# Patient Record
Sex: FEMALE | Race: OTHER | NOT HISPANIC OR LATINO
[De-identification: names, ages, dates, MRNs, and addresses within clinical notes are randomized per-mention and may not be internally consistent; named-entity substitution may affect disease eponyms.]

---

## 2017-01-04 ENCOUNTER — RESULT REVIEW (OUTPATIENT)
Age: 67
End: 2017-01-04

## 2017-01-06 ENCOUNTER — APPOINTMENT (OUTPATIENT)
Dept: MRI IMAGING | Facility: CLINIC | Age: 67
End: 2017-01-06

## 2017-03-02 ENCOUNTER — INPATIENT (INPATIENT)
Facility: HOSPITAL | Age: 67
LOS: 20 days | Discharge: HOME | DRG: 73 | End: 2017-03-23
Attending: INTERNAL MEDICINE | Admitting: INTERNAL MEDICINE
Payer: MEDICARE

## 2017-03-02 VITALS
TEMPERATURE: 97 F | SYSTOLIC BLOOD PRESSURE: 201 MMHG | HEART RATE: 115 BPM | OXYGEN SATURATION: 95 % | DIASTOLIC BLOOD PRESSURE: 96 MMHG

## 2017-03-02 DIAGNOSIS — R79.89 OTHER SPECIFIED ABNORMAL FINDINGS OF BLOOD CHEMISTRY: ICD-10-CM

## 2017-03-02 DIAGNOSIS — R11.2 NAUSEA WITH VOMITING, UNSPECIFIED: ICD-10-CM

## 2017-03-02 DIAGNOSIS — I10 ESSENTIAL (PRIMARY) HYPERTENSION: ICD-10-CM

## 2017-03-02 DIAGNOSIS — N17.9 ACUTE KIDNEY FAILURE, UNSPECIFIED: ICD-10-CM

## 2017-03-02 DIAGNOSIS — I48.2 CHRONIC ATRIAL FIBRILLATION: ICD-10-CM

## 2017-03-02 DIAGNOSIS — E11.22 TYPE 2 DIABETES MELLITUS WITH DIABETIC CHRONIC KIDNEY DISEASE: ICD-10-CM

## 2017-03-02 LAB
ALBUMIN SERPL ELPH-MCNC: 3.8 G/DL — SIGNIFICANT CHANGE UP (ref 3.3–5)
ALP SERPL-CCNC: 128 U/L — HIGH (ref 40–120)
ALT FLD-CCNC: 29 U/L RC — SIGNIFICANT CHANGE UP (ref 10–45)
ANION GAP SERPL CALC-SCNC: 19 MMOL/L — HIGH (ref 5–17)
APPEARANCE UR: CLEAR — SIGNIFICANT CHANGE UP
APTT BLD: 34.9 SEC — SIGNIFICANT CHANGE UP (ref 27.5–37.4)
AST SERPL-CCNC: 26 U/L — SIGNIFICANT CHANGE UP (ref 10–40)
BACTERIA # UR AUTO: ABNORMAL /HPF
BASOPHILS # BLD AUTO: 0 K/UL — SIGNIFICANT CHANGE UP (ref 0–0.2)
BASOPHILS NFR BLD AUTO: 0.1 % — SIGNIFICANT CHANGE UP (ref 0–2)
BILIRUB SERPL-MCNC: 0.4 MG/DL — SIGNIFICANT CHANGE UP (ref 0.2–1.2)
BILIRUB UR-MCNC: NEGATIVE — SIGNIFICANT CHANGE UP
BUN SERPL-MCNC: 100 MG/DL — HIGH (ref 7–23)
CALCIUM SERPL-MCNC: 11.3 MG/DL — HIGH (ref 8.4–10.5)
CHLORIDE SERPL-SCNC: 90 MMOL/L — LOW (ref 96–108)
CK MB CFR SERPL CALC: 7.7 NG/ML — HIGH (ref 0–3.8)
CK MB CFR SERPL CALC: 8.5 NG/ML — HIGH (ref 0–3.8)
CK SERPL-CCNC: 55 U/L — SIGNIFICANT CHANGE UP (ref 25–170)
CK SERPL-CCNC: 70 U/L — SIGNIFICANT CHANGE UP (ref 25–170)
CO2 SERPL-SCNC: 23 MMOL/L — SIGNIFICANT CHANGE UP (ref 22–31)
COLOR SPEC: SIGNIFICANT CHANGE UP
CREAT SERPL-MCNC: 2.84 MG/DL — HIGH (ref 0.5–1.3)
DIFF PNL FLD: ABNORMAL
EOSINOPHIL # BLD AUTO: 0 K/UL — SIGNIFICANT CHANGE UP (ref 0–0.5)
EOSINOPHIL NFR BLD AUTO: 0.3 % — SIGNIFICANT CHANGE UP (ref 0–6)
EPI CELLS # UR: SIGNIFICANT CHANGE UP /HPF
GAS PNL BLDV: SIGNIFICANT CHANGE UP
GLUCOSE SERPL-MCNC: 134 MG/DL — HIGH (ref 70–99)
GLUCOSE UR QL: 300
HCT VFR BLD CALC: 37.3 % — SIGNIFICANT CHANGE UP (ref 34.5–45)
HGB BLD-MCNC: 12.2 G/DL — SIGNIFICANT CHANGE UP (ref 11.5–15.5)
INR BLD: 1.53 RATIO — HIGH (ref 0.88–1.16)
INR BLD: 1.54 RATIO — HIGH (ref 0.88–1.16)
KETONES UR-MCNC: NEGATIVE — SIGNIFICANT CHANGE UP
LEUKOCYTE ESTERASE UR-ACNC: NEGATIVE — SIGNIFICANT CHANGE UP
LIDOCAIN IGE QN: 93 U/L — HIGH (ref 7–60)
LYMPHOCYTES # BLD AUTO: 0.8 K/UL — LOW (ref 1–3.3)
LYMPHOCYTES # BLD AUTO: 6 % — LOW (ref 13–44)
MCHC RBC-ENTMCNC: 24.7 PG — LOW (ref 27–34)
MCHC RBC-ENTMCNC: 32.7 GM/DL — SIGNIFICANT CHANGE UP (ref 32–36)
MCV RBC AUTO: 75.5 FL — LOW (ref 80–100)
MONOCYTES # BLD AUTO: 0.3 K/UL — SIGNIFICANT CHANGE UP (ref 0–0.9)
MONOCYTES NFR BLD AUTO: 2.3 % — SIGNIFICANT CHANGE UP (ref 2–14)
NEUTROPHILS # BLD AUTO: 12.8 K/UL — HIGH (ref 1.8–7.4)
NEUTROPHILS NFR BLD AUTO: 91.2 % — HIGH (ref 43–77)
NITRITE UR-MCNC: NEGATIVE — SIGNIFICANT CHANGE UP
NT-PROBNP SERPL-SCNC: 1371 PG/ML — HIGH (ref 0–300)
PH UR: 7 — SIGNIFICANT CHANGE UP (ref 4.8–8)
PLATELET # BLD AUTO: 465 K/UL — HIGH (ref 150–400)
POTASSIUM SERPL-MCNC: 4.4 MMOL/L — SIGNIFICANT CHANGE UP (ref 3.5–5.3)
POTASSIUM SERPL-SCNC: 4.4 MMOL/L — SIGNIFICANT CHANGE UP (ref 3.5–5.3)
PROT SERPL-MCNC: 8.7 G/DL — HIGH (ref 6–8.3)
PROT UR-MCNC: 500 MG/DL
PROTHROM AB SERPL-ACNC: 16.8 SEC — HIGH (ref 10–13.1)
PROTHROM AB SERPL-ACNC: 16.9 SEC — HIGH (ref 10–13.1)
RBC # BLD: 4.94 M/UL — SIGNIFICANT CHANGE UP (ref 3.8–5.2)
RBC # FLD: 17.6 % — HIGH (ref 10.3–14.5)
RBC CASTS # UR COMP ASSIST: SIGNIFICANT CHANGE UP /HPF (ref 0–2)
SODIUM SERPL-SCNC: 132 MMOL/L — LOW (ref 135–145)
SP GR SPEC: 1.01 — SIGNIFICANT CHANGE UP (ref 1.01–1.02)
TROPONIN T SERPL-MCNC: 0.17 NG/ML — HIGH (ref 0–0.06)
TROPONIN T SERPL-MCNC: 0.19 NG/ML — HIGH (ref 0–0.06)
UROBILINOGEN FLD QL: NEGATIVE — SIGNIFICANT CHANGE UP
WBC # BLD: 14 K/UL — HIGH (ref 3.8–10.5)
WBC # FLD AUTO: 14 K/UL — HIGH (ref 3.8–10.5)
WBC UR QL: SIGNIFICANT CHANGE UP /HPF (ref 0–5)

## 2017-03-02 PROCEDURE — 74176 CT ABD & PELVIS W/O CONTRAST: CPT | Mod: 26

## 2017-03-02 PROCEDURE — 93010 ELECTROCARDIOGRAM REPORT: CPT

## 2017-03-02 PROCEDURE — 99223 1ST HOSP IP/OBS HIGH 75: CPT

## 2017-03-02 PROCEDURE — 99285 EMERGENCY DEPT VISIT HI MDM: CPT | Mod: 25

## 2017-03-02 PROCEDURE — 71010: CPT | Mod: 26

## 2017-03-02 RX ORDER — INSULIN LISPRO 100/ML
VIAL (ML) SUBCUTANEOUS AT BEDTIME
Qty: 0 | Refills: 0 | Status: DISCONTINUED | OUTPATIENT
Start: 2017-03-02 | End: 2017-03-23

## 2017-03-02 RX ORDER — ONDANSETRON 8 MG/1
4 TABLET, FILM COATED ORAL ONCE
Qty: 0 | Refills: 0 | Status: COMPLETED | OUTPATIENT
Start: 2017-03-02 | End: 2017-03-02

## 2017-03-02 RX ORDER — SODIUM CHLORIDE 9 MG/ML
2000 INJECTION INTRAMUSCULAR; INTRAVENOUS; SUBCUTANEOUS ONCE
Qty: 0 | Refills: 0 | Status: COMPLETED | OUTPATIENT
Start: 2017-03-02 | End: 2017-03-02

## 2017-03-02 RX ORDER — DEXTROSE 50 % IN WATER 50 %
1 SYRINGE (ML) INTRAVENOUS ONCE
Qty: 0 | Refills: 0 | Status: DISCONTINUED | OUTPATIENT
Start: 2017-03-02 | End: 2017-03-23

## 2017-03-02 RX ORDER — AMLODIPINE BESYLATE 2.5 MG/1
5 TABLET ORAL DAILY
Qty: 0 | Refills: 0 | Status: DISCONTINUED | OUTPATIENT
Start: 2017-03-02 | End: 2017-03-03

## 2017-03-02 RX ORDER — INSULIN LISPRO 100/ML
VIAL (ML) SUBCUTANEOUS
Qty: 0 | Refills: 0 | Status: DISCONTINUED | OUTPATIENT
Start: 2017-03-02 | End: 2017-03-23

## 2017-03-02 RX ORDER — WARFARIN SODIUM 2.5 MG/1
4 TABLET ORAL ONCE
Qty: 0 | Refills: 0 | Status: COMPLETED | OUTPATIENT
Start: 2017-03-02 | End: 2017-03-02

## 2017-03-02 RX ORDER — SODIUM CHLORIDE 9 MG/ML
1000 INJECTION, SOLUTION INTRAVENOUS
Qty: 0 | Refills: 0 | Status: DISCONTINUED | OUTPATIENT
Start: 2017-03-02 | End: 2017-03-23

## 2017-03-02 RX ORDER — ONDANSETRON 8 MG/1
4 TABLET, FILM COATED ORAL EVERY 6 HOURS
Qty: 0 | Refills: 0 | Status: DISCONTINUED | OUTPATIENT
Start: 2017-03-02 | End: 2017-03-23

## 2017-03-02 RX ORDER — DEXTROSE 50 % IN WATER 50 %
25 SYRINGE (ML) INTRAVENOUS ONCE
Qty: 0 | Refills: 0 | Status: DISCONTINUED | OUTPATIENT
Start: 2017-03-02 | End: 2017-03-23

## 2017-03-02 RX ORDER — GLUCAGON INJECTION, SOLUTION 0.5 MG/.1ML
1 INJECTION, SOLUTION SUBCUTANEOUS ONCE
Qty: 0 | Refills: 0 | Status: DISCONTINUED | OUTPATIENT
Start: 2017-03-02 | End: 2017-03-23

## 2017-03-02 RX ORDER — HEPARIN SODIUM 5000 [USP'U]/ML
5000 INJECTION INTRAVENOUS; SUBCUTANEOUS EVERY 12 HOURS
Qty: 0 | Refills: 0 | Status: DISCONTINUED | OUTPATIENT
Start: 2017-03-02 | End: 2017-03-02

## 2017-03-02 RX ORDER — CITALOPRAM 10 MG/1
10 TABLET, FILM COATED ORAL DAILY
Qty: 0 | Refills: 0 | Status: DISCONTINUED | OUTPATIENT
Start: 2017-03-03 | End: 2017-03-07

## 2017-03-02 RX ORDER — SODIUM CHLORIDE 9 MG/ML
1000 INJECTION INTRAMUSCULAR; INTRAVENOUS; SUBCUTANEOUS
Qty: 0 | Refills: 0 | Status: DISCONTINUED | OUTPATIENT
Start: 2017-03-02 | End: 2017-03-03

## 2017-03-02 RX ORDER — SODIUM CHLORIDE 9 MG/ML
3 INJECTION INTRAMUSCULAR; INTRAVENOUS; SUBCUTANEOUS ONCE
Qty: 0 | Refills: 0 | Status: COMPLETED | OUTPATIENT
Start: 2017-03-02 | End: 2017-03-02

## 2017-03-02 RX ADMIN — SODIUM CHLORIDE 3 MILLILITER(S): 9 INJECTION INTRAMUSCULAR; INTRAVENOUS; SUBCUTANEOUS at 14:07

## 2017-03-02 RX ADMIN — ONDANSETRON 4 MILLIGRAM(S): 8 TABLET, FILM COATED ORAL at 20:14

## 2017-03-02 RX ADMIN — AMLODIPINE BESYLATE 5 MILLIGRAM(S): 2.5 TABLET ORAL at 20:21

## 2017-03-02 RX ADMIN — Medication 1 DROP(S): at 21:48

## 2017-03-02 RX ADMIN — ONDANSETRON 4 MILLIGRAM(S): 8 TABLET, FILM COATED ORAL at 15:04

## 2017-03-02 RX ADMIN — WARFARIN SODIUM 4 MILLIGRAM(S): 2.5 TABLET ORAL at 21:48

## 2017-03-02 RX ADMIN — SODIUM CHLORIDE 2000 MILLILITER(S): 9 INJECTION INTRAMUSCULAR; INTRAVENOUS; SUBCUTANEOUS at 15:04

## 2017-03-02 NOTE — H&P ADULT. - PROBLEM SELECTOR PROBLEM 3
Type 2 diabetes mellitus with chronic kidney disease, without long-term current use of insulin, unspecified CKD stage

## 2017-03-02 NOTE — H&P ADULT. - PROBLEM SELECTOR PLAN 2
- Renal consulted, f/u recs  -  trend Cr   - Gentle IVF 70ml/hr CKD Stage 4   - Renal consulted, f/u recs  -  trend Cr   - Gentle IVF 70ml/hr

## 2017-03-02 NOTE — ED PROVIDER NOTE - OBJECTIVE STATEMENT
66 year old female patient with pmhx of DM, HTN, HLD presents to the ED from rehab c/o vomiting due intolerance to PO intake x20-30 days with nausea and abdominal pain. Patient vomits after PO intake. Had a lengthy hospital stay in October and November. Last bowel movement was   Denies fever, diarrhea, cough, chest pain, shortness of breath. 66 year old female patient with pmhx of DM, HTN, HLD presents to the ED from rehab c/o vomiting due intolerance to PO intake x20-30 days with nausea and abdominal pain. Patient vomits after PO intake. Had a lengthy hospital stay in October and November.   Denies fever, diarrhea, cough, chest pain, shortness of breath.

## 2017-03-02 NOTE — H&P ADULT. - PSH
S/P Amputation of Lesser Toe  Rt 1-3 metatarsal  S/P amputation of lesser toe, right  2013 right great toe, 2nd and 3rd right toes

## 2017-03-02 NOTE — H&P ADULT. - PROBLEM SELECTOR PLAN 5
Trop 0.19: No CP; likely demand possibly in setting of significant hypertension.   - Trend trops and EKG

## 2017-03-02 NOTE — ED PROVIDER NOTE - PMH
Cellulitis of Foot    Diabetes  Type 2  Diabetes    Diabetic Neuropathy    Edema of Both Legs    History of Osteoarthritis    HLD (Hyperlipidemia)    HTN - Hypertension    Hyperlipidemia    Hypertension    Osteomyelitis

## 2017-03-02 NOTE — H&P ADULT. - RADIOLOGY RESULTS AND INTERPRETATION
Personally reviewed imaging. Imaging notable for mild b/l midlung linear atelectasis. CT Abd/Pelvis demonstrated small hiatal hernia. PEG in satisfactory position. No bowel obstruction. mild pulm edema.

## 2017-03-02 NOTE — ED PROVIDER NOTE - NS ED MD SCRIBE ATTENDING SCRIBE SECTIONS
PHYSICAL EXAM/VITAL SIGNS( Pullset)/HISTORY OF PRESENT ILLNESS/HIV/REVIEW OF SYSTEMS/PAST MEDICAL/SURGICAL/SOCIAL HISTORY/DISPOSITION

## 2017-03-02 NOTE — H&P ADULT. - FAMILY HISTORY
Father  Still living? Unknown  Family history of diabetes mellitus in father, Age at diagnosis: Age Unknown

## 2017-03-02 NOTE — H&P ADULT. - PROBLEM SELECTOR PLAN 1
Unclear etiology. CT Abd Pelvis without any evidence of bowel obstruction. PEG tube per imaging in place. Unclear etiology. CT Abd Pelvis without any evidence of bowel obstruction. PEG tube per imaging in place.  - Zofran prn  - Consider GI consult in am  - NPO for now; except meds; Will hold on feeds

## 2017-03-02 NOTE — H&P ADULT. - HISTORY OF PRESENT ILLNESS
67 yo woman w/DM w/diabetic neuropathy, HTN, HLD, CKD stage 4, afib  severe lymphedema, sent in from [facility] for nausea/vomiting for 1 month. The patient denied any significant abdominal pain but reported nausea and repeated episodes of vomiting nonbilious non bloody contents. She reported fatigue and generalized weakness. She reports that she vomits immediately after taking any feeds or medications via the G-tube. Per transfer documents, the patient had a BM earlier today prior to transfer. She denied any abdominal pain, fevers/chills, CP, SOB, sick contacts, or skin changes. At the facility, an abdominal XR was done negative for any evidence of obstruction or foreign body. Per transfer records, the patient was advanced to dysphagia and then mechanical soft foods per speech and swallow. The speech therapist noted persistent regrugitation of gastric fluids.     Of note, she had a prolonged hospitalization where she was hospitalized for RLE cellulitis/osteomyelitis course c/b afib and PEA arrest requiring intubation s/p trach given prolonged wean, NEHAL requiring HD, and dysphagia requiring PEG tube placement.      In the ED, initial vitals Afebrile,  201/96 95% on RA  Labs notable for leukocytosis 14.0 Hb 12.2 Plt 465 BMP Na 132,  Cr 2.84 K 4.4   Trop 0.19/CKMB 8.5; BNP 1371; VBG 7.37/46/26/1.1  UA few bacteria, negative nitrite negative LE.     Imaging notable for mild b/l midlung linear atelectasis. CT Abd/Pelvis demonstrated small hiatal hernia. PEG in satisfactory position. No bowel obstruction. mild pulm edema.     In the ED, she received 2L NS and was started on  NS @ 70 ml/hr. Zofran 4mg x1.     Renal consulted and she was admitted to medicine for further evaluation.

## 2017-03-02 NOTE — ED ADULT NURSE NOTE - OBJECTIVE STATEMENT
Pt came in with c/o nausea and vomiting. No distress. Breathing easy and non labored. Pt from rehab. Pt able to move all extremities. Son at bedside.

## 2017-03-02 NOTE — H&P ADULT. - ASSESSMENT
66F w/DM w/diabetic neuropathy, HTN, HLD, CKD stage 4, afib  severe lymphedema, [facility] for 1 month admitted for NEHAL on CKD and evaluation of nausea/vomiting.

## 2017-03-02 NOTE — ED ADULT NURSE REASSESSMENT NOTE - NS ED NURSE REASSESS COMMENT FT1
Endorsed to Lizabeth that Normal saline bolus not completed because pt refused to keep left arm straight. IV reinforced

## 2017-03-02 NOTE — H&P ADULT. - LAB RESULTS AND INTERPRETATION
Personally reviewed Labs notable for leukocytosis 14.0 Hb 12.2 Plt 465 BMP Na 132,  Cr 2.84 K 4.4   Trop 0.19/CKMB 8.5; BNP 1371; VBG 7.37/46/26/1.1  UA few bacteria, negative nitrite negative LE.

## 2017-03-03 LAB
ANION GAP SERPL CALC-SCNC: 16 MMOL/L — SIGNIFICANT CHANGE UP (ref 5–17)
APTT BLD: 32.5 SEC — SIGNIFICANT CHANGE UP (ref 27.5–37.4)
BASOPHILS # BLD AUTO: 0.03 K/UL — SIGNIFICANT CHANGE UP (ref 0–0.2)
BASOPHILS NFR BLD AUTO: 0.2 % — SIGNIFICANT CHANGE UP (ref 0–2)
BUN SERPL-MCNC: 96 MG/DL — HIGH (ref 7–23)
CALCIUM SERPL-MCNC: 10.4 MG/DL — SIGNIFICANT CHANGE UP (ref 8.4–10.5)
CHLORIDE SERPL-SCNC: 95 MMOL/L — LOW (ref 96–108)
CO2 SERPL-SCNC: 22 MMOL/L — SIGNIFICANT CHANGE UP (ref 22–31)
CREAT SERPL-MCNC: 2.63 MG/DL — HIGH (ref 0.5–1.3)
CULTURE RESULTS: SIGNIFICANT CHANGE UP
EOSINOPHIL # BLD AUTO: 0.19 K/UL — SIGNIFICANT CHANGE UP (ref 0–0.5)
EOSINOPHIL NFR BLD AUTO: 1.4 % — SIGNIFICANT CHANGE UP (ref 0–6)
GLUCOSE SERPL-MCNC: 91 MG/DL — SIGNIFICANT CHANGE UP (ref 70–99)
HBA1C BLD-MCNC: 6.4 % — HIGH (ref 4–5.6)
HCT VFR BLD CALC: 31 % — LOW (ref 34.5–45)
HGB BLD-MCNC: 10.2 G/DL — LOW (ref 11.5–15.5)
IMM GRANULOCYTES NFR BLD AUTO: 0.2 % — SIGNIFICANT CHANGE UP (ref 0–1.5)
INR BLD: 1.55 RATIO — HIGH (ref 0.88–1.16)
LYMPHOCYTES # BLD AUTO: 1.55 K/UL — SIGNIFICANT CHANGE UP (ref 1–3.3)
LYMPHOCYTES # BLD AUTO: 11.6 % — LOW (ref 13–44)
MAGNESIUM SERPL-MCNC: 3 MG/DL — HIGH (ref 1.6–2.6)
MCHC RBC-ENTMCNC: 24.5 PG — LOW (ref 27–34)
MCHC RBC-ENTMCNC: 32.9 GM/DL — SIGNIFICANT CHANGE UP (ref 32–36)
MCV RBC AUTO: 74.3 FL — LOW (ref 80–100)
MONOCYTES # BLD AUTO: 0.57 K/UL — SIGNIFICANT CHANGE UP (ref 0–0.9)
MONOCYTES NFR BLD AUTO: 4.3 % — SIGNIFICANT CHANGE UP (ref 2–14)
NEUTROPHILS # BLD AUTO: 11.02 K/UL — HIGH (ref 1.8–7.4)
NEUTROPHILS NFR BLD AUTO: 82.3 % — HIGH (ref 43–77)
PHOSPHATE SERPL-MCNC: 5.6 MG/DL — HIGH (ref 2.5–4.5)
PLATELET # BLD AUTO: 464 K/UL — HIGH (ref 150–400)
POTASSIUM SERPL-MCNC: 3.3 MMOL/L — LOW (ref 3.5–5.3)
POTASSIUM SERPL-SCNC: 3.3 MMOL/L — LOW (ref 3.5–5.3)
PROTHROM AB SERPL-ACNC: 17.6 SEC — HIGH (ref 10–13.1)
RBC # BLD: 4.17 M/UL — SIGNIFICANT CHANGE UP (ref 3.8–5.2)
RBC # FLD: 18.1 % — HIGH (ref 10.3–14.5)
SODIUM SERPL-SCNC: 133 MMOL/L — LOW (ref 135–145)
SPECIMEN SOURCE: SIGNIFICANT CHANGE UP
WBC # BLD: 13.39 K/UL — HIGH (ref 3.8–10.5)
WBC # FLD AUTO: 13.39 K/UL — HIGH (ref 3.8–10.5)

## 2017-03-03 RX ORDER — METOCLOPRAMIDE HCL 10 MG
5 TABLET ORAL EVERY 8 HOURS
Qty: 0 | Refills: 0 | Status: DISCONTINUED | OUTPATIENT
Start: 2017-03-03 | End: 2017-03-07

## 2017-03-03 RX ORDER — HYDRALAZINE HCL 50 MG
10 TABLET ORAL
Qty: 0 | Refills: 0 | Status: DISCONTINUED | OUTPATIENT
Start: 2017-03-03 | End: 2017-03-04

## 2017-03-03 RX ORDER — LISINOPRIL 2.5 MG/1
5 TABLET ORAL DAILY
Qty: 0 | Refills: 0 | Status: DISCONTINUED | OUTPATIENT
Start: 2017-03-03 | End: 2017-03-07

## 2017-03-03 RX ORDER — ASPIRIN/CALCIUM CARB/MAGNESIUM 324 MG
81 TABLET ORAL DAILY
Qty: 0 | Refills: 0 | Status: DISCONTINUED | OUTPATIENT
Start: 2017-03-03 | End: 2017-03-04

## 2017-03-03 RX ORDER — WARFARIN SODIUM 2.5 MG/1
4 TABLET ORAL ONCE
Qty: 0 | Refills: 0 | Status: COMPLETED | OUTPATIENT
Start: 2017-03-03 | End: 2017-03-03

## 2017-03-03 RX ORDER — POT CHLORIDE/POT BICARB/CIT AC 25 MEQ
25 TABLET, EFFERVESCENT ORAL
Qty: 0 | Refills: 0 | Status: COMPLETED | OUTPATIENT
Start: 2017-03-03 | End: 2017-03-03

## 2017-03-03 RX ORDER — AMLODIPINE BESYLATE 2.5 MG/1
5 TABLET ORAL ONCE
Qty: 0 | Refills: 0 | Status: DISCONTINUED | OUTPATIENT
Start: 2017-03-03 | End: 2017-03-03

## 2017-03-03 RX ORDER — HYDRALAZINE HCL 50 MG
10 TABLET ORAL
Qty: 0 | Refills: 0 | Status: DISCONTINUED | OUTPATIENT
Start: 2017-03-03 | End: 2017-03-03

## 2017-03-03 RX ORDER — ZALEPLON 10 MG
5 CAPSULE ORAL AT BEDTIME
Qty: 0 | Refills: 0 | Status: DISCONTINUED | OUTPATIENT
Start: 2017-03-03 | End: 2017-03-07

## 2017-03-03 RX ORDER — HYDRALAZINE HCL 50 MG
5 TABLET ORAL ONCE
Qty: 0 | Refills: 0 | Status: COMPLETED | OUTPATIENT
Start: 2017-03-03 | End: 2017-03-03

## 2017-03-03 RX ORDER — SODIUM CHLORIDE 9 MG/ML
1000 INJECTION, SOLUTION INTRAVENOUS
Qty: 0 | Refills: 0 | Status: DISCONTINUED | OUTPATIENT
Start: 2017-03-03 | End: 2017-03-05

## 2017-03-03 RX ADMIN — ONDANSETRON 4 MILLIGRAM(S): 8 TABLET, FILM COATED ORAL at 04:49

## 2017-03-03 RX ADMIN — LISINOPRIL 5 MILLIGRAM(S): 2.5 TABLET ORAL at 17:08

## 2017-03-03 RX ADMIN — CITALOPRAM 10 MILLIGRAM(S): 10 TABLET, FILM COATED ORAL at 12:59

## 2017-03-03 RX ADMIN — AMLODIPINE BESYLATE 5 MILLIGRAM(S): 2.5 TABLET ORAL at 05:30

## 2017-03-03 RX ADMIN — Medication 1 DROP(S): at 15:49

## 2017-03-03 RX ADMIN — Medication 5 MILLIGRAM(S): at 23:35

## 2017-03-03 RX ADMIN — Medication 5 MILLIGRAM(S): at 15:50

## 2017-03-03 RX ADMIN — Medication 25 MILLIEQUIVALENT(S): at 11:06

## 2017-03-03 RX ADMIN — ONDANSETRON 4 MILLIGRAM(S): 8 TABLET, FILM COATED ORAL at 22:10

## 2017-03-03 RX ADMIN — Medication 25 MILLIEQUIVALENT(S): at 12:59

## 2017-03-03 RX ADMIN — Medication 5 MILLIGRAM(S): at 06:50

## 2017-03-03 RX ADMIN — SODIUM CHLORIDE 70 MILLILITER(S): 9 INJECTION, SOLUTION INTRAVENOUS at 11:06

## 2017-03-03 RX ADMIN — Medication 1 DROP(S): at 05:30

## 2017-03-03 RX ADMIN — SODIUM CHLORIDE 70 MILLILITER(S): 9 INJECTION, SOLUTION INTRAVENOUS at 22:06

## 2017-03-03 RX ADMIN — Medication 5 MILLIGRAM(S): at 23:03

## 2017-03-03 RX ADMIN — Medication 81 MILLIGRAM(S): at 12:59

## 2017-03-03 RX ADMIN — ONDANSETRON 4 MILLIGRAM(S): 8 TABLET, FILM COATED ORAL at 11:20

## 2017-03-03 RX ADMIN — Medication 10 MILLIGRAM(S): at 17:07

## 2017-03-03 RX ADMIN — Medication 1 DROP(S): at 22:07

## 2017-03-03 RX ADMIN — WARFARIN SODIUM 4 MILLIGRAM(S): 2.5 TABLET ORAL at 23:06

## 2017-03-03 NOTE — DIETITIAN INITIAL EVALUATION ADULT. - ADHERENCE
noted per transfer records, pt was on Nepro via PEG for a total volume of 1200ml per day, providing 2160cal, 96Gm prot, pt reports she was on nocturnal feeds

## 2017-03-03 NOTE — DIETITIAN INITIAL EVALUATION ADULT. - ENERGY NEEDS
ht: 5' , wt: 160 pounds, BMI:31.2 kg/m2, IBW: 100 pounds +/- 10%    pt admitted c nausea/vomiting, abdominal pain from [facility]; Pmhx noted (including lymphedema); noted no obstruction noted. Spoke c NP, per attending, pt was on po diet and tube feeds and possibly po diet was making her nauseous, would like to try bolus feeds at this time. Noted questionable if pt c pressure ulcer.

## 2017-03-03 NOTE — DIETITIAN INITIAL EVALUATION ADULT. - NS AS NUTRI INTERV ENTERAL NUTRITION
Discussed c NP, conflicting information and concern for whether bolus feeds will help c relieving nausea; noted attending knows pt well and would like to try bolus feeds: recommend gradually increasing feeds to 7 cans daily of Glucerna 1.2 (1995cal, 99Gm prot; ~27cal/kg of stated wt; ~2Gm prot/kg based on IBW). RD remains available as needed to adjust tube feeds as needed based on tolerance, wt and labs.

## 2017-03-03 NOTE — DIETITIAN INITIAL EVALUATION ADULT. - FACTORS AFF FOOD INTAKE
noted per MBS on 10/31/2016, recommended for NPO c non-oral means of hydration; pt reports at [place] she was having daily BMs for the most part; pt reports she has been experiencing nausea c tube feeds for the past 4-5 months, unable to recall whether she has nausea when she was in the hospital/difficulty chewing/difficulty swallowing

## 2017-03-03 NOTE — DIETITIAN INITIAL EVALUATION ADULT. - PHYSICAL APPEARANCE
nutrition focused physical exam conducted, mild muscle loss around shoulders and severe wasting in calf muscles noted at this time/overweight

## 2017-03-03 NOTE — DIETITIAN INITIAL EVALUATION ADULT. - NS FNS WEIGHT USED FOR CALC
stated/160 pounds; IBW for prot needs; defer fluid needs to team at this time; estimated nutrient needs can be adjusted as needed pending more accurate wt

## 2017-03-03 NOTE — DIETITIAN INITIAL EVALUATION ADULT. - OTHER INFO
pt seen for consult for chewing/swallowing difficulty. pt reports she weighed about 200 pounds about 6 months ago and states now she weighs about 160 pounds, noted admission wt of 171.9 pounds, questionable which wt more accurate, however, overall pt c wt loss in short period of time-possibly related to hospitalization, decreased po intake compared to how much pt used to eat, noted pt was obese- likely po intake at her usual wt versus how much she was getting via tube feeds (which did provide her c adequate prot and calories and micronutrient coverage) was lower. pt reports NKFA. Noted per chart, pt was on Coumadin PTA. pt reports at [facility], her Finger sticks were usually WNL, states she remembers them only going as high as 170 but not much higher than that and could not recall how low they had been. Noted pt was on Glucerna 1.2 upon DC to [facility] in November 2016 per DC note, per pt she is unsure which formulas she was on.

## 2017-03-03 NOTE — DIETITIAN INITIAL EVALUATION ADULT. - PROBLEM SELECTOR PLAN 1
Unclear etiology. CT Abd Pelvis without any evidence of bowel obstruction. PEG tube per imaging in place.  - Zofran prn  - Consider GI consult in am  - NPO for now; except meds; Will hold on feeds

## 2017-03-03 NOTE — DIETITIAN INITIAL EVALUATION ADULT. - ORAL INTAKE PTA
pt reports being NPO at [place], noted per H&P, pt recommended for mechanical soft diet, pt reports she was not eating anything by mouth

## 2017-03-04 LAB
ANION GAP SERPL CALC-SCNC: 18 MMOL/L — HIGH (ref 5–17)
BUN SERPL-MCNC: 83 MG/DL — HIGH (ref 7–23)
CALCIUM SERPL-MCNC: 10.9 MG/DL — HIGH (ref 8.4–10.5)
CHLORIDE SERPL-SCNC: 105 MMOL/L — SIGNIFICANT CHANGE UP (ref 96–108)
CO2 SERPL-SCNC: 19 MMOL/L — LOW (ref 22–31)
CREAT SERPL-MCNC: 2.56 MG/DL — HIGH (ref 0.5–1.3)
GLUCOSE SERPL-MCNC: 99 MG/DL — SIGNIFICANT CHANGE UP (ref 70–99)
HCT VFR BLD CALC: 35.6 % — SIGNIFICANT CHANGE UP (ref 34.5–45)
HGB BLD-MCNC: 11.6 G/DL — SIGNIFICANT CHANGE UP (ref 11.5–15.5)
INR BLD: 2.02 RATIO — HIGH (ref 0.88–1.16)
MCHC RBC-ENTMCNC: 24.8 PG — LOW (ref 27–34)
MCHC RBC-ENTMCNC: 32.6 GM/DL — SIGNIFICANT CHANGE UP (ref 32–36)
MCV RBC AUTO: 76.1 FL — LOW (ref 80–100)
PLATELET # BLD AUTO: 438 K/UL — HIGH (ref 150–400)
POTASSIUM SERPL-MCNC: 3.6 MMOL/L — SIGNIFICANT CHANGE UP (ref 3.5–5.3)
POTASSIUM SERPL-SCNC: 3.6 MMOL/L — SIGNIFICANT CHANGE UP (ref 3.5–5.3)
PROTHROM AB SERPL-ACNC: 23 SEC — HIGH (ref 10–13.1)
RBC # BLD: 4.68 M/UL — SIGNIFICANT CHANGE UP (ref 3.8–5.2)
RBC # FLD: 18.8 % — HIGH (ref 10.3–14.5)
SODIUM SERPL-SCNC: 142 MMOL/L — SIGNIFICANT CHANGE UP (ref 135–145)
WBC # BLD: 13.07 K/UL — HIGH (ref 3.8–10.5)
WBC # FLD AUTO: 13.07 K/UL — HIGH (ref 3.8–10.5)

## 2017-03-04 RX ORDER — WARFARIN SODIUM 2.5 MG/1
4 TABLET ORAL ONCE
Qty: 0 | Refills: 0 | Status: COMPLETED | OUTPATIENT
Start: 2017-03-04 | End: 2017-03-04

## 2017-03-04 RX ORDER — HYDRALAZINE HCL 50 MG
25 TABLET ORAL
Qty: 0 | Refills: 0 | Status: DISCONTINUED | OUTPATIENT
Start: 2017-03-04 | End: 2017-03-05

## 2017-03-04 RX ORDER — ASPIRIN/CALCIUM CARB/MAGNESIUM 324 MG
81 TABLET ORAL DAILY
Qty: 0 | Refills: 0 | Status: DISCONTINUED | OUTPATIENT
Start: 2017-03-04 | End: 2017-03-07

## 2017-03-04 RX ADMIN — Medication 1 DROP(S): at 05:23

## 2017-03-04 RX ADMIN — Medication 1: at 12:02

## 2017-03-04 RX ADMIN — WARFARIN SODIUM 4 MILLIGRAM(S): 2.5 TABLET ORAL at 22:46

## 2017-03-04 RX ADMIN — SODIUM CHLORIDE 70 MILLILITER(S): 9 INJECTION, SOLUTION INTRAVENOUS at 17:53

## 2017-03-04 RX ADMIN — Medication 1 DROP(S): at 22:46

## 2017-03-04 RX ADMIN — Medication 5 MILLIGRAM(S): at 13:56

## 2017-03-04 RX ADMIN — ONDANSETRON 4 MILLIGRAM(S): 8 TABLET, FILM COATED ORAL at 09:46

## 2017-03-04 RX ADMIN — Medication 1 DROP(S): at 13:55

## 2017-03-04 RX ADMIN — Medication 10 MILLIGRAM(S): at 05:22

## 2017-03-04 RX ADMIN — LISINOPRIL 5 MILLIGRAM(S): 2.5 TABLET ORAL at 05:22

## 2017-03-04 RX ADMIN — ONDANSETRON 4 MILLIGRAM(S): 8 TABLET, FILM COATED ORAL at 21:22

## 2017-03-04 RX ADMIN — CITALOPRAM 10 MILLIGRAM(S): 10 TABLET, FILM COATED ORAL at 12:02

## 2017-03-04 RX ADMIN — Medication 10 MILLIGRAM(S): at 17:53

## 2017-03-04 RX ADMIN — Medication 5 MILLIGRAM(S): at 22:46

## 2017-03-04 RX ADMIN — Medication 81 MILLIGRAM(S): at 13:55

## 2017-03-04 RX ADMIN — ONDANSETRON 4 MILLIGRAM(S): 8 TABLET, FILM COATED ORAL at 15:35

## 2017-03-04 RX ADMIN — SODIUM CHLORIDE 70 MILLILITER(S): 9 INJECTION, SOLUTION INTRAVENOUS at 23:05

## 2017-03-04 RX ADMIN — Medication 5 MILLIGRAM(S): at 05:22

## 2017-03-04 NOTE — SWALLOW BEDSIDE ASSESSMENT ADULT - SWALLOW EVAL: DIAGNOSIS
Pt presents with a h/o oropharyngeal dysphagia, now admitted with persistent intermittent emesis x 1 mo., suspected 2/2 Gastroparesis with plan for conversion of PEG to J-tube. Given current ongoing GI w/u and plan, would need clearance from a GI perspective for trials of all PO textures including solids, and possibly barium for MBS prior to completion of objective assessment. Will schedule objective swallow study for comprehensive evaluation of swallow function pending further clarification of GI diagnosis and plan.

## 2017-03-04 NOTE — SWALLOW BEDSIDE ASSESSMENT ADULT - COMMENTS
Of note, she had a prolonged hospitalization where she was hospitalized for RLE cellulitis/osteomyelitis course c/b afib and PEA arrest requiring intubation s/p trach given prolonged wean, NEHAL requiring HD, dysphagia requiring PEG  placement. In ED, initial vitals Afebrile,  201/96 95% on RA  Labs notable for leukocytosis 14.0 Hb 12.2 Plt 465 BMP Na 132,  Cr 2.84 K 4.4, Trop 0.19/CKMB 8.5; BNP 1371; VBG 7.37/46/26/1.1  UA few bacteria, negative nitrite negative LE. Imaging notable for mild b/l midlung linear atelectasis. CT Abd/Pelvis demonstrated small hiatal hernia. PEG in satisfactory position. No bowel obstruction. mild pulm edema. 2 cm linear metallic density in the transverse colon of uncertain etiology. In the ED, she received 2L NS and was started on  NS @ 70 ml/hr. Zofran 4mg x1. Renal consulted and she was admitted to medicine for further evaluation. +hyponatremia, hypokalemia, NEHAL,   Malnutrition alert: 30-40 lb wt loss in ~6 mos.   Cards following for NSTEMI.  GI: vomitting, likely gastroparesis, IR contacted for conversion from G to G-J tube, would avoid high volume/bolus feeds, ok to start low rate feeds.

## 2017-03-04 NOTE — SWALLOW BEDSIDE ASSESSMENT ADULT - SLP GENERAL OBSERVATIONS
Pt evaluated OOB seated upright in chair. Telephone  #[phone] (Tej) assisted for Farsi. Pt verbally responsive, suspected cognitive deficits, follows directions for the purposes of the assessment. + Dysphonia with hoarse vocal quality and reduced vocal intensity.

## 2017-03-04 NOTE — SWALLOW BEDSIDE ASSESSMENT ADULT - SWALLOW EVAL: PATIENT/FAMILY GOALS STATEMENT
"I'm very hungry." Pt reports that she has been participating in swallow therapy exercises while at [facility]. She reports consuming very limited trials of PO during swallow therapy only, receiving all nutrition/hydration/meds via PEG.

## 2017-03-04 NOTE — SWALLOW BEDSIDE ASSESSMENT ADULT - SLP PERTINENT HISTORY OF CURRENT PROBLEM
65 yo woman w/DM w/diabetic neuropathy, HTN, HLD, CKD stage 4, afib  severe lymphedema, [facility]. Pt denied any significant abdominal pain but reported nausea and repeated episodes of vomiting nonbilious non bloody contents. Reported fatigue and generalized weakness. Reports that she vomits immediately after any feeds or meds via G-tube. Per transfer documents, Pt had a BM earlier today prior to transfer. Denied any abdominal pain, fevers/chills, CP, SOB, sick contacts, or skin changes. At Rehab, Abd XR was done negative for any evidence of obstruction or foreign body. Per transfer records, Pt was advanced to dysphagia and then mechanical soft foods per speech and swallow. Speech therapist noted persistent regurgitation of gastric fluids.

## 2017-03-05 LAB
ANION GAP SERPL CALC-SCNC: 17 MMOL/L — SIGNIFICANT CHANGE UP (ref 5–17)
BUN SERPL-MCNC: 68 MG/DL — HIGH (ref 7–23)
CALCIUM SERPL-MCNC: 10.6 MG/DL — HIGH (ref 8.4–10.5)
CHLORIDE SERPL-SCNC: 109 MMOL/L — HIGH (ref 96–108)
CO2 SERPL-SCNC: 20 MMOL/L — LOW (ref 22–31)
CREAT SERPL-MCNC: 2.31 MG/DL — HIGH (ref 0.5–1.3)
GLUCOSE SERPL-MCNC: 127 MG/DL — HIGH (ref 70–99)
HCT VFR BLD CALC: 32.8 % — LOW (ref 34.5–45)
HGB BLD-MCNC: 10.5 G/DL — LOW (ref 11.5–15.5)
INR BLD: 2.01 RATIO — HIGH (ref 0.88–1.16)
MCHC RBC-ENTMCNC: 24.7 PG — LOW (ref 27–34)
MCHC RBC-ENTMCNC: 32 GM/DL — SIGNIFICANT CHANGE UP (ref 32–36)
MCV RBC AUTO: 77.2 FL — LOW (ref 80–100)
PLATELET # BLD AUTO: 458 K/UL — HIGH (ref 150–400)
POTASSIUM SERPL-MCNC: 3.1 MMOL/L — LOW (ref 3.5–5.3)
POTASSIUM SERPL-SCNC: 3.1 MMOL/L — LOW (ref 3.5–5.3)
PROTHROM AB SERPL-ACNC: 22.9 SEC — HIGH (ref 10–13.1)
RBC # BLD: 4.25 M/UL — SIGNIFICANT CHANGE UP (ref 3.8–5.2)
RBC # FLD: 18.8 % — HIGH (ref 10.3–14.5)
SODIUM SERPL-SCNC: 146 MMOL/L — HIGH (ref 135–145)
WBC # BLD: 13.76 K/UL — HIGH (ref 3.8–10.5)
WBC # FLD AUTO: 13.76 K/UL — HIGH (ref 3.8–10.5)

## 2017-03-05 RX ORDER — WARFARIN SODIUM 2.5 MG/1
4 TABLET ORAL ONCE
Qty: 0 | Refills: 0 | Status: COMPLETED | OUTPATIENT
Start: 2017-03-05 | End: 2017-03-05

## 2017-03-05 RX ORDER — POTASSIUM CHLORIDE 20 MEQ
10 PACKET (EA) ORAL
Qty: 0 | Refills: 0 | Status: COMPLETED | OUTPATIENT
Start: 2017-03-05 | End: 2017-03-05

## 2017-03-05 RX ORDER — POTASSIUM CHLORIDE 20 MEQ
20 PACKET (EA) ORAL
Qty: 0 | Refills: 0 | Status: DISCONTINUED | OUTPATIENT
Start: 2017-03-05 | End: 2017-03-05

## 2017-03-05 RX ORDER — HYDRALAZINE HCL 50 MG
50 TABLET ORAL THREE TIMES A DAY
Qty: 0 | Refills: 0 | Status: DISCONTINUED | OUTPATIENT
Start: 2017-03-05 | End: 2017-03-06

## 2017-03-05 RX ORDER — HYDRALAZINE HCL 50 MG
25 TABLET ORAL EVERY 8 HOURS
Qty: 0 | Refills: 0 | Status: DISCONTINUED | OUTPATIENT
Start: 2017-03-05 | End: 2017-03-05

## 2017-03-05 RX ORDER — HYDRALAZINE HCL 50 MG
10 TABLET ORAL ONCE
Qty: 0 | Refills: 0 | Status: COMPLETED | OUTPATIENT
Start: 2017-03-05 | End: 2017-03-05

## 2017-03-05 RX ORDER — HYDRALAZINE HCL 50 MG
20 TABLET ORAL EVERY 6 HOURS
Qty: 0 | Refills: 0 | Status: DISCONTINUED | OUTPATIENT
Start: 2017-03-05 | End: 2017-03-12

## 2017-03-05 RX ADMIN — Medication 50 MILLIGRAM(S): at 14:03

## 2017-03-05 RX ADMIN — Medication 1 DROP(S): at 14:00

## 2017-03-05 RX ADMIN — Medication 1 DROP(S): at 22:21

## 2017-03-05 RX ADMIN — Medication 5 MILLIGRAM(S): at 22:21

## 2017-03-05 RX ADMIN — Medication 10 MILLIGRAM(S): at 10:26

## 2017-03-05 RX ADMIN — Medication 5 MILLIGRAM(S): at 14:00

## 2017-03-05 RX ADMIN — Medication 81 MILLIGRAM(S): at 11:28

## 2017-03-05 RX ADMIN — Medication 25 MILLIGRAM(S): at 06:00

## 2017-03-05 RX ADMIN — LISINOPRIL 5 MILLIGRAM(S): 2.5 TABLET ORAL at 06:00

## 2017-03-05 RX ADMIN — CITALOPRAM 10 MILLIGRAM(S): 10 TABLET, FILM COATED ORAL at 11:28

## 2017-03-05 RX ADMIN — Medication 100 MILLIEQUIVALENT(S): at 15:22

## 2017-03-05 RX ADMIN — Medication 5 MILLIGRAM(S): at 06:00

## 2017-03-05 RX ADMIN — Medication 100 MILLIEQUIVALENT(S): at 12:02

## 2017-03-05 RX ADMIN — Medication 50 MILLIGRAM(S): at 22:25

## 2017-03-05 RX ADMIN — Medication 100 MILLIEQUIVALENT(S): at 16:35

## 2017-03-05 RX ADMIN — WARFARIN SODIUM 4 MILLIGRAM(S): 2.5 TABLET ORAL at 22:21

## 2017-03-05 RX ADMIN — Medication 1 DROP(S): at 06:00

## 2017-03-05 RX ADMIN — ONDANSETRON 4 MILLIGRAM(S): 8 TABLET, FILM COATED ORAL at 20:27

## 2017-03-05 RX ADMIN — ONDANSETRON 4 MILLIGRAM(S): 8 TABLET, FILM COATED ORAL at 05:58

## 2017-03-06 ENCOUNTER — APPOINTMENT (OUTPATIENT)
Dept: VASCULAR SURGERY | Facility: CLINIC | Age: 67
End: 2017-03-06

## 2017-03-06 ENCOUNTER — TRANSCRIPTION ENCOUNTER (OUTPATIENT)
Age: 67
End: 2017-03-06

## 2017-03-06 LAB
ANION GAP SERPL CALC-SCNC: 15 MMOL/L — SIGNIFICANT CHANGE UP (ref 5–17)
BUN SERPL-MCNC: 66 MG/DL — HIGH (ref 7–23)
CALCIUM SERPL-MCNC: 10.8 MG/DL — HIGH (ref 8.4–10.5)
CHLORIDE SERPL-SCNC: 111 MMOL/L — HIGH (ref 96–108)
CO2 SERPL-SCNC: 20 MMOL/L — LOW (ref 22–31)
CREAT SERPL-MCNC: 2.3 MG/DL — HIGH (ref 0.5–1.3)
GLUCOSE SERPL-MCNC: 104 MG/DL — HIGH (ref 70–99)
HCT VFR BLD CALC: 34.9 % — SIGNIFICANT CHANGE UP (ref 34.5–45)
HGB BLD-MCNC: 11.1 G/DL — LOW (ref 11.5–15.5)
INR BLD: 1.97 RATIO — HIGH (ref 0.88–1.16)
MCHC RBC-ENTMCNC: 24.7 PG — LOW (ref 27–34)
MCHC RBC-ENTMCNC: 31.8 GM/DL — LOW (ref 32–36)
MCV RBC AUTO: 77.7 FL — LOW (ref 80–100)
PLATELET # BLD AUTO: 460 K/UL — HIGH (ref 150–400)
POTASSIUM SERPL-MCNC: 3.2 MMOL/L — LOW (ref 3.5–5.3)
POTASSIUM SERPL-MCNC: 3.7 MMOL/L — SIGNIFICANT CHANGE UP (ref 3.5–5.3)
POTASSIUM SERPL-SCNC: 3.2 MMOL/L — LOW (ref 3.5–5.3)
POTASSIUM SERPL-SCNC: 3.7 MMOL/L — SIGNIFICANT CHANGE UP (ref 3.5–5.3)
PROTHROM AB SERPL-ACNC: 22.4 SEC — HIGH (ref 10–13.1)
RBC # BLD: 4.49 M/UL — SIGNIFICANT CHANGE UP (ref 3.8–5.2)
RBC # FLD: 19.1 % — HIGH (ref 10.3–14.5)
SODIUM SERPL-SCNC: 146 MMOL/L — HIGH (ref 135–145)
WBC # BLD: 13.86 K/UL — HIGH (ref 3.8–10.5)
WBC # FLD AUTO: 13.86 K/UL — HIGH (ref 3.8–10.5)

## 2017-03-06 PROCEDURE — 93010 ELECTROCARDIOGRAM REPORT: CPT

## 2017-03-06 RX ORDER — METOPROLOL TARTRATE 50 MG
50 TABLET ORAL
Qty: 0 | Refills: 0 | Status: DISCONTINUED | OUTPATIENT
Start: 2017-03-06 | End: 2017-03-07

## 2017-03-06 RX ORDER — HYDRALAZINE HCL 50 MG
75 TABLET ORAL THREE TIMES A DAY
Qty: 0 | Refills: 0 | Status: DISCONTINUED | OUTPATIENT
Start: 2017-03-06 | End: 2017-03-07

## 2017-03-06 RX ORDER — WARFARIN SODIUM 2.5 MG/1
4 TABLET ORAL ONCE
Qty: 0 | Refills: 0 | Status: COMPLETED | OUTPATIENT
Start: 2017-03-06 | End: 2017-03-06

## 2017-03-06 RX ORDER — METOPROLOL TARTRATE 50 MG
25 TABLET ORAL
Qty: 0 | Refills: 0 | Status: DISCONTINUED | OUTPATIENT
Start: 2017-03-06 | End: 2017-03-06

## 2017-03-06 RX ORDER — POTASSIUM CHLORIDE 20 MEQ
40 PACKET (EA) ORAL ONCE
Qty: 0 | Refills: 0 | Status: COMPLETED | OUTPATIENT
Start: 2017-03-06 | End: 2017-03-06

## 2017-03-06 RX ORDER — SODIUM CHLORIDE 9 MG/ML
1000 INJECTION, SOLUTION INTRAVENOUS
Qty: 0 | Refills: 0 | Status: DISCONTINUED | OUTPATIENT
Start: 2017-03-06 | End: 2017-03-09

## 2017-03-06 RX ADMIN — Medication 50 MILLIGRAM(S): at 18:03

## 2017-03-06 RX ADMIN — Medication 1 DROP(S): at 22:15

## 2017-03-06 RX ADMIN — Medication 5 MILLIGRAM(S): at 13:13

## 2017-03-06 RX ADMIN — Medication 40 MILLIEQUIVALENT(S): at 17:58

## 2017-03-06 RX ADMIN — Medication 81 MILLIGRAM(S): at 13:13

## 2017-03-06 RX ADMIN — LISINOPRIL 5 MILLIGRAM(S): 2.5 TABLET ORAL at 06:00

## 2017-03-06 RX ADMIN — WARFARIN SODIUM 4 MILLIGRAM(S): 2.5 TABLET ORAL at 22:18

## 2017-03-06 RX ADMIN — SODIUM CHLORIDE 70 MILLILITER(S): 9 INJECTION, SOLUTION INTRAVENOUS at 17:00

## 2017-03-06 RX ADMIN — Medication 5 MILLIGRAM(S): at 22:13

## 2017-03-06 RX ADMIN — CITALOPRAM 10 MILLIGRAM(S): 10 TABLET, FILM COATED ORAL at 13:13

## 2017-03-06 RX ADMIN — Medication 1 DROP(S): at 13:13

## 2017-03-06 RX ADMIN — Medication 5 MILLIGRAM(S): at 06:00

## 2017-03-06 RX ADMIN — Medication 50 MILLIGRAM(S): at 05:59

## 2017-03-06 RX ADMIN — Medication 75 MILLIGRAM(S): at 14:06

## 2017-03-06 RX ADMIN — Medication 1 DROP(S): at 06:00

## 2017-03-06 RX ADMIN — Medication 75 MILLIGRAM(S): at 22:14

## 2017-03-06 NOTE — DISCHARGE NOTE ADULT - CARE PROVIDERS DIRECT ADDRESSES
,DirectAddress_Unknown,[email or web address],DirectAddress_Unknown,DirectAddress_Unknown

## 2017-03-06 NOTE — DISCHARGE NOTE ADULT - MEDICATION SUMMARY - MEDICATIONS TO STOP TAKING
I will STOP taking the medications listed below when I get home from the hospital:    Lasix 40 mg oral tablet  -- 1 tab(s) by gastrostomy tube once a day

## 2017-03-06 NOTE — DISCHARGE NOTE ADULT - PLAN OF CARE
no nausea/vomiting Gastrostomy port to stay vented;  If giving meds through gastrostomy, keep it clamped for 2 hours  Use Reglan until Dromperidone is available Continue Reglan until dromperidone purchased by family Routine care  use jejunostomy port for feeding Avoid taking (NSAIDs) - (ex: Ibuprofen, Advil, Celebrex, Naprosyn)  Avoid taking any nephrotoxic agents (can harm kidneys) - Intravenous contrast for diagnostic testing, combination cold medications.  Have all medications adjusted for your renal function by your Health Care Provider.  Blood pressure control is important.  Take all medication as prescribed. Continue free water via j tube 300 cc Q6 hourly Continue Epogen 3x weekly complete treatment Will receive Epogen at Nephrology office completed  treatment

## 2017-03-06 NOTE — DISCHARGE NOTE ADULT - HOSPITAL COURSE
Attending to complete pt with hx  CKD 5  on HD , severe gastroparesis  due to DM . comes to ER  c/o persistant  vomiting ,  pt with gastrostomy tube , during hoitalization , jejunostomy tube was reinserted  and gastrostomy tube to gravity , pt also was treated for UTI  and pt was started on Dromperidone  for gastroparesis  with NPO .

## 2017-03-06 NOTE — DISCHARGE NOTE ADULT - CARE PROVIDER_API CALL
Pat Gold), Internal Medicine; Nephrology  [address]  [place] [address]  Phone: [phone]  Fax: [phone]    Javier Ellis), Gastroenterology; Internal Medicine  [address]  [place], [place] [id number]  Phone: [phone]  Fax: [phone]    Bryan Schmid), Medicine  [address] [place] [address]  Phone: [phone]  Fax: [phone]

## 2017-03-06 NOTE — DISCHARGE NOTE ADULT - CARE PLAN
Principal Discharge DX:	Intractable vomiting with nausea, unspecified vomiting type  Goal:	no nausea/vomiting  Instructions for follow-up, activity and diet:	Gastrostomy port to stay vented;  If giving meds through gastrostomy, keep it clamped for 2 hours  Use Reglan until Dromperidone is available  Secondary Diagnosis:	Gastroparesis due to DM  Instructions for follow-up, activity and diet:	Continue Reglan until dromperidone purchased by family  Secondary Diagnosis:	Gastrojejunostomy tube status  Instructions for follow-up, activity and diet:	Routine care  use jejunostomy port for feeding  Secondary Diagnosis:	Acute on chronic kidney failure  Instructions for follow-up, activity and diet:	Avoid taking (NSAIDs) - (ex: Ibuprofen, Advil, Celebrex, Naprosyn)  Avoid taking any nephrotoxic agents (can harm kidneys) - Intravenous contrast for diagnostic testing, combination cold medications.  Have all medications adjusted for your renal function by your Health Care Provider.  Blood pressure control is important.  Take all medication as prescribed.  Secondary Diagnosis:	Hypernatremia  Instructions for follow-up, activity and diet:	Continue free water via j tube 300 cc Q6 hourly  Secondary Diagnosis:	Anemia of chronic renal failure  Instructions for follow-up, activity and diet:	Continue Epogen 3x weekly  Secondary Diagnosis:	Acute cystitis without hematuria  Instructions for follow-up, activity and diet:	complete treatment Principal Discharge DX:	Intractable vomiting with nausea, unspecified vomiting type  Goal:	no nausea/vomiting  Instructions for follow-up, activity and diet:	Gastrostomy port to stay vented;  If giving meds through gastrostomy, keep it clamped for 2 hours  Use Reglan until Dromperidone is available  Secondary Diagnosis:	Gastroparesis due to DM  Instructions for follow-up, activity and diet:	Continue Reglan until dromperidone purchased by family  Secondary Diagnosis:	Gastrojejunostomy tube status  Instructions for follow-up, activity and diet:	Routine care  use jejunostomy port for feeding  Secondary Diagnosis:	Acute on chronic kidney failure  Instructions for follow-up, activity and diet:	Avoid taking (NSAIDs) - (ex: Ibuprofen, Advil, Celebrex, Naprosyn)  Avoid taking any nephrotoxic agents (can harm kidneys) - Intravenous contrast for diagnostic testing, combination cold medications.  Have all medications adjusted for your renal function by your Health Care Provider.  Blood pressure control is important.  Take all medication as prescribed.  Secondary Diagnosis:	Hypernatremia  Instructions for follow-up, activity and diet:	Continue free water via j tube 300 cc Q6 hourly  Secondary Diagnosis:	Anemia of chronic renal failure  Instructions for follow-up, activity and diet:	Will receive Epogen at Nephrology office  Secondary Diagnosis:	Acute cystitis without hematuria  Instructions for follow-up, activity and diet:	complete treatment Principal Discharge DX:	Intractable vomiting with nausea, unspecified vomiting type  Goal:	no nausea/vomiting  Instructions for follow-up, activity and diet:	Gastrostomy port to stay vented;  If giving meds through gastrostomy, keep it clamped for 2 hours  Use Reglan until Dromperidone is available  Secondary Diagnosis:	Gastroparesis due to DM  Instructions for follow-up, activity and diet:	Continue Reglan until dromperidone purchased by family  Secondary Diagnosis:	Gastrojejunostomy tube status  Instructions for follow-up, activity and diet:	Routine care  use jejunostomy port for feeding  Secondary Diagnosis:	Acute on chronic kidney failure  Instructions for follow-up, activity and diet:	Avoid taking (NSAIDs) - (ex: Ibuprofen, Advil, Celebrex, Naprosyn)  Avoid taking any nephrotoxic agents (can harm kidneys) - Intravenous contrast for diagnostic testing, combination cold medications.  Have all medications adjusted for your renal function by your Health Care Provider.  Blood pressure control is important.  Take all medication as prescribed.  Secondary Diagnosis:	Hypernatremia  Instructions for follow-up, activity and diet:	Continue free water via j tube 300 cc Q6 hourly  Secondary Diagnosis:	Anemia of chronic renal failure  Instructions for follow-up, activity and diet:	Will receive Epogen at Nephrology office  Secondary Diagnosis:	Acute cystitis without hematuria  Instructions for follow-up, activity and diet:	completed  treatment

## 2017-03-06 NOTE — DISCHARGE NOTE ADULT - SECONDARY DIAGNOSIS.
Gastroparesis due to DM Gastrojejunostomy tube status Acute on chronic kidney failure Hypernatremia Anemia of chronic renal failure Acute cystitis without hematuria

## 2017-03-06 NOTE — DISCHARGE NOTE ADULT - MEDICATION SUMMARY - MEDICATIONS TO TAKE
I will START or STAY ON the medications listed below when I get home from the hospital:    aspirin 81 mg oral tablet, chewable  -- 1 tab(s) by gastrostomy tube once a day  -- Indication: For prevention    warfarin 7.5 mg oral tablet  -- 1 tab(s) by gastrostomy tube once  -- Indication: For Afib    warfarin 4 mg oral tablet  -- 1 tab(s) by gastrostomy tube once a day  -- Indication: For Afib    citalopram 10 mg oral tablet  -- 1 tab(s) by gastrostomy tube once a day  -- Indication: For Depression    insulin lispro 100 units/mL subcutaneous solution  --  subcutaneous once a day (at bedtime); 0 Unit(s) if Glucose 0 - 250  1 Unit(s) if Glucose 251 - 300  2 Unit(s) if Glucose 301 - 350  3 Unit(s) if Glucose 351 - 400  4 Unit(s) if Glucose Greater Than 400  -- Indication: For Diabetes    insulin lispro 100 units/mL subcutaneous solution  --  subcutaneous 3 times a day (before meals); 1 Unit(s) if Glucose 151 - 200  2 Unit(s) if Glucose 201 - 250  3 Unit(s) if Glucose 251 - 300  4 Unit(s) if Glucose 301 - 350  5 Unit(s) if Glucose 351 - 400  6 Unit(s) if Glucose Greater Than 400  -- Indication: For Diabetes    Reglan 5 mg/5 mL oral syrup  -- 10 milligram(s) by gastrostomy tube 3 times a day  -- Indication: For Gastroparesis    epoetin ramón  -- 68767 unit(s) subcutaneous Tuesday, Thursday, Saturday  -- Indication: For Anemia    polyethylene glycol 3350 oral powder for reconstitution  -- 17 gram(s) by gastrostomy tube once a day  -- Indication: For Constipation    midodrine 5 mg oral tablet  -- 1 tab(s) by gastrostomy tube 3 times a day  -- Indication: For hypotension    ocular lubricant ophthalmic solution  -- 1 drop(s) to each affected eye 3 times a day  -- Indication: For dry eyes I will START or STAY ON the medications listed below when I get home from the hospital:    aspirin 81 mg oral tablet, chewable  -- 1 tab(s) by gastrostomy tube once a day  -- Indication: For prevention    warfarin 7.5 mg oral tablet  -- 1 tab(s) by gastrostomy tube once  -- Indication: For Afib    warfarin 4 mg oral tablet  -- 1 tab(s) by gastrostomy tube once a day  -- Indication: For Afib    citalopram 10 mg oral tablet  -- 1 tab(s) by gastrostomy tube once a day  -- Indication: For Depression    insulin lispro 100 units/mL subcutaneous solution  --  subcutaneous once a day (at bedtime); 0 Unit(s) if Glucose 0 - 250  1 Unit(s) if Glucose 251 - 300  2 Unit(s) if Glucose 301 - 350  3 Unit(s) if Glucose 351 - 400  4 Unit(s) if Glucose Greater Than 400  -- Indication: For Diabetes    insulin lispro 100 units/mL subcutaneous solution  --  subcutaneous 3 times a day (before meals); 1 Unit(s) if Glucose 151 - 200  2 Unit(s) if Glucose 201 - 250  3 Unit(s) if Glucose 251 - 300  4 Unit(s) if Glucose 301 - 350  5 Unit(s) if Glucose 351 - 400  6 Unit(s) if Glucose Greater Than 400  -- Indication: For Diabetes    Reglan 5 mg/5 mL oral syrup  -- 10 milligram(s) by gastrostomy tube 3 times a day  -- Indication: For Gastroparesis    polyethylene glycol 3350 oral powder for reconstitution  -- 17 gram(s) by gastrostomy tube once a day  -- Indication: For Constipation    midodrine 5 mg oral tablet  -- 1 tab(s) by gastrostomy tube 3 times a day  -- Indication: For hypotension    ocular lubricant ophthalmic solution  -- 1 drop(s) to each affected eye 3 times a day  -- Indication: For dry eyes I will START or STAY ON the medications listed below when I get home from the hospital:    aspirin 81 mg oral tablet, chewable  -- 1 tab(s) by gastrostomy tube once a day  -- Indication: For prevention    warfarin 4 mg oral tablet  -- 1 tab(s) by gastrostomy tube once a day  -- Indication: For Afib    citalopram 10 mg oral tablet  -- 1 tab(s) by gastrostomy tube once a day  -- Indication: For Depression    insulin lispro 100 units/mL subcutaneous solution  --  subcutaneous once a day (at bedtime); 0 Unit(s) if Glucose 0 - 250  1 Unit(s) if Glucose 251 - 300  2 Unit(s) if Glucose 301 - 350  3 Unit(s) if Glucose 351 - 400  4 Unit(s) if Glucose Greater Than 400  -- Indication: For Diabetes    insulin lispro 100 units/mL subcutaneous solution  --  subcutaneous 3 times a day (before meals); 1 Unit(s) if Glucose 151 - 200  2 Unit(s) if Glucose 201 - 250  3 Unit(s) if Glucose 251 - 300  4 Unit(s) if Glucose 301 - 350  5 Unit(s) if Glucose 351 - 400  6 Unit(s) if Glucose Greater Than 400  -- Indication: For Diabetes    Reglan 5 mg/5 mL oral syrup  -- 10 milligram(s) by gastrostomy tube 3 times a day  -- Indication: For Gastroparesis    polyethylene glycol 3350 oral powder for reconstitution  -- 17 gram(s) by gastrostomy tube once a day  -- Indication: For Constipation    midodrine 5 mg oral tablet  -- 1 tab(s) by gastrostomy tube 3 times a day  -- Indication: For hypotension    ocular lubricant ophthalmic solution  -- 1 drop(s) to each affected eye 3 times a day  -- Indication: For dry eyes

## 2017-03-06 NOTE — DISCHARGE NOTE ADULT - NS AS DC FOLLOWUP STROKE INST
Smoking Cessation/Influenza vaccination (VIS Pub Date: August 19, 2014) Smoking Cessation/Influenza vaccination (VIS Pub Date: August 19, 2014)/Coumadin/Warfarin Smoking Cessation/Coumadin/Warfarin Smoking Cessation

## 2017-03-06 NOTE — DISCHARGE NOTE ADULT - ADDITIONAL INSTRUCTIONS
Follow up with Nephrology, gastroenterology and PMD  Dose coumadin daily for Afib; Keep INR 2-3;   Check BMP in 2 days

## 2017-03-06 NOTE — DISCHARGE NOTE ADULT - PATIENT PORTAL LINK FT
“You can access the [facility] Patient Portal, offered by [facility], by registering with the following website: [email or web address]”

## 2017-03-06 NOTE — DISCHARGE NOTE ADULT - MEDICATION SUMMARY - MEDICATIONS TO CHANGE
I will SWITCH the dose or number of times a day I take the medications listed below when I get home from the hospital:    Reglan 10 mg oral tablet  -- 1 tab(s) by mouth 2 times a day

## 2017-03-06 NOTE — DISCHARGE NOTE ADULT - INSTRUCTIONS
Feeds: suplena at 45 cc/hr via jejunostomy; Medications fully dissolved if given through jejunostomy; if not give through gastrostomy and clamp for 2 hours after  Gastric port to be vented Feeds: suplena at 45 cc/hr via jejunostomy with free water 300 cc Q6 hourly    Please give Medications via gastrostomy, unless fully dissolvable or in solution form and clamp for 2 hours after.  Gastric port to be vented

## 2017-03-07 LAB
ANION GAP SERPL CALC-SCNC: 14 MMOL/L — SIGNIFICANT CHANGE UP (ref 5–17)
BUN SERPL-MCNC: 67 MG/DL — HIGH (ref 7–23)
CALCIUM SERPL-MCNC: 10.3 MG/DL — SIGNIFICANT CHANGE UP (ref 8.4–10.5)
CHLORIDE SERPL-SCNC: 109 MMOL/L — HIGH (ref 96–108)
CO2 SERPL-SCNC: 22 MMOL/L — SIGNIFICANT CHANGE UP (ref 22–31)
CREAT SERPL-MCNC: 2.82 MG/DL — HIGH (ref 0.5–1.3)
GLUCOSE SERPL-MCNC: 95 MG/DL — SIGNIFICANT CHANGE UP (ref 70–99)
HCT VFR BLD CALC: 35.3 % — SIGNIFICANT CHANGE UP (ref 34.5–45)
HGB BLD-MCNC: 10.8 G/DL — LOW (ref 11.5–15.5)
INR BLD: 2.13 RATIO — HIGH (ref 0.88–1.16)
MCHC RBC-ENTMCNC: 23.9 PG — LOW (ref 27–34)
MCHC RBC-ENTMCNC: 30.6 GM/DL — LOW (ref 32–36)
MCV RBC AUTO: 78.1 FL — LOW (ref 80–100)
PLATELET # BLD AUTO: 472 K/UL — HIGH (ref 150–400)
POTASSIUM SERPL-MCNC: 3.6 MMOL/L — SIGNIFICANT CHANGE UP (ref 3.5–5.3)
POTASSIUM SERPL-SCNC: 3.6 MMOL/L — SIGNIFICANT CHANGE UP (ref 3.5–5.3)
PROTHROM AB SERPL-ACNC: 24.2 SEC — HIGH (ref 10–13.1)
RBC # BLD: 4.52 M/UL — SIGNIFICANT CHANGE UP (ref 3.8–5.2)
RBC # FLD: 19.7 % — HIGH (ref 10.3–14.5)
SODIUM SERPL-SCNC: 145 MMOL/L — SIGNIFICANT CHANGE UP (ref 135–145)
WBC # BLD: 14.34 K/UL — HIGH (ref 3.8–10.5)
WBC # FLD AUTO: 14.34 K/UL — HIGH (ref 3.8–10.5)

## 2017-03-07 PROCEDURE — 49446 CHANGE G-TUBE TO G-J PERC: CPT

## 2017-03-07 RX ORDER — WARFARIN SODIUM 2.5 MG/1
4 TABLET ORAL ONCE
Qty: 0 | Refills: 0 | Status: DISCONTINUED | OUTPATIENT
Start: 2017-03-07 | End: 2017-03-07

## 2017-03-07 RX ORDER — METOCLOPRAMIDE HCL 10 MG
5 TABLET ORAL EVERY 8 HOURS
Qty: 0 | Refills: 0 | Status: DISCONTINUED | OUTPATIENT
Start: 2017-03-07 | End: 2017-03-08

## 2017-03-07 RX ORDER — CITALOPRAM 10 MG/1
10 TABLET, FILM COATED ORAL DAILY
Qty: 0 | Refills: 0 | Status: DISCONTINUED | OUTPATIENT
Start: 2017-03-07 | End: 2017-03-23

## 2017-03-07 RX ORDER — ZALEPLON 10 MG
5 CAPSULE ORAL AT BEDTIME
Qty: 0 | Refills: 0 | Status: DISCONTINUED | OUTPATIENT
Start: 2017-03-07 | End: 2017-03-07

## 2017-03-07 RX ORDER — METOPROLOL TARTRATE 50 MG
50 TABLET ORAL
Qty: 0 | Refills: 0 | Status: DISCONTINUED | OUTPATIENT
Start: 2017-03-07 | End: 2017-03-11

## 2017-03-07 RX ORDER — HYDRALAZINE HCL 50 MG
75 TABLET ORAL THREE TIMES A DAY
Qty: 0 | Refills: 0 | Status: DISCONTINUED | OUTPATIENT
Start: 2017-03-07 | End: 2017-03-08

## 2017-03-07 RX ORDER — WARFARIN SODIUM 2.5 MG/1
4 TABLET ORAL ONCE
Qty: 0 | Refills: 0 | Status: COMPLETED | OUTPATIENT
Start: 2017-03-07 | End: 2017-03-07

## 2017-03-07 RX ORDER — METOCLOPRAMIDE HCL 10 MG
5 TABLET ORAL EVERY 8 HOURS
Qty: 0 | Refills: 0 | Status: DISCONTINUED | OUTPATIENT
Start: 2017-03-07 | End: 2017-03-07

## 2017-03-07 RX ORDER — LISINOPRIL 2.5 MG/1
5 TABLET ORAL DAILY
Qty: 0 | Refills: 0 | Status: DISCONTINUED | OUTPATIENT
Start: 2017-03-07 | End: 2017-03-09

## 2017-03-07 RX ORDER — ASPIRIN/CALCIUM CARB/MAGNESIUM 324 MG
81 TABLET ORAL DAILY
Qty: 0 | Refills: 0 | Status: DISCONTINUED | OUTPATIENT
Start: 2017-03-07 | End: 2017-03-23

## 2017-03-07 RX ADMIN — Medication 5 MILLIGRAM(S): at 01:13

## 2017-03-07 RX ADMIN — WARFARIN SODIUM 4 MILLIGRAM(S): 2.5 TABLET ORAL at 22:49

## 2017-03-07 RX ADMIN — Medication 81 MILLIGRAM(S): at 22:49

## 2017-03-07 RX ADMIN — CITALOPRAM 10 MILLIGRAM(S): 10 TABLET, FILM COATED ORAL at 22:49

## 2017-03-07 RX ADMIN — SODIUM CHLORIDE 70 MILLILITER(S): 9 INJECTION, SOLUTION INTRAVENOUS at 22:50

## 2017-03-07 RX ADMIN — Medication 75 MILLIGRAM(S): at 05:39

## 2017-03-07 RX ADMIN — Medication 50 MILLIGRAM(S): at 05:38

## 2017-03-07 RX ADMIN — SODIUM CHLORIDE 70 MILLILITER(S): 9 INJECTION, SOLUTION INTRAVENOUS at 05:39

## 2017-03-07 RX ADMIN — Medication 75 MILLIGRAM(S): at 22:49

## 2017-03-07 RX ADMIN — Medication 1 DROP(S): at 22:20

## 2017-03-07 RX ADMIN — Medication 1 DROP(S): at 19:27

## 2017-03-07 RX ADMIN — Medication 1 DROP(S): at 05:39

## 2017-03-07 RX ADMIN — Medication 5 MILLIGRAM(S): at 22:49

## 2017-03-07 RX ADMIN — ONDANSETRON 4 MILLIGRAM(S): 8 TABLET, FILM COATED ORAL at 11:55

## 2017-03-07 RX ADMIN — Medication 5 MILLIGRAM(S): at 05:38

## 2017-03-07 RX ADMIN — LISINOPRIL 5 MILLIGRAM(S): 2.5 TABLET ORAL at 05:38

## 2017-03-07 NOTE — PHYSICAL THERAPY INITIAL EVALUATION ADULT - PERTINENT HX OF CURRENT PROBLEM, REHAB EVAL
Pt is a 65 y/o female admitted to [facility] on 3/2/17 sent in from [facility] for nausea/vomiting for 1 month. The patient denied any significant abdominal pain but reported nausea and repeated episodes of vomiting nonbilious non bloody contents. She reported fatigue and generalized weakness. She reports that she vomits immediately after taking any feeds or medications via the G-tube. At the facility, an abdominal XR was done negative for any evidence of obstruction or foreign body.

## 2017-03-07 NOTE — PHYSICAL THERAPY INITIAL EVALUATION ADULT - GAIT DEVIATIONS NOTED, PT EVAL
decreased stride length/decreased weight-shifting ability/unsteady gait/decreased step length/decreased leandro

## 2017-03-07 NOTE — PHYSICAL THERAPY INITIAL EVALUATION ADULT - ADDITIONAL COMMENTS
Pt came from a rehab facility, where she was walking with a walker. Prior to last admission, pt was I ambulating at home without a device. Pt lives in a house with 6 steps to enter.

## 2017-03-07 NOTE — PHYSICAL THERAPY INITIAL EVALUATION ADULT - PRECAUTIONS/LIMITATIONS, REHAB EVAL
Per transfer records, the patient was advanced to dysphagia and then mechanical soft foods per speech and swallow. The speech therapist noted persistent regrugitation of gastric fluids.  Of note, she had a prolonged hospitalization where she was hospitalized for RLE cellulitis/osteomyelitis course c/b afib and PEA arrest requiring intubation s/p trach given prolonged wean, NEHAL requiring HD, and dysphagia requiring PEG tube placement. CT abd: Small hiatal hernia. PEG tube in satisfactory position. No bowel obstruction. 2 cm linear metallic density in the transverse colon of uncertain etiology. Mild pulmonary edema. Per transfer records, the patient was advanced to dysphagia and then mechanical soft foods per speech and swallow. The speech therapist noted persistent regrugitation of gastric fluids.  Of note, she had a prolonged hospitalization where she was hospitalized for RLE cellulitis/osteomyelitis course c/b afib and PEA arrest requiring intubation s/p trach given prolonged wean, NEHAL requiring HD, and dysphagia requiring PEG tube placement. CT abd: Small hiatal hernia. PEG tube in satisfactory position. No bowel obstruction. 2 cm linear metallic density in the transverse colon of uncertain etiology. Mild pulmonary edema./fall precautions

## 2017-03-08 LAB
ANION GAP SERPL CALC-SCNC: 13 MMOL/L — SIGNIFICANT CHANGE UP (ref 5–17)
BUN SERPL-MCNC: 74 MG/DL — HIGH (ref 7–23)
CALCIUM SERPL-MCNC: 10 MG/DL — SIGNIFICANT CHANGE UP (ref 8.4–10.5)
CHLORIDE SERPL-SCNC: 107 MMOL/L — SIGNIFICANT CHANGE UP (ref 96–108)
CO2 SERPL-SCNC: 22 MMOL/L — SIGNIFICANT CHANGE UP (ref 22–31)
CREAT SERPL-MCNC: 2.85 MG/DL — HIGH (ref 0.5–1.3)
GLUCOSE SERPL-MCNC: 85 MG/DL — SIGNIFICANT CHANGE UP (ref 70–99)
HCT VFR BLD CALC: 35.3 % — SIGNIFICANT CHANGE UP (ref 34.5–45)
HGB BLD-MCNC: 10.9 G/DL — LOW (ref 11.5–15.5)
INR BLD: 3.56 RATIO — HIGH (ref 0.88–1.16)
MCHC RBC-ENTMCNC: 24.4 PG — LOW (ref 27–34)
MCHC RBC-ENTMCNC: 30.9 GM/DL — LOW (ref 32–36)
MCV RBC AUTO: 79 FL — LOW (ref 80–100)
PLATELET # BLD AUTO: 460 K/UL — HIGH (ref 150–400)
POTASSIUM SERPL-MCNC: 3.6 MMOL/L — SIGNIFICANT CHANGE UP (ref 3.5–5.3)
POTASSIUM SERPL-SCNC: 3.6 MMOL/L — SIGNIFICANT CHANGE UP (ref 3.5–5.3)
PROTHROM AB SERPL-ACNC: 40.7 SEC — HIGH (ref 10–13.1)
RBC # BLD: 4.47 M/UL — SIGNIFICANT CHANGE UP (ref 3.8–5.2)
RBC # FLD: 20 % — HIGH (ref 10.3–14.5)
SODIUM SERPL-SCNC: 142 MMOL/L — SIGNIFICANT CHANGE UP (ref 135–145)
WBC # BLD: 10.16 K/UL — SIGNIFICANT CHANGE UP (ref 3.8–10.5)
WBC # FLD AUTO: 10.16 K/UL — SIGNIFICANT CHANGE UP (ref 3.8–10.5)

## 2017-03-08 RX ORDER — METOCLOPRAMIDE HCL 10 MG
10 TABLET ORAL EVERY 8 HOURS
Qty: 0 | Refills: 0 | Status: DISCONTINUED | OUTPATIENT
Start: 2017-03-08 | End: 2017-03-23

## 2017-03-08 RX ORDER — HYDRALAZINE HCL 50 MG
50 TABLET ORAL THREE TIMES A DAY
Qty: 0 | Refills: 0 | Status: DISCONTINUED | OUTPATIENT
Start: 2017-03-08 | End: 2017-03-08

## 2017-03-08 RX ORDER — HYDRALAZINE HCL 50 MG
50 TABLET ORAL THREE TIMES A DAY
Qty: 0 | Refills: 0 | Status: DISCONTINUED | OUTPATIENT
Start: 2017-03-08 | End: 2017-03-09

## 2017-03-08 RX ORDER — METOCLOPRAMIDE HCL 10 MG
10 TABLET ORAL EVERY 8 HOURS
Qty: 0 | Refills: 0 | Status: DISCONTINUED | OUTPATIENT
Start: 2017-03-08 | End: 2017-03-08

## 2017-03-08 RX ADMIN — LISINOPRIL 5 MILLIGRAM(S): 2.5 TABLET ORAL at 05:38

## 2017-03-08 RX ADMIN — Medication 50 MILLIGRAM(S): at 05:38

## 2017-03-08 RX ADMIN — Medication 1 DROP(S): at 05:39

## 2017-03-08 RX ADMIN — Medication 10 MILLIGRAM(S): at 12:33

## 2017-03-08 RX ADMIN — Medication 1 DROP(S): at 13:41

## 2017-03-08 RX ADMIN — CITALOPRAM 10 MILLIGRAM(S): 10 TABLET, FILM COATED ORAL at 12:35

## 2017-03-08 RX ADMIN — Medication 75 MILLIGRAM(S): at 05:38

## 2017-03-08 RX ADMIN — Medication 81 MILLIGRAM(S): at 12:33

## 2017-03-08 RX ADMIN — Medication 10 MILLIGRAM(S): at 22:59

## 2017-03-08 RX ADMIN — Medication 5 MILLIGRAM(S): at 05:39

## 2017-03-08 RX ADMIN — Medication 50 MILLIGRAM(S): at 23:21

## 2017-03-08 RX ADMIN — ONDANSETRON 4 MILLIGRAM(S): 8 TABLET, FILM COATED ORAL at 10:17

## 2017-03-08 RX ADMIN — Medication 1 DROP(S): at 23:20

## 2017-03-08 RX ADMIN — Medication 50 MILLIGRAM(S): at 17:56

## 2017-03-08 NOTE — SWALLOW VFSS/MBS ASSESSMENT ADULT - DIAGNOSTIC IMPRESSIONS
Pt scheduled for MBS this afternoon. Pt arrived in radiology, secure in CHRISTIAN chair. Pt awake and alert, was brought into room for exam. However, prior to initiation of PO trials Pt began vomiting. RN and DOUGLAS De Oliveira were notified and test was not completed at this time. Pt was returned to floor. Will reattempt on 3/9, pending Pt able to participate in PO trials.

## 2017-03-09 LAB
ANION GAP SERPL CALC-SCNC: 14 MMOL/L — SIGNIFICANT CHANGE UP (ref 5–17)
BLD GP AB SCN SERPL QL: NEGATIVE — SIGNIFICANT CHANGE UP
BUN SERPL-MCNC: 88 MG/DL — HIGH (ref 7–23)
CALCIUM SERPL-MCNC: 9 MG/DL — SIGNIFICANT CHANGE UP (ref 8.4–10.5)
CHLORIDE SERPL-SCNC: 103 MMOL/L — SIGNIFICANT CHANGE UP (ref 96–108)
CO2 SERPL-SCNC: 22 MMOL/L — SIGNIFICANT CHANGE UP (ref 22–31)
CREAT SERPL-MCNC: 3.7 MG/DL — HIGH (ref 0.5–1.3)
GLUCOSE SERPL-MCNC: 126 MG/DL — HIGH (ref 70–99)
HCT VFR BLD CALC: 28 % — LOW (ref 34.5–45)
HCT VFR BLD CALC: 28.4 % — LOW (ref 34.5–45)
HGB BLD-MCNC: 8.7 G/DL — LOW (ref 11.5–15.5)
HGB BLD-MCNC: 9 G/DL — LOW (ref 11.5–15.5)
INR BLD: 3.47 RATIO — HIGH (ref 0.88–1.16)
MCHC RBC-ENTMCNC: 24.8 PG — LOW (ref 27–34)
MCHC RBC-ENTMCNC: 24.9 PG — LOW (ref 27–34)
MCHC RBC-ENTMCNC: 31.1 GM/DL — LOW (ref 32–36)
MCHC RBC-ENTMCNC: 31.8 GM/DL — LOW (ref 32–36)
MCV RBC AUTO: 78.3 FL — LOW (ref 80–100)
MCV RBC AUTO: 79.8 FL — LOW (ref 80–100)
PLATELET # BLD AUTO: 338 K/UL — SIGNIFICANT CHANGE UP (ref 150–400)
PLATELET # BLD AUTO: 353 K/UL — SIGNIFICANT CHANGE UP (ref 150–400)
POTASSIUM SERPL-MCNC: 3.3 MMOL/L — LOW (ref 3.5–5.3)
POTASSIUM SERPL-SCNC: 3.3 MMOL/L — LOW (ref 3.5–5.3)
PROTHROM AB SERPL-ACNC: 39.7 SEC — HIGH (ref 10–13.1)
RBC # BLD: 3.51 M/UL — LOW (ref 3.8–5.2)
RBC # BLD: 3.63 M/UL — LOW (ref 3.8–5.2)
RBC # FLD: 18.5 % — HIGH (ref 10.3–14.5)
RBC # FLD: 19.8 % — HIGH (ref 10.3–14.5)
RH IG SCN BLD-IMP: POSITIVE — SIGNIFICANT CHANGE UP
SODIUM SERPL-SCNC: 139 MMOL/L — SIGNIFICANT CHANGE UP (ref 135–145)
WBC # BLD: 10.7 K/UL — HIGH (ref 3.8–10.5)
WBC # BLD: 9.37 K/UL — SIGNIFICANT CHANGE UP (ref 3.8–10.5)
WBC # FLD AUTO: 10.7 K/UL — HIGH (ref 3.8–10.5)
WBC # FLD AUTO: 9.37 K/UL — SIGNIFICANT CHANGE UP (ref 3.8–10.5)

## 2017-03-09 PROCEDURE — 74230 X-RAY XM SWLNG FUNCJ C+: CPT | Mod: 26

## 2017-03-09 RX ORDER — SODIUM CHLORIDE 9 MG/ML
1000 INJECTION INTRAMUSCULAR; INTRAVENOUS; SUBCUTANEOUS
Qty: 0 | Refills: 0 | Status: DISCONTINUED | OUTPATIENT
Start: 2017-03-09 | End: 2017-03-10

## 2017-03-09 RX ORDER — SODIUM CHLORIDE 9 MG/ML
250 INJECTION INTRAMUSCULAR; INTRAVENOUS; SUBCUTANEOUS ONCE
Qty: 0 | Refills: 0 | Status: COMPLETED | OUTPATIENT
Start: 2017-03-09 | End: 2017-03-09

## 2017-03-09 RX ADMIN — SODIUM CHLORIDE 70 MILLILITER(S): 9 INJECTION INTRAMUSCULAR; INTRAVENOUS; SUBCUTANEOUS at 23:48

## 2017-03-09 RX ADMIN — Medication 10 MILLIGRAM(S): at 06:45

## 2017-03-09 RX ADMIN — Medication 1 DROP(S): at 06:45

## 2017-03-09 RX ADMIN — Medication 81 MILLIGRAM(S): at 14:17

## 2017-03-09 RX ADMIN — SODIUM CHLORIDE 500 MILLILITER(S): 9 INJECTION INTRAMUSCULAR; INTRAVENOUS; SUBCUTANEOUS at 14:18

## 2017-03-09 RX ADMIN — Medication 50 MILLIGRAM(S): at 18:51

## 2017-03-09 RX ADMIN — Medication 1 DROP(S): at 23:20

## 2017-03-09 RX ADMIN — Medication 50 MILLIGRAM(S): at 06:45

## 2017-03-09 RX ADMIN — CITALOPRAM 10 MILLIGRAM(S): 10 TABLET, FILM COATED ORAL at 14:17

## 2017-03-09 RX ADMIN — SODIUM CHLORIDE 70 MILLILITER(S): 9 INJECTION INTRAMUSCULAR; INTRAVENOUS; SUBCUTANEOUS at 14:19

## 2017-03-09 RX ADMIN — ONDANSETRON 4 MILLIGRAM(S): 8 TABLET, FILM COATED ORAL at 08:28

## 2017-03-09 RX ADMIN — Medication 10 MILLIGRAM(S): at 23:20

## 2017-03-09 RX ADMIN — Medication 10 MILLIGRAM(S): at 14:18

## 2017-03-09 RX ADMIN — Medication 1 DROP(S): at 14:18

## 2017-03-09 RX ADMIN — LISINOPRIL 5 MILLIGRAM(S): 2.5 TABLET ORAL at 06:45

## 2017-03-09 NOTE — SWALLOW VFSS/MBS ASSESSMENT ADULT - SLP PERTINENT HISTORY OF CURRENT PROBLEM
67 yo woman w/DM w/diabetic neuropathy, HTN, HLD, CKD stage 4, afib  severe lymphedema, sent in [facility]/V for 1 month. Pt denied any significant abdominal pain but reported nausea and repeated episodes of vomiting nonbilious non bloody contents. Reported fatigue and generalized weakness. Reports that she vomits immediately after any feeds or meds via G-tube. Per transfer documents, Pt had a BM earlier today prior to transfer. Denied any abdominal pain, fevers/chills, CP, SOB, sick contacts, or skin changes. At Rehab, Abd XR was done negative for any evidence of obstruction or foreign body. Per transfer records, Pt was advanced to dysphagia and then mechanical soft foods per speech and swallow. Speech therapist noted persistent regurgitation of gastric fluids.

## 2017-03-09 NOTE — SWALLOW VFSS/MBS ASSESSMENT ADULT - NS SWALLOW VFSS REC ASPIR MON
upper respiratory infection/pneumonia/change of breathing pattern/cough/gurgly voice/fever/throat clearing

## 2017-03-09 NOTE — SWALLOW VFSS/MBS ASSESSMENT ADULT - LARYNGEAL PENETRATION DURING THE SWALLOW - SILENT
Mild/deep, over the laryngeal surface of the arytenoids, with incomplete retrieval/Trace over the laryngeal surface of the arytenoids, with incomplete retrieval/Trace over the larygeal surface of the epiglottis and suspected over the interarytenoid space to the level of the vocal cords, with incomplete retrieval/Trace over the laryngeal surface of the arytenoids with incomplete retrieval/Trace over the laryngeal surface of the epiglottis and arytenoids, with incomplete retrieval/Trace

## 2017-03-09 NOTE — SWALLOW VFSS/MBS ASSESSMENT ADULT - DIAGNOSTIC IMPRESSIONS
Pt presents with an oropharyngeal dysphagia notable for prolonged oral management with max spillover prior to swallow, delay in pharyngeal swallow initiation.... Pt presents with an oropharyngeal dysphagia notable for prolonged oral management with piecemeal deglutition and max spillover to the hypopharynx prior to swallow, delay in pharyngeal swallow initiation, and deep laryngeal penetration across PO consistencies with incomplete clearance. Pt with inconsistent cough post laryngeal penetration, and unable to implement postural strategies due to inconsistent command following.    Disorders: reduced lingual strength/ROM/Rate of motion, reduced tongue to palate contact, mildly reduced BOT to posterior pharyngeal wall contact, delay in trigger of the swallow reflex, mildly reduced hyo-laryngeal excursion, reduced laryngeal closure, and reduced supraglottic sensation.

## 2017-03-09 NOTE — SWALLOW VFSS/MBS ASSESSMENT ADULT - SLP GENERAL OBSERVATIONS
Pt received in radiology, secure in CHRISTIAN chair. Pt awake and alert, cooperative, but restless, frequently shifting position throughout study, which limited visualization to a degree.

## 2017-03-09 NOTE — SWALLOW VFSS/MBS ASSESSMENT ADULT - ROSENBEK'S PENETRATION ASPIRATION SCALE
(3) contrast remains above the vocal cords, visible residue remains (penetration) (5) contrast contacts vocal cords, visible residue remains (penetration)

## 2017-03-09 NOTE — SWALLOW VFSS/MBS ASSESSMENT ADULT - RECOMMENDED CONSISTENCY
From an oropharyngeal standpoint Pt appears to tolerate:   However, given Pt with present hospital course significant for gastroparesis with persistent N/V and poor tolerance of G-tube feedings, requiring conversion from G-tube to J-tube, defer to GI for appropriateness for and suggested volume of PO intake, pending Pt tolerance of oral feeding. NPO, with non-oral nutrition/hydration/medications.

## 2017-03-09 NOTE — SWALLOW VFSS/MBS ASSESSMENT ADULT - LARYNGEAL PENETRATION AFTER THE SWALLOW - SILENT
over laryngeal surface of the arytenoids, from oral residue that flows to hypopharynx, during reswallow/Trace

## 2017-03-09 NOTE — SWALLOW VFSS/MBS ASSESSMENT ADULT - ORAL PHASE
Residue in oral cavity/Reduced anterior - posterior transport/Delayed oral transit time/Incomplete tongue to palate contact/Uncontrolled bolus / spillover in anand-pharynx/max spillover to the valleculae, min spillover to hypopharynx Delayed oral transit time/Reduced anterior - posterior transport/Incomplete tongue to palate contact/max spillover to the pyriform sinuses, trace to min lingual residue Delayed oral transit time/Reduced anterior - posterior transport/Incomplete tongue to palate contact/max spillover to the pyriforms, trace to min lingual residue post swallow Delayed oral transit time/Incomplete tongue to palate contact/max uncontrolled spillover to the pyriform sinuses, mild deep laryngeal penetration over the laryngeal surface of the arytenoids prior to swallow with incomplete retrieval/Reduced anterior - posterior transport Incomplete tongue to palate contact/Delayed oral transit time/Reduced anterior - posterior transport Residue in oral cavity/Reduced anterior - posterior transport/Incomplete tongue to palate contact/Delayed oral transit time/Uncontrolled bolus / spillover in anand-pharynx/max spillover to the valleculae, mod spillover to hypopharynx, mild lingual residue that flows to valleculae post swallow

## 2017-03-09 NOTE — SWALLOW VFSS/MBS ASSESSMENT ADULT - ADDITIONAL INFORMATION
+ slight calcifications of laryngeal surface epiglottis, thyroid and arytenoid cartilages + slight calcifications of laryngeal surface epiglottis, thyroid and arytenoid cartilages  + lateral neck calcification, ? vascular  + multilevel C-spine changes

## 2017-03-10 LAB
ANION GAP SERPL CALC-SCNC: 14 MMOL/L — SIGNIFICANT CHANGE UP (ref 5–17)
BUN SERPL-MCNC: 88 MG/DL — HIGH (ref 7–23)
CALCIUM SERPL-MCNC: 9 MG/DL — SIGNIFICANT CHANGE UP (ref 8.4–10.5)
CHLORIDE SERPL-SCNC: 103 MMOL/L — SIGNIFICANT CHANGE UP (ref 96–108)
CO2 SERPL-SCNC: 23 MMOL/L — SIGNIFICANT CHANGE UP (ref 22–31)
CREAT SERPL-MCNC: 4.12 MG/DL — HIGH (ref 0.5–1.3)
GLUCOSE SERPL-MCNC: 95 MG/DL — SIGNIFICANT CHANGE UP (ref 70–99)
HCT VFR BLD CALC: 28.9 % — LOW (ref 34.5–45)
HGB BLD-MCNC: 9.4 G/DL — LOW (ref 11.5–15.5)
INR BLD: 1.79 RATIO — HIGH (ref 0.88–1.16)
MCHC RBC-ENTMCNC: 25.1 PG — LOW (ref 27–34)
MCHC RBC-ENTMCNC: 32.3 GM/DL — SIGNIFICANT CHANGE UP (ref 32–36)
MCV RBC AUTO: 77.6 FL — LOW (ref 80–100)
PLATELET # BLD AUTO: 346 K/UL — SIGNIFICANT CHANGE UP (ref 150–400)
POTASSIUM SERPL-MCNC: 3.5 MMOL/L — SIGNIFICANT CHANGE UP (ref 3.5–5.3)
POTASSIUM SERPL-SCNC: 3.5 MMOL/L — SIGNIFICANT CHANGE UP (ref 3.5–5.3)
PROTHROM AB SERPL-ACNC: 20.3 SEC — HIGH (ref 10–13.1)
RBC # BLD: 3.73 M/UL — LOW (ref 3.8–5.2)
RBC # FLD: 18.7 % — HIGH (ref 10.3–14.5)
SODIUM SERPL-SCNC: 140 MMOL/L — SIGNIFICANT CHANGE UP (ref 135–145)
WBC # BLD: 11 K/UL — HIGH (ref 3.8–10.5)
WBC # FLD AUTO: 11 K/UL — HIGH (ref 3.8–10.5)

## 2017-03-10 RX ORDER — SODIUM CHLORIDE 9 MG/ML
1000 INJECTION INTRAMUSCULAR; INTRAVENOUS; SUBCUTANEOUS
Qty: 0 | Refills: 0 | Status: DISCONTINUED | OUTPATIENT
Start: 2017-03-10 | End: 2017-03-12

## 2017-03-10 RX ORDER — WARFARIN SODIUM 2.5 MG/1
4 TABLET ORAL ONCE
Qty: 0 | Refills: 0 | Status: COMPLETED | OUTPATIENT
Start: 2017-03-10 | End: 2017-03-10

## 2017-03-10 RX ADMIN — SODIUM CHLORIDE 50 MILLILITER(S): 9 INJECTION INTRAMUSCULAR; INTRAVENOUS; SUBCUTANEOUS at 14:04

## 2017-03-10 RX ADMIN — Medication 1 DROP(S): at 14:04

## 2017-03-10 RX ADMIN — Medication 10 MILLIGRAM(S): at 22:53

## 2017-03-10 RX ADMIN — ONDANSETRON 4 MILLIGRAM(S): 8 TABLET, FILM COATED ORAL at 10:04

## 2017-03-10 RX ADMIN — Medication 1 DROP(S): at 22:54

## 2017-03-10 RX ADMIN — CITALOPRAM 10 MILLIGRAM(S): 10 TABLET, FILM COATED ORAL at 14:03

## 2017-03-10 RX ADMIN — Medication 81 MILLIGRAM(S): at 14:03

## 2017-03-10 RX ADMIN — Medication 50 MILLIGRAM(S): at 05:34

## 2017-03-10 RX ADMIN — WARFARIN SODIUM 4 MILLIGRAM(S): 2.5 TABLET ORAL at 22:57

## 2017-03-10 RX ADMIN — Medication 10 MILLIGRAM(S): at 05:34

## 2017-03-10 RX ADMIN — Medication 10 MILLIGRAM(S): at 14:04

## 2017-03-10 RX ADMIN — Medication 1 DROP(S): at 05:34

## 2017-03-11 LAB
ANION GAP SERPL CALC-SCNC: 16 MMOL/L — SIGNIFICANT CHANGE UP (ref 5–17)
APTT BLD: 35.7 SEC — SIGNIFICANT CHANGE UP (ref 27.5–37.4)
BUN SERPL-MCNC: 104 MG/DL — HIGH (ref 7–23)
CALCIUM SERPL-MCNC: 9.2 MG/DL — SIGNIFICANT CHANGE UP (ref 8.4–10.5)
CHLORIDE SERPL-SCNC: 101 MMOL/L — SIGNIFICANT CHANGE UP (ref 96–108)
CO2 SERPL-SCNC: 24 MMOL/L — SIGNIFICANT CHANGE UP (ref 22–31)
CREAT SERPL-MCNC: 4.46 MG/DL — HIGH (ref 0.5–1.3)
GLUCOSE SERPL-MCNC: 88 MG/DL — SIGNIFICANT CHANGE UP (ref 70–99)
HCT VFR BLD CALC: 26.3 % — LOW (ref 34.5–45)
HGB BLD-MCNC: 8.2 G/DL — LOW (ref 11.5–15.5)
INR BLD: 1.91 RATIO — HIGH (ref 0.88–1.16)
MCHC RBC-ENTMCNC: 24.6 PG — LOW (ref 27–34)
MCHC RBC-ENTMCNC: 31.2 GM/DL — LOW (ref 32–36)
MCV RBC AUTO: 78.7 FL — LOW (ref 80–100)
PLATELET # BLD AUTO: 370 K/UL — SIGNIFICANT CHANGE UP (ref 150–400)
POTASSIUM SERPL-MCNC: 3.1 MMOL/L — LOW (ref 3.5–5.3)
POTASSIUM SERPL-SCNC: 3.1 MMOL/L — LOW (ref 3.5–5.3)
PROTHROM AB SERPL-ACNC: 21.7 SEC — HIGH (ref 10–13.1)
RBC # BLD: 3.34 M/UL — LOW (ref 3.8–5.2)
RBC # FLD: 20 % — HIGH (ref 10.3–14.5)
SODIUM SERPL-SCNC: 141 MMOL/L — SIGNIFICANT CHANGE UP (ref 135–145)
WBC # BLD: 10.06 K/UL — SIGNIFICANT CHANGE UP (ref 3.8–10.5)
WBC # FLD AUTO: 10.06 K/UL — SIGNIFICANT CHANGE UP (ref 3.8–10.5)

## 2017-03-11 RX ORDER — WARFARIN SODIUM 2.5 MG/1
5 TABLET ORAL ONCE
Qty: 0 | Refills: 0 | Status: COMPLETED | OUTPATIENT
Start: 2017-03-11 | End: 2017-03-11

## 2017-03-11 RX ORDER — METOPROLOL TARTRATE 50 MG
25 TABLET ORAL
Qty: 0 | Refills: 0 | Status: DISCONTINUED | OUTPATIENT
Start: 2017-03-11 | End: 2017-03-12

## 2017-03-11 RX ADMIN — Medication 10 MILLIGRAM(S): at 14:21

## 2017-03-11 RX ADMIN — Medication 10 MILLIGRAM(S): at 22:34

## 2017-03-11 RX ADMIN — WARFARIN SODIUM 5 MILLIGRAM(S): 2.5 TABLET ORAL at 22:33

## 2017-03-11 RX ADMIN — Medication 50 MILLIGRAM(S): at 06:18

## 2017-03-11 RX ADMIN — Medication 1 DROP(S): at 14:21

## 2017-03-11 RX ADMIN — CITALOPRAM 10 MILLIGRAM(S): 10 TABLET, FILM COATED ORAL at 12:06

## 2017-03-11 RX ADMIN — Medication 81 MILLIGRAM(S): at 12:06

## 2017-03-11 RX ADMIN — Medication 1 DROP(S): at 06:18

## 2017-03-11 RX ADMIN — SODIUM CHLORIDE 50 MILLILITER(S): 9 INJECTION INTRAMUSCULAR; INTRAVENOUS; SUBCUTANEOUS at 08:33

## 2017-03-11 RX ADMIN — Medication 1 DROP(S): at 22:33

## 2017-03-11 RX ADMIN — Medication 50 MILLIGRAM(S): at 18:29

## 2017-03-11 RX ADMIN — Medication 10 MILLIGRAM(S): at 06:19

## 2017-03-12 LAB
ANION GAP SERPL CALC-SCNC: 18 MMOL/L — HIGH (ref 5–17)
ANION GAP SERPL CALC-SCNC: 18 MMOL/L — HIGH (ref 5–17)
BASOPHILS # BLD AUTO: 0.11 K/UL — SIGNIFICANT CHANGE UP (ref 0–0.2)
BASOPHILS NFR BLD AUTO: 1 % — SIGNIFICANT CHANGE UP (ref 0–2)
BUN SERPL-MCNC: 115 MG/DL — HIGH (ref 7–23)
BUN SERPL-MCNC: 120 MG/DL — HIGH (ref 7–23)
CALCIUM SERPL-MCNC: 9 MG/DL — SIGNIFICANT CHANGE UP (ref 8.4–10.5)
CALCIUM SERPL-MCNC: 9.2 MG/DL — SIGNIFICANT CHANGE UP (ref 8.4–10.5)
CHLORIDE SERPL-SCNC: 101 MMOL/L — SIGNIFICANT CHANGE UP (ref 96–108)
CHLORIDE SERPL-SCNC: 102 MMOL/L — SIGNIFICANT CHANGE UP (ref 96–108)
CO2 SERPL-SCNC: 22 MMOL/L — SIGNIFICANT CHANGE UP (ref 22–31)
CO2 SERPL-SCNC: 25 MMOL/L — SIGNIFICANT CHANGE UP (ref 22–31)
CREAT SERPL-MCNC: 5.36 MG/DL — HIGH (ref 0.5–1.3)
CREAT SERPL-MCNC: 5.64 MG/DL — HIGH (ref 0.5–1.3)
EOSINOPHIL # BLD AUTO: 0.44 K/UL — SIGNIFICANT CHANGE UP (ref 0–0.5)
EOSINOPHIL NFR BLD AUTO: 4 % — SIGNIFICANT CHANGE UP (ref 0–6)
GLUCOSE SERPL-MCNC: 102 MG/DL — HIGH (ref 70–99)
GLUCOSE SERPL-MCNC: 95 MG/DL — SIGNIFICANT CHANGE UP (ref 70–99)
HCT VFR BLD CALC: 25.9 % — LOW (ref 34.5–45)
HGB BLD-MCNC: 8 G/DL — LOW (ref 11.5–15.5)
INR BLD: 2.54 RATIO — HIGH (ref 0.88–1.16)
LYMPHOCYTES # BLD AUTO: 1.89 K/UL — SIGNIFICANT CHANGE UP (ref 1–3.3)
LYMPHOCYTES # BLD AUTO: 17 % — SIGNIFICANT CHANGE UP (ref 13–44)
MANUAL SMEAR VERIFICATION: SIGNIFICANT CHANGE UP
MCHC RBC-ENTMCNC: 24.3 PG — LOW (ref 27–34)
MCHC RBC-ENTMCNC: 30.9 GM/DL — LOW (ref 32–36)
MCV RBC AUTO: 78.7 FL — LOW (ref 80–100)
MONOCYTES # BLD AUTO: 0.22 K/UL — SIGNIFICANT CHANGE UP (ref 0–0.9)
MONOCYTES NFR BLD AUTO: 2 % — SIGNIFICANT CHANGE UP (ref 2–14)
NEUTROPHILS # BLD AUTO: 8.23 K/UL — HIGH (ref 1.8–7.4)
NEUTROPHILS NFR BLD AUTO: 69 % — SIGNIFICANT CHANGE UP (ref 43–77)
NEUTS BAND # BLD: 5 % — SIGNIFICANT CHANGE UP (ref 0–8)
PLAT MORPH BLD: NORMAL — SIGNIFICANT CHANGE UP
PLATELET # BLD AUTO: 337 K/UL — SIGNIFICANT CHANGE UP (ref 150–400)
POTASSIUM SERPL-MCNC: 3 MMOL/L — LOW (ref 3.5–5.3)
POTASSIUM SERPL-MCNC: 3.9 MMOL/L — SIGNIFICANT CHANGE UP (ref 3.5–5.3)
POTASSIUM SERPL-SCNC: 3 MMOL/L — LOW (ref 3.5–5.3)
POTASSIUM SERPL-SCNC: 3.9 MMOL/L — SIGNIFICANT CHANGE UP (ref 3.5–5.3)
PROTHROM AB SERPL-ACNC: 29 SEC — HIGH (ref 10–13.1)
RBC # BLD: 3.29 M/UL — LOW (ref 3.8–5.2)
RBC # FLD: 20.2 % — HIGH (ref 10.3–14.5)
RBC BLD AUTO: SIGNIFICANT CHANGE UP
SODIUM SERPL-SCNC: 141 MMOL/L — SIGNIFICANT CHANGE UP (ref 135–145)
SODIUM SERPL-SCNC: 145 MMOL/L — SIGNIFICANT CHANGE UP (ref 135–145)
VARIANT LYMPHS # BLD: 2 % — SIGNIFICANT CHANGE UP (ref 0–6)
WBC # BLD: 11.12 K/UL — HIGH (ref 3.8–10.5)
WBC # FLD AUTO: 11.12 K/UL — HIGH (ref 3.8–10.5)

## 2017-03-12 PROCEDURE — 71010: CPT | Mod: 26

## 2017-03-12 PROCEDURE — 71250 CT THORAX DX C-: CPT | Mod: 26

## 2017-03-12 RX ORDER — MIDODRINE HYDROCHLORIDE 2.5 MG/1
5 TABLET ORAL
Qty: 0 | Refills: 0 | Status: DISCONTINUED | OUTPATIENT
Start: 2017-03-12 | End: 2017-03-14

## 2017-03-12 RX ORDER — SODIUM CHLORIDE 9 MG/ML
1000 INJECTION INTRAMUSCULAR; INTRAVENOUS; SUBCUTANEOUS
Qty: 0 | Refills: 0 | Status: DISCONTINUED | OUTPATIENT
Start: 2017-03-12 | End: 2017-03-13

## 2017-03-12 RX ORDER — WARFARIN SODIUM 2.5 MG/1
4 TABLET ORAL ONCE
Qty: 0 | Refills: 0 | Status: COMPLETED | OUTPATIENT
Start: 2017-03-12 | End: 2017-03-12

## 2017-03-12 RX ORDER — POTASSIUM CHLORIDE 20 MEQ
40 PACKET (EA) ORAL ONCE
Qty: 0 | Refills: 0 | Status: COMPLETED | OUTPATIENT
Start: 2017-03-12 | End: 2017-03-12

## 2017-03-12 RX ORDER — METOPROLOL TARTRATE 50 MG
25 TABLET ORAL
Qty: 0 | Refills: 0 | Status: DISCONTINUED | OUTPATIENT
Start: 2017-03-12 | End: 2017-03-12

## 2017-03-12 RX ORDER — POTASSIUM CHLORIDE 20 MEQ
10 PACKET (EA) ORAL
Qty: 0 | Refills: 0 | Status: DISCONTINUED | OUTPATIENT
Start: 2017-03-12 | End: 2017-03-12

## 2017-03-12 RX ADMIN — Medication 1 DROP(S): at 22:18

## 2017-03-12 RX ADMIN — Medication 81 MILLIGRAM(S): at 11:46

## 2017-03-12 RX ADMIN — WARFARIN SODIUM 4 MILLIGRAM(S): 2.5 TABLET ORAL at 22:17

## 2017-03-12 RX ADMIN — CITALOPRAM 10 MILLIGRAM(S): 10 TABLET, FILM COATED ORAL at 11:46

## 2017-03-12 RX ADMIN — Medication 1 DROP(S): at 06:08

## 2017-03-12 RX ADMIN — Medication 10 MILLIGRAM(S): at 22:17

## 2017-03-12 RX ADMIN — Medication 40 MILLIEQUIVALENT(S): at 11:45

## 2017-03-12 RX ADMIN — Medication 1 DROP(S): at 17:37

## 2017-03-12 RX ADMIN — Medication 100 MILLIEQUIVALENT(S): at 11:46

## 2017-03-12 RX ADMIN — Medication 10 MILLIGRAM(S): at 06:08

## 2017-03-12 RX ADMIN — Medication 10 MILLIGRAM(S): at 17:37

## 2017-03-12 RX ADMIN — MIDODRINE HYDROCHLORIDE 5 MILLIGRAM(S): 2.5 TABLET ORAL at 17:36

## 2017-03-13 LAB
ANION GAP SERPL CALC-SCNC: 16 MMOL/L — SIGNIFICANT CHANGE UP (ref 5–17)
ANION GAP SERPL CALC-SCNC: 18 MMOL/L — HIGH (ref 5–17)
APPEARANCE UR: ABNORMAL
BILIRUB UR-MCNC: NEGATIVE — SIGNIFICANT CHANGE UP
BUN SERPL-MCNC: 120 MG/DL — HIGH (ref 7–23)
BUN SERPL-MCNC: 120 MG/DL — HIGH (ref 7–23)
CALCIUM SERPL-MCNC: 8.7 MG/DL — SIGNIFICANT CHANGE UP (ref 8.4–10.5)
CALCIUM SERPL-MCNC: 9 MG/DL — SIGNIFICANT CHANGE UP (ref 8.4–10.5)
CHLORIDE SERPL-SCNC: 103 MMOL/L — SIGNIFICANT CHANGE UP (ref 96–108)
CHLORIDE SERPL-SCNC: 104 MMOL/L — SIGNIFICANT CHANGE UP (ref 96–108)
CO2 SERPL-SCNC: 24 MMOL/L — SIGNIFICANT CHANGE UP (ref 22–31)
CO2 SERPL-SCNC: 27 MMOL/L — SIGNIFICANT CHANGE UP (ref 22–31)
COLOR SPEC: ABNORMAL
CREAT ?TM UR-MCNC: 101 MG/DL — SIGNIFICANT CHANGE UP
CREAT SERPL-MCNC: 5.5 MG/DL — HIGH (ref 0.5–1.3)
CREAT SERPL-MCNC: 5.76 MG/DL — HIGH (ref 0.5–1.3)
DIFF PNL FLD: ABNORMAL
GLUCOSE SERPL-MCNC: 103 MG/DL — HIGH (ref 70–99)
GLUCOSE SERPL-MCNC: 112 MG/DL — HIGH (ref 70–99)
GLUCOSE UR QL: NEGATIVE MG/DL — SIGNIFICANT CHANGE UP
HCT VFR BLD CALC: 26 % — LOW (ref 34.5–45)
HGB BLD-MCNC: 8.6 G/DL — LOW (ref 11.5–15.5)
INR BLD: 2.68 RATIO — HIGH (ref 0.88–1.16)
KETONES UR-MCNC: NEGATIVE — SIGNIFICANT CHANGE UP
LEUKOCYTE ESTERASE UR-ACNC: ABNORMAL
MAGNESIUM SERPL-MCNC: 3.5 MG/DL — HIGH (ref 1.6–2.6)
MCHC RBC-ENTMCNC: 25.7 PG — LOW (ref 27–34)
MCHC RBC-ENTMCNC: 32.9 GM/DL — SIGNIFICANT CHANGE UP (ref 32–36)
MCV RBC AUTO: 78.1 FL — LOW (ref 80–100)
NITRITE UR-MCNC: NEGATIVE — SIGNIFICANT CHANGE UP
PH UR: 6 — SIGNIFICANT CHANGE UP (ref 5–8)
PHOSPHATE SERPL-MCNC: 4.5 MG/DL — SIGNIFICANT CHANGE UP (ref 2.5–4.5)
PLATELET # BLD AUTO: 378 K/UL — SIGNIFICANT CHANGE UP (ref 150–400)
POTASSIUM SERPL-MCNC: 3.5 MMOL/L — SIGNIFICANT CHANGE UP (ref 3.5–5.3)
POTASSIUM SERPL-MCNC: 3.6 MMOL/L — SIGNIFICANT CHANGE UP (ref 3.5–5.3)
POTASSIUM SERPL-SCNC: 3.5 MMOL/L — SIGNIFICANT CHANGE UP (ref 3.5–5.3)
POTASSIUM SERPL-SCNC: 3.6 MMOL/L — SIGNIFICANT CHANGE UP (ref 3.5–5.3)
PROT UR-MCNC: NEGATIVE MG/DL — SIGNIFICANT CHANGE UP
PROTHROM AB SERPL-ACNC: 29.2 SEC — HIGH (ref 10–13.1)
RBC # BLD: 3.33 M/UL — LOW (ref 3.8–5.2)
RBC # FLD: 19.4 % — HIGH (ref 10.3–14.5)
SODIUM SERPL-SCNC: 144 MMOL/L — SIGNIFICANT CHANGE UP (ref 135–145)
SODIUM SERPL-SCNC: 148 MMOL/L — HIGH (ref 135–145)
SODIUM UR-SCNC: 70 MMOL/L — SIGNIFICANT CHANGE UP
SP GR SPEC: 1.02 — SIGNIFICANT CHANGE UP (ref 1.01–1.02)
UROBILINOGEN FLD QL: NEGATIVE MG/DL — SIGNIFICANT CHANGE UP
WBC # BLD: 14.7 K/UL — HIGH (ref 3.8–10.5)
WBC # FLD AUTO: 14.7 K/UL — HIGH (ref 3.8–10.5)

## 2017-03-13 PROCEDURE — 99222 1ST HOSP IP/OBS MODERATE 55: CPT | Mod: GC

## 2017-03-13 RX ORDER — SODIUM CHLORIDE 9 MG/ML
1000 INJECTION INTRAMUSCULAR; INTRAVENOUS; SUBCUTANEOUS
Qty: 0 | Refills: 0 | Status: DISCONTINUED | OUTPATIENT
Start: 2017-03-13 | End: 2017-03-13

## 2017-03-13 RX ORDER — SODIUM CHLORIDE 9 MG/ML
500 INJECTION INTRAMUSCULAR; INTRAVENOUS; SUBCUTANEOUS ONCE
Qty: 0 | Refills: 0 | Status: COMPLETED | OUTPATIENT
Start: 2017-03-13 | End: 2017-03-13

## 2017-03-13 RX ORDER — POLYETHYLENE GLYCOL 3350 17 G/17G
17 POWDER, FOR SOLUTION ORAL
Qty: 0 | Refills: 0 | Status: DISCONTINUED | OUTPATIENT
Start: 2017-03-13 | End: 2017-03-17

## 2017-03-13 RX ORDER — ERYTHROPOIETIN 10000 [IU]/ML
10000 INJECTION, SOLUTION INTRAVENOUS; SUBCUTANEOUS ONCE
Qty: 0 | Refills: 0 | Status: COMPLETED | OUTPATIENT
Start: 2017-03-13 | End: 2017-03-13

## 2017-03-13 RX ORDER — WARFARIN SODIUM 2.5 MG/1
3 TABLET ORAL ONCE
Qty: 0 | Refills: 0 | Status: COMPLETED | OUTPATIENT
Start: 2017-03-13 | End: 2017-03-13

## 2017-03-13 RX ADMIN — MIDODRINE HYDROCHLORIDE 5 MILLIGRAM(S): 2.5 TABLET ORAL at 06:17

## 2017-03-13 RX ADMIN — Medication 10 MILLIGRAM(S): at 13:12

## 2017-03-13 RX ADMIN — WARFARIN SODIUM 3 MILLIGRAM(S): 2.5 TABLET ORAL at 21:29

## 2017-03-13 RX ADMIN — POLYETHYLENE GLYCOL 3350 17 GRAM(S): 17 POWDER, FOR SOLUTION ORAL at 13:14

## 2017-03-13 RX ADMIN — Medication 10 MILLIGRAM(S): at 06:17

## 2017-03-13 RX ADMIN — ONDANSETRON 4 MILLIGRAM(S): 8 TABLET, FILM COATED ORAL at 21:29

## 2017-03-13 RX ADMIN — Medication 1 DROP(S): at 13:11

## 2017-03-13 RX ADMIN — ERYTHROPOIETIN 10000 UNIT(S): 10000 INJECTION, SOLUTION INTRAVENOUS; SUBCUTANEOUS at 18:20

## 2017-03-13 RX ADMIN — MIDODRINE HYDROCHLORIDE 5 MILLIGRAM(S): 2.5 TABLET ORAL at 18:20

## 2017-03-13 RX ADMIN — SODIUM CHLORIDE 125 MILLILITER(S): 9 INJECTION INTRAMUSCULAR; INTRAVENOUS; SUBCUTANEOUS at 12:12

## 2017-03-13 RX ADMIN — CITALOPRAM 10 MILLIGRAM(S): 10 TABLET, FILM COATED ORAL at 13:12

## 2017-03-13 RX ADMIN — Medication 81 MILLIGRAM(S): at 13:12

## 2017-03-13 RX ADMIN — Medication 10 MILLIGRAM(S): at 21:30

## 2017-03-13 RX ADMIN — Medication 1 DROP(S): at 06:16

## 2017-03-13 RX ADMIN — SODIUM CHLORIDE 166.67 MILLILITER(S): 9 INJECTION INTRAMUSCULAR; INTRAVENOUS; SUBCUTANEOUS at 09:15

## 2017-03-13 RX ADMIN — Medication 1 DROP(S): at 21:29

## 2017-03-14 LAB
ANION GAP SERPL CALC-SCNC: 20 MMOL/L — HIGH (ref 5–17)
BUN SERPL-MCNC: 119 MG/DL — HIGH (ref 7–23)
CALCIUM SERPL-MCNC: 9.2 MG/DL — SIGNIFICANT CHANGE UP (ref 8.4–10.5)
CHLORIDE SERPL-SCNC: 99 MMOL/L — SIGNIFICANT CHANGE UP (ref 96–108)
CO2 SERPL-SCNC: 24 MMOL/L — SIGNIFICANT CHANGE UP (ref 22–31)
CREAT SERPL-MCNC: 5.74 MG/DL — HIGH (ref 0.5–1.3)
GLUCOSE SERPL-MCNC: 119 MG/DL — HIGH (ref 70–99)
HCT VFR BLD CALC: 27.2 % — LOW (ref 34.5–45)
HGB BLD-MCNC: 8.4 G/DL — LOW (ref 11.5–15.5)
INR BLD: 2.87 RATIO — HIGH (ref 0.88–1.16)
MCHC RBC-ENTMCNC: 24.7 PG — LOW (ref 27–34)
MCHC RBC-ENTMCNC: 30.9 GM/DL — LOW (ref 32–36)
MCV RBC AUTO: 80 FL — SIGNIFICANT CHANGE UP (ref 80–100)
PLATELET # BLD AUTO: 413 K/UL — HIGH (ref 150–400)
POTASSIUM SERPL-MCNC: 3.6 MMOL/L — SIGNIFICANT CHANGE UP (ref 3.5–5.3)
POTASSIUM SERPL-SCNC: 3.6 MMOL/L — SIGNIFICANT CHANGE UP (ref 3.5–5.3)
PROTHROM AB SERPL-ACNC: 32.8 SEC — HIGH (ref 10–13.1)
RBC # BLD: 3.4 M/UL — LOW (ref 3.8–5.2)
RBC # FLD: 20.4 % — HIGH (ref 10.3–14.5)
SODIUM SERPL-SCNC: 143 MMOL/L — SIGNIFICANT CHANGE UP (ref 135–145)
WBC # BLD: 14.2 K/UL — HIGH (ref 3.8–10.5)
WBC # FLD AUTO: 14.2 K/UL — HIGH (ref 3.8–10.5)

## 2017-03-14 PROCEDURE — 99232 SBSQ HOSP IP/OBS MODERATE 35: CPT

## 2017-03-14 RX ORDER — CEFTRIAXONE 500 MG/1
1 INJECTION, POWDER, FOR SOLUTION INTRAMUSCULAR; INTRAVENOUS EVERY 24 HOURS
Qty: 0 | Refills: 0 | Status: DISCONTINUED | OUTPATIENT
Start: 2017-03-14 | End: 2017-03-20

## 2017-03-14 RX ORDER — WARFARIN SODIUM 2.5 MG/1
3 TABLET ORAL ONCE
Qty: 0 | Refills: 0 | Status: COMPLETED | OUTPATIENT
Start: 2017-03-14 | End: 2017-03-14

## 2017-03-14 RX ADMIN — WARFARIN SODIUM 3 MILLIGRAM(S): 2.5 TABLET ORAL at 21:39

## 2017-03-14 RX ADMIN — Medication 10 MILLIGRAM(S): at 05:16

## 2017-03-14 RX ADMIN — Medication 1 DROP(S): at 05:16

## 2017-03-14 RX ADMIN — MIDODRINE HYDROCHLORIDE 5 MILLIGRAM(S): 2.5 TABLET ORAL at 05:16

## 2017-03-14 RX ADMIN — CEFTRIAXONE 100 GRAM(S): 500 INJECTION, POWDER, FOR SOLUTION INTRAMUSCULAR; INTRAVENOUS at 18:12

## 2017-03-14 RX ADMIN — Medication 1 DROP(S): at 21:40

## 2017-03-14 RX ADMIN — Medication 81 MILLIGRAM(S): at 12:10

## 2017-03-14 RX ADMIN — Medication 10 MILLIGRAM(S): at 21:39

## 2017-03-14 RX ADMIN — POLYETHYLENE GLYCOL 3350 17 GRAM(S): 17 POWDER, FOR SOLUTION ORAL at 05:15

## 2017-03-14 RX ADMIN — CITALOPRAM 10 MILLIGRAM(S): 10 TABLET, FILM COATED ORAL at 12:10

## 2017-03-14 RX ADMIN — POLYETHYLENE GLYCOL 3350 17 GRAM(S): 17 POWDER, FOR SOLUTION ORAL at 21:39

## 2017-03-14 RX ADMIN — Medication 1 DROP(S): at 14:06

## 2017-03-14 NOTE — PROVIDER CONTACT NOTE (OTHER) - BACKGROUND
admitted with ARF, nausea and vomiting, GJ tube in place, SUplena infusing at 45 ml/hr, via J tube Gtube to dependent drainage.

## 2017-03-14 NOTE — PROVIDER CONTACT NOTE (OTHER) - ACTION/TREATMENT ORDERED:
2 liters oxygen via nasal cannula applied, oxygen saturation is 95 %, Will continue to monitor
Give morning amlodipine and recheck manual BP in 1 hour
Ok to give patient amlodipine
continue to monitor
continue to monitor

## 2017-03-14 NOTE — PROVIDER CONTACT NOTE (OTHER) - ASSESSMENT
Patient is alert, no signs/symptoms of distress noted, per NP Ankit and RN assessment, lungs clear. HOB elevated 30-45 degrees

## 2017-03-14 NOTE — PROVIDER CONTACT NOTE (OTHER) - SITUATION
patient's oxygen saturation is 86% on room air, had been 95 % on room air at 0945 with DOUGLAS Pham in attendance.

## 2017-03-15 LAB
-  AMIKACIN: SIGNIFICANT CHANGE UP
-  AMPICILLIN/SULBACTAM: SIGNIFICANT CHANGE UP
-  AMPICILLIN: SIGNIFICANT CHANGE UP
-  AZTREONAM: SIGNIFICANT CHANGE UP
-  CEFAZOLIN: SIGNIFICANT CHANGE UP
-  CEFEPIME: SIGNIFICANT CHANGE UP
-  CEFOXITIN: SIGNIFICANT CHANGE UP
-  CEFTAZIDIME: SIGNIFICANT CHANGE UP
-  CEFTRIAXONE: SIGNIFICANT CHANGE UP
-  CIPROFLOXACIN: SIGNIFICANT CHANGE UP
-  ERTAPENEM: SIGNIFICANT CHANGE UP
-  GENTAMICIN: SIGNIFICANT CHANGE UP
-  LEVOFLOXACIN: SIGNIFICANT CHANGE UP
-  MEROPENEM: SIGNIFICANT CHANGE UP
-  NITROFURANTOIN: SIGNIFICANT CHANGE UP
-  PIPERACILLIN/TAZOBACTAM: SIGNIFICANT CHANGE UP
-  TOBRAMYCIN: SIGNIFICANT CHANGE UP
-  TRIMETHOPRIM/SULFAMETHOXAZOLE: SIGNIFICANT CHANGE UP
ANION GAP SERPL CALC-SCNC: 22 MMOL/L — HIGH (ref 5–17)
BASOPHILS # BLD AUTO: 0.03 K/UL — SIGNIFICANT CHANGE UP (ref 0–0.2)
BASOPHILS NFR BLD AUTO: 0.3 % — SIGNIFICANT CHANGE UP (ref 0–2)
BUN SERPL-MCNC: 129 MG/DL — HIGH (ref 7–23)
CALCIUM SERPL-MCNC: 8.9 MG/DL — SIGNIFICANT CHANGE UP (ref 8.4–10.5)
CHLORIDE SERPL-SCNC: 97 MMOL/L — SIGNIFICANT CHANGE UP (ref 96–108)
CO2 SERPL-SCNC: 26 MMOL/L — SIGNIFICANT CHANGE UP (ref 22–31)
CREAT SERPL-MCNC: 6.3 MG/DL — HIGH (ref 0.5–1.3)
CULTURE RESULTS: SIGNIFICANT CHANGE UP
EOSINOPHIL # BLD AUTO: 0.5 K/UL — SIGNIFICANT CHANGE UP (ref 0–0.5)
EOSINOPHIL NFR BLD AUTO: 5 % — SIGNIFICANT CHANGE UP (ref 0–6)
GLUCOSE SERPL-MCNC: 113 MG/DL — HIGH (ref 70–99)
HCT VFR BLD CALC: 25.4 % — LOW (ref 34.5–45)
HGB BLD-MCNC: 8 G/DL — LOW (ref 11.5–15.5)
IMM GRANULOCYTES NFR BLD AUTO: 0.3 % — SIGNIFICANT CHANGE UP (ref 0–1.5)
INR BLD: 3.2 RATIO — HIGH (ref 0.88–1.16)
LYMPHOCYTES # BLD AUTO: 1.52 K/UL — SIGNIFICANT CHANGE UP (ref 1–3.3)
LYMPHOCYTES # BLD AUTO: 15.1 % — SIGNIFICANT CHANGE UP (ref 13–44)
MCHC RBC-ENTMCNC: 24.9 PG — LOW (ref 27–34)
MCHC RBC-ENTMCNC: 31.5 GM/DL — LOW (ref 32–36)
MCV RBC AUTO: 79.1 FL — LOW (ref 80–100)
METHOD TYPE: SIGNIFICANT CHANGE UP
MONOCYTES # BLD AUTO: 0.77 K/UL — SIGNIFICANT CHANGE UP (ref 0–0.9)
MONOCYTES NFR BLD AUTO: 7.6 % — SIGNIFICANT CHANGE UP (ref 2–14)
NEUTROPHILS # BLD AUTO: 7.23 K/UL — SIGNIFICANT CHANGE UP (ref 1.8–7.4)
NEUTROPHILS NFR BLD AUTO: 71.7 % — SIGNIFICANT CHANGE UP (ref 43–77)
ORGANISM # SPEC MICROSCOPIC CNT: SIGNIFICANT CHANGE UP
ORGANISM # SPEC MICROSCOPIC CNT: SIGNIFICANT CHANGE UP
PLATELET # BLD AUTO: 378 K/UL — SIGNIFICANT CHANGE UP (ref 150–400)
POTASSIUM SERPL-MCNC: 3.2 MMOL/L — LOW (ref 3.5–5.3)
POTASSIUM SERPL-SCNC: 3.2 MMOL/L — LOW (ref 3.5–5.3)
PROTHROM AB SERPL-ACNC: 36.6 SEC — HIGH (ref 10–13.1)
RBC # BLD: 3.21 M/UL — LOW (ref 3.8–5.2)
RBC # FLD: 20.4 % — HIGH (ref 10.3–14.5)
SODIUM SERPL-SCNC: 145 MMOL/L — SIGNIFICANT CHANGE UP (ref 135–145)
SPECIMEN SOURCE: SIGNIFICANT CHANGE UP
WBC # BLD: 10.08 K/UL — SIGNIFICANT CHANGE UP (ref 3.8–10.5)
WBC # FLD AUTO: 10.08 K/UL — SIGNIFICANT CHANGE UP (ref 3.8–10.5)

## 2017-03-15 PROCEDURE — 99232 SBSQ HOSP IP/OBS MODERATE 35: CPT

## 2017-03-15 RX ORDER — WARFARIN SODIUM 2.5 MG/1
1 TABLET ORAL ONCE
Qty: 0 | Refills: 0 | Status: COMPLETED | OUTPATIENT
Start: 2017-03-15 | End: 2017-03-15

## 2017-03-15 RX ORDER — POTASSIUM CHLORIDE 20 MEQ
20 PACKET (EA) ORAL
Qty: 0 | Refills: 0 | Status: COMPLETED | OUTPATIENT
Start: 2017-03-15 | End: 2017-03-15

## 2017-03-15 RX ADMIN — Medication 81 MILLIGRAM(S): at 11:51

## 2017-03-15 RX ADMIN — POLYETHYLENE GLYCOL 3350 17 GRAM(S): 17 POWDER, FOR SOLUTION ORAL at 11:52

## 2017-03-15 RX ADMIN — Medication 20 MILLIEQUIVALENT(S): at 12:03

## 2017-03-15 RX ADMIN — Medication 1 DROP(S): at 22:36

## 2017-03-15 RX ADMIN — Medication 20 MILLIEQUIVALENT(S): at 11:55

## 2017-03-15 RX ADMIN — Medication 10 MILLIGRAM(S): at 22:17

## 2017-03-15 RX ADMIN — Medication 1 DROP(S): at 05:29

## 2017-03-15 RX ADMIN — CITALOPRAM 10 MILLIGRAM(S): 10 TABLET, FILM COATED ORAL at 11:52

## 2017-03-15 RX ADMIN — WARFARIN SODIUM 1 MILLIGRAM(S): 2.5 TABLET ORAL at 22:17

## 2017-03-15 RX ADMIN — Medication 10 MILLIGRAM(S): at 12:03

## 2017-03-15 RX ADMIN — POLYETHYLENE GLYCOL 3350 17 GRAM(S): 17 POWDER, FOR SOLUTION ORAL at 22:17

## 2017-03-15 RX ADMIN — CEFTRIAXONE 100 GRAM(S): 500 INJECTION, POWDER, FOR SOLUTION INTRAMUSCULAR; INTRAVENOUS at 16:37

## 2017-03-15 RX ADMIN — Medication 10 MILLIGRAM(S): at 05:29

## 2017-03-15 RX ADMIN — Medication 1 DROP(S): at 12:03

## 2017-03-16 LAB
ANION GAP SERPL CALC-SCNC: 20 MMOL/L — HIGH (ref 5–17)
BUN SERPL-MCNC: 124 MG/DL — HIGH (ref 7–23)
CALCIUM SERPL-MCNC: 8.5 MG/DL — SIGNIFICANT CHANGE UP (ref 8.4–10.5)
CHLORIDE SERPL-SCNC: 97 MMOL/L — SIGNIFICANT CHANGE UP (ref 96–108)
CO2 SERPL-SCNC: 26 MMOL/L — SIGNIFICANT CHANGE UP (ref 22–31)
CREAT SERPL-MCNC: 6.45 MG/DL — HIGH (ref 0.5–1.3)
EOSINOPHIL NFR URNS MANUAL: NEGATIVE — SIGNIFICANT CHANGE UP
GLUCOSE SERPL-MCNC: 105 MG/DL — HIGH (ref 70–99)
HCT VFR BLD CALC: 25.2 % — LOW (ref 34.5–45)
HGB BLD-MCNC: 7.9 G/DL — LOW (ref 11.5–15.5)
INR BLD: 3.51 RATIO — HIGH (ref 0.88–1.16)
MCHC RBC-ENTMCNC: 24.7 PG — LOW (ref 27–34)
MCHC RBC-ENTMCNC: 31.3 GM/DL — LOW (ref 32–36)
MCV RBC AUTO: 78.8 FL — LOW (ref 80–100)
PLATELET # BLD AUTO: 379 K/UL — SIGNIFICANT CHANGE UP (ref 150–400)
POTASSIUM SERPL-MCNC: 3.6 MMOL/L — SIGNIFICANT CHANGE UP (ref 3.5–5.3)
POTASSIUM SERPL-SCNC: 3.6 MMOL/L — SIGNIFICANT CHANGE UP (ref 3.5–5.3)
PROTHROM AB SERPL-ACNC: 40.2 SEC — HIGH (ref 10–13.1)
RBC # BLD: 3.2 M/UL — LOW (ref 3.8–5.2)
RBC # FLD: 20.1 % — HIGH (ref 10.3–14.5)
SODIUM SERPL-SCNC: 143 MMOL/L — SIGNIFICANT CHANGE UP (ref 135–145)
WBC # BLD: 10.7 K/UL — HIGH (ref 3.8–10.5)
WBC # FLD AUTO: 10.7 K/UL — HIGH (ref 3.8–10.5)

## 2017-03-16 PROCEDURE — 99232 SBSQ HOSP IP/OBS MODERATE 35: CPT

## 2017-03-16 RX ORDER — ERYTHROPOIETIN 10000 [IU]/ML
10000 INJECTION, SOLUTION INTRAVENOUS; SUBCUTANEOUS
Qty: 0 | Refills: 0 | Status: DISCONTINUED | OUTPATIENT
Start: 2017-03-16 | End: 2017-03-21

## 2017-03-16 RX ORDER — MIDODRINE HYDROCHLORIDE 2.5 MG/1
5 TABLET ORAL THREE TIMES A DAY
Qty: 0 | Refills: 0 | Status: DISCONTINUED | OUTPATIENT
Start: 2017-03-16 | End: 2017-03-23

## 2017-03-16 RX ORDER — SODIUM CHLORIDE 9 MG/ML
1000 INJECTION INTRAMUSCULAR; INTRAVENOUS; SUBCUTANEOUS
Qty: 0 | Refills: 0 | Status: DISCONTINUED | OUTPATIENT
Start: 2017-03-16 | End: 2017-03-17

## 2017-03-16 RX ADMIN — Medication 10 MILLIGRAM(S): at 22:46

## 2017-03-16 RX ADMIN — Medication 10 MILLIGRAM(S): at 05:56

## 2017-03-16 RX ADMIN — CITALOPRAM 10 MILLIGRAM(S): 10 TABLET, FILM COATED ORAL at 11:23

## 2017-03-16 RX ADMIN — SODIUM CHLORIDE 70 MILLILITER(S): 9 INJECTION INTRAMUSCULAR; INTRAVENOUS; SUBCUTANEOUS at 11:40

## 2017-03-16 RX ADMIN — CEFTRIAXONE 100 GRAM(S): 500 INJECTION, POWDER, FOR SOLUTION INTRAMUSCULAR; INTRAVENOUS at 17:24

## 2017-03-16 RX ADMIN — Medication 1 DROP(S): at 05:56

## 2017-03-16 RX ADMIN — Medication 1 DROP(S): at 11:25

## 2017-03-16 RX ADMIN — SODIUM CHLORIDE 70 MILLILITER(S): 9 INJECTION INTRAMUSCULAR; INTRAVENOUS; SUBCUTANEOUS at 18:58

## 2017-03-16 RX ADMIN — Medication 1 DROP(S): at 22:47

## 2017-03-16 RX ADMIN — Medication 10 MILLIGRAM(S): at 11:25

## 2017-03-16 RX ADMIN — ERYTHROPOIETIN 10000 UNIT(S): 10000 INJECTION, SOLUTION INTRAVENOUS; SUBCUTANEOUS at 12:23

## 2017-03-16 RX ADMIN — MIDODRINE HYDROCHLORIDE 5 MILLIGRAM(S): 2.5 TABLET ORAL at 22:47

## 2017-03-16 RX ADMIN — Medication 81 MILLIGRAM(S): at 11:23

## 2017-03-16 RX ADMIN — MIDODRINE HYDROCHLORIDE 5 MILLIGRAM(S): 2.5 TABLET ORAL at 12:23

## 2017-03-17 LAB
ANION GAP SERPL CALC-SCNC: 19 MMOL/L — HIGH (ref 5–17)
BUN SERPL-MCNC: 122 MG/DL — HIGH (ref 7–23)
CALCIUM SERPL-MCNC: 8.8 MG/DL — SIGNIFICANT CHANGE UP (ref 8.4–10.5)
CHLORIDE SERPL-SCNC: 96 MMOL/L — SIGNIFICANT CHANGE UP (ref 96–108)
CO2 SERPL-SCNC: 27 MMOL/L — SIGNIFICANT CHANGE UP (ref 22–31)
CREAT SERPL-MCNC: 6.62 MG/DL — HIGH (ref 0.5–1.3)
CULTURE RESULTS: SIGNIFICANT CHANGE UP
GLUCOSE SERPL-MCNC: 114 MG/DL — HIGH (ref 70–99)
HCT VFR BLD CALC: 25 % — LOW (ref 34.5–45)
HGB BLD-MCNC: 8 G/DL — LOW (ref 11.5–15.5)
INR BLD: 1.97 RATIO — HIGH (ref 0.88–1.16)
MCHC RBC-ENTMCNC: 25.2 PG — LOW (ref 27–34)
MCHC RBC-ENTMCNC: 32 GM/DL — SIGNIFICANT CHANGE UP (ref 32–36)
MCV RBC AUTO: 78.9 FL — LOW (ref 80–100)
PLATELET # BLD AUTO: 423 K/UL — HIGH (ref 150–400)
POTASSIUM SERPL-MCNC: 3.4 MMOL/L — LOW (ref 3.5–5.3)
POTASSIUM SERPL-SCNC: 3.4 MMOL/L — LOW (ref 3.5–5.3)
PROTHROM AB SERPL-ACNC: 22.4 SEC — HIGH (ref 10–13.1)
RBC # BLD: 3.17 M/UL — LOW (ref 3.8–5.2)
RBC # FLD: 19.8 % — HIGH (ref 10.3–14.5)
SODIUM SERPL-SCNC: 142 MMOL/L — SIGNIFICANT CHANGE UP (ref 135–145)
SPECIMEN SOURCE: SIGNIFICANT CHANGE UP
WBC # BLD: 10.52 K/UL — HIGH (ref 3.8–10.5)
WBC # FLD AUTO: 10.52 K/UL — HIGH (ref 3.8–10.5)

## 2017-03-17 PROCEDURE — 99232 SBSQ HOSP IP/OBS MODERATE 35: CPT

## 2017-03-17 RX ORDER — POLYETHYLENE GLYCOL 3350 17 G/17G
17 POWDER, FOR SOLUTION ORAL DAILY
Qty: 0 | Refills: 0 | Status: DISCONTINUED | OUTPATIENT
Start: 2017-03-17 | End: 2017-03-23

## 2017-03-17 RX ORDER — POTASSIUM CHLORIDE 20 MEQ
20 PACKET (EA) ORAL ONCE
Qty: 0 | Refills: 0 | Status: COMPLETED | OUTPATIENT
Start: 2017-03-17 | End: 2017-03-17

## 2017-03-17 RX ORDER — WARFARIN SODIUM 2.5 MG/1
4 TABLET ORAL ONCE
Qty: 0 | Refills: 0 | Status: COMPLETED | OUTPATIENT
Start: 2017-03-17 | End: 2017-03-17

## 2017-03-17 RX ORDER — SODIUM CHLORIDE 9 MG/ML
1000 INJECTION INTRAMUSCULAR; INTRAVENOUS; SUBCUTANEOUS
Qty: 0 | Refills: 0 | Status: DISCONTINUED | OUTPATIENT
Start: 2017-03-17 | End: 2017-03-20

## 2017-03-17 RX ADMIN — WARFARIN SODIUM 4 MILLIGRAM(S): 2.5 TABLET ORAL at 21:31

## 2017-03-17 RX ADMIN — Medication 1 DROP(S): at 13:04

## 2017-03-17 RX ADMIN — Medication 1 DROP(S): at 06:14

## 2017-03-17 RX ADMIN — Medication 10 MILLIGRAM(S): at 13:05

## 2017-03-17 RX ADMIN — CEFTRIAXONE 100 GRAM(S): 500 INJECTION, POWDER, FOR SOLUTION INTRAMUSCULAR; INTRAVENOUS at 17:13

## 2017-03-17 RX ADMIN — Medication 1 DROP(S): at 21:32

## 2017-03-17 RX ADMIN — Medication 20 MILLIEQUIVALENT(S): at 21:31

## 2017-03-17 RX ADMIN — Medication 10 MILLIGRAM(S): at 21:32

## 2017-03-17 RX ADMIN — MIDODRINE HYDROCHLORIDE 5 MILLIGRAM(S): 2.5 TABLET ORAL at 06:14

## 2017-03-17 RX ADMIN — Medication 10 MILLIGRAM(S): at 06:19

## 2017-03-17 RX ADMIN — POLYETHYLENE GLYCOL 3350 17 GRAM(S): 17 POWDER, FOR SOLUTION ORAL at 08:38

## 2017-03-17 RX ADMIN — Medication 81 MILLIGRAM(S): at 13:05

## 2017-03-17 RX ADMIN — SODIUM CHLORIDE 70 MILLILITER(S): 9 INJECTION INTRAMUSCULAR; INTRAVENOUS; SUBCUTANEOUS at 13:07

## 2017-03-17 RX ADMIN — ONDANSETRON 4 MILLIGRAM(S): 8 TABLET, FILM COATED ORAL at 08:39

## 2017-03-17 RX ADMIN — SODIUM CHLORIDE 70 MILLILITER(S): 9 INJECTION INTRAMUSCULAR; INTRAVENOUS; SUBCUTANEOUS at 21:38

## 2017-03-17 RX ADMIN — CITALOPRAM 10 MILLIGRAM(S): 10 TABLET, FILM COATED ORAL at 13:06

## 2017-03-17 RX ADMIN — MIDODRINE HYDROCHLORIDE 5 MILLIGRAM(S): 2.5 TABLET ORAL at 13:07

## 2017-03-18 LAB
ANION GAP SERPL CALC-SCNC: 17 MMOL/L — SIGNIFICANT CHANGE UP (ref 5–17)
BLD GP AB SCN SERPL QL: NEGATIVE — SIGNIFICANT CHANGE UP
BUN SERPL-MCNC: 112 MG/DL — HIGH (ref 7–23)
CALCIUM SERPL-MCNC: 8.5 MG/DL — SIGNIFICANT CHANGE UP (ref 8.4–10.5)
CHLORIDE SERPL-SCNC: 97 MMOL/L — SIGNIFICANT CHANGE UP (ref 96–108)
CO2 SERPL-SCNC: 26 MMOL/L — SIGNIFICANT CHANGE UP (ref 22–31)
CREAT SERPL-MCNC: 5.43 MG/DL — HIGH (ref 0.5–1.3)
CULTURE RESULTS: SIGNIFICANT CHANGE UP
GLUCOSE SERPL-MCNC: 127 MG/DL — HIGH (ref 70–99)
HCT VFR BLD CALC: 23.8 % — LOW (ref 34.5–45)
HGB BLD-MCNC: 7.3 G/DL — LOW (ref 11.5–15.5)
INR BLD: 1.51 RATIO — HIGH (ref 0.88–1.16)
MCHC RBC-ENTMCNC: 24.6 PG — LOW (ref 27–34)
MCHC RBC-ENTMCNC: 30.7 GM/DL — LOW (ref 32–36)
MCV RBC AUTO: 80.1 FL — SIGNIFICANT CHANGE UP (ref 80–100)
PLATELET # BLD AUTO: 407 K/UL — HIGH (ref 150–400)
POTASSIUM SERPL-MCNC: 3.4 MMOL/L — LOW (ref 3.5–5.3)
POTASSIUM SERPL-SCNC: 3.4 MMOL/L — LOW (ref 3.5–5.3)
PROTHROM AB SERPL-ACNC: 17.1 SEC — HIGH (ref 10–13.1)
RBC # BLD: 2.97 M/UL — LOW (ref 3.8–5.2)
RBC # FLD: 19.8 % — HIGH (ref 10.3–14.5)
RH IG SCN BLD-IMP: POSITIVE — SIGNIFICANT CHANGE UP
SODIUM SERPL-SCNC: 140 MMOL/L — SIGNIFICANT CHANGE UP (ref 135–145)
SPECIMEN SOURCE: SIGNIFICANT CHANGE UP
WBC # BLD: 9.93 K/UL — SIGNIFICANT CHANGE UP (ref 3.8–10.5)
WBC # FLD AUTO: 9.93 K/UL — SIGNIFICANT CHANGE UP (ref 3.8–10.5)

## 2017-03-18 RX ORDER — POTASSIUM CHLORIDE 20 MEQ
20 PACKET (EA) ORAL ONCE
Qty: 0 | Refills: 0 | Status: COMPLETED | OUTPATIENT
Start: 2017-03-18 | End: 2017-03-18

## 2017-03-18 RX ORDER — FUROSEMIDE 40 MG
20 TABLET ORAL ONCE
Qty: 0 | Refills: 0 | Status: COMPLETED | OUTPATIENT
Start: 2017-03-18 | End: 2017-03-18

## 2017-03-18 RX ADMIN — MIDODRINE HYDROCHLORIDE 5 MILLIGRAM(S): 2.5 TABLET ORAL at 16:01

## 2017-03-18 RX ADMIN — CITALOPRAM 10 MILLIGRAM(S): 10 TABLET, FILM COATED ORAL at 11:41

## 2017-03-18 RX ADMIN — Medication 1 DROP(S): at 05:08

## 2017-03-18 RX ADMIN — Medication 1 DROP(S): at 16:00

## 2017-03-18 RX ADMIN — Medication 1 DROP(S): at 21:19

## 2017-03-18 RX ADMIN — Medication 20 MILLIEQUIVALENT(S): at 16:27

## 2017-03-18 RX ADMIN — Medication 10 MILLIGRAM(S): at 21:19

## 2017-03-18 RX ADMIN — Medication 10 MILLIGRAM(S): at 16:00

## 2017-03-18 RX ADMIN — Medication 20 MILLIGRAM(S): at 23:31

## 2017-03-18 RX ADMIN — MIDODRINE HYDROCHLORIDE 5 MILLIGRAM(S): 2.5 TABLET ORAL at 05:09

## 2017-03-18 RX ADMIN — Medication 81 MILLIGRAM(S): at 11:41

## 2017-03-18 RX ADMIN — CEFTRIAXONE 100 GRAM(S): 500 INJECTION, POWDER, FOR SOLUTION INTRAMUSCULAR; INTRAVENOUS at 17:35

## 2017-03-18 RX ADMIN — MIDODRINE HYDROCHLORIDE 5 MILLIGRAM(S): 2.5 TABLET ORAL at 21:19

## 2017-03-18 RX ADMIN — SODIUM CHLORIDE 70 MILLILITER(S): 9 INJECTION INTRAMUSCULAR; INTRAVENOUS; SUBCUTANEOUS at 11:40

## 2017-03-18 RX ADMIN — POLYETHYLENE GLYCOL 3350 17 GRAM(S): 17 POWDER, FOR SOLUTION ORAL at 11:41

## 2017-03-18 RX ADMIN — Medication 10 MILLIGRAM(S): at 05:08

## 2017-03-19 LAB
ANION GAP SERPL CALC-SCNC: 18 MMOL/L — HIGH (ref 5–17)
BUN SERPL-MCNC: 91 MG/DL — HIGH (ref 7–23)
CALCIUM SERPL-MCNC: 8.8 MG/DL — SIGNIFICANT CHANGE UP (ref 8.4–10.5)
CHLORIDE SERPL-SCNC: 101 MMOL/L — SIGNIFICANT CHANGE UP (ref 96–108)
CO2 SERPL-SCNC: 25 MMOL/L — SIGNIFICANT CHANGE UP (ref 22–31)
CREAT SERPL-MCNC: 4.63 MG/DL — HIGH (ref 0.5–1.3)
GLUCOSE SERPL-MCNC: 93 MG/DL — SIGNIFICANT CHANGE UP (ref 70–99)
HCT VFR BLD CALC: 29.6 % — LOW (ref 34.5–45)
HGB BLD-MCNC: 9.2 G/DL — LOW (ref 11.5–15.5)
INR BLD: 1.29 RATIO — HIGH (ref 0.88–1.16)
MCHC RBC-ENTMCNC: 25.5 PG — LOW (ref 27–34)
MCHC RBC-ENTMCNC: 31.1 GM/DL — LOW (ref 32–36)
MCV RBC AUTO: 82 FL — SIGNIFICANT CHANGE UP (ref 80–100)
PLATELET # BLD AUTO: 459 K/UL — HIGH (ref 150–400)
POTASSIUM SERPL-MCNC: 3.6 MMOL/L — SIGNIFICANT CHANGE UP (ref 3.5–5.3)
POTASSIUM SERPL-SCNC: 3.6 MMOL/L — SIGNIFICANT CHANGE UP (ref 3.5–5.3)
PROTHROM AB SERPL-ACNC: 14.6 SEC — HIGH (ref 10–13.1)
RBC # BLD: 3.61 M/UL — LOW (ref 3.8–5.2)
RBC # FLD: 19 % — HIGH (ref 10.3–14.5)
SODIUM SERPL-SCNC: 144 MMOL/L — SIGNIFICANT CHANGE UP (ref 135–145)
WBC # BLD: 11.34 K/UL — HIGH (ref 3.8–10.5)
WBC # FLD AUTO: 11.34 K/UL — HIGH (ref 3.8–10.5)

## 2017-03-19 RX ORDER — WARFARIN SODIUM 2.5 MG/1
4 TABLET ORAL ONCE
Qty: 0 | Refills: 0 | Status: COMPLETED | OUTPATIENT
Start: 2017-03-19 | End: 2017-03-19

## 2017-03-19 RX ADMIN — CEFTRIAXONE 100 GRAM(S): 500 INJECTION, POWDER, FOR SOLUTION INTRAMUSCULAR; INTRAVENOUS at 17:57

## 2017-03-19 RX ADMIN — MIDODRINE HYDROCHLORIDE 5 MILLIGRAM(S): 2.5 TABLET ORAL at 13:48

## 2017-03-19 RX ADMIN — Medication 10 MILLIGRAM(S): at 21:37

## 2017-03-19 RX ADMIN — Medication 1 DROP(S): at 21:37

## 2017-03-19 RX ADMIN — Medication 81 MILLIGRAM(S): at 13:48

## 2017-03-19 RX ADMIN — MIDODRINE HYDROCHLORIDE 5 MILLIGRAM(S): 2.5 TABLET ORAL at 05:16

## 2017-03-19 RX ADMIN — SODIUM CHLORIDE 70 MILLILITER(S): 9 INJECTION INTRAMUSCULAR; INTRAVENOUS; SUBCUTANEOUS at 21:38

## 2017-03-19 RX ADMIN — CITALOPRAM 10 MILLIGRAM(S): 10 TABLET, FILM COATED ORAL at 13:48

## 2017-03-19 RX ADMIN — Medication 10 MILLIGRAM(S): at 13:49

## 2017-03-19 RX ADMIN — SODIUM CHLORIDE 70 MILLILITER(S): 9 INJECTION INTRAMUSCULAR; INTRAVENOUS; SUBCUTANEOUS at 11:09

## 2017-03-19 RX ADMIN — ONDANSETRON 4 MILLIGRAM(S): 8 TABLET, FILM COATED ORAL at 11:05

## 2017-03-19 RX ADMIN — Medication 1 DROP(S): at 05:17

## 2017-03-19 RX ADMIN — ERYTHROPOIETIN 10000 UNIT(S): 10000 INJECTION, SOLUTION INTRAVENOUS; SUBCUTANEOUS at 17:57

## 2017-03-19 RX ADMIN — Medication 10 MILLIGRAM(S): at 05:16

## 2017-03-19 RX ADMIN — WARFARIN SODIUM 4 MILLIGRAM(S): 2.5 TABLET ORAL at 21:38

## 2017-03-19 RX ADMIN — Medication 1 DROP(S): at 13:48

## 2017-03-20 LAB
ANION GAP SERPL CALC-SCNC: 15 MMOL/L — SIGNIFICANT CHANGE UP (ref 5–17)
BUN SERPL-MCNC: 76 MG/DL — HIGH (ref 7–23)
CALCIUM SERPL-MCNC: 8.4 MG/DL — SIGNIFICANT CHANGE UP (ref 8.4–10.5)
CHLORIDE SERPL-SCNC: 104 MMOL/L — SIGNIFICANT CHANGE UP (ref 96–108)
CO2 SERPL-SCNC: 25 MMOL/L — SIGNIFICANT CHANGE UP (ref 22–31)
CREAT SERPL-MCNC: 3.8 MG/DL — HIGH (ref 0.5–1.3)
GLUCOSE SERPL-MCNC: 102 MG/DL — HIGH (ref 70–99)
HCT VFR BLD CALC: 27.6 % — LOW (ref 34.5–45)
HGB BLD-MCNC: 8.4 G/DL — LOW (ref 11.5–15.5)
INR BLD: 1.32 RATIO — HIGH (ref 0.88–1.16)
MCHC RBC-ENTMCNC: 25.7 PG — LOW (ref 27–34)
MCHC RBC-ENTMCNC: 30.4 GM/DL — LOW (ref 32–36)
MCV RBC AUTO: 84.4 FL — SIGNIFICANT CHANGE UP (ref 80–100)
PLATELET # BLD AUTO: 469 K/UL — HIGH (ref 150–400)
POTASSIUM SERPL-MCNC: 3.3 MMOL/L — LOW (ref 3.5–5.3)
POTASSIUM SERPL-SCNC: 3.3 MMOL/L — LOW (ref 3.5–5.3)
PROTHROM AB SERPL-ACNC: 15 SEC — HIGH (ref 10–13.1)
RBC # BLD: 3.27 M/UL — LOW (ref 3.8–5.2)
RBC # FLD: 19.5 % — HIGH (ref 10.3–14.5)
SODIUM SERPL-SCNC: 144 MMOL/L — SIGNIFICANT CHANGE UP (ref 135–145)
WBC # BLD: 10.27 K/UL — SIGNIFICANT CHANGE UP (ref 3.8–10.5)
WBC # FLD AUTO: 10.27 K/UL — SIGNIFICANT CHANGE UP (ref 3.8–10.5)

## 2017-03-20 RX ORDER — WARFARIN SODIUM 2.5 MG/1
5 TABLET ORAL ONCE
Qty: 0 | Refills: 0 | Status: COMPLETED | OUTPATIENT
Start: 2017-03-20 | End: 2017-03-20

## 2017-03-20 RX ORDER — POTASSIUM CHLORIDE 20 MEQ
20 PACKET (EA) ORAL ONCE
Qty: 0 | Refills: 0 | Status: COMPLETED | OUTPATIENT
Start: 2017-03-20 | End: 2017-03-20

## 2017-03-20 RX ADMIN — Medication 10 MILLIGRAM(S): at 23:06

## 2017-03-20 RX ADMIN — Medication 10 MILLIGRAM(S): at 13:59

## 2017-03-20 RX ADMIN — Medication 1 DROP(S): at 13:59

## 2017-03-20 RX ADMIN — MIDODRINE HYDROCHLORIDE 5 MILLIGRAM(S): 2.5 TABLET ORAL at 14:02

## 2017-03-20 RX ADMIN — MIDODRINE HYDROCHLORIDE 5 MILLIGRAM(S): 2.5 TABLET ORAL at 05:00

## 2017-03-20 RX ADMIN — CITALOPRAM 10 MILLIGRAM(S): 10 TABLET, FILM COATED ORAL at 13:59

## 2017-03-20 RX ADMIN — Medication 1 DROP(S): at 05:00

## 2017-03-20 RX ADMIN — MIDODRINE HYDROCHLORIDE 5 MILLIGRAM(S): 2.5 TABLET ORAL at 23:06

## 2017-03-20 RX ADMIN — WARFARIN SODIUM 5 MILLIGRAM(S): 2.5 TABLET ORAL at 23:05

## 2017-03-20 RX ADMIN — Medication 10 MILLIGRAM(S): at 05:00

## 2017-03-20 RX ADMIN — Medication 81 MILLIGRAM(S): at 13:58

## 2017-03-20 RX ADMIN — Medication 1 DROP(S): at 23:06

## 2017-03-20 RX ADMIN — Medication 20 MILLIEQUIVALENT(S): at 14:02

## 2017-03-21 RX ORDER — WARFARIN SODIUM 2.5 MG/1
5 TABLET ORAL ONCE
Qty: 0 | Refills: 0 | Status: COMPLETED | OUTPATIENT
Start: 2017-03-21 | End: 2017-03-21

## 2017-03-21 RX ORDER — ERYTHROPOIETIN 10000 [IU]/ML
10000 INJECTION, SOLUTION INTRAVENOUS; SUBCUTANEOUS
Qty: 0 | Refills: 0 | Status: DISCONTINUED | OUTPATIENT
Start: 2017-03-21 | End: 2017-03-21

## 2017-03-21 RX ORDER — ERYTHROPOIETIN 10000 [IU]/ML
10000 INJECTION, SOLUTION INTRAVENOUS; SUBCUTANEOUS
Qty: 0 | Refills: 0 | Status: DISCONTINUED | OUTPATIENT
Start: 2017-03-21 | End: 2017-03-23

## 2017-03-21 RX ORDER — POTASSIUM CHLORIDE 20 MEQ
40 PACKET (EA) ORAL ONCE
Qty: 0 | Refills: 0 | Status: COMPLETED | OUTPATIENT
Start: 2017-03-21 | End: 2017-03-21

## 2017-03-21 RX ADMIN — MIDODRINE HYDROCHLORIDE 5 MILLIGRAM(S): 2.5 TABLET ORAL at 14:55

## 2017-03-21 RX ADMIN — Medication 10 MILLIGRAM(S): at 06:29

## 2017-03-21 RX ADMIN — Medication 81 MILLIGRAM(S): at 12:55

## 2017-03-21 RX ADMIN — MIDODRINE HYDROCHLORIDE 5 MILLIGRAM(S): 2.5 TABLET ORAL at 06:24

## 2017-03-21 RX ADMIN — Medication 1 DROP(S): at 14:54

## 2017-03-21 RX ADMIN — Medication 40 MILLIEQUIVALENT(S): at 12:55

## 2017-03-21 RX ADMIN — Medication 1 DROP(S): at 22:18

## 2017-03-21 RX ADMIN — Medication 10 MILLIGRAM(S): at 14:54

## 2017-03-21 RX ADMIN — Medication 1 DROP(S): at 06:26

## 2017-03-21 RX ADMIN — ERYTHROPOIETIN 10000 UNIT(S): 10000 INJECTION, SOLUTION INTRAVENOUS; SUBCUTANEOUS at 16:17

## 2017-03-21 RX ADMIN — Medication 10 MILLIGRAM(S): at 22:17

## 2017-03-21 RX ADMIN — WARFARIN SODIUM 5 MILLIGRAM(S): 2.5 TABLET ORAL at 22:17

## 2017-03-21 RX ADMIN — CITALOPRAM 10 MILLIGRAM(S): 10 TABLET, FILM COATED ORAL at 12:55

## 2017-03-22 PROCEDURE — 49452 REPLACE G-J TUBE PERC: CPT

## 2017-03-22 PROCEDURE — 99232 SBSQ HOSP IP/OBS MODERATE 35: CPT

## 2017-03-22 RX ORDER — WARFARIN SODIUM 2.5 MG/1
7.5 TABLET ORAL ONCE
Qty: 0 | Refills: 0 | Status: COMPLETED | OUTPATIENT
Start: 2017-03-22 | End: 2017-03-22

## 2017-03-22 RX ORDER — FUROSEMIDE 40 MG
1 TABLET ORAL
Qty: 0 | Refills: 0 | COMMUNITY

## 2017-03-22 RX ORDER — ERYTHROPOIETIN 10000 [IU]/ML
10000 INJECTION, SOLUTION INTRAVENOUS; SUBCUTANEOUS
Qty: 0 | Refills: 0 | COMMUNITY
Start: 2017-03-22

## 2017-03-22 RX ORDER — MIDODRINE HYDROCHLORIDE 2.5 MG/1
1 TABLET ORAL
Qty: 0 | Refills: 0 | COMMUNITY
Start: 2017-03-22

## 2017-03-22 RX ORDER — ASPIRIN/CALCIUM CARB/MAGNESIUM 324 MG
1 TABLET ORAL
Qty: 0 | Refills: 0 | COMMUNITY
Start: 2017-03-22

## 2017-03-22 RX ORDER — CITALOPRAM 10 MG/1
1 TABLET, FILM COATED ORAL
Qty: 0 | Refills: 0 | COMMUNITY

## 2017-03-22 RX ORDER — CITALOPRAM 10 MG/1
1 TABLET, FILM COATED ORAL
Qty: 0 | Refills: 0 | COMMUNITY
Start: 2017-03-22

## 2017-03-22 RX ORDER — INSULIN LISPRO 100/ML
0 VIAL (ML) SUBCUTANEOUS
Qty: 0 | Refills: 0 | COMMUNITY
Start: 2017-03-22

## 2017-03-22 RX ORDER — POLYETHYLENE GLYCOL 3350 17 G/17G
17 POWDER, FOR SOLUTION ORAL
Qty: 0 | Refills: 0 | COMMUNITY
Start: 2017-03-22

## 2017-03-22 RX ORDER — POTASSIUM CHLORIDE 20 MEQ
40 PACKET (EA) ORAL ONCE
Qty: 0 | Refills: 0 | Status: COMPLETED | OUTPATIENT
Start: 2017-03-22 | End: 2017-03-22

## 2017-03-22 RX ORDER — WARFARIN SODIUM 2.5 MG/1
1 TABLET ORAL
Qty: 0 | Refills: 0 | COMMUNITY

## 2017-03-22 RX ORDER — METOCLOPRAMIDE HCL 10 MG
1 TABLET ORAL
Qty: 0 | Refills: 0 | COMMUNITY

## 2017-03-22 RX ORDER — WARFARIN SODIUM 2.5 MG/1
1 TABLET ORAL
Qty: 0 | Refills: 0 | COMMUNITY
Start: 2017-03-22

## 2017-03-22 RX ADMIN — CITALOPRAM 10 MILLIGRAM(S): 10 TABLET, FILM COATED ORAL at 13:21

## 2017-03-22 RX ADMIN — Medication 10 MILLIGRAM(S): at 21:07

## 2017-03-22 RX ADMIN — Medication 10 MILLIGRAM(S): at 13:21

## 2017-03-22 RX ADMIN — Medication 10 MILLIGRAM(S): at 06:39

## 2017-03-22 RX ADMIN — Medication 1 DROP(S): at 06:41

## 2017-03-22 RX ADMIN — WARFARIN SODIUM 7.5 MILLIGRAM(S): 2.5 TABLET ORAL at 21:08

## 2017-03-22 RX ADMIN — MIDODRINE HYDROCHLORIDE 5 MILLIGRAM(S): 2.5 TABLET ORAL at 13:22

## 2017-03-22 RX ADMIN — Medication 1 DROP(S): at 13:21

## 2017-03-22 RX ADMIN — Medication 40 MILLIEQUIVALENT(S): at 13:21

## 2017-03-22 RX ADMIN — Medication 1 DROP(S): at 21:08

## 2017-03-22 RX ADMIN — Medication 81 MILLIGRAM(S): at 13:21

## 2017-03-23 VITALS — TEMPERATURE: 98 F | RESPIRATION RATE: 18 BRPM | OXYGEN SATURATION: 92 % | HEART RATE: 81 BPM

## 2017-03-23 PROCEDURE — 81001 URINALYSIS AUTO W/SCOPE: CPT

## 2017-03-23 PROCEDURE — 83605 ASSAY OF LACTIC ACID: CPT

## 2017-03-23 PROCEDURE — 97530 THERAPEUTIC ACTIVITIES: CPT

## 2017-03-23 PROCEDURE — 84132 ASSAY OF SERUM POTASSIUM: CPT

## 2017-03-23 PROCEDURE — 83735 ASSAY OF MAGNESIUM: CPT

## 2017-03-23 PROCEDURE — 82435 ASSAY OF BLOOD CHLORIDE: CPT

## 2017-03-23 PROCEDURE — 84484 ASSAY OF TROPONIN QUANT: CPT

## 2017-03-23 PROCEDURE — 82803 BLOOD GASES ANY COMBINATION: CPT

## 2017-03-23 PROCEDURE — 85027 COMPLETE CBC AUTOMATED: CPT

## 2017-03-23 PROCEDURE — 96374 THER/PROPH/DIAG INJ IV PUSH: CPT

## 2017-03-23 PROCEDURE — 84295 ASSAY OF SERUM SODIUM: CPT

## 2017-03-23 PROCEDURE — 83036 HEMOGLOBIN GLYCOSYLATED A1C: CPT

## 2017-03-23 PROCEDURE — C1887: CPT

## 2017-03-23 PROCEDURE — 97110 THERAPEUTIC EXERCISES: CPT

## 2017-03-23 PROCEDURE — L8699: CPT

## 2017-03-23 PROCEDURE — 87186 SC STD MICRODIL/AGAR DIL: CPT

## 2017-03-23 PROCEDURE — 74176 CT ABD & PELVIS W/O CONTRAST: CPT

## 2017-03-23 PROCEDURE — 49446 CHANGE G-TUBE TO G-J PERC: CPT

## 2017-03-23 PROCEDURE — 86900 BLOOD TYPING SEROLOGIC ABO: CPT

## 2017-03-23 PROCEDURE — 85014 HEMATOCRIT: CPT

## 2017-03-23 PROCEDURE — 97116 GAIT TRAINING THERAPY: CPT

## 2017-03-23 PROCEDURE — 80048 BASIC METABOLIC PNL TOTAL CA: CPT

## 2017-03-23 PROCEDURE — 84100 ASSAY OF PHOSPHORUS: CPT

## 2017-03-23 PROCEDURE — 83690 ASSAY OF LIPASE: CPT

## 2017-03-23 PROCEDURE — 82570 ASSAY OF URINE CREATININE: CPT

## 2017-03-23 PROCEDURE — 74230 X-RAY XM SWLNG FUNCJ C+: CPT

## 2017-03-23 PROCEDURE — 93005 ELECTROCARDIOGRAM TRACING: CPT

## 2017-03-23 PROCEDURE — 36430 TRANSFUSION BLD/BLD COMPNT: CPT

## 2017-03-23 PROCEDURE — 85610 PROTHROMBIN TIME: CPT

## 2017-03-23 PROCEDURE — 87086 URINE CULTURE/COLONY COUNT: CPT

## 2017-03-23 PROCEDURE — 84300 ASSAY OF URINE SODIUM: CPT

## 2017-03-23 PROCEDURE — 83880 ASSAY OF NATRIURETIC PEPTIDE: CPT

## 2017-03-23 PROCEDURE — 86923 COMPATIBILITY TEST ELECTRIC: CPT

## 2017-03-23 PROCEDURE — 97162 PT EVAL MOD COMPLEX 30 MIN: CPT

## 2017-03-23 PROCEDURE — 80053 COMPREHEN METABOLIC PANEL: CPT

## 2017-03-23 PROCEDURE — 86850 RBC ANTIBODY SCREEN: CPT

## 2017-03-23 PROCEDURE — 99285 EMERGENCY DEPT VISIT HI MDM: CPT | Mod: 25

## 2017-03-23 PROCEDURE — 82550 ASSAY OF CK (CPK): CPT

## 2017-03-23 PROCEDURE — 87205 SMEAR GRAM STAIN: CPT

## 2017-03-23 PROCEDURE — 92611 MOTION FLUOROSCOPY/SWALLOW: CPT

## 2017-03-23 PROCEDURE — 86901 BLOOD TYPING SEROLOGIC RH(D): CPT

## 2017-03-23 PROCEDURE — 87040 BLOOD CULTURE FOR BACTERIA: CPT

## 2017-03-23 PROCEDURE — 71250 CT THORAX DX C-: CPT

## 2017-03-23 PROCEDURE — 71045 X-RAY EXAM CHEST 1 VIEW: CPT

## 2017-03-23 PROCEDURE — P9016: CPT

## 2017-03-23 PROCEDURE — C1769: CPT

## 2017-03-23 PROCEDURE — 85730 THROMBOPLASTIN TIME PARTIAL: CPT

## 2017-03-23 PROCEDURE — 82947 ASSAY GLUCOSE BLOOD QUANT: CPT

## 2017-03-23 PROCEDURE — 82330 ASSAY OF CALCIUM: CPT

## 2017-03-23 PROCEDURE — 49452 REPLACE G-J TUBE PERC: CPT

## 2017-03-23 PROCEDURE — 92610 EVALUATE SWALLOWING FUNCTION: CPT

## 2017-03-23 PROCEDURE — 82553 CREATINE MB FRACTION: CPT

## 2017-03-23 RX ADMIN — Medication 10 MILLIGRAM(S): at 05:19

## 2017-03-23 RX ADMIN — Medication 1 DROP(S): at 05:19

## 2017-03-23 NOTE — PROVIDER CONTACT NOTE (MEDICATION) - BACKGROUND
Patient admitted for acute renal failure
acute renal failure  HTN
Patient admitted for acute renal failure

## 2017-03-23 NOTE — PROVIDER CONTACT NOTE (MEDICATION) - ACTION/TREATMENT ORDERED:
Do not give Midodrine po
DOUGLAS Her made aware. To reschedule 0600 midodrine for 0800 and have BP retaken at that time. Will continue to monitor patient. Call bell within easy reach. Patient safety maintained.
DOUGLAS Her made aware. To hold 2200 dose of midodrine. Call bell within easy reach. Will continue to monitor patient.

## 2017-03-23 NOTE — PROVIDER CONTACT NOTE (MEDICATION) - REASON
Patient /76 manually- Midodrine ordered for 0600
Patient with midodrine ordered and /82
/53 pt due for midodrine

## 2017-03-23 NOTE — PROVIDER CONTACT NOTE (MEDICATION) - ASSESSMENT
AxOx2
Patient A&Ox3, confused at times. Patient denies chest pain, SOB, or palpitations. Patient denies headache, dizziness or fatigue. Patient resting comfortably in bed.
Patient A&Ox3, forgetful at times. Patint denies chest pain, SOB, or palpitations. Patient denies weakness or dizziness. Patient resting comfortably in bed.

## 2017-04-21 ENCOUNTER — INPATIENT (INPATIENT)
Facility: HOSPITAL | Age: 67
LOS: 3 days | Discharge: HOME | DRG: 394 | End: 2017-04-25
Attending: INTERNAL MEDICINE | Admitting: INTERNAL MEDICINE
Payer: MEDICARE

## 2017-04-21 VITALS — DIASTOLIC BLOOD PRESSURE: 72 MMHG | HEART RATE: 68 BPM | RESPIRATION RATE: 18 BRPM | SYSTOLIC BLOOD PRESSURE: 138 MMHG

## 2017-04-21 DIAGNOSIS — I48.91 UNSPECIFIED ATRIAL FIBRILLATION: ICD-10-CM

## 2017-04-21 DIAGNOSIS — Z29.9 ENCOUNTER FOR PROPHYLACTIC MEASURES, UNSPECIFIED: ICD-10-CM

## 2017-04-21 DIAGNOSIS — K94.13 ENTEROSTOMY MALFUNCTION: ICD-10-CM

## 2017-04-21 DIAGNOSIS — E87.5 HYPERKALEMIA: ICD-10-CM

## 2017-04-21 DIAGNOSIS — E87.1 HYPO-OSMOLALITY AND HYPONATREMIA: ICD-10-CM

## 2017-04-21 DIAGNOSIS — E11.8 TYPE 2 DIABETES MELLITUS WITH UNSPECIFIED COMPLICATIONS: ICD-10-CM

## 2017-04-21 LAB
ALBUMIN SERPL ELPH-MCNC: 3.8 G/DL — SIGNIFICANT CHANGE UP (ref 3.3–5)
ALP SERPL-CCNC: 120 U/L — SIGNIFICANT CHANGE UP (ref 40–120)
ALT FLD-CCNC: 22 U/L RC — SIGNIFICANT CHANGE UP (ref 10–45)
ANION GAP SERPL CALC-SCNC: 15 MMOL/L — SIGNIFICANT CHANGE UP (ref 5–17)
ANION GAP SERPL CALC-SCNC: 17 MMOL/L — SIGNIFICANT CHANGE UP (ref 5–17)
APTT BLD: 40.8 SEC — HIGH (ref 27.5–37.4)
AST SERPL-CCNC: 30 U/L — SIGNIFICANT CHANGE UP (ref 10–40)
BASOPHILS # BLD AUTO: 0.1 K/UL — SIGNIFICANT CHANGE UP (ref 0–0.2)
BASOPHILS NFR BLD AUTO: 0.8 % — SIGNIFICANT CHANGE UP (ref 0–2)
BILIRUB SERPL-MCNC: 0.4 MG/DL — SIGNIFICANT CHANGE UP (ref 0.2–1.2)
BUN SERPL-MCNC: 50 MG/DL — HIGH (ref 7–23)
BUN SERPL-MCNC: 52 MG/DL — HIGH (ref 7–23)
CALCIUM SERPL-MCNC: 10.1 MG/DL — SIGNIFICANT CHANGE UP (ref 8.4–10.5)
CALCIUM SERPL-MCNC: 9.7 MG/DL — SIGNIFICANT CHANGE UP (ref 8.4–10.5)
CHLORIDE SERPL-SCNC: 101 MMOL/L — SIGNIFICANT CHANGE UP (ref 96–108)
CHLORIDE SERPL-SCNC: 98 MMOL/L — SIGNIFICANT CHANGE UP (ref 96–108)
CO2 SERPL-SCNC: 18 MMOL/L — LOW (ref 22–31)
CO2 SERPL-SCNC: 21 MMOL/L — LOW (ref 22–31)
CREAT SERPL-MCNC: 2.6 MG/DL — HIGH (ref 0.5–1.3)
CREAT SERPL-MCNC: 2.73 MG/DL — HIGH (ref 0.5–1.3)
EOSINOPHIL # BLD AUTO: 0.2 K/UL — SIGNIFICANT CHANGE UP (ref 0–0.5)
EOSINOPHIL NFR BLD AUTO: 1.8 % — SIGNIFICANT CHANGE UP (ref 0–6)
GAS PNL BLDV: SIGNIFICANT CHANGE UP
GLUCOSE SERPL-MCNC: 73 MG/DL — SIGNIFICANT CHANGE UP (ref 70–99)
GLUCOSE SERPL-MCNC: 74 MG/DL — SIGNIFICANT CHANGE UP (ref 70–99)
HCT VFR BLD CALC: 41.2 % — SIGNIFICANT CHANGE UP (ref 34.5–45)
HGB BLD-MCNC: 13.3 G/DL — SIGNIFICANT CHANGE UP (ref 11.5–15.5)
INR BLD: 2.57 RATIO — HIGH (ref 0.88–1.16)
LYMPHOCYTES # BLD AUTO: 1.8 K/UL — SIGNIFICANT CHANGE UP (ref 1–3.3)
LYMPHOCYTES # BLD AUTO: 19.7 % — SIGNIFICANT CHANGE UP (ref 13–44)
MCHC RBC-ENTMCNC: 27.4 PG — SIGNIFICANT CHANGE UP (ref 27–34)
MCHC RBC-ENTMCNC: 32.3 GM/DL — SIGNIFICANT CHANGE UP (ref 32–36)
MCV RBC AUTO: 84.7 FL — SIGNIFICANT CHANGE UP (ref 80–100)
MONOCYTES # BLD AUTO: 0.6 K/UL — SIGNIFICANT CHANGE UP (ref 0–0.9)
MONOCYTES NFR BLD AUTO: 6.3 % — SIGNIFICANT CHANGE UP (ref 2–14)
NEUTROPHILS # BLD AUTO: 6.4 K/UL — SIGNIFICANT CHANGE UP (ref 1.8–7.4)
NEUTROPHILS NFR BLD AUTO: 71.5 % — SIGNIFICANT CHANGE UP (ref 43–77)
PLATELET # BLD AUTO: 363 K/UL — SIGNIFICANT CHANGE UP (ref 150–400)
POTASSIUM SERPL-MCNC: 4.7 MMOL/L — SIGNIFICANT CHANGE UP (ref 3.5–5.3)
POTASSIUM SERPL-MCNC: 6.1 MMOL/L — HIGH (ref 3.5–5.3)
POTASSIUM SERPL-SCNC: 4.7 MMOL/L — SIGNIFICANT CHANGE UP (ref 3.5–5.3)
POTASSIUM SERPL-SCNC: 6.1 MMOL/L — HIGH (ref 3.5–5.3)
PROT SERPL-MCNC: 8.7 G/DL — HIGH (ref 6–8.3)
PROTHROM AB SERPL-ACNC: 28.3 SEC — HIGH (ref 9.8–12.7)
RBC # BLD: 4.87 M/UL — SIGNIFICANT CHANGE UP (ref 3.8–5.2)
RBC # FLD: 16.7 % — HIGH (ref 10.3–14.5)
SODIUM SERPL-SCNC: 133 MMOL/L — LOW (ref 135–145)
SODIUM SERPL-SCNC: 137 MMOL/L — SIGNIFICANT CHANGE UP (ref 135–145)
WBC # BLD: 8.9 K/UL — SIGNIFICANT CHANGE UP (ref 3.8–10.5)
WBC # FLD AUTO: 8.9 K/UL — SIGNIFICANT CHANGE UP (ref 3.8–10.5)

## 2017-04-21 PROCEDURE — 99223 1ST HOSP IP/OBS HIGH 75: CPT

## 2017-04-21 PROCEDURE — 99285 EMERGENCY DEPT VISIT HI MDM: CPT | Mod: GC

## 2017-04-21 RX ORDER — DEXTROSE 50 % IN WATER 50 %
25 SYRINGE (ML) INTRAVENOUS ONCE
Qty: 0 | Refills: 0 | Status: DISCONTINUED | OUTPATIENT
Start: 2017-04-21 | End: 2017-04-25

## 2017-04-21 RX ORDER — DEXTROSE 50 % IN WATER 50 %
1 SYRINGE (ML) INTRAVENOUS ONCE
Qty: 0 | Refills: 0 | Status: DISCONTINUED | OUTPATIENT
Start: 2017-04-21 | End: 2017-04-25

## 2017-04-21 RX ORDER — SODIUM CHLORIDE 9 MG/ML
1000 INJECTION, SOLUTION INTRAVENOUS
Qty: 0 | Refills: 0 | Status: DISCONTINUED | OUTPATIENT
Start: 2017-04-21 | End: 2017-04-22

## 2017-04-21 RX ORDER — GLUCAGON INJECTION, SOLUTION 0.5 MG/.1ML
1 INJECTION, SOLUTION SUBCUTANEOUS ONCE
Qty: 0 | Refills: 0 | Status: DISCONTINUED | OUTPATIENT
Start: 2017-04-21 | End: 2017-04-25

## 2017-04-21 RX ORDER — WARFARIN SODIUM 2.5 MG/1
1 TABLET ORAL
Qty: 0 | Refills: 0 | COMMUNITY

## 2017-04-21 RX ORDER — METOCLOPRAMIDE HCL 10 MG
5 TABLET ORAL EVERY 8 HOURS
Qty: 0 | Refills: 0 | Status: DISCONTINUED | OUTPATIENT
Start: 2017-04-21 | End: 2017-04-25

## 2017-04-21 RX ORDER — INSULIN LISPRO 100/ML
VIAL (ML) SUBCUTANEOUS EVERY 6 HOURS
Qty: 0 | Refills: 0 | Status: DISCONTINUED | OUTPATIENT
Start: 2017-04-21 | End: 2017-04-25

## 2017-04-21 RX ORDER — DEXTROSE 50 % IN WATER 50 %
12.5 SYRINGE (ML) INTRAVENOUS ONCE
Qty: 0 | Refills: 0 | Status: DISCONTINUED | OUTPATIENT
Start: 2017-04-21 | End: 2017-04-25

## 2017-04-21 RX ORDER — SODIUM CHLORIDE 9 MG/ML
1000 INJECTION, SOLUTION INTRAVENOUS
Qty: 0 | Refills: 0 | Status: DISCONTINUED | OUTPATIENT
Start: 2017-04-21 | End: 2017-04-25

## 2017-04-21 RX ADMIN — SODIUM CHLORIDE 100 MILLILITER(S): 9 INJECTION, SOLUTION INTRAVENOUS at 20:44

## 2017-04-21 RX ADMIN — SODIUM CHLORIDE 100 MILLILITER(S): 9 INJECTION, SOLUTION INTRAVENOUS at 23:11

## 2017-04-21 NOTE — ED PROVIDER NOTE - NS ED ROS FT
ROS: GENERAL: no fevers, no chills EYE: no visual changes ENT: no epistaxis, no eye pain, no throat pain CHEST: no pain with breathing,  no hemoptysis CARDIAC: no chest pain, no upper back pain GI: no abdominal pain, no hematemesis, no bright red blood per rectum : no dysuria, no hematuria MSK: no arm pain, no leg pain, no back pain SKIN: no purpura, no petechiae NEURO: no headache, no neck pain HEME: no easy bruising, no easy bleeding -ncohen

## 2017-04-21 NOTE — H&P ADULT. - ASSESSMENT
65 yo woman with history of DM T2 w/diabetic neuropathy (hx of right TMA), Afib on couamdin, HTN, HLD, CKD stage 4, severe lymphedema, sent in from rehab for leaking J Tube for ~1 day. 65 yo woman with history of DM T2 w/diabetic neuropathy (hx of right TMA), Afib on couamdin, HTN, HLD, CKD stage 4, severe lymphedema, sent in from rehab for leaking GJ Tube for ~1 day.

## 2017-04-21 NOTE — ED PROVIDER NOTE - MEDICAL DECISION MAKING DETAILS
will need replacement of j tube, cannot be done by me. will consult gi, admit. thomas will need replacement of j tube, cannot be done by me. will consult gi, admit. -[email or web address]    67 years old F history of Diabetes, HLD, HTN, significant gastroparesis has J tube since 7 month ago, came in with a broken J tube, no fever, no chills, no n/v. Called Dr. Mejia from GI will be admitting pt and GI will see in AM. ZR fell at [facility] and jtube displased  will need replacement of j tube, cannot be done by me. will consult gi, admit. ZR    67 years old F history of Diabetes, HLD, HTN, significant gastroparesis has J tube since 7 month ago, came in with a broken J tube, no fever, no chills, no n/v. Called Dr. Mejia from GI will be admitting pt and GI will see in AM. ZR

## 2017-04-21 NOTE — H&P ADULT. - PROBLEM SELECTOR PLAN 4
Rate controlled  No known rate controlled agents on med rec  On coumadin. Hold coumadin tonight as Gewaratube nonfunctioning  Monitor PT/INR Current INR therapeutic 2.57

## 2017-04-21 NOTE — ED ADULT NURSE REASSESSMENT NOTE - NS ED NURSE REASSESS COMMENT FT1
Report given to Holding RN Ana. Patient A&Ox3, at baseline mental status, pt aware of being moved to Holding area, vital signs stable. No drainage from PG tube at this time. Gauze at PEG tube site is clean/dry/intact. No redness/streaking to PEG site. Pt denies headache, dizziness, chest pain, palpitations, cough, SOB, abdominal pain, n/v/d, fevers, chills at this time. Patient in stable condition. Transported to [facility].

## 2017-04-21 NOTE — H&P ADULT. - HISTORY OF PRESENT ILLNESS
Patient's primary language Farsi, yet defers  services during conversation.  Pleasant, 65 yo woman with history of DM T2 w/diabetic neuropathy (hx of right TMA), Afib on couamdin, HTN, HLD, CKD stage 4, severe lymphedema, sent in from rehab for leaking J Tube for ~1 day. Per patient states it was functioning the day before. Denies abdominal pain, had one episode of NBNB emesis x1 during the day. Denies current nausea. Denies fevers, chills, sweats, diarrhea, constipation, dysuria, increased urinary frequency. Denies rhinorrhea, sore throat. Occasional dry cough. Patient last hospitalized 3/2/2017 to 3/24/2017 for reported nausea and emesis and appeared to have clogged J tube that was cleared by IR team on 3/22/2017. Per patient J tube has been in place for ~7-8 months.     Per prior charting: history of prolonged hospitalization where she was hospitalized for RLE cellulitis/osteomyelitis course c/b afib and PEA arrest requiring intubation s/p trach given prolonged wean, NEHAL requiring HD, and dysphagia requiring PEG tube placement.

## 2017-04-21 NOTE — H&P ADULT. - PROBLEM SELECTOR PLAN 1
Spoke with ED team: Rhonda TALAMANTES/Roberto team consulted and will see patient in AM  Primary day team to also contact IR in AM as they were involved on 3/22/2017 with GJ tube occlusion  Patient does not tolerate PO  Maintain NPO status  Hold tube feeds  Hold meds until AM when GJ tube repaired/exchanged  Maintenance IVF   Antiemetics PRN

## 2017-04-21 NOTE — H&P ADULT. - ATTENDING COMMENTS
Dr. Bryan Schmid accepted patient's case from ED and requested hospitalist team to complete admission. Patient previously unknown to me and I was assigned case. Dr. Schmid to continue care. Sign out provided to night NP Dr. Bryan Schmid accepted patient's case from ED and requested hospitalist team to complete admission. Patient previously unknown to me and I was assigned case. Dr. Schmid to continue care. Sign out provided to night NP.

## 2017-04-21 NOTE — ED PROVIDER NOTE - OBJECTIVE STATEMENT
67 year old female had G tube dislodge tonight at nursing home, originally placed 7 months ago. Now new tube has leaking around it. 67 year old female had fracture tonight at nursing home, originally placed 7 months ago.

## 2017-04-21 NOTE — ED ADULT NURSE NOTE - OBJECTIVE STATEMENT
68 y/o F BIBA from [facility], pt has PMH DMT2, HTN, Renal disease, sent in for leaking PEG tube. EMS reports pt had new GJ tube placed today and there is leaking around the site. Pt is AAOx3, NAD, abd round soft NTND, PEG tube site to L upper abd, gauze clean/dry/intact, no drainage present at this time, no redness/streaking noted. Pt reports she gets food and meds through tube, NPO, does not drink anything, only eats ice. Pt denies headache, dizziness, chest pain, palpitations, cough, SOB, abdominal pain, n/v/d, urinary symptoms, fevers, chills, weakness at this time. Safety maintained. 66 y/o F BIBA from [facility], pt has PMH DMT2, HTN, Renal disease, sent in for leaking PEG tube. EMS reports pt had new GJ tube placed today and there is leaking around the site. Tube has tear in it to proximal portion. Pt is AAOx3, NAD, abd round soft NTND, PEG tube site to L upper abd, gauze clean/dry/intact, no drainage present at this time, no redness/streaking noted. Pt has PEG tube for gastroparesis, pt reports she gets food and meds through tube, NPO, does not drink anything, only eats ice. Pt denies headache, dizziness, chest pain, palpitations, cough, SOB, abdominal pain, n/v/d, urinary symptoms, fevers, chills, weakness at this time. Safety maintained.

## 2017-04-21 NOTE — H&P ADULT. - PROBLEM SELECTOR PLAN 3
Hx of hyponatremia  Patient initiated on Lactate Ringers by ED  Will consider change to NS  Trend BMP

## 2017-04-22 LAB
ANION GAP SERPL CALC-SCNC: 13 MMOL/L — SIGNIFICANT CHANGE UP (ref 5–17)
APTT BLD: 35.7 SEC — SIGNIFICANT CHANGE UP (ref 27.5–37.4)
BUN SERPL-MCNC: 48 MG/DL — HIGH (ref 7–23)
CALCIUM SERPL-MCNC: 9.5 MG/DL — SIGNIFICANT CHANGE UP (ref 8.4–10.5)
CHLORIDE SERPL-SCNC: 102 MMOL/L — SIGNIFICANT CHANGE UP (ref 96–108)
CO2 SERPL-SCNC: 19 MMOL/L — LOW (ref 22–31)
CREAT SERPL-MCNC: 2.63 MG/DL — HIGH (ref 0.5–1.3)
GLUCOSE SERPL-MCNC: 78 MG/DL — SIGNIFICANT CHANGE UP (ref 70–99)
HCT VFR BLD CALC: 31.7 % — LOW (ref 34.5–45)
HGB BLD-MCNC: 10.1 G/DL — LOW (ref 11.5–15.5)
INR BLD: 2.54 RATIO — HIGH (ref 0.88–1.16)
MAGNESIUM SERPL-MCNC: 2.5 MG/DL — SIGNIFICANT CHANGE UP (ref 1.6–2.6)
MCHC RBC-ENTMCNC: 26.9 PG — LOW (ref 27–34)
MCHC RBC-ENTMCNC: 31.9 GM/DL — LOW (ref 32–36)
MCV RBC AUTO: 84.5 FL — SIGNIFICANT CHANGE UP (ref 80–100)
PHOSPHATE SERPL-MCNC: 4.9 MG/DL — HIGH (ref 2.5–4.5)
PLATELET # BLD AUTO: 383 K/UL — SIGNIFICANT CHANGE UP (ref 150–400)
POTASSIUM SERPL-MCNC: 4.3 MMOL/L — SIGNIFICANT CHANGE UP (ref 3.5–5.3)
POTASSIUM SERPL-SCNC: 4.3 MMOL/L — SIGNIFICANT CHANGE UP (ref 3.5–5.3)
PROTHROM AB SERPL-ACNC: 29.2 SEC — HIGH (ref 10–13.1)
RBC # BLD: 3.75 M/UL — LOW (ref 3.8–5.2)
RBC # FLD: 17.1 % — HIGH (ref 10.3–14.5)
SODIUM SERPL-SCNC: 134 MMOL/L — LOW (ref 135–145)
WBC # BLD: 9.29 K/UL — SIGNIFICANT CHANGE UP (ref 3.8–10.5)
WBC # FLD AUTO: 9.29 K/UL — SIGNIFICANT CHANGE UP (ref 3.8–10.5)

## 2017-04-22 PROCEDURE — 49452 REPLACE G-J TUBE PERC: CPT

## 2017-04-22 PROCEDURE — 74000: CPT | Mod: 26

## 2017-04-22 PROCEDURE — 71010: CPT | Mod: 26

## 2017-04-22 RX ORDER — ASPIRIN/CALCIUM CARB/MAGNESIUM 324 MG
81 TABLET ORAL DAILY
Qty: 0 | Refills: 0 | Status: DISCONTINUED | OUTPATIENT
Start: 2017-04-22 | End: 2017-04-22

## 2017-04-22 RX ORDER — MIDODRINE HYDROCHLORIDE 2.5 MG/1
5 TABLET ORAL DAILY
Qty: 0 | Refills: 0 | Status: DISCONTINUED | OUTPATIENT
Start: 2017-04-22 | End: 2017-04-22

## 2017-04-22 RX ORDER — POLYETHYLENE GLYCOL 3350 17 G/17G
17 POWDER, FOR SOLUTION ORAL DAILY
Qty: 0 | Refills: 0 | Status: DISCONTINUED | OUTPATIENT
Start: 2017-04-22 | End: 2017-04-25

## 2017-04-22 RX ORDER — WARFARIN SODIUM 2.5 MG/1
5 TABLET ORAL ONCE
Qty: 0 | Refills: 0 | Status: COMPLETED | OUTPATIENT
Start: 2017-04-22 | End: 2017-04-22

## 2017-04-22 RX ORDER — ASPIRIN/CALCIUM CARB/MAGNESIUM 324 MG
81 TABLET ORAL DAILY
Qty: 0 | Refills: 0 | Status: DISCONTINUED | OUTPATIENT
Start: 2017-04-22 | End: 2017-04-25

## 2017-04-22 RX ORDER — POLYETHYLENE GLYCOL 3350 17 G/17G
17 POWDER, FOR SOLUTION ORAL DAILY
Qty: 0 | Refills: 0 | Status: DISCONTINUED | OUTPATIENT
Start: 2017-04-22 | End: 2017-04-22

## 2017-04-22 RX ORDER — MIDODRINE HYDROCHLORIDE 2.5 MG/1
5 TABLET ORAL THREE TIMES A DAY
Qty: 0 | Refills: 0 | Status: DISCONTINUED | OUTPATIENT
Start: 2017-04-22 | End: 2017-04-22

## 2017-04-22 RX ORDER — CITALOPRAM 10 MG/1
10 TABLET, FILM COATED ORAL DAILY
Qty: 0 | Refills: 0 | Status: DISCONTINUED | OUTPATIENT
Start: 2017-04-22 | End: 2017-04-22

## 2017-04-22 RX ORDER — SODIUM CHLORIDE 9 MG/ML
1000 INJECTION, SOLUTION INTRAVENOUS
Qty: 0 | Refills: 0 | Status: COMPLETED | OUTPATIENT
Start: 2017-04-22 | End: 2017-04-22

## 2017-04-22 RX ORDER — WARFARIN SODIUM 2.5 MG/1
5 TABLET ORAL ONCE
Qty: 0 | Refills: 0 | Status: DISCONTINUED | OUTPATIENT
Start: 2017-04-22 | End: 2017-04-22

## 2017-04-22 RX ORDER — CITALOPRAM 10 MG/1
10 TABLET, FILM COATED ORAL DAILY
Qty: 0 | Refills: 0 | Status: DISCONTINUED | OUTPATIENT
Start: 2017-04-22 | End: 2017-04-25

## 2017-04-22 RX ORDER — MIDODRINE HYDROCHLORIDE 2.5 MG/1
5 TABLET ORAL THREE TIMES A DAY
Qty: 0 | Refills: 0 | Status: DISCONTINUED | OUTPATIENT
Start: 2017-04-22 | End: 2017-04-25

## 2017-04-22 RX ADMIN — WARFARIN SODIUM 5 MILLIGRAM(S): 2.5 TABLET ORAL at 22:46

## 2017-04-22 RX ADMIN — CITALOPRAM 10 MILLIGRAM(S): 10 TABLET, FILM COATED ORAL at 18:03

## 2017-04-22 RX ADMIN — POLYETHYLENE GLYCOL 3350 17 GRAM(S): 17 POWDER, FOR SOLUTION ORAL at 18:03

## 2017-04-22 RX ADMIN — Medication 81 MILLIGRAM(S): at 18:03

## 2017-04-22 RX ADMIN — SODIUM CHLORIDE 75 MILLILITER(S): 9 INJECTION, SOLUTION INTRAVENOUS at 03:10

## 2017-04-22 NOTE — PROVIDER CONTACT NOTE (OTHER) - BACKGROUND
Pt says her BP is sometimes high. Pt says her BP is sometimes high. Historically on this admission her SBP is in the 150s.

## 2017-04-23 LAB
ANION GAP SERPL CALC-SCNC: 14 MMOL/L — SIGNIFICANT CHANGE UP (ref 5–17)
BUN SERPL-MCNC: 42 MG/DL — HIGH (ref 7–23)
CALCIUM SERPL-MCNC: 9.2 MG/DL — SIGNIFICANT CHANGE UP (ref 8.4–10.5)
CHLORIDE SERPL-SCNC: 104 MMOL/L — SIGNIFICANT CHANGE UP (ref 96–108)
CO2 SERPL-SCNC: 21 MMOL/L — LOW (ref 22–31)
CREAT SERPL-MCNC: 2.23 MG/DL — HIGH (ref 0.5–1.3)
GLUCOSE SERPL-MCNC: 85 MG/DL — SIGNIFICANT CHANGE UP (ref 70–99)
HCT VFR BLD CALC: 31.9 % — LOW (ref 34.5–45)
HGB BLD-MCNC: 10 G/DL — LOW (ref 11.5–15.5)
INR BLD: 4.05 RATIO — HIGH (ref 0.88–1.16)
MAGNESIUM SERPL-MCNC: 2.4 MG/DL — SIGNIFICANT CHANGE UP (ref 1.6–2.6)
MCHC RBC-ENTMCNC: 26.4 PG — LOW (ref 27–34)
MCHC RBC-ENTMCNC: 31.4 GM/DL — LOW (ref 32–36)
MCV RBC AUTO: 83.9 FL — SIGNIFICANT CHANGE UP (ref 80–100)
PHOSPHATE SERPL-MCNC: 4.5 MG/DL — SIGNIFICANT CHANGE UP (ref 2.5–4.5)
PLATELET # BLD AUTO: 342 K/UL — SIGNIFICANT CHANGE UP (ref 150–400)
POTASSIUM SERPL-MCNC: 4.4 MMOL/L — SIGNIFICANT CHANGE UP (ref 3.5–5.3)
POTASSIUM SERPL-SCNC: 4.4 MMOL/L — SIGNIFICANT CHANGE UP (ref 3.5–5.3)
PROTHROM AB SERPL-ACNC: 45.4 SEC — HIGH (ref 9.8–12.7)
RBC # BLD: 3.81 M/UL — SIGNIFICANT CHANGE UP (ref 3.8–5.2)
RBC # FLD: 16.3 % — HIGH (ref 10.3–14.5)
SODIUM SERPL-SCNC: 139 MMOL/L — SIGNIFICANT CHANGE UP (ref 135–145)
WBC # BLD: 9 K/UL — SIGNIFICANT CHANGE UP (ref 3.8–10.5)
WBC # FLD AUTO: 9 K/UL — SIGNIFICANT CHANGE UP (ref 3.8–10.5)

## 2017-04-23 RX ORDER — GABAPENTIN 400 MG/1
300 CAPSULE ORAL DAILY
Qty: 0 | Refills: 0 | Status: DISCONTINUED | OUTPATIENT
Start: 2017-04-23 | End: 2017-04-25

## 2017-04-23 RX ADMIN — CITALOPRAM 10 MILLIGRAM(S): 10 TABLET, FILM COATED ORAL at 12:53

## 2017-04-23 RX ADMIN — Medication 81 MILLIGRAM(S): at 12:53

## 2017-04-23 RX ADMIN — GABAPENTIN 300 MILLIGRAM(S): 400 CAPSULE ORAL at 12:53

## 2017-04-23 RX ADMIN — Medication 102 MILLIGRAM(S): at 00:04

## 2017-04-23 RX ADMIN — POLYETHYLENE GLYCOL 3350 17 GRAM(S): 17 POWDER, FOR SOLUTION ORAL at 12:52

## 2017-04-23 NOTE — DIETITIAN INITIAL EVALUATION ADULT. - PT NOT SOURCE
pt with difficulty remembering certain details and stated month and year of birth but had difficulty with day, also unable to confirm ht.

## 2017-04-23 NOTE — DIETITIAN INITIAL EVALUATION ADULT. - SOURCE
patient/Pt's preferred language is listed as Franck, pt able to communicate in English and declined  phone when offered. Comprehensive review of medical records

## 2017-04-23 NOTE — DIETITIAN INITIAL EVALUATION ADULT. - NS AS NUTRI INTERV ENTERAL NUTRITION
Recommend continuing Glucerna 1.2 and increase as tolerated by 10ml q 4-6 hours as tolerated to goal rate of 70ml/hr, consider changing to 18 hour feeds as pt was receiving PTA. Glucerna to goal rate of 70 ml/hr x 18 hrs. At goal, feed provides 1512 kcal(~22kcal/kg), 75.6 g protein (1.1gm protein) based on dosing wt of 69.1kg. Noted previously elevated phosphorus, if K and Phos are continuously elevated consider Nepro at 50ml/hr x18 hours to provide 1620kcal (~23kcal/kg) and 72.9g protein (1.05gm/kg) of dosing wt. RD to remain available for further nutritional interventions as indicated.

## 2017-04-23 NOTE — DIETITIAN INITIAL EVALUATION ADULT. - PROBLEM SELECTOR PLAN 4
Rate controlled  No known rate controlled agents on med rec  On coumadin. Hold coumadin tonight as POPS [employer] nonfunctioning  Monitor PT/INR Current INR therapeutic 2.57

## 2017-04-23 NOTE — DIETITIAN INITIAL EVALUATION ADULT. - ENERGY NEEDS
Ht: 61“-pt unable to confirm ht, Wt: 152.3 lbs, 69.1 kg, BMI: 28.8kg/m2, IBW: 105 lbs (+/-10%), %IBW: 145%  1+ L ankle edema, no pressure injuries documented

## 2017-04-23 NOTE — DIETITIAN INITIAL EVALUATION ADULT. - NS FNS WEIGHT CHANGE REASON
Pt reports previously weighing 200 pounds awhile ago (estimates 7-8 months ago) and states about 1 month or so ago she weighed 160 pounds, noted per RD note on 3/3/17 pt reported weighing 160 pounds. Pt states her current wt is ~154 pounds. Noted dosing wt of 152.3 pounds. Pt reports she is happy with wt loss and likes weighing where she is currently at.

## 2017-04-23 NOTE — DIETITIAN INITIAL EVALUATION ADULT. - OTHER INFO
Nutrition consult for enteral nutrition PTA, recommendations below discussed with team. Pt s/p IR tube exchange as per RN pt has been tolerating Glucerna 1.2 currently at rate of 30ml/hr with goal of 70ml/hr. Pt states last bowel movement was PTA.

## 2017-04-23 NOTE — DIETITIAN INITIAL EVALUATION ADULT. - ADHERENCE
Per transfer documentation in chart pt was receiving glucerna 1.2, 1250ml per day to provide 1500kcal and 75 gm of protein. Pt reports tolerating prior to J-Tube malfunction.

## 2017-04-24 LAB
ANION GAP SERPL CALC-SCNC: 13 MMOL/L — SIGNIFICANT CHANGE UP (ref 5–17)
BUN SERPL-MCNC: 45 MG/DL — HIGH (ref 7–23)
CALCIUM SERPL-MCNC: 9.2 MG/DL — SIGNIFICANT CHANGE UP (ref 8.4–10.5)
CHLORIDE SERPL-SCNC: 106 MMOL/L — SIGNIFICANT CHANGE UP (ref 96–108)
CO2 SERPL-SCNC: 21 MMOL/L — LOW (ref 22–31)
CREAT SERPL-MCNC: 2.54 MG/DL — HIGH (ref 0.5–1.3)
GLUCOSE SERPL-MCNC: 95 MG/DL — SIGNIFICANT CHANGE UP (ref 70–99)
INR BLD: 2.75 RATIO — HIGH (ref 0.88–1.16)
PHOSPHATE SERPL-MCNC: 4.7 MG/DL — HIGH (ref 2.5–4.5)
POTASSIUM SERPL-MCNC: 4.6 MMOL/L — SIGNIFICANT CHANGE UP (ref 3.5–5.3)
POTASSIUM SERPL-SCNC: 4.6 MMOL/L — SIGNIFICANT CHANGE UP (ref 3.5–5.3)
PROTHROM AB SERPL-ACNC: 30.3 SEC — HIGH (ref 9.8–12.7)
SODIUM SERPL-SCNC: 140 MMOL/L — SIGNIFICANT CHANGE UP (ref 135–145)

## 2017-04-24 RX ORDER — WARFARIN SODIUM 2.5 MG/1
5 TABLET ORAL ONCE
Qty: 0 | Refills: 0 | Status: COMPLETED | OUTPATIENT
Start: 2017-04-24 | End: 2017-04-24

## 2017-04-24 RX ADMIN — GABAPENTIN 300 MILLIGRAM(S): 400 CAPSULE ORAL at 12:24

## 2017-04-24 RX ADMIN — Medication 81 MILLIGRAM(S): at 12:24

## 2017-04-24 RX ADMIN — CITALOPRAM 10 MILLIGRAM(S): 10 TABLET, FILM COATED ORAL at 12:24

## 2017-04-24 RX ADMIN — MIDODRINE HYDROCHLORIDE 5 MILLIGRAM(S): 2.5 TABLET ORAL at 14:30

## 2017-04-24 RX ADMIN — POLYETHYLENE GLYCOL 3350 17 GRAM(S): 17 POWDER, FOR SOLUTION ORAL at 12:24

## 2017-04-24 RX ADMIN — WARFARIN SODIUM 5 MILLIGRAM(S): 2.5 TABLET ORAL at 22:55

## 2017-04-24 NOTE — PHYSICAL THERAPY INITIAL EVALUATION ADULT - PERTINENT HX OF CURRENT PROBLEM, REHAB EVAL
67 y/oF sent from rehab 4/21 with leaking J tube x 1d. Recent hospitalization 3/2-3/24/17 (was sent from rehab at that time) for reported nausea and emesis and appeared to have clogged J tube that was cleared by IR team on 3/22/2017. Per patient J tube has been in place for ~7-8 months. PMH  DM T2 w/diabetic neuropathy (hx of right TMA), Afib on couamdin, HTN, HLD, CKD stage 4, severe lymphedema. Hx of prolonged hospitalization where she was hospitalized for RLE cellulitis/osteomyelitis (cont) 67 y/oF sent from rehab 4/21 with leaking J tube x 1d, s/p IR tube change. Recent hospitalization 3/2-3/24/17 (was sent from rehab at that time) for reported nausea and emesis and appeared to have clogged J tube that was cleared by IR team on 3/22/2017. Per patient J tube has been in place for ~7-8 months. PMH  DM T2 w/diabetic neuropathy (hx of right TMA), Afib on couamdin, HTN, HLD, CKD stage 4, severe lymphedema. (cont below)

## 2017-04-24 NOTE — PHYSICAL THERAPY INITIAL EVALUATION ADULT - ADDITIONAL COMMENTS
course c/b afib and PEA arrest requiring intubation s/p trach given prolonged wean, NEHAL requiring HD, and dysphagia requiring PEG tube placement. Prior level of function-  As of last PT note from previous hospitalization 3/21 pt required min assist for sit to stand transfers, and ambulation with rolling walker 25 feet x 2. Hx of prolonged hospitalization where she was hospitalized for RLE cellulitis/osteomyelitis course c/b afib and PEA arrest requiring intubation s/p trach given prolonged wean, NEHAL requiring HD, and dysphagia requiring PEG tube placement. Prior level of function-  As of last PT note from previous hospitalization 3/21 pt required min assist for sit to stand transfers, and ambulation with rolling walker 25 feet x 2. Hx of prolonged hospitalization where she was hospitalized for RLE cellulitis/osteomyelitis course c/b afib and PEA arrest requiring intubation s/p trach given prolonged wean, NEHAL requiring HD, and dysphagia requiring PEG tube placement. Prior level of function-  As of last PT note from previous hospitalization 3/21 pt required min assist for sit to stand transfers, and ambulation with rolling walker 25 feet x 2. Pt lives with  in house with 8-9 steps to enter, no stairs from back entrance. Has rolling walker and wheelchair at home

## 2017-04-24 NOTE — PHYSICAL THERAPY INITIAL EVALUATION ADULT - PATIENT/FAMILY AGREES WITH PLAN
pt wants to go home. As per [facility] Sal[facility], pt's son stating is unable to arrange for assistance at home

## 2017-04-24 NOTE — PHYSICAL THERAPY INITIAL EVALUATION ADULT - DISCHARGE DISPOSITION, PT EVAL
rehabilitation facility/subacute. If pt goes home, recommend home PT and assist for all functional activities

## 2017-04-25 VITALS
HEART RATE: 86 BPM | OXYGEN SATURATION: 98 % | SYSTOLIC BLOOD PRESSURE: 159 MMHG | DIASTOLIC BLOOD PRESSURE: 77 MMHG | TEMPERATURE: 98 F | RESPIRATION RATE: 18 BRPM

## 2017-04-25 LAB
ANION GAP SERPL CALC-SCNC: 12 MMOL/L — SIGNIFICANT CHANGE UP (ref 5–17)
BUN SERPL-MCNC: 50 MG/DL — HIGH (ref 7–23)
CALCIUM SERPL-MCNC: 9 MG/DL — SIGNIFICANT CHANGE UP (ref 8.4–10.5)
CHLORIDE SERPL-SCNC: 104 MMOL/L — SIGNIFICANT CHANGE UP (ref 96–108)
CO2 SERPL-SCNC: 22 MMOL/L — SIGNIFICANT CHANGE UP (ref 22–31)
CREAT SERPL-MCNC: 2.46 MG/DL — HIGH (ref 0.5–1.3)
GLUCOSE SERPL-MCNC: 70 MG/DL — SIGNIFICANT CHANGE UP (ref 70–99)
INR BLD: 1.79 RATIO — HIGH (ref 0.88–1.16)
POTASSIUM SERPL-MCNC: 4.8 MMOL/L — SIGNIFICANT CHANGE UP (ref 3.5–5.3)
POTASSIUM SERPL-SCNC: 4.8 MMOL/L — SIGNIFICANT CHANGE UP (ref 3.5–5.3)
PROTHROM AB SERPL-ACNC: 19.7 SEC — HIGH (ref 9.8–12.7)
SODIUM SERPL-SCNC: 138 MMOL/L — SIGNIFICANT CHANGE UP (ref 135–145)

## 2017-04-25 PROCEDURE — 99285 EMERGENCY DEPT VISIT HI MDM: CPT

## 2017-04-25 PROCEDURE — 85027 COMPLETE CBC AUTOMATED: CPT

## 2017-04-25 PROCEDURE — C1769: CPT

## 2017-04-25 PROCEDURE — 84132 ASSAY OF SERUM POTASSIUM: CPT

## 2017-04-25 PROCEDURE — L8699: CPT

## 2017-04-25 PROCEDURE — 82565 ASSAY OF CREATININE: CPT

## 2017-04-25 PROCEDURE — 82947 ASSAY GLUCOSE BLOOD QUANT: CPT

## 2017-04-25 PROCEDURE — 85730 THROMBOPLASTIN TIME PARTIAL: CPT

## 2017-04-25 PROCEDURE — 83605 ASSAY OF LACTIC ACID: CPT

## 2017-04-25 PROCEDURE — 84100 ASSAY OF PHOSPHORUS: CPT

## 2017-04-25 PROCEDURE — 85014 HEMATOCRIT: CPT

## 2017-04-25 PROCEDURE — 84295 ASSAY OF SERUM SODIUM: CPT

## 2017-04-25 PROCEDURE — 82435 ASSAY OF BLOOD CHLORIDE: CPT

## 2017-04-25 PROCEDURE — 82803 BLOOD GASES ANY COMBINATION: CPT

## 2017-04-25 PROCEDURE — 97162 PT EVAL MOD COMPLEX 30 MIN: CPT

## 2017-04-25 PROCEDURE — 83735 ASSAY OF MAGNESIUM: CPT

## 2017-04-25 PROCEDURE — 80048 BASIC METABOLIC PNL TOTAL CA: CPT

## 2017-04-25 PROCEDURE — 85610 PROTHROMBIN TIME: CPT

## 2017-04-25 PROCEDURE — 71045 X-RAY EXAM CHEST 1 VIEW: CPT

## 2017-04-25 PROCEDURE — 80053 COMPREHEN METABOLIC PANEL: CPT

## 2017-04-25 PROCEDURE — 74018 RADEX ABDOMEN 1 VIEW: CPT

## 2017-04-25 PROCEDURE — 49452 REPLACE G-J TUBE PERC: CPT

## 2017-04-25 PROCEDURE — 82330 ASSAY OF CALCIUM: CPT

## 2017-04-25 RX ORDER — GABAPENTIN 400 MG/1
1 CAPSULE ORAL
Qty: 0 | Refills: 0 | COMMUNITY
Start: 2017-04-25

## 2017-04-25 RX ORDER — HYDRALAZINE HCL 50 MG
1 TABLET ORAL
Qty: 0 | Refills: 0 | COMMUNITY
Start: 2017-04-25

## 2017-04-25 RX ORDER — WARFARIN SODIUM 2.5 MG/1
5 TABLET ORAL ONCE
Qty: 0 | Refills: 0 | Status: DISCONTINUED | OUTPATIENT
Start: 2017-04-25 | End: 2017-04-25

## 2017-04-25 RX ORDER — ATORVASTATIN CALCIUM 80 MG/1
1 TABLET, FILM COATED ORAL
Qty: 0 | Refills: 0 | COMMUNITY
Start: 2017-04-25

## 2017-04-25 RX ORDER — HYDRALAZINE HCL 50 MG
25 TABLET ORAL
Qty: 0 | Refills: 0 | Status: DISCONTINUED | OUTPATIENT
Start: 2017-04-25 | End: 2017-04-25

## 2017-04-25 RX ORDER — ATORVASTATIN CALCIUM 80 MG/1
40 TABLET, FILM COATED ORAL AT BEDTIME
Qty: 0 | Refills: 0 | Status: DISCONTINUED | OUTPATIENT
Start: 2017-04-25 | End: 2017-04-25

## 2017-04-25 RX ADMIN — CITALOPRAM 10 MILLIGRAM(S): 10 TABLET, FILM COATED ORAL at 12:50

## 2017-04-25 RX ADMIN — Medication 25 MILLIGRAM(S): at 12:50

## 2017-04-25 RX ADMIN — Medication 81 MILLIGRAM(S): at 12:50

## 2017-04-25 RX ADMIN — POLYETHYLENE GLYCOL 3350 17 GRAM(S): 17 POWDER, FOR SOLUTION ORAL at 12:50

## 2017-04-25 RX ADMIN — GABAPENTIN 300 MILLIGRAM(S): 400 CAPSULE ORAL at 12:50

## 2017-04-25 NOTE — DISCHARGE NOTE ADULT - HOSPITAL COURSE
Patient's primary language Farsi, yet defers  services during conversation.  Pleasant, 67 yo woman with history of DM T2 w/diabetic neuropathy (hx of right TMA), Afib on couamdin, HTN, HLD, CKD stage 4, severe lymphedema, sent in from rehab for leaking J Tube for ~1 day. Per patient states it was functioning the day before. Denies abdominal pain, had one episode of NBNB emesis x1 during the day. Denies current nausea. Denies fevers, chills, sweats, diarrhea, constipation, dysuria, increased urinary frequency. Denies rhinorrhea, sore throat. Occasional dry cough. Patient last hospitalized 3/2/2017 to 3/24/2017 for reported nausea and emesis and appeared to have clogged J tube that was cleared by IR team on 3/22/2017. Per patient J tube has been in place for ~7-8 months.     Per prior charting: history of prolonged hospitalization where she was hospitalized for RLE cellulitis/osteomyelitis course c/b afib and PEA arrest requiring intubation s/p trach given prolonged wean, NEHAL requiring HD, and dysphagia requiring PEG tube placement.    Hospital Course:    Patient had GJ tube replaced byIR. Slow continuous tube feeds were restarted with Glucerna. Dietitian evaluated patient and recommended Glucerna but with caution to switch to Nepro if potassium and phosphate were persistently elevated in the setting of CKD stage 4. Patient had complaints of RLE neuropathic pain so gabapentin was started. Patient was stable so initial plan was to discharge her to home with a home health aide. However, after having a discussion with [facility], it was determined that the rehab center was already in the process of acquiring a home health aide, but the earliest the patient could receive the aide would be June 1st. Therefore, plans were changed to have her discharge back to rehab.

## 2017-04-25 NOTE — PROVIDER CONTACT NOTE (OTHER) - ACTION/TREATMENT ORDERED:
Give Reglan IVP as ordered
No action at this time. Continue to monitor. MD to revise Midodrine order.
Dr. Jones made aware. Will continue to monitor

## 2017-04-25 NOTE — DISCHARGE NOTE ADULT - CARE PLAN
Principal Discharge DX:	Jejunostomy tube leak  Goal:	resolution  Instructions for follow-up, activity and diet:	You were admitted to the hospital for gastrojejunostomy tube malfunction. The interventional radiologists were able to replace the tube and now it is function. You can continue to resume tube feeds with Glucerna. However, there is concern that since you do have chronic kidney disease whether you can eliminate potassium and phosphorus adequately when you receive them from your feeds. Elevated potassium and phosphorus can cause complications. Therefore, we recommend that you have a BMP and a phosphorus level checked on 4/28 to see whether they are still persistently elevated. Your potassium on 4/25 is 4.8 and your phosphorus on 4/24 was 4.5. If they are still elevated, we recommend that you switch to Nepro for tube feeds.  Secondary Diagnosis:	Type 2 diabetes mellitus with diabetic polyneuropathy, with long-term current use of insulin  Goal:	Control of blood sugar  Instructions for follow-up, activity and diet:	You have a diagnosis of Type 2 diabetes with numerous complications including part of your foot amputated and severe gastroparesis. Your blood sugars are very well controlled during this hospitalization. We recommend that you continue to have your blood sugar checked regularly at rehab and continue with correctional scale insulin.  Secondary Diagnosis:	Atrial fibrillation, unspecified type  Goal:	Anticoagulation  Instructions for follow-up, activity and diet:	Please continue to take your 5 mg warfarin daily and have your INR checked weekly for any further adjustments. The warfarin is used to reduce the risk of developing a stroke from atrial fibrillation.  Secondary Diagnosis:	Essential hypertension  Goal:	Control of blood pressure  Instructions for follow-up, activity and diet:	You were on midodrine for previously low blood pressures. During your admission, you were noted to have high blood pressures even after we stopped the midodrine. Therefore, we recommend you stop the midodrine and start taking low dose hydralazine at rehab. We recommend that the rehabilitation center continue to monitor your blood pressure and adjust the hydralazine as needed.  Secondary Diagnosis:	Chronic kidney disease (CKD), stage 4 (severe)  Instructions for follow-up, activity and diet:	You have chronic kidney disease stage 4 and were previously on dialysis during a prior hospitalization because of kidney failure. To prevent further progression, it is recommended that blood pressures be controlled. That is the reason why we started hydralazine. Principal Discharge DX:	Jejunostomy tube leak  Goal:	resolution  Instructions for follow-up, activity and diet:	You were admitted to the hospital for gastrojejunostomy tube malfunction. The interventional radiologists were able to replace the tube and now it is function. You can continue to resume tube feeds with Glucerna. However, there is concern that since you do have chronic kidney disease whether you can eliminate potassium and phosphorus adequately when you receive them from your feeds. Elevated potassium and phosphorus can cause complications. Therefore, we recommend that you have a BMP and a phosphorus level checked on 4/28 to see whether they are still persistently elevated. Your potassium on 4/25 is 4.8 and your phosphorus on 4/24 was 4.5. If they are still elevated, we recommend that you switch to Nepro for tube feeds.  Secondary Diagnosis:	Type 2 diabetes mellitus with diabetic polyneuropathy, with long-term current use of insulin  Goal:	Control of blood sugar  Instructions for follow-up, activity and diet:	You have a diagnosis of Type 2 diabetes with numerous complications including part of your foot amputated and severe gastroparesis. Your blood sugars are very well controlled during this hospitalization. We recommend that you continue to have your blood sugar checked regularly at rehab and continue with correctional scale insulin.  Secondary Diagnosis:	Atrial fibrillation, unspecified type  Goal:	Anticoagulation  Instructions for follow-up, activity and diet:	Please continue to take your 5 mg warfarin daily and have your INR checked weekly for any further adjustments. The warfarin is used to reduce the risk of developing a stroke from atrial fibrillation.  Secondary Diagnosis:	Essential hypertension  Goal:	Control of blood pressure  Instructions for follow-up, activity and diet:	You were on midodrine for previously low blood pressures. During your admission, you were noted to have high blood pressures even after we stopped the midodrine. Therefore, we recommend you stop the midodrine and start taking low dose hydralazine at rehab. We recommend that the rehabilitation center continue to monitor your blood pressure and adjust the hydralazine as needed.  Secondary Diagnosis:	Chronic kidney disease (CKD), stage 4 (severe)  Instructions for follow-up, activity and diet:	You have chronic kidney disease stage 4 and were previously on dialysis during a prior hospitalization because of kidney failure. To prevent further progression, it is recommended that blood pressures be controlled. That is the reason why we started hydralazine. Please continue to follow your PMD regarding the benefits and risks of hemodialysis since kidney disease is typically progressive. Principal Discharge DX:	Jejunostomy tube leak  Goal:	resolution  Instructions for follow-up, activity and diet:	You were admitted to the hospital for gastrojejunostomy tube malfunction. The interventional radiologists were able to replace the tube and now it is function. You can continue to resume tube feeds with Glucerna. However, there is concern that since you do have chronic kidney disease whether you can eliminate potassium and phosphorus adequately when you receive them from your feeds. Elevated potassium and phosphorus can cause complications. Therefore, we recommend that you have a BMP and a phosphorus level checked on 4/28 to see whether they are still persistently elevated. Your potassium on 4/25 is 4.8 and your phosphorus on 4/24 was 4.5. If they are still elevated, we recommend that you switch to Nepro for tube feeds.  Secondary Diagnosis:	Type 2 diabetes mellitus with diabetic polyneuropathy, with long-term current use of insulin  Goal:	Control of blood sugar  Instructions for follow-up, activity and diet:	You have a diagnosis of Type 2 diabetes with numerous complications including part of your foot amputated and severe gastroparesis. Your blood sugars are very well controlled during this hospitalization. We recommend that you continue to have your blood sugar checked regularly at rehab and continue with correctional scale insulin.  Secondary Diagnosis:	Atrial fibrillation, unspecified type  Goal:	Anticoagulation  Instructions for follow-up, activity and diet:	Please continue to take your 5 mg warfarin daily and have your INR checked weekly for any further adjustments. The warfarin is used to reduce the risk of developing a stroke from atrial fibrillation.  Secondary Diagnosis:	Essential hypertension  Goal:	Control of blood pressure  Instructions for follow-up, activity and diet:	You were on midodrine for previously low blood pressures. During your admission, you were noted to have high blood pressures even after we stopped the midodrine. Therefore, we recommend you stop the midodrine and start taking low dose hydralazine at rehab. We recommend that the rehabilitation center continue to monitor your blood pressure and adjust the hydralazine as needed.  Secondary Diagnosis:	Chronic kidney disease (CKD), stage 4 (severe)  Instructions for follow-up, activity and diet:	You have chronic kidney disease stage 4 and were previously on dialysis during a prior hospitalization because of kidney failure. To prevent further progression, it is recommended that blood pressures be controlled. That is the reason why we started hydralazine. Please continue to follow your nephrologist for further management. Principal Discharge DX:	Jejunostomy tube leak  Goal:	resolution  Instructions for follow-up, activity and diet:	You were admitted to the hospital for gastrojejunostomy tube malfunction. The interventional radiologists were able to replace the tube and now it is function. You can continue to resume tube feeds with Glucerna. However, there is concern that since you do have chronic kidney disease whether you can eliminate potassium and phosphorus adequately when you receive them from your feeds. Elevated potassium and phosphorus can cause complications. Therefore, we recommend that you have a BMP and a phosphorus level checked on 4/28 to see whether they are still persistently elevated. Your potassium on 4/25 is 4.8 and your phosphorus on 4/24 was 4.5. If they are still elevated, we recommend that you switch to Nepro for tube feeds. Please follow up with your gastroenterologist.  Secondary Diagnosis:	Type 2 diabetes mellitus with diabetic polyneuropathy, with long-term current use of insulin  Goal:	Control of blood sugar  Instructions for follow-up, activity and diet:	You have a diagnosis of Type 2 diabetes with numerous complications including part of your foot amputated and severe gastroparesis. Your blood sugars are very well controlled during this hospitalization. We recommend that you continue to have your blood sugar checked regularly at rehab and continue with correctional scale insulin. Given that you are diabetic and you have a high risk of developing cardiovascular disease events, we recommend that you start a statin to lower your lipids.  Secondary Diagnosis:	Atrial fibrillation, unspecified type  Goal:	Anticoagulation  Instructions for follow-up, activity and diet:	Please continue to take your 5 mg warfarin daily and have your INR checked weekly for any further adjustments. The warfarin is used to reduce the risk of developing a stroke from atrial fibrillation.  Secondary Diagnosis:	Essential hypertension  Goal:	Control of blood pressure  Instructions for follow-up, activity and diet:	You were on midodrine for previously low blood pressures. During your admission, you were noted to have high blood pressures even after we stopped the midodrine. Therefore, we recommend you stop the midodrine and start taking low dose hydralazine at rehab. We recommend that the rehabilitation center continue to monitor your blood pressure and adjust the hydralazine as needed.  Secondary Diagnosis:	Chronic kidney disease (CKD), stage 4 (severe)  Instructions for follow-up, activity and diet:	You have chronic kidney disease stage 4 and were previously on dialysis during a prior hospitalization because of kidney failure. To prevent further progression, it is recommended that blood pressures be controlled. That is the reason why we started hydralazine. Please continue to follow your nephrologist for further management.

## 2017-04-25 NOTE — PROVIDER CONTACT NOTE (OTHER) - SITUATION
BP on arrival to [address] [place] [address] was 186/83.
Pt c/o nauseous, vomited x1 green color, small amount
elevated bp

## 2017-04-25 NOTE — DISCHARGE NOTE ADULT - INSTRUCTIONS
Please continue on tube feeds with Glucerna 1.2 @ max rate 70 cc/hr via enteral feeding pump for 18 hours per day. We recommend that you have a Basic Metabolic Panel and a Phosphorus level checked on 4/28 to monitor your potassium and phosphorus levels. If they are still elevated, we recommend, per the dietitian, that you switch to Nepro @ max rate 50 cc/hr for 18 hours.

## 2017-04-25 NOTE — DISCHARGE NOTE ADULT - MEDICATION SUMMARY - MEDICATIONS TO STOP TAKING
I will STOP taking the medications listed below when I get home from the hospital:    midodrine 5 mg oral tablet  -- 1 tab(s) by gastrostomy tube 3 times a day    Tamiflu 30 mg oral capsule  --  by mouth once a day

## 2017-04-25 NOTE — DISCHARGE NOTE ADULT - PLAN OF CARE
You were admitted to the hospital for gastrojejunostomy tube malfunction. The interventional radiologists were able to replace the tube and now it is function. You can continue to resume tube feeds with Glucerna. However, there is concern that since you do have chronic kidney disease whether you can eliminate potassium and phosphorus adequately when you receive them from your feeds. Elevated potassium and phosphorus can cause complications. Therefore, we recommend that you have a BMP and a phosphorus level checked on 4/28 to see whether they are still persistently elevated. Your potassium on 4/25 is 4.8 and your phosphorus on 4/24 was 4.5. If they are still elevated, we recommend that you switch to Nepro for tube feeds. resolution Control of blood sugar You have a diagnosis of Type 2 diabetes with numerous complications including part of your foot amputated and severe gastroparesis. Your blood sugars are very well controlled during this hospitalization. We recommend that you continue to have your blood sugar checked regularly at rehab and continue with correctional scale insulin. Anticoagulation Please continue to take your 5 mg warfarin daily and have your INR checked weekly for any further adjustments. The warfarin is used to reduce the risk of developing a stroke from atrial fibrillation. Control of blood pressure You were on midodrine for previously low blood pressures. During your admission, you were noted to have high blood pressures even after we stopped the midodrine. Therefore, we recommend you stop the midodrine and start taking low dose hydralazine at rehab. We recommend that the rehabilitation center continue to monitor your blood pressure and adjust the hydralazine as needed. You have chronic kidney disease stage 4 and were previously on dialysis during a prior hospitalization because of kidney failure. To prevent further progression, it is recommended that blood pressures be controlled. That is the reason why we started hydralazine. You have chronic kidney disease stage 4 and were previously on dialysis during a prior hospitalization because of kidney failure. To prevent further progression, it is recommended that blood pressures be controlled. That is the reason why we started hydralazine. Please continue to follow your PMD regarding the benefits and risks of hemodialysis since kidney disease is typically progressive. You have chronic kidney disease stage 4 and were previously on dialysis during a prior hospitalization because of kidney failure. To prevent further progression, it is recommended that blood pressures be controlled. That is the reason why we started hydralazine. Please continue to follow your nephrologist for further management. You were admitted to the hospital for gastrojejunostomy tube malfunction. The interventional radiologists were able to replace the tube and now it is function. You can continue to resume tube feeds with Glucerna. However, there is concern that since you do have chronic kidney disease whether you can eliminate potassium and phosphorus adequately when you receive them from your feeds. Elevated potassium and phosphorus can cause complications. Therefore, we recommend that you have a BMP and a phosphorus level checked on 4/28 to see whether they are still persistently elevated. Your potassium on 4/25 is 4.8 and your phosphorus on 4/24 was 4.5. If they are still elevated, we recommend that you switch to Nepro for tube feeds. Please follow up with your gastroenterologist. You have a diagnosis of Type 2 diabetes with numerous complications including part of your foot amputated and severe gastroparesis. Your blood sugars are very well controlled during this hospitalization. We recommend that you continue to have your blood sugar checked regularly at rehab and continue with correctional scale insulin. Given that you are diabetic and you have a high risk of developing cardiovascular disease events, we recommend that you start a statin to lower your lipids.

## 2017-04-25 NOTE — DISCHARGE NOTE ADULT - PATIENT PORTAL LINK FT
“You can access the Follow[facility] Patient Portal, offered by [facility], by registering with the following website: [email or web address]”

## 2017-04-25 NOTE — DISCHARGE NOTE ADULT - CARE PROVIDERS DIRECT ADDRESSES
,DirectAddress_Unknown,[email or web address],DirectAddress_Unknown,DirectAddress_Unknown

## 2017-04-25 NOTE — DISCHARGE NOTE ADULT - MEDICATION SUMMARY - MEDICATIONS TO TAKE
I will START or STAY ON the medications listed below when I get home from the hospital:    aspirin 81 mg oral tablet, chewable  -- 1 tab(s) by gastrostomy tube once a day  -- Indication: For CV prophylaxis    warfarin 5 mg oral tablet  -- 1 tab(s) by gastrostomy tube once a day  -- Indication: For Atrial fibrillation, unspecified type    gabapentin 300 mg oral capsule  -- 1 cap(s) by gastrostomy tube once a day  -- Indication: For Diabetic neuropathy    citalopram 10 mg oral tablet  -- 1 tab(s) by gastrostomy tube once a day  -- Indication: For Depression    insulin lispro 100 units/mL subcutaneous solution  --  subcutaneous once a day (at bedtime); 0 Unit(s) if Glucose 0 - 250  1 Unit(s) if Glucose 251 - 300  2 Unit(s) if Glucose 301 - 350  3 Unit(s) if Glucose 351 - 400  4 Unit(s) if Glucose Greater Than 400  -- Indication: For Type 2 diabetes mellitus with complication, unspecified long term insulin use status    insulin lispro 100 units/mL subcutaneous solution  --  subcutaneous 3 times a day (before meals); 1 Unit(s) if Glucose 151 - 200  2 Unit(s) if Glucose 201 - 250  3 Unit(s) if Glucose 251 - 300  4 Unit(s) if Glucose 301 - 350  5 Unit(s) if Glucose 351 - 400  6 Unit(s) if Glucose Greater Than 400  -- Indication: For Type 2 diabetes mellitus with complication, unspecified long term insulin use status    Reglan 5 mg/5 mL oral syrup  -- 10 milligram(s) by gastrostomy tube 3 times a day  -- Indication: For Nausea    polyethylene glycol 3350 oral powder for reconstitution  -- 17 gram(s) by gastrostomy tube 3 times a day, As Needed  -- Indication: For Constipation    Artificial Tears ophthalmic solution  --  to each affected eye every 6 hours, As Needed  -- Indication: For Dry eyes    ocular lubricant ophthalmic solution  -- 1 drop(s) to each affected eye 3 times a day  -- Indication: For Dry eyes    hydrALAZINE 25 mg oral tablet  -- 1 tab(s) by mouth 2 times a day  -- Indication: For Hypertension I will START or STAY ON the medications listed below when I get home from the hospital:    aspirin 81 mg oral tablet, chewable  -- 1 tab(s) by gastrostomy tube once a day  -- Indication: For CV prophylaxis    warfarin 5 mg oral tablet  -- 1 tab(s) by gastrostomy tube once a day  -- Indication: For Atrial fibrillation, unspecified type    gabapentin 300 mg oral capsule  -- 1 cap(s) by gastrostomy tube once a day  -- Indication: For Diabetic neuropathy    citalopram 10 mg oral tablet  -- 1 tab(s) by gastrostomy tube once a day  -- Indication: For Depression    insulin lispro 100 units/mL subcutaneous solution  --  subcutaneous once a day (at bedtime); 0 Unit(s) if Glucose 0 - 250  1 Unit(s) if Glucose 251 - 300  2 Unit(s) if Glucose 301 - 350  3 Unit(s) if Glucose 351 - 400  4 Unit(s) if Glucose Greater Than 400  -- Indication: For Type 2 diabetes mellitus with complication, unspecified long term insulin use status    insulin lispro 100 units/mL subcutaneous solution  --  subcutaneous 3 times a day (before meals); 1 Unit(s) if Glucose 151 - 200  2 Unit(s) if Glucose 201 - 250  3 Unit(s) if Glucose 251 - 300  4 Unit(s) if Glucose 301 - 350  5 Unit(s) if Glucose 351 - 400  6 Unit(s) if Glucose Greater Than 400  -- Indication: For Type 2 diabetes mellitus with complication, unspecified long term insulin use status    Reglan 5 mg/5 mL oral syrup  -- 10 milligram(s) by gastrostomy tube 3 times a day  -- Indication: For Nausea    atorvastatin 40 mg oral tablet  -- 1 tab(s) by mouth once a day (at bedtime)  -- Indication: For Type 2 diabetes mellitus with complication, unspecified long term insulin use status    polyethylene glycol 3350 oral powder for reconstitution  -- 17 gram(s) by gastrostomy tube 3 times a day, As Needed  -- Indication: For Constipation    Artificial Tears ophthalmic solution  --  to each affected eye every 6 hours, As Needed  -- Indication: For Dry eyes    ocular lubricant ophthalmic solution  -- 1 drop(s) to each affected eye 3 times a day  -- Indication: For Dry eyes    hydrALAZINE 25 mg oral tablet  -- 1 tab(s) by mouth 2 times a day  -- Indication: For Hypertension I will START or STAY ON the medications listed below when I get home from the hospital:    aspirin 81 mg oral tablet, chewable  -- 1 tab(s) by gastrostomy tube once a day  -- Indication: For CV prophylaxis    warfarin 5 mg oral tablet  -- 1 tab(s) by gastrostomy tube once a day  -- Indication: For Atrial fibrillation, unspecified type    gabapentin 300 mg oral capsule  -- 1 cap(s) by gastrostomy tube once a day  -- Indication: For Diabetic neuropathy    citalopram 10 mg oral tablet  -- 1 tab(s) by gastrostomy tube once a day  -- Indication: For Depression    insulin lispro 100 units/mL subcutaneous solution  --  subcutaneous once a day (at bedtime); 0 Unit(s) if Glucose 0 - 250  1 Unit(s) if Glucose 251 - 300  2 Unit(s) if Glucose 301 - 350  3 Unit(s) if Glucose 351 - 400  4 Unit(s) if Glucose Greater Than 400  -- Indication: For Type 2 diabetes mellitus with complication, unspecified long term insulin use status    insulin lispro 100 units/mL subcutaneous solution  --  subcutaneous 3 times a day (before meals); 1 Unit(s) if Glucose 151 - 200  2 Unit(s) if Glucose 201 - 250  3 Unit(s) if Glucose 251 - 300  4 Unit(s) if Glucose 301 - 350  5 Unit(s) if Glucose 351 - 400  6 Unit(s) if Glucose Greater Than 400  -- Indication: For Type 2 diabetes mellitus with complication, unspecified long term insulin use status    Reglan 5 mg/5 mL oral syrup  -- 10 milligram(s) by gastrostomy tube 3 times a day  -- Indication: For Nausea    atorvastatin 40 mg oral tablet  -- 1 tab(s) by gastrostomy tube once a day (at bedtime)  -- Indication: For Type 2 diabetes mellitus with complication, unspecified long term insulin use status    polyethylene glycol 3350 oral powder for reconstitution  -- 17 gram(s) by gastrostomy tube 3 times a day, As Needed  -- Indication: For Constipation    Artificial Tears ophthalmic solution  --  to each affected eye every 6 hours, As Needed  -- Indication: For Dry eyes    ocular lubricant ophthalmic solution  -- 1 drop(s) to each affected eye 3 times a day  -- Indication: For Dry eyes    hydrALAZINE 25 mg oral tablet  -- 1 tab(s) by gastrostomy tube 2 times a day  -- Indication: For Hypertension

## 2017-04-25 NOTE — DISCHARGE NOTE ADULT - CARE PROVIDER_API CALL
Bryan Schmid), Medicine  [address] [facility] [address]  [place] [address]  Phone: [phone]  Fax: [phone]    Javier Ellis), Gastroenterology; Internal Medicine  [address] [id number]  Phone: [phone]  Fax: [phone]    Pat Gold), Internal Medicine; Nephrology  [address] [address]  Phone: [phone]  Fax: [phone]

## 2017-04-25 NOTE — DISCHARGE NOTE ADULT - SECONDARY DIAGNOSIS.
Type 2 diabetes mellitus with diabetic polyneuropathy, with long-term current use of insulin Atrial fibrillation, unspecified type Essential hypertension Chronic kidney disease (CKD), stage 4 (severe)

## 2017-05-25 ENCOUNTER — INPATIENT (INPATIENT)
Facility: HOSPITAL | Age: 67
LOS: 24 days | Discharge: NURSING, REHAB OR LONG-TERM CARE | DRG: 853 | End: 2017-06-19
Attending: INTERNAL MEDICINE | Admitting: INTERNAL MEDICINE
Payer: MEDICARE

## 2017-05-25 VITALS — HEART RATE: 102 BPM | OXYGEN SATURATION: 100 %

## 2017-05-25 DIAGNOSIS — R09.2 RESPIRATORY ARREST: ICD-10-CM

## 2017-05-25 DIAGNOSIS — Z98.890 OTHER SPECIFIED POSTPROCEDURAL STATES: Chronic | ICD-10-CM

## 2017-05-25 DIAGNOSIS — R74.8 ABNORMAL LEVELS OF OTHER SERUM ENZYMES: ICD-10-CM

## 2017-05-25 DIAGNOSIS — N17.9 ACUTE KIDNEY FAILURE, UNSPECIFIED: ICD-10-CM

## 2017-05-25 DIAGNOSIS — I48.91 UNSPECIFIED ATRIAL FIBRILLATION: ICD-10-CM

## 2017-05-25 DIAGNOSIS — Z93.1 GASTROSTOMY STATUS: Chronic | ICD-10-CM

## 2017-05-25 DIAGNOSIS — J96.00 ACUTE RESPIRATORY FAILURE, UNSPECIFIED WHETHER WITH HYPOXIA OR HYPERCAPNIA: ICD-10-CM

## 2017-05-25 DIAGNOSIS — E87.2 ACIDOSIS: ICD-10-CM

## 2017-05-25 LAB
ALBUMIN SERPL ELPH-MCNC: 2.9 G/DL — LOW (ref 3.3–5)
ALP SERPL-CCNC: 185 U/L — HIGH (ref 40–120)
ALT FLD-CCNC: 38 U/L RC — SIGNIFICANT CHANGE UP (ref 10–45)
ANION GAP SERPL CALC-SCNC: 28 MMOL/L — HIGH (ref 5–17)
APPEARANCE UR: ABNORMAL
APTT BLD: 37.9 SEC — HIGH (ref 27.5–37.4)
AST SERPL-CCNC: 52 U/L — HIGH (ref 10–40)
BASE EXCESS BLDV CALC-SCNC: -9.3 MMOL/L — LOW (ref -2–2)
BASOPHILS # BLD AUTO: 0 K/UL — SIGNIFICANT CHANGE UP (ref 0–0.2)
BASOPHILS NFR BLD AUTO: 0.1 % — SIGNIFICANT CHANGE UP (ref 0–2)
BILIRUB SERPL-MCNC: 0.5 MG/DL — SIGNIFICANT CHANGE UP (ref 0.2–1.2)
BILIRUB UR-MCNC: NEGATIVE — SIGNIFICANT CHANGE UP
BLD GP AB SCN SERPL QL: NEGATIVE — SIGNIFICANT CHANGE UP
BUN SERPL-MCNC: 173 MG/DL — HIGH (ref 7–23)
CA-I SERPL-SCNC: 1 MMOL/L — LOW (ref 1.12–1.3)
CALCIUM SERPL-MCNC: 8 MG/DL — LOW (ref 8.4–10.5)
CHLORIDE BLDV-SCNC: 95 MMOL/L — LOW (ref 96–108)
CHLORIDE SERPL-SCNC: 87 MMOL/L — LOW (ref 96–108)
CO2 BLDV-SCNC: 18 MMOL/L — LOW (ref 22–30)
CO2 SERPL-SCNC: 14 MMOL/L — LOW (ref 22–31)
COLOR SPEC: YELLOW — SIGNIFICANT CHANGE UP
CREAT SERPL-MCNC: 7.78 MG/DL — HIGH (ref 0.5–1.3)
DIFF PNL FLD: ABNORMAL
EOSINOPHIL # BLD AUTO: 0.1 K/UL — SIGNIFICANT CHANGE UP (ref 0–0.5)
EOSINOPHIL NFR BLD AUTO: 0.6 % — SIGNIFICANT CHANGE UP (ref 0–6)
EPI CELLS # UR: SIGNIFICANT CHANGE UP /HPF
GAS PNL BLDA: SIGNIFICANT CHANGE UP
GAS PNL BLDV: 129 MMOL/L — LOW (ref 136–145)
GAS PNL BLDV: SIGNIFICANT CHANGE UP
GLUCOSE BLDV-MCNC: 90 MG/DL — SIGNIFICANT CHANGE UP (ref 70–99)
GLUCOSE SERPL-MCNC: 169 MG/DL — HIGH (ref 70–99)
GLUCOSE UR QL: 50
GRAM STN FLD: SIGNIFICANT CHANGE UP
HCO3 BLDV-SCNC: 17 MMOL/L — LOW (ref 21–29)
HCT VFR BLD CALC: 23.4 % — LOW (ref 34.5–45)
HCT VFR BLDA CALC: 19 % — CRITICAL LOW (ref 39–50)
HGB BLD CALC-MCNC: 6.2 G/DL — CRITICAL LOW (ref 11.5–15.5)
HGB BLD-MCNC: 7.8 G/DL — LOW (ref 11.5–15.5)
HOROWITZ INDEX BLDV+IHG-RTO: 100 — SIGNIFICANT CHANGE UP
INR BLD: 2.1 RATIO — HIGH (ref 0.88–1.16)
KETONES UR-MCNC: NEGATIVE — SIGNIFICANT CHANGE UP
LACTATE BLDV-MCNC: 0.5 MMOL/L — LOW (ref 0.7–2)
LEUKOCYTE ESTERASE UR-ACNC: ABNORMAL
LYMPHOCYTES # BLD AUTO: 0.8 K/UL — LOW (ref 1–3.3)
LYMPHOCYTES # BLD AUTO: 5.8 % — LOW (ref 13–44)
MCHC RBC-ENTMCNC: 27.8 PG — SIGNIFICANT CHANGE UP (ref 27–34)
MCHC RBC-ENTMCNC: 33.3 GM/DL — SIGNIFICANT CHANGE UP (ref 32–36)
MCV RBC AUTO: 83.5 FL — SIGNIFICANT CHANGE UP (ref 80–100)
MONOCYTES # BLD AUTO: 0.6 K/UL — SIGNIFICANT CHANGE UP (ref 0–0.9)
MONOCYTES NFR BLD AUTO: 4.3 % — SIGNIFICANT CHANGE UP (ref 2–14)
NEUTROPHILS # BLD AUTO: 13 K/UL — HIGH (ref 1.8–7.4)
NEUTROPHILS NFR BLD AUTO: 89.2 % — HIGH (ref 43–77)
NITRITE UR-MCNC: NEGATIVE — SIGNIFICANT CHANGE UP
OSMOLALITY SERPL: 337 MOS/KG — HIGH (ref 275–300)
OTHER CELLS CSF MANUAL: 5 ML/DL — LOW (ref 18–22)
PCO2 BLDV: 41 MMHG — SIGNIFICANT CHANGE UP (ref 35–50)
PH BLDV: 7.24 — LOW (ref 7.35–7.45)
PH UR: 6 — SIGNIFICANT CHANGE UP (ref 5–8)
PLATELET # BLD AUTO: 379 K/UL — SIGNIFICANT CHANGE UP (ref 150–400)
PO2 BLDV: 40 MMHG — SIGNIFICANT CHANGE UP (ref 25–45)
POTASSIUM BLDV-SCNC: 3.6 MMOL/L — SIGNIFICANT CHANGE UP (ref 3.5–5)
POTASSIUM SERPL-MCNC: 4 MMOL/L — SIGNIFICANT CHANGE UP (ref 3.5–5.3)
POTASSIUM SERPL-SCNC: 4 MMOL/L — SIGNIFICANT CHANGE UP (ref 3.5–5.3)
PROT SERPL-MCNC: 7.1 G/DL — SIGNIFICANT CHANGE UP (ref 6–8.3)
PROT UR-MCNC: >600 MG/DL
PROTHROM AB SERPL-ACNC: 23 SEC — HIGH (ref 9.8–12.7)
RBC # BLD: 2.8 M/UL — LOW (ref 3.8–5.2)
RBC # FLD: 14.7 % — HIGH (ref 10.3–14.5)
RBC CASTS # UR COMP ASSIST: SIGNIFICANT CHANGE UP /HPF (ref 0–2)
RH IG SCN BLD-IMP: POSITIVE — SIGNIFICANT CHANGE UP
SAO2 % BLDV: 62 % — LOW (ref 67–88)
SODIUM SERPL-SCNC: 129 MMOL/L — LOW (ref 135–145)
SP GR SPEC: 1.02 — SIGNIFICANT CHANGE UP (ref 1.01–1.02)
SPECIMEN SOURCE: SIGNIFICANT CHANGE UP
T3 SERPL-MCNC: 71 NG/DL — LOW (ref 80–200)
T4 AB SER-ACNC: 6.4 UG/DL — SIGNIFICANT CHANGE UP (ref 4.6–12)
TSH SERPL-MCNC: 2.28 UIU/ML — SIGNIFICANT CHANGE UP (ref 0.27–4.2)
UROBILINOGEN FLD QL: NEGATIVE — SIGNIFICANT CHANGE UP
WBC # BLD: 14.5 K/UL — HIGH (ref 3.8–10.5)
WBC # FLD AUTO: 14.5 K/UL — HIGH (ref 3.8–10.5)
WBC UR QL: SIGNIFICANT CHANGE UP /HPF (ref 0–5)

## 2017-05-25 PROCEDURE — 99291 CRITICAL CARE FIRST HOUR: CPT | Mod: 25,GC

## 2017-05-25 PROCEDURE — 74000: CPT | Mod: 26

## 2017-05-25 PROCEDURE — 31500 INSERT EMERGENCY AIRWAY: CPT

## 2017-05-25 PROCEDURE — 36800 INSERTION OF CANNULA: CPT | Mod: GC

## 2017-05-25 PROCEDURE — 71010: CPT | Mod: 26

## 2017-05-25 PROCEDURE — 70450 CT HEAD/BRAIN W/O DYE: CPT | Mod: 26

## 2017-05-25 PROCEDURE — 71250 CT THORAX DX C-: CPT | Mod: 26

## 2017-05-25 PROCEDURE — 71010: CPT | Mod: 26,77

## 2017-05-25 PROCEDURE — 93010 ELECTROCARDIOGRAM REPORT: CPT | Mod: 59

## 2017-05-25 PROCEDURE — 72125 CT NECK SPINE W/O DYE: CPT | Mod: 26

## 2017-05-25 PROCEDURE — 74176 CT ABD & PELVIS W/O CONTRAST: CPT | Mod: 26

## 2017-05-25 RX ORDER — ASPIRIN/CALCIUM CARB/MAGNESIUM 324 MG
300 TABLET ORAL ONCE
Qty: 0 | Refills: 0 | Status: DISCONTINUED | OUTPATIENT
Start: 2017-05-25 | End: 2017-05-25

## 2017-05-25 RX ORDER — NOREPINEPHRINE BITARTRATE/D5W 8 MG/250ML
0.01 PLASTIC BAG, INJECTION (ML) INTRAVENOUS
Qty: 8 | Refills: 0 | Status: DISCONTINUED | OUTPATIENT
Start: 2017-05-25 | End: 2017-05-26

## 2017-05-25 RX ORDER — INSULIN LISPRO 100/ML
VIAL (ML) SUBCUTANEOUS
Qty: 0 | Refills: 0 | Status: DISCONTINUED | OUTPATIENT
Start: 2017-05-25 | End: 2017-05-25

## 2017-05-25 RX ORDER — SODIUM CHLORIDE 9 MG/ML
1000 INJECTION INTRAMUSCULAR; INTRAVENOUS; SUBCUTANEOUS ONCE
Qty: 0 | Refills: 0 | Status: DISCONTINUED | OUTPATIENT
Start: 2017-05-25 | End: 2017-05-25

## 2017-05-25 RX ORDER — MIDAZOLAM HYDROCHLORIDE 1 MG/ML
1 INJECTION, SOLUTION INTRAMUSCULAR; INTRAVENOUS
Qty: 100 | Refills: 0 | Status: DISCONTINUED | OUTPATIENT
Start: 2017-05-25 | End: 2017-05-25

## 2017-05-25 RX ORDER — IMIPENEM AND CILASTATIN 250; 250 MG/100ML; MG/100ML
INJECTION, POWDER, FOR SOLUTION INTRAVENOUS
Qty: 0 | Refills: 0 | Status: DISCONTINUED | OUTPATIENT
Start: 2017-05-25 | End: 2017-05-27

## 2017-05-25 RX ORDER — MIDAZOLAM HYDROCHLORIDE 1 MG/ML
1 INJECTION, SOLUTION INTRAMUSCULAR; INTRAVENOUS ONCE
Qty: 0 | Refills: 0 | Status: DISCONTINUED | OUTPATIENT
Start: 2017-05-25 | End: 2017-05-25

## 2017-05-25 RX ORDER — HEPARIN SODIUM 5000 [USP'U]/ML
5000 INJECTION INTRAVENOUS; SUBCUTANEOUS EVERY 8 HOURS
Qty: 0 | Refills: 0 | Status: DISCONTINUED | OUTPATIENT
Start: 2017-05-25 | End: 2017-06-15

## 2017-05-25 RX ORDER — IMIPENEM AND CILASTATIN 250; 250 MG/100ML; MG/100ML
500 INJECTION, POWDER, FOR SOLUTION INTRAVENOUS EVERY 12 HOURS
Qty: 0 | Refills: 0 | Status: DISCONTINUED | OUTPATIENT
Start: 2017-05-26 | End: 2017-05-27

## 2017-05-25 RX ORDER — IPRATROPIUM/ALBUTEROL SULFATE 18-103MCG
3 AEROSOL WITH ADAPTER (GRAM) INHALATION EVERY 4 HOURS
Qty: 0 | Refills: 0 | Status: DISCONTINUED | OUTPATIENT
Start: 2017-05-25 | End: 2017-05-26

## 2017-05-25 RX ORDER — PIPERACILLIN AND TAZOBACTAM 4; .5 G/20ML; G/20ML
3.38 INJECTION, POWDER, LYOPHILIZED, FOR SOLUTION INTRAVENOUS ONCE
Qty: 0 | Refills: 0 | Status: COMPLETED | OUTPATIENT
Start: 2017-05-25 | End: 2017-05-25

## 2017-05-25 RX ORDER — VANCOMYCIN HCL 1 G
1000 VIAL (EA) INTRAVENOUS ONCE
Qty: 0 | Refills: 0 | Status: DISCONTINUED | OUTPATIENT
Start: 2017-05-25 | End: 2017-05-25

## 2017-05-25 RX ORDER — CITALOPRAM 10 MG/1
10 TABLET, FILM COATED ORAL DAILY
Qty: 0 | Refills: 0 | Status: DISCONTINUED | OUTPATIENT
Start: 2017-05-25 | End: 2017-05-25

## 2017-05-25 RX ORDER — PANTOPRAZOLE SODIUM 20 MG/1
40 TABLET, DELAYED RELEASE ORAL DAILY
Qty: 0 | Refills: 0 | Status: DISCONTINUED | OUTPATIENT
Start: 2017-05-25 | End: 2017-05-27

## 2017-05-25 RX ORDER — DESMOPRESSIN ACETATE 0.1 MG/1
21 TABLET ORAL ONCE
Qty: 0 | Refills: 0 | Status: COMPLETED | OUTPATIENT
Start: 2017-05-25 | End: 2017-05-25

## 2017-05-25 RX ORDER — CIPROFLOXACIN LACTATE 400MG/40ML
400 VIAL (ML) INTRAVENOUS EVERY 24 HOURS
Qty: 0 | Refills: 0 | Status: DISCONTINUED | OUTPATIENT
Start: 2017-05-26 | End: 2017-05-27

## 2017-05-25 RX ORDER — PROPOFOL 10 MG/ML
5 INJECTION, EMULSION INTRAVENOUS
Qty: 500 | Refills: 0 | Status: DISCONTINUED | OUTPATIENT
Start: 2017-05-25 | End: 2017-05-27

## 2017-05-25 RX ORDER — INSULIN LISPRO 100/ML
VIAL (ML) SUBCUTANEOUS AT BEDTIME
Qty: 0 | Refills: 0 | Status: DISCONTINUED | OUTPATIENT
Start: 2017-05-25 | End: 2017-05-25

## 2017-05-25 RX ORDER — INSULIN LISPRO 100/ML
VIAL (ML) SUBCUTANEOUS EVERY 6 HOURS
Qty: 0 | Refills: 0 | Status: DISCONTINUED | OUTPATIENT
Start: 2017-05-25 | End: 2017-05-31

## 2017-05-25 RX ORDER — CIPROFLOXACIN LACTATE 400MG/40ML
VIAL (ML) INTRAVENOUS
Qty: 0 | Refills: 0 | Status: DISCONTINUED | OUTPATIENT
Start: 2017-05-25 | End: 2017-05-27

## 2017-05-25 RX ORDER — CIPROFLOXACIN LACTATE 400MG/40ML
400 VIAL (ML) INTRAVENOUS ONCE
Qty: 0 | Refills: 0 | Status: COMPLETED | OUTPATIENT
Start: 2017-05-25 | End: 2017-05-25

## 2017-05-25 RX ORDER — CALCIUM GLUCONATE 100 MG/ML
2 VIAL (ML) INTRAVENOUS ONCE
Qty: 0 | Refills: 0 | Status: COMPLETED | OUTPATIENT
Start: 2017-05-25 | End: 2017-05-25

## 2017-05-25 RX ORDER — IMIPENEM AND CILASTATIN 250; 250 MG/100ML; MG/100ML
500 INJECTION, POWDER, FOR SOLUTION INTRAVENOUS ONCE
Qty: 0 | Refills: 0 | Status: COMPLETED | OUTPATIENT
Start: 2017-05-25 | End: 2017-05-25

## 2017-05-25 RX ORDER — PANTOPRAZOLE SODIUM 20 MG/1
80 TABLET, DELAYED RELEASE ORAL ONCE
Qty: 0 | Refills: 0 | Status: COMPLETED | OUTPATIENT
Start: 2017-05-25 | End: 2017-05-25

## 2017-05-25 RX ORDER — FENTANYL CITRATE 50 UG/ML
100 INJECTION INTRAVENOUS ONCE
Qty: 0 | Refills: 0 | Status: DISCONTINUED | OUTPATIENT
Start: 2017-05-25 | End: 2017-05-25

## 2017-05-25 RX ADMIN — MIDAZOLAM HYDROCHLORIDE 1 MG/HR: 1 INJECTION, SOLUTION INTRAMUSCULAR; INTRAVENOUS at 15:30

## 2017-05-25 RX ADMIN — DESMOPRESSIN ACETATE 221 MICROGRAM(S): 0.1 TABLET ORAL at 16:16

## 2017-05-25 RX ADMIN — IMIPENEM AND CILASTATIN 100 MILLIGRAM(S): 250; 250 INJECTION, POWDER, FOR SOLUTION INTRAVENOUS at 21:59

## 2017-05-25 RX ADMIN — Medication 3 MILLILITER(S): at 17:56

## 2017-05-25 RX ADMIN — FENTANYL CITRATE 100 MICROGRAM(S): 50 INJECTION INTRAVENOUS at 12:19

## 2017-05-25 RX ADMIN — Medication 1.39 MICROGRAM(S)/KG/MIN: at 18:48

## 2017-05-25 RX ADMIN — PROPOFOL 2.1 MICROGRAM(S)/KG/MIN: 10 INJECTION, EMULSION INTRAVENOUS at 18:48

## 2017-05-25 RX ADMIN — PANTOPRAZOLE SODIUM 80 MILLIGRAM(S): 20 TABLET, DELAYED RELEASE ORAL at 12:57

## 2017-05-25 RX ADMIN — FENTANYL CITRATE 100 MICROGRAM(S): 50 INJECTION INTRAVENOUS at 12:04

## 2017-05-25 RX ADMIN — Medication 3 MILLILITER(S): at 23:11

## 2017-05-25 RX ADMIN — Medication 200 GRAM(S): at 21:58

## 2017-05-25 RX ADMIN — MIDAZOLAM HYDROCHLORIDE 1 MG/HR: 1 INJECTION, SOLUTION INTRAMUSCULAR; INTRAVENOUS at 12:37

## 2017-05-25 RX ADMIN — HEPARIN SODIUM 5000 UNIT(S): 5000 INJECTION INTRAVENOUS; SUBCUTANEOUS at 21:58

## 2017-05-25 RX ADMIN — Medication 200 MILLIGRAM(S): at 21:59

## 2017-05-25 RX ADMIN — MIDAZOLAM HYDROCHLORIDE 1 MILLIGRAM(S): 1 INJECTION, SOLUTION INTRAMUSCULAR; INTRAVENOUS at 13:26

## 2017-05-25 RX ADMIN — PIPERACILLIN AND TAZOBACTAM 200 GRAM(S): 4; .5 INJECTION, POWDER, LYOPHILIZED, FOR SOLUTION INTRAVENOUS at 12:57

## 2017-05-25 NOTE — H&P ADULT. - HISTORY OF PRESENT ILLNESS
67 F with T2DM, afib on coumadin, HTN, HLD, CKD IV, and severe lymphedema brought in by EMS after being found minimally responsive and intubated in the field. Per ED note, EMS noted that patient appeared to be having severe diarrhea. Normally a/o x2 and able to hold a conversation per son. She has a relatively recent prolonged hospitalization for RLE cellulitis and OM from Aug 2016 - Nov 2016 during which hospital course was complicated by cardiac arrest with hx of trach, dysphagia s/p PEG, and NEHAL requiring HD. She was successfully decannulated and NEHAL had resolved no longer requiring HD prior to discharge.     In the ED: 67 F with T2DM, afib on coumadin, HTN, HLD, CKD IV, and severe lymphedema brought in by EMS after being found minimally responsive and intubated in the field. Per ED note, EMS noted that patient appeared to be having severe diarrhea. Normally a/o x2 and able to hold a conversation per son. She has a relatively recent prolonged hospitalization for RLE cellulitis and OM from Aug 2016 - Nov 2016 during which hospital course was complicated by cardiac arrest with hx of trach, dysphagia s/p PEG, and NEHAL requiring HD. She was successfully decannulated and NEHAL had resolved no longer requiring HD prior to discharge.     In the ED:  T 96.7, 146/69, 82-102bpm, 100% O2. Leukocytosis to 14.5, no bandemia. Hyponatremic to 129, NEHAL with Cr to 7.78 with BUN of 173. Acidotic with pH of 7.14 on ABG, AG of 28, bicarb of 14, lactate of 1.3, and pCO2 of 46. CXR revealed complete collapse of L lung. 67 F with T2DM, afib on coumadin, HTN, HLD, CKD IV, and ICU admission in August 2016 with prolonged hospitalization where she was hospitalized for RLE cellulitis/osteomyelitis course c/b afib and PEA arrest requiring intubation s/p trach given prolonged wean, NEHAL requiring HD, and dysphagia requiring PEG tube placement  brought in by EMS after being found minimally responsive, and subsequently intubated. Per ED note, EMS noted that patient appeared to be having severe diarrhea and had poor access so IO site was made. Normally a/o x2 and able to hold a conversation per son. Per family by bedside, patient was seen by family the day before and she had no complaints and no changes in mental status.     In the ED:  T 96.7, 146/69, 82-102bpm, 100% O2. Leukocytosis to 14.5, no bandemia. Hyponatremic to 129, NEHAL with Cr to 7.78 with BUN of 173. Acidotic with pH of 7.14 on ABG, AG of 28, bicarb of 14, lactate of 1.3, and pCO2 of 46. CXR revealed opacification of L lung.

## 2017-05-25 NOTE — H&P ADULT. - RS GEN PE MLT RESP DETAILS PC
breath sounds equal/clear to auscultation bilaterally/airway patent/good air movement/normal/respirations non-labored

## 2017-05-25 NOTE — H&P ADULT. - PROBLEM SELECTOR PLAN 1
-Pt intubated in the field. L lung collapse on CXR. Concern for aspiration/mucus plugging. Unclear if pt was hypoxic at the time. ABG in the ED with no hypoxia/hypercarbia. Pt may require bronch in the future.  -Will start empiric zosyn for aspiration PNA. -Pt intubated in the field. L lung collapse on CXR. Concern for aspiration/mucus plugging. Unclear if pt was hypoxic at the time. ABG in the ED with no hypoxia/hypercarbia. Currently intubated, pending CT. May require bronchoscopy  -Will start empiric zosyn for aspiration PNA, vancomycin. -Pt intubated in the field. L lung collapse on CXR. Concern for aspiration/mucus plugging/sepsis/fluid overload. Unclear if pt was hypoxic at the time. ABG in the ED with no hypoxia/hypercarbia. Currently intubated, pending CT. May require bronchoscopy  -Will start empiric imipenem/ciprofloxacin based on past cultures

## 2017-05-25 NOTE — ED PROVIDER NOTE - DIAGNOSTIC INTERPRETATION
ETT in place, ?rotation vs. trach deviation, white out left lung concerning for aspiration, cannot exclude pneumothorax, discussed with radiology resident, will get CT and POCUS to eval.

## 2017-05-25 NOTE — ED PROVIDER NOTE - MEDICAL DECISION MAKING DETAILS
67 year old female, past medical history  DM T2 w/diabetic neuropathy (hx of right TMA), Afib on couamdin, HTN, HLD, CKD stage 4, severe lymphedema, who presents to the ED for respiratory failure. history of prolonged hospitalization where she was hospitalized for RLE cellulitis/osteomyelitis course c/b afib and PEA arrest requiring intubation s/p trach given prolonged wean, NEHAL requiring HD, and dysphagia requiring PEG tube placement. Critically ill. Unclear if found down. Patient intubated for respiratory failture with agonal breathing and altered mental status. Patient always had pulse as per EMS. Will evaluated patient for AMS etiology and resp failure. Intubated in field, tube at 25mm at lip. Obtain labwork., CBC, CMP, ABG, EKG, coags, trops, CT head, cervical, xray chest and abdomen for diarrhea, stool cultures, c-diff

## 2017-05-25 NOTE — ED PROVIDER NOTE - ATTENDING CONTRIBUTION TO CARE
ATTENDING MD:  I, Magno Ramos, personally have seen and examined this patient.  I have discussed all aspects of care with the resident physician. Resident note reviewed and agree on plan of care and except where noted.  See HPI, PE, and MDM for details.     intubated, minimally responsive to pain, localiziang, eyes not opening. Wheezing b/l worse on L-lung. Abd soft, mild diffuse teenderness. No obvious head trauma. Pupils equal, round. Heart rate regular. Raphael placed by nursing with trace urine output. Extremities atraumatic. No obvious rash.    MDM: AMS, on anticoagulatnts, XR suggstive of spspiration, renal injury with minimal output. will CT head. Profuse nonbloody diarrhea, will get C diff, CT abd/pelvis. Renal consult for possible dialysis.

## 2017-05-25 NOTE — H&P ADULT. - PSH
H/O tracheostomy    S/P Amputation of Lesser Toe  Rt 1-3 metatarsal  S/P amputation of lesser toe, right  2013 right great toe, 2nd and 3rd right toes  Status post insertion of percutaneous endoscopic gastrostomy (PEG) tube

## 2017-05-25 NOTE — H&P ADULT. - PROBLEM SELECTOR PLAN 2
-Documented CKD IV, baseline Cr ~2.4  -Pt will need urgent HD. Nephro consulted. Will place charlotte in the MICU. -Documented CKD IV, baseline Cr ~2.4  -Pt will need urgent HD. Nephro consulted. Will place shiley in the MICU.  -uremic, may have a component of pericarditis. EKG, echo to evaluate heart function

## 2017-05-25 NOTE — H&P ADULT. - PROBLEM SELECTOR PLAN 3
-Likely 2/2 to uremia and NEHAL. Plan for HD. Consider Bicarb gtt.   -Ketones negative and lactate wnl. -Likely 2/2 to uremia and NEHAL +/- sepsis. Plan for HD. Consider Bicarb gtt. -Likely 2/2 to uremia and NEHAL +/- sepsis. Plan for HD.

## 2017-05-25 NOTE — H&P ADULT. - LAB RESULTS AND INTERPRETATION
Hyponatremic, NEHAL with Cr> 7 and BUN > 100, Leukocytosis, anion gap metabolic acidosis, normal lactate, elevated troponin

## 2017-05-25 NOTE — ED PROCEDURE NOTE - PROCEDURE ADDITIONAL DETAILS
Follow up ct chest, ab pelvis
Bilateral lung sliding is present. Small R pleural effusion. Large L pleural effusion. Trace B lines on R. Complete collapse of R lung with diffuse hepatization of the pulmonary tissue. No pericardial effusion. Qualitatively good contractility. IVC plethoric and nonvariable. CT ordered for confirmatory study.

## 2017-05-25 NOTE — ED PROVIDER NOTE - CRITICAL CARE PROVIDED
consultation with other physicians/direct patient care (not related to procedure)/interpretation of diagnostic studies/documentation/consult w/ pt's family directly relating to pts condition/additional history taking

## 2017-05-25 NOTE — H&P ADULT. - ASSESSMENT
67 F with T2DM, afib on coumadin, HTN, HLD, CKD IV, and severe lymphedema BIBEMS after being found unresponsive s/p intubation in the field concerning aspiration/mucus plugging. Pt also found to have severe NEHAL on CKD and will require urgent HD.

## 2017-05-25 NOTE — ED PROVIDER NOTE - OBJECTIVE STATEMENT
67 year old female, past medical history  DM T2 w/diabetic neuropathy (hx of right TMA), Afib on couamdin, HTN, HLD, CKD stage 4, severe lymphedema, who presents to the ED for respiratory failure. As per EMS patient was unresponsive. Patient was moving extremities to pain, was nonverbal, her eyes remained open. The patient was intubated in the field with etomidate. As per son patient is normally axox2, and able to hold a conversation. As per EMS patient appears to be having severe diarrhea.     history of prolonged hospitalization where she was hospitalized for RLE cellulitis/osteomyelitis course c/b afib and PEA arrest requiring intubation s/p trach given prolonged wean, NEHAL requiring HD, and dysphagia requiring PEG tube placemen

## 2017-05-25 NOTE — ED ADULT NURSE NOTE - OBJECTIVE STATEMENT
68 yo female A&OX0 presents to the ED with the c/o being found unresponsive. According to EMS pt was found unresponsive, with agonal breathing. Pt currently lives at [facility], hx of DM type 2. Pt presents to the ED intubated, unresponsive to painful stimuli. Pt Opens and closes eyes, maex4 not on command. Pt vss. Temp at 96.2 rectal. Family at bedside. According to EMS pt has been having loose stools for the past week. Today pt presents to the ED with loose stools, + blood in stool.

## 2017-05-26 LAB
ALBUMIN SERPL ELPH-MCNC: 2.5 G/DL — LOW (ref 3.3–5)
ALP SERPL-CCNC: 151 U/L — HIGH (ref 40–120)
ALT FLD-CCNC: 37 U/L RC — SIGNIFICANT CHANGE UP (ref 10–45)
ANION GAP SERPL CALC-SCNC: 14 MMOL/L — SIGNIFICANT CHANGE UP (ref 5–17)
ANION GAP SERPL CALC-SCNC: 18 MMOL/L — HIGH (ref 5–17)
ANION GAP SERPL CALC-SCNC: 21 MMOL/L — HIGH (ref 5–17)
APTT BLD: 45.1 SEC — HIGH (ref 27.5–37.4)
AST SERPL-CCNC: 39 U/L — SIGNIFICANT CHANGE UP (ref 10–40)
BASOPHILS # BLD AUTO: 0 K/UL — SIGNIFICANT CHANGE UP (ref 0–0.2)
BASOPHILS NFR BLD AUTO: 0.1 % — SIGNIFICANT CHANGE UP (ref 0–2)
BILIRUB SERPL-MCNC: 0.3 MG/DL — SIGNIFICANT CHANGE UP (ref 0.2–1.2)
BUN SERPL-MCNC: 47 MG/DL — HIGH (ref 7–23)
BUN SERPL-MCNC: 80 MG/DL — HIGH (ref 7–23)
BUN SERPL-MCNC: 81 MG/DL — HIGH (ref 7–23)
CALCIUM SERPL-MCNC: 7.8 MG/DL — LOW (ref 8.4–10.5)
CALCIUM SERPL-MCNC: 7.8 MG/DL — LOW (ref 8.4–10.5)
CALCIUM SERPL-MCNC: 8.4 MG/DL — SIGNIFICANT CHANGE UP (ref 8.4–10.5)
CHLORIDE SERPL-SCNC: 95 MMOL/L — LOW (ref 96–108)
CHLORIDE SERPL-SCNC: 96 MMOL/L — SIGNIFICANT CHANGE UP (ref 96–108)
CHLORIDE SERPL-SCNC: 97 MMOL/L — SIGNIFICANT CHANGE UP (ref 96–108)
CK MB BLD-MCNC: 3.9 % — HIGH (ref 0–3.5)
CK MB BLD-MCNC: 8.3 % — HIGH (ref 0–3.5)
CK MB CFR SERPL CALC: 5.2 NG/ML — HIGH (ref 0–3.8)
CK MB CFR SERPL CALC: 5.7 NG/ML — HIGH (ref 0–3.8)
CK SERPL-CCNC: 133 U/L — SIGNIFICANT CHANGE UP (ref 25–170)
CK SERPL-CCNC: 69 U/L — SIGNIFICANT CHANGE UP (ref 25–170)
CO2 SERPL-SCNC: 18 MMOL/L — LOW (ref 22–31)
CO2 SERPL-SCNC: 21 MMOL/L — LOW (ref 22–31)
CO2 SERPL-SCNC: 26 MMOL/L — SIGNIFICANT CHANGE UP (ref 22–31)
CREAT SERPL-MCNC: 2.78 MG/DL — HIGH (ref 0.5–1.3)
CREAT SERPL-MCNC: 4.12 MG/DL — HIGH (ref 0.5–1.3)
CREAT SERPL-MCNC: 4.33 MG/DL — HIGH (ref 0.5–1.3)
CULTURE RESULTS: NO GROWTH — SIGNIFICANT CHANGE UP
EOSINOPHIL # BLD AUTO: 0 K/UL — SIGNIFICANT CHANGE UP (ref 0–0.5)
EOSINOPHIL NFR BLD AUTO: 0.3 % — SIGNIFICANT CHANGE UP (ref 0–6)
GAS PNL BLDA: SIGNIFICANT CHANGE UP
GLUCOSE SERPL-MCNC: 108 MG/DL — HIGH (ref 70–99)
GLUCOSE SERPL-MCNC: 129 MG/DL — HIGH (ref 70–99)
GLUCOSE SERPL-MCNC: 84 MG/DL — SIGNIFICANT CHANGE UP (ref 70–99)
GRAM STN FLD: SIGNIFICANT CHANGE UP
HAV IGM SER-ACNC: SIGNIFICANT CHANGE UP
HBV CORE IGM SER-ACNC: SIGNIFICANT CHANGE UP
HBV SURFACE AB SER-ACNC: <3 MIU/ML — LOW
HBV SURFACE AG SER-ACNC: SIGNIFICANT CHANGE UP
HCT VFR BLD CALC: 21.7 % — LOW (ref 34.5–45)
HCT VFR BLD CALC: 23.5 % — LOW (ref 34.5–45)
HCV AB S/CO SERPL IA: 0.19 S/CO — SIGNIFICANT CHANGE UP
HCV AB SERPL-IMP: SIGNIFICANT CHANGE UP
HGB BLD-MCNC: 7.3 G/DL — LOW (ref 11.5–15.5)
HGB BLD-MCNC: 8 G/DL — LOW (ref 11.5–15.5)
INR BLD: 2.29 RATIO — HIGH (ref 0.88–1.16)
LYMPHOCYTES # BLD AUTO: 0.6 K/UL — LOW (ref 1–3.3)
LYMPHOCYTES # BLD AUTO: 4.5 % — LOW (ref 13–44)
MAGNESIUM SERPL-MCNC: 2.2 MG/DL — SIGNIFICANT CHANGE UP (ref 1.6–2.6)
MCHC RBC-ENTMCNC: 27.8 PG — SIGNIFICANT CHANGE UP (ref 27–34)
MCHC RBC-ENTMCNC: 29 PG — SIGNIFICANT CHANGE UP (ref 27–34)
MCHC RBC-ENTMCNC: 33.6 GM/DL — SIGNIFICANT CHANGE UP (ref 32–36)
MCHC RBC-ENTMCNC: 34 GM/DL — SIGNIFICANT CHANGE UP (ref 32–36)
MCV RBC AUTO: 82.8 FL — SIGNIFICANT CHANGE UP (ref 80–100)
MCV RBC AUTO: 85.3 FL — SIGNIFICANT CHANGE UP (ref 80–100)
MONOCYTES # BLD AUTO: 0.5 K/UL — SIGNIFICANT CHANGE UP (ref 0–0.9)
MONOCYTES NFR BLD AUTO: 4.1 % — SIGNIFICANT CHANGE UP (ref 2–14)
NEUTROPHILS # BLD AUTO: 11 K/UL — HIGH (ref 1.8–7.4)
NEUTROPHILS NFR BLD AUTO: 91 % — HIGH (ref 43–77)
PHOSPHATE SERPL-MCNC: 3.5 MG/DL — SIGNIFICANT CHANGE UP (ref 2.5–4.5)
PLATELET # BLD AUTO: 332 K/UL — SIGNIFICANT CHANGE UP (ref 150–400)
PLATELET # BLD AUTO: 335 K/UL — SIGNIFICANT CHANGE UP (ref 150–400)
POTASSIUM SERPL-MCNC: 2.9 MMOL/L — CRITICAL LOW (ref 3.5–5.3)
POTASSIUM SERPL-MCNC: 3.6 MMOL/L — SIGNIFICANT CHANGE UP (ref 3.5–5.3)
POTASSIUM SERPL-MCNC: 4.5 MMOL/L — SIGNIFICANT CHANGE UP (ref 3.5–5.3)
POTASSIUM SERPL-SCNC: 2.9 MMOL/L — CRITICAL LOW (ref 3.5–5.3)
POTASSIUM SERPL-SCNC: 3.6 MMOL/L — SIGNIFICANT CHANGE UP (ref 3.5–5.3)
POTASSIUM SERPL-SCNC: 4.5 MMOL/L — SIGNIFICANT CHANGE UP (ref 3.5–5.3)
PROT SERPL-MCNC: 6.2 G/DL — SIGNIFICANT CHANGE UP (ref 6–8.3)
PROTHROM AB SERPL-ACNC: 25.2 SEC — HIGH (ref 9.8–12.7)
RBC # BLD: 2.62 M/UL — LOW (ref 3.8–5.2)
RBC # BLD: 2.75 M/UL — LOW (ref 3.8–5.2)
RBC # FLD: 14.1 % — SIGNIFICANT CHANGE UP (ref 10.3–14.5)
RBC # FLD: 14.5 % — SIGNIFICANT CHANGE UP (ref 10.3–14.5)
SODIUM SERPL-SCNC: 134 MMOL/L — LOW (ref 135–145)
SODIUM SERPL-SCNC: 135 MMOL/L — SIGNIFICANT CHANGE UP (ref 135–145)
SODIUM SERPL-SCNC: 137 MMOL/L — SIGNIFICANT CHANGE UP (ref 135–145)
SPECIMEN SOURCE: SIGNIFICANT CHANGE UP
SPECIMEN SOURCE: SIGNIFICANT CHANGE UP
TROPONIN T SERPL-MCNC: 0.26 NG/ML — HIGH (ref 0–0.06)
TROPONIN T SERPL-MCNC: 0.28 NG/ML — HIGH (ref 0–0.06)
WBC # BLD: 12.1 K/UL — HIGH (ref 3.8–10.5)
WBC # BLD: 13.7 K/UL — HIGH (ref 3.8–10.5)
WBC # FLD AUTO: 12.1 K/UL — HIGH (ref 3.8–10.5)
WBC # FLD AUTO: 13.7 K/UL — HIGH (ref 3.8–10.5)

## 2017-05-26 PROCEDURE — 93306 TTE W/DOPPLER COMPLETE: CPT | Mod: 26

## 2017-05-26 PROCEDURE — 99291 CRITICAL CARE FIRST HOUR: CPT

## 2017-05-26 RX ORDER — IPRATROPIUM/ALBUTEROL SULFATE 18-103MCG
3 AEROSOL WITH ADAPTER (GRAM) INHALATION EVERY 6 HOURS
Qty: 0 | Refills: 0 | Status: DISCONTINUED | OUTPATIENT
Start: 2017-05-26 | End: 2017-05-29

## 2017-05-26 RX ORDER — SENNA PLUS 8.6 MG/1
5 TABLET ORAL AT BEDTIME
Qty: 0 | Refills: 0 | Status: DISCONTINUED | OUTPATIENT
Start: 2017-05-26 | End: 2017-05-28

## 2017-05-26 RX ORDER — POLYETHYLENE GLYCOL 3350 17 G/17G
17 POWDER, FOR SOLUTION ORAL
Qty: 0 | Refills: 0 | Status: DISCONTINUED | OUTPATIENT
Start: 2017-05-26 | End: 2017-05-28

## 2017-05-26 RX ORDER — MIDODRINE HYDROCHLORIDE 2.5 MG/1
10 TABLET ORAL
Qty: 0 | Refills: 0 | Status: COMPLETED | OUTPATIENT
Start: 2017-05-26 | End: 2017-05-26

## 2017-05-26 RX ORDER — ASPIRIN/CALCIUM CARB/MAGNESIUM 324 MG
81 TABLET ORAL DAILY
Qty: 0 | Refills: 0 | Status: DISCONTINUED | OUTPATIENT
Start: 2017-05-26 | End: 2017-06-15

## 2017-05-26 RX ORDER — VANCOMYCIN HCL 1 G
1000 VIAL (EA) INTRAVENOUS ONCE
Qty: 0 | Refills: 0 | Status: COMPLETED | OUTPATIENT
Start: 2017-05-26 | End: 2017-05-26

## 2017-05-26 RX ORDER — SODIUM CHLORIDE 9 MG/ML
500 INJECTION INTRAMUSCULAR; INTRAVENOUS; SUBCUTANEOUS ONCE
Qty: 0 | Refills: 0 | Status: COMPLETED | OUTPATIENT
Start: 2017-05-26 | End: 2017-05-26

## 2017-05-26 RX ORDER — CALCIUM GLUCONATE 100 MG/ML
2 VIAL (ML) INTRAVENOUS ONCE
Qty: 0 | Refills: 0 | Status: COMPLETED | OUTPATIENT
Start: 2017-05-26 | End: 2017-05-26

## 2017-05-26 RX ORDER — POTASSIUM CHLORIDE 20 MEQ
40 PACKET (EA) ORAL EVERY 4 HOURS
Qty: 0 | Refills: 0 | Status: DISCONTINUED | OUTPATIENT
Start: 2017-05-26 | End: 2017-05-26

## 2017-05-26 RX ADMIN — PROPOFOL 2.1 MICROGRAM(S)/KG/MIN: 10 INJECTION, EMULSION INTRAVENOUS at 15:04

## 2017-05-26 RX ADMIN — Medication 3 MILLILITER(S): at 11:18

## 2017-05-26 RX ADMIN — HEPARIN SODIUM 5000 UNIT(S): 5000 INJECTION INTRAVENOUS; SUBCUTANEOUS at 21:10

## 2017-05-26 RX ADMIN — Medication 3 MILLILITER(S): at 18:16

## 2017-05-26 RX ADMIN — MIDODRINE HYDROCHLORIDE 10 MILLIGRAM(S): 2.5 TABLET ORAL at 16:41

## 2017-05-26 RX ADMIN — Medication 200 MILLIGRAM(S): at 20:41

## 2017-05-26 RX ADMIN — SENNA PLUS 5 MILLILITER(S): 8.6 TABLET ORAL at 21:10

## 2017-05-26 RX ADMIN — PANTOPRAZOLE SODIUM 40 MILLIGRAM(S): 20 TABLET, DELAYED RELEASE ORAL at 12:30

## 2017-05-26 RX ADMIN — Medication 3 MILLILITER(S): at 01:04

## 2017-05-26 RX ADMIN — HEPARIN SODIUM 5000 UNIT(S): 5000 INJECTION INTRAVENOUS; SUBCUTANEOUS at 15:04

## 2017-05-26 RX ADMIN — HEPARIN SODIUM 5000 UNIT(S): 5000 INJECTION INTRAVENOUS; SUBCUTANEOUS at 06:48

## 2017-05-26 RX ADMIN — POLYETHYLENE GLYCOL 3350 17 GRAM(S): 17 POWDER, FOR SOLUTION ORAL at 06:48

## 2017-05-26 RX ADMIN — Medication 81 MILLIGRAM(S): at 21:11

## 2017-05-26 RX ADMIN — POLYETHYLENE GLYCOL 3350 17 GRAM(S): 17 POWDER, FOR SOLUTION ORAL at 18:28

## 2017-05-26 RX ADMIN — SODIUM CHLORIDE 500 MILLILITER(S): 9 INJECTION INTRAMUSCULAR; INTRAVENOUS; SUBCUTANEOUS at 06:46

## 2017-05-26 RX ADMIN — Medication 250 MILLIGRAM(S): at 20:21

## 2017-05-26 RX ADMIN — IMIPENEM AND CILASTATIN 100 MILLIGRAM(S): 250; 250 INJECTION, POWDER, FOR SOLUTION INTRAVENOUS at 06:48

## 2017-05-26 RX ADMIN — Medication 3 MILLILITER(S): at 23:23

## 2017-05-26 RX ADMIN — Medication 200 GRAM(S): at 07:52

## 2017-05-26 RX ADMIN — Medication 3 MILLILITER(S): at 05:14

## 2017-05-26 RX ADMIN — Medication 40 MILLIEQUIVALENT(S): at 06:46

## 2017-05-26 NOTE — DIETITIAN INITIAL EVALUATION ADULT. - NS AS NUTRI INTERV ENTERAL NUTRITION
Recommend Nepro goal 50 cc/hr x 18 hrs to provide 1620 kcals, 73gm protein, 654 cc free water for 23 kcals/kg, 1.1 gm protein wt of 69.2 kg

## 2017-05-26 NOTE — DIETITIAN INITIAL EVALUATION ADULT. - ENERGY NEEDS
Ht: 63'  Wt: 152.3  BMI: 27 kg/m2   IBW: 115 (+/-10%)    132 % IBW  Edema:2+ generalized, 4+ B/L ankle, foot edema    Skin: no pressure injuries

## 2017-05-26 NOTE — DIETITIAN INITIAL EVALUATION ADULT. - OTHER INFO
Pt seen for: consult for EN PTA.   Adm dx: respiratory failure, AMS, NEHAL requiring emergent HD, intubated 5/25.    GI issues: no N/V/D  Last BM: none since adm     Food allergies: NKFA    Vit/supplement PTA: none

## 2017-05-26 NOTE — DIETITIAN INITIAL EVALUATION ADULT. - ADHERENCE
At [facility] pt was receiving 4 cans of Nepro/day and pudding or yogurt only at breakfast.

## 2017-05-26 NOTE — DIETITIAN INITIAL EVALUATION ADULT. - PROBLEM SELECTOR PLAN 2
-Documented CKD IV, baseline Cr ~2.4  -Pt will need urgent HD. Nephro consulted. Will place shiley in the MICU.  -uremic, may have a component of pericarditis. EKG, echo to evaluate heart function

## 2017-05-26 NOTE — DIETITIAN INITIAL EVALUATION ADULT. - PROBLEM SELECTOR PLAN 1
-Pt intubated in the field. L lung collapse on CXR. Concern for aspiration/mucus plugging/sepsis/fluid overload. Unclear if pt was hypoxic at the time. ABG in the ED with no hypoxia/hypercarbia. Currently intubated, pending CT. May require bronchoscopy  -Will start empiric imipenem/ciprofloxacin based on past cultures

## 2017-05-27 LAB
-  AMIKACIN: SIGNIFICANT CHANGE UP
-  AZTREONAM: SIGNIFICANT CHANGE UP
-  CEFEPIME: SIGNIFICANT CHANGE UP
-  CEFTAZIDIME: SIGNIFICANT CHANGE UP
-  CIPROFLOXACIN: SIGNIFICANT CHANGE UP
-  GENTAMICIN: SIGNIFICANT CHANGE UP
-  IMIPENEM: SIGNIFICANT CHANGE UP
-  LEVOFLOXACIN: SIGNIFICANT CHANGE UP
-  MEROPENEM: SIGNIFICANT CHANGE UP
-  PIPERACILLIN/TAZOBACTAM: SIGNIFICANT CHANGE UP
-  TOBRAMYCIN: SIGNIFICANT CHANGE UP
ALBUMIN SERPL ELPH-MCNC: 2.5 G/DL — LOW (ref 3.3–5)
ALP SERPL-CCNC: 134 U/L — HIGH (ref 40–120)
ALT FLD-CCNC: 29 U/L RC — SIGNIFICANT CHANGE UP (ref 10–45)
ANION GAP SERPL CALC-SCNC: 16 MMOL/L — SIGNIFICANT CHANGE UP (ref 5–17)
APTT BLD: 39.8 SEC — HIGH (ref 27.5–37.4)
AST SERPL-CCNC: 26 U/L — SIGNIFICANT CHANGE UP (ref 10–40)
BASE EXCESS BLDA CALC-SCNC: 2.5 MMOL/L — HIGH (ref -2–2)
BASOPHILS # BLD AUTO: 0 K/UL — SIGNIFICANT CHANGE UP (ref 0–0.2)
BASOPHILS NFR BLD AUTO: 0.3 % — SIGNIFICANT CHANGE UP (ref 0–2)
BILIRUB SERPL-MCNC: 0.3 MG/DL — SIGNIFICANT CHANGE UP (ref 0.2–1.2)
BUN SERPL-MCNC: 47 MG/DL — HIGH (ref 7–23)
CALCIUM SERPL-MCNC: 7.9 MG/DL — LOW (ref 8.4–10.5)
CHLORIDE SERPL-SCNC: 98 MMOL/L — SIGNIFICANT CHANGE UP (ref 96–108)
CO2 BLDA-SCNC: 27 MMOL/L — SIGNIFICANT CHANGE UP (ref 22–30)
CO2 SERPL-SCNC: 23 MMOL/L — SIGNIFICANT CHANGE UP (ref 22–31)
CREAT SERPL-MCNC: 3.01 MG/DL — HIGH (ref 0.5–1.3)
CULTURE RESULTS: SIGNIFICANT CHANGE UP
EOSINOPHIL # BLD AUTO: 0.3 K/UL — SIGNIFICANT CHANGE UP (ref 0–0.5)
EOSINOPHIL NFR BLD AUTO: 2.6 % — SIGNIFICANT CHANGE UP (ref 0–6)
GAS PNL BLDA: SIGNIFICANT CHANGE UP
GAS PNL BLDA: SIGNIFICANT CHANGE UP
GLUCOSE SERPL-MCNC: 78 MG/DL — SIGNIFICANT CHANGE UP (ref 70–99)
HCO3 BLDA-SCNC: 26 MMOL/L — SIGNIFICANT CHANGE UP (ref 21–29)
HCT VFR BLD CALC: 22 % — LOW (ref 34.5–45)
HGB BLD-MCNC: 7.4 G/DL — LOW (ref 11.5–15.5)
HOROWITZ INDEX BLDA+IHG-RTO: 40 — SIGNIFICANT CHANGE UP
INR BLD: 2.26 RATIO — HIGH (ref 0.88–1.16)
LYMPHOCYTES # BLD AUTO: 0.8 K/UL — LOW (ref 1–3.3)
LYMPHOCYTES # BLD AUTO: 7.6 % — LOW (ref 13–44)
MAGNESIUM SERPL-MCNC: 2.1 MG/DL — SIGNIFICANT CHANGE UP (ref 1.6–2.6)
MCHC RBC-ENTMCNC: 28.3 PG — SIGNIFICANT CHANGE UP (ref 27–34)
MCHC RBC-ENTMCNC: 33.6 GM/DL — SIGNIFICANT CHANGE UP (ref 32–36)
MCV RBC AUTO: 84.3 FL — SIGNIFICANT CHANGE UP (ref 80–100)
METHOD TYPE: SIGNIFICANT CHANGE UP
MONOCYTES # BLD AUTO: 0.7 K/UL — SIGNIFICANT CHANGE UP (ref 0–0.9)
MONOCYTES NFR BLD AUTO: 6.7 % — SIGNIFICANT CHANGE UP (ref 2–14)
NEUTROPHILS # BLD AUTO: 9.2 K/UL — HIGH (ref 1.8–7.4)
NEUTROPHILS NFR BLD AUTO: 82.8 % — HIGH (ref 43–77)
ORGANISM # SPEC MICROSCOPIC CNT: SIGNIFICANT CHANGE UP
ORGANISM # SPEC MICROSCOPIC CNT: SIGNIFICANT CHANGE UP
PCO2 BLDA: 36 MMHG — SIGNIFICANT CHANGE UP (ref 32–46)
PH BLDA: 7.47 — HIGH (ref 7.35–7.45)
PHOSPHATE SERPL-MCNC: 3.9 MG/DL — SIGNIFICANT CHANGE UP (ref 2.5–4.5)
PLATELET # BLD AUTO: 346 K/UL — SIGNIFICANT CHANGE UP (ref 150–400)
PO2 BLDA: 233 MMHG — HIGH (ref 74–108)
POTASSIUM SERPL-MCNC: 3.7 MMOL/L — SIGNIFICANT CHANGE UP (ref 3.5–5.3)
POTASSIUM SERPL-SCNC: 3.7 MMOL/L — SIGNIFICANT CHANGE UP (ref 3.5–5.3)
PROT SERPL-MCNC: 6.2 G/DL — SIGNIFICANT CHANGE UP (ref 6–8.3)
PROTHROM AB SERPL-ACNC: 25 SEC — HIGH (ref 9.8–12.7)
RBC # BLD: 2.61 M/UL — LOW (ref 3.8–5.2)
RBC # FLD: 14.5 % — SIGNIFICANT CHANGE UP (ref 10.3–14.5)
SAO2 % BLDA: 100 % — HIGH (ref 92–96)
SODIUM SERPL-SCNC: 137 MMOL/L — SIGNIFICANT CHANGE UP (ref 135–145)
SPECIMEN SOURCE: SIGNIFICANT CHANGE UP
WBC # BLD: 11.1 K/UL — HIGH (ref 3.8–10.5)
WBC # FLD AUTO: 11.1 K/UL — HIGH (ref 3.8–10.5)

## 2017-05-27 PROCEDURE — 99291 CRITICAL CARE FIRST HOUR: CPT

## 2017-05-27 PROCEDURE — 93010 ELECTROCARDIOGRAM REPORT: CPT

## 2017-05-27 RX ORDER — DEXTROSE 50 % IN WATER 50 %
50 SYRINGE (ML) INTRAVENOUS ONCE
Qty: 0 | Refills: 0 | Status: COMPLETED | OUTPATIENT
Start: 2017-05-27 | End: 2017-05-27

## 2017-05-27 RX ORDER — PIPERACILLIN AND TAZOBACTAM 4; .5 G/20ML; G/20ML
3.38 INJECTION, POWDER, LYOPHILIZED, FOR SOLUTION INTRAVENOUS EVERY 12 HOURS
Qty: 0 | Refills: 0 | Status: DISCONTINUED | OUTPATIENT
Start: 2017-05-27 | End: 2017-06-06

## 2017-05-27 RX ORDER — DEXTROSE 50 % IN WATER 50 %
50 SYRINGE (ML) INTRAVENOUS ONCE
Qty: 0 | Refills: 0 | Status: DISCONTINUED | OUTPATIENT
Start: 2017-05-27 | End: 2017-05-27

## 2017-05-27 RX ORDER — PIPERACILLIN AND TAZOBACTAM 4; .5 G/20ML; G/20ML
3.38 INJECTION, POWDER, LYOPHILIZED, FOR SOLUTION INTRAVENOUS ONCE
Qty: 0 | Refills: 0 | Status: COMPLETED | OUTPATIENT
Start: 2017-05-27 | End: 2017-05-27

## 2017-05-27 RX ADMIN — Medication 3 MILLILITER(S): at 05:12

## 2017-05-27 RX ADMIN — Medication 3 MILLILITER(S): at 11:30

## 2017-05-27 RX ADMIN — Medication 50 MILLILITER(S): at 13:15

## 2017-05-27 RX ADMIN — Medication 81 MILLIGRAM(S): at 12:25

## 2017-05-27 RX ADMIN — IMIPENEM AND CILASTATIN 100 MILLIGRAM(S): 250; 250 INJECTION, POWDER, FOR SOLUTION INTRAVENOUS at 00:07

## 2017-05-27 RX ADMIN — HEPARIN SODIUM 5000 UNIT(S): 5000 INJECTION INTRAVENOUS; SUBCUTANEOUS at 05:06

## 2017-05-27 RX ADMIN — HEPARIN SODIUM 5000 UNIT(S): 5000 INJECTION INTRAVENOUS; SUBCUTANEOUS at 15:06

## 2017-05-27 RX ADMIN — Medication 3 MILLILITER(S): at 17:59

## 2017-05-27 RX ADMIN — HEPARIN SODIUM 5000 UNIT(S): 5000 INJECTION INTRAVENOUS; SUBCUTANEOUS at 23:00

## 2017-05-27 RX ADMIN — Medication 50 MILLILITER(S): at 18:00

## 2017-05-27 RX ADMIN — IMIPENEM AND CILASTATIN 100 MILLIGRAM(S): 250; 250 INJECTION, POWDER, FOR SOLUTION INTRAVENOUS at 10:54

## 2017-05-27 RX ADMIN — Medication 50 MILLILITER(S): at 05:52

## 2017-05-27 RX ADMIN — Medication 3 MILLILITER(S): at 23:48

## 2017-05-27 RX ADMIN — POLYETHYLENE GLYCOL 3350 17 GRAM(S): 17 POWDER, FOR SOLUTION ORAL at 05:07

## 2017-05-27 RX ADMIN — PIPERACILLIN AND TAZOBACTAM 200 GRAM(S): 4; .5 INJECTION, POWDER, LYOPHILIZED, FOR SOLUTION INTRAVENOUS at 18:00

## 2017-05-27 NOTE — AIRWAY REMOVAL NOTE  ADULT & PEDS - ARTIFICAL AIRWAY REMOVAL COMMENTS
Written order for extubation verified. The patient was identified by full name and birth date compared to the identification band. Present during the procedure was Jono Jennings RN.

## 2017-05-28 LAB
ANION GAP SERPL CALC-SCNC: 14 MMOL/L — SIGNIFICANT CHANGE UP (ref 5–17)
ANION GAP SERPL CALC-SCNC: 17 MMOL/L — SIGNIFICANT CHANGE UP (ref 5–17)
BASOPHILS # BLD AUTO: 0 K/UL — SIGNIFICANT CHANGE UP (ref 0–0.2)
BASOPHILS NFR BLD AUTO: 0.3 % — SIGNIFICANT CHANGE UP (ref 0–2)
BUN SERPL-MCNC: 36 MG/DL — HIGH (ref 7–23)
BUN SERPL-MCNC: 54 MG/DL — HIGH (ref 7–23)
CALCIUM SERPL-MCNC: 7.7 MG/DL — LOW (ref 8.4–10.5)
CALCIUM SERPL-MCNC: 8 MG/DL — LOW (ref 8.4–10.5)
CHLORIDE SERPL-SCNC: 98 MMOL/L — SIGNIFICANT CHANGE UP (ref 96–108)
CHLORIDE SERPL-SCNC: 99 MMOL/L — SIGNIFICANT CHANGE UP (ref 96–108)
CK MB BLD-MCNC: 5.4 % — HIGH (ref 0–3.5)
CK MB CFR SERPL CALC: 6.8 NG/ML — HIGH (ref 0–3.8)
CK SERPL-CCNC: 125 U/L — SIGNIFICANT CHANGE UP (ref 25–170)
CO2 SERPL-SCNC: 22 MMOL/L — SIGNIFICANT CHANGE UP (ref 22–31)
CO2 SERPL-SCNC: 25 MMOL/L — SIGNIFICANT CHANGE UP (ref 22–31)
CREAT SERPL-MCNC: 2.55 MG/DL — HIGH (ref 0.5–1.3)
CREAT SERPL-MCNC: 3.5 MG/DL — HIGH (ref 0.5–1.3)
CULTURE RESULTS: NO GROWTH — SIGNIFICANT CHANGE UP
EOSINOPHIL # BLD AUTO: 0.4 K/UL — SIGNIFICANT CHANGE UP (ref 0–0.5)
EOSINOPHIL NFR BLD AUTO: 2.8 % — SIGNIFICANT CHANGE UP (ref 0–6)
GAS PNL BLDA: SIGNIFICANT CHANGE UP
GLUCOSE SERPL-MCNC: 103 MG/DL — HIGH (ref 70–99)
GLUCOSE SERPL-MCNC: 109 MG/DL — HIGH (ref 70–99)
HCT VFR BLD CALC: 22 % — LOW (ref 34.5–45)
HCT VFR BLD CALC: 22.4 % — LOW (ref 34.5–45)
HGB BLD-MCNC: 7.4 G/DL — LOW (ref 11.5–15.5)
HGB BLD-MCNC: 7.4 G/DL — LOW (ref 11.5–15.5)
INR BLD: 2.26 RATIO — HIGH (ref 0.88–1.16)
LYMPHOCYTES # BLD AUTO: 0.9 K/UL — LOW (ref 1–3.3)
LYMPHOCYTES # BLD AUTO: 6.9 % — LOW (ref 13–44)
MAGNESIUM SERPL-MCNC: 2 MG/DL — SIGNIFICANT CHANGE UP (ref 1.6–2.6)
MAGNESIUM SERPL-MCNC: 2.3 MG/DL — SIGNIFICANT CHANGE UP (ref 1.6–2.6)
MCHC RBC-ENTMCNC: 28.2 PG — SIGNIFICANT CHANGE UP (ref 27–34)
MCHC RBC-ENTMCNC: 28.8 PG — SIGNIFICANT CHANGE UP (ref 27–34)
MCHC RBC-ENTMCNC: 32.8 GM/DL — SIGNIFICANT CHANGE UP (ref 32–36)
MCHC RBC-ENTMCNC: 33.8 GM/DL — SIGNIFICANT CHANGE UP (ref 32–36)
MCV RBC AUTO: 85.1 FL — SIGNIFICANT CHANGE UP (ref 80–100)
MCV RBC AUTO: 85.9 FL — SIGNIFICANT CHANGE UP (ref 80–100)
MONOCYTES # BLD AUTO: 1 K/UL — HIGH (ref 0–0.9)
MONOCYTES NFR BLD AUTO: 7.6 % — SIGNIFICANT CHANGE UP (ref 2–14)
NEUTROPHILS # BLD AUTO: 10.8 K/UL — HIGH (ref 1.8–7.4)
NEUTROPHILS NFR BLD AUTO: 82.5 % — HIGH (ref 43–77)
PHOSPHATE SERPL-MCNC: 2.6 MG/DL — SIGNIFICANT CHANGE UP (ref 2.5–4.5)
PHOSPHATE SERPL-MCNC: 3.9 MG/DL — SIGNIFICANT CHANGE UP (ref 2.5–4.5)
PLATELET # BLD AUTO: 359 K/UL — SIGNIFICANT CHANGE UP (ref 150–400)
PLATELET # BLD AUTO: 389 K/UL — SIGNIFICANT CHANGE UP (ref 150–400)
POTASSIUM SERPL-MCNC: 3.7 MMOL/L — SIGNIFICANT CHANGE UP (ref 3.5–5.3)
POTASSIUM SERPL-MCNC: 3.8 MMOL/L — SIGNIFICANT CHANGE UP (ref 3.5–5.3)
POTASSIUM SERPL-SCNC: 3.7 MMOL/L — SIGNIFICANT CHANGE UP (ref 3.5–5.3)
POTASSIUM SERPL-SCNC: 3.8 MMOL/L — SIGNIFICANT CHANGE UP (ref 3.5–5.3)
PROTHROM AB SERPL-ACNC: 24.8 SEC — HIGH (ref 9.8–12.7)
RBC # BLD: 2.59 M/UL — LOW (ref 3.8–5.2)
RBC # BLD: 2.61 M/UL — LOW (ref 3.8–5.2)
RBC # FLD: 14.5 % — SIGNIFICANT CHANGE UP (ref 10.3–14.5)
RBC # FLD: 14.5 % — SIGNIFICANT CHANGE UP (ref 10.3–14.5)
SODIUM SERPL-SCNC: 137 MMOL/L — SIGNIFICANT CHANGE UP (ref 135–145)
SODIUM SERPL-SCNC: 138 MMOL/L — SIGNIFICANT CHANGE UP (ref 135–145)
SPECIMEN SOURCE: SIGNIFICANT CHANGE UP
TROPONIN T SERPL-MCNC: 0.32 NG/ML — HIGH (ref 0–0.06)
WBC # BLD: 13.1 K/UL — HIGH (ref 3.8–10.5)
WBC # BLD: 14.2 K/UL — HIGH (ref 3.8–10.5)
WBC # FLD AUTO: 13.1 K/UL — HIGH (ref 3.8–10.5)
WBC # FLD AUTO: 14.2 K/UL — HIGH (ref 3.8–10.5)

## 2017-05-28 PROCEDURE — 71010: CPT | Mod: 26

## 2017-05-28 RX ORDER — MIDODRINE HYDROCHLORIDE 2.5 MG/1
10 TABLET ORAL ONCE
Qty: 0 | Refills: 0 | Status: DISCONTINUED | OUTPATIENT
Start: 2017-05-28 | End: 2017-06-06

## 2017-05-28 RX ADMIN — PIPERACILLIN AND TAZOBACTAM 25 GRAM(S): 4; .5 INJECTION, POWDER, LYOPHILIZED, FOR SOLUTION INTRAVENOUS at 21:02

## 2017-05-28 RX ADMIN — Medication 81 MILLIGRAM(S): at 12:11

## 2017-05-28 RX ADMIN — Medication 3 MILLILITER(S): at 11:51

## 2017-05-28 RX ADMIN — HEPARIN SODIUM 5000 UNIT(S): 5000 INJECTION INTRAVENOUS; SUBCUTANEOUS at 21:33

## 2017-05-28 RX ADMIN — HEPARIN SODIUM 5000 UNIT(S): 5000 INJECTION INTRAVENOUS; SUBCUTANEOUS at 15:54

## 2017-05-28 RX ADMIN — PIPERACILLIN AND TAZOBACTAM 25 GRAM(S): 4; .5 INJECTION, POWDER, LYOPHILIZED, FOR SOLUTION INTRAVENOUS at 05:47

## 2017-05-28 RX ADMIN — Medication 3 MILLILITER(S): at 17:27

## 2017-05-28 RX ADMIN — HEPARIN SODIUM 5000 UNIT(S): 5000 INJECTION INTRAVENOUS; SUBCUTANEOUS at 05:47

## 2017-05-28 RX ADMIN — Medication 3 MILLILITER(S): at 23:51

## 2017-05-28 RX ADMIN — Medication 3 MILLILITER(S): at 05:08

## 2017-05-29 LAB
ALBUMIN SERPL ELPH-MCNC: 2.5 G/DL — LOW (ref 3.3–5)
ALP SERPL-CCNC: 140 U/L — HIGH (ref 40–120)
ALT FLD-CCNC: 21 U/L RC — SIGNIFICANT CHANGE UP (ref 10–45)
ANION GAP SERPL CALC-SCNC: 14 MMOL/L — SIGNIFICANT CHANGE UP (ref 5–17)
APTT BLD: 25.6 SEC — LOW (ref 27.5–37.4)
AST SERPL-CCNC: 28 U/L — SIGNIFICANT CHANGE UP (ref 10–40)
BASOPHILS # BLD AUTO: 0 K/UL — SIGNIFICANT CHANGE UP (ref 0–0.2)
BASOPHILS NFR BLD AUTO: 0.2 % — SIGNIFICANT CHANGE UP (ref 0–2)
BILIRUB SERPL-MCNC: 0.2 MG/DL — SIGNIFICANT CHANGE UP (ref 0.2–1.2)
BUN SERPL-MCNC: 40 MG/DL — HIGH (ref 7–23)
CALCIUM SERPL-MCNC: 7.8 MG/DL — LOW (ref 8.4–10.5)
CHLORIDE SERPL-SCNC: 99 MMOL/L — SIGNIFICANT CHANGE UP (ref 96–108)
CO2 SERPL-SCNC: 25 MMOL/L — SIGNIFICANT CHANGE UP (ref 22–31)
CREAT SERPL-MCNC: 2.76 MG/DL — HIGH (ref 0.5–1.3)
EOSINOPHIL # BLD AUTO: 0.6 K/UL — HIGH (ref 0–0.5)
EOSINOPHIL NFR BLD AUTO: 4.5 % — SIGNIFICANT CHANGE UP (ref 0–6)
GLUCOSE SERPL-MCNC: 119 MG/DL — HIGH (ref 70–99)
HCT VFR BLD CALC: 21.3 % — LOW (ref 34.5–45)
HGB BLD-MCNC: 7.1 G/DL — LOW (ref 11.5–15.5)
INR BLD: 1.94 RATIO — HIGH (ref 0.88–1.16)
LYMPHOCYTES # BLD AUTO: 1 K/UL — SIGNIFICANT CHANGE UP (ref 1–3.3)
LYMPHOCYTES # BLD AUTO: 7.3 % — LOW (ref 13–44)
MAGNESIUM SERPL-MCNC: 2.1 MG/DL — SIGNIFICANT CHANGE UP (ref 1.6–2.6)
MCHC RBC-ENTMCNC: 28.8 PG — SIGNIFICANT CHANGE UP (ref 27–34)
MCHC RBC-ENTMCNC: 33.2 GM/DL — SIGNIFICANT CHANGE UP (ref 32–36)
MCV RBC AUTO: 86.8 FL — SIGNIFICANT CHANGE UP (ref 80–100)
MONOCYTES # BLD AUTO: 0.8 K/UL — SIGNIFICANT CHANGE UP (ref 0–0.9)
MONOCYTES NFR BLD AUTO: 6 % — SIGNIFICANT CHANGE UP (ref 2–14)
NEUTROPHILS # BLD AUTO: 10.9 K/UL — HIGH (ref 1.8–7.4)
NEUTROPHILS NFR BLD AUTO: 82 % — HIGH (ref 43–77)
PHOSPHATE SERPL-MCNC: 2.6 MG/DL — SIGNIFICANT CHANGE UP (ref 2.5–4.5)
PLATELET # BLD AUTO: 384 K/UL — SIGNIFICANT CHANGE UP (ref 150–400)
POTASSIUM SERPL-MCNC: 3.6 MMOL/L — SIGNIFICANT CHANGE UP (ref 3.5–5.3)
POTASSIUM SERPL-SCNC: 3.6 MMOL/L — SIGNIFICANT CHANGE UP (ref 3.5–5.3)
PROT SERPL-MCNC: 6.4 G/DL — SIGNIFICANT CHANGE UP (ref 6–8.3)
PROTHROM AB SERPL-ACNC: 21.4 SEC — HIGH (ref 9.8–12.7)
RBC # BLD: 2.46 M/UL — LOW (ref 3.8–5.2)
RBC # FLD: 14.5 % — SIGNIFICANT CHANGE UP (ref 10.3–14.5)
SODIUM SERPL-SCNC: 138 MMOL/L — SIGNIFICANT CHANGE UP (ref 135–145)
WBC # BLD: 13.3 K/UL — HIGH (ref 3.8–10.5)
WBC # FLD AUTO: 13.3 K/UL — HIGH (ref 3.8–10.5)

## 2017-05-29 PROCEDURE — 99233 SBSQ HOSP IP/OBS HIGH 50: CPT | Mod: GC

## 2017-05-29 RX ORDER — DEXTROSE 50 % IN WATER 50 %
50 SYRINGE (ML) INTRAVENOUS ONCE
Qty: 0 | Refills: 0 | Status: COMPLETED | OUTPATIENT
Start: 2017-05-29 | End: 2017-05-29

## 2017-05-29 RX ORDER — ACETAMINOPHEN 500 MG
1000 TABLET ORAL ONCE
Qty: 0 | Refills: 0 | Status: COMPLETED | OUTPATIENT
Start: 2017-05-29 | End: 2017-05-29

## 2017-05-29 RX ADMIN — Medication 3 MILLILITER(S): at 11:04

## 2017-05-29 RX ADMIN — Medication 3 MILLILITER(S): at 05:12

## 2017-05-29 RX ADMIN — Medication 50 MILLILITER(S): at 22:00

## 2017-05-29 RX ADMIN — HEPARIN SODIUM 5000 UNIT(S): 5000 INJECTION INTRAVENOUS; SUBCUTANEOUS at 05:18

## 2017-05-29 RX ADMIN — Medication 81 MILLIGRAM(S): at 16:54

## 2017-05-29 RX ADMIN — PIPERACILLIN AND TAZOBACTAM 25 GRAM(S): 4; .5 INJECTION, POWDER, LYOPHILIZED, FOR SOLUTION INTRAVENOUS at 08:45

## 2017-05-29 RX ADMIN — Medication 400 MILLIGRAM(S): at 04:35

## 2017-05-29 RX ADMIN — HEPARIN SODIUM 5000 UNIT(S): 5000 INJECTION INTRAVENOUS; SUBCUTANEOUS at 14:53

## 2017-05-29 RX ADMIN — PIPERACILLIN AND TAZOBACTAM 25 GRAM(S): 4; .5 INJECTION, POWDER, LYOPHILIZED, FOR SOLUTION INTRAVENOUS at 22:36

## 2017-05-29 RX ADMIN — HEPARIN SODIUM 5000 UNIT(S): 5000 INJECTION INTRAVENOUS; SUBCUTANEOUS at 22:35

## 2017-05-30 LAB
ALBUMIN SERPL ELPH-MCNC: 2.8 G/DL — LOW (ref 3.3–5)
ALP SERPL-CCNC: 138 U/L — HIGH (ref 40–120)
ALT FLD-CCNC: 20 U/L RC — SIGNIFICANT CHANGE UP (ref 10–45)
ANION GAP SERPL CALC-SCNC: 16 MMOL/L — SIGNIFICANT CHANGE UP (ref 5–17)
APTT BLD: 36.4 SEC — SIGNIFICANT CHANGE UP (ref 27.5–37.4)
AST SERPL-CCNC: 24 U/L — SIGNIFICANT CHANGE UP (ref 10–40)
BILIRUB SERPL-MCNC: 0.3 MG/DL — SIGNIFICANT CHANGE UP (ref 0.2–1.2)
BUN SERPL-MCNC: 48 MG/DL — HIGH (ref 7–23)
CALCIUM SERPL-MCNC: 8.4 MG/DL — SIGNIFICANT CHANGE UP (ref 8.4–10.5)
CHLORIDE SERPL-SCNC: 99 MMOL/L — SIGNIFICANT CHANGE UP (ref 96–108)
CK MB CFR SERPL CALC: 3.1 NG/ML — SIGNIFICANT CHANGE UP (ref 0–3.8)
CK SERPL-CCNC: 51 U/L — SIGNIFICANT CHANGE UP (ref 25–170)
CO2 SERPL-SCNC: 25 MMOL/L — SIGNIFICANT CHANGE UP (ref 22–31)
CREAT SERPL-MCNC: 3.34 MG/DL — HIGH (ref 0.5–1.3)
CULTURE RESULTS: SIGNIFICANT CHANGE UP
CULTURE RESULTS: SIGNIFICANT CHANGE UP
GAS PNL BLDA: SIGNIFICANT CHANGE UP
GLUCOSE SERPL-MCNC: 79 MG/DL — SIGNIFICANT CHANGE UP (ref 70–99)
HCT VFR BLD CALC: 23.7 % — LOW (ref 34.5–45)
HGB BLD-MCNC: 7.7 G/DL — LOW (ref 11.5–15.5)
INR BLD: 1.78 RATIO — HIGH (ref 0.88–1.16)
MAGNESIUM SERPL-MCNC: 2.2 MG/DL — SIGNIFICANT CHANGE UP (ref 1.6–2.6)
MCHC RBC-ENTMCNC: 28.1 PG — SIGNIFICANT CHANGE UP (ref 27–34)
MCHC RBC-ENTMCNC: 32.3 GM/DL — SIGNIFICANT CHANGE UP (ref 32–36)
MCV RBC AUTO: 87 FL — SIGNIFICANT CHANGE UP (ref 80–100)
PHOSPHATE SERPL-MCNC: 3.4 MG/DL — SIGNIFICANT CHANGE UP (ref 2.5–4.5)
PLATELET # BLD AUTO: 400 K/UL — SIGNIFICANT CHANGE UP (ref 150–400)
POTASSIUM SERPL-MCNC: 3.9 MMOL/L — SIGNIFICANT CHANGE UP (ref 3.5–5.3)
POTASSIUM SERPL-SCNC: 3.9 MMOL/L — SIGNIFICANT CHANGE UP (ref 3.5–5.3)
PROT SERPL-MCNC: 7 G/DL — SIGNIFICANT CHANGE UP (ref 6–8.3)
PROTHROM AB SERPL-ACNC: 19.4 SEC — HIGH (ref 9.8–12.7)
RBC # BLD: 2.72 M/UL — LOW (ref 3.8–5.2)
RBC # FLD: 14.4 % — SIGNIFICANT CHANGE UP (ref 10.3–14.5)
SODIUM SERPL-SCNC: 140 MMOL/L — SIGNIFICANT CHANGE UP (ref 135–145)
SPECIMEN SOURCE: SIGNIFICANT CHANGE UP
SPECIMEN SOURCE: SIGNIFICANT CHANGE UP
TROPONIN T SERPL-MCNC: 0.26 NG/ML — HIGH (ref 0–0.06)
WBC # BLD: 19.1 K/UL — HIGH (ref 3.8–10.5)
WBC # FLD AUTO: 19.1 K/UL — HIGH (ref 3.8–10.5)

## 2017-05-30 PROCEDURE — 99233 SBSQ HOSP IP/OBS HIGH 50: CPT | Mod: GC

## 2017-05-30 PROCEDURE — 99024 POSTOP FOLLOW-UP VISIT: CPT

## 2017-05-30 PROCEDURE — 93010 ELECTROCARDIOGRAM REPORT: CPT

## 2017-05-30 PROCEDURE — 49452 REPLACE G-J TUBE PERC: CPT

## 2017-05-30 RX ORDER — DEXTROSE 50 % IN WATER 50 %
25 SYRINGE (ML) INTRAVENOUS ONCE
Qty: 0 | Refills: 0 | Status: COMPLETED | OUTPATIENT
Start: 2017-05-30 | End: 2017-05-30

## 2017-05-30 RX ORDER — SODIUM CHLORIDE 9 MG/ML
1000 INJECTION, SOLUTION INTRAVENOUS
Qty: 0 | Refills: 0 | Status: DISCONTINUED | OUTPATIENT
Start: 2017-05-30 | End: 2017-05-30

## 2017-05-30 RX ORDER — DEXTROSE 50 % IN WATER 50 %
50 SYRINGE (ML) INTRAVENOUS ONCE
Qty: 0 | Refills: 0 | Status: COMPLETED | OUTPATIENT
Start: 2017-05-30 | End: 2017-05-30

## 2017-05-30 RX ORDER — DEXTROSE 10 % IN WATER 10 %
1000 INTRAVENOUS SOLUTION INTRAVENOUS
Qty: 0 | Refills: 0 | Status: DISCONTINUED | OUTPATIENT
Start: 2017-05-30 | End: 2017-05-30

## 2017-05-30 RX ORDER — DEXTROSE 50 % IN WATER 50 %
50 SYRINGE (ML) INTRAVENOUS ONCE
Qty: 0 | Refills: 0 | Status: DISCONTINUED | OUTPATIENT
Start: 2017-05-30 | End: 2017-05-30

## 2017-05-30 RX ORDER — ACETAMINOPHEN 500 MG
650 TABLET ORAL ONCE
Qty: 0 | Refills: 0 | Status: COMPLETED | OUTPATIENT
Start: 2017-05-30 | End: 2017-05-30

## 2017-05-30 RX ORDER — ERYTHROPOIETIN 10000 [IU]/ML
10000 INJECTION, SOLUTION INTRAVENOUS; SUBCUTANEOUS ONCE
Qty: 0 | Refills: 0 | Status: COMPLETED | OUTPATIENT
Start: 2017-05-30 | End: 2017-05-30

## 2017-05-30 RX ADMIN — HEPARIN SODIUM 5000 UNIT(S): 5000 INJECTION INTRAVENOUS; SUBCUTANEOUS at 06:38

## 2017-05-30 RX ADMIN — HEPARIN SODIUM 5000 UNIT(S): 5000 INJECTION INTRAVENOUS; SUBCUTANEOUS at 21:11

## 2017-05-30 RX ADMIN — SODIUM CHLORIDE 50 MILLILITER(S): 9 INJECTION, SOLUTION INTRAVENOUS at 07:20

## 2017-05-30 RX ADMIN — Medication 50 MILLILITER(S): at 04:25

## 2017-05-30 RX ADMIN — PIPERACILLIN AND TAZOBACTAM 25 GRAM(S): 4; .5 INJECTION, POWDER, LYOPHILIZED, FOR SOLUTION INTRAVENOUS at 11:00

## 2017-05-30 RX ADMIN — Medication 650 MILLIGRAM(S): at 23:28

## 2017-05-30 RX ADMIN — PIPERACILLIN AND TAZOBACTAM 25 GRAM(S): 4; .5 INJECTION, POWDER, LYOPHILIZED, FOR SOLUTION INTRAVENOUS at 22:28

## 2017-05-30 RX ADMIN — Medication 650 MILLIGRAM(S): at 22:28

## 2017-05-30 RX ADMIN — ERYTHROPOIETIN 10000 UNIT(S): 10000 INJECTION, SOLUTION INTRAVENOUS; SUBCUTANEOUS at 18:49

## 2017-05-30 RX ADMIN — Medication 50 MILLILITER(S): at 11:14

## 2017-05-30 RX ADMIN — Medication 25 MILLILITER(S): at 07:17

## 2017-05-30 RX ADMIN — Medication 81 MILLIGRAM(S): at 16:59

## 2017-05-30 RX ADMIN — Medication 50 MILLILITER(S): at 01:09

## 2017-05-30 RX ADMIN — HEPARIN SODIUM 5000 UNIT(S): 5000 INJECTION INTRAVENOUS; SUBCUTANEOUS at 16:59

## 2017-05-30 NOTE — PHYSICAL THERAPY INITIAL EVALUATION ADULT - PERTINENT HX OF CURRENT PROBLEM, REHAB EVAL
67 F Hx T2DM, afib, HTN, HLD, CKD IV, ICU admission 8/2016 with prolonged hospitalization where she was hospitalized for RLE cellulitis/osteomyelitis course c/b afib and PEA arrest requiring intubation s/p trach given prolonged wean, NEHAL requiring HD, and dysphagia requiring PEG tube placement BIBA for unresponsiveness, and intubated and noted to have severe diarrhea. Hosp Course: CXR +left lung complete opacification; AXR negative; CT chest +mod pleural effusion with pulm edema; CTH negative;

## 2017-05-30 NOTE — PHYSICAL THERAPY INITIAL EVALUATION ADULT - ADDITIONAL COMMENTS
Hosp Course continued: BCx negative; Sputem Cx negative; on IV abx and diuresis; Hosp Course continued: BCx negative; Sputem Cx negative; on IV abx and diuresis;    Patient resides in private home with 6 steps to enter; Has been ambulating at rehab with use of RW

## 2017-05-31 LAB
ANION GAP SERPL CALC-SCNC: 13 MMOL/L — SIGNIFICANT CHANGE UP (ref 5–17)
APTT BLD: 45 SEC — HIGH (ref 27.5–37.4)
BUN SERPL-MCNC: 26 MG/DL — HIGH (ref 7–23)
CALCIUM SERPL-MCNC: 8.2 MG/DL — LOW (ref 8.4–10.5)
CHLORIDE SERPL-SCNC: 101 MMOL/L — SIGNIFICANT CHANGE UP (ref 96–108)
CO2 SERPL-SCNC: 28 MMOL/L — SIGNIFICANT CHANGE UP (ref 22–31)
CREAT SERPL-MCNC: 2.36 MG/DL — HIGH (ref 0.5–1.3)
GAS PNL BLDA: SIGNIFICANT CHANGE UP
GAS PNL BLDA: SIGNIFICANT CHANGE UP
GLUCOSE SERPL-MCNC: 108 MG/DL — HIGH (ref 70–99)
HCT VFR BLD CALC: 22.8 % — LOW (ref 34.5–45)
HCT VFR BLD CALC: 23.6 % — LOW (ref 34.5–45)
HGB BLD-MCNC: 7.1 G/DL — LOW (ref 11.5–15.5)
HGB BLD-MCNC: 7.6 G/DL — LOW (ref 11.5–15.5)
INR BLD: 2.11 RATIO — HIGH (ref 0.88–1.16)
MAGNESIUM SERPL-MCNC: 2 MG/DL — SIGNIFICANT CHANGE UP (ref 1.6–2.6)
MCHC RBC-ENTMCNC: 27.3 PG — SIGNIFICANT CHANGE UP (ref 27–34)
MCHC RBC-ENTMCNC: 28.5 PG — SIGNIFICANT CHANGE UP (ref 27–34)
MCHC RBC-ENTMCNC: 31.2 GM/DL — LOW (ref 32–36)
MCHC RBC-ENTMCNC: 32.4 GM/DL — SIGNIFICANT CHANGE UP (ref 32–36)
MCV RBC AUTO: 87.6 FL — SIGNIFICANT CHANGE UP (ref 80–100)
MCV RBC AUTO: 87.9 FL — SIGNIFICANT CHANGE UP (ref 80–100)
PHOSPHATE SERPL-MCNC: 1.9 MG/DL — LOW (ref 2.5–4.5)
PLATELET # BLD AUTO: 375 K/UL — SIGNIFICANT CHANGE UP (ref 150–400)
PLATELET # BLD AUTO: 389 K/UL — SIGNIFICANT CHANGE UP (ref 150–400)
POTASSIUM SERPL-MCNC: 3.4 MMOL/L — LOW (ref 3.5–5.3)
POTASSIUM SERPL-SCNC: 3.4 MMOL/L — LOW (ref 3.5–5.3)
PROTHROM AB SERPL-ACNC: 23.3 SEC — HIGH (ref 9.8–12.7)
RBC # BLD: 2.6 M/UL — LOW (ref 3.8–5.2)
RBC # BLD: 2.68 M/UL — LOW (ref 3.8–5.2)
RBC # FLD: 14.2 % — SIGNIFICANT CHANGE UP (ref 10.3–14.5)
RBC # FLD: 14.3 % — SIGNIFICANT CHANGE UP (ref 10.3–14.5)
SODIUM SERPL-SCNC: 142 MMOL/L — SIGNIFICANT CHANGE UP (ref 135–145)
WBC # BLD: 15.8 K/UL — HIGH (ref 3.8–10.5)
WBC # BLD: 19.9 K/UL — HIGH (ref 3.8–10.5)
WBC # FLD AUTO: 15.8 K/UL — HIGH (ref 3.8–10.5)
WBC # FLD AUTO: 19.9 K/UL — HIGH (ref 3.8–10.5)

## 2017-05-31 PROCEDURE — 99231 SBSQ HOSP IP/OBS SF/LOW 25: CPT

## 2017-05-31 PROCEDURE — 71010: CPT | Mod: 26

## 2017-05-31 PROCEDURE — 99233 SBSQ HOSP IP/OBS HIGH 50: CPT | Mod: GC

## 2017-05-31 RX ORDER — ACETAMINOPHEN 500 MG
650 TABLET ORAL EVERY 6 HOURS
Qty: 0 | Refills: 0 | Status: DISCONTINUED | OUTPATIENT
Start: 2017-05-31 | End: 2017-05-31

## 2017-05-31 RX ORDER — ACETAMINOPHEN 500 MG
650 TABLET ORAL EVERY 6 HOURS
Qty: 0 | Refills: 0 | Status: DISCONTINUED | OUTPATIENT
Start: 2017-05-31 | End: 2017-06-15

## 2017-05-31 RX ORDER — POTASSIUM PHOSPHATE, MONOBASIC POTASSIUM PHOSPHATE, DIBASIC 236; 224 MG/ML; MG/ML
15 INJECTION, SOLUTION INTRAVENOUS ONCE
Qty: 0 | Refills: 0 | Status: COMPLETED | OUTPATIENT
Start: 2017-05-31 | End: 2017-05-31

## 2017-05-31 RX ORDER — INSULIN LISPRO 100/ML
VIAL (ML) SUBCUTANEOUS EVERY 4 HOURS
Qty: 0 | Refills: 0 | Status: DISCONTINUED | OUTPATIENT
Start: 2017-05-31 | End: 2017-06-10

## 2017-05-31 RX ORDER — ACETAMINOPHEN 500 MG
1000 TABLET ORAL ONCE
Qty: 0 | Refills: 0 | Status: COMPLETED | OUTPATIENT
Start: 2017-05-31 | End: 2017-05-31

## 2017-05-31 RX ORDER — POTASSIUM CHLORIDE 20 MEQ
20 PACKET (EA) ORAL ONCE
Qty: 0 | Refills: 0 | Status: COMPLETED | OUTPATIENT
Start: 2017-05-31 | End: 2017-05-31

## 2017-05-31 RX ADMIN — POTASSIUM PHOSPHATE, MONOBASIC POTASSIUM PHOSPHATE, DIBASIC 62.5 MILLIMOLE(S): 236; 224 INJECTION, SOLUTION INTRAVENOUS at 04:00

## 2017-05-31 RX ADMIN — HEPARIN SODIUM 5000 UNIT(S): 5000 INJECTION INTRAVENOUS; SUBCUTANEOUS at 22:37

## 2017-05-31 RX ADMIN — Medication 81 MILLIGRAM(S): at 13:45

## 2017-05-31 RX ADMIN — PIPERACILLIN AND TAZOBACTAM 25 GRAM(S): 4; .5 INJECTION, POWDER, LYOPHILIZED, FOR SOLUTION INTRAVENOUS at 22:38

## 2017-05-31 RX ADMIN — Medication 1000 MILLIGRAM(S): at 08:22

## 2017-05-31 RX ADMIN — Medication 20 MILLIEQUIVALENT(S): at 04:01

## 2017-05-31 RX ADMIN — HEPARIN SODIUM 5000 UNIT(S): 5000 INJECTION INTRAVENOUS; SUBCUTANEOUS at 05:05

## 2017-05-31 RX ADMIN — HEPARIN SODIUM 5000 UNIT(S): 5000 INJECTION INTRAVENOUS; SUBCUTANEOUS at 13:51

## 2017-05-31 RX ADMIN — PIPERACILLIN AND TAZOBACTAM 25 GRAM(S): 4; .5 INJECTION, POWDER, LYOPHILIZED, FOR SOLUTION INTRAVENOUS at 10:49

## 2017-05-31 RX ADMIN — Medication 400 MILLIGRAM(S): at 07:58

## 2017-05-31 RX ADMIN — Medication 650 MILLIGRAM(S): at 20:15

## 2017-06-01 LAB
ALBUMIN SERPL ELPH-MCNC: 2.9 G/DL — LOW (ref 3.3–5)
ALP SERPL-CCNC: 130 U/L — HIGH (ref 40–120)
ALT FLD-CCNC: 8 U/L — LOW (ref 10–45)
ANION GAP SERPL CALC-SCNC: 14 MMOL/L — SIGNIFICANT CHANGE UP (ref 5–17)
APTT BLD: 31.2 SEC — SIGNIFICANT CHANGE UP (ref 27.5–37.4)
AST SERPL-CCNC: 20 U/L — SIGNIFICANT CHANGE UP (ref 10–40)
BASOPHILS # BLD AUTO: 0.06 K/UL — SIGNIFICANT CHANGE UP (ref 0–0.2)
BASOPHILS NFR BLD AUTO: 0.4 % — SIGNIFICANT CHANGE UP (ref 0–2)
BILIRUB SERPL-MCNC: 0.3 MG/DL — SIGNIFICANT CHANGE UP (ref 0.2–1.2)
BUN SERPL-MCNC: 20 MG/DL — SIGNIFICANT CHANGE UP (ref 7–23)
CALCIUM SERPL-MCNC: 8.5 MG/DL — SIGNIFICANT CHANGE UP (ref 8.4–10.5)
CHLORIDE SERPL-SCNC: 95 MMOL/L — LOW (ref 96–108)
CO2 SERPL-SCNC: 30 MMOL/L — SIGNIFICANT CHANGE UP (ref 22–31)
CREAT SERPL-MCNC: 2.15 MG/DL — HIGH (ref 0.5–1.3)
EOSINOPHIL # BLD AUTO: 0.86 K/UL — HIGH (ref 0–0.5)
EOSINOPHIL NFR BLD AUTO: 6 % — SIGNIFICANT CHANGE UP (ref 0–6)
GAS PNL BLDA: SIGNIFICANT CHANGE UP
GLUCOSE SERPL-MCNC: 78 MG/DL — SIGNIFICANT CHANGE UP (ref 70–99)
HCT VFR BLD CALC: 27 % — LOW (ref 34.5–45)
HGB BLD-MCNC: 8.2 G/DL — LOW (ref 11.5–15.5)
IMM GRANULOCYTES NFR BLD AUTO: 0.4 % — SIGNIFICANT CHANGE UP (ref 0–1.5)
INR BLD: 1.47 RATIO — HIGH (ref 0.88–1.16)
LYMPHOCYTES # BLD AUTO: 1.69 K/UL — SIGNIFICANT CHANGE UP (ref 1–3.3)
LYMPHOCYTES # BLD AUTO: 11.9 % — LOW (ref 13–44)
MAGNESIUM SERPL-MCNC: 2.1 MG/DL — SIGNIFICANT CHANGE UP (ref 1.6–2.6)
MCHC RBC-ENTMCNC: 26.3 PG — LOW (ref 27–34)
MCHC RBC-ENTMCNC: 30.4 GM/DL — LOW (ref 32–36)
MCV RBC AUTO: 86.5 FL — SIGNIFICANT CHANGE UP (ref 80–100)
MONOCYTES # BLD AUTO: 0.92 K/UL — HIGH (ref 0–0.9)
MONOCYTES NFR BLD AUTO: 6.5 % — SIGNIFICANT CHANGE UP (ref 2–14)
NEUTROPHILS # BLD AUTO: 10.66 K/UL — HIGH (ref 1.8–7.4)
NEUTROPHILS NFR BLD AUTO: 74.8 % — SIGNIFICANT CHANGE UP (ref 43–77)
PHOSPHATE SERPL-MCNC: 2.3 MG/DL — LOW (ref 2.5–4.5)
PLATELET # BLD AUTO: 522 K/UL — HIGH (ref 150–400)
POTASSIUM SERPL-MCNC: 3.7 MMOL/L — SIGNIFICANT CHANGE UP (ref 3.5–5.3)
POTASSIUM SERPL-SCNC: 3.7 MMOL/L — SIGNIFICANT CHANGE UP (ref 3.5–5.3)
PROT SERPL-MCNC: 7.6 G/DL — SIGNIFICANT CHANGE UP (ref 6–8.3)
PROTHROM AB SERPL-ACNC: 16.8 SEC — HIGH (ref 10–13.1)
RBC # BLD: 3.12 M/UL — LOW (ref 3.8–5.2)
RBC # FLD: 15.7 % — HIGH (ref 10.3–14.5)
SODIUM SERPL-SCNC: 139 MMOL/L — SIGNIFICANT CHANGE UP (ref 135–145)
WBC # BLD: 14.25 K/UL — HIGH (ref 3.8–10.5)
WBC # FLD AUTO: 14.25 K/UL — HIGH (ref 3.8–10.5)

## 2017-06-01 PROCEDURE — 99222 1ST HOSP IP/OBS MODERATE 55: CPT

## 2017-06-01 PROCEDURE — 71010: CPT | Mod: 26

## 2017-06-01 RX ADMIN — HEPARIN SODIUM 5000 UNIT(S): 5000 INJECTION INTRAVENOUS; SUBCUTANEOUS at 14:25

## 2017-06-01 RX ADMIN — PIPERACILLIN AND TAZOBACTAM 25 GRAM(S): 4; .5 INJECTION, POWDER, LYOPHILIZED, FOR SOLUTION INTRAVENOUS at 23:52

## 2017-06-01 RX ADMIN — PIPERACILLIN AND TAZOBACTAM 25 GRAM(S): 4; .5 INJECTION, POWDER, LYOPHILIZED, FOR SOLUTION INTRAVENOUS at 11:27

## 2017-06-01 RX ADMIN — Medication 81 MILLIGRAM(S): at 11:27

## 2017-06-01 RX ADMIN — Medication 650 MILLIGRAM(S): at 21:30

## 2017-06-01 RX ADMIN — HEPARIN SODIUM 5000 UNIT(S): 5000 INJECTION INTRAVENOUS; SUBCUTANEOUS at 05:27

## 2017-06-01 RX ADMIN — Medication 650 MILLIGRAM(S): at 15:45

## 2017-06-01 RX ADMIN — HEPARIN SODIUM 5000 UNIT(S): 5000 INJECTION INTRAVENOUS; SUBCUTANEOUS at 21:30

## 2017-06-01 RX ADMIN — Medication 650 MILLIGRAM(S): at 22:00

## 2017-06-01 RX ADMIN — Medication 650 MILLIGRAM(S): at 15:25

## 2017-06-02 LAB
ANION GAP SERPL CALC-SCNC: 15 MMOL/L — SIGNIFICANT CHANGE UP (ref 5–17)
ANISOCYTOSIS BLD QL: SLIGHT — SIGNIFICANT CHANGE UP
BASOPHILS # BLD AUTO: 0.06 K/UL — SIGNIFICANT CHANGE UP (ref 0–0.2)
BASOPHILS NFR BLD AUTO: 0.4 % — SIGNIFICANT CHANGE UP (ref 0–2)
BUN SERPL-MCNC: 21 MG/DL — SIGNIFICANT CHANGE UP (ref 7–23)
CALCIUM SERPL-MCNC: 8.4 MG/DL — SIGNIFICANT CHANGE UP (ref 8.4–10.5)
CHLORIDE SERPL-SCNC: 95 MMOL/L — LOW (ref 96–108)
CO2 SERPL-SCNC: 29 MMOL/L — SIGNIFICANT CHANGE UP (ref 22–31)
CREAT SERPL-MCNC: 2.42 MG/DL — HIGH (ref 0.5–1.3)
EOSINOPHIL # BLD AUTO: 0.77 K/UL — HIGH (ref 0–0.5)
EOSINOPHIL NFR BLD AUTO: 5.3 % — SIGNIFICANT CHANGE UP (ref 0–6)
GLUCOSE SERPL-MCNC: 78 MG/DL — SIGNIFICANT CHANGE UP (ref 70–99)
HCT VFR BLD CALC: 24.5 % — LOW (ref 34.5–45)
HGB BLD-MCNC: 7.4 G/DL — LOW (ref 11.5–15.5)
HYPOCHROMIA BLD QL: SLIGHT — SIGNIFICANT CHANGE UP
IMM GRANULOCYTES NFR BLD AUTO: 0.3 % — SIGNIFICANT CHANGE UP (ref 0–1.5)
INR BLD: 1.29 RATIO — HIGH (ref 0.88–1.16)
LYMPHOCYTES # BLD AUTO: 1.67 K/UL — SIGNIFICANT CHANGE UP (ref 1–3.3)
LYMPHOCYTES # BLD AUTO: 11.5 % — LOW (ref 13–44)
MANUAL SMEAR VERIFICATION: SIGNIFICANT CHANGE UP
MCHC RBC-ENTMCNC: 26.1 PG — LOW (ref 27–34)
MCHC RBC-ENTMCNC: 30.2 GM/DL — LOW (ref 32–36)
MCV RBC AUTO: 86.3 FL — SIGNIFICANT CHANGE UP (ref 80–100)
MONOCYTES # BLD AUTO: 0.78 K/UL — SIGNIFICANT CHANGE UP (ref 0–0.9)
MONOCYTES NFR BLD AUTO: 5.4 % — SIGNIFICANT CHANGE UP (ref 2–14)
NEUTROPHILS # BLD AUTO: 11.17 K/UL — HIGH (ref 1.8–7.4)
NEUTROPHILS NFR BLD AUTO: 77.1 % — HIGH (ref 43–77)
PLAT MORPH BLD: NORMAL — SIGNIFICANT CHANGE UP
PLATELET # BLD AUTO: 567 K/UL — HIGH (ref 150–400)
POTASSIUM SERPL-MCNC: 3.8 MMOL/L — SIGNIFICANT CHANGE UP (ref 3.5–5.3)
POTASSIUM SERPL-SCNC: 3.8 MMOL/L — SIGNIFICANT CHANGE UP (ref 3.5–5.3)
PROTHROM AB SERPL-ACNC: 14.7 SEC — HIGH (ref 10–13.1)
RBC # BLD: 2.84 M/UL — LOW (ref 3.8–5.2)
RBC # FLD: 15.3 % — HIGH (ref 10.3–14.5)
RBC BLD AUTO: ABNORMAL
SODIUM SERPL-SCNC: 139 MMOL/L — SIGNIFICANT CHANGE UP (ref 135–145)
WBC # BLD: 14.5 K/UL — HIGH (ref 3.8–10.5)
WBC # FLD AUTO: 14.5 K/UL — HIGH (ref 3.8–10.5)

## 2017-06-02 PROCEDURE — 77001 FLUOROGUIDE FOR VEIN DEVICE: CPT | Mod: 26

## 2017-06-02 PROCEDURE — 76937 US GUIDE VASCULAR ACCESS: CPT | Mod: 26

## 2017-06-02 PROCEDURE — 99232 SBSQ HOSP IP/OBS MODERATE 35: CPT

## 2017-06-02 PROCEDURE — 36558 INSERT TUNNELED CV CATH: CPT

## 2017-06-02 RX ORDER — SODIUM CHLORIDE 9 MG/ML
1000 INJECTION, SOLUTION INTRAVENOUS
Qty: 0 | Refills: 0 | Status: COMPLETED | OUTPATIENT
Start: 2017-06-02 | End: 2017-06-02

## 2017-06-02 RX ADMIN — SODIUM CHLORIDE 30 MILLILITER(S): 9 INJECTION, SOLUTION INTRAVENOUS at 11:14

## 2017-06-02 RX ADMIN — HEPARIN SODIUM 5000 UNIT(S): 5000 INJECTION INTRAVENOUS; SUBCUTANEOUS at 21:09

## 2017-06-02 RX ADMIN — SODIUM CHLORIDE 30 MILLILITER(S): 9 INJECTION, SOLUTION INTRAVENOUS at 22:23

## 2017-06-02 RX ADMIN — HEPARIN SODIUM 5000 UNIT(S): 5000 INJECTION INTRAVENOUS; SUBCUTANEOUS at 06:30

## 2017-06-02 RX ADMIN — Medication 81 MILLIGRAM(S): at 11:15

## 2017-06-02 RX ADMIN — PIPERACILLIN AND TAZOBACTAM 25 GRAM(S): 4; .5 INJECTION, POWDER, LYOPHILIZED, FOR SOLUTION INTRAVENOUS at 11:14

## 2017-06-03 LAB
ANION GAP SERPL CALC-SCNC: 16 MMOL/L — SIGNIFICANT CHANGE UP (ref 5–17)
BASOPHILS # BLD AUTO: 0.05 K/UL — SIGNIFICANT CHANGE UP (ref 0–0.2)
BASOPHILS NFR BLD AUTO: 0.4 % — SIGNIFICANT CHANGE UP (ref 0–2)
BUN SERPL-MCNC: 17 MG/DL — SIGNIFICANT CHANGE UP (ref 7–23)
CALCIUM SERPL-MCNC: 8.9 MG/DL — SIGNIFICANT CHANGE UP (ref 8.4–10.5)
CHLORIDE SERPL-SCNC: 101 MMOL/L — SIGNIFICANT CHANGE UP (ref 96–108)
CO2 SERPL-SCNC: 25 MMOL/L — SIGNIFICANT CHANGE UP (ref 22–31)
CREAT SERPL-MCNC: 2.35 MG/DL — HIGH (ref 0.5–1.3)
CULTURE RESULTS: SIGNIFICANT CHANGE UP
CULTURE RESULTS: SIGNIFICANT CHANGE UP
EOSINOPHIL # BLD AUTO: 0.42 K/UL — SIGNIFICANT CHANGE UP (ref 0–0.5)
EOSINOPHIL NFR BLD AUTO: 3.4 % — SIGNIFICANT CHANGE UP (ref 0–6)
GLUCOSE SERPL-MCNC: 87 MG/DL — SIGNIFICANT CHANGE UP (ref 70–99)
HCT VFR BLD CALC: 25.8 % — LOW (ref 34.5–45)
HGB BLD-MCNC: 7.8 G/DL — LOW (ref 11.5–15.5)
IMM GRANULOCYTES NFR BLD AUTO: 0.3 % — SIGNIFICANT CHANGE UP (ref 0–1.5)
LYMPHOCYTES # BLD AUTO: 1.39 K/UL — SIGNIFICANT CHANGE UP (ref 1–3.3)
LYMPHOCYTES # BLD AUTO: 11.2 % — LOW (ref 13–44)
MCHC RBC-ENTMCNC: 26.1 PG — LOW (ref 27–34)
MCHC RBC-ENTMCNC: 30.2 GM/DL — LOW (ref 32–36)
MCV RBC AUTO: 86.3 FL — SIGNIFICANT CHANGE UP (ref 80–100)
MONOCYTES # BLD AUTO: 1.01 K/UL — HIGH (ref 0–0.9)
MONOCYTES NFR BLD AUTO: 8.2 % — SIGNIFICANT CHANGE UP (ref 2–14)
NEUTROPHILS # BLD AUTO: 9.45 K/UL — HIGH (ref 1.8–7.4)
NEUTROPHILS NFR BLD AUTO: 76.5 % — SIGNIFICANT CHANGE UP (ref 43–77)
PLATELET # BLD AUTO: 596 K/UL — HIGH (ref 150–400)
POTASSIUM SERPL-MCNC: 3.9 MMOL/L — SIGNIFICANT CHANGE UP (ref 3.5–5.3)
POTASSIUM SERPL-SCNC: 3.9 MMOL/L — SIGNIFICANT CHANGE UP (ref 3.5–5.3)
RBC # BLD: 2.99 M/UL — LOW (ref 3.8–5.2)
RBC # FLD: 15.3 % — HIGH (ref 10.3–14.5)
SODIUM SERPL-SCNC: 142 MMOL/L — SIGNIFICANT CHANGE UP (ref 135–145)
SPECIMEN SOURCE: SIGNIFICANT CHANGE UP
SPECIMEN SOURCE: SIGNIFICANT CHANGE UP
WBC # BLD: 12.36 K/UL — HIGH (ref 3.8–10.5)
WBC # FLD AUTO: 12.36 K/UL — HIGH (ref 3.8–10.5)

## 2017-06-03 RX ADMIN — HEPARIN SODIUM 5000 UNIT(S): 5000 INJECTION INTRAVENOUS; SUBCUTANEOUS at 21:53

## 2017-06-03 RX ADMIN — Medication 650 MILLIGRAM(S): at 21:51

## 2017-06-03 RX ADMIN — Medication 650 MILLIGRAM(S): at 22:51

## 2017-06-03 RX ADMIN — HEPARIN SODIUM 5000 UNIT(S): 5000 INJECTION INTRAVENOUS; SUBCUTANEOUS at 13:20

## 2017-06-03 RX ADMIN — PIPERACILLIN AND TAZOBACTAM 25 GRAM(S): 4; .5 INJECTION, POWDER, LYOPHILIZED, FOR SOLUTION INTRAVENOUS at 11:29

## 2017-06-03 RX ADMIN — Medication 650 MILLIGRAM(S): at 13:50

## 2017-06-03 RX ADMIN — Medication 650 MILLIGRAM(S): at 13:20

## 2017-06-03 RX ADMIN — HEPARIN SODIUM 5000 UNIT(S): 5000 INJECTION INTRAVENOUS; SUBCUTANEOUS at 06:02

## 2017-06-03 RX ADMIN — Medication 81 MILLIGRAM(S): at 11:29

## 2017-06-04 LAB
ANION GAP SERPL CALC-SCNC: 17 MMOL/L — SIGNIFICANT CHANGE UP (ref 5–17)
BUN SERPL-MCNC: 19 MG/DL — SIGNIFICANT CHANGE UP (ref 7–23)
CALCIUM SERPL-MCNC: 9.1 MG/DL — SIGNIFICANT CHANGE UP (ref 8.4–10.5)
CHLORIDE SERPL-SCNC: 98 MMOL/L — SIGNIFICANT CHANGE UP (ref 96–108)
CO2 SERPL-SCNC: 25 MMOL/L — SIGNIFICANT CHANGE UP (ref 22–31)
CREAT SERPL-MCNC: 2.15 MG/DL — HIGH (ref 0.5–1.3)
GLUCOSE SERPL-MCNC: 108 MG/DL — HIGH (ref 70–99)
HCT VFR BLD CALC: 27.6 % — LOW (ref 34.5–45)
HGB BLD-MCNC: 8.7 G/DL — LOW (ref 11.5–15.5)
MCHC RBC-ENTMCNC: 27.4 PG — SIGNIFICANT CHANGE UP (ref 27–34)
MCHC RBC-ENTMCNC: 31.5 GM/DL — LOW (ref 32–36)
MCV RBC AUTO: 87.1 FL — SIGNIFICANT CHANGE UP (ref 80–100)
PLATELET # BLD AUTO: 569 K/UL — HIGH (ref 150–400)
POTASSIUM SERPL-MCNC: 3.8 MMOL/L — SIGNIFICANT CHANGE UP (ref 3.5–5.3)
POTASSIUM SERPL-SCNC: 3.8 MMOL/L — SIGNIFICANT CHANGE UP (ref 3.5–5.3)
RBC # BLD: 3.17 M/UL — LOW (ref 3.8–5.2)
RBC # FLD: 15.4 % — HIGH (ref 10.3–14.5)
SODIUM SERPL-SCNC: 140 MMOL/L — SIGNIFICANT CHANGE UP (ref 135–145)
WBC # BLD: 12.9 K/UL — HIGH (ref 3.8–10.5)
WBC # FLD AUTO: 12.9 K/UL — HIGH (ref 3.8–10.5)

## 2017-06-04 PROCEDURE — 71010: CPT | Mod: 26

## 2017-06-04 RX ADMIN — HEPARIN SODIUM 5000 UNIT(S): 5000 INJECTION INTRAVENOUS; SUBCUTANEOUS at 21:41

## 2017-06-04 RX ADMIN — PIPERACILLIN AND TAZOBACTAM 25 GRAM(S): 4; .5 INJECTION, POWDER, LYOPHILIZED, FOR SOLUTION INTRAVENOUS at 10:55

## 2017-06-04 RX ADMIN — PIPERACILLIN AND TAZOBACTAM 25 GRAM(S): 4; .5 INJECTION, POWDER, LYOPHILIZED, FOR SOLUTION INTRAVENOUS at 00:02

## 2017-06-04 RX ADMIN — Medication 650 MILLIGRAM(S): at 17:42

## 2017-06-04 RX ADMIN — HEPARIN SODIUM 5000 UNIT(S): 5000 INJECTION INTRAVENOUS; SUBCUTANEOUS at 06:23

## 2017-06-04 RX ADMIN — Medication 81 MILLIGRAM(S): at 10:55

## 2017-06-04 RX ADMIN — HEPARIN SODIUM 5000 UNIT(S): 5000 INJECTION INTRAVENOUS; SUBCUTANEOUS at 15:03

## 2017-06-04 RX ADMIN — PIPERACILLIN AND TAZOBACTAM 25 GRAM(S): 4; .5 INJECTION, POWDER, LYOPHILIZED, FOR SOLUTION INTRAVENOUS at 23:11

## 2017-06-04 RX ADMIN — Medication 650 MILLIGRAM(S): at 16:46

## 2017-06-05 ENCOUNTER — TRANSCRIPTION ENCOUNTER (OUTPATIENT)
Age: 67
End: 2017-06-05

## 2017-06-05 LAB
BUN SERPL-MCNC: 36 MG/DL — HIGH (ref 7–23)
CALCIUM SERPL-MCNC: 9.7 MG/DL — SIGNIFICANT CHANGE UP (ref 8.4–10.5)
CHLORIDE SERPL-SCNC: 95 MMOL/L — LOW (ref 96–108)
CO2 SERPL-SCNC: 20 MMOL/L — LOW (ref 22–31)
CREAT SERPL-MCNC: 3.42 MG/DL — HIGH (ref 0.5–1.3)
GLUCOSE SERPL-MCNC: 93 MG/DL — SIGNIFICANT CHANGE UP (ref 70–99)
HCT VFR BLD CALC: 29.4 % — LOW (ref 34.5–45)
HGB BLD-MCNC: 9.1 G/DL — LOW (ref 11.5–15.5)
MCHC RBC-ENTMCNC: 26.7 PG — LOW (ref 27–34)
MCHC RBC-ENTMCNC: 31 GM/DL — LOW (ref 32–36)
MCV RBC AUTO: 86.2 FL — SIGNIFICANT CHANGE UP (ref 80–100)
PLATELET # BLD AUTO: 611 K/UL — HIGH (ref 150–400)
POTASSIUM SERPL-MCNC: 4.2 MMOL/L — SIGNIFICANT CHANGE UP (ref 3.5–5.3)
POTASSIUM SERPL-SCNC: 4.2 MMOL/L — SIGNIFICANT CHANGE UP (ref 3.5–5.3)
RBC # BLD: 3.41 M/UL — LOW (ref 3.8–5.2)
RBC # FLD: 15.7 % — HIGH (ref 10.3–14.5)
SODIUM SERPL-SCNC: 138 MMOL/L — SIGNIFICANT CHANGE UP (ref 135–145)
WBC # BLD: 17.1 K/UL — HIGH (ref 3.8–10.5)
WBC # FLD AUTO: 17.1 K/UL — HIGH (ref 3.8–10.5)

## 2017-06-05 PROCEDURE — 99231 SBSQ HOSP IP/OBS SF/LOW 25: CPT

## 2017-06-05 PROCEDURE — 99232 SBSQ HOSP IP/OBS MODERATE 35: CPT

## 2017-06-05 RX ORDER — METOCLOPRAMIDE HCL 10 MG
10 TABLET ORAL EVERY 8 HOURS
Qty: 0 | Refills: 0 | Status: DISCONTINUED | OUTPATIENT
Start: 2017-06-05 | End: 2017-06-10

## 2017-06-05 RX ADMIN — Medication 81 MILLIGRAM(S): at 11:10

## 2017-06-05 RX ADMIN — PIPERACILLIN AND TAZOBACTAM 25 GRAM(S): 4; .5 INJECTION, POWDER, LYOPHILIZED, FOR SOLUTION INTRAVENOUS at 11:10

## 2017-06-05 RX ADMIN — Medication 10 MILLIGRAM(S): at 22:46

## 2017-06-05 RX ADMIN — PIPERACILLIN AND TAZOBACTAM 25 GRAM(S): 4; .5 INJECTION, POWDER, LYOPHILIZED, FOR SOLUTION INTRAVENOUS at 22:46

## 2017-06-05 RX ADMIN — HEPARIN SODIUM 5000 UNIT(S): 5000 INJECTION INTRAVENOUS; SUBCUTANEOUS at 22:46

## 2017-06-05 RX ADMIN — HEPARIN SODIUM 5000 UNIT(S): 5000 INJECTION INTRAVENOUS; SUBCUTANEOUS at 14:04

## 2017-06-05 RX ADMIN — HEPARIN SODIUM 5000 UNIT(S): 5000 INJECTION INTRAVENOUS; SUBCUTANEOUS at 06:17

## 2017-06-05 RX ADMIN — Medication 10 MILLIGRAM(S): at 14:04

## 2017-06-05 NOTE — DISCHARGE NOTE ADULT - CARE PROVIDER_API CALL
Bryan Schmid), Medicine  [address] [facility] [address]  [place] [address]  Phone: [phone]  Fax: [phone]    Pat Gold), Internal Medicine; Nephrology  [address] [facility] [address]  [place] [address]  Phone: [phone]  Fax: [phone]

## 2017-06-05 NOTE — DISCHARGE NOTE ADULT - HOSPITAL COURSE
*** TO BE COMPLETED BY ATTENDING PHYSICIAN *** pt was found unresponsive  and was intubated in the field , pt was succesfully extubated   and tolerated NC O2 ,  pt also was founf  to have CKD 5 and perma cath was placed  for HD TIW . pt also with gastropasis and  gastrostromy tube was inserted

## 2017-06-05 NOTE — DISCHARGE NOTE ADULT - MEDICATION SUMMARY - MEDICATIONS TO STOP TAKING
I will STOP taking the medications listed below when I get home from the hospital:    hydrALAZINE 25 mg oral tablet  -- 1 tab(s) by gastrostomy tube 2 times a day    hydrALAZINE 25 mg oral tablet  -- 1 tab(s) by mouth 2 times a day

## 2017-06-05 NOTE — DISCHARGE NOTE ADULT - INSTRUCTIONS
J-tube feeds: Nepro @ 50cc/h x18 hrs daily Jejunostomy tube feeds: Nepro @ 50cc/h x18 hrs daily left arm incision intact. J/G tube patent, right CW permacath

## 2017-06-05 NOTE — DISCHARGE NOTE ADULT - PLAN OF CARE
Able to breathe comfortably with adequate gas exchange Condition improved since extubation.  Continue with supplemental oxygen as needed.  Follow up with Pulmonary or your primary healthcare provider after discharge from Rehab. You are now on hemodialysis treatment via left side Perma-cath (hemodialysis catheter)  You have been getting HD on Tu-Thur-Sat  You will continue HD as an out-patient; follow up with Nephrology while in Rehab and after dc from Rehab. No anticoagulation at this time.  Follow up with your primary healthcare provider after discharge from Rehab.  Atrial fibrillation is the most common heart rhythm problem & has the risk of stroke & heart attack  It helps if you control your blood pressure, not drink more than 1-2 alcohol drinks per day, cut down on caffeine, getting treatment for over active thyroid gland, & getting exercise  Call your doctor if you feel your heart racing or beating unusually, chest tightness or pain, lightheaded, faint, shortness of breath especially with exercise  It is important to take your heart medication as prescribed. Condition stable; no intervention needed at this time.  Follow up with your primary healthcare provider after discharge from Rehab. Condition resolved. Continue with coumadin and have your doctor check INR   Follow up with your primary healthcare provider after discharge from Rehab.  Atrial fibrillation is the most common heart rhythm problem & has the risk of stroke & heart attack  It helps if you control your blood pressure, not drink more than 1-2 alcohol drinks per day, cut down on caffeine, getting treatment for over active thyroid gland, & getting exercise  Call your doctor if you feel your heart racing or beating unusually, chest tightness or pain, lightheaded, faint, shortness of breath especially with exercise  It is important to take your heart medication as prescribed. No anticoagulation at this time as per cardiology  Follow up with your primary healthcare provider after discharge from Rehab.  Atrial fibrillation is the most common heart rhythm problem & has the risk of stroke & heart attack  It helps if you control your blood pressure, not drink more than 1-2 alcohol drinks per day, cut down on caffeine, getting treatment for over active thyroid gland, & getting exercise  Call your doctor if you feel your heart racing or beating unusually, chest tightness or pain, lightheaded, faint, shortness of breath especially with exercise  It is important to take your heart medication as prescribed.

## 2017-06-05 NOTE — DISCHARGE NOTE ADULT - MEDICATION SUMMARY - MEDICATIONS TO CHANGE
I will SWITCH the dose or number of times a day I take the medications listed below when I get home from the hospital:    warfarin 5 mg oral tablet  -- 1 tab(s) by gastrostomy tube once a day    gabapentin 300 mg oral capsule  -- 1 cap(s) by gastrostomy tube once a day    gabapentin 300 mg oral tablet  --  by mouth once a day

## 2017-06-05 NOTE — DISCHARGE NOTE ADULT - CARE PLAN
Principal Discharge DX:	Acute respiratory failure, unspecified whether with hypoxia or hypercapnia  Goal:	Able to breathe comfortably with adequate gas exchange  Instructions for follow-up, activity and diet:	Condition improved since extubation.  Continue with supplemental oxygen as needed.  Follow up with Pulmonary or your primary healthcare provider after discharge from Rehab.  Secondary Diagnosis:	Renal failure  Instructions for follow-up, activity and diet:	You are now on hemodialysis treatment via left side Perma-cath (hemodialysis catheter)  You have been getting HD on Tu-Thur-Sat  You will continue HD as an out-patient; follow up with Nephrology while in Rehab and after dc from Rehab.  Secondary Diagnosis:	Atrial fibrillation  Instructions for follow-up, activity and diet:	No anticoagulation at this time.  Follow up with your primary healthcare provider after discharge from Rehab.  Atrial fibrillation is the most common heart rhythm problem & has the risk of stroke & heart attack  It helps if you control your blood pressure, not drink more than 1-2 alcohol drinks per day, cut down on caffeine, getting treatment for over active thyroid gland, & getting exercise  Call your doctor if you feel your heart racing or beating unusually, chest tightness or pain, lightheaded, faint, shortness of breath especially with exercise  It is important to take your heart medication as prescribed.  Secondary Diagnosis:	Pleural effusion  Instructions for follow-up, activity and diet:	Condition stable; no intervention needed at this time.  Follow up with your primary healthcare provider after discharge from Rehab.  Secondary Diagnosis:	Metabolic acidosis, increased anion gap (IAG)  Instructions for follow-up, activity and diet:	Condition resolved. Principal Discharge DX:	Acute respiratory failure, unspecified whether with hypoxia or hypercapnia  Goal:	Able to breathe comfortably with adequate gas exchange  Instructions for follow-up, activity and diet:	Condition improved since extubation.  Continue with supplemental oxygen as needed.  Follow up with Pulmonary or your primary healthcare provider after discharge from Rehab.  Secondary Diagnosis:	Renal failure  Instructions for follow-up, activity and diet:	You are now on hemodialysis treatment via left side Perma-cath (hemodialysis catheter)  You have been getting HD on Tu-Thur-Sat  You will continue HD as an out-patient; follow up with Nephrology while in Rehab and after dc from Rehab.  Secondary Diagnosis:	Atrial fibrillation  Instructions for follow-up, activity and diet:	Continue with coumadin and have your doctor check INR   Follow up with your primary healthcare provider after discharge from Rehab.  Atrial fibrillation is the most common heart rhythm problem & has the risk of stroke & heart attack  It helps if you control your blood pressure, not drink more than 1-2 alcohol drinks per day, cut down on caffeine, getting treatment for over active thyroid gland, & getting exercise  Call your doctor if you feel your heart racing or beating unusually, chest tightness or pain, lightheaded, faint, shortness of breath especially with exercise  It is important to take your heart medication as prescribed.  Secondary Diagnosis:	Pleural effusion  Instructions for follow-up, activity and diet:	Condition stable; no intervention needed at this time.  Follow up with your primary healthcare provider after discharge from Rehab.  Secondary Diagnosis:	Metabolic acidosis, increased anion gap (IAG)  Instructions for follow-up, activity and diet:	Condition resolved. Principal Discharge DX:	Acute respiratory failure, unspecified whether with hypoxia or hypercapnia  Goal:	Able to breathe comfortably with adequate gas exchange  Instructions for follow-up, activity and diet:	Condition improved since extubation.  Continue with supplemental oxygen as needed.  Follow up with Pulmonary or your primary healthcare provider after discharge from Rehab.  Secondary Diagnosis:	Renal failure  Instructions for follow-up, activity and diet:	You are now on hemodialysis treatment via left side Perma-cath (hemodialysis catheter)  You have been getting HD on Tu-Thur-Sat  You will continue HD as an out-patient; follow up with Nephrology while in Rehab and after dc from Rehab.  Secondary Diagnosis:	Atrial fibrillation  Instructions for follow-up, activity and diet:	No anticoagulation at this time as per cardiology  Follow up with your primary healthcare provider after discharge from Rehab.  Atrial fibrillation is the most common heart rhythm problem & has the risk of stroke & heart attack  It helps if you control your blood pressure, not drink more than 1-2 alcohol drinks per day, cut down on caffeine, getting treatment for over active thyroid gland, & getting exercise  Call your doctor if you feel your heart racing or beating unusually, chest tightness or pain, lightheaded, faint, shortness of breath especially with exercise  It is important to take your heart medication as prescribed.  Secondary Diagnosis:	Pleural effusion  Instructions for follow-up, activity and diet:	Condition stable; no intervention needed at this time.  Follow up with your primary healthcare provider after discharge from Rehab.  Secondary Diagnosis:	Metabolic acidosis, increased anion gap (IAG)  Instructions for follow-up, activity and diet:	Condition resolved.

## 2017-06-05 NOTE — DISCHARGE NOTE ADULT - ADDITIONAL INSTRUCTIONS
Follow up with Renal and your primary healthcare provider(s) as instructed above. Follow up with Renal and your primary healthcare provider(s) as instructed above.  Please monitor INR  Last dialysis session was on 6/19 Follow up with Renal and your primary healthcare provider(s) as instructed above.  Last dialysis session was on 6/19

## 2017-06-05 NOTE — DISCHARGE NOTE ADULT - MEDICATION SUMMARY - MEDICATIONS TO TAKE
I will START or STAY ON the medications listed below when I get home from the hospital:    aspirin 81 mg oral tablet, chewable  -- 1 tab(s) by mouth once a day  -- Indication: For blood thinner    warfarin 2.5 mg oral tablet  -- 1 tab(s) by gastrostomy tube once a day  -- Indication: For Atrial fibrillation    gabapentin 100 mg oral capsule  -- 1 cap(s) by mouth once a day (at bedtime)  -- Indication: For Pain    insulin lispro 100 units/mL subcutaneous solution  --  subcutaneous every 6 hours; 1 Unit(s) if Glucose 151 - 200  2 Unit(s) if Glucose 201 - 250  3 Unit(s) if Glucose 251 - 300  4 Unit(s) if Glucose 301 - 350  5 Unit(s) if Glucose 351 - 400  6 Unit(s) if Glucose Greater Than 400  -- Indication: For Diabetes mellitus of other type with circulatory complication, without long-term current use of insulin    metoclopramide 10 mg oral tablet  -- 1 tab(s) by mouth every 8 hours  -- Indication: For nausea    atorvastatin 40 mg oral tablet  -- 1 tab(s) by mouth once a day  -- Indication: For Cholesterol    midodrine 2.5 mg oral tablet  -- 1 tab(s) by mouth once a day  -- Indication: For Low blood pressure    Artificial Tears ophthalmic solution  --  to each affected eye every 6 hours, As Needed  -- Indication: For Dry eyes

## 2017-06-06 LAB
ANION GAP SERPL CALC-SCNC: 19 MMOL/L — HIGH (ref 5–17)
BUN SERPL-MCNC: 55 MG/DL — HIGH (ref 7–23)
CALCIUM SERPL-MCNC: 9.1 MG/DL — SIGNIFICANT CHANGE UP (ref 8.4–10.5)
CHLORIDE SERPL-SCNC: 95 MMOL/L — LOW (ref 96–108)
CO2 SERPL-SCNC: 27 MMOL/L — SIGNIFICANT CHANGE UP (ref 22–31)
CREAT SERPL-MCNC: 4.88 MG/DL — HIGH (ref 0.5–1.3)
GLUCOSE SERPL-MCNC: 106 MG/DL — HIGH (ref 70–99)
HCT VFR BLD CALC: 24.3 % — LOW (ref 34.5–45)
HGB BLD-MCNC: 7.4 G/DL — LOW (ref 11.5–15.5)
MCHC RBC-ENTMCNC: 26.1 PG — LOW (ref 27–34)
MCHC RBC-ENTMCNC: 30.5 GM/DL — LOW (ref 32–36)
MCV RBC AUTO: 85.6 FL — SIGNIFICANT CHANGE UP (ref 80–100)
PLATELET # BLD AUTO: 627 K/UL — HIGH (ref 150–400)
POTASSIUM SERPL-MCNC: 3.7 MMOL/L — SIGNIFICANT CHANGE UP (ref 3.5–5.3)
POTASSIUM SERPL-SCNC: 3.7 MMOL/L — SIGNIFICANT CHANGE UP (ref 3.5–5.3)
RBC # BLD: 2.84 M/UL — LOW (ref 3.8–5.2)
RBC # FLD: 15.9 % — HIGH (ref 10.3–14.5)
SODIUM SERPL-SCNC: 141 MMOL/L — SIGNIFICANT CHANGE UP (ref 135–145)
WBC # BLD: 23.56 K/UL — HIGH (ref 3.8–10.5)
WBC # FLD AUTO: 23.56 K/UL — HIGH (ref 3.8–10.5)

## 2017-06-06 RX ORDER — MIDODRINE HYDROCHLORIDE 2.5 MG/1
10 TABLET ORAL ONCE
Qty: 0 | Refills: 0 | Status: COMPLETED | OUTPATIENT
Start: 2017-06-06 | End: 2017-06-06

## 2017-06-06 RX ADMIN — Medication 650 MILLIGRAM(S): at 11:41

## 2017-06-06 RX ADMIN — Medication 81 MILLIGRAM(S): at 10:43

## 2017-06-06 RX ADMIN — HEPARIN SODIUM 5000 UNIT(S): 5000 INJECTION INTRAVENOUS; SUBCUTANEOUS at 05:05

## 2017-06-06 RX ADMIN — MIDODRINE HYDROCHLORIDE 10 MILLIGRAM(S): 2.5 TABLET ORAL at 07:45

## 2017-06-06 RX ADMIN — HEPARIN SODIUM 5000 UNIT(S): 5000 INJECTION INTRAVENOUS; SUBCUTANEOUS at 21:45

## 2017-06-06 RX ADMIN — Medication 650 MILLIGRAM(S): at 12:04

## 2017-06-06 RX ADMIN — Medication 650 MILLIGRAM(S): at 18:11

## 2017-06-06 RX ADMIN — Medication 10 MILLIGRAM(S): at 05:04

## 2017-06-06 RX ADMIN — Medication 10 MILLIGRAM(S): at 21:45

## 2017-06-06 RX ADMIN — Medication 650 MILLIGRAM(S): at 18:52

## 2017-06-06 RX ADMIN — Medication 1 DROP(S): at 18:10

## 2017-06-07 DIAGNOSIS — D72.828 OTHER ELEVATED WHITE BLOOD CELL COUNT: ICD-10-CM

## 2017-06-07 DIAGNOSIS — N19 UNSPECIFIED KIDNEY FAILURE: ICD-10-CM

## 2017-06-07 DIAGNOSIS — J90 PLEURAL EFFUSION, NOT ELSEWHERE CLASSIFIED: ICD-10-CM

## 2017-06-07 DIAGNOSIS — N18.6 END STAGE RENAL DISEASE: ICD-10-CM

## 2017-06-07 DIAGNOSIS — E13.59 OTHER SPECIFIED DIABETES MELLITUS WITH OTHER CIRCULATORY COMPLICATIONS: ICD-10-CM

## 2017-06-07 DIAGNOSIS — D72.829 ELEVATED WHITE BLOOD CELL COUNT, UNSPECIFIED: ICD-10-CM

## 2017-06-07 DIAGNOSIS — N18.9 CHRONIC KIDNEY DISEASE, UNSPECIFIED: ICD-10-CM

## 2017-06-07 DIAGNOSIS — E78.2 MIXED HYPERLIPIDEMIA: ICD-10-CM

## 2017-06-07 DIAGNOSIS — J15.9 UNSPECIFIED BACTERIAL PNEUMONIA: ICD-10-CM

## 2017-06-07 LAB
ANION GAP SERPL CALC-SCNC: 19 MMOL/L — HIGH (ref 5–17)
ANISOCYTOSIS BLD QL: SLIGHT — SIGNIFICANT CHANGE UP
BASE EXCESS BLDA CALC-SCNC: 5.3 MMOL/L — HIGH (ref -2–2)
BASOPHILS # BLD AUTO: 0.06 K/UL — SIGNIFICANT CHANGE UP (ref 0–0.2)
BASOPHILS NFR BLD AUTO: 0.4 % — SIGNIFICANT CHANGE UP (ref 0–2)
BUN SERPL-MCNC: 31 MG/DL — HIGH (ref 7–23)
CALCIUM SERPL-MCNC: 8.8 MG/DL — SIGNIFICANT CHANGE UP (ref 8.4–10.5)
CHLORIDE SERPL-SCNC: 98 MMOL/L — SIGNIFICANT CHANGE UP (ref 96–108)
CO2 BLDA-SCNC: 31 MMOL/L — HIGH (ref 22–30)
CO2 SERPL-SCNC: 25 MMOL/L — SIGNIFICANT CHANGE UP (ref 22–31)
CREAT SERPL-MCNC: 3.3 MG/DL — HIGH (ref 0.5–1.3)
EOSINOPHIL # BLD AUTO: 0.62 K/UL — HIGH (ref 0–0.5)
EOSINOPHIL NFR BLD AUTO: 4.2 % — SIGNIFICANT CHANGE UP (ref 0–6)
GAS PNL BLDA: SIGNIFICANT CHANGE UP
GLUCOSE SERPL-MCNC: 105 MG/DL — HIGH (ref 70–99)
HCO3 BLDA-SCNC: 29 MMOL/L — SIGNIFICANT CHANGE UP (ref 21–29)
HCT VFR BLD CALC: 26 % — LOW (ref 34.5–45)
HGB BLD-MCNC: 7.9 G/DL — LOW (ref 11.5–15.5)
HOROWITZ INDEX BLDA+IHG-RTO: 36 — SIGNIFICANT CHANGE UP
IMM GRANULOCYTES NFR BLD AUTO: 0.3 % — SIGNIFICANT CHANGE UP (ref 0–1.5)
LYMPHOCYTES # BLD AUTO: 1.57 K/UL — SIGNIFICANT CHANGE UP (ref 1–3.3)
LYMPHOCYTES # BLD AUTO: 10.7 % — LOW (ref 13–44)
MANUAL SMEAR VERIFICATION: SIGNIFICANT CHANGE UP
MCHC RBC-ENTMCNC: 26.4 PG — LOW (ref 27–34)
MCHC RBC-ENTMCNC: 30.4 GM/DL — LOW (ref 32–36)
MCV RBC AUTO: 87 FL — SIGNIFICANT CHANGE UP (ref 80–100)
MONOCYTES # BLD AUTO: 0.84 K/UL — SIGNIFICANT CHANGE UP (ref 0–0.9)
MONOCYTES NFR BLD AUTO: 5.7 % — SIGNIFICANT CHANGE UP (ref 2–14)
NEUTROPHILS # BLD AUTO: 11.48 K/UL — HIGH (ref 1.8–7.4)
NEUTROPHILS NFR BLD AUTO: 78.7 % — HIGH (ref 43–77)
PCO2 BLDA: 44 MMHG — SIGNIFICANT CHANGE UP (ref 32–46)
PH BLDA: 7.44 — SIGNIFICANT CHANGE UP (ref 7.35–7.45)
PLAT MORPH BLD: NORMAL — SIGNIFICANT CHANGE UP
PLATELET # BLD AUTO: 634 K/UL — HIGH (ref 150–400)
PO2 BLDA: 86 MMHG — SIGNIFICANT CHANGE UP (ref 74–108)
POTASSIUM SERPL-MCNC: 4 MMOL/L — SIGNIFICANT CHANGE UP (ref 3.5–5.3)
POTASSIUM SERPL-SCNC: 4 MMOL/L — SIGNIFICANT CHANGE UP (ref 3.5–5.3)
RBC # BLD: 2.99 M/UL — LOW (ref 3.8–5.2)
RBC # FLD: 16 % — HIGH (ref 10.3–14.5)
RBC BLD AUTO: ABNORMAL
SAO2 % BLDA: 98 % — HIGH (ref 92–96)
SODIUM SERPL-SCNC: 142 MMOL/L — SIGNIFICANT CHANGE UP (ref 135–145)
WBC # BLD: 14.62 K/UL — HIGH (ref 3.8–10.5)
WBC # FLD AUTO: 14.62 K/UL — HIGH (ref 3.8–10.5)

## 2017-06-07 PROCEDURE — 71010: CPT | Mod: 26

## 2017-06-07 PROCEDURE — 99232 SBSQ HOSP IP/OBS MODERATE 35: CPT

## 2017-06-07 RX ORDER — ERYTHROPOIETIN 10000 [IU]/ML
10000 INJECTION, SOLUTION INTRAVENOUS; SUBCUTANEOUS
Qty: 0 | Refills: 0 | Status: DISCONTINUED | OUTPATIENT
Start: 2017-06-07 | End: 2017-06-15

## 2017-06-07 RX ORDER — ERYTHROPOIETIN 10000 [IU]/ML
10000 INJECTION, SOLUTION INTRAVENOUS; SUBCUTANEOUS
Qty: 0 | Refills: 0 | Status: DISCONTINUED | OUTPATIENT
Start: 2017-06-07 | End: 2017-06-07

## 2017-06-07 RX ADMIN — Medication 81 MILLIGRAM(S): at 11:59

## 2017-06-07 RX ADMIN — Medication 10 MILLIGRAM(S): at 05:41

## 2017-06-07 RX ADMIN — HEPARIN SODIUM 5000 UNIT(S): 5000 INJECTION INTRAVENOUS; SUBCUTANEOUS at 14:10

## 2017-06-07 RX ADMIN — Medication 10 MILLIGRAM(S): at 14:12

## 2017-06-07 RX ADMIN — Medication 10 MILLIGRAM(S): at 22:34

## 2017-06-07 RX ADMIN — HEPARIN SODIUM 5000 UNIT(S): 5000 INJECTION INTRAVENOUS; SUBCUTANEOUS at 05:41

## 2017-06-07 RX ADMIN — HEPARIN SODIUM 5000 UNIT(S): 5000 INJECTION INTRAVENOUS; SUBCUTANEOUS at 22:34

## 2017-06-07 NOTE — PROGRESS NOTE ADULT - SUBJECTIVE AND OBJECTIVE BOX
BENNY INGRAM 67y [id number]    Patient is a 67y old  Female who presents with a chief complaint of found unresponsive; intubated in field (05 Jun 2017 17:05)      Follow Up/CC:  Pna    Interval History/ROS: No acute issues, no fevers    Allergies    No Known Allergies    Intolerances        ANTIMICROBIALS:  none    MEDICATIONS  (STANDING):  heparin  Injectable 5000Unit(s) SubCutaneous every 8 hours  aspirin  chewable 81milliGRAM(s) Oral daily  insulin lispro (HumaLOG) corrective regimen sliding scale  SubCutaneous every 4 hours  metoclopramide 10milliGRAM(s) Oral every 8 hours  epoetin ramón Injectable 31779Qfim(s) IV Push <User Schedule>    MEDICATIONS  (PRN):  acetaminophen    Suspension. 650milliGRAM(s) Oral every 6 hours PRN Mild (1-3) and Moderate Pain (4 - 6)  artificial  tears Solution 1Drop(s) Both EYES every 4 hours PRN Dry Eyes        Vital Signs Last 24 Hrs  T(C): 36.8, Max: 37 (06-07 @ 05:33)  T(F): 98.2, Max: 98.6 (06-07 @ 05:33)  HR: 70 (64 - 73)  BP: 106/67 (94/56 - 131/59)  BP(mean): --  RR: 18 (18 - 18)  SpO2: 95% (95% - 98%)    CBC Full  -  ( 07 Jun 2017 09:27 )  WBC Count : 14.62 K/uL  Hemoglobin : 7.9 g/dL  Hematocrit : 26.0 %  Platelet Count - Automated : 634 K/uL  Mean Cell Volume : 87.0 fl  Mean Cell Hemoglobin : 26.4 pg  Mean Cell Hemoglobin Concentration : 30.4 gm/dL  Auto Neutrophil # : x  Auto Lymphocyte # : x  Auto Monocyte # : x  Auto Eosinophil # : x  Auto Basophil # : x  Auto Neutrophil % : x  Auto Lymphocyte % : x  Auto Monocyte % : x  Auto Eosinophil % : x  Auto Basophil % : x    06-07    142  |  98  |  31<H>  ----------------------------<  105<H>  4.0   |  25  |  3.30<H>    Ca    8.8      07 Jun 2017 09:14            MICROBIOLOGY:  .Blood Blood  05-29 @ 07:48   No growth at 5 days.  --  --      .Blood Blood  05-29 @ 07:45   No growth at 5 days.  --  --      .Broncial Combicath - trap  05-26 @ 08:13   No growth  --    No squamous epithelial cells  No WBC's seen.  No organisms seen      .Sputum Sputum  05-25 @ 21:22   Rare Pseudomonas aeruginosa (Carbapenem Resistant)  Normal Respiratory Aranza present  --  Pseudomonas aeruginosa (Carbapenem Resistant)      .Blood Blood-Peripheral  05-25 @ 18:04   No growth at 5 days.  --  --      .Urine Catheterized  05-25 @ 14:11   No growth  --  --      .Stool Feces  05-25 @ 13:56   No Protozoa seen by trichrome stain  No Helminths or Protozoa seen in formalin concentrate  performed by iodine stain  (routine O+P not evaluated for Microsporidia,  Cryptosporidia, Cyclospora, or Isospora.)  Note: One negative specimen does not rul  --  --      .Urine Catheterized  03-13 @ 12:24   >100,000 CFU/ml Proteus mirabilis  --  Proteus mirabilis      .Blood Blood-Peripheral  03-13 @ 12:23   No growth at 5 days.  --  --      .Blood Blood-Peripheral  03-12 @ 21:55   No growth at 5 days.  --  --              v            RADIOLOGY    CXR:     CT HEAD:    CT CHEST:    CT ABDOMEN:    MRI:    OTHER:

## 2017-06-07 NOTE — PROGRESS NOTE ADULT - SUBJECTIVE AND OBJECTIVE BOX
Chief complaint: mild cough  no fever  , no abdominal pain     HPI:  67 F with T2DM, afib on coumadin, HTN, HLD, CKD IV, and ICU admission in August 2016 with prolonged hospitalization where she was hospitalized for RLE cellulitis/osteomyelitis course c/b afib and PEA arrest requiring intubation s/p trach given prolonged wean, NEHAL requiring HD, and dysphagia requiring PEG tube placement  brought in by EMS after being found minimally responsive, and subsequently intubated. Per ED note, EMS noted that patient appeared to be having severe diarrhea and had poor access so IO site was made. Normally a/o x2 and able to hold a conversation per son. Per family by bedside, patient was seen by family the day before and she had no complaints and no changes in mental status.     In the ED:  T 96.7, 146/69, 82-102bpm, 100% O2. Leukocytosis to 14.5, no bandemia. Hyponatremic to 129, NEHAL with Cr to 7.78 with BUN of 173. Acidotic with pH of 7.14 on ABG, AG of 28, bicarb of 14, lactate of 1.3, and pCO2 of 46. CXR revealed opacification of L lung. (25 May 2017 14:32)      REVIEW OF SYSTEMS:    CONSTITUTIONAL: No fever, weight loss, or fatigue  NECK: No pain or stiffness  RESPIRATORY: No cough, wheezing, chills or hemoptysis; No shortness of breath  CARDIOVASCULAR: No chest pain, palpitations, dizziness, or leg swelling  GASTROINTESTINAL: No abdominal or epigastric pain. No nausea, vomiting, or hematemesis; No diarrhea or constipation. No melena or hematochezia.  GENITOURINARY: No dysuria, frequency, hematuria, or incontinence  NEUROLOGICAL: No headaches, memory loss, loss of strength, numbness, or tremors  SKIN: No itching, burning, rashes, or lesions   LYMPH NODES: No enlarged glands  MUSCULOSKELETAL: No joint pain or swelling; No muscle, back, or extremity pain  HEME/LYMPH: No easy bruising, or bleeding gums    MEDICATIONS  (STANDING):  heparin  Injectable 5000Unit(s) SubCutaneous every 8 hours  aspirin  chewable 81milliGRAM(s) Oral daily  insulin lispro (HumaLOG) corrective regimen sliding scale  SubCutaneous every 4 hours  metoclopramide 10milliGRAM(s) Oral every 8 hours    MEDICATIONS  (PRN):  acetaminophen    Suspension. 650milliGRAM(s) Oral every 6 hours PRN Mild (1-3) and Moderate Pain (4 - 6)  artificial  tears Solution 1Drop(s) Both EYES every 4 hours PRN Dry Eyes      Allergies    No Known Allergies    Intolerances        Vital Signs Last 24 Hrs  T(C): 37, Max: 37.1 (06-06 @ 12:07)  T(F): 98.6, Max: 98.8 (06-06 @ 12:07)  HR: 72 (57 - 73)  BP: 125/55 (94/56 - 138/77)  BP(mean): --  RR: 18 (16 - 18)  SpO2: 95% (95% - 100%)    PHYSICAL EXAM:    GENERAL: NAD, well-groomed, well-developed  HEAD:  Atraumatic, Normocephalic  EYES: EOMI, PERRLA, conjunctiva and sclera clear  ENMT: No tonsillar erythema, exudates, or enlargement; Moist mucous membranes, Good dentition, No lesions  NECK: Supple, No JVD, Normal thyroid  NERVOUS SYSTEM:  Alert & Oriented X3, Good concentration; Motor Strength 5/5 B/L upper and lower extremities; DTRs 2+ intact and symmetric  CHEST/LUNG: Clear to percussion bilaterally; No rales, positive  rhonchi, wheezing, or rubs  HEART: Regular rate and rhythm; No murmurs, rubs, or gallops  ABDOMEN: Soft, Nontender, Nondistended; Bowel sounds present  EXTREMITIES:  2+ Peripheral Pulses, No clubbing, cyanosis, or edema  LYMPH: No lymphadenopathy noted  SKIN: No rashes or lesions      LABS:                        7.4    23.56 )-----------( 627      ( 06 Jun 2017 16:04 )             24.3     06-06    141  |  95<L>  |  55<H>  ----------------------------<  106<H>  3.7   |  27  |  4.88<H>    Ca    9.1      06 Jun 2017 15:59            RADIOLOGY & ADDITIONAL STUDIES:

## 2017-06-07 NOTE — PROGRESS NOTE ADULT - SUBJECTIVE AND OBJECTIVE BOX
[facility] [phone]        Patient seen and examined         MEDICATIONS  (STANDING):  heparin  Injectable 5000Unit(s) SubCutaneous every 8 hours  aspirin  chewable 81milliGRAM(s) Oral daily  insulin lispro (HumaLOG) corrective regimen sliding scale  SubCutaneous every 4 hours  metoclopramide 10milliGRAM(s) Oral every 8 hours      VITAL:  T(C): , Max: 37.1 (06-06 @ 12:07)  T(F): , Max: 98.8 (06-06 @ 12:07)  HR: 72  BP: 125/55  BP(mean): --  RR: 18  SpO2: 95%  Wt(kg): --    I and O's:    I & Os for current day (as of 06-07 @ 08:22)  =============================================  IN: 890 ml / OUT: 1000 ml / NET: -110 ml        PHYSICAL EXAM:    Constitutional: NAD  HEENT: PERRLA    Neck:  No JVD  Respiratory: CTAB/L  Cardiovascular: S1 and S2  Gastrointestinal: BS+, soft, NT/ND  Extremities: No peripheral edema  Neurological: A/O x 3, no focal deficits  Psychiatric: Normal mood, normal affect  : No Raphael  Skin: No rashes  Access: Not applicable    LABS:                        7.4    23.56 )-----------( 627      ( 06 Jun 2017 16:04 )             24.3   This is a 67-year-old female with a past medical history of lymphedema, diabetes, hypertension, chronic kidney disease stage IV who presents with progressive renal failure.  06-06    141  |  95<L>  |  55<H>  ----------------------------<  106<H>  3.7   |  27  |  4.88<H>    Ca    9.1      06 Jun 2017 15:59            Urine Studies:          RADIOLOGY & ADDITIONAL STUDIES:        ASSESSMENT      RECOMMENDATIONS [facility] [phone]        Patient seen and examined in bed.  No new changes        MEDICATIONS  (STANDING):  heparin  Injectable 5000Unit(s) SubCutaneous every 8 hours  aspirin  chewable 81milliGRAM(s) Oral daily  insulin lispro (HumaLOG) corrective regimen sliding scale  SubCutaneous every 4 hours  metoclopramide 10milliGRAM(s) Oral every 8 hours      VITAL:  T(C): , Max: 37.1 (06-06 @ 12:07)  T(F): , Max: 98.8 (06-06 @ 12:07)  HR: 72  BP: 125/55  BP(mean): --  RR: 18  SpO2: 95%  Wt(kg): --    I and O's:    I & Os for current day (as of 06-07 @ 08:22)  =============================================  IN: 890 ml / OUT: 1000 ml / NET: -110 ml        PHYSICAL EXAM:    Constitutional: NAD  HEENT: PERRLA    Neck:  No JVD  Respiratory: CTAB/L  Cardiovascular: S1 and S2  Gastrointestinal: BS+, soft, NT/ND  Extremities: No peripheral edema  Neurological: A/O x 3, no focal deficits  Psychiatric: Normal mood, normal affect  : No Raphael  Skin: No rashes  Access: Left perm cath    LABS:                        7.4    23.56 )-----------( 627      ( 06 Jun 2017 16:04 )             24.3       141  |  95<L>  |  55<H>  ----------------------------<  106<H>  3.7   |  27  |  4.88<H>    Ca    9.1      06 Jun 2017 15:59

## 2017-06-07 NOTE — PROGRESS NOTE ADULT - SUBJECTIVE AND OBJECTIVE BOX
Patient is a 67y old  Female who presents with a chief complaint of found unresponsive; intubated in field (05 Jun 2017 17:05)      Any change in ROS:     MEDICATIONS  (STANDING):  heparin  Injectable 5000Unit(s) SubCutaneous every 8 hours  aspirin  chewable 81milliGRAM(s) Oral daily  insulin lispro (HumaLOG) corrective regimen sliding scale  SubCutaneous every 4 hours  metoclopramide 10milliGRAM(s) Oral every 8 hours  epoetin ramón Injectable [id number](s) IV Push <User Schedule>    MEDICATIONS  (PRN):  acetaminophen    Suspension. 650milliGRAM(s) Oral every 6 hours PRN Mild (1-3) and Moderate Pain (4 - 6)  artificial  tears Solution 1Drop(s) Both EYES every 4 hours PRN Dry Eyes    Vital Signs Last 24 Hrs  T(C): 37, Max: 37.1 (06-06 @ 12:07)  T(F): 98.6, Max: 98.8 (06-06 @ 12:07)  HR: 72 (57 - 73)  BP: 125/55 (94/56 - 131/59)  BP(mean): --  RR: 18 (17 - 18)  SpO2: 95% (95% - 98%)    I&O's Summary    I & Os for current day (as of 07 Jun 2017 11:44)  =============================================  IN: 890 ml / OUT: 1000 ml / NET: -110 ml        Physical Exam:   GENERAL: NAD, well-groomed, well-developed  HEENT: MALACHI/   Atraumatic, Normocephalic  ENMT: No tonsillar erythema, exudates, or enlargement; Moist mucous membranes, Good dentition, No lesions  NECK: Supple, No JVD, Normal thyroid  CHEST/LUNG: Clear to percussion bilaterally; No rales, rhonchi, wheezing, or rubs  CVS: Regular rate and rhythm; No murmurs, rubs, or gallops  GI: : Soft, Nontender, Nondistended; Bowel sounds present  NERVOUS SYSTEM:  Alert & Oriented X3, Good concentration; Motor Strength 5/5 B/L upper and lower extremities; DTRs 2+ intact and symmetric  EXTREMITIES:  2+ Peripheral Pulses, No clubbing, cyanosis, or edema  LYMPH: No lymphadenopathy noted  SKIN: No rashes or lesions  ENDOCRINOLOGY: No Thyromegaly  PSYCH: Appropriate/demented/others    Labs:  ABG - ( 07 Jun 2017 06:10 )  pH: 7.44  /  pCO2: 44    /  pO2: 86    / HCO3: 29    / Base Excess: 5.3   /  SaO2: 98                                          7.9    14.62 )-----------( 634      ( 07 Jun 2017 09:27 )             26.0     06-07    142  |  98  |  31<H>  ----------------------------<  105<H>  4.0   |  25  |  3.30<H>    Ca    8.8      07 Jun 2017 09:14      CAPILLARY BLOOD GLUCOSE  87 (07 Jun 2017 10:20)  127 (07 Jun 2017 05:39)  107 (07 Jun 2017 00:58)  122 (06 Jun 2017 20:47)  75 (06 Jun 2017 16:48)  75 (06 Jun 2017 16:45)              Cultures:                                               Studies  Chest X-RAY  CT SCAN Chest     EXAM:  CHEST-PORTABLE URGENT                            PROCEDURE DATE:  06/04/2017            INTERPRETATION:  CLINICAL INDICATION: Evaluate pleural effusions,   respiratory arrest    TECHNIQUE: Single AP view of the chest dated 6/4/2017    COMPARISON: Comparison study dated 6/1/2017    FINDINGS:  Small to moderate left pleural effusion with adjacent atelectasis.   There is no pneumothorax.  The cardiomediastinal silhouette is not well evaluated.  There is a left-sided dialysis catheter seen with its tip within the SVC.    IMPRESSION:  Small to moderate left pleural effusion with adjacent atelectasis.    CT Abdomen  Venous Dopplers: LE:   Others

## 2017-06-07 NOTE — PROGRESS NOTE ADULT - PROBLEM SELECTOR PLAN 1
Again unclear if she will have any recovery.  At present she is anuric.  She may benefit from an AVF  HD in AM

## 2017-06-07 NOTE — PROGRESS NOTE ADULT - SUBJECTIVE AND OBJECTIVE BOX
Subjective: Patient seen and examined. No new events except as noted.   complaining of cough     REVIEW OF SYSTEMS:    CONSTITUTIONAL: positive weakness, no fevers or chills  EYES/ENT: No visual changes;  No vertigo or throat pain   NECK: No pain or stiffness  RESPIRATORY: No cough, wheezing, hemoptysis; No shortness of breath  CARDIOVASCULAR: No chest pain or palpitations  GASTROINTESTINAL: No abdominal or epigastric pain. No nausea, vomiting, or hematemesis; No diarrhea or constipation. No melena or hematochezia.  GENITOURINARY: No dysuria, frequency or hematuria  NEUROLOGICAL: No numbness or weakness  SKIN: No itching, burning, rashes, or lesions   All other review of systems is negative unless indicated above.    MEDICATIONS:  MEDICATIONS  (STANDING):  heparin  Injectable 5000Unit(s) SubCutaneous every 8 hours  aspirin  chewable 81milliGRAM(s) Oral daily  insulin lispro (HumaLOG) corrective regimen sliding scale  SubCutaneous every 4 hours  metoclopramide 10milliGRAM(s) Oral every 8 hours  epoetin ramón Injectable [id number](s) IV Push <User Schedule>      PHYSICAL EXAM:  T(C): 36.8, Max: 37 (06-07 @ 05:33)  HR: 70 (57 - 73)  BP: 106/67 (94/56 - 131/59)  RR: 18 (17 - 18)  SpO2: 95% (95% - 98%)  Wt(kg): --  I&O's Summary    I & Os for current day (as of 07 Jun 2017 12:16)  =============================================  IN: 890 ml / OUT: 1000 ml / NET: -110 ml        Appearance: Normal, weak and cachectic 	  HEENT:   Normal oral mucosa, PERRL, EOMI	  Lymphatic: No lymphadenopathy , no edema  Cardiovascular: Normal S1 S2, No JVD, No murmurs , Peripheral pulses palpable 2+ bilaterally  Respiratory: Lungs clear to auscultation, normal effort 	  HD catheter in upper chest in place   Gastrointestinal:  Soft, Non-tender, + BS, + PEG   Skin: No rashes, No ecchymoses, No cyanosis, warm to touch  Musculoskeletal: lower extremity toe amputation   Psychiatry:  Mood & affect appropriate  Ext: No edema      LABS:    CARDIAC MARKERS:                                7.9    14.62 )-----------( 634      ( 07 Jun 2017 09:27 )             26.0     06-07    142  |  98  |  31<H>  ----------------------------<  105<H>  4.0   |  25  |  3.30<H>    Ca    8.8      07 Jun 2017 09:14      proBNP:   Lipid Profile:   HgA1c:   TSH:           TELEMETRY: 	    ECG:  	  RADIOLOGY:   DIAGNOSTIC TESTING:  [ ] Echocardiogram:  [ ]  Catheterization:  [ ] Stress Test:    OTHER:

## 2017-06-07 NOTE — PROGRESS NOTE ADULT - PROBLEM SELECTOR PLAN 1
Small to moderate effusion with some atelectasis on the left side: No tapping as patiens SOB has improved significantly and it is likely due to ESRD. Would taper off the oxygen as tolerated: Discussed with the RN, to decrease it to 2 L

## 2017-06-07 NOTE — PROGRESS NOTE ADULT - ASSESSMENT
This is a 67-year-old female with a past medical history of lymphedema, diabetes, hypertension, chronic kidney disease stage IV who presents with progressive anuric renal failure now on HD  Likely Asp PNA

## 2017-06-08 LAB
ANION GAP SERPL CALC-SCNC: 21 MMOL/L — HIGH (ref 5–17)
BUN SERPL-MCNC: 48 MG/DL — HIGH (ref 7–23)
CALCIUM SERPL-MCNC: 9.6 MG/DL — SIGNIFICANT CHANGE UP (ref 8.4–10.5)
CHLORIDE SERPL-SCNC: 95 MMOL/L — LOW (ref 96–108)
CO2 SERPL-SCNC: 23 MMOL/L — SIGNIFICANT CHANGE UP (ref 22–31)
CREAT SERPL-MCNC: 4.07 MG/DL — HIGH (ref 0.5–1.3)
CULTURE RESULTS: SIGNIFICANT CHANGE UP
GLUCOSE SERPL-MCNC: 78 MG/DL — SIGNIFICANT CHANGE UP (ref 70–99)
HCT VFR BLD CALC: 25 % — LOW (ref 34.5–45)
HGB BLD-MCNC: 7.6 G/DL — LOW (ref 11.5–15.5)
MCHC RBC-ENTMCNC: 26.6 PG — LOW (ref 27–34)
MCHC RBC-ENTMCNC: 30.4 GM/DL — LOW (ref 32–36)
MCV RBC AUTO: 87.4 FL — SIGNIFICANT CHANGE UP (ref 80–100)
PLATELET # BLD AUTO: 677 K/UL — HIGH (ref 150–400)
POTASSIUM SERPL-MCNC: 3.8 MMOL/L — SIGNIFICANT CHANGE UP (ref 3.5–5.3)
POTASSIUM SERPL-SCNC: 3.8 MMOL/L — SIGNIFICANT CHANGE UP (ref 3.5–5.3)
RBC # BLD: 2.86 M/UL — LOW (ref 3.8–5.2)
RBC # FLD: 15.6 % — HIGH (ref 10.3–14.5)
SODIUM SERPL-SCNC: 139 MMOL/L — SIGNIFICANT CHANGE UP (ref 135–145)
SPECIMEN SOURCE: SIGNIFICANT CHANGE UP
WBC # BLD: 13.86 K/UL — HIGH (ref 3.8–10.5)
WBC # FLD AUTO: 13.86 K/UL — HIGH (ref 3.8–10.5)

## 2017-06-08 RX ADMIN — Medication 1 DROP(S): at 21:24

## 2017-06-08 RX ADMIN — HEPARIN SODIUM 5000 UNIT(S): 5000 INJECTION INTRAVENOUS; SUBCUTANEOUS at 21:24

## 2017-06-08 RX ADMIN — Medication 10 MILLIGRAM(S): at 14:02

## 2017-06-08 RX ADMIN — Medication 10 MILLIGRAM(S): at 05:24

## 2017-06-08 RX ADMIN — HEPARIN SODIUM 5000 UNIT(S): 5000 INJECTION INTRAVENOUS; SUBCUTANEOUS at 05:24

## 2017-06-08 RX ADMIN — Medication 81 MILLIGRAM(S): at 11:57

## 2017-06-08 RX ADMIN — ERYTHROPOIETIN 10000 UNIT(S): 10000 INJECTION, SOLUTION INTRAVENOUS; SUBCUTANEOUS at 11:02

## 2017-06-08 RX ADMIN — HEPARIN SODIUM 5000 UNIT(S): 5000 INJECTION INTRAVENOUS; SUBCUTANEOUS at 13:55

## 2017-06-08 RX ADMIN — Medication 10 MILLIGRAM(S): at 21:24

## 2017-06-08 NOTE — PROGRESS NOTE ADULT - PROBLEM SELECTOR PLAN 2
Finished Zosyn treatment for 7 days  The WBC is till high but better yesterday , today labs pending  Remains afebrile

## 2017-06-08 NOTE — PROGRESS NOTE ADULT - SUBJECTIVE AND OBJECTIVE BOX
Subjective: Patient seen and examined. No new events except as noted.   complaining of weakness   REVIEW OF SYSTEMS:    CONSTITUTIONAL: No weakness, fevers or chills  EYES/ENT: No visual changes;  No vertigo or throat pain   NECK: No pain or stiffness  RESPIRATORY: No cough, wheezing, hemoptysis; No shortness of breath  CARDIOVASCULAR: No chest pain or palpitations  GASTROINTESTINAL: No abdominal or epigastric pain. No nausea, vomiting, or hematemesis; No diarrhea or constipation. No melena or hematochezia.  GENITOURINARY: No dysuria, frequency or hematuria  NEUROLOGICAL: No numbness or weakness  SKIN: No itching, burning, rashes, or lesions   All other review of systems is negative unless indicated above.    MEDICATIONS:  MEDICATIONS  (STANDING):  heparin  Injectable 5000Unit(s) SubCutaneous every 8 hours  aspirin  chewable 81milliGRAM(s) Oral daily  insulin lispro (HumaLOG) corrective regimen sliding scale  SubCutaneous every 4 hours  metoclopramide 10milliGRAM(s) Oral every 8 hours  epoetin ramón Injectable 86376Ghdi(s) IV Push <User Schedule>      PHYSICAL EXAM:  T(C): 37.2, Max: 37.2 (06-08 @ 08:20)  HR: 73 (58 - 77)  BP: 110/52 (103/55 - 132/71)  RR: 18 (18 - 18)  SpO2: 99% (95% - 99%)  Wt(kg): --  I&O's Summary  I & Os for 24h ending 08 Jun 2017 07:00  =============================================  IN: 875 ml / OUT: 0 ml / NET: 875 ml    I & Os for current day (as of 08 Jun 2017 11:39)  =============================================  IN: 0 ml / OUT: 0 ml / NET: 0 ml        Appearance: Normal	  HEENT:   Normal oral mucosa, PERRL, EOMI	  Lymphatic: No lymphadenopathy , no edema  Cardiovascular: Normal S1 S2, No JVD, No murmurs , Peripheral pulses palpable 2+ bilaterally  Respiratory: Lungs clear to auscultation, normal effort 	  Gastrointestinal:  Soft, Non-tender, + BS	  Skin: No rashes, No ecchymoses, No cyanosis, warm to touch  Musculoskeletal: Normal range of motion, normal strength  Psychiatry:  Mood & affect appropriate  Ext: toe amputations, 3+ distal pulses bilaterally LE      LABS:    CARDIAC MARKERS:                                7.9    14.62 )-----------( 634      ( 07 Jun 2017 09:27 )             26.0     06-07    142  |  98  |  31<H>  ----------------------------<  105<H>  4.0   |  25  |  3.30<H>    Ca    8.8      07 Jun 2017 09:14      proBNP:   Lipid Profile:   HgA1c:   TSH:           TELEMETRY: 	    ECG:  	  RADIOLOGY:   DIAGNOSTIC TESTING:  [ ] Echocardiogram:  [ ]  Catheterization:  [ ] Stress Test:    OTHER:

## 2017-06-08 NOTE — PROGRESS NOTE ADULT - PROBLEM SELECTOR PLAN 1
Again unclear if she will have any recovery.  At present she is anuric.  She may benefit from an AVF  HD in AM Again unclear if she will have any recovery.  At present she is anuric.  She may benefit from an AVF.  Will wilfredo YADAV re Awilda consult  HD today

## 2017-06-08 NOTE — PROGRESS NOTE ADULT - SUBJECTIVE AND OBJECTIVE BOX
Chief complaint:pt c/o no chest pain or cough . no vomiting     HPI:  67 F with T2DM, afib on coumadin, HTN, HLD, CKD IV, and ICU admission in August 2016 with prolonged hospitalization where she was hospitalized for RLE cellulitis/osteomyelitis course c/b afib and PEA arrest requiring intubation s/p trach given prolonged wean, NEHAL requiring HD, and dysphagia requiring PEG tube placement  brought in by EMS after being found minimally responsive, and subsequently intubated. Per ED note, EMS noted that patient appeared to be having severe diarrhea and had poor access so IO site was made. Normally a/o x2 and able to hold a conversation per son.  she had no complaints and no changes in mental status.     In the ED:  T 96.7, 146/69, 82-102bpm, 100% O2. Leukocytosis to 14.5, no bandemia. Hyponatremic to 129, NEHAL with Cr to 7.78 with BUN of 173. Acidotic with pH of 7.14 on ABG, AG of 28, bicarb of 14, lactate of 1.3, and pCO2 of 46. CXR revealed opacification of L lung. (25 May 2017 14:32)      REVIEW OF SYSTEMS:    CONSTITUTIONAL: No fever, weight loss, or fatigue  NECK: No pain or stiffness  RESPIRATORY: No cough, wheezing, chills or hemoptysis; No shortness of breath  CARDIOVASCULAR: No chest pain, palpitations, dizziness, or leg swelling  GASTROINTESTINAL: No abdominal or epigastric pain. No nausea, vomiting, or hematemesis; No diarrhea or constipation. No melena or hematochezia.  GENITOURINARY: No dysuria, frequency, hematuria, or incontinence  NEUROLOGICAL: No headaches, memory loss, loss of strength, numbness, or tremors  SKIN: No itching, burning, rashes, or lesions   LYMPH NODES: No enlarged glands  MUSCULOSKELETAL: No joint pain or swelling; No muscle, back, or extremity pain  HEME/LYMPH: No easy bruising, or bleeding gums    MEDICATIONS  (STANDING):  heparin  Injectable 5000Unit(s) SubCutaneous every 8 hours  aspirin  chewable 81milliGRAM(s) Oral daily  insulin lispro (HumaLOG) corrective regimen sliding scale  SubCutaneous every 4 hours  metoclopramide 10milliGRAM(s) Oral every 8 hours  epoetin ramón Injectable 50146Snzc(s) IV Push <User Schedule>    MEDICATIONS  (PRN):  acetaminophen    Suspension. 650milliGRAM(s) Oral every 6 hours PRN Mild (1-3) and Moderate Pain (4 - 6)  artificial  tears Solution 1Drop(s) Both EYES every 4 hours PRN Dry Eyes      Allergies    No Known Allergies    Intolerances        Vital Signs Last 24 Hrs  T(C): 37, Max: 37 (06-08 @ 05:35)  T(F): 98.6, Max: 98.6 (06-08 @ 05:35)  HR: 63 (63 - 77)  BP: 108/62 (103/55 - 132/71)  BP(mean): --  RR: 18 (18 - 18)  SpO2: 97% (95% - 98%)    PHYSICAL EXAM:    GENERAL: NAD, well-groomed, well-developed  HEAD:  Atraumatic, Normocephalic  EYES: EOMI, PERRLA, conjunctiva and sclera clear  ENMT: No tonsillar erythema, exudates, or enlargement; Moist mucous membranes, Good dentition, No lesions  NECK: Supple, No JVD, Normal thyroid  NERVOUS SYSTEM:  Alert & Oriented X3, Good concentration; Motor Strength 5/5 B/L upper and lower extremities; DTRs 2+ intact and symmetric  CHEST/LUNG: Clear to percussion bilaterally; No rales,+ rhonchi, wheezing, or rubs  HEART: Regular rate and rhythm; No murmurs, rubs, or gallops  ABDOMEN: Soft, Nontender, Nondistended; Bowel sounds present  EXTREMITIES:  2+ Peripheral Pulses, No clubbing, cyanosis, or edema  LYMPH: No lymphadenopathy noted  SKIN: No rashes or lesions      LABS:                        7.9    14.62 )-----------( 634      ( 07 Jun 2017 09:27 )             26.0     06-07    142  |  98  |  31<H>  ----------------------------<  105<H>  4.0   |  25  |  3.30<H>    Ca    8.8      07 Jun 2017 09:14            RADIOLOGY & ADDITIONAL STUDIES:

## 2017-06-08 NOTE — PROGRESS NOTE ADULT - PROBLEM SELECTOR PLAN 1
Small to moderate effusion with some atelectasis on the left side: No tapping as patients SOB has improved significantly and it is likely due to ESRD. Would taper off the oxygen as tolerated: Discussed with the RN, to decrease it to 2 L

## 2017-06-08 NOTE — PROGRESS NOTE ADULT - SUBJECTIVE AND OBJECTIVE BOX
Patient is a 67y old  Female who presents with a chief complaint of found unresponsive; intubated in field (05 Jun 2017 17:05)  not SOB, on 2L       Any change in ROS: none     MEDICATIONS  (STANDING):  heparin  Injectable 5000Unit(s) SubCutaneous every 8 hours  aspirin  chewable 81milliGRAM(s) Oral daily  insulin lispro (HumaLOG) corrective regimen sliding scale  SubCutaneous every 4 hours  metoclopramide 10milliGRAM(s) Oral every 8 hours  epoetin ramón Injectable 66933Amvm(s) IV Push <User Schedule>    MEDICATIONS  (PRN):  acetaminophen    Suspension. 650milliGRAM(s) Oral every 6 hours PRN Mild (1-3) and Moderate Pain (4 - 6)  artificial  tears Solution 1Drop(s) Both EYES every 4 hours PRN Dry Eyes    Vital Signs Last 24 Hrs  T(C): 37.2, Max: 37.2 (06-08 @ 08:20)  T(F): 99, Max: 99 (06-08 @ 08:20)  HR: 73 (58 - 77)  BP: 110/52 (103/55 - 132/71)  BP(mean): --  RR: 18 (18 - 18)  SpO2: 99% (95% - 99%)    I&O's Summary  I & Os for 24h ending 08 Jun 2017 07:00  =============================================  IN: 875 ml / OUT: 0 ml / NET: 875 ml    I & Os for current day (as of 08 Jun 2017 11:14)  =============================================  IN: 0 ml / OUT: 0 ml / NET: 0 ml        Physical Exam:   GENERAL: NAD, well-groomed, well-developed  HEENT: MALACHI/   Atraumatic, Normocephalic  ENMT: No tonsillar erythema, exudates, or enlargement; Moist mucous membranes, Good dentition, No lesions  NECK: Supple, No JVD, Normal thyroid  CHEST/LUNG: decreased air entry left base   CVS: Regular rate and rhythm; No murmurs, rubs, or gallops  GI: : Soft, Nontender, Nondistended; Bowel sounds present  NERVOUS SYSTEM:  Alert & Oriented X3, Good concentration; Motor Strength 5/5 B/L upper and lower extremities; DTRs 2+ intact and symmetric  EXTREMITIES:  2+ Peripheral Pulses, No clubbing, cyanosis, or edema  LYMPH: No lymphadenopathy noted  SKIN: No rashes or lesions  ENDOCRINOLOGY: No Thyromegaly  PSYCH: Appropriate ext:: left tma    Labs:  ABG - ( 07 Jun 2017 06:10 )  pH: 7.44  /  pCO2: 44    /  pO2: 86    / HCO3: 29    / Base Excess: 5.3   /  SaO2: 98                                          7.9    14.62 )-----------( 634      ( 07 Jun 2017 09:27 )             26.0                         7.4    23.56 )-----------( 627      ( 06 Jun 2017 16:04 )             24.3                         9.1    17.10 )-----------( 611      ( 05 Jun 2017 08:50 )             29.4     06-07    142  |  98  |  31<H>  ----------------------------<  105<H>  4.0   |  25  |  3.30<H>  06-06    141  |  95<L>  |  55<H>  ----------------------------<  106<H>  3.7   |  27  |  4.88<H>  06-05    138  |  95<L>  |  36<H>  ----------------------------<  93  4.2   |  20<L>  |  3.42<H>    Ca    8.8      07 Jun 2017 09:14  Ca    9.1      06 Jun 2017 15:59      CAPILLARY BLOOD GLUCOSE  85 (08 Jun 2017 10:00)  105 (08 Jun 2017 05:49)  107 (08 Jun 2017 01:29)  110 (07 Jun 2017 21:45)  97 (07 Jun 2017 16:37)  117 (07 Jun 2017 14:08)          Studies  Chest X-RAY  CT SCAN Chest EXAM:  CHEST-PORTABLE URGENT                            PROCEDURE DATE:  06/04/2017            INTERPRETATION:  CLINICAL INDICATION: Evaluate pleural effusions,   respiratory arrest    TECHNIQUE: Single AP view of the chest dated 6/4/2017    COMPARISON: Comparison study dated 6/1/2017    FINDINGS:  Small to moderate left pleural effusion with adjacent atelectasis.   There is no pneumothorax.  The cardiomediastinal silhouette is not well evaluated.  There is a left-sided dialysis catheter seen with its tip within the SVC.    IMPRESSION:  Small to moderate left pleural effusion with adjacent atelectasis.  Venous Dopplers: LE:   CT Abdomen  Others

## 2017-06-08 NOTE — PROGRESS NOTE ADULT - SUBJECTIVE AND OBJECTIVE BOX
[facility] [phone]        Patient seen and examined in bed.  No new changes        MEDICATIONS  (STANDING):  heparin  Injectable 5000Unit(s) SubCutaneous every 8 hours  aspirin  chewable 81milliGRAM(s) Oral daily  insulin lispro (HumaLOG) corrective regimen sliding scale  SubCutaneous every 4 hours  metoclopramide 10milliGRAM(s) Oral every 8 hours  epoetin ramón Injectable 33283Cznj(s) IV Push <User Schedule>      VITAL:  T(C): , Max: 37 (06-08 @ 05:35)  T(F): , Max: 98.6 (06-08 @ 05:35)  HR: 63  BP: 108/62  BP(mean): --  RR: 18  SpO2: 97%  Wt(kg): --    I and O's:  I & Os for 24h ending 06-08 @ 07:00  =============================================  IN: 875 ml / OUT: 0 ml / NET: 875 ml    I & Os for current day (as of 06-08 @ 10:20)  =============================================  IN: 0 ml / OUT: 0 ml / NET: 0 ml        PHYSICAL EXAM:    Constitutional: NAD  HEENT: PERRLA    Neck:  No JVD  Respiratory: crackles at bases  Cardiovascular: S1 and S2  Gastrointestinal: BS+, soft, NT/ND  Extremities: No peripheral edema  Neurological: A/O x 3, no focal deficits  Psychiatric: Normal mood, normal affect  : No Raphael  Skin: No rashes  Access: + perm cath    LABS:                        7.9    14.62 )-----------( 634      ( 07 Jun 2017 09:27 )             26.0     06-07    142  |  98  |  31<H>  ----------------------------<  105<H>  4.0   |  25  |  3.30<H>    Ca    8.8      07 Jun 2017 09:14

## 2017-06-09 DIAGNOSIS — I48.2 CHRONIC ATRIAL FIBRILLATION: ICD-10-CM

## 2017-06-09 LAB
ANION GAP SERPL CALC-SCNC: 14 MMOL/L — SIGNIFICANT CHANGE UP (ref 5–17)
APPEARANCE UR: ABNORMAL
BACTERIA # UR AUTO: ABNORMAL /HPF
BASOPHILS # BLD AUTO: 0.07 K/UL — SIGNIFICANT CHANGE UP (ref 0–0.2)
BASOPHILS NFR BLD AUTO: 0.6 % — SIGNIFICANT CHANGE UP (ref 0–2)
BILIRUB UR-MCNC: NEGATIVE — SIGNIFICANT CHANGE UP
BUN SERPL-MCNC: 37 MG/DL — HIGH (ref 7–23)
CALCIUM SERPL-MCNC: 9.1 MG/DL — SIGNIFICANT CHANGE UP (ref 8.4–10.5)
CHLORIDE SERPL-SCNC: 92 MMOL/L — LOW (ref 96–108)
CO2 SERPL-SCNC: 27 MMOL/L — SIGNIFICANT CHANGE UP (ref 22–31)
COLOR SPEC: YELLOW — SIGNIFICANT CHANGE UP
CREAT SERPL-MCNC: 3.59 MG/DL — HIGH (ref 0.5–1.3)
DIFF PNL FLD: ABNORMAL
EOSINOPHIL # BLD AUTO: 0.74 K/UL — HIGH (ref 0–0.5)
EOSINOPHIL NFR BLD AUTO: 6.3 % — HIGH (ref 0–6)
EPI CELLS # UR: ABNORMAL /HPF
GLUCOSE SERPL-MCNC: 98 MG/DL — SIGNIFICANT CHANGE UP (ref 70–99)
GLUCOSE UR QL: NEGATIVE — SIGNIFICANT CHANGE UP
HCT VFR BLD CALC: 28.3 % — LOW (ref 34.5–45)
HGB BLD-MCNC: 8.6 G/DL — LOW (ref 11.5–15.5)
IMM GRANULOCYTES NFR BLD AUTO: 0.4 % — SIGNIFICANT CHANGE UP (ref 0–1.5)
KETONES UR-MCNC: NEGATIVE — SIGNIFICANT CHANGE UP
LEUKOCYTE ESTERASE UR-ACNC: ABNORMAL
LYMPHOCYTES # BLD AUTO: 18.6 % — SIGNIFICANT CHANGE UP (ref 13–44)
LYMPHOCYTES # BLD AUTO: 2.18 K/UL — SIGNIFICANT CHANGE UP (ref 1–3.3)
MCHC RBC-ENTMCNC: 25.9 PG — LOW (ref 27–34)
MCHC RBC-ENTMCNC: 30.4 GM/DL — LOW (ref 32–36)
MCV RBC AUTO: 85.2 FL — SIGNIFICANT CHANGE UP (ref 80–100)
MONOCYTES # BLD AUTO: 1 K/UL — HIGH (ref 0–0.9)
MONOCYTES NFR BLD AUTO: 8.6 % — SIGNIFICANT CHANGE UP (ref 2–14)
NEUTROPHILS # BLD AUTO: 7.65 K/UL — HIGH (ref 1.8–7.4)
NEUTROPHILS NFR BLD AUTO: 65.5 % — SIGNIFICANT CHANGE UP (ref 43–77)
NITRITE UR-MCNC: NEGATIVE — SIGNIFICANT CHANGE UP
PH UR: 7.5 — SIGNIFICANT CHANGE UP (ref 5–8)
PLATELET # BLD AUTO: 814 K/UL — HIGH (ref 150–400)
POTASSIUM SERPL-MCNC: 4 MMOL/L — SIGNIFICANT CHANGE UP (ref 3.5–5.3)
POTASSIUM SERPL-SCNC: 4 MMOL/L — SIGNIFICANT CHANGE UP (ref 3.5–5.3)
PROT UR-MCNC: 500 MG/DL
RBC # BLD: 3.32 M/UL — LOW (ref 3.8–5.2)
RBC # FLD: 15.7 % — HIGH (ref 10.3–14.5)
RBC CASTS # UR COMP ASSIST: ABNORMAL /HPF (ref 0–2)
SODIUM SERPL-SCNC: 133 MMOL/L — LOW (ref 135–145)
SP GR SPEC: 1.02 — SIGNIFICANT CHANGE UP (ref 1.01–1.02)
UROBILINOGEN FLD QL: NEGATIVE — SIGNIFICANT CHANGE UP
WBC # BLD: 11.69 K/UL — HIGH (ref 3.8–10.5)
WBC # FLD AUTO: 11.69 K/UL — HIGH (ref 3.8–10.5)
WBC UR QL: >50 /HPF (ref 0–5)

## 2017-06-09 PROCEDURE — 99222 1ST HOSP IP/OBS MODERATE 55: CPT | Mod: GC

## 2017-06-09 RX ADMIN — Medication 1 DROP(S): at 10:21

## 2017-06-09 RX ADMIN — Medication 10 MILLIGRAM(S): at 21:14

## 2017-06-09 RX ADMIN — Medication 10 MILLIGRAM(S): at 13:12

## 2017-06-09 RX ADMIN — Medication 81 MILLIGRAM(S): at 11:16

## 2017-06-09 RX ADMIN — HEPARIN SODIUM 5000 UNIT(S): 5000 INJECTION INTRAVENOUS; SUBCUTANEOUS at 21:14

## 2017-06-09 RX ADMIN — HEPARIN SODIUM 5000 UNIT(S): 5000 INJECTION INTRAVENOUS; SUBCUTANEOUS at 05:31

## 2017-06-09 RX ADMIN — ERYTHROPOIETIN 10000 UNIT(S): 10000 INJECTION, SOLUTION INTRAVENOUS; SUBCUTANEOUS at 23:35

## 2017-06-09 RX ADMIN — HEPARIN SODIUM 5000 UNIT(S): 5000 INJECTION INTRAVENOUS; SUBCUTANEOUS at 13:12

## 2017-06-09 RX ADMIN — Medication 650 MILLIGRAM(S): at 21:14

## 2017-06-09 RX ADMIN — Medication 10 MILLIGRAM(S): at 05:31

## 2017-06-09 RX ADMIN — Medication 650 MILLIGRAM(S): at 21:44

## 2017-06-09 NOTE — PROGRESS NOTE ADULT - PROBLEM SELECTOR PLAN 1
Small pleural effusion with some atelectasis on the left side: No tapping as patients SOB has improved significantly and it is likely due to ESRD. Would taper off the oxygen as tolerated:

## 2017-06-09 NOTE — CONSULT NOTE ADULT - SUBJECTIVE AND OBJECTIVE BOX
CC: Patient is a 67y old  Female w/ progressive renal failure - now requiring long term HD access     HPI:  67 F with T2DM, A.fib, HTN, HLD, CKD IV, and ICU admission in August 2016 with prolonged hospitalization where she was hospitalized for RLE cellulitis/osteomyelitis course c/b afib and PEA arrest requiring intubation s/p trach given prolonged wean, NEHAL requiring HD, and dysphagia requiring PEG tube placement. Now she is requiring HD, and as per nephrology will need long term access.      REVIEW OF SYSTEMS  Negative, unless mentioned otherwise in HPI above    PAST MEDICAL & SURGICAL HISTORY:  HLD  DM  HTN  OM  Cellulitis  Osteoarthritis  hx of trach PEG  S/P amputation of lesser toe, right: 2013 right great toe, 2nd and 3rd right toes  S/P Amputation of Lesser Toe: Rt 1-3 metatarsal    HOME MEDS  · 	polyethylene glycol 3350 oral powder for reconstitution: 17 gram(s) by gastrostomy tube 3 times a day, As Needed  · 	citalopram 10 mg oral tablet: 1 tab(s) by gastrostomy tube once a day  · 	insulin lispro 100 units/mL subcutaneous solution:  subcutaneous 3 times a day (before meals), 1 Unit(s) if Glucose 151 - 200  	2 Unit(s) if Glucose 201 - 250  	3 Unit(s) if Glucose 251 - 300  	4 Unit(s) if Glucose 301 - 350  	5 Unit(s) if Glucose 351 - 400  	6 Unit(s) if Glucose Greater Than 400  · 	insulin lispro 100 units/mL subcutaneous solution:  subcutaneous once a day (at bedtime), 0 Unit(s) if Glucose 0 - 250  	1 Unit(s) if Glucose 251 - 300  	2 Unit(s) if Glucose 301 - 350  	3 Unit(s) if Glucose 351 - 400  	4 Unit(s) if Glucose Greater Than 400  · 	Artificial Tears ophthalmic solution:  to each affected eye every 6 hours, As Needed  · 	warfarin 5 mg oral tablet: 1 tab(s) by gastrostomy tube once a day  · 	Reglan 5 mg/5 mL oral syrup: 10 milligram(s) by gastrostomy tube 3 times a day        MEDICATIONS  (STANDING):  heparin  Injectable 5000Unit(s) SubCutaneous every 8 hours  aspirin  chewable 81milliGRAM(s) Oral daily  insulin lispro (HumaLOG) corrective regimen sliding scale  SubCutaneous every 4 hours  metoclopramide 10milliGRAM(s) Oral every 8 hours  epoetin ramón Injectable 03277Qgub(s) IV Push <User Schedule>    MEDICATIONS  (PRN):  acetaminophen    Suspension. 650milliGRAM(s) Oral every 6 hours PRN Mild (1-3) and Moderate Pain (4 - 6)  artificial  tears Solution 1Drop(s) Both EYES every 4 hours PRN Dry Eyes    Allergies  NKDA    Vital Signs  T(C): 36.7, Max: 37.3 (06-08 @ 19:39)  HR: 68 (68 - 73)  BP: 133/64 (123/62 - 133/64)  RR: 17 (17 - 18)  SpO2: 96% (96% - 100%)    Physical Exam  General: A&Ox3, NAD.  Chest: L-sided permacath, w/ bibasilar crackles  Palpable radial pulse bilaterally  Warm extremities       Labs (09 Jun 2017):                        8.6    11.69 )-----------( 814                   28.3     133  |  92  |  37  ----------------------------<  98  4.0   |  27  |  3.59

## 2017-06-09 NOTE — PROGRESS NOTE ADULT - SUBJECTIVE AND OBJECTIVE BOX
Chief complaint:S/P HD  c/o no SOB  no fever .     HPI:  67 F with T2DM, afib on coumadin, HTN, HLD, CKD IV, and ICU admission in August 2016 with prolonged hospitalization where she was hospitalized for RLE cellulitis/osteomyelitis course c/b afib and PEA arrest requiring intubation s/p trach given prolonged wean, NEHAL requiring HD, and dysphagia requiring PEG tube placement  brought in by EMS after being found minimally responsive, and subsequently intubated. Per ED note, EMS noted that patient appeared to be having severe diarrhea and had poor access so IO site was made. Normally a/o x2 and able to hold a conversation per son. Per family by bedside, patient was seen by family the day before and she had no complaints and no changes in mental status.     In the ED:  T 96.7, 146/69, 82-102bpm, 100% O2. Leukocytosis to 14.5, no bandemia. Hyponatremic to 129, NEHAL with Cr to 7.78 with BUN of 173. Acidotic with pH of 7.14 on ABG, AG of 28, bicarb of 14, lactate of 1.3, and pCO2 of 46. CXR revealed opacification of L lung. (25 May 2017 14:32)      REVIEW OF SYSTEMS:    CONSTITUTIONAL: No fever, weight loss, or fatigue  NECK: No pain or stiffness  RESPIRATORY: No cough, wheezing, chills or hemoptysis; No shortness of breath  CARDIOVASCULAR: No chest pain, palpitations, dizziness, or leg swelling  GASTROINTESTINAL: No abdominal or epigastric pain. No nausea, vomiting, or hematemesis; No diarrhea or constipation. No melena or hematochezia.  GENITOURINARY: No dysuria, frequency, hematuria, or incontinence  NEUROLOGICAL: No headaches, memory loss, loss of strength, numbness, or tremors  SKIN: No itching, burning, rashes, or lesions   LYMPH NODES: No enlarged glands  MUSCULOSKELETAL: No joint pain or swelling; No muscle, back, or extremity pain  HEME/LYMPH: No easy bruising, or bleeding gums    MEDICATIONS  (STANDING):  heparin  Injectable 5000Unit(s) SubCutaneous every 8 hours  aspirin  chewable 81milliGRAM(s) Oral daily  insulin lispro (HumaLOG) corrective regimen sliding scale  SubCutaneous every 4 hours  metoclopramide 10milliGRAM(s) Oral every 8 hours  epoetin ramón Injectable 85557Xvtv(s) IV Push <User Schedule>    MEDICATIONS  (PRN):  acetaminophen    Suspension. 650milliGRAM(s) Oral every 6 hours PRN Mild (1-3) and Moderate Pain (4 - 6)  artificial  tears Solution 1Drop(s) Both EYES every 4 hours PRN Dry Eyes      Allergies    No Known Allergies    Intolerances        Vital Signs Last 24 Hrs  T(C): 36.7, Max: 37.3 (06-08 @ 19:39)  T(F): 98.1, Max: 99.1 (06-08 @ 19:39)  HR: 68 (68 - 73)  BP: 133/64 (110/52 - 133/64)  BP(mean): --  RR: 17 (17 - 18)  SpO2: 96% (96% - 100%)    PHYSICAL EXAM:    GENERAL: NAD, well-groomed, well-developed  HEAD:  Atraumatic, Normocephalic  EYES: EOMI, PERRLA, conjunctiva and sclera clear  ENMT: No tonsillar erythema, exudates, or enlargement; Moist mucous membranes, Good dentition, No lesions  NECK: Supple, No JVD, Normal thyroid  NERVOUS SYSTEM:  Alert & Oriented X3, Good concentration; Motor Strength 5/5 B/L upper and lower extremities; DTRs 2+ intact and symmetric  CHEST/LUNG: Clear to percussion bilaterally; No rales, rhonchi, wheezing, or rubs  HEART: Regular rate and rhythm; No murmurs, rubs, or gallops  ABDOMEN: Soft, Nontender, Nondistended; Bowel sounds present  EXTREMITIES:  2+ Peripheral Pulses, No clubbing, cyanosis, or edema  LYMPH: No lymphadenopathy noted  SKIN: No rashes or lesions      LABS:                        7.6    13.86 )-----------( 677      ( 08 Jun 2017 13:30 )             25.0     06-08    139  |  95<L>  |  48<H>  ----------------------------<  78  3.8   |  23  |  4.07<H>    Ca    9.6      08 Jun 2017 13:30            RADIOLOGY & ADDITIONAL STUDIES:

## 2017-06-09 NOTE — PROGRESS NOTE ADULT - PROBLEM SELECTOR PLAN 2
Finished Zosyn treatment for 7 days  The WBC is till high but better yesterday , today labs pending  Remains afebrile: and WBC with improvement

## 2017-06-09 NOTE — PROGRESS NOTE ADULT - SUBJECTIVE AND OBJECTIVE BOX
[facility] [phone]        Patient seen and examined bed.  SHe was not SOB.  Uneventful night        MEDICATIONS  (STANDING):  heparin  Injectable 5000Unit(s) SubCutaneous every 8 hours  aspirin  chewable 81milliGRAM(s) Oral daily  insulin lispro (HumaLOG) corrective regimen sliding scale  SubCutaneous every 4 hours  metoclopramide 10milliGRAM(s) Oral every 8 hours  epoetin ramón Injectable 52459Cfcb(s) IV Push <User Schedule>      VITAL:  T(C): , Max: 37.3 (06-08 @ 19:39)  T(F): , Max: 99.1 (06-08 @ 19:39)  HR: 68  BP: 133/64  BP(mean): --  RR: 17  SpO2: 96%  Wt(kg): --    I and O's:    I & Os for current day (as of 06-09 @ 08:52)  =============================================  IN: 935 ml / OUT: 1000 ml / NET: -65 ml        PHYSICAL EXAM:    Constitutional: NAD  HEENT: PERRLA    Neck:  No JVD  Respiratory: CTAB/L  Cardiovascular: S1 and S2  Gastrointestinal: BS+, soft, NT/ND  Extremities: No peripheral edema  Neurological: A/O x 3, no focal deficits  Psychiatric: Normal mood, normal affect  : No Raphael  Skin: No rashes  Access: + perm cath    LABS:                        7.6    13.86 )-----------( 677      ( 08 Jun 2017 13:30 )             25.0     06-08    139  |  95<L>  |  48<H>  ----------------------------<  78  3.8   |  23  |  4.07<H>    Ca    9.6      08 Jun 2017 13:30

## 2017-06-09 NOTE — CONSULT NOTE ADULT - ASSESSMENT
67F w/ multiple comorbidities and most recently progressive anuric renal failure - requiring HD access on the long term  - AVF planning   - Bilateral upper extremity vein mapping ordered.   - Protect the right arm (NO BP Cuffs, NO IV sticks, No blood draws) for now (although patient is R hand dominant, she has an IV on the left arm).   - Will follow.   - D/w Dr Caban. 67F w/ multiple comorbidities and most recently progressive anuric renal failure - requiring HD access on the long term  - AVF planning   - Bilateral upper extremity vein mapping ordered.   - Protect the right arm (NO BP Cuffs, NO IV sticks, No blood draws) for now (although patient is R hand dominant, she has an IV on the left arm).   - Will follow.   - D/w Dr Caban.   - Please page vascular surgery at [phone] w/ further questions.

## 2017-06-09 NOTE — PROGRESS NOTE ADULT - SUBJECTIVE AND OBJECTIVE BOX
Patient is a 67y old  Female who presents with a chief complaint of found unresponsive; intubated in field (05 Jun 2017 17:05)    patient is worried about her urine output       Any change in ROS:     MEDICATIONS  (STANDING):  heparin  Injectable 5000Unit(s) SubCutaneous every 8 hours  aspirin  chewable 81milliGRAM(s) Oral daily  insulin lispro (HumaLOG) corrective regimen sliding scale  SubCutaneous every 4 hours  metoclopramide 10milliGRAM(s) Oral every 8 hours  epoetin ramón Injectable 33279Ukse(s) IV Push <User Schedule>    MEDICATIONS  (PRN):  acetaminophen    Suspension. 650milliGRAM(s) Oral every 6 hours PRN Mild (1-3) and Moderate Pain (4 - 6)  artificial  tears Solution 1Drop(s) Both EYES every 4 hours PRN Dry Eyes    Vital Signs Last 24 Hrs  T(C): 36.7, Max: 37.3 (06-08 @ 19:39)  T(F): 98.1, Max: 99.1 (06-08 @ 19:39)  HR: 68 (68 - 73)  BP: 133/64 (123/62 - 133/64)  BP(mean): --  RR: 17 (17 - 18)  SpO2: 96% (96% - 98%)    I&O's Summary    I & Os for current day (as of 09 Jun 2017 12:09)  =============================================  IN: 935 ml / OUT: 1000 ml / NET: -65 ml        Physical Exam:   GENERAL: NAD, well-groomed, well-developed  HEENT: MALACHI/   Atraumatic, Normocephalic  ENMT: No tonsillar erythema, exudates, or enlargement; Moist mucous membranes, Good dentition, No lesions  NECK: Supple, No JVD, Normal thyroid  CHEST/LUNG: Decreased air entry left base !!  CVS: Regular rate and rhythm; No murmurs, rubs, or gallops  GI: : Soft, Nontender, Nondistended; Bowel sounds present  NERVOUS SYSTEM:  Alert & Oriented X3, Good concentration  EXTREMITIES:  2+ Peripheral Pulses, No clubbing, cyanosis, or edema  LYMPH: No lymphadenopathy noted  SKIN: No rashes or lesions  ENDOCRINOLOGY: No Thyromegaly  PSYCH: Appropriate    Labs:                              8.6    11.69 )-----------( 814      ( 09 Jun 2017 08:40 )             28.3                         7.6    13.86 )-----------( 677      ( 08 Jun 2017 13:30 )             25.0                         7.9    14.62 )-----------( 634      ( 07 Jun 2017 09:27 )             26.0                         7.4    23.56 )-----------( 627      ( 06 Jun 2017 16:04 )             24.3     06-09    133<L>  |  92<L>  |  37<H>  ----------------------------<  98  4.0   |  27  |  3.59<H>  06-08    139  |  95<L>  |  48<H>  ----------------------------<  78  3.8   |  23  |  4.07<H>  06-07    142  |  98  |  31<H>  ----------------------------<  105<H>  4.0   |  25  |  3.30<H>  06-06    141  |  95<L>  |  55<H>  ----------------------------<  106<H>  3.7   |  27  |  4.88<H>    Ca    9.1      09 Jun 2017 08:40  Ca    9.6      08 Jun 2017 13:30      CAPILLARY BLOOD GLUCOSE  92 (09 Jun 2017 10:07)  106 (09 Jun 2017 06:00)  116 (09 Jun 2017 02:04)  111 (08 Jun 2017 21:21)  105 (08 Jun 2017 16:39)  105 (08 Jun 2017 13:51)            Cultures:           Wound culture:                06-07 @ 13:36  Organism --  Culture w/ gram stain --  Specimen Source .Urine Catheterized      Abscess culture:             06-07 @ 13:36  Organism --  Gram Stain --  Specimen Source .Urine Catheterized        Tissue culture:           06-07 @ 13:36  Organism --  Gram Stain --  Specimen Source .Urine Catheterized      Body Fluid Smear & Culture:                        06-07 @ 13:36  AFB Smear  --  Culture Acid Fast Body Fluid w/ Smear  --  Culture Acid Fast Smear Concentrated   --    Culture Results:       Culture grew 3 or more types of organisms which indicate  collection contamination; consider recollection only if clinically  indicated.  Specimen Source .Urine Catheterized            Studies  Chest X-RAY  CT SCAN Chest EXAM:  CHEST SINGLE AP OR PA                            PROCEDURE DATE:  06/07/2017            INTERPRETATION:  CLINICAL INDICATION: Leukocytosis; recent completion of   antibiotics for pneumonia    TECHNIQUE: Frontal radiograph of the chest    COMPARISON:  Radiograph of the chest from June 4, 2017      FINDINGS:    There is a left-sided central venous catheter with its tip in the SVC.   There is right lower lung linear atelectasis. There is no evidence of   pneumothorax. There is a small left pleural effusion. The   cardiomediastinal silhouette is stable in size.    IMPRESSION:  Right lower lung linear atelectasis. Small left pleural effusion.  Venous Dopplers: LE:   CT Abdomen  Others

## 2017-06-09 NOTE — PROGRESS NOTE ADULT - SUBJECTIVE AND OBJECTIVE BOX
Subjective: Patient seen and examined. No new events except as noted.   no cp or shortness of breath     REVIEW OF SYSTEMS:    CONSTITUTIONAL: No weakness, fevers or chills  EYES/ENT: No visual changes;  No vertigo or throat pain   NECK: No pain or stiffness  RESPIRATORY: No cough, wheezing, hemoptysis; No shortness of breath  CARDIOVASCULAR: No chest pain or palpitations  GASTROINTESTINAL: No abdominal or epigastric pain. No nausea, vomiting, or hematemesis; No diarrhea or constipation. No melena or hematochezia.  GENITOURINARY: No dysuria, frequency or hematuria  NEUROLOGICAL: No numbness or weakness  SKIN: No itching, burning, rashes, or lesions   All other review of systems is negative unless indicated above.    MEDICATIONS:  MEDICATIONS  (STANDING):  heparin  Injectable 5000Unit(s) SubCutaneous every 8 hours  aspirin  chewable 81milliGRAM(s) Oral daily  insulin lispro (HumaLOG) corrective regimen sliding scale  SubCutaneous every 4 hours  metoclopramide 10milliGRAM(s) Oral every 8 hours  epoetin ramón Injectable 63828Iwyn(s) IV Push <User Schedule>      PHYSICAL EXAM:  T(C): 36.7, Max: 37.3 (06-08 @ 19:39)  HR: 68 (68 - 73)  BP: 133/64 (123/62 - 133/64)  RR: 17 (17 - 18)  SpO2: 96% (96% - 100%)  Wt(kg): --  I&O's Summary    I & Os for current day (as of 09 Jun 2017 10:15)  =============================================  IN: 935 ml / OUT: 1000 ml / NET: -65 ml        Appearance: Normal	  HEENT:   Normal oral mucosa, PERRL, EOMI	  Lymphatic: No lymphadenopathy , no edema  Cardiovascular: Normal S1 S2, No JVD, No murmurs , Peripheral pulses palpable 2+ bilaterally  Respiratory: Lungs clear to auscultation, normal effort 	  Gastrointestinal:  Soft, Non-tender, + BS	  Skin: No rashes, No ecchymoses, No cyanosis, warm to touch  Musculoskeletal: Normal range of motion, normal strength  Psychiatry:  Mood & affect appropriate  Ext: No edema      LABS:    CARDIAC MARKERS:                                8.6    11.69 )-----------( 814      ( 09 Jun 2017 08:40 )             28.3     06-09    133<L>  |  92<L>  |  37<H>  ----------------------------<  98  4.0   |  27  |  3.59<H>    Ca    9.1      09 Jun 2017 08:40      proBNP:   Lipid Profile:   HgA1c:   TSH:           TELEMETRY: 	    ECG:  	  RADIOLOGY:   DIAGNOSTIC TESTING:  [ ] Echocardiogram:  [ ]  Catheterization:  [ ] Stress Test:    OTHER:

## 2017-06-09 NOTE — PROGRESS NOTE ADULT - PROBLEM SELECTOR PLAN 2
Elevated WBC (but decreasing) and afebrile and not septic appearing.  SP IV abx for PNA(off iv abx at present)

## 2017-06-09 NOTE — PROGRESS NOTE ADULT - PROBLEM SELECTOR PLAN 1
pt is anuric  , discussed with  renal  and will require ti  AV fistula   for chronic HD  . vascular consult  called

## 2017-06-10 DIAGNOSIS — D64.9 ANEMIA, UNSPECIFIED: ICD-10-CM

## 2017-06-10 DIAGNOSIS — I82.90 ACUTE EMBOLISM AND THROMBOSIS OF UNSPECIFIED VEIN: ICD-10-CM

## 2017-06-10 RX ORDER — INSULIN LISPRO 100/ML
VIAL (ML) SUBCUTANEOUS EVERY 6 HOURS
Qty: 0 | Refills: 0 | Status: DISCONTINUED | OUTPATIENT
Start: 2017-06-10 | End: 2017-06-15

## 2017-06-10 RX ORDER — METOCLOPRAMIDE HCL 10 MG
10 TABLET ORAL EVERY 8 HOURS
Qty: 0 | Refills: 0 | Status: DISCONTINUED | OUTPATIENT
Start: 2017-06-10 | End: 2017-06-15

## 2017-06-10 RX ADMIN — ERYTHROPOIETIN 10000 UNIT(S): 10000 INJECTION, SOLUTION INTRAVENOUS; SUBCUTANEOUS at 23:32

## 2017-06-10 RX ADMIN — Medication 10 MILLIGRAM(S): at 16:40

## 2017-06-10 RX ADMIN — Medication 650 MILLIGRAM(S): at 10:52

## 2017-06-10 RX ADMIN — HEPARIN SODIUM 5000 UNIT(S): 5000 INJECTION INTRAVENOUS; SUBCUTANEOUS at 16:39

## 2017-06-10 RX ADMIN — Medication 81 MILLIGRAM(S): at 16:40

## 2017-06-10 RX ADMIN — HEPARIN SODIUM 5000 UNIT(S): 5000 INJECTION INTRAVENOUS; SUBCUTANEOUS at 21:44

## 2017-06-10 RX ADMIN — Medication 10 MILLIGRAM(S): at 05:56

## 2017-06-10 RX ADMIN — HEPARIN SODIUM 5000 UNIT(S): 5000 INJECTION INTRAVENOUS; SUBCUTANEOUS at 05:56

## 2017-06-10 RX ADMIN — Medication 650 MILLIGRAM(S): at 11:20

## 2017-06-10 RX ADMIN — Medication 10 MILLIGRAM(S): at 21:44

## 2017-06-10 NOTE — PROGRESS NOTE ADULT - SUBJECTIVE AND OBJECTIVE BOX
Subjective: Patient seen and examined. No new events except as noted.   no chest pain or sob    REVIEW OF SYSTEMS:    CONSTITUTIONAL: No weakness, fevers or chills  EYES/ENT: No visual changes;  No vertigo or throat pain   NECK: No pain or stiffness  RESPIRATORY: No cough, wheezing, hemoptysis; No shortness of breath  CARDIOVASCULAR: No chest pain or palpitations  GASTROINTESTINAL: No abdominal or epigastric pain. No nausea, vomiting, or hematemesis; No diarrhea or constipation. No melena or hematochezia.  GENITOURINARY: No dysuria, frequency or hematuria  NEUROLOGICAL: No numbness or weakness  SKIN: No itching, burning, rashes, or lesions   All other review of systems is negative unless indicated above.    MEDICATIONS:  MEDICATIONS  (STANDING):  heparin  Injectable 5000Unit(s) SubCutaneous every 8 hours  aspirin  chewable 81milliGRAM(s) Oral daily  epoetin ramón Injectable 03936Cjwn(s) IV Push <User Schedule>  insulin lispro (HumaLOG) corrective regimen sliding scale  SubCutaneous every 6 hours  metoclopramide 10milliGRAM(s) Oral every 8 hours      PHYSICAL EXAM:  T(C): 37.1, Max: 37.1 (06-10 @ 20:10)  HR: 75 (60 - 75)  BP: 125/64 (95/45 - 130/58)  RR: 18 (17 - 18)  SpO2: 98% (94% - 98%)  Wt(kg): --  I&O's Summary  I & Os for 24h ending 10 Jose 2017 07:00  =============================================  IN: 1020 ml / OUT: 0 ml / NET: 1020 ml    I & Os for current day (as of 10 Jose 2017 22:30)  =============================================  IN: 0 ml / OUT: 1000 ml / NET: -1000 ml        Appearance: Normal	  HEENT:   Normal oral mucosa, PERRL, EOMI	  Lymphatic: No lymphadenopathy , no edema  Cardiovascular: Normal S1 S2, No JVD, No murmurs , Peripheral pulses palpable 2+ bilaterally  Respiratory: Lungs clear to auscultation, normal effort 	  Gastrointestinal:  Soft, Non-tender, + BS	  Skin: No rashes, No ecchymoses, No cyanosis, warm to touch  Musculoskeletal: Normal range of motion, normal strength  Psychiatry:  Mood & affect appropriate  Ext: No edema      LABS:    CARDIAC MARKERS:                                8.6    11.69 )-----------( 814      ( 09 Jun 2017 08:40 )             28.3     06-09    133<L>  |  92<L>  |  37<H>  ----------------------------<  98  4.0   |  27  |  3.59<H>    Ca    9.1      09 Jun 2017 08:40      proBNP:   Lipid Profile:   HgA1c:   TSH:           TELEMETRY: 	    ECG:  	  RADIOLOGY:   DIAGNOSTIC TESTING:  [ ] Echocardiogram:  [ ]  Catheterization:  [ ] Stress Test:    OTHER:

## 2017-06-10 NOTE — PROGRESS NOTE ADULT - ASSESSMENT
This is a 67-year-old female with a past medical history of lymphedema, diabetes, hypertension, chronic \

## 2017-06-10 NOTE — PROGRESS NOTE ADULT - PROBLEM SELECTOR PLAN 3
Epogen at HD  May need blood xfusion if the HH remains low Epogen at HD  H/H overall stable today no current indication for PRBC.   May need blood xfusion in the future

## 2017-06-10 NOTE — PROGRESS NOTE ADULT - SUBJECTIVE AND OBJECTIVE BOX
no events. doing ok. denies SOB, cough, chest pain    MEDICATIONS  (STANDING):  heparin  Injectable 5000Unit(s) SubCutaneous every 8 hours  aspirin  chewable 81milliGRAM(s) Oral daily  metoclopramide 10milliGRAM(s) Oral every 8 hours  epoetin ramón Injectable [id number](s) IV Push <User Schedule>  insulin lispro (HumaLOG) corrective regimen sliding scale  SubCutaneous every 6 hours    MEDICATIONS  (PRN):  acetaminophen    Suspension. 650milliGRAM(s) Oral every 6 hours PRN Mild (1-3) and Moderate Pain (4 - 6)  artificial  tears Solution 1Drop(s) Both EYES every 4 hours PRN Dry Eyes    Vital Signs Last 24 Hrs  T(C): 36.7, Max: 37.1 ( @ 19:21)  T(F): 98.1, Max: 98.7 ( @ 19:21)  HR: 66 (66 - 68)  BP: 124/57 (103/61 - 148/69)  BP(mean): --  RR: 18 (18 - 18)  SpO2: 94% (94% - 98%)    I&O's Summary    I & Os for current day (as of 10 Jose 2017 09:49)  =============================================  IN: 1020 ml / OUT: 0 ml / NET: 1020 ml      Physical Exam:   Const: NAD  Respiratory: CTA bilat  CVS: S1, S2 no murmur  GI: soft non-tender  CNS alert, follows commends appropriately  SKIN: dry intact. normal color    Labs:                          8.6    11.69 )-----------( 814      ( 2017 08:40 )             28.3     -    133<L>  |  92<L>  |  37<H>  ----------------------------<  98  4.0   |  27  |  3.59<H>    Ca    9.1      2017 08:40      CAPILLARY BLOOD GLUCOSE  111 (10 Jose 2017 05:47)  110 (10 Jose 2017 01:48)  129 (2017 22:24)  99 (2017 17:28)  125 (2017 13:58)  92 (2017 10:07)        Urinalysis Basic - ( 2017 20:12 )    Color: Yellow / Appearance: Turbid / S.016 / pH: x  Gluc: x / Ketone: Negative  / Bili: Negative / Urobili: Negative   Blood: x / Protein: 500 mg/dL / Nitrite: Negative   Leuk Esterase: Large / RBC: 10-25 /HPF / WBC >50 /HPF   Sq Epi: x / Non Sq Epi: Many /HPF / Bacteria: Many /HPF      D DImer  Cultures:           Wound culture:                 @ 13:36  Organism --  Culture w/ gram stain --  Specimen Source .Urine Catheterized      Abscess culture:              @ 13:36  Organism --  Gram Stain --  Specimen Source .Urine Catheterized        Tissue culture:            @ 13:36  Organism --  Gram Stain --  Specimen Source .Urine Catheterized      Body Fluid Smear & Culture:                         @ 13:36  AFB Smear  --  Culture Acid Fast Body Fluid w/ Smear  --  Culture Acid Fast Smear Concentrated   --    Culture Results:       Culture grew 3 or more types of organisms which indicate  collection contamination; consider recollection only if clinically  indicated.  Specimen Source .Urine Catheterized      Studies  Chest X-RAY  EXAM:  CHEST SINGLE AP OR PA                            PROCEDURE DATE:  2017            INTERPRETATION:  CLINICAL INDICATION: Leukocytosis; recent completion of   antibiotics for pneumonia    TECHNIQUE: Frontal radiograph of the chest    COMPARISON:  Radiograph of the chest from 2017      FINDINGS:    There is a left-sided central venous catheter with its tip in the SVC.   There is right lower lung linear atelectasis. There is no evidence of   pneumothorax. There is a small left pleural effusion. The   cardiomediastinal silhouette is stable in size.    IMPRESSION:  Right lower lung linear atelectasis. Small left pleural effusion.  CT SCAN Chest   EXAM:  CT ABDOMEN AND PELVIS OC                          EXAM:  CT CHEST                            PROCEDURE DATE:  2017            INTERPRETATION:  CLINICAL INFORMATION: Respiratory failure, diarrhea.    COMPARISON: CT chest 3/12/2017, CT abdomen and pelvis 3/2/2017.    PROCEDURE:   CT of the Chest, Abdomen and Pelvis was performed without intravenous   contrast.   Intravenous contrast: None.  Oral contrast: None.  Sagittal and coronal reformats were performed.    FINDINGS:    CHEST:     LUNGS AND LARGE AIRWAYS: Endotracheal tube is above the myesha. Complete   atelectasis of the left lower lobe and segmental atelectasis of the right   lower lobe. Bilateral ground glass opacities and interlobular septal   thickening consistent with pulmonary edema.  PLEURA: Moderate bilateral pleural effusions.  VESSELS: Atheromatous changes of the aorta and coronary arteries.  HEART: Heart size is normal. Small pericardial effusion, which is new   compared to prior exam on 3/12/2017.  MEDIASTINUM AND ELO: No lymphadenopathy.  CHEST WALL AND LOWER NECK: Within normal limits.    ABDOMEN AND PELVIS:    LIVER: Within normal limits.  BILE DUCTS: Normal caliber.  GALLBLADDER: Contracted with mild diffuse nonspecific wall thickening.  SPLEEN: Within normal limits.  PANCREAS: Within normal limits.  ADRENALS: Within normal limits.  KIDNEYS/URETERS: Within normal limits.    BLADDER: Raphael catheter within a collapsed bladder.  REPRODUCTIVE ORGANS: The uterus and adnexa are within normal limits.    BOWEL: Small hiatal hernia. Percutaneous gastrostomy tube with the distal   tip in the third portion of the duodenum. No bowel obstruction. Appendix   is normal.  PERITONEUM: Small perihepatic ascites.  VESSELS:  Atheromatous changes.  RETROPERITONEUM: No lymphadenopathy.    ABDOMINAL WALL: Within normal limits.  BONES: Old right rib and sternal fractures. Stable compression deformity   of the L2 vertebral body. Multilevel degenerative changes of the spine.    IMPRESSION:    Moderate bilateral pleural effusions and pulmonary edema. Associated   compressive atelectasis.    Small pericardial effusion which is new compared to prior exam 3/12/2017.    Nonspecific gallbladder wall edema and perihepatic ascites.  CT Abdomen  Venous Dopplers: LE:   Others

## 2017-06-10 NOTE — PROGRESS NOTE ADULT - SUBJECTIVE AND OBJECTIVE BOX
Patient seen and examined    REVIEW OF SYSTEMS:    CONSTITUTIONAL: No weakness, fevers or chills  EYES/ENT: No visual changes;  No vertigo or throat pain   NECK: No pain or stiffness  RESPIRATORY: No cough, wheezing, hemoptysis; No shortness of breath  CARDIOVASCULAR: No chest pain or palpitations  GASTROINTESTINAL: No abdominal or epigastric pain. No nausea, vomiting, or hematemesis; No diarrhea or constipation. No melena or hematochezia.  GENITOURINARY: No dysuria, frequency or hematuria  NEUROLOGICAL: No numbness or weakness  SKIN: No itching, burning, rashes, or lesions   All other review of systems is negative unless indicated above.      MEDICATIONS  (STANDING):  heparin  Injectable 5000Unit(s) SubCutaneous every 8 hours  aspirin  chewable 81milliGRAM(s) Oral daily  metoclopramide 10milliGRAM(s) Oral every 8 hours  epoetin ramón Injectable 70301Ipxv(s) IV Push <User Schedule>  insulin lispro (HumaLOG) corrective regimen sliding scale  SubCutaneous every 6 hours    MEDICATIONS  (PRN):  acetaminophen    Suspension. 650milliGRAM(s) Oral every 6 hours PRN Mild (1-3) and Moderate Pain (4 - 6)  artificial  tears Solution 1Drop(s) Both EYES every 4 hours PRN Dry Eyes      VITAL:  T(F): , Max: 98.7 ( @ 19:21)  HR: 66  BP: 124/57  BP(mean): --  RR: 18  SpO2: 94%  Wt(kg): --    I and O's:    I & Os for current day (as of 06-10 @ 08:53)  =============================================  IN: 1020 ml / OUT: 0 ml / NET: 1020 ml        PHYSICAL EXAM:    Constitutional: NAD  HEENT: PERRLA, EOMI,  MMM  Neck: No LAD, No JVD  Respiratory: CTAB  Cardiovascular: S1 and S2  Gastrointestinal: BS+, soft, NT/ND  Extremities: No peripheral edema  Neurological: A/O x 3, no focal deficits  Psychiatric: Normal mood, normal affect  : No Raphael  Skin: No rashes  Access: Not applicable    LABS:                          8.6    11.69 )-----------( 814      ( 2017 08:40 )             28.3                         7.6    13.86 )-----------( 677      ( 2017 13:30 )             25.0                         7.9    14.62 )-----------( 634      ( 2017 09:27 )             26.0     06-09    133<L>  |  92<L>  |  37<H>  ----------------------------<  98  4.0   |  27  |  3.59<H>  06-08    139  |  95<L>  |  48<H>  ----------------------------<  78  3.8   |  23  |  4.07<H>  06-07    142  |  98  |  31<H>  ----------------------------<  105<H>  4.0   |  25  |  3.30<H>          Urine Studies:  Urinalysis Basic - ( 2017 20:12 )    Color: Yellow / Appearance: Turbid / S.016 / pH: x  Gluc: x / Ketone: Negative  / Bili: Negative / Urobili: Negative   Blood: x / Protein: 500 mg/dL / Nitrite: Negative   Leuk Esterase: Large / RBC: 10-25 /HPF / WBC >50 /HPF   Sq Epi: x / Non Sq Epi: Many /HPF / Bacteria: Many /HPF            RADIOLOGY & ADDITIONAL STUDIES: Patient seen and examined lying in bed awake    REVIEW OF SYSTEMS:    CONSTITUTIONAL: No weakness, fevers or chills  EYES/ENT: No visual changes;  No vertigo or throat pain   NECK: No pain or stiffness  RESPIRATORY: No cough, wheezing, hemoptysis; No shortness of breath  CARDIOVASCULAR: No chest pain or palpitations  GASTROINTESTINAL: No abdominal or epigastric pain. No nausea, vomiting, or hematemesis; No diarrhea or constipation. No melena or hematochezia.  GENITOURINARY: No dysuria, frequency or hematuria  NEUROLOGICAL: No numbness or weakness  SKIN: No itching, burning, rashes, or lesions   All other review of systems is negative unless indicated above.      MEDICATIONS  (STANDING):  heparin  Injectable 5000Unit(s) SubCutaneous every 8 hours  aspirin  chewable 81milliGRAM(s) Oral daily  metoclopramide 10milliGRAM(s) Oral every 8 hours  epoetin ramón Injectable 11741Yfgy(s) IV Push <User Schedule>  insulin lispro (HumaLOG) corrective regimen sliding scale  SubCutaneous every 6 hours    MEDICATIONS  (PRN):  acetaminophen    Suspension. 650milliGRAM(s) Oral every 6 hours PRN Mild (1-3) and Moderate Pain (4 - 6)  artificial  tears Solution 1Drop(s) Both EYES every 4 hours PRN Dry Eyes      VITAL:  T(F): , Max: 98.7 ( @ 19:21)  HR: 66  BP: 124/57  BP(mean): --  RR: 18  SpO2: 94%  Wt(kg): --    I and O's:    I & Os for current day (as of 06-10 @ 08:53)  =============================================  IN: 1020 ml / OUT: 0 ml / NET: 1020 ml        PHYSICAL EXAM:    Constitutional: NAD  HEENT: PERRLA, EOMI,  MMM  Neck: No LAD, No JVD  Respiratory: CTAB  Cardiovascular: S1 and S2  Gastrointestinal: BS+, soft, NT/ND  Extremities: No peripheral edema  Neurological: A/O x 3, no focal deficits  Psychiatric: Normal mood, normal affect  : No Arphael  Skin: No rashes  Access: Not applicable    LABS:                          8.6    11.69 )-----------( 814      ( 2017 08:40 )             28.3                         7.6    13.86 )-----------( 677      ( 2017 13:30 )             25.0                         7.9    14.62 )-----------( 634      ( 2017 09:27 )             26.0     06-09    133<L>  |  92<L>  |  37<H>  ----------------------------<  98  4.0   |  27  |  3.59<H>  06-08    139  |  95<L>  |  48<H>  ----------------------------<  78  3.8   |  23  |  4.07<H>  06-07    142  |  98  |  31<H>  ----------------------------<  105<H>  4.0   |  25  |  3.30<H>          Urine Studies:  Urinalysis Basic - ( 2017 20:12 )    Color: Yellow / Appearance: Turbid / S.016 / pH: x  Gluc: x / Ketone: Negative  / Bili: Negative / Urobili: Negative   Blood: x / Protein: 500 mg/dL / Nitrite: Negative   Leuk Esterase: Large / RBC: 10-25 /HPF / WBC >50 /HPF   Sq Epi: x / Non Sq Epi: Many /HPF / Bacteria: Many /HPF            RADIOLOGY & ADDITIONAL STUDIES: Patient seen and examined lying in bed awake    REVIEW OF SYSTEMS:    CONSTITUTIONAL: NAD    MEDICATIONS  (STANDING):  heparin  Injectable 5000Unit(s) SubCutaneous every 8 hours  aspirin  chewable 81milliGRAM(s) Oral daily  metoclopramide 10milliGRAM(s) Oral every 8 hours  epoetin ramón Injectable 50561Sulr(s) IV Push <User Schedule>  insulin lispro (HumaLOG) corrective regimen sliding scale  SubCutaneous every 6 hours    MEDICATIONS  (PRN):  acetaminophen    Suspension. 650milliGRAM(s) Oral every 6 hours PRN Mild (1-3) and Moderate Pain (4 - 6)  artificial  tears Solution 1Drop(s) Both EYES every 4 hours PRN Dry Eyes      VITAL:  T(F): , Max: 98.7 ( @ 19:21)  HR: 66  BP: 124/57  BP(mean): --  RR: 18  SpO2: 94%  Wt(kg): --    I and O's:    I & Os for current day (as of 06-10 @ 08:53)  =============================================  IN: 1020 ml / OUT: 0 ml / NET: 1020 ml        PHYSICAL EXAM:    Constitutional: NAD  HEENT: PERRLA, EOMI,  MMM  Neck: No LAD, No JVD  Respiratory: CTAB  Cardiovascular: S1 and S2  Gastrointestinal: BS+, soft, NT/ND  Extremities: No peripheral edema  Neurological: A/O x 3, no focal deficits  Psychiatric: Normal mood, normal affect  : No Raphael  Skin: No rashes  Access: Not applicable    LABS:                          8.6    11.69 )-----------( 814      ( 2017 08:40 )             28.3                         7.6    13.86 )-----------( 677      ( 2017 13:30 )             25.0                         7.9    14.62 )-----------( 634      ( 2017 09:27 )             26.0     06-09    133<L>  |  92<L>  |  37<H>  ----------------------------<  98  4.0   |  27  |  3.59<H>  06-08    139  |  95<L>  |  48<H>  ----------------------------<  78  3.8   |  23  |  4.07<H>  06-07    142  |  98  |  31<H>  ----------------------------<  105<H>  4.0   |  25  |  3.30<H>          Urine Studies:  Urinalysis Basic - ( 2017 20:12 )    Color: Yellow / Appearance: Turbid / S.016 / pH: x  Gluc: x / Ketone: Negative  / Bili: Negative / Urobili: Negative   Blood: x / Protein: 500 mg/dL / Nitrite: Negative   Leuk Esterase: Large / RBC: 10-25 /HPF / WBC >50 /HPF   Sq Epi: x / Non Sq Epi: Many /HPF / Bacteria: Many /HPF

## 2017-06-10 NOTE — PROGRESS NOTE ADULT - SUBJECTIVE AND OBJECTIVE BOX
Chief complaint:c/o no chest pain , pain had urinary out yesterday , not SOB    HPI:  67 F with T2DM, afib on coumadin, HTN, HLD, CKD IV, and ICU admission in 2016 with prolonged hospitalization where she was hospitalized for RLE cellulitis/osteomyelitis course c/b afib and PEA arrest requiring intubation s/p trach given prolonged wean, NEHAL requiring HD, and dysphagia requiring PEG tube placement  brought in by EMS after being found minimally responsive, and subsequently intubated. Per ED note, EMS noted that patient appeared to be having severe diarrhea and had poor access so IO site was made. Normally a/o x2 and able to hold a conversation per son. Per family by bedside, patient was seen by family the day before and she had no complaints and no changes in mental status.     In the ED:  T 96.7, 146/69, 82-102bpm, 100% O2. Leukocytosis to 14.5, no bandemia. Hyponatremic to 129, NEHAL with Cr to 7.78 with BUN of 173. Acidotic with pH of 7.14 on ABG, AG of 28, bicarb of 14, lactate of 1.3, and pCO2 of 46. CXR revealed opacification of L lung. (25 May 2017 14:32)      REVIEW OF SYSTEMS:    CONSTITUTIONAL: No fever, weight loss, or fatigue  NECK: No pain or stiffness  RESPIRATORY: No cough, wheezing, chills or hemoptysis; No shortness of breath  CARDIOVASCULAR: No chest pain, palpitations, dizziness, or leg swelling  GASTROINTESTINAL: No abdominal or epigastric pain. No nausea, vomiting, or hematemesis; No diarrhea or constipation. No melena or hematochezia.  GENITOURINARY: No dysuria, frequency, hematuria, or incontinence  NEUROLOGICAL: No headaches, memory loss, loss of strength, numbness, or tremors  SKIN: No itching, burning, rashes, or lesions   LYMPH NODES: No enlarged glands  MUSCULOSKELETAL: No joint pain or swelling; No muscle, back, or extremity pain  HEME/LYMPH: No easy bruising, or bleeding gums    MEDICATIONS  (STANDING):  heparin  Injectable 5000Unit(s) SubCutaneous every 8 hours  aspirin  chewable 81milliGRAM(s) Oral daily  metoclopramide 10milliGRAM(s) Oral every 8 hours  epoetin ramón Injectable [id number](s) IV Push <User Schedule>  insulin lispro (HumaLOG) corrective regimen sliding scale  SubCutaneous every 6 hours    MEDICATIONS  (PRN):  acetaminophen    Suspension. 650milliGRAM(s) Oral every 6 hours PRN Mild (1-3) and Moderate Pain (4 - 6)  artificial  tears Solution 1Drop(s) Both EYES every 4 hours PRN Dry Eyes      Allergies    No Known Allergies    Intolerances        Vital Signs Last 24 Hrs  T(C): 36.7, Max: 37.1 ( @ 19:21)  T(F): 98.1, Max: 98.7 ( @ 19:21)  HR: 66 (66 - 68)  BP: 124/57 (103/61 - 148/69)  BP(mean): --  RR: 18 (18 - 18)  SpO2: 94% (94% - 98%)    PHYSICAL EXAM:    GENERAL: NAD, well-groomed, well-developed  HEAD:  Atraumatic, Normocephalic  EYES: EOMI, PERRLA, conjunctiva and sclera clear  ENMT: No tonsillar erythema, exudates, or enlargement; Moist mucous membranes, Good dentition, No lesions  NECK: Supple, No JVD, Normal thyroid  NERVOUS SYSTEM:  Alert & Oriented X3, Good concentration; Motor Strength 5/5 B/L upper and lower extremities; DTRs 2+ intact and symmetric  CHEST/LUNG: Clear to percussion bilaterally; No rales, rhonchi, wheezing, or rubs  HEART: Regular rate and rhythm; No murmurs, rubs, or gallops  ABDOMEN: Soft, Nontender, Nondistended; Bowel sounds present  EXTREMITIES:  2+ Peripheral Pulses, No clubbing, cyanosis, or edema  LYMPH: No lymphadenopathy noted  SKIN: No rashes or lesions      LABS:                        8.6    11.69 )-----------( 814      ( 2017 08:40 )             28.3     06-    133<L>  |  92<L>  |  37<H>  ----------------------------<  98  4.0   |  27  |  3.59<H>    Ca    9.1      2017 08:40        Urinalysis Basic - ( 2017 20:12 )    Color: Yellow / Appearance: Turbid / S.016 / pH: x  Gluc: x / Ketone: Negative  / Bili: Negative / Urobili: Negative   Blood: x / Protein: 500 mg/dL / Nitrite: Negative   Leuk Esterase: Large / RBC: 10-25 /HPF / WBC >50 /HPF   Sq Epi: x / Non Sq Epi: Many /HPF / Bacteria: Many /HPF        RADIOLOGY & ADDITIONAL STUDIES:

## 2017-06-10 NOTE — PROGRESS NOTE ADULT - PROBLEM SELECTOR PLAN 1
Again unclear if she will have any recovery.  At present she is anuric.  She may benefit from an AVF  HD in AM NEHAL on CKD now on HD unclear if there will be any renal recovery, remains anuric.  She may benefit from an AVF. Plan for HD today.

## 2017-06-11 LAB
ANION GAP SERPL CALC-SCNC: 15 MMOL/L — SIGNIFICANT CHANGE UP (ref 5–17)
BUN SERPL-MCNC: 32 MG/DL — HIGH (ref 7–23)
CALCIUM SERPL-MCNC: 9.9 MG/DL — SIGNIFICANT CHANGE UP (ref 8.4–10.5)
CHLORIDE SERPL-SCNC: 96 MMOL/L — SIGNIFICANT CHANGE UP (ref 96–108)
CO2 SERPL-SCNC: 25 MMOL/L — SIGNIFICANT CHANGE UP (ref 22–31)
CREAT SERPL-MCNC: 2.94 MG/DL — HIGH (ref 0.5–1.3)
GLUCOSE SERPL-MCNC: 108 MG/DL — HIGH (ref 70–99)
HCT VFR BLD CALC: 29.3 % — LOW (ref 34.5–45)
HGB BLD-MCNC: 8.7 G/DL — LOW (ref 11.5–15.5)
MCHC RBC-ENTMCNC: 26 PG — LOW (ref 27–34)
MCHC RBC-ENTMCNC: 29.7 GM/DL — LOW (ref 32–36)
MCV RBC AUTO: 87.7 FL — SIGNIFICANT CHANGE UP (ref 80–100)
PLATELET # BLD AUTO: 836 K/UL — HIGH (ref 150–400)
POTASSIUM SERPL-MCNC: 3.9 MMOL/L — SIGNIFICANT CHANGE UP (ref 3.5–5.3)
POTASSIUM SERPL-SCNC: 3.9 MMOL/L — SIGNIFICANT CHANGE UP (ref 3.5–5.3)
RBC # BLD: 3.34 M/UL — LOW (ref 3.8–5.2)
RBC # FLD: 15.7 % — HIGH (ref 10.3–14.5)
SODIUM SERPL-SCNC: 136 MMOL/L — SIGNIFICANT CHANGE UP (ref 135–145)
WBC # BLD: 12.2 K/UL — HIGH (ref 3.8–10.5)
WBC # FLD AUTO: 12.2 K/UL — HIGH (ref 3.8–10.5)

## 2017-06-11 RX ADMIN — HEPARIN SODIUM 5000 UNIT(S): 5000 INJECTION INTRAVENOUS; SUBCUTANEOUS at 05:11

## 2017-06-11 RX ADMIN — Medication 650 MILLIGRAM(S): at 21:22

## 2017-06-11 RX ADMIN — Medication 650 MILLIGRAM(S): at 11:37

## 2017-06-11 RX ADMIN — Medication 10 MILLIGRAM(S): at 05:11

## 2017-06-11 RX ADMIN — HEPARIN SODIUM 5000 UNIT(S): 5000 INJECTION INTRAVENOUS; SUBCUTANEOUS at 16:02

## 2017-06-11 RX ADMIN — HEPARIN SODIUM 5000 UNIT(S): 5000 INJECTION INTRAVENOUS; SUBCUTANEOUS at 21:21

## 2017-06-11 RX ADMIN — Medication 650 MILLIGRAM(S): at 12:10

## 2017-06-11 RX ADMIN — Medication 10 MILLIGRAM(S): at 16:02

## 2017-06-11 RX ADMIN — Medication 81 MILLIGRAM(S): at 11:37

## 2017-06-11 RX ADMIN — Medication 10 MILLIGRAM(S): at 21:22

## 2017-06-11 RX ADMIN — Medication 650 MILLIGRAM(S): at 21:53

## 2017-06-11 NOTE — PROGRESS NOTE ADULT - SUBJECTIVE AND OBJECTIVE BOX
Chief complaint:c/o no cough  and no dyspnea . no urine output     HPI:  67 F with T2DM, afib on coumadin, HTN, HLD, CKD IV, and ICU admission in 2016 with prolonged hospitalization where she was hospitalized for RLE cellulitis/osteomyelitis course c/b afib and PEA arrest requiring intubation s/p trach given prolonged wean, NEHAL requiring HD, and dysphagia requiring PEG tube placement  brought in by EMS after being found minimally responsive, and subsequently intubated. Per ED note, EMS noted that patient appeared to be having severe diarrhea and had poor access so IO site was made. Normally a/o x2 and able to hold a conversation per son. Per family by bedside, patient was seen by family the day before and she had no complaints and no changes in mental status.     In the ED:  T 96.7, 146/69, 82-102bpm, 100% O2. Leukocytosis to 14.5, no bandemia. Hyponatremic to 129, NEHAL with Cr to 7.78 with BUN of 173. Acidotic with pH of 7.14 on ABG, AG of 28, bicarb of 14, lactate of 1.3, and pCO2 of 46. CXR revealed opacification of L lung. (25 May 2017 14:32)      REVIEW OF SYSTEMS:    CONSTITUTIONAL: No fever, weight loss, or fatigue  NECK: No pain or stiffness  RESPIRATORY: No cough, wheezing, chills or hemoptysis; No shortness of breath  CARDIOVASCULAR: No chest pain, palpitations, dizziness, or leg swelling  GASTROINTESTINAL: No abdominal or epigastric pain. No nausea, vomiting, or hematemesis; No diarrhea or constipation. No melena or hematochezia.  GENITOURINARY: No dysuria, frequency, hematuria, or incontinence  NEUROLOGICAL: No headaches, memory loss, loss of strength, numbness, or tremors  SKIN: No itching, burning, rashes, or lesions   LYMPH NODES: No enlarged glands  MUSCULOSKELETAL: No joint pain or swelling; No muscle, back, or extremity pain  HEME/LYMPH: No easy bruising, or bleeding gums    MEDICATIONS  (STANDING):  heparin  Injectable 5000Unit(s) SubCutaneous every 8 hours  aspirin  chewable 81milliGRAM(s) Oral daily  epoetin ramón Injectable [id number](s) IV Push <User Schedule>  insulin lispro (HumaLOG) corrective regimen sliding scale  SubCutaneous every 6 hours  metoclopramide 10milliGRAM(s) Oral every 8 hours    MEDICATIONS  (PRN):  acetaminophen    Suspension. 650milliGRAM(s) Oral every 6 hours PRN Mild (1-3) and Moderate Pain (4 - 6)  artificial  tears Solution 1Drop(s) Both EYES every 4 hours PRN Dry Eyes      Allergies    No Known Allergies    Intolerances        Vital Signs Last 24 Hrs  T(C): 36.8, Max: 37.1 (-10 @ 20:10)  T(F): 98.3, Max: 98.8 (06-10 @ 20:10)  HR: 67 (60 - 75)  BP: 117/56 (95/45 - 130/58)  BP(mean): --  RR: 18 (17 - 18)  SpO2: 97% (95% - 98%)    PHYSICAL EXAM:    GENERAL: NAD, well-groomed, well-developed  HEAD:  Atraumatic, Normocephalic  EYES: EOMI, PERRLA, conjunctiva and sclera clear  ENMT: No tonsillar erythema, exudates, or enlargement; Moist mucous membranes, Good dentition, No lesions  NECK: Supple, No JVD, Normal thyroid  NERVOUS SYSTEM:  Alert & Oriented X3, Good concentration; Motor Strength 5/5 B/L upper and lower extremities; DTRs 2+ intact and symmetric  CHEST/LUNG: Clear to percussion bilaterally; No rales, rhonchi, wheezing, or rubs  HEART: Regular rate and rhythm; No murmurs, rubs, or gallops  ABDOMEN: Soft, Nontender, Nondistended; Bowel sounds present  EXTREMITIES:  2+ Peripheral Pulses, No clubbing, cyanosis, or edema  LYMPH: No lymphadenopathy noted  SKIN: No rashes or lesions      LABS:                        8.7    12.20 )-----------( 836      ( 2017 09:35 )             29.3     06-11    136  |  96  |  32<H>  ----------------------------<  108<H>  3.9   |  25  |  2.94<H>    Ca    9.9      2017 09:35        Urinalysis Basic - ( 2017 20:12 )    Color: Yellow / Appearance: Turbid / S.016 / pH: x  Gluc: x / Ketone: Negative  / Bili: Negative / Urobili: Negative   Blood: x / Protein: 500 mg/dL / Nitrite: Negative   Leuk Esterase: Large / RBC: 10-25 /HPF / WBC >50 /HPF   Sq Epi: x / Non Sq Epi: Many /HPF / Bacteria: Many /HPF        RADIOLOGY & ADDITIONAL STUDIES:

## 2017-06-11 NOTE — PROGRESS NOTE ADULT - SUBJECTIVE AND OBJECTIVE BOX
no events. doing ok. denies SOB, cough, chest pain      MEDICATIONS  (STANDING):  heparin  Injectable 5000Unit(s) SubCutaneous every 8 hours  aspirin  chewable 81milliGRAM(s) Oral daily  epoetin ramón Injectable 25496Zrjr(s) IV Push <User Schedule>  insulin lispro (HumaLOG) corrective regimen sliding scale  SubCutaneous every 6 hours  metoclopramide 10milliGRAM(s) Oral every 8 hours    MEDICATIONS  (PRN):  acetaminophen    Suspension. 650milliGRAM(s) Oral every 6 hours PRN Mild (1-3) and Moderate Pain (4 - 6)  artificial  tears Solution 1Drop(s) Both EYES every 4 hours PRN Dry Eyes    Vital Signs Last 24 Hrs  T(C): 36.8, Max: 37.1 (06-10 @ 20:10)  T(F): 98.3, Max: 98.8 (06-10 @ 20:10)  HR: 67 (60 - 75)  BP: 117/56 (95/45 - 130/58)  BP(mean): --  RR: 18 (17 - 18)  SpO2: 97% (95% - 98%)    I&O's Summary    I & Os for current day (as of 2017 10:37)  =============================================  IN: 560 ml / OUT: 1000 ml / NET: -440 ml    Physical Exam:   Const: NAD  Respiratory: CTA bilat  CVS: S1, S2, soft systolic murmur  GI: soft non-tender  CNS alert, follows commends appropriately  SKIN: dry intact. normal color    Labs:               8.7    12.20 )-----------( 836      ( 2017 09:35 )             29.3     06-11    136  |  96  |  32<H>  ----------------------------<  108<H>  3.9   |  25  |  2.94<H>    Ca    9.9      2017 09:35      CAPILLARY BLOOD GLUCOSE  123 (2017 05:55)  117 (10 Jose 2017 23:55)  87 (10 Jose 2017 16:49)  113 (10 Jose 2017 11:32)        Urinalysis Basic - ( 2017 20:12 )    Color: Yellow / Appearance: Turbid / S.016 / pH: x  Gluc: x / Ketone: Negative  / Bili: Negative / Urobili: Negative   Blood: x / Protein: 500 mg/dL / Nitrite: Negative   Leuk Esterase: Large / RBC: 10-25 /HPF / WBC >50 /HPF   Sq Epi: x / Non Sq Epi: Many /HPF / Bacteria: Many /HPF      D DImer  Cultures:           Wound culture:                 @ 13:36  Organism --  Culture w/ gram stain --  Specimen Source .Urine Catheterized      Abscess culture:              @ 13:36  Organism --  Gram Stain --  Specimen Source .Urine Catheterized        Tissue culture:            @ 13:36  Organism --  Gram Stain --  Specimen Source .Urine Catheterized      Body Fluid Smear & Culture:                         @ 13:36  AFB Smear  --  Culture Acid Fast Body Fluid w/ Smear  --  Culture Acid Fast Smear Concentrated   --    Culture Results:       Culture grew 3 or more types of organisms which indicate  collection contamination; consider recollection only if clinically  indicated.  Specimen Source .Urine Catheterized            Studies  Chest X-RAY  EXAM:  CHEST SINGLE AP OR PA                            PROCEDURE DATE:  2017            INTERPRETATION:  CLINICAL INDICATION: Leukocytosis; recent completion of   antibiotics for pneumonia    TECHNIQUE: Frontal radiograph of the chest    COMPARISON:  Radiograph of the chest from 2017      FINDINGS:    There is a left-sided central venous catheter with its tip in the SVC.   There is right lower lung linear atelectasis. There is no evidence of   pneumothorax. There is a small left pleural effusion. The   cardiomediastinal silhouette is stable in size.    IMPRESSION:  Right lower lung linear atelectasis. Small left pleural effusion.  CT SCAN Chest   PROCEDURE DATE:  2017            INTERPRETATION:  CLINICAL INFORMATION: Respiratory failure, diarrhea.    COMPARISON: CT chest 3/12/2017, CT abdomen and pelvis 3/2/2017.    PROCEDURE:   CT of the Chest, Abdomen and Pelvis was performed without intravenous   contrast.   Intravenous contrast: None.  Oral contrast: None.  Sagittal and coronal reformats were performed.    FINDINGS:    CHEST:     LUNGS AND LARGE AIRWAYS: Endotracheal tube is above the myesha. Complete   atelectasis of the left lower lobe and segmental atelectasis of the right   lower lobe. Bilateral ground glass opacities and interlobular septal   thickening consistent with pulmonary edema.  PLEURA: Moderate bilateral pleural effusions.  VESSELS: Atheromatous changes of the aorta and coronary arteries.  HEART: Heart size is normal. Small pericardial effusion, which is new   compared to prior exam on 3/12/2017.  MEDIASTINUM AND ELO: No lymphadenopathy.  CHEST WALL AND LOWER NECK: Within normal limits.    ABDOMEN AND PELVIS:    LIVER: Within normal limits.  BILE DUCTS: Normal caliber.  GALLBLADDER: Contracted with mild diffuse nonspecific wall thickening.  SPLEEN: Within normal limits.  PANCREAS: Within normal limits.  ADRENALS: Within normal limits.  KIDNEYS/URETERS: Within normal limits.    BLADDER: Raphael catheter within a collapsed bladder.  REPRODUCTIVE ORGANS: The uterus and adnexa are within normal limits.    BOWEL: Small hiatal hernia. Percutaneous gastrostomy tube with the distal   tip in the third portion of the duodenum. No bowel obstruction. Appendix   is normal.  PERITONEUM: Small perihepatic ascites.  VESSELS:  Atheromatous changes.  RETROPERITONEUM: No lymphadenopathy.    ABDOMINAL WALL: Within normal limits.  BONES: Old right rib and sternal fractures. Stable compression deformity   of the L2 vertebral body. Multilevel degenerative changes of the spine.    IMPRESSION:    Moderate bilateral pleural effusions and pulmonary edema. Associated   compressive atelectasis.    Small pericardial effusion which is new compared to prior exam 3/12/2017.    Nonspecific gallbladder wall edema and perihepatic ascites.  CT Abdomen  Venous Dopplers: LE:   Others

## 2017-06-11 NOTE — PROGRESS NOTE ADULT - SUBJECTIVE AND OBJECTIVE BOX
Subjective: Patient seen and examined. No new events except as noted.   resting comfortably in bed. No chest pain or sob     REVIEW OF SYSTEMS:    CONSTITUTIONAL: No weakness, fevers or chills  EYES/ENT: No visual changes;  No vertigo or throat pain   NECK: No pain or stiffness  RESPIRATORY: No cough, wheezing, hemoptysis; No shortness of breath  CARDIOVASCULAR: No chest pain or palpitations  GASTROINTESTINAL: No abdominal or epigastric pain. No nausea, vomiting, or hematemesis; No diarrhea or constipation. No melena or hematochezia.  GENITOURINARY: No dysuria, frequency or hematuria  NEUROLOGICAL: No numbness or weakness  SKIN: No itching, burning, rashes, or lesions   All other review of systems is negative unless indicated above.    MEDICATIONS:  MEDICATIONS  (STANDING):  heparin  Injectable 5000Unit(s) SubCutaneous every 8 hours  aspirin  chewable 81milliGRAM(s) Oral daily  epoetin ramón Injectable 47751Izyc(s) IV Push <User Schedule>  insulin lispro (HumaLOG) corrective regimen sliding scale  SubCutaneous every 6 hours  metoclopramide 10milliGRAM(s) Oral every 8 hours      PHYSICAL EXAM:  T(C): 36.8, Max: 37.1 (06-10 @ 20:10)  HR: 67 (60 - 75)  BP: 117/56 (95/45 - 130/58)  RR: 18 (17 - 18)  SpO2: 97% (94% - 98%)  Wt(kg): --  I&O's Summary    I & Os for current day (as of 11 Jun 2017 09:39)  =============================================  IN: 560 ml / OUT: 1000 ml / NET: -440 ml        Appearance: Normal	  HEENT:   Normal oral mucosa, PERRL, EOMI	  Lymphatic: No lymphadenopathy , no edema  Cardiovascular: Normal S1 S2, No JVD, No murmurs , Peripheral pulses palpable 2+ bilaterally  Respiratory: Lungs clear to auscultation, normal effort 	  Gastrointestinal:  Soft, Non-tender, + BS	+ peg  Skin: No rashes, No ecchymoses, No cyanosis, warm to touch  Musculoskeletal: Normal range of motion, normal strength  Psychiatry:  Mood & affect appropriate  Ext: toe amputation       LABS:  Pending     CARDIAC MARKERS:                  proBNP:   Lipid Profile:   HgA1c:   TSH:           TELEMETRY: 	    ECG:  	  RADIOLOGY:   DIAGNOSTIC TESTING:  [ ] Echocardiogram:  [ ]  Catheterization:  [ ] Stress Test:    OTHER:

## 2017-06-12 DIAGNOSIS — N17.9 ACUTE KIDNEY FAILURE, UNSPECIFIED: ICD-10-CM

## 2017-06-12 LAB
ANION GAP SERPL CALC-SCNC: 17 MMOL/L — SIGNIFICANT CHANGE UP (ref 5–17)
BUN SERPL-MCNC: 48 MG/DL — HIGH (ref 7–23)
CALCIUM SERPL-MCNC: 10.2 MG/DL — SIGNIFICANT CHANGE UP (ref 8.4–10.5)
CHLORIDE SERPL-SCNC: 97 MMOL/L — SIGNIFICANT CHANGE UP (ref 96–108)
CO2 SERPL-SCNC: 25 MMOL/L — SIGNIFICANT CHANGE UP (ref 22–31)
CREAT SERPL-MCNC: 3.76 MG/DL — HIGH (ref 0.5–1.3)
GLUCOSE SERPL-MCNC: 113 MG/DL — HIGH (ref 70–99)
HCT VFR BLD CALC: 30 % — LOW (ref 34.5–45)
HGB BLD-MCNC: 9 G/DL — LOW (ref 11.5–15.5)
MCHC RBC-ENTMCNC: 26.5 PG — LOW (ref 27–34)
MCHC RBC-ENTMCNC: 30 GM/DL — LOW (ref 32–36)
MCV RBC AUTO: 88.2 FL — SIGNIFICANT CHANGE UP (ref 80–100)
PLATELET # BLD AUTO: 854 K/UL — HIGH (ref 150–400)
POTASSIUM SERPL-MCNC: 3.8 MMOL/L — SIGNIFICANT CHANGE UP (ref 3.5–5.3)
POTASSIUM SERPL-SCNC: 3.8 MMOL/L — SIGNIFICANT CHANGE UP (ref 3.5–5.3)
RBC # BLD: 3.4 M/UL — LOW (ref 3.8–5.2)
RBC # FLD: 16.1 % — HIGH (ref 10.3–14.5)
SODIUM SERPL-SCNC: 139 MMOL/L — SIGNIFICANT CHANGE UP (ref 135–145)
WBC # BLD: 11.36 K/UL — HIGH (ref 3.8–10.5)
WBC # FLD AUTO: 11.36 K/UL — HIGH (ref 3.8–10.5)

## 2017-06-12 PROCEDURE — 93970 EXTREMITY STUDY: CPT | Mod: 26

## 2017-06-12 RX ADMIN — HEPARIN SODIUM 5000 UNIT(S): 5000 INJECTION INTRAVENOUS; SUBCUTANEOUS at 13:02

## 2017-06-12 RX ADMIN — Medication 10 MILLIGRAM(S): at 21:22

## 2017-06-12 RX ADMIN — Medication 10 MILLIGRAM(S): at 04:47

## 2017-06-12 RX ADMIN — HEPARIN SODIUM 5000 UNIT(S): 5000 INJECTION INTRAVENOUS; SUBCUTANEOUS at 21:22

## 2017-06-12 RX ADMIN — Medication 10 MILLIGRAM(S): at 13:01

## 2017-06-12 RX ADMIN — HEPARIN SODIUM 5000 UNIT(S): 5000 INJECTION INTRAVENOUS; SUBCUTANEOUS at 04:47

## 2017-06-12 RX ADMIN — Medication 81 MILLIGRAM(S): at 11:35

## 2017-06-12 NOTE — CONSULT NOTE ADULT - SUBJECTIVE AND OBJECTIVE BOX
Chief complaint:c/o no chast pain or dyspnea    HPI:  67 F with T2DM, afib on coumadin, HTN, HLD, CKD IV, and ICU admission in August 2016 with prolonged hospitalization where she was hospitalized for RLE cellulitis/osteomyelitis course c/b afib and PEA arrest requiring intubation s/p trach given prolonged wean, NEHAL requiring HD, and dysphagia requiring PEG tube placement  brought in by EMS after being found minimally responsive, and subsequently intubated. Per ED note, EMS noted that patient appeared to be having severe diarrhea and had poor access so IO site was made. Normally a/o x2 and able to hold a conversation per son. Per family by bedside, patient was seen by family the day before and she had no complaints and no changes in mental status.     In the ED:  T 96.7, 146/69, 82-102bpm, 100% O2. Leukocytosis to 14.5, no bandemia. Hyponatremic to 129, NEHAL with Cr to 7.78 with BUN of 173. Acidotic with pH of 7.14 on ABG, AG of 28, bicarb of 14, lactate of 1.3, and pCO2 of 46. CXR revealed opacification of L lung. (25 May 2017 14:32)      REVIEW OF SYSTEMS:    CONSTITUTIONAL: No fever, weight loss, or fatigue  NECK: No pain or stiffness  RESPIRATORY: No cough, wheezing, chills or hemoptysis; No shortness of breath  CARDIOVASCULAR: No chest pain, palpitations, dizziness, or leg swelling  GASTROINTESTINAL: No abdominal or epigastric pain. No nausea, vomiting, or hematemesis; No diarrhea or constipation. No melena or hematochezia.  GENITOURINARY: No dysuria, frequency, hematuria, or incontinence  NEUROLOGICAL: No headaches, memory loss, loss of strength, numbness, or tremors  SKIN: No itching, burning, rashes, or lesions   LYMPH NODES: No enlarged glands  MUSCULOSKELETAL: No joint pain or swelling; No muscle, back, or extremity pain  HEME/LYMPH: No easy bruising, or bleeding gums    MEDICATIONS  (STANDING):  heparin  Injectable 5000Unit(s) SubCutaneous every 8 hours  aspirin  chewable 81milliGRAM(s) Oral daily  epoetin ramón Injectable [id number](s) IV Push <User Schedule>  insulin lispro (HumaLOG) corrective regimen sliding scale  SubCutaneous every 6 hours  metoclopramide 10milliGRAM(s) Oral every 8 hours    MEDICATIONS  (PRN):  acetaminophen    Suspension. 650milliGRAM(s) Oral every 6 hours PRN Mild (1-3) and Moderate Pain (4 - 6)  artificial  tears Solution 1Drop(s) Both EYES every 4 hours PRN Dry Eyes      Allergies    No Known Allergies    Intolerances        Vital Signs Last 24 Hrs  T(C): 36.4, Max: 36.9 (06-11 @ 14:37)  T(F): 97.6, Max: 98.4 (06-11 @ 14:37)  HR: 62 (62 - 70)  BP: 134/72 (113/67 - 134/72)  BP(mean): --  RR: 16 (16 - 18)  SpO2: 98% (95% - 98%)    PHYSICAL EXAM:    GENERAL: NAD, well-groomed, well-developed  HEAD:  Atraumatic, Normocephalic  EYES: EOMI, PERRLA, conjunctiva and sclera clear  ENMT: No tonsillar erythema, exudates, or enlargement; Moist mucous membranes, Good dentition, No lesions  NECK: Supple, No JVD, Normal thyroid  NERVOUS SYSTEM:  Alert & Oriented X3, Good concentration; Motor Strength 5/5 B/L upper and lower extremities; DTRs 2+ intact and symmetric  CHEST/LUNG: Clear to percussion bilaterally; No rales, rhonchi, wheezing, or rubs  HEART: Regular rate and rhythm; No murmurs, rubs, or gallops  ABDOMEN: Soft, Nontender, Nondistended; Bowel sounds present  EXTREMITIES:  2+ Peripheral Pulses, No clubbing, cyanosis, or edema  LYMPH: No lymphadenopathy noted  SKIN: No rashes or lesions      LABS:                        9.0    11.36 )-----------( 854      ( 12 Jun 2017 07:19 )             30.0     06-12    139  |  97  |  48<H>  ----------------------------<  113<H>  3.8   |  25  |  3.76<H>    Ca    10.2      12 Jun 2017 07:19            RADIOLOGY & ADDITIONAL STUDIES:

## 2017-06-12 NOTE — PROGRESS NOTE ADULT - PROBLEM SELECTOR PLAN 1
Again unclear if she will have any recovery.  At present she is anuric.     HD in am Again unclear if she will have any recovery.  At present she is anuric.     HD in am  Vein mapping for AVF for right arm

## 2017-06-12 NOTE — PROGRESS NOTE ADULT - SUBJECTIVE AND OBJECTIVE BOX
Patient is a 67y old  Female who presents with a chief complaint of found unresponsive; intubated in field (05 Jun 2017 17:05)    pt is doing much better  denies any SOB, and she has been on oxygen 2 L per minute and feeling pretty good       Any change in ROS: none    MEDICATIONS  (STANDING):  heparin  Injectable 5000Unit(s) SubCutaneous every 8 hours  aspirin  chewable 81milliGRAM(s) Oral daily  epoetin ramón Injectable 00910Dvym(s) IV Push <User Schedule>  insulin lispro (HumaLOG) corrective regimen sliding scale  SubCutaneous every 6 hours  metoclopramide 10milliGRAM(s) Oral every 8 hours    MEDICATIONS  (PRN):  acetaminophen    Suspension. 650milliGRAM(s) Oral every 6 hours PRN Mild (1-3) and Moderate Pain (4 - 6)  artificial  tears Solution 1Drop(s) Both EYES every 4 hours PRN Dry Eyes    Vital Signs Last 24 Hrs  T(C): 36.4, Max: 36.9 (06-11 @ 14:37)  T(F): 97.6, Max: 98.4 (06-11 @ 14:37)  HR: 62 (62 - 70)  BP: 134/72 (113/67 - 134/72)  BP(mean): --  RR: 16 (16 - 18)  SpO2: 98% (95% - 98%)    I&O's Summary    I & Os for current day (as of 12 Jun 2017 09:50)  =============================================  IN: 1190 ml / OUT: 400 ml / NET: 790 ml        Physical Exam:   GENERAL: NAD, well-groomed, well-developed  HEENT: MALACHI/   Atraumatic, Normocephalic  ENMT: No tonsillar erythema, exudates, or enlargement; Moist mucous membranes, Good dentition, No lesions  NECK: Supple, No JVD, Normal thyroid  CHEST/LUNG: Decreased air entry left base   CVS: Regular rate and rhythm; No murmurs, rubs, or gallops  GI: : Soft, Nontender, Nondistended; Bowel sounds present  NERVOUS SYSTEM:  Alert & Oriented X3, Good concentration; Motor Strength 5/5 B/L upper and lower extremities; DTRs 2+ intact and symmetric  EXTREMITIES:  2+ Peripheral Pulses, No clubbing, cyanosis, or edema  LYMPH: No lymphadenopathy noted  SKIN: No rashes or lesions  ENDOCRINOLOGY: No Thyromegaly  PSYCH: Appropriate  Labs:                              9.0    11.36 )-----------( 854      ( 12 Jun 2017 07:19 )             30.0                         8.7    12.20 )-----------( 836      ( 11 Jun 2017 09:35 )             29.3                         8.6    11.69 )-----------( 814      ( 09 Jun 2017 08:40 )             28.3                         7.6    13.86 )-----------( 677      ( 08 Jun 2017 13:30 )             25.0     06-12    139  |  97  |  48<H>  ----------------------------<  113<H>  3.8   |  25  |  3.76<H>  06-11    136  |  96  |  32<H>  ----------------------------<  108<H>  3.9   |  25  |  2.94<H>  06-09    133<L>  |  92<L>  |  37<H>  ----------------------------<  98  4.0   |  27  |  3.59<H>  06-08    139  |  95<L>  |  48<H>  ----------------------------<  78  3.8   |  23  |  4.07<H>    Ca    10.2      12 Jun 2017 07:19  Ca    9.9      11 Jun 2017 09:35      CAPILLARY BLOOD GLUCOSE  102 (12 Jun 2017 06:00)  122 (11 Jun 2017 23:52)  132 (11 Jun 2017 22:04)  99 (11 Jun 2017 16:50)  90 (11 Jun 2017 12:10)            Cultures:           Wound culture:                06-07 @ 13:36  Organism --  Culture w/ gram stain --  Specimen Source .Urine Catheterized      Abscess culture:             06-07 @ 13:36  Organism --  Gram Stain --  Specimen Source .Urine Catheterized        Tissue culture:           06-07 @ 13:36  Organism --  Gram Stain --  Specimen Source .Urine Catheterized      Body Fluid Smear & Culture:                        06-07 @ 13:36  AFB Smear  --  Culture Acid Fast Body Fluid w/ Smear  --  Culture Acid Fast Smear Concentrated   --    Culture Results:       Culture grew 3 or more types of organisms which indicate  collection contamination; consider recollection only if clinically  indicated.  Specimen Source .Urine Catheterized            Studies  Chest X-RAY  EXAM:  CHEST SINGLE AP OR PA                            PROCEDURE DATE:  06/07/2017            INTERPRETATION:  CLINICAL INDICATION: Leukocytosis; recent completion of   antibiotics for pneumonia    TECHNIQUE: Frontal radiograph of the chest    COMPARISON:  Radiograph of the chest from June 4, 2017      FINDINGS:    There is a left-sided central venous catheter with its tip in the SVC.   There is right lower lung linear atelectasis. There is no evidence of   pneumothorax. There is a small left pleural effusion. The   cardiomediastinal silhouette is stable in size.    IMPRESSION:  Right lower lung linear atelectasis. Small left pleural effusion.  CT SCAN Chest   Venous Dopplers: LE:   CT Abdomen  Others    ------------------------------------------------------------------------  Conclusions:  1. Aortic valve not well visualized; appears calcified.  Peak transaortic valve gradient equals 21 mm Hg, mean  transaortic valve gradient equals 12 mm Hg, aortic valve  velocity time integral equals 45 cm, consistent with mild  aortic stenosis.  2. Endocardium not well visualized; grossly normal left  ventricular systolic function. Cannot rule out wall motion  abnormalities.  3. The right ventricle is not well visualized; grossly  normal right ventricular systolic function.  4. No pericardial effusion seen.  5. Left pleural effusion.  6. Very technically difficult study.  --------------------------------------------------

## 2017-06-12 NOTE — PROGRESS NOTE ADULT - PROBLEM SELECTOR PLAN 1
Continue monitor. no intervention indicated  pts oxygenation has improved significantly with continuing dialysis.

## 2017-06-12 NOTE — PROGRESS NOTE ADULT - SUBJECTIVE AND OBJECTIVE BOX
[facility] [phone]        Patient seen and examined         MEDICATIONS  (STANDING):  heparin  Injectable 5000Unit(s) SubCutaneous every 8 hours  aspirin  chewable 81milliGRAM(s) Oral daily  epoetin ramón Injectable 06588Lgxi(s) IV Push <User Schedule>  insulin lispro (HumaLOG) corrective regimen sliding scale  SubCutaneous every 6 hours  metoclopramide 10milliGRAM(s) Oral every 8 hours      VITAL:  T(C): , Max: 36.9 (06-11 @ 14:37)  T(F): , Max: 98.4 (06-11 @ 14:37)  HR: 62  BP: 134/72  BP(mean): --  RR: 16  SpO2: 98%  Wt(kg): --    I and O's:    I & Os for current day (as of 06-12 @ 08:04)  =============================================  IN: 1190 ml / OUT: 400 ml / NET: 790 ml        PHYSICAL EXAM:    Constitutional: NAD  HEENT: PERRLA    Neck:  No JVD  Respiratory: CTAB/L  Cardiovascular: S1 and S2  Gastrointestinal: BS+, soft, NT/ND  Extremities: No peripheral edema  Neurological: A/O x 3, no focal deficits  Psychiatric: Normal mood, normal affect  : No Raphael  Skin: No rashes  Access: Not applicable    LABS:                        9.0    11.36 )-----------( 854      ( 12 Jun 2017 07:19 )             30.0     06-12    139  |  97  |  48<H>  ----------------------------<  113<H>  3.8   |  25  |  3.76<H>    Ca    10.2      12 Jun 2017 07:19            Urine Studies:          RADIOLOGY & ADDITIONAL STUDIES:        ASSESSMENT      RECOMMENDATIONS [facility] [phone]        Patient seen and examined in the chair.  She states that she did not sleep well last night as was too hot        MEDICATIONS  (STANDING):  heparin  Injectable 5000Unit(s) SubCutaneous every 8 hours  aspirin  chewable 81milliGRAM(s) Oral daily  epoetin ramón Injectable 68222Zoch(s) IV Push <User Schedule>  insulin lispro (HumaLOG) corrective regimen sliding scale  SubCutaneous every 6 hours  metoclopramide 10milliGRAM(s) Oral every 8 hours      VITAL:  T(C): , Max: 36.9 (06-11 @ 14:37)  T(F): , Max: 98.4 (06-11 @ 14:37)  HR: 62  BP: 134/72  BP(mean): --  RR: 16  SpO2: 98%  Wt(kg): --    I and O's:    I & Os for current day (as of 06-12 @ 08:04)  =============================================  IN: 1190 ml / OUT: 400 ml / NET: 790 ml        PHYSICAL EXAM:    Constitutional: NAD  HEENT: PERRLA    Neck:  No JVD  Respiratory: CTA B/L  Cardiovascular: S1 and S2  Gastrointestinal: BS+, soft, NT/ND  Extremities: No peripheral edema  Neurological: A/O x 3, no focal deficits  Psychiatric: Normal mood, normal effect  : No Raphael  Skin: No rashes  Access: perm cath    LABS:                        9.0    11.36 )-----------( 854      ( 12 Jun 2017 07:19 )             30.0     06-12    139  |  97  |  48<H>  ----------------------------<  113<H>  3.8   |  25  |  3.76<H>    Ca    10.2      12 Jun 2017 07:19

## 2017-06-12 NOTE — PROGRESS NOTE ADULT - SUBJECTIVE AND OBJECTIVE BOX
Subjective: Patient seen and examined. No new events except as noted.     REVIEW OF SYSTEMS:    CONSTITUTIONAL: No weakness, fevers or chills  EYES/ENT: No visual changes;  No vertigo or throat pain   NECK: No pain or stiffness  RESPIRATORY: No cough, wheezing, hemoptysis; No shortness of breath  CARDIOVASCULAR: No chest pain or palpitations  GASTROINTESTINAL: No abdominal or epigastric pain. No nausea, vomiting, or hematemesis; No diarrhea or constipation. No melena or hematochezia.  GENITOURINARY: No dysuria, frequency or hematuria  NEUROLOGICAL: No numbness or weakness  SKIN: No itching, burning, rashes, or lesions   All other review of systems is negative unless indicated above.    MEDICATIONS:  MEDICATIONS  (STANDING):  heparin  Injectable 5000Unit(s) SubCutaneous every 8 hours  aspirin  chewable 81milliGRAM(s) Oral daily  epoetin ramón Injectable 61658Bwwj(s) IV Push <User Schedule>  insulin lispro (HumaLOG) corrective regimen sliding scale  SubCutaneous every 6 hours  metoclopramide 10milliGRAM(s) Oral every 8 hours      PHYSICAL EXAM:  T(C): 36.4, Max: 36.9 (06-11 @ 14:37)  HR: 62 (62 - 70)  BP: 134/72 (113/67 - 134/72)  RR: 16 (16 - 18)  SpO2: 98% (95% - 98%)  Wt(kg): --  I&O's Summary    I & Os for current day (as of 12 Jun 2017 09:31)  =============================================  IN: 1190 ml / OUT: 400 ml / NET: 790 ml        Appearance: weak 	  HEENT:   Normal oral mucosa, PERRL, EOMI	  Lymphatic: No lymphadenopathy , no edema  Cardiovascular: Normal S1 S2, No JVD, No murmurs , Peripheral pulses palpable 2+ bilaterally  Respiratory: Lungs clear to auscultation, normal effort 	  Gastrointestinal:  Soft, Non-tender, + BS	, +PEG   Skin: No rashes, No ecchymoses, No cyanosis, warm to touch  Musculoskeletal: Normal range of motion, normal strength  Psychiatry:  depressed  Ext: toe amputation       LABS:    CARDIAC MARKERS:                                9.0    11.36 )-----------( 854      ( 12 Jun 2017 07:19 )             30.0     06-12    139  |  97  |  48<H>  ----------------------------<  113<H>  3.8   |  25  |  3.76<H>    Ca    10.2      12 Jun 2017 07:19      proBNP:   Lipid Profile:   HgA1c:   TSH:           TELEMETRY: 	    ECG:  	  RADIOLOGY:   DIAGNOSTIC TESTING:  [ ] Echocardiogram:  [ ]  Catheterization:  [ ] Stress Test:    OTHER:

## 2017-06-13 LAB
ANION GAP SERPL CALC-SCNC: 18 MMOL/L — HIGH (ref 5–17)
BASOPHILS # BLD AUTO: 0.07 K/UL — SIGNIFICANT CHANGE UP (ref 0–0.2)
BASOPHILS NFR BLD AUTO: 0.6 % — SIGNIFICANT CHANGE UP (ref 0–2)
BUN SERPL-MCNC: 65 MG/DL — HIGH (ref 7–23)
CALCIUM SERPL-MCNC: 10.5 MG/DL — SIGNIFICANT CHANGE UP (ref 8.4–10.5)
CHLORIDE SERPL-SCNC: 96 MMOL/L — SIGNIFICANT CHANGE UP (ref 96–108)
CO2 SERPL-SCNC: 25 MMOL/L — SIGNIFICANT CHANGE UP (ref 22–31)
CREAT SERPL-MCNC: 4.81 MG/DL — HIGH (ref 0.5–1.3)
EOSINOPHIL # BLD AUTO: 1.11 K/UL — HIGH (ref 0–0.5)
EOSINOPHIL NFR BLD AUTO: 9.1 % — HIGH (ref 0–6)
GLUCOSE SERPL-MCNC: 78 MG/DL — SIGNIFICANT CHANGE UP (ref 70–99)
HCT VFR BLD CALC: 27.7 % — LOW (ref 34.5–45)
HGB BLD-MCNC: 8.4 G/DL — LOW (ref 11.5–15.5)
IMM GRANULOCYTES NFR BLD AUTO: 0.6 % — SIGNIFICANT CHANGE UP (ref 0–1.5)
LYMPHOCYTES # BLD AUTO: 2.6 K/UL — SIGNIFICANT CHANGE UP (ref 1–3.3)
LYMPHOCYTES # BLD AUTO: 21.2 % — SIGNIFICANT CHANGE UP (ref 13–44)
MCHC RBC-ENTMCNC: 26.5 PG — LOW (ref 27–34)
MCHC RBC-ENTMCNC: 30.3 GM/DL — LOW (ref 32–36)
MCV RBC AUTO: 87.4 FL — SIGNIFICANT CHANGE UP (ref 80–100)
MONOCYTES # BLD AUTO: 1.06 K/UL — HIGH (ref 0–0.9)
MONOCYTES NFR BLD AUTO: 8.7 % — SIGNIFICANT CHANGE UP (ref 2–14)
NEUTROPHILS # BLD AUTO: 7.34 K/UL — SIGNIFICANT CHANGE UP (ref 1.8–7.4)
NEUTROPHILS NFR BLD AUTO: 59.8 % — SIGNIFICANT CHANGE UP (ref 43–77)
PLATELET # BLD AUTO: 893 K/UL — HIGH (ref 150–400)
POTASSIUM SERPL-MCNC: 4.2 MMOL/L — SIGNIFICANT CHANGE UP (ref 3.5–5.3)
POTASSIUM SERPL-SCNC: 4.2 MMOL/L — SIGNIFICANT CHANGE UP (ref 3.5–5.3)
RBC # BLD: 3.17 M/UL — LOW (ref 3.8–5.2)
RBC # FLD: 16.3 % — HIGH (ref 10.3–14.5)
SODIUM SERPL-SCNC: 139 MMOL/L — SIGNIFICANT CHANGE UP (ref 135–145)
WBC # BLD: 12.25 K/UL — HIGH (ref 3.8–10.5)
WBC # FLD AUTO: 12.25 K/UL — HIGH (ref 3.8–10.5)

## 2017-06-13 PROCEDURE — 99232 SBSQ HOSP IP/OBS MODERATE 35: CPT | Mod: GC

## 2017-06-13 RX ADMIN — Medication 650 MILLIGRAM(S): at 23:20

## 2017-06-13 RX ADMIN — HEPARIN SODIUM 5000 UNIT(S): 5000 INJECTION INTRAVENOUS; SUBCUTANEOUS at 12:52

## 2017-06-13 RX ADMIN — Medication 10 MILLIGRAM(S): at 12:53

## 2017-06-13 RX ADMIN — Medication 650 MILLIGRAM(S): at 23:28

## 2017-06-13 RX ADMIN — HEPARIN SODIUM 5000 UNIT(S): 5000 INJECTION INTRAVENOUS; SUBCUTANEOUS at 23:28

## 2017-06-13 RX ADMIN — Medication 10 MILLIGRAM(S): at 05:02

## 2017-06-13 RX ADMIN — HEPARIN SODIUM 5000 UNIT(S): 5000 INJECTION INTRAVENOUS; SUBCUTANEOUS at 05:02

## 2017-06-13 RX ADMIN — ERYTHROPOIETIN 10000 UNIT(S): 10000 INJECTION, SOLUTION INTRAVENOUS; SUBCUTANEOUS at 11:17

## 2017-06-13 RX ADMIN — Medication 81 MILLIGRAM(S): at 12:53

## 2017-06-13 RX ADMIN — Medication 10 MILLIGRAM(S): at 23:28

## 2017-06-13 NOTE — PROGRESS NOTE ADULT - SUBJECTIVE AND OBJECTIVE BOX
[facility] [phone]        Patient seen and examined         MEDICATIONS  (STANDING):  heparin  Injectable 5000Unit(s) SubCutaneous every 8 hours  aspirin  chewable 81milliGRAM(s) Oral daily  epoetin ramón Injectable 97792Hoku(s) IV Push <User Schedule>  insulin lispro (HumaLOG) corrective regimen sliding scale  SubCutaneous every 6 hours  metoclopramide 10milliGRAM(s) Oral every 8 hours      VITAL:  T(C): , Max: 36.9 (06-12 @ 19:55)  T(F): , Max: 98.4 (06-12 @ 19:55)  HR: 66  BP: 138/74  BP(mean): --  RR: 16  SpO2: 98%  Wt(kg): --    I and O's:    I & Os for current day (as of 06-13 @ 08:49)  =============================================  IN: 1000 ml / OUT: 0 ml / NET: 1000 ml        PHYSICAL EXAM:    Constitutional: NAD  HEENT: PERRLA    Neck:  No JVD  Respiratory: CTAB/L  Cardiovascular: S1 and S2  Gastrointestinal: BS+, soft, NT/ND  Extremities: No peripheral edema  Neurological: A/O x 3, no focal deficits  Psychiatric: Normal mood, normal affect  : No Raphael  Skin: No rashes  Access: Not applicable    LABS:                        9.0    11.36 )-----------( 854      ( 12 Jun 2017 07:19 )             30.0     06-12    139  |  97  |  48<H>  ----------------------------<  113<H>  3.8   |  25  |  3.76<H>    Ca    10.2      12 Jun 2017 07:19            Urine Studies:          RADIOLOGY & ADDITIONAL STUDIES:        ASSESSMENT      RECOMMENDATIONS [facility] [phone]        Patient seen and examined on HD. No new changed        MEDICATIONS  (STANDING):  heparin  Injectable 5000Unit(s) SubCutaneous every 8 hours  aspirin  chewable 81milliGRAM(s) Oral daily  epoetin ramón Injectable 43122Wwiq(s) IV Push <User Schedule>  insulin lispro (HumaLOG) corrective regimen sliding scale  SubCutaneous every 6 hours  metoclopramide 10milliGRAM(s) Oral every 8 hours      VITAL:  T(C): , Max: 36.9 (06-12 @ 19:55)  T(F): , Max: 98.4 (06-12 @ 19:55)  HR: 66  BP: 138/74  BP(mean): --  RR: 16  SpO2: 98%  Wt(kg): --    I and O's:    I & Os for current day (as of 06-13 @ 08:49)  =============================================  IN: 1000 ml / OUT: 0 ml / NET: 1000 ml        PHYSICAL EXAM:    Constitutional: NAD  HEENT: PERRLA    Neck:  No JVD  Respiratory: CTAB/L  Cardiovascular: S1 and S2  Gastrointestinal: BS+, soft, NT/ND  Extremities: No peripheral edema  Neurological: A/O x 3, no focal deficits  Psychiatric: Normal mood, normal affect  : No Raphael  Skin: No rashes  Access: +perm cath    LABS:                        9.0    11.36 )-----------( 854      ( 12 Jun 2017 07:19 )             30.0     06-12    139  |  97  |  48<H>  ----------------------------<  113<H>  3.8   |  25  |  3.76<H>    Ca    10.2      12 Jun 2017 07:19

## 2017-06-13 NOTE — PROGRESS NOTE ADULT - SUBJECTIVE AND OBJECTIVE BOX
Subjective: Patient seen and examined. No new events except as noted.     REVIEW OF SYSTEMS:    CONSTITUTIONAL: No weakness, fevers or chills  EYES/ENT: No visual changes;  No vertigo or throat pain   NECK: No pain or stiffness  RESPIRATORY: No cough, wheezing, hemoptysis; No shortness of breath  CARDIOVASCULAR: No chest pain or palpitations  GASTROINTESTINAL: No abdominal or epigastric pain. No nausea, vomiting, or hematemesis; No diarrhea or constipation. No melena or hematochezia.  GENITOURINARY: No dysuria, frequency or hematuria  NEUROLOGICAL: No numbness or weakness  SKIN: No itching, burning, rashes, or lesions   All other review of systems is negative unless indicated above.    MEDICATIONS:  MEDICATIONS  (STANDING):  heparin  Injectable 5000Unit(s) SubCutaneous every 8 hours  aspirin  chewable 81milliGRAM(s) Oral daily  epoetin rmaón Injectable 58922Fswr(s) IV Push <User Schedule>  insulin lispro (HumaLOG) corrective regimen sliding scale  SubCutaneous every 6 hours  metoclopramide 10milliGRAM(s) Oral every 8 hours      PHYSICAL EXAM:  T(C): 36.7, Max: 36.9 (06-12 @ 19:55)  HR: 66 (65 - 68)  BP: 138/74 (107/59 - 138/74)  RR: 16 (16 - 18)  SpO2: 98% (98% - 98%)  Wt(kg): --  I&O's Summary    I & Os for current day (as of 13 Jun 2017 10:29)  =============================================  IN: 1000 ml / OUT: 0 ml / NET: 1000 ml        Appearance: Normal	  HEENT:   Normal oral mucosa, PERRL, EOMI	  Lymphatic: No lymphadenopathy , no edema  Cardiovascular: Normal S1 S2, No JVD, No murmurs , Peripheral pulses palpable 2+ bilaterally  Respiratory: Lungs clear to auscultation, normal effort 	  Gastrointestinal:  Soft, Non-tender, + BS	  Skin: No rashes, No ecchymoses, No cyanosis, warm to touch  Musculoskeletal: Normal range of motion, normal strength  Psychiatry:  Mood & affect appropriate  Ext: No edema      LABS:    CARDIAC MARKERS:                                9.0    11.36 )-----------( 854      ( 12 Jun 2017 07:19 )             30.0     06-12    139  |  97  |  48<H>  ----------------------------<  113<H>  3.8   |  25  |  3.76<H>    Ca    10.2      12 Jun 2017 07:19      proBNP:   Lipid Profile:   HgA1c:   TSH:           TELEMETRY: 	    ECG:  	  RADIOLOGY:   DIAGNOSTIC TESTING:  [ ] Echocardiogram:  [ ]  Catheterization:  [ ] Stress Test:    OTHER:

## 2017-06-13 NOTE — PROGRESS NOTE ADULT - SUBJECTIVE AND OBJECTIVE BOX
68 y/o female with ESRD requiring dialysis.      vein mapping with no usable vein.  will plan for AVG creation thursday    PE  AVSS  bruises on L arm.     no usable vein.

## 2017-06-13 NOTE — PROGRESS NOTE ADULT - SUBJECTIVE AND OBJECTIVE BOX
Chief complaint:pt  had venous mapping for planned procedure  AV fistula     HPI:  67 F with T2DM, afib on coumadin, HTN, HLD, CKD IV, and ICU admission in August 2016 with prolonged hospitalization where she was hospitalized for RLE cellulitis/osteomyelitis course c/b afib and PEA arrest requiring intubation s/p trach given prolonged wean, NEHAL requiring HD, and dysphagia requiring PEG tube placement  brought in by EMS after being found minimally responsive, and subsequently intubated. Per ED note, EMS noted that patient appeared to be having severe diarrhea and had poor access so IO site was made. Normally a/o x2 and able to hold a conversation per son. Per family by bedside, patient was seen by family the day before and she had no complaints and no changes in mental status.     In the ED:  T 96.7, 146/69, 82-102bpm, 100% O2. Leukocytosis to 14.5, no bandemia. Hyponatremic to 129, NEHAL with Cr to 7.78 with BUN of 173. Acidotic with pH of 7.14 on ABG, AG of 28, bicarb of 14, lactate of 1.3, and pCO2 of 46. CXR revealed opacification of L lung. (25 May 2017 14:32)      REVIEW OF SYSTEMS:    CONSTITUTIONAL: No fever, weight loss, or fatigue  NECK: No pain or stiffness  RESPIRATORY: No cough, wheezing, chills or hemoptysis; No shortness of breath  CARDIOVASCULAR: No chest pain, palpitations, dizziness, or leg swelling  GASTROINTESTINAL: No abdominal or epigastric pain. No nausea, vomiting, or hematemesis; No diarrhea or constipation. No melena or hematochezia.  GENITOURINARY: No dysuria, frequency, hematuria, or incontinence  NEUROLOGICAL: No headaches, memory loss, loss of strength, numbness, or tremors  SKIN: No itching, burning, rashes, or lesions   LYMPH NODES: No enlarged glands  MUSCULOSKELETAL: No joint pain or swelling; No muscle, back, or extremity pain  HEME/LYMPH: No easy bruising, or bleeding gums    MEDICATIONS  (STANDING):  heparin  Injectable 5000Unit(s) SubCutaneous every 8 hours  aspirin  chewable 81milliGRAM(s) Oral daily  epoetin ramón Injectable 24417Pweh(s) IV Push <User Schedule>  insulin lispro (HumaLOG) corrective regimen sliding scale  SubCutaneous every 6 hours  metoclopramide 10milliGRAM(s) Oral every 8 hours    MEDICATIONS  (PRN):  acetaminophen    Suspension. 650milliGRAM(s) Oral every 6 hours PRN Mild (1-3) and Moderate Pain (4 - 6)  artificial  tears Solution 1Drop(s) Both EYES every 4 hours PRN Dry Eyes      Allergies    No Known Allergies    Intolerances        Vital Signs Last 24 Hrs  T(C): 36.7, Max: 36.9 (06-12 @ 19:55)  T(F): 98, Max: 98.4 (06-12 @ 19:55)  HR: 66 (62 - 68)  BP: 138/74 (107/59 - 138/74)  BP(mean): --  RR: 16 (16 - 18)  SpO2: 98% (96% - 98%)    PHYSICAL EXAM:    GENERAL: NAD, well-groomed, well-developed  HEAD:  Atraumatic, Normocephalic  EYES: EOMI, PERRLA, conjunctiva and sclera clear  ENMT: No tonsillar erythema, exudates, or enlargement; Moist mucous membranes, Good dentition, No lesions  NECK: Supple, No JVD, Normal thyroid  NERVOUS SYSTEM:  Alert & Oriented X3, Good concentration; Motor Strength 5/5 B/L upper and lower extremities; DTRs 2+ intact and symmetric  CHEST/LUNG: Clear to percussion bilaterally; No rales, rhonchi, wheezing, or rubs  HEART: Regular rate and rhythm; No murmurs, rubs, or gallops  ABDOMEN: Soft, Nontender, Nondistended; Bowel sounds present  EXTREMITIES:  2+ Peripheral Pulses, No clubbing, cyanosis, or edema  LYMPH: No lymphadenopathy noted  SKIN: No rashes or lesions      LABS:                        9.0    11.36 )-----------( 854      ( 12 Jun 2017 07:19 )             30.0     06-12    139  |  97  |  48<H>  ----------------------------<  113<H>  3.8   |  25  |  3.76<H>    Ca    10.2      12 Jun 2017 07:19            RADIOLOGY & ADDITIONAL STUDIES:

## 2017-06-13 NOTE — PROGRESS NOTE ADULT - SUBJECTIVE AND OBJECTIVE BOX
Patient is a 67y old  Female who presents with a chief complaint of found unresponsive; intubated in field (05 Jun 2017 17:05)  Doing great    no SOB   DOING GREAT!!      Any change in ROS: none    MEDICATIONS  (STANDING):  heparin  Injectable 5000Unit(s) SubCutaneous every 8 hours  aspirin  chewable 81milliGRAM(s) Oral daily  epoetin ramón Injectable 58998Xnna(s) IV Push <User Schedule>  insulin lispro (HumaLOG) corrective regimen sliding scale  SubCutaneous every 6 hours  metoclopramide 10milliGRAM(s) Oral every 8 hours    MEDICATIONS  (PRN):  acetaminophen    Suspension. 650milliGRAM(s) Oral every 6 hours PRN Mild (1-3) and Moderate Pain (4 - 6)  artificial  tears Solution 1Drop(s) Both EYES every 4 hours PRN Dry Eyes    Vital Signs Last 24 Hrs  T(C): 36.9, Max: 36.9 (06-12 @ 19:55)  T(F): 98.4, Max: 98.4 (06-12 @ 19:55)  HR: 67 (65 - 68)  BP: 115/55 (107/59 - 156/70)  BP(mean): --  RR: 18 (16 - 18)  SpO2: 95% (95% - 98%)    I&O's Summary    I & Os for current day (as of 13 Jun 2017 12:55)  =============================================  IN: 1000 ml / OUT: 0 ml / NET: 1000 ml        Physical Exam:   GENERAL: NAD, well-groomed, well-developed  HEENT: MALACHI/   Atraumatic, Normocephalic  ENMT: No tonsillar erythema, exudates, or enlargement; Moist mucous membranes, Good dentition, No lesions  NECK: Supple, No JVD, Normal thyroid  CHEST/LUNG: Decreased air entry left base  CVS: Regular rate and rhythm; No murmurs, rubs, or gallops  GI: : Soft, Nontender, Nondistended; Bowel sounds present  NERVOUS SYSTEM:  Alert & Oriented X3, Good concentration; Motor Strength 5/5 B/L upper and lower extremities; DTRs 2+ intact and symmetric  EXTREMITIES:  2+ Peripheral Pulses, No clubbing, cyanosis, or edema  LYMPH: No lymphadenopathy noted  SKIN: No rashes or lesions  ENDOCRINOLOGY: No Thyromegaly  PSYCH: Appropriate    Labs:               9.0    11.36 )-----------( 854      ( 12 Jun 2017 07:19 )             30.0                         8.7    12.20 )-----------( 836      ( 11 Jun 2017 09:35 )             29.3     06-12    139  |  97  |  48<H>  ----------------------------<  113<H>  3.8   |  25  |  3.76<H>  06-11    136  |  96  |  32<H>  ----------------------------<  108<H>  3.9   |  25  |  2.94<H>    Ca    10.2      12 Jun 2017 07:19      CAPILLARY BLOOD GLUCOSE  84 (13 Jun 2017 12:36)  84 (13 Jun 2017 12:35)  107 (13 Jun 2017 05:43)  118 (12 Jun 2017 23:59)  112 (12 Jun 2017 21:52)  121 (12 Jun 2017 16:33)            Cultures:           Wound culture:                06-07 @ 13:36  Organism --  Culture w/ gram stain --  Specimen Source .Urine Catheterized      Abscess culture:             06-07 @ 13:36  Organism --  Gram Stain --  Specimen Source .Urine Catheterized        Tissue culture:           06-07 @ 13:36  Organism --  Gram Stain --  Specimen Source .Urine Catheterized      Body Fluid Smear & Culture:                        06-07 @ 13:36  AFB Smear  --  Culture Acid Fast Body Fluid w/ Smear  --  Culture Acid Fast Smear Concentrated   --    Culture Results:       Culture grew 3 or more types of organisms which indicate  collection contamination; consider recollection only if clinically  indicated.  Specimen Source .Urine Catheterized            Studies  Chest X-RAY  CT SCAN Chest IMPRESSION: The diameters of the nonthrombosed right and left cephalic   and basilic veins are entered in the table.     VONNIE MALHOTRA M.D., ATTENDING RADIOLOGIST  This document has been electronically signed. Jun 12 2017 12:30PM  Venous Dopplers: LE:   CT Abdomen  Others    EXAM:  CHEST SINGLE AP OR PA                            PROCEDURE DATE:  06/07/2017            INTERPRETATION:  CLINICAL INDICATION: Leukocytosis; recent completion of   antibiotics for pneumonia    TECHNIQUE: Frontal radiograph of the chest    COMPARISON:  Radiograph of the chest from June 4, 2017      FINDINGS:    There is a left-sided central venous catheter with its tip in the SVC.   There is right lower lung linear atelectasis. There is no evidence of   pneumothorax. There is a small left pleural effusion. The   cardiomediastinal silhouette is stable in size.    IMPRESSION:  Right lower lung linear atelectasis. Small left pleural effusion.

## 2017-06-13 NOTE — PROGRESS NOTE ADULT - PROBLEM SELECTOR PLAN 1
Again unclear if she will have any recovery.  At present she is anuric.     HD in am  Vein mapping for AVF for right arm Again unclear if she will have any recovery.  At present she is anuric.     HD in am  AVG on thur

## 2017-06-14 LAB
ANION GAP SERPL CALC-SCNC: 15 MMOL/L — SIGNIFICANT CHANGE UP (ref 5–17)
BLD GP AB SCN SERPL QL: NEGATIVE — SIGNIFICANT CHANGE UP
BUN SERPL-MCNC: 40 MG/DL — HIGH (ref 7–23)
CALCIUM SERPL-MCNC: 9.7 MG/DL — SIGNIFICANT CHANGE UP (ref 8.4–10.5)
CHLORIDE SERPL-SCNC: 97 MMOL/L — SIGNIFICANT CHANGE UP (ref 96–108)
CO2 SERPL-SCNC: 28 MMOL/L — SIGNIFICANT CHANGE UP (ref 22–31)
CREAT SERPL-MCNC: 3.25 MG/DL — HIGH (ref 0.5–1.3)
GLUCOSE SERPL-MCNC: 85 MG/DL — SIGNIFICANT CHANGE UP (ref 70–99)
HCT VFR BLD CALC: 27.6 % — LOW (ref 34.5–45)
HGB BLD-MCNC: 8.3 G/DL — LOW (ref 11.5–15.5)
INR BLD: 1.17 RATIO — HIGH (ref 0.88–1.16)
MCHC RBC-ENTMCNC: 26.8 PG — LOW (ref 27–34)
MCHC RBC-ENTMCNC: 30.1 GM/DL — LOW (ref 32–36)
MCV RBC AUTO: 89 FL — SIGNIFICANT CHANGE UP (ref 80–100)
PLATELET # BLD AUTO: 790 K/UL — HIGH (ref 150–400)
POTASSIUM SERPL-MCNC: 4 MMOL/L — SIGNIFICANT CHANGE UP (ref 3.5–5.3)
POTASSIUM SERPL-SCNC: 4 MMOL/L — SIGNIFICANT CHANGE UP (ref 3.5–5.3)
PROTHROM AB SERPL-ACNC: 13.3 SEC — HIGH (ref 10–13.1)
RBC # BLD: 3.1 M/UL — LOW (ref 3.8–5.2)
RBC # FLD: 17 % — HIGH (ref 10.3–14.5)
RH IG SCN BLD-IMP: POSITIVE — SIGNIFICANT CHANGE UP
SODIUM SERPL-SCNC: 140 MMOL/L — SIGNIFICANT CHANGE UP (ref 135–145)
WBC # BLD: 11.93 K/UL — HIGH (ref 3.8–10.5)
WBC # FLD AUTO: 11.93 K/UL — HIGH (ref 3.8–10.5)

## 2017-06-14 PROCEDURE — 99232 SBSQ HOSP IP/OBS MODERATE 35: CPT | Mod: 57

## 2017-06-14 RX ADMIN — Medication 10 MILLIGRAM(S): at 06:44

## 2017-06-14 RX ADMIN — HEPARIN SODIUM 5000 UNIT(S): 5000 INJECTION INTRAVENOUS; SUBCUTANEOUS at 22:59

## 2017-06-14 RX ADMIN — Medication 650 MILLIGRAM(S): at 16:27

## 2017-06-14 RX ADMIN — Medication 650 MILLIGRAM(S): at 17:00

## 2017-06-14 RX ADMIN — Medication 10 MILLIGRAM(S): at 23:00

## 2017-06-14 RX ADMIN — HEPARIN SODIUM 5000 UNIT(S): 5000 INJECTION INTRAVENOUS; SUBCUTANEOUS at 06:44

## 2017-06-14 RX ADMIN — Medication 81 MILLIGRAM(S): at 11:30

## 2017-06-14 NOTE — PROGRESS NOTE ADULT - SUBJECTIVE AND OBJECTIVE BOX
Subjective: Patient seen and examined. No new events except as noted.   Unsuccessful vein mapping     REVIEW OF SYSTEMS:    CONSTITUTIONAL: +weakness, fevers or chills  EYES/ENT: No visual changes;  No vertigo or throat pain   NECK: No pain or stiffness  RESPIRATORY: No cough, wheezing, hemoptysis; No shortness of breath  CARDIOVASCULAR: No chest pain or palpitations  GASTROINTESTINAL: No abdominal or epigastric pain. No nausea, vomiting, or hematemesis; No diarrhea or constipation. No melena or hematochezia.  GENITOURINARY: No dysuria, frequency or hematuria  NEUROLOGICAL: No numbness or weakness  SKIN: No itching, burning, rashes, or lesions   All other review of systems is negative unless indicated above.  Toe amputation     MEDICATIONS:  MEDICATIONS  (STANDING):  heparin  Injectable 5000Unit(s) SubCutaneous every 8 hours  aspirin  chewable 81milliGRAM(s) Oral daily  epoetin ramón Injectable 41089Iqme(s) IV Push <User Schedule>  insulin lispro (HumaLOG) corrective regimen sliding scale  SubCutaneous every 6 hours  metoclopramide 10milliGRAM(s) Oral every 8 hours      PHYSICAL EXAM:  T(C): 36.4, Max: 36.9 (06-13 @ 12:34)  HR: 68 (60 - 75)  BP: 147/69 (95/45 - 147/69)  RR: 18 (18 - 18)  SpO2: 94% (94% - 96%)  Wt(kg): --  I&O's Summary    I & Os for current day (as of 14 Jun 2017 10:43)  =============================================  IN: 1780 ml / OUT: 1440 ml / NET: 340 ml        Appearance: Normal	  HEENT:   Normal oral mucosa, PERRL, EOMI	  Lymphatic: No lymphadenopathy , no edema  Cardiovascular: Normal S1 S2, No JVD, No murmurs , Peripheral pulses palpable 2+ bilaterally  Respiratory: Lungs clear to auscultation, normal effort 	  Gastrointestinal:  Soft, Non-tender, + BS	  Skin: No rashes, No ecchymoses, No cyanosis, warm to touch  Musculoskeletal: Normal range of motion, normal strength  Psychiatry:  Mood & affect appropriate  Ext: trace edema  toe amputation     LABS:    CARDIAC MARKERS:                                8.4    12.25 )-----------( 893      ( 13 Jun 2017 12:31 )             27.7     06-13    139  |  96  |  65<H>  ----------------------------<  78  4.2   |  25  |  4.81<H>    Ca    10.5      13 Jun 2017 12:35      proBNP:   Lipid Profile:   HgA1c:   TSH:           TELEMETRY: 	    ECG:  	  RADIOLOGY:   DIAGNOSTIC TESTING:  [ ] Echocardiogram:  [ ]  Catheterization:  [ ] Stress Test:    OTHER:

## 2017-06-14 NOTE — PROGRESS NOTE ADULT - SUBJECTIVE AND OBJECTIVE BOX
Patient is a 67y old  Female who presents with a chief complaint of found unresponsive; intubated in field (05 Jun 2017 17:05)    doing much better  no SOB   no cough decreased oxygen requirement       Any change in ROS: None     MEDICATIONS  (STANDING):  heparin  Injectable 5000Unit(s) SubCutaneous every 8 hours  aspirin  chewable 81milliGRAM(s) Oral daily  epoetin ramón Injectable 47314Fxqr(s) IV Push <User Schedule>  insulin lispro (HumaLOG) corrective regimen sliding scale  SubCutaneous every 6 hours  metoclopramide 10milliGRAM(s) Oral every 8 hours    MEDICATIONS  (PRN):  acetaminophen    Suspension. 650milliGRAM(s) Oral every 6 hours PRN Mild (1-3) and Moderate Pain (4 - 6)  artificial  tears Solution 1Drop(s) Both EYES every 4 hours PRN Dry Eyes    Vital Signs Last 24 Hrs  T(C): 36.4, Max: 36.9 (06-13 @ 12:34)  T(F): 97.6, Max: 98.4 (06-13 @ 12:34)  HR: 68 (60 - 75)  BP: 147/69 (95/45 - 147/69)  BP(mean): --  RR: 18 (18 - 18)  SpO2: 94% (94% - 96%)    I&O's Summary    I & Os for current day (as of 14 Jun 2017 11:14)  =============================================  IN: 1780 ml / OUT: 1440 ml / NET: 340 ml        Physical Exam:   GENERAL: NAD, well-groomed, well-developed  HEENT: MALACHI/   Atraumatic, Normocephalic  ENMT: No tonsillar erythema, exudates, or enlargement; Moist mucous membranes, Good dentition, No lesions  NECK: Supple, No JVD, Normal thyroid  CHEST/LUNG: Clear to percussion bilaterally; No rales, rhonchi, wheezing, or rubs  CVS: Regular rate and rhythm; No murmurs, rubs, or gallops  GI: : Soft, Nontender, Nondistended; Bowel sounds present  NERVOUS SYSTEM:  Alert & Oriented X3, Good concentration; Motor Strength 5/5 B/L upper and lower extremities; DTRs 2+ intact and symmetric  EXTREMITIES:  2+ Peripheral Pulses, No clubbing, cyanosis, or edema  LYMPH: No lymphadenopathy noted  SKIN: No rashes or lesions  ENDOCRINOLOGY: No Thyromegaly  PSYCH: Appropriate    Labs:                 8.4    12.25 )-----------( 893      ( 13 Jun 2017 12:31 )             27.7                         9.0    11.36 )-----------( 854      ( 12 Jun 2017 07:19 )             30.0                         8.7    12.20 )-----------( 836      ( 11 Jun 2017 09:35 )             29.3     06-13    139  |  96  |  65<H>  ----------------------------<  78  4.2   |  25  |  4.81<H>  06-12    139  |  97  |  48<H>  ----------------------------<  113<H>  3.8   |  25  |  3.76<H>  06-11    136  |  96  |  32<H>  ----------------------------<  108<H>  3.9   |  25  |  2.94<H>    Ca    10.5      13 Jun 2017 12:35      CAPILLARY BLOOD GLUCOSE  107 (14 Jun 2017 05:54)  146 (13 Jun 2017 23:49)  116 (13 Jun 2017 22:02)  115 (13 Jun 2017 16:59)  84 (13 Jun 2017 12:36)  84 (13 Jun 2017 12:35)        Wound culture:                06-07 @ 13:36  Organism --  Culture w/ gram stain --  Specimen Source .Urine Catheterized      Abscess culture:             06-07 @ 13:36  Organism --  Gram Stain --  Specimen Source .Urine Catheterized        Tissue culture:           06-07 @ 13:36  Organism --  Gram Stain --  Specimen Source .Urine Catheterized      Body Fluid Smear & Culture:                        06-07 @ 13:36  AFB Smear  --  Culture Acid Fast Body Fluid w/ Smear  --  Culture Acid Fast Smear Concentrated   --    Culture Results:       Culture grew 3 or more types of organisms which indicate  collection contamination; consider recollection only if clinically  indicated.  Specimen Source .Urine Catheterized              Studies  Chest X-RAY  CT SCAN Chest   IMPRESSION: The diameters of the nonthrombosed right and left cephalic   and basilic veins are entered in the table.                     VONNIE MALHOTRA M.D., ATTENDING RADIOLOGIST  This document has been electronically signed. Jun 12 2017 12:30PM      Venous Dopplers: LE:   CT Abdomen  Others

## 2017-06-14 NOTE — PROGRESS NOTE ADULT - SUBJECTIVE AND OBJECTIVE BOX
Chief complaint:pt schedulled for OR , no fever or chills     HPI:  67 F with T2DM, afib on coumadin, HTN, HLD, CKD IV, and ICU admission in August 2016 with prolonged hospitalization where she was hospitalized for RLE cellulitis/osteomyelitis course c/b afib and PEA arrest requiring intubation s/p trach given prolonged wean, NEHAL requiring HD, and dysphagia requiring PEG tube placement  brought in by EMS after being found minimally responsive, and subsequently intubated. Per ED note, EMS noted that patient appeared to be having severe diarrhea and had poor access so IO site was made. Normally a/o x2 and able to hold a conversation per son. Per family by bedside, patient was seen by family the day before and she had no complaints and no changes in mental status.     In the ED:  T 96.7, 146/69, 82-102bpm, 100% O2. Leukocytosis to 14.5, no bandemia. Hyponatremic to 129, NEHAL with Cr to 7.78 with BUN of 173. Acidotic with pH of 7.14 on ABG, AG of 28, bicarb of 14, lactate of 1.3, and pCO2 of 46. CXR revealed opacification of L lung. (25 May 2017 14:32)      REVIEW OF SYSTEMS:    CONSTITUTIONAL: No fever, weight loss, or fatigue  NECK: No pain or stiffness  RESPIRATORY: No cough, wheezing, chills or hemoptysis; No shortness of breath  CARDIOVASCULAR: No chest pain, palpitations, dizziness, or leg swelling  GASTROINTESTINAL: No abdominal or epigastric pain. No nausea, vomiting, or hematemesis; No diarrhea or constipation. No melena or hematochezia.  GENITOURINARY: No dysuria, frequency, hematuria, or incontinence  NEUROLOGICAL: No headaches, memory loss, loss of strength, numbness, or tremors  SKIN: No itching, burning, rashes, or lesions   LYMPH NODES: No enlarged glands  MUSCULOSKELETAL: No joint pain or swelling; No muscle, back, or extremity pain  HEME/LYMPH: No easy bruising, or bleeding gums    MEDICATIONS  (STANDING):  heparin  Injectable 5000Unit(s) SubCutaneous every 8 hours  aspirin  chewable 81milliGRAM(s) Oral daily  epoetin ramón Injectable 86893Jiao(s) IV Push <User Schedule>  insulin lispro (HumaLOG) corrective regimen sliding scale  SubCutaneous every 6 hours  metoclopramide 10milliGRAM(s) Oral every 8 hours    MEDICATIONS  (PRN):  acetaminophen    Suspension. 650milliGRAM(s) Oral every 6 hours PRN Mild (1-3) and Moderate Pain (4 - 6)  artificial  tears Solution 1Drop(s) Both EYES every 4 hours PRN Dry Eyes      Allergies    No Known Allergies    Intolerances        Vital Signs Last 24 Hrs  T(C): 36.4, Max: 36.9 (06-13 @ 12:34)  T(F): 97.6, Max: 98.4 (06-13 @ 12:34)  HR: 68 (60 - 75)  BP: 147/69 (95/45 - 156/70)  BP(mean): --  RR: 18 (18 - 18)  SpO2: 94% (94% - 98%)    PHYSICAL EXAM:    GENERAL: NAD, well-groomed, well-developed  HEAD:  Atraumatic, Normocephalic  EYES: EOMI, PERRLA, conjunctiva and sclera clear  ENMT: No tonsillar erythema, exudates, or enlargement; Moist mucous membranes, Good dentition, No lesions  NECK: Supple, No JVD, Normal thyroid  NERVOUS SYSTEM:  Alert & Oriented X3, Good concentration; Motor Strength 5/5 B/L upper and lower extremities; DTRs 2+ intact and symmetric  CHEST/LUNG: Clear to percussion bilaterally; No rales, rhonchi, wheezing, or rubs  HEART: Regular rate and rhythm; No murmurs, rubs, or gallops  ABDOMEN: Soft, Nontender, Nondistended; Bowel sounds present  EXTREMITIES:  2+ Peripheral Pulses, No clubbing, cyanosis, or edema  LYMPH: No lymphadenopathy noted  SKIN: No rashes or lesions      LABS:                        8.4    12.25 )-----------( 893      ( 13 Jun 2017 12:31 )             27.7     06-13    139  |  96  |  65<H>  ----------------------------<  78  4.2   |  25  |  4.81<H>    Ca    10.5      13 Jun 2017 12:35            RADIOLOGY & ADDITIONAL STUDIES:

## 2017-06-14 NOTE — PROGRESS NOTE ADULT - ASSESSMENT
66 y/o F with ESRD needing HD access.    will plan for AVG Tmrw at 130.  Please dialyze today and not in AM tmrw.

## 2017-06-14 NOTE — PROGRESS NOTE ADULT - SUBJECTIVE AND OBJECTIVE BOX
GENERAL SURGERY DAILY PROGRESS NOTE:     No acute events overnight. No complaints   PE:  General: WN/WD NAD  Neurology: A&Ox3, nonfocal, CRUZ x 4  Head:  Normocephalic, atraumatic  ENT:  Mucosa moist, no ulcerations  Neck:  Supple, no sinuses or palpable masses  Lymphatic:  No palpable cervical, supraclavicular, axillary or inguinal adenopathy  Respiratory: CTA B/L  CV: RRR, S1S2, no murmur  Abdominal: Soft, NT, ND no palpable mass  MSK: No edema, + peripheral pulses, FROM all 4 extremity    Vital Signs Last 24 Hrs  T(C): 36.7, Max: 36.9 (13 @ 12:34)  T(F): 98, Max: 98.4 ( @ 12:34)  HR: 73 (67 - 75)  BP: 142/86 (95/45 - 147/69)  BP(mean): --  RR: 18 (18 - 18)  SpO2: 96% (94% - 96%)    I&O's Detail  I & Os for 24h ending 2017 07:00  =============================================  IN:    Other: 900 ml    Nepro: 850 ml    Enteral Tube Flush: 30 ml    Total IN: 1780 ml  ---------------------------------------------  OUT:    Other: 1400 ml    Voided: 40 ml    Total OUT: 1440 ml  ---------------------------------------------  Total NET: 340 ml    I & Os for current day (as of 2017 12:17)  =============================================  IN:    Enteral Tube Flush: 30 ml    Nepro: 20 ml    Total IN: 50 ml  ---------------------------------------------  OUT:    Total OUT: 0 ml  ---------------------------------------------  Total NET: 50 ml      Daily     Daily Weight in k.1 (2017 10:00)    MEDICATIONS  (STANDING):  heparin  Injectable 5000Unit(s) SubCutaneous every 8 hours  aspirin  chewable 81milliGRAM(s) Oral daily  epoetin ramón Injectable 11207Nmxk(s) IV Push <User Schedule>  insulin lispro (HumaLOG) corrective regimen sliding scale  SubCutaneous every 6 hours  metoclopramide 10milliGRAM(s) Oral every 8 hours    MEDICATIONS  (PRN):  acetaminophen    Suspension. 650milliGRAM(s) Oral every 6 hours PRN Mild (1-3) and Moderate Pain (4 - 6)  artificial  tears Solution 1Drop(s) Both EYES every 4 hours PRN Dry Eyes      LABS:                        8.4    12.25 )-----------( 893      ( 2017 12:31 )             27.7     06-13    139  |  96  |  65<H>  ----------------------------<  78  4.2   |  25  |  4.81<H>    Ca    10.5      2017 12:35          68 y/o M with ESRD on HD, awaiting AVF  -Plan for AVF tomorrow  -Will need HD today in preparation of for OR tomorrow, discussed with nephrology. Patient now scheduled for HD  -NPO @ [facility]  -Pre-operative labs GENERAL SURGERY DAILY PROGRESS NOTE:     No acute events overnight. No complaints   PE:  General: WN/WD NAD  Neurology: A&Ox3, nonfocal, CRUZ x 4  Head:  Normocephalic, atraumatic  ENT:  Mucosa moist, no ulcerations  Neck:  Supple, no sinuses or palpable masses  Lymphatic:  No palpable cervical, supraclavicular, axillary or inguinal adenopathy  Respiratory: CTA B/L  CV: RRR, S1S2, no murmur  Abdominal: Soft, NT, ND no palpable mass  MSK: No edema, + peripheral pulses, FROM all 4 extremity    Vital Signs Last 24 Hrs  T(C): 36.7, Max: 36.9 (13 @ 12:34)  T(F): 98, Max: 98.4 ( @ 12:34)  HR: 73 (67 - 75)  BP: 142/86 (95/45 - 147/69)  BP(mean): --  RR: 18 (18 - 18)  SpO2: 96% (94% - 96%)    I&O's Detail  I & Os for 24h ending 2017 07:00  =============================================  IN:    Other: 900 ml    Nepro: 850 ml    Enteral Tube Flush: 30 ml    Total IN: 1780 ml  ---------------------------------------------  OUT:    Other: 1400 ml    Voided: 40 ml    Total OUT: 1440 ml  ---------------------------------------------  Total NET: 340 ml    I & Os for current day (as of 2017 12:17)  =============================================  IN:    Enteral Tube Flush: 30 ml    Nepro: 20 ml    Total IN: 50 ml  ---------------------------------------------  OUT:    Total OUT: 0 ml  ---------------------------------------------  Total NET: 50 ml      Daily     Daily Weight in k.1 (2017 10:00)    MEDICATIONS  (STANDING):  heparin  Injectable 5000Unit(s) SubCutaneous every 8 hours  aspirin  chewable 81milliGRAM(s) Oral daily  epoetin ramón Injectable 65525Ekuf(s) IV Push <User Schedule>  insulin lispro (HumaLOG) corrective regimen sliding scale  SubCutaneous every 6 hours  metoclopramide 10milliGRAM(s) Oral every 8 hours    MEDICATIONS  (PRN):  acetaminophen    Suspension. 650milliGRAM(s) Oral every 6 hours PRN Mild (1-3) and Moderate Pain (4 - 6)  artificial  tears Solution 1Drop(s) Both EYES every 4 hours PRN Dry Eyes      LABS:                        8.4    12.25 )-----------( 893      ( 2017 12:31 )             27.7     06-13    139  |  96  |  65<H>  ----------------------------<  78  4.2   |  25  |  4.81<H>    Ca    10.5      2017 12:35          66 y/o F with ESRD on HD, awaiting AVF  -Plan for AVF tomorrow  -Will need HD today in preparation of for OR tomorrow, discussed with nephrology. Patient now scheduled for HD  -NPO @ [facility]  -Pre-operative labs

## 2017-06-14 NOTE — PROGRESS NOTE ADULT - SUBJECTIVE AND OBJECTIVE BOX
66 y/o female with ESRD requiring dialysis.      vein mapping with no usable vein.  will plan for AVG creation thursday    PE  AVSS  bruises on L arm.     no usable vein.

## 2017-06-14 NOTE — PROGRESS NOTE ADULT - PROBLEM SELECTOR PLAN 1
Continue monitor. no intervention indicated  pts oxygenation has improved significantly with continuing dialysis.  on 2 L/mt of oxygen

## 2017-06-14 NOTE — PROGRESS NOTE ADULT - SUBJECTIVE AND OBJECTIVE BOX
[facility] [phone]        Patient seen and examined         MEDICATIONS  (STANDING):  heparin  Injectable 5000Unit(s) SubCutaneous every 8 hours  aspirin  chewable 81milliGRAM(s) Oral daily  epoetin ramón Injectable 90346Jmxy(s) IV Push <User Schedule>  insulin lispro (HumaLOG) corrective regimen sliding scale  SubCutaneous every 6 hours  metoclopramide 10milliGRAM(s) Oral every 8 hours      VITAL:  T(C): , Max: 36.9 (06-13 @ 12:34)  T(F): , Max: 98.4 (06-13 @ 12:34)  HR: 68  BP: 147/69  BP(mean): --  RR: 18  SpO2: 94%  Wt(kg): --    I and O's:    I & Os for current day (as of 06-14 @ 08:17)  =============================================  IN: 1780 ml / OUT: 1440 ml / NET: 340 ml        PHYSICAL EXAM:     Constitutional: NAD  HEENT: PERRLA    Neck:  No JVD  Respiratory: CTAB/L  Cardiovascular: S1 and S2  Gastrointestinal: BS+, soft, NT/ND  Extremities: No peripheral edema  Neurological: A/O x 3, no focal deficits  Psychiatric: Normal mood, normal affect  : No Raphael  Skin: No rashes  Access: +perm cath      LABS:                        8.4    12.25 )-----------( 893      ( 13 Jun 2017 12:31 )             27.7     06-13    139  |  96  |  65<H>  ----------------------------<  78  4.2   |  25  |  4.81<H>    Ca    10.5      13 Jun 2017 12:35            Urine Studies:          RADIOLOGY & ADDITIONAL STUDIES: [facility] [phone]        Patient seen and examined bed.  No new changes        MEDICATIONS  (STANDING):  heparin  Injectable 5000Unit(s) SubCutaneous every 8 hours  aspirin  chewable 81milliGRAM(s) Oral daily  epoetin ramón Injectable 35056Hieq(s) IV Push <User Schedule>  insulin lispro (HumaLOG) corrective regimen sliding scale  SubCutaneous every 6 hours  metoclopramide 10milliGRAM(s) Oral every 8 hours      VITAL:  T(C): , Max: 36.9 (06-13 @ 12:34)  T(F): , Max: 98.4 (06-13 @ 12:34)  HR: 68  BP: 147/69  BP(mean): --  RR: 18  SpO2: 94%  Wt(kg): --    I and O's:    I & Os for current day (as of 06-14 @ 08:17)  =============================================  IN: 1780 ml / OUT: 1440 ml / NET: 340 ml        PHYSICAL EXAM:     Constitutional: NAD  HEENT: PERRLA    Neck:  No JVD  Respiratory: rare crackles  Cardiovascular: S1 and S2  Gastrointestinal: BS+, soft, NT/ND  Extremities: No peripheral edema  Neurological: A/O x 3, no focal deficits  Psychiatric: Normal mood, normal affect  : No Raphael  Skin: No rashes  Access: +perm cath      LABS:                        8.4    12.25 )-----------( 893      ( 13 Jun 2017 12:31 )             27.7     06-13    139  |  96  |  65<H>  ----------------------------<  78  4.2   |  25  |  4.81<H>    Ca    10.5      13 Jun 2017 12:35            Urine Studies:          RADIOLOGY & ADDITIONAL STUDIES:

## 2017-06-14 NOTE — PROGRESS NOTE ADULT - PROBLEM SELECTOR PLAN 1
Again unclear if she will have any recovery.  At present she is anuric.     HD in am  AVG on thur as well Again unclear if she will have any recovery.  At present she is anuric.     HD today per vasc request  AVG on thur

## 2017-06-15 ENCOUNTER — TRANSCRIPTION ENCOUNTER (OUTPATIENT)
Age: 67
End: 2017-06-15

## 2017-06-15 DIAGNOSIS — N18.5 CHRONIC KIDNEY DISEASE, STAGE 5: ICD-10-CM

## 2017-06-15 PROCEDURE — 36830 ARTERY-VEIN NONAUTOGRAFT: CPT | Mod: GC

## 2017-06-15 RX ORDER — METOCLOPRAMIDE HCL 10 MG
10 TABLET ORAL EVERY 8 HOURS
Qty: 0 | Refills: 0 | Status: DISCONTINUED | OUTPATIENT
Start: 2017-06-15 | End: 2017-06-19

## 2017-06-15 RX ORDER — HEPARIN SODIUM 5000 [USP'U]/ML
5000 INJECTION INTRAVENOUS; SUBCUTANEOUS EVERY 8 HOURS
Qty: 0 | Refills: 0 | Status: DISCONTINUED | OUTPATIENT
Start: 2017-06-15 | End: 2017-06-19

## 2017-06-15 RX ORDER — ASPIRIN/CALCIUM CARB/MAGNESIUM 324 MG
81 TABLET ORAL DAILY
Qty: 0 | Refills: 0 | Status: DISCONTINUED | OUTPATIENT
Start: 2017-06-15 | End: 2017-06-19

## 2017-06-15 RX ORDER — ACETAMINOPHEN 500 MG
650 TABLET ORAL EVERY 6 HOURS
Qty: 0 | Refills: 0 | Status: DISCONTINUED | OUTPATIENT
Start: 2017-06-15 | End: 2017-06-19

## 2017-06-15 RX ORDER — INSULIN LISPRO 100/ML
VIAL (ML) SUBCUTANEOUS EVERY 6 HOURS
Qty: 0 | Refills: 0 | Status: DISCONTINUED | OUTPATIENT
Start: 2017-06-15 | End: 2017-06-19

## 2017-06-15 RX ADMIN — Medication 650 MILLIGRAM(S): at 21:03

## 2017-06-15 RX ADMIN — Medication 650 MILLIGRAM(S): at 01:02

## 2017-06-15 RX ADMIN — Medication 650 MILLIGRAM(S): at 20:03

## 2017-06-15 RX ADMIN — ERYTHROPOIETIN 10000 UNIT(S): 10000 INJECTION, SOLUTION INTRAVENOUS; SUBCUTANEOUS at 01:17

## 2017-06-15 RX ADMIN — HEPARIN SODIUM 5000 UNIT(S): 5000 INJECTION INTRAVENOUS; SUBCUTANEOUS at 05:54

## 2017-06-15 RX ADMIN — Medication 1 DROP(S): at 22:14

## 2017-06-15 RX ADMIN — Medication 650 MILLIGRAM(S): at 01:58

## 2017-06-15 RX ADMIN — Medication 10 MILLIGRAM(S): at 22:14

## 2017-06-15 RX ADMIN — Medication 10 MILLIGRAM(S): at 05:54

## 2017-06-15 RX ADMIN — HEPARIN SODIUM 5000 UNIT(S): 5000 INJECTION INTRAVENOUS; SUBCUTANEOUS at 22:14

## 2017-06-15 RX ADMIN — Medication 81 MILLIGRAM(S): at 17:10

## 2017-06-15 NOTE — PROGRESS NOTE ADULT - ASSESSMENT
This is a 67-year-old female with a past medical history of lymphedema, diabetes, hypertension, chronic kidney disease stage IV who presents with progressive anuric renal failure now on HD  Likely Asp PNA This is a 67-year-old female with a past medical history of lymphedema, diabetes, hypertension, chronic kidney disease stage IV who presents with progressive anuric renal failure now on HD  Asp PNA

## 2017-06-15 NOTE — PROGRESS NOTE ADULT - SUBJECTIVE AND OBJECTIVE BOX
Subjective: Patient seen and examined. No new events except as noted.   feels weak  s/p HD yesterday     REVIEW OF SYSTEMS:    CONSTITUTIONAL: +weakness, no fevers or chills  EYES/ENT: No visual changes;  No vertigo or throat pain   NECK: No pain or stiffness  RESPIRATORY: No cough, wheezing, hemoptysis; No shortness of breath  CARDIOVASCULAR: No chest pain or palpitations  GASTROINTESTINAL: No abdominal or epigastric pain. No nausea, vomiting, or hematemesis; No diarrhea or constipation. No melena or hematochezia.  GENITOURINARY: No dysuria, frequency or hematuria  NEUROLOGICAL: No numbness or weakness  SKIN: No itching, burning, rashes, or lesions   All other review of systems is negative unless indicated above.    MEDICATIONS:  MEDICATIONS  (STANDING):      PHYSICAL EXAM:  T(C): 36.9, Max: 36.9 (06-15 @ 04:28)  HR: 65 (63 - 76)  BP: 127/65 (98/60 - 142/86)  RR: 18 (18 - 18)  SpO2: 100% (96% - 100%)  Wt(kg): --  I&O's Summary    I & Os for current day (as of 15 Jose 2017 11:09)  =============================================  IN: 560 ml / OUT: 1130 ml / NET: -570 ml        Appearance: Normal	  HEENT:   Normal oral mucosa, PERRL, EOMI	  Lymphatic: No lymphadenopathy , no edema  Cardiovascular: Normal S1 S2, No JVD, No murmurs , Peripheral pulses palpable 2+ bilaterally  Respiratory: Lungs clear to auscultation, normal effort 	  Gastrointestinal:  Soft, Non-tender, + BS	  Skin: No rashes, No ecchymoses, No cyanosis, warm to touch  Musculoskeletal: Normal range of motion, normal strength  Psychiatry:  Mood & affect appropriate  Ext: No edema      LABS:    CARDIAC MARKERS:                                8.3    11.93 )-----------( 790      ( 14 Jun 2017 20:05 )             27.6     06-14    140  |  97  |  40<H>  ----------------------------<  85  4.0   |  28  |  3.25<H>    Ca    9.7      14 Jun 2017 20:07      proBNP:   Lipid Profile:   HgA1c:   TSH:           TELEMETRY: 	    ECG:  	  RADIOLOGY:   DIAGNOSTIC TESTING:  [ ] Echocardiogram:  [ ]  Catheterization:  [ ] Stress Test:    OTHER:

## 2017-06-15 NOTE — PROGRESS NOTE ADULT - PROBLEM SELECTOR PLAN 1
Again unclear if she will have any recovery.  At present she is anuric.     HD today per vasc request  AVG on thur Again unclear if she will have any recovery.  At present she is anuric.     HD yesterday and next HD sat  AVG today

## 2017-06-15 NOTE — CHART NOTE - NSCHARTNOTEFT_GEN_A_CORE
Chart Reviewed anesthesia not to follow
Chart Reviewed anesthesia not to follow
Source: Patient [x ]    Family [ ]     other [ ] pt tolerating tube feeding without complaints; denies abdominal pain, gas, or hunger    Diet : NPO  with tube feeding       Source for PO intake [ ] Patient [ ] family [ ] chart [ ] staff [ ] other  N/A     Enteral /Parenteral Nutrition: Nepro@50ml/hr x18hrs providing 1620calories, 24/kg, protein 72.9kg, 1gm/kg    Current Weight: 67.1kg   stable  % Weight Change    Pertinent Medications: MEDICATIONS  (STANDING):  heparin  Injectable 5000Unit(s) SubCutaneous every 8 hours  aspirin  chewable 81milliGRAM(s) Oral daily  epoetin ramón Injectable [id number](s) IV Push <User Schedule>  insulin lispro (HumaLOG) corrective regimen sliding scale  SubCutaneous every 6 hours  metoclopramide 10milliGRAM(s) Oral every 8 hours    MEDICATIONS  (PRN):  acetaminophen    Suspension. 650milliGRAM(s) Oral every 6 hours PRN Mild (1-3) and Moderate Pain (4 - 6)  artificial  tears Solution 1Drop(s) Both EYES every 4 hours PRN Dry Eyes    Pertinent Labs:  06-13 Na139 mmol/L Glu 78 mg/dL K+ 4.2 mmol/L Cr  4.81 mg/dL<H> BUN 65 mg/dL<H> Phos n/a   Alb n/a   PAB n/a         Skin: intact    Estimated Needs:   [x ] no change since previous assessment  [ ] recalculated:       Previous Nutrition Diagnosis:     [ ] Inadequate Energy Intake [ ]Inadequate Oral Intake [ ] Excessive Energy Intake     [ ] Underweight [x ] Increased Nutrient Needs [ ] Overweight/Obesity     [ ] Altered GI Function [ ] Unintended Weight Loss [ ] Food & Nutrition Related Knowledge Deficit [ ] Malnutrition          Nutrition Diagnosis is [x ] ongoing  [ ] resolved [ ] not applicable          New Nutrition Diagnosis: [x ] not applicable    [ ] Inadequate Protein Energy Intake [ ]Inadequate Oral Intake [ ] Excessive Energy Intake     [ ] Underweight [ ] Increased Nutrient Needs [ ] Overweight/Obesity     [ ] Altered GI Function [ ] Unintended Weight Loss [ ] Food & Nutrition Related Knowledge Deficit[ ] Limited Adherence to nutrition related recommendations [ ] Malnutrition  [ ] other: Free text       Related to:      As evidenced by:      Interventions:     Recommend    [ ] Change Diet To:    [ ] Nutrition Supplement    [ ] Nutrition Support    [ ] Other:        Monitoring and Evaluation:     [ ] PO intake [ ] Tolerance to diet prescription [ ] weights [ ] follow up per protocol    [ ] other:
Spoke with covering NP regarding case. Patient now s/p Right upper extremity Arterio-venous graft, and currently in PACU. NP was advised that patient is to follow-up in office with Dr. Caban in 1 month following procedure for fistula duplex. Continue care per primary team.

## 2017-06-15 NOTE — PROGRESS NOTE ADULT - SUBJECTIVE AND OBJECTIVE BOX
[facility] SURGERY DAILY PROGRESS NOTE:     Hospital Day: 21    Postoperative Day: 0    Status post: RUE AVG      Subjective: Patient resting comfortably in bed without complaint Froy pain and diet. Denies SOB, CP, palp, N/V, numbness, tingling, HA, dizziness, or other issue.    Objective:  Vital Signs Last 24h  T(C): 36.4, Max: 36.9 (06-15 @ 04:28)  HR: 71 (65 - 76)  BP: 111/66 (102/51 - 178/74)  RR: 18 (14 - 18)  SpO2: 99% (91% - 100%)      Physical Exam:  General: NAD  Respiratory: CBTA  Cardiovascular: soft systolic murmur, RRR  Extremities: RUE incis c/d/i, palp radial pulse, full ROM and sensation, bruit present, no palp thrill      I & Os for 24h ending 06-15 @ 07:00  =============================================  IN: 560 ml / OUT: 1130 ml / NET: -570 ml        LABS:                          8.3<L>  11.93<H> )--------------( 790<H>    ( 14 Jun 2017 20:05 )               27.6<L>        140      |    97     |  40<H>  ---------------------------------<   85    14 Jun 2017 20:07  4.0      |    28     |  3.25<H>    Ca    9.7        14 Jun 2017 20:07 [facility] SURGERY POST OP NOTE:     Hospital Day: 21    Postoperative Day: 0    Status post: RUE AVG      Subjective: Patient resting comfortably in bed without complaint Froy pain and diet. Denies SOB, CP, palp, N/V, numbness, tingling, HA, dizziness, or other issue.    Objective:  Vital Signs Last 24h  T(C): 36.4, Max: 36.9 (06-15 @ 04:28)  HR: 71 (65 - 76)  BP: 111/66 (102/51 - 178/74)  RR: 18 (14 - 18)  SpO2: 99% (91% - 100%)      Physical Exam:  General: NAD  Respiratory: CBTA  Cardiovascular: soft systolic murmur, RRR  Extremities: RUE incis c/d/i, palp radial pulse, full ROM and sensation, bruit present, no palp thrill      I & Os for 24h ending 06-15 @ 07:00  =============================================  IN: 560 ml / OUT: 1130 ml / NET: -570 ml        LABS:                          8.3<L>  11.93<H> )--------------( 790<H>    ( 14 Jun 2017 20:05 )               27.6<L>        140      |    97     |  40<H>  ---------------------------------<   85    14 Jun 2017 20:07  4.0      |    28     |  3.25<H>    Ca    9.7        14 Jun 2017 20:07

## 2017-06-15 NOTE — PROGRESS NOTE ADULT - SUBJECTIVE AND OBJECTIVE BOX
[facility] [phone]        Patient seen and examined         MEDICATIONS  (STANDING):  heparin  Injectable 5000Unit(s) SubCutaneous every 8 hours  aspirin  chewable 81milliGRAM(s) Oral daily  epoetin ramón Injectable 75496Duci(s) IV Push <User Schedule>  insulin lispro (HumaLOG) corrective regimen sliding scale  SubCutaneous every 6 hours  metoclopramide 10milliGRAM(s) Oral every 8 hours      VITAL:  T(C): , Max: 36.9 (06-15 @ 04:28)  T(F): , Max: 98.5 (06-15 @ 04:28)  HR: 65  BP: 127/65  BP(mean): --  RR: 18  SpO2: 100%  Wt(kg): --    I and O's:    I & Os for current day (as of 06-15 @ 08:01)  =============================================  IN: 400 ml / OUT: 1130 ml / NET: -730 ml        PHYSICAL EXAM:    Constitutional: NAD  HEENT: PERRLA    Neck:  No JVD  Respiratory: CTAB/L  Cardiovascular: S1 and S2  Gastrointestinal: BS+, soft, NT/ND  Extremities: No peripheral edema  Neurological: A/O x 3, no focal deficits  Psychiatric: Normal mood, normal affect  : No Raphael  Skin: No rashes  Access: Not applicable    LABS:                        8.3    11.93 )-----------( 790      ( 14 Jun 2017 20:05 )             27.6     06-14    140  |  97  |  40<H>  ----------------------------<  85  4.0   |  28  |  3.25<H>    Ca    9.7      14 Jun 2017 20:07            Urine Studies:          RADIOLOGY & ADDITIONAL STUDIES: [facility] [phone]        Patient seen and examined in bed        MEDICATIONS  (STANDING):  heparin  Injectable 5000Unit(s) SubCutaneous every 8 hours  aspirin  chewable 81milliGRAM(s) Oral daily  epoetin ramón Injectable 03962Yopy(s) IV Push <User Schedule>  insulin lispro (HumaLOG) corrective regimen sliding scale  SubCutaneous every 6 hours  metoclopramide 10milliGRAM(s) Oral every 8 hours      VITAL:  T(C): , Max: 36.9 (06-15 @ 04:28)  T(F): , Max: 98.5 (06-15 @ 04:28)  HR: 65  BP: 127/65  BP(mean): --  RR: 18  SpO2: 100%  Wt(kg): --    I and O's:    I & Os for current day (as of 06-15 @ 08:01)  =============================================  IN: 400 ml / OUT: 1130 ml / NET: -730 ml        PHYSICAL EXAM:  Constitutional: NAD  HEENT: PERRLA    Neck:  No JVD  Respiratory: rare crackle  Cardiovascular: S1 and S2  Gastrointestinal: BS+, soft, NT/ND  Extremities: No peripheral edema  Neurological: A/O x 3, no focal deficits  Psychiatric: Normal mood, normal affect  : No Raphael  Skin: No rashes  Access: + perm cath         LABS:                        8.3    11.93 )-----------( 790      ( 14 Jun 2017 20:05 )             27.6     06-14    140  |  97  |  40<H>  ----------------------------<  85  4.0   |  28  |  3.25<H>    Ca    9.7      14 Jun 2017 20:07

## 2017-06-15 NOTE — PROVIDER CONTACT NOTE (OTHER) - BACKGROUND
Pt admitted for respiratory arrest, s/p intubation, and acute chronic kidney failure requiring urgent HD. PMH: DM, osteomyletitis, diabetic neuropathy, HTN.

## 2017-06-15 NOTE — PROGRESS NOTE ADULT - SUBJECTIVE AND OBJECTIVE BOX
Chief complaint:c/o no urine , BP range noted , pt is medically stable for surgery today    HPI:  67 F with T2DM, afib on coumadin, HTN, HLD, CKD IV, and ICU admission in August 2016 with prolonged hospitalization where she was hospitalized for RLE cellulitis/osteomyelitis course c/b afib and PEA arrest requiring intubation s/p trach given prolonged wean, NEHAL requiring HD, and dysphagia requiring PEG tube placement  brought in by EMS after being found minimally responsive, and subsequently intubated. Per ED note, EMS noted that patient appeared to be having severe diarrhea and had poor access so IO site was made. Normally a/o x2 and able to hold a conversation per son. Per family by bedside, patient was seen by family the day before and she had no complaints and no changes in mental status.     In the ED:  T 96.7, 146/69, 82-102bpm, 100% O2. Leukocytosis to 14.5, no bandemia. Hyponatremic to 129, NEHAL with Cr to 7.78 with BUN of 173. Acidotic with pH of 7.14 on ABG, AG of 28, bicarb of 14, lactate of 1.3, and pCO2 of 46. CXR revealed opacification of L lung. (25 May 2017 14:32)      REVIEW OF SYSTEMS:    CONSTITUTIONAL: No fever, weight loss, or fatigue  NECK: No pain or stiffness  RESPIRATORY: No cough, wheezing, chills or hemoptysis; No shortness of breath  CARDIOVASCULAR: No chest pain, palpitations, dizziness, or leg swelling  GASTROINTESTINAL: No abdominal or epigastric pain. No nausea, vomiting, or hematemesis; No diarrhea or constipation. No melena or hematochezia.  GENITOURINARY: No dysuria, frequency, hematuria, or incontinence  NEUROLOGICAL: No headaches, memory loss, loss of strength, numbness, or tremors  SKIN: No itching, burning, rashes, or lesions   LYMPH NODES: No enlarged glands  MUSCULOSKELETAL: No joint pain or swelling; No muscle, back, or extremity pain  HEME/LYMPH: No easy bruising, or bleeding gums    MEDICATIONS  (STANDING):  heparin  Injectable 5000Unit(s) SubCutaneous every 8 hours  aspirin  chewable 81milliGRAM(s) Oral daily  epoetin ramón Injectable 47760Rsgd(s) IV Push <User Schedule>  insulin lispro (HumaLOG) corrective regimen sliding scale  SubCutaneous every 6 hours  metoclopramide 10milliGRAM(s) Oral every 8 hours    MEDICATIONS  (PRN):  acetaminophen    Suspension. 650milliGRAM(s) Oral every 6 hours PRN Mild (1-3) and Moderate Pain (4 - 6)  artificial  tears Solution 1Drop(s) Both EYES every 4 hours PRN Dry Eyes      Allergies    No Known Allergies    Intolerances        Vital Signs Last 24 Hrs  T(C): 36.9, Max: 36.9 (06-15 @ 04:28)  T(F): 98.5, Max: 98.5 (06-15 @ 04:28)  HR: 65 (63 - 76)  BP: 127/65 (98/60 - 142/86)  BP(mean): --  RR: 18 (18 - 18)  SpO2: 100% (96% - 100%)    PHYSICAL EXAM:    GENERAL: NAD, well-groomed, well-developed  HEAD:  Atraumatic, Normocephalic  EYES: EOMI, PERRLA, conjunctiva and sclera clear  ENMT: No tonsillar erythema, exudates, or enlargement; Moist mucous membranes, Good dentition, No lesions  NECK: Supple, No JVD, Normal thyroid  NERVOUS SYSTEM:  Alert & Oriented X3, Good concentration; Motor Strength 5/5 B/L upper and lower extremities; DTRs 2+ intact and symmetric  CHEST/LUNG: Clear to percussion bilaterally; No rales, rhonchi, wheezing, or rubs  HEART: Regular rate and rhythm; No murmurs, rubs, or gallops  ABDOMEN: Soft, Nontender, Nondistended; Bowel sounds present  EXTREMITIES:  2+ Peripheral Pulses, No clubbing, cyanosis, or edema  LYMPH: No lymphadenopathy noted  SKIN: No rashes or lesions      LABS:                        8.3    11.93 )-----------( 790      ( 14 Jun 2017 20:05 )             27.6     06-14    140  |  97  |  40<H>  ----------------------------<  85  4.0   |  28  |  3.25<H>    Ca    9.7      14 Jun 2017 20:07      PT/INR - ( 14 Jun 2017 20:07 )   PT: 13.3 sec;   INR: 1.17 ratio               RADIOLOGY & ADDITIONAL STUDIES:

## 2017-06-16 DIAGNOSIS — I48.0 PAROXYSMAL ATRIAL FIBRILLATION: ICD-10-CM

## 2017-06-16 RX ORDER — ACETAMINOPHEN 500 MG
650 TABLET ORAL ONCE
Qty: 0 | Refills: 0 | Status: COMPLETED | OUTPATIENT
Start: 2017-06-16 | End: 2017-06-16

## 2017-06-16 RX ADMIN — Medication 650 MILLIGRAM(S): at 05:14

## 2017-06-16 RX ADMIN — Medication 10 MILLIGRAM(S): at 05:15

## 2017-06-16 RX ADMIN — Medication 650 MILLIGRAM(S): at 12:15

## 2017-06-16 RX ADMIN — Medication 81 MILLIGRAM(S): at 17:35

## 2017-06-16 RX ADMIN — HEPARIN SODIUM 5000 UNIT(S): 5000 INJECTION INTRAVENOUS; SUBCUTANEOUS at 05:15

## 2017-06-16 RX ADMIN — Medication 10 MILLIGRAM(S): at 17:34

## 2017-06-16 RX ADMIN — Medication 650 MILLIGRAM(S): at 06:14

## 2017-06-16 RX ADMIN — Medication 650 MILLIGRAM(S): at 21:24

## 2017-06-16 RX ADMIN — HEPARIN SODIUM 5000 UNIT(S): 5000 INJECTION INTRAVENOUS; SUBCUTANEOUS at 21:01

## 2017-06-16 RX ADMIN — Medication 650 MILLIGRAM(S): at 20:54

## 2017-06-16 RX ADMIN — Medication 650 MILLIGRAM(S): at 11:45

## 2017-06-16 RX ADMIN — Medication 10 MILLIGRAM(S): at 21:01

## 2017-06-16 RX ADMIN — HEPARIN SODIUM 5000 UNIT(S): 5000 INJECTION INTRAVENOUS; SUBCUTANEOUS at 17:35

## 2017-06-16 RX ADMIN — Medication 650 MILLIGRAM(S): at 17:35

## 2017-06-16 RX ADMIN — Medication 1 DROP(S): at 22:44

## 2017-06-16 RX ADMIN — Medication 650 MILLIGRAM(S): at 18:05

## 2017-06-16 NOTE — PROGRESS NOTE ADULT - SUBJECTIVE AND OBJECTIVE BOX
Patient is a 67y old  Female who presents with a chief complaint of found unresponsive; intubated in field (05 Jun 2017 17:05)    pt is doing good  S/P AV fistula       Any change in ROS:     MEDICATIONS  (STANDING):  heparin  Injectable 5000Unit(s) SubCutaneous every 8 hours  insulin lispro (HumaLOG) corrective regimen sliding scale  SubCutaneous every 6 hours  metoclopramide 10milliGRAM(s) Oral every 8 hours  aspirin  chewable 81milliGRAM(s) Oral daily    MEDICATIONS  (PRN):  acetaminophen    Suspension. 650milliGRAM(s) Oral every 6 hours PRN Mild (1-3) and Moderate Pain (4 - 6)  artificial  tears Solution 1Drop(s) Both EYES every 4 hours PRN Dry Eyes    Vital Signs Last 24 Hrs  T(C): 36.7, Max: 36.7 (06-16 @ 04:00)  T(F): 98, Max: 98.1 (06-16 @ 10:10)  HR: 75 (71 - 82)  BP: 105/68 (98/43 - 130/72)  BP(mean): --  RR: 18 (18 - 18)  SpO2: 96% (94% - 99%)    I&O's Summary    I & Os for current day (as of 16 Jun 2017 16:22)  =============================================  IN: 770 ml / OUT: 20 ml / NET: 750 ml        Physical Exam:   GENERAL: NAD, well-groomed, well-developed  HEENT: MALACHI/   Atraumatic, Normocephalic  ENMT: No tonsillar erythema, exudates, or enlargement; Moist mucous membranes, Good dentition, No lesions  NECK: Supple, No JVD, Normal thyroid  CHEST/LUNG: Clear to percussion bilaterally; No rales, rhonchi, wheezing, or rubs  CVS: Regular rate and rhythm; No murmurs, rubs, or gallops  GI: : Soft, Nontender, Nondistended; Bowel sounds present  NERVOUS SYSTEM:  Alert & Oriented X3, Good concentration; Motor Strength 5/5 B/L upper and lower extremities; DTRs 2+ intact and symmetric  EXTREMITIES:  2+ Peripheral Pulses, No clubbing, cyanosis, or edema  LYMPH: No lymphadenopathy noted  SKIN: No rashes or lesions  ENDOCRINOLOGY: No Thyromegaly  PSYCH: Appropriate    Labs:                  8.3    11.93 )-----------( 790      ( 14 Jun 2017 20:05 )             27.6                         8.4    12.25 )-----------( 893      ( 13 Jun 2017 12:31 )             27.7     06-14    140  |  97  |  40<H>  ----------------------------<  85  4.0   |  28  |  3.25<H>  06-13    139  |  96  |  65<H>  ----------------------------<  78  4.2   |  25  |  4.81<H>    Ca    9.7      14 Jun 2017 20:07      CAPILLARY BLOOD GLUCOSE  97 (16 Jun 2017 12:39)  128 (16 Jun 2017 06:26)  104 (16 Jun 2017 00:27)  82 (15 Jose 2017 17:09)        PT/INR - ( 14 Jun 2017 20:07 )   PT: 13.3 sec;   INR: 1.17 ratio          Studies  Chest X-RAY  CT SCAN Chest   Venous Dopplers: LE:   CT Abdomen  Others    IMPRESSION:  Right lower lung linear atelectasis. Small left pleural effusion.

## 2017-06-16 NOTE — PROGRESS NOTE ADULT - PROBLEM SELECTOR PLAN 1
Continue monitor. no intervention indicated  pts oxygenation has improved significantly with continuing dialysis.  on RA now

## 2017-06-16 NOTE — PROGRESS NOTE ADULT - SUBJECTIVE AND OBJECTIVE BOX
[facility] [phone]        Patient seen and examined         MEDICATIONS  (STANDING):  heparin  Injectable 5000Unit(s) SubCutaneous every 8 hours  insulin lispro (HumaLOG) corrective regimen sliding scale  SubCutaneous every 6 hours  metoclopramide 10milliGRAM(s) Oral every 8 hours  aspirin  chewable 81milliGRAM(s) Oral daily      VITAL:  T(C): , Max: 36.7 (06-16 @ 04:00)  T(F): , Max: 98 (06-16 @ 04:00)  HR: 80  BP: 130/72  BP(mean): 84  RR: 18  SpO2: 94%  Wt(kg): --    I and O's:    I & Os for current day (as of 06-16 @ 08:08)  =============================================  IN: 770 ml / OUT: 20 ml / NET: 750 ml        PHYSICAL EXAM:  Constitutional: NAD  HEENT: PERRLA    Neck:  No JVD  Respiratory: rare crackle  Cardiovascular: S1 and S2  Gastrointestinal: BS+, soft, NT/ND  Extremities: No peripheral edema  Neurological: A/O x 3, no focal deficits  Psychiatric: Normal mood, normal affect  : No Raphael  Skin: No rashes  Access: + perm cath       LABS:                        8.3    11.93 )-----------( 790      ( 14 Jun 2017 20:05 )             27.6     06-14    140  |  97  |  40<H>  ----------------------------<  85  4.0   |  28  |  3.25<H>    Ca    9.7      14 Jun 2017 20:07            Urine Studies:          RADIOLOGY & ADDITIONAL STUDIES: [facility] [phone]        Patient seen and examined in bed with the son at bedside.  She had her         MEDICATIONS  (STANDING):  heparin  Injectable 5000Unit(s) SubCutaneous every 8 hours  insulin lispro (HumaLOG) corrective regimen sliding scale  SubCutaneous every 6 hours  metoclopramide 10milliGRAM(s) Oral every 8 hours  aspirin  chewable 81milliGRAM(s) Oral daily      VITAL:  T(C): , Max: 36.7 (06-16 @ 04:00)  T(F): , Max: 98 (06-16 @ 04:00)  HR: 80  BP: 130/72  BP(mean): 84  RR: 18  SpO2: 94%  Wt(kg): --    I and O's:    I & Os for current day (as of 06-16 @ 08:08)  =============================================  IN: 770 ml / OUT: 20 ml / NET: 750 ml        PHYSICAL EXAM:  Constitutional: NAD  HEENT: PERRLA    Neck:  No JVD  Respiratory: rare crackle  Cardiovascular: S1 and S2  Gastrointestinal: BS+, soft, NT/ND  Extremities: No peripheral edema  Neurological: A/O x 3, no focal deficits  Psychiatric: Normal mood, normal affect  : No Raphael  Skin: No rashes  Access: + perm cath       LABS:                        8.3    11.93 )-----------( 790      ( 14 Jun 2017 20:05 )             27.6     06-14    140  |  97  |  40<H>  ----------------------------<  85  4.0   |  28  |  3.25<H>    Ca    9.7      14 Jun 2017 20:07            Urine Studies:          RADIOLOGY & ADDITIONAL STUDIES: [facility] [phone]        Patient seen and examined in bed with the son at bedside.  She had her AVG done        MEDICATIONS  (STANDING):  heparin  Injectable 5000Unit(s) SubCutaneous every 8 hours  insulin lispro (HumaLOG) corrective regimen sliding scale  SubCutaneous every 6 hours  metoclopramide 10milliGRAM(s) Oral every 8 hours  aspirin  chewable 81milliGRAM(s) Oral daily      VITAL:  T(C): , Max: 36.7 (06-16 @ 04:00)  T(F): , Max: 98 (06-16 @ 04:00)  HR: 80  BP: 130/72  BP(mean): 84  RR: 18  SpO2: 94%  Wt(kg): --    I and O's:    I & Os for current day (as of 06-16 @ 08:08)  =============================================  IN: 770 ml / OUT: 20 ml / NET: 750 ml        PHYSICAL EXAM:  Constitutional: NAD  HEENT: PERRLA    Neck:  No JVD  Respiratory: rare crackle  Cardiovascular: S1 and S2  Gastrointestinal: BS+, soft, NT/ND  Extremities: No peripheral edema  Neurological: A/O x 3, no focal deficits  Psychiatric: Normal mood, normal affect  : No Raphael  Skin: No rashes  Access: + perm cath +AVG right       LABS:                        8.3    11.93 )-----------( 790      ( 14 Jun 2017 20:05 )             27.6     06-14    140  |  97  |  40<H>  ----------------------------<  85  4.0   |  28  |  3.25<H>    Ca    9.7      14 Jun 2017 20:07            Urine Studies:          RADIOLOGY & ADDITIONAL STUDIES:

## 2017-06-16 NOTE — PROGRESS NOTE ADULT - SUBJECTIVE AND OBJECTIVE BOX
Chief complaint:S/P  AV fistula  placed  , c/o mild pain  at the site     HPI:  67 F with T2DM, afib on coumadin, HTN, HLD, CKD IV, and ICU admission in August 2016 with prolonged hospitalization where she was hospitalized for RLE cellulitis/osteomyelitis course c/b afib and PEA arrest requiring intubation s/p trach given prolonged wean, NEHAL requiring HD, and dysphagia requiring PEG tube placement  brought in by EMS after being found minimally responsive, and subsequently intubated. Per ED note, EMS noted that patient appeared to be having severe diarrhea and had poor access so IO site was made. Normally a/o x2 and able to hold a conversation per son. Per family by bedside, patient was seen by family the day before and she had no complaints and no changes in mental status.     In the ED:  T 96.7, 146/69, 82-102bpm, 100% O2. Leukocytosis to 14.5, no bandemia. Hyponatremic to 129, NEHAL with Cr to 7.78 with BUN of 173. Acidotic with pH of 7.14 on ABG, AG of 28, bicarb of 14, lactate of 1.3, and pCO2 of 46. CXR revealed opacification of L lung. (25 May 2017 14:32)      REVIEW OF SYSTEMS:    CONSTITUTIONAL: No fever, weight loss, or fatigue  NECK: No pain or stiffness  RESPIRATORY: No cough, wheezing, chills or hemoptysis; No shortness of breath  CARDIOVASCULAR: No chest pain, palpitations, dizziness, or leg swelling  GASTROINTESTINAL: No abdominal or epigastric pain. No nausea, vomiting, or hematemesis; No diarrhea or constipation. No melena or hematochezia.  GENITOURINARY: No dysuria, frequency, hematuria, or incontinence  NEUROLOGICAL: No headaches, memory loss, loss of strength, numbness, or tremors  SKIN: No itching, burning, rashes, or lesions   LYMPH NODES: No enlarged glands  MUSCULOSKELETAL: No joint pain or swelling; No muscle, back, or extremity pain  HEME/LYMPH: No easy bruising, or bleeding gums    MEDICATIONS  (STANDING):  heparin  Injectable 5000Unit(s) SubCutaneous every 8 hours  insulin lispro (HumaLOG) corrective regimen sliding scale  SubCutaneous every 6 hours  metoclopramide 10milliGRAM(s) Oral every 8 hours  aspirin  chewable 81milliGRAM(s) Oral daily    MEDICATIONS  (PRN):  acetaminophen    Suspension. 650milliGRAM(s) Oral every 6 hours PRN Mild (1-3) and Moderate Pain (4 - 6)  artificial  tears Solution 1Drop(s) Both EYES every 4 hours PRN Dry Eyes      Allergies    No Known Allergies    Intolerances        Vital Signs Last 24 Hrs  T(C): 36.7, Max: 36.7 (06-16 @ 04:00)  T(F): 98, Max: 98 (06-16 @ 04:00)  HR: 80 (65 - 82)  BP: 130/72 (102/51 - 178/74)  BP(mean): 84 (74 - 89)  RR: 18 (14 - 18)  SpO2: 94% (91% - 100%)    PHYSICAL EXAM:    GENERAL: NAD, well-groomed, well-developed  HEAD:  Atraumatic, Normocephalic  EYES: EOMI, PERRLA, conjunctiva and sclera clear  ENMT: No tonsillar erythema, exudates, or enlargement; Moist mucous membranes, Good dentition, No lesions  NECK: Supple, No JVD, Normal thyroid  NERVOUS SYSTEM:  Alert & Oriented X3, Good concentration; Motor Strength 5/5 B/L upper and lower extremities; DTRs 2+ intact and symmetric  CHEST/LUNG: Clear to percussion bilaterally; No rales, rhonchi, wheezing, or rubs  HEART: Regular rate and rhythm; No murmurs, rubs, or gallops  ABDOMEN: Soft, Nontender, Nondistended; Bowel sounds present  EXTREMITIES:  2+ Peripheral Pulses, No clubbing, cyanosis, or edema  LYMPH: No lymphadenopathy noted  SKIN: No rashes or lesions      LABS:                        8.3    11.93 )-----------( 790      ( 14 Jun 2017 20:05 )             27.6     06-14    140  |  97  |  40<H>  ----------------------------<  85  4.0   |  28  |  3.25<H>    Ca    9.7      14 Jun 2017 20:07      PT/INR - ( 14 Jun 2017 20:07 )   PT: 13.3 sec;   INR: 1.17 ratio               RADIOLOGY & ADDITIONAL STUDIES:

## 2017-06-16 NOTE — PROGRESS NOTE ADULT - ASSESSMENT
68 y/o F with ESRD on HD, now slp JENIFER AVG POD #1  - Patient to follow-up with Dr. Caban in office in 1 month for upper extremity duplex  -Continue current management per primary team  -Discussed with Dr. Caban

## 2017-06-16 NOTE — PROGRESS NOTE ADULT - SUBJECTIVE AND OBJECTIVE BOX
Subjective: Patient seen and examined. No new events except as noted.   s/p AVG yesterday       REVIEW OF SYSTEMS:    CONSTITUTIONAL: No weakness, fevers or chills  EYES/ENT: No visual changes;  No vertigo or throat pain   NECK: No pain or stiffness  RESPIRATORY: No cough, wheezing, hemoptysis; No shortness of breath  CARDIOVASCULAR: No chest pain or palpitations  GASTROINTESTINAL: No abdominal or epigastric pain. No nausea, vomiting, or hematemesis; No diarrhea or constipation. No melena or hematochezia.  GENITOURINARY: No dysuria, frequency or hematuria  NEUROLOGICAL: No numbness or weakness  SKIN: No itching, burning, rashes, or lesions   All other review of systems is negative unless indicated above.    MEDICATIONS:  MEDICATIONS  (STANDING):  heparin  Injectable 5000Unit(s) SubCutaneous every 8 hours  insulin lispro (HumaLOG) corrective regimen sliding scale  SubCutaneous every 6 hours  metoclopramide 10milliGRAM(s) Oral every 8 hours  aspirin  chewable 81milliGRAM(s) Oral daily      PHYSICAL EXAM:  T(C): 36.7, Max: 36.7 (06-16 @ 04:00)  HR: 80 (65 - 82)  BP: 130/72 (102/51 - 178/74)  RR: 18 (14 - 18)  SpO2: 94% (91% - 100%)  Wt(kg): --  I&O's Summary    I & Os for current day (as of 16 Jun 2017 10:32)  =============================================  IN: 770 ml / OUT: 20 ml / NET: 750 ml        Appearance: weak and cachectic looking 	  HEENT:   Normal oral mucosa, PERRL, EOMI	  Lymphatic: No lymphadenopathy , no edema  Cardiovascular: Normal S1 S2, No JVD, No murmurs , Peripheral pulses palpable 2+ bilaterally  Respiratory: Lungs clear to auscultation, normal effort 	  Gastrointestinal:  Soft, Non-tender, + BS	  Skin: No rashes, No ecchymoses, No cyanosis, warm to touch  Musculoskeletal: Normal range of motion, normal strength  Psychiatry:  Mood & affect appropriate  Ext: toe amputation       LABS:    CARDIAC MARKERS:                                8.3    11.93 )-----------( 790      ( 14 Jun 2017 20:05 )             27.6     06-14    140  |  97  |  40<H>  ----------------------------<  85  4.0   |  28  |  3.25<H>    Ca    9.7      14 Jun 2017 20:07      proBNP:   Lipid Profile:   HgA1c:   TSH:           TELEMETRY: 	    ECG:  	  RADIOLOGY:   DIAGNOSTIC TESTING:  [ ] Echocardiogram:  [ ]  Catheterization:  [ ] Stress Test:    OTHER:

## 2017-06-16 NOTE — PROGRESS NOTE ADULT - PROBLEM SELECTOR PLAN 2
Elevated WBC (but decreasing) and afebrile and not septic appearing.  SP IV abx for PNA(off iv abx at present) Epogen at HD

## 2017-06-16 NOTE — PROGRESS NOTE ADULT - ASSESSMENT
This is a 67-year-old female with a past medical history of lymphedema, diabetes, hypertension, chronic kidney disease stage IV who presents with progressive anuric renal failure now on HD  Asp PNA

## 2017-06-16 NOTE — PROGRESS NOTE ADULT - PROBLEM SELECTOR PLAN 1
Again unclear if she will have any recovery.  At present she is anuric.     HD yesterday and next HD sat  AVG today Again unclear if she will have any recovery.  At present she is anuric.     HD today  AVG done.  Will use in 3 weeks

## 2017-06-16 NOTE — PROGRESS NOTE ADULT - SUBJECTIVE AND OBJECTIVE BOX
GENERAL SURGERY DAILY PROGRESS NOTE:   No events overnight. Patient without complaints. complaining of "whole body pain" denies pain specifically in RUE.  PE:  General: WN/WD NAD  Neurology: Awake and alert nonfocal, CRUZ x 4  Head:  Normocephalic, atraumatic  ENT:  Mucosa moist, no ulcerations  Neck:  Supple, no sinuses or palpable masses  Lymphatic:  No palpable cervical, supraclavicular, axillary or inguinal adenopathy  Respiratory: CTA B/L  CV: RRR  Abdominal: Soft, NT, ND no palpable mass  MSK: RUE AVG with palpable thrill. Incisions with dermabond c/d/i. Finger tips slightly cooler than contralateral side but radial pulse was strongly palpable and no motor weakness was noted compared to contralateral side. LUE with palpable radial pulse as well    Vital Signs Last 24 Hrs  T(C): 36.7, Max: 36.7 (06-16 @ 04:00)  T(F): 98, Max: 98 (06-16 @ 04:00)  HR: 80 (65 - 82)  BP: 130/72 (102/51 - 178/74)  BP(mean): 84 (74 - 89)  RR: 18 (14 - 18)  SpO2: 94% (91% - 100%)    I&O's Detail    I & Os for current day (as of 16 Jun 2017 10:39)  =============================================  IN:    Nepro: 650 ml    Enteral Tube Flush: 60 ml    Solution: 60 ml    Total IN: 770 ml  ---------------------------------------------  OUT:    Voided: 20 ml    Total OUT: 20 ml  ---------------------------------------------  Total NET: 750 ml      Daily     Daily     MEDICATIONS  (STANDING):  heparin  Injectable 5000Unit(s) SubCutaneous every 8 hours  insulin lispro (HumaLOG) corrective regimen sliding scale  SubCutaneous every 6 hours  metoclopramide 10milliGRAM(s) Oral every 8 hours  aspirin  chewable 81milliGRAM(s) Oral daily    MEDICATIONS  (PRN):  acetaminophen    Suspension. 650milliGRAM(s) Oral every 6 hours PRN Mild (1-3) and Moderate Pain (4 - 6)  artificial  tears Solution 1Drop(s) Both EYES every 4 hours PRN Dry Eyes      LABS:                        8.3    11.93 )-----------( 790      ( 14 Jun 2017 20:05 )             27.6     06-14    140  |  97  |  40<H>  ----------------------------<  85  4.0   |  28  |  3.25<H>    Ca    9.7      14 Jun 2017 20:07      PT/INR - ( 14 Jun 2017 20:07 )   PT: 13.3 sec;   INR: 1.17 ratio

## 2017-06-17 LAB
ANION GAP SERPL CALC-SCNC: 19 MMOL/L — HIGH (ref 5–17)
BUN SERPL-MCNC: 40 MG/DL — HIGH (ref 7–23)
CALCIUM SERPL-MCNC: 10.6 MG/DL — HIGH (ref 8.4–10.5)
CHLORIDE SERPL-SCNC: 93 MMOL/L — LOW (ref 96–108)
CO2 SERPL-SCNC: 22 MMOL/L — SIGNIFICANT CHANGE UP (ref 22–31)
CREAT SERPL-MCNC: 3.41 MG/DL — HIGH (ref 0.5–1.3)
GLUCOSE SERPL-MCNC: 74 MG/DL — SIGNIFICANT CHANGE UP (ref 70–99)
HCT VFR BLD CALC: 32.8 % — LOW (ref 34.5–45)
HGB BLD-MCNC: 10.1 G/DL — LOW (ref 11.5–15.5)
MCHC RBC-ENTMCNC: 27 PG — SIGNIFICANT CHANGE UP (ref 27–34)
MCHC RBC-ENTMCNC: 30.8 GM/DL — LOW (ref 32–36)
MCV RBC AUTO: 87.7 FL — SIGNIFICANT CHANGE UP (ref 80–100)
PLATELET # BLD AUTO: 625 K/UL — HIGH (ref 150–400)
POTASSIUM SERPL-MCNC: 4.4 MMOL/L — SIGNIFICANT CHANGE UP (ref 3.5–5.3)
POTASSIUM SERPL-SCNC: 4.4 MMOL/L — SIGNIFICANT CHANGE UP (ref 3.5–5.3)
RBC # BLD: 3.74 M/UL — LOW (ref 3.8–5.2)
RBC # FLD: 17.5 % — HIGH (ref 10.3–14.5)
SODIUM SERPL-SCNC: 134 MMOL/L — LOW (ref 135–145)
WBC # BLD: 10.69 K/UL — HIGH (ref 3.8–10.5)
WBC # FLD AUTO: 10.69 K/UL — HIGH (ref 3.8–10.5)

## 2017-06-17 RX ADMIN — Medication 650 MILLIGRAM(S): at 22:35

## 2017-06-17 RX ADMIN — Medication 10 MILLIGRAM(S): at 22:35

## 2017-06-17 RX ADMIN — Medication 1 DROP(S): at 22:42

## 2017-06-17 RX ADMIN — Medication 10 MILLIGRAM(S): at 05:52

## 2017-06-17 RX ADMIN — Medication 650 MILLIGRAM(S): at 17:20

## 2017-06-17 RX ADMIN — Medication 10 MILLIGRAM(S): at 13:23

## 2017-06-17 RX ADMIN — Medication 81 MILLIGRAM(S): at 10:59

## 2017-06-17 RX ADMIN — HEPARIN SODIUM 5000 UNIT(S): 5000 INJECTION INTRAVENOUS; SUBCUTANEOUS at 22:35

## 2017-06-17 RX ADMIN — HEPARIN SODIUM 5000 UNIT(S): 5000 INJECTION INTRAVENOUS; SUBCUTANEOUS at 05:52

## 2017-06-17 RX ADMIN — Medication 650 MILLIGRAM(S): at 17:50

## 2017-06-17 RX ADMIN — Medication 650 MILLIGRAM(S): at 05:53

## 2017-06-17 RX ADMIN — HEPARIN SODIUM 5000 UNIT(S): 5000 INJECTION INTRAVENOUS; SUBCUTANEOUS at 13:23

## 2017-06-17 RX ADMIN — Medication 650 MILLIGRAM(S): at 06:23

## 2017-06-17 RX ADMIN — Medication 650 MILLIGRAM(S): at 23:30

## 2017-06-17 NOTE — PROGRESS NOTE ADULT - SUBJECTIVE AND OBJECTIVE BOX
Chief complaint:c/o no dyspnea , S/P HD yesterday     HPI:  67 F with T2DM, afib on coumadin, HTN, HLD, CKD IV, and ICU admission in August 2016 with prolonged hospitalization where she was hospitalized for RLE cellulitis/osteomyelitis course c/b afib and PEA arrest requiring intubation s/p trach given prolonged wean, NEHAL requiring HD, and dysphagia requiring PEG tube placement  brought in by EMS after being found minimally responsive, and subsequently intubated. Per ED note, EMS noted that patient appeared to be having severe diarrhea and had poor access so IO site was made. Normally a/o x2 and able to hold a conversation per son. Per family by bedside, patient was seen by family the day before and she had no complaints and no changes in mental status.     In the ED:  T 96.7, 146/69, 82-102bpm, 100% O2. Leukocytosis to 14.5, no bandemia. Hyponatremic to 129, NEHAL with Cr to 7.78 with BUN of 173. Acidotic with pH of 7.14 on ABG, AG of 28, bicarb of 14, lactate of 1.3, and pCO2 of 46. CXR revealed opacification of L lung. (25 May 2017 14:32)      REVIEW OF SYSTEMS:    CONSTITUTIONAL: No fever, weight loss, or fatigue  NECK: No pain or stiffness  RESPIRATORY: No cough, wheezing, chills or hemoptysis; No shortness of breath  CARDIOVASCULAR: No chest pain, palpitations, dizziness, or leg swelling  GASTROINTESTINAL: No abdominal or epigastric pain. No nausea, vomiting, or hematemesis; No diarrhea or constipation. No melena or hematochezia.  GENITOURINARY: No dysuria, frequency, hematuria, or incontinence  NEUROLOGICAL: No headaches, memory loss, loss of strength, numbness, or tremors  SKIN: No itching, burning, rashes, or lesions   LYMPH NODES: No enlarged glands  MUSCULOSKELETAL: No joint pain or swelling; No muscle, back, or extremity pain  HEME/LYMPH: No easy bruising, or bleeding gums    MEDICATIONS  (STANDING):  heparin  Injectable 5000Unit(s) SubCutaneous every 8 hours  insulin lispro (HumaLOG) corrective regimen sliding scale  SubCutaneous every 6 hours  metoclopramide 10milliGRAM(s) Oral every 8 hours  aspirin  chewable 81milliGRAM(s) Oral daily    MEDICATIONS  (PRN):  acetaminophen    Suspension. 650milliGRAM(s) Oral every 6 hours PRN Mild (1-3) and Moderate Pain (4 - 6)  artificial  tears Solution 1Drop(s) Both EYES every 4 hours PRN Dry Eyes      Allergies    No Known Allergies    Intolerances        Vital Signs Last 24 Hrs  T(C): 36.8, Max: 36.9 (06-16 @ 19:42)  T(F): 98.3, Max: 98.5 (06-16 @ 19:42)  HR: 80 (75 - 86)  BP: 107/52 (78/35 - 112/42)  BP(mean): --  RR: 18 (18 - 18)  SpO2: 96% (96% - 96%)    PHYSICAL EXAM:    GENERAL: NAD, well-groomed, well-developed  HEAD:  Atraumatic, Normocephalic  EYES: EOMI, PERRLA, conjunctiva and sclera clear  ENMT: No tonsillar erythema, exudates, or enlargement; Moist mucous membranes, Good dentition, No lesions  NECK: Supple, No JVD, Normal thyroid  NERVOUS SYSTEM:  Alert & Oriented X3, Good concentration; Motor Strength 5/5 B/L upper and lower extremities; DTRs 2+ intact and symmetric  CHEST/LUNG: Clear to percussion bilaterally; No rales, rhonchi, wheezing, or rubs  HEART: Regular rate and rhythm; No murmurs, rubs, or gallops  ABDOMEN: Soft, Nontender, Nondistended; Bowel sounds present  EXTREMITIES:  2+ Peripheral Pulses, No clubbing, cyanosis, or edema  LYMPH: No lymphadenopathy noted  SKIN: No rashes or lesions      LABS:                RADIOLOGY & ADDITIONAL STUDIES:

## 2017-06-17 NOTE — PROVIDER CONTACT NOTE (MEDICATION) - ASSESSMENT
pt c/o of pain in arms bilaterally. pt alert and oriented. pt has J-g tube. pt has acetaminophen  suspension due at 2317.

## 2017-06-17 NOTE — PROGRESS NOTE ADULT - SUBJECTIVE AND OBJECTIVE BOX
Patient is a 67y old  Female who presents with a chief complaint of found unresponsive; intubated in field (05 Jun 2017 17:05)    doing ok, no SOB   No overnight events      Any change in ROS:     MEDICATIONS  (STANDING):  heparin  Injectable 5000Unit(s) SubCutaneous every 8 hours  insulin lispro (HumaLOG) corrective regimen sliding scale  SubCutaneous every 6 hours  metoclopramide 10milliGRAM(s) Oral every 8 hours  aspirin  chewable 81milliGRAM(s) Oral daily    MEDICATIONS  (PRN):  acetaminophen    Suspension. 650milliGRAM(s) Oral every 6 hours PRN Mild (1-3) and Moderate Pain (4 - 6)  artificial  tears Solution 1Drop(s) Both EYES every 4 hours PRN Dry Eyes    Vital Signs Last 24 Hrs  T(C): 36.6, Max: 36.9 (06-16 @ 19:42)  T(F): 97.9, Max: 98.5 (06-16 @ 19:42)  HR: 84 (75 - 86)  BP: 105/67 (78/35 - 109/52)  BP(mean): --  RR: 18 (18 - 18)  SpO2: 94% (94% - 96%)    I&O's Summary    I & Os for current day (as of 17 Jun 2017 11:58)  =============================================  IN: 870 ml / OUT: 600 ml / NET: 270 ml        Physical Exam:   GENERAL: NAD, well-groomed, well-developed  HEENT: MALACHI/   Atraumatic, Normocephalic  ENMT: No tonsillar erythema, exudates, or enlargement; Moist mucous membranes, Good dentition, No lesions  NECK: Supple, No JVD, Normal thyroid  CHEST/LUNG: Clear to percussion bilaterally; No rales, rhonchi, wheezing, or rubs  CVS: Regular rate and rhythm; No murmurs, rubs, or gallops  GI: : Soft, Nontender, Nondistended; Bowel sounds present  NERVOUS SYSTEM:  Alert & Oriented X3,  EXTREMITIES:  2+ Peripheral Pulses, No clubbing, cyanosis, or edema  LYMPH: No lymphadenopathy noted  SKIN: No rashes or lesions  ENDOCRINOLOGY: No Thyromegaly  PSYCH: Appropriate    Labs:                              8.3    11.93 )-----------( 790      ( 14 Jun 2017 20:05 )             27.6                         8.4    12.25 )-----------( 893      ( 13 Jun 2017 12:31 )             27.7     06-14    140  |  97  |  40<H>  ----------------------------<  85  4.0   |  28  |  3.25<H>  06-13    139  |  96  |  65<H>  ----------------------------<  78  4.2   |  25  |  4.81<H>        CAPILLARY BLOOD GLUCOSE  123 (17 Jun 2017 05:50)  86 (17 Jun 2017 00:20)  124 (16 Jun 2017 17:24)  97 (16 Jun 2017 12:39)            Cultures:                             Studies  Chest X-RAY  CT SCAN Chest   Venous Dopplers: LE:   EXAM:  CHEST SINGLE AP OR PA                            PROCEDURE DATE:  06/07/2017            INTERPRETATION:  CLINICAL INDICATION: Leukocytosis; recent completion of   antibiotics for pneumonia    TECHNIQUE: Frontal radiograph of the chest    COMPARISON:  Radiograph of the chest from June 4, 2017      FINDINGS:    There is a left-sided central venous catheter with its tip in the SVC.   There is right lower lung linear atelectasis. There is no evidence of   pneumothorax. There is a small left pleural effusion. The   cardiomediastinal silhouette is stable in size.    IMPRESSION:  Right lower lung linear atelectasis. Small left pleural effusion.  CT Abdomen  Others

## 2017-06-17 NOTE — PROGRESS NOTE ADULT - SUBJECTIVE AND OBJECTIVE BOX
Subjective: Patient seen and examined. No new events except as noted.     REVIEW OF SYSTEMS:    CONSTITUTIONAL: + weakness, no fevers or chills  EYES/ENT: No visual changes;  No vertigo or throat pain   NECK: No pain or stiffness  RESPIRATORY: No cough, wheezing, hemoptysis; No shortness of breath  CARDIOVASCULAR: No chest pain or palpitations  GASTROINTESTINAL: No abdominal or epigastric pain. No nausea, vomiting, or hematemesis; No diarrhea or constipation. No melena or hematochezia.  GENITOURINARY: No dysuria, frequency or hematuria  NEUROLOGICAL: No numbness or weakness  SKIN: No itching, burning, rashes, or lesions   All other review of systems is negative unless indicated above.    MEDICATIONS:  MEDICATIONS  (STANDING):  heparin  Injectable 5000Unit(s) SubCutaneous every 8 hours  insulin lispro (HumaLOG) corrective regimen sliding scale  SubCutaneous every 6 hours  metoclopramide 10milliGRAM(s) Oral every 8 hours  aspirin  chewable 81milliGRAM(s) Oral daily      PHYSICAL EXAM:  T(C): 36.5, Max: 36.8 (06-17 @ 04:12)  HR: 78 (71 - 84)  BP: 94/52 (94/52 - 107/52)  RR: 18 (18 - 18)  SpO2: 98% (94% - 98%)  Wt(kg): --  I&O's Summary  I & Os for 24h ending 17 Jun 2017 07:00  =============================================  IN: 870 ml / OUT: 600 ml / NET: 270 ml    I & Os for current day (as of 17 Jun 2017 22:08)  =============================================  IN: 430 ml / OUT: 0 ml / NET: 430 ml        Appearance: Normal	  HEENT:   Normal oral mucosa, PERRL, EOMI	  Lymphatic: No lymphadenopathy , no edema  Cardiovascular: Normal S1 S2, No JVD, No murmurs , Peripheral pulses palpable 2+ bilaterally  Respiratory: Lungs clear to auscultation, normal effort 	  Gastrointestinal:  Soft, Non-tender, + BS	  Skin: No rashes, No ecchymoses, No cyanosis, warm to touch  Musculoskeletal: Normal range of motion, normal strength  Psychiatry:  Mood & affect appropriate  Ext: No edema      LABS:    CARDIAC MARKERS:                                10.1   10.69 )-----------( 625      ( 17 Jun 2017 15:12 )             32.8     06-17    134<L>  |  93<L>  |  40<H>  ----------------------------<  74  4.4   |  22  |  3.41<H>    Ca    10.6<H>      17 Jun 2017 15:12      proBNP:   Lipid Profile:   HgA1c:   TSH:           TELEMETRY: 	    ECG:  	  RADIOLOGY:   DIAGNOSTIC TESTING:  [ ] Echocardiogram:  [ ]  Catheterization:  [ ] Stress Test:    OTHER:

## 2017-06-18 DIAGNOSIS — I95.1 ORTHOSTATIC HYPOTENSION: ICD-10-CM

## 2017-06-18 RX ORDER — MIDODRINE HYDROCHLORIDE 2.5 MG/1
2.5 TABLET ORAL DAILY
Qty: 0 | Refills: 0 | Status: DISCONTINUED | OUTPATIENT
Start: 2017-06-18 | End: 2017-06-19

## 2017-06-18 RX ADMIN — Medication 650 MILLIGRAM(S): at 10:07

## 2017-06-18 RX ADMIN — HEPARIN SODIUM 5000 UNIT(S): 5000 INJECTION INTRAVENOUS; SUBCUTANEOUS at 13:08

## 2017-06-18 RX ADMIN — Medication 10 MILLIGRAM(S): at 13:06

## 2017-06-18 RX ADMIN — HEPARIN SODIUM 5000 UNIT(S): 5000 INJECTION INTRAVENOUS; SUBCUTANEOUS at 06:10

## 2017-06-18 RX ADMIN — MIDODRINE HYDROCHLORIDE 2.5 MILLIGRAM(S): 2.5 TABLET ORAL at 13:06

## 2017-06-18 RX ADMIN — Medication 10 MILLIGRAM(S): at 06:11

## 2017-06-18 RX ADMIN — HEPARIN SODIUM 5000 UNIT(S): 5000 INJECTION INTRAVENOUS; SUBCUTANEOUS at 21:21

## 2017-06-18 RX ADMIN — Medication 650 MILLIGRAM(S): at 21:51

## 2017-06-18 RX ADMIN — Medication 10 MILLIGRAM(S): at 21:21

## 2017-06-18 RX ADMIN — Medication 650 MILLIGRAM(S): at 21:21

## 2017-06-18 RX ADMIN — Medication 650 MILLIGRAM(S): at 10:40

## 2017-06-18 RX ADMIN — Medication 81 MILLIGRAM(S): at 10:56

## 2017-06-18 NOTE — PROGRESS NOTE ADULT - SUBJECTIVE AND OBJECTIVE BOX
Chief complaint:pt c/oo hypotensive events  with BP 80/50 when sitting  or standing  improves when put to bed , denies  dizziness     HPI:  67 F with T2DM, afib on coumadin, HTN, HLD, CKD IV, and ICU admission in August 2016 with prolonged hospitalization where she was hospitalized for RLE cellulitis/osteomyelitis course c/b afib and PEA arrest requiring intubation s/p trach given prolonged wean, NEHAL requiring HD, and dysphagia requiring PEG tube placement  brought in by EMS after being found minimally responsive, and subsequently intubated. Per ED note, EMS noted that patient appeared to be having severe diarrhea and had poor access so IO site was made. Normally a/o x2 and able to hold a conversation per son. Per family by bedside, patient was seen by family the day before and she had no complaints and no changes in mental status.     In the ED:  T 96.7, 146/69, 82-102bpm, 100% O2. Leukocytosis to 14.5, no bandemia. Hyponatremic to 129, NEHAL with Cr to 7.78 with BUN of 173. Acidotic with pH of 7.14 on ABG, AG of 28, bicarb of 14, lactate of 1.3, and pCO2 of 46. CXR revealed opacification of L lung. (25 May 2017 14:32)      REVIEW OF SYSTEMS:    CONSTITUTIONAL: No fever, weight loss, or fatigue  NECK: No pain or stiffness  RESPIRATORY: No cough, wheezing, chills or hemoptysis; No shortness of breath  CARDIOVASCULAR: No chest pain, palpitations, dizziness, or leg swelling  GASTROINTESTINAL: No abdominal or epigastric pain. No nausea, vomiting, or hematemesis; No diarrhea or constipation. No melena or hematochezia.  GENITOURINARY: No dysuria, frequency, hematuria, or incontinence  NEUROLOGICAL: No headaches, memory loss, loss of strength, numbness, or tremors  SKIN: No itching, burning, rashes, or lesions   LYMPH NODES: No enlarged glands  MUSCULOSKELETAL: No joint pain or swelling; No muscle, back, or extremity pain  HEME/LYMPH: No easy bruising, or bleeding gums    MEDICATIONS  (STANDING):  heparin  Injectable 5000Unit(s) SubCutaneous every 8 hours  insulin lispro (HumaLOG) corrective regimen sliding scale  SubCutaneous every 6 hours  metoclopramide 10milliGRAM(s) Oral every 8 hours  aspirin  chewable 81milliGRAM(s) Oral daily  midodrine 2.5milliGRAM(s) Oral daily    MEDICATIONS  (PRN):  acetaminophen    Suspension. 650milliGRAM(s) Oral every 6 hours PRN Mild (1-3) and Moderate Pain (4 - 6)  artificial  tears Solution 1Drop(s) Both EYES every 4 hours PRN Dry Eyes      Allergies    No Known Allergies    Intolerances        Vital Signs Last 24 Hrs  T(C): 36.5, Max: 36.6 (06-17 @ 11:28)  T(F): 97.7, Max: 97.9 (06-17 @ 11:28)  HR: 99 (71 - 99)  BP: 98/59 (80/48 - 143/64)  BP(mean): --  RR: 20 (18 - 20)  SpO2: 98% (94% - 98%)    PHYSICAL EXAM:    GENERAL: NAD, well-groomed, well-developed  HEAD:  Atraumatic, Normocephalic  EYES: EOMI, PERRLA, conjunctiva and sclera clear  ENMT: No tonsillar erythema, exudates, or enlargement; Moist mucous membranes, Good dentition, No lesions  NECK: Supple, No JVD, Normal thyroid  NERVOUS SYSTEM:  Alert & Oriented X3, Good concentration; Motor Strength 5/5 B/L upper and lower extremities; DTRs 2+ intact and symmetric  CHEST/LUNG: Clear to percussion bilaterally; No rales, rhonchi, wheezing, or rubs  HEART: Regular rate and rhythm; No murmurs, rubs, or gallops  ABDOMEN: Soft, Nontender, Nondistended; Bowel sounds present  EXTREMITIES:  2+ Peripheral Pulses, No clubbing, cyanosis, or edema  LYMPH: No lymphadenopathy noted  SKIN: No rashes or lesions      LABS:                        10.1   10.69 )-----------( 625      ( 17 Jun 2017 15:12 )             32.8     06-17    134<L>  |  93<L>  |  40<H>  ----------------------------<  74  4.4   |  22  |  3.41<H>    Ca    10.6<H>      17 Jun 2017 15:12            RADIOLOGY & ADDITIONAL STUDIES:

## 2017-06-18 NOTE — PROGRESS NOTE ADULT - SUBJECTIVE AND OBJECTIVE BOX
NEPHROLOGY[facility]  Mecca Copeland NP      Patient seen and examined. No acute events noted      MEDICATIONS  (STANDING):  heparin  Injectable 5000Unit(s) SubCutaneous every 8 hours  insulin lispro (HumaLOG) corrective regimen sliding scale  SubCutaneous every 6 hours  metoclopramide 10milliGRAM(s) Oral every 8 hours  aspirin  chewable 81milliGRAM(s) Oral daily  midodrine 2.5milliGRAM(s) Oral daily      VITAL:  T(C): , Max: 36.6 (06-17 @ 16:39)  T(F): , Max: 97.8 (06-17 @ 16:39)  HR: 99  BP: 98/59  BP(mean): --  RR: 20  SpO2: 98%  Wt(kg): --    I and O's:    I & Os for current day (as of 06-18 @ 12:28)  =============================================  IN: 430 ml / OUT: 0 ml / NET: 430 ml        PHYSICAL EXAM:    Constitutional: NAD  HEENT: PERRLA, EOMI,     Neck:  No JVD  Respiratory: CTAB/L  Cardiovascular: S1 and S2  Gastrointestinal: BS+, soft, NT/ND  Extremities: No peripheral edema  : No Raphael  Skin: No rashes  Access: AVG    LABS:                        10.1   10.69 )-----------( 625      ( 17 Jun 2017 15:12 )             32.8       17 Jun 2017 15:12    134<L>  |  93<L>  |  40<H>  ----------------------------<  74     4.4     |  22     |  3.41<H>    Ca    10.6<H>      17 Jun 2017 15:12        RADIOLOGY & ADDITIONAL STUDIES:        Assessment and Plan:   · Assessment		  This is a 67-year-old female with a past medical history of lymphedema, diabetes, hypertension, chronic kidney disease stage IV who presents with progressive anuric renal failure. S/P AVG   PNA-resolved  Hypotension      Problem/Plan - 1:  ·  Problem: R/O ESRD (end stage renal disease).  Plan: Unclear if patient will have Renal recovery  S/P  HD last Friday, next HD likely tomorrow  Will use AVG in 3 weeks    Problem/Plan - 2:  ·  Problem: Anemia due to chronic kidney diseaseOther elevated white blood cell (WBC) count.  Plan: Trend hgb/hct    Problem/Plan - 3:  ·  Problem: Anemia due to chronic kidney diseaseHypotension.  Plan: C/w Midodrine for BP support, can increase dose to 5mg daily as needed

## 2017-06-18 NOTE — PROVIDER CONTACT NOTE (OTHER) - REASON
Aide Jose NP
pt has emesis of 100cc light green drainage
pt voided, /61 while OOB
IV infiltration, need new access for medication
Hypotensive

## 2017-06-18 NOTE — PROGRESS NOTE ADULT - SUBJECTIVE AND OBJECTIVE BOX
Patient is a 67y old  Female who presents with a chief complaint of found unresponsive; intubated in field (05 Jun 2017 17:05)    I am better  Any change in ROS:     MEDICATIONS  (STANDING):  heparin  Injectable 5000Unit(s) SubCutaneous every 8 hours  insulin lispro (HumaLOG) corrective regimen sliding scale  SubCutaneous every 6 hours  metoclopramide 10milliGRAM(s) Oral every 8 hours  aspirin  chewable 81milliGRAM(s) Oral daily  midodrine 2.5milliGRAM(s) Oral daily    MEDICATIONS  (PRN):  acetaminophen    Suspension. 650milliGRAM(s) Oral every 6 hours PRN Mild (1-3) and Moderate Pain (4 - 6)  artificial  tears Solution 1Drop(s) Both EYES every 4 hours PRN Dry Eyes    Vital Signs Last 24 Hrs  T(C): 36.5, Max: 36.6 (06-17 @ 16:39)  T(F): 97.7, Max: 97.8 (06-17 @ 16:39)  HR: 99 (71 - 99)  BP: 98/59 (80/48 - 143/64)  BP(mean): --  RR: 20 (18 - 20)  SpO2: 98% (95% - 98%)    I&O's Summary  I & Os for 24h ending 18 Jun 2017 07:00  =============================================  IN: 430 ml / OUT: 0 ml / NET: 430 ml    I & Os for current day (as of 18 Jun 2017 15:27)  =============================================  IN: 230 ml / OUT: 200 ml / NET: 30 ml        Physical Exam:   GENERAL: NAD, well-groomed, well-developed  HEENT: MALACHI/   Atraumatic, Normocephalic  ENMT: No tonsillar erythema, exudates, or enlargement; Moist mucous membranes, Good dentition, No lesions  NECK: Supple, No JVD, Normal thyroid  CHEST/LUNG: Clear to percussion bilaterally; No rales, rhonchi, wheezing, or rubs  CVS: Regular rate and rhythm; No murmurs, rubs, or gallops  GI: : Soft, Nontender, Nondistended; Bowel sounds present  NERVOUS SYSTEM:  Alert & Oriented X3, Good concentration; Motor Strength 5/5 B/L upper and lower extremities; DTRs 2+ intact and symmetric  EXTREMITIES:  2+ Peripheral Pulses, No clubbing, cyanosis, or edema  LYMPH: No lymphadenopathy noted  SKIN: No rashes or lesions  ENDOCRINOLOGY: No Thyromegaly  PSYCH: Appropriate    Labs:                              10.1   10.69 )-----------( 625      ( 17 Jun 2017 15:12 )             32.8                         8.3    11.93 )-----------( 790      ( 14 Jun 2017 20:05 )             27.6     06-17    134<L>  |  93<L>  |  40<H>  ----------------------------<  74  4.4   |  22  |  3.41<H>  06-14    140  |  97  |  40<H>  ----------------------------<  85  4.0   |  28  |  3.25<H>    Ca    10.6<H>      17 Jun 2017 15:12      CAPILLARY BLOOD GLUCOSE  110 (18 Jun 2017 11:50)  104 (18 Jun 2017 00:38)  96 (17 Jun 2017 17:24)            Cultures:                             Studies  Chest X-RAY  CT SCAN Chest   Venous Dopplers: LE:   EXAM:  CHEST SINGLE AP OR PA                            PROCEDURE DATE:  06/07/2017            INTERPRETATION:  CLINICAL INDICATION: Leukocytosis; recent completion of   antibiotics for pneumonia    TECHNIQUE: Frontal radiograph of the chest    COMPARISON:  Radiograph of the chest from June 4, 2017      FINDINGS:    There is a left-sided central venous catheter with its tip in the SVC.   There is right lower lung linear atelectasis. There is no evidence of   pneumothorax. There is a small left pleural effusion. The   cardiomediastinal silhouette is stable in size.    IMPRESSION:  Right lower lung linear atelectasis. Small left pleural effusion.  CT Abdomen  Others

## 2017-06-18 NOTE — PROGRESS NOTE ADULT - SUBJECTIVE AND OBJECTIVE BOX
Subjective: Patient seen and examined. No new events except as noted.     REVIEW OF SYSTEMS:    CONSTITUTIONAL: No weakness, fevers or chills  EYES/ENT: No visual changes;  No vertigo or throat pain   NECK: No pain or stiffness  RESPIRATORY: No cough, wheezing, hemoptysis; No shortness of breath  CARDIOVASCULAR: No chest pain or palpitations  GASTROINTESTINAL: No abdominal or epigastric pain. No nausea, vomiting, or hematemesis; No diarrhea or constipation. No melena or hematochezia.  GENITOURINARY: No dysuria, frequency or hematuria  NEUROLOGICAL: No numbness or weakness  SKIN: No itching, burning, rashes, or lesions   All other review of systems is negative unless indicated above.    MEDICATIONS:  MEDICATIONS  (STANDING):  heparin  Injectable 5000Unit(s) SubCutaneous every 8 hours  insulin lispro (HumaLOG) corrective regimen sliding scale  SubCutaneous every 6 hours  metoclopramide 10milliGRAM(s) Oral every 8 hours  aspirin  chewable 81milliGRAM(s) Oral daily      PHYSICAL EXAM:  T(C): 36.5, Max: 36.6 (06-17 @ 11:28)  HR: 74 (71 - 84)  BP: 100/50 (94/52 - 143/64)  RR: 20 (18 - 20)  SpO2: 98% (94% - 98%)  Wt(kg): --  I&O's Summary    I & Os for current day (as of 18 Jun 2017 10:04)  =============================================  IN: 430 ml / OUT: 0 ml / NET: 430 ml        Appearance: Normal	  HEENT:   Normal oral mucosa, PERRL, EOMI	  Lymphatic: No lymphadenopathy , no edema  Cardiovascular: Normal S1 S2, No JVD, No murmurs , Peripheral pulses palpable 2+ bilaterally  Respiratory: Lungs clear to auscultation, normal effort 	  Gastrointestinal:  Soft, Non-tender, + BS	  Skin: No rashes, No ecchymoses, No cyanosis, warm to touch  Musculoskeletal: Normal range of motion, normal strength  Psychiatry:  Mood & affect appropriate  Ext: left toe amputation       LABS:    CARDIAC MARKERS:                                10.1   10.69 )-----------( 625      ( 17 Jun 2017 15:12 )             32.8     06-17    134<L>  |  93<L>  |  40<H>  ----------------------------<  74  4.4   |  22  |  3.41<H>    Ca    10.6<H>      17 Jun 2017 15:12      proBNP:   Lipid Profile:   HgA1c:   TSH:           TELEMETRY: 	    ECG:  	  RADIOLOGY:   DIAGNOSTIC TESTING:  [ ] Echocardiogram:  [ ]  Catheterization:  [ ] Stress Test:    OTHER:

## 2017-06-18 NOTE — PROVIDER CONTACT NOTE (OTHER) - ACTION/TREATMENT ORDERED:
DOUGLAS Martínez aware that pt had emesis of 100 cc's of light green drainage, will continue to monitor pt
Aide Jose NP made aware. Recommended not to ambulate pt in hallway due to pt hypotension.
NP made aware, recommended if IV access unsuccessful after 3x, wait until IV team is available for access.
continue to monitor pt

## 2017-06-18 NOTE — PROVIDER CONTACT NOTE (OTHER) - ASSESSMENT
pt alert, tube feeding infusing via J tube at 50 cc/hr
Manual BP 94/52. Pt denies s/s pain/discomfort/dizziness.
VSS, no s/s redness, tenderness, only swelling. Pt denies pain/discomfort.
pt voided with watery stool x1. /61 while oob

## 2017-06-19 VITALS
HEART RATE: 74 BPM | DIASTOLIC BLOOD PRESSURE: 50 MMHG | TEMPERATURE: 99 F | RESPIRATION RATE: 18 BRPM | SYSTOLIC BLOOD PRESSURE: 106 MMHG | OXYGEN SATURATION: 96 %

## 2017-06-19 LAB
ANION GAP SERPL CALC-SCNC: 27 MMOL/L — HIGH (ref 5–17)
BUN SERPL-MCNC: 70 MG/DL — HIGH (ref 7–23)
CALCIUM SERPL-MCNC: 11 MG/DL — HIGH (ref 8.4–10.5)
CHLORIDE SERPL-SCNC: 89 MMOL/L — LOW (ref 96–108)
CO2 SERPL-SCNC: 18 MMOL/L — LOW (ref 22–31)
CREAT SERPL-MCNC: 4.79 MG/DL — HIGH (ref 0.5–1.3)
GLUCOSE SERPL-MCNC: 79 MG/DL — SIGNIFICANT CHANGE UP (ref 70–99)
HCT VFR BLD CALC: 31.4 % — LOW (ref 34.5–45)
HGB BLD-MCNC: 9.7 G/DL — LOW (ref 11.5–15.5)
MCHC RBC-ENTMCNC: 26.1 PG — LOW (ref 27–34)
MCHC RBC-ENTMCNC: 30.9 GM/DL — LOW (ref 32–36)
MCV RBC AUTO: 84.4 FL — SIGNIFICANT CHANGE UP (ref 80–100)
PLATELET # BLD AUTO: 604 K/UL — HIGH (ref 150–400)
POTASSIUM SERPL-MCNC: 4.8 MMOL/L — SIGNIFICANT CHANGE UP (ref 3.5–5.3)
POTASSIUM SERPL-SCNC: 4.8 MMOL/L — SIGNIFICANT CHANGE UP (ref 3.5–5.3)
RBC # BLD: 3.72 M/UL — LOW (ref 3.8–5.2)
RBC # FLD: 17.3 % — HIGH (ref 10.3–14.5)
SODIUM SERPL-SCNC: 134 MMOL/L — LOW (ref 135–145)
WBC # BLD: 11.69 K/UL — HIGH (ref 3.8–10.5)
WBC # FLD AUTO: 11.69 K/UL — HIGH (ref 3.8–10.5)

## 2017-06-19 PROCEDURE — 72125 CT NECK SPINE W/O DYE: CPT

## 2017-06-19 PROCEDURE — 87177 OVA AND PARASITES SMEARS: CPT

## 2017-06-19 PROCEDURE — 82010 KETONE BODYS QUAN: CPT

## 2017-06-19 PROCEDURE — 87086 URINE CULTURE/COLONY COUNT: CPT

## 2017-06-19 PROCEDURE — 93970 EXTREMITY STUDY: CPT

## 2017-06-19 PROCEDURE — 87045 FECES CULTURE AEROBIC BACT: CPT

## 2017-06-19 PROCEDURE — 31500 INSERT EMERGENCY AIRWAY: CPT

## 2017-06-19 PROCEDURE — 83930 ASSAY OF BLOOD OSMOLALITY: CPT

## 2017-06-19 PROCEDURE — P9016: CPT

## 2017-06-19 PROCEDURE — 36430 TRANSFUSION BLD/BLD COMPNT: CPT

## 2017-06-19 PROCEDURE — 84295 ASSAY OF SERUM SODIUM: CPT

## 2017-06-19 PROCEDURE — 84443 ASSAY THYROID STIM HORMONE: CPT

## 2017-06-19 PROCEDURE — 82009 KETONE BODYS QUAL: CPT

## 2017-06-19 PROCEDURE — 83605 ASSAY OF LACTIC ACID: CPT

## 2017-06-19 PROCEDURE — 97116 GAIT TRAINING THERAPY: CPT

## 2017-06-19 PROCEDURE — 94003 VENT MGMT INPAT SUBQ DAY: CPT

## 2017-06-19 PROCEDURE — 86850 RBC ANTIBODY SCREEN: CPT

## 2017-06-19 PROCEDURE — C1750: CPT

## 2017-06-19 PROCEDURE — 80074 ACUTE HEPATITIS PANEL: CPT

## 2017-06-19 PROCEDURE — 85730 THROMBOPLASTIN TIME PARTIAL: CPT

## 2017-06-19 PROCEDURE — C1894: CPT

## 2017-06-19 PROCEDURE — 76937 US GUIDE VASCULAR ACCESS: CPT

## 2017-06-19 PROCEDURE — 82330 ASSAY OF CALCIUM: CPT

## 2017-06-19 PROCEDURE — 82803 BLOOD GASES ANY COMBINATION: CPT

## 2017-06-19 PROCEDURE — 97161 PT EVAL LOW COMPLEX 20 MIN: CPT

## 2017-06-19 PROCEDURE — 93306 TTE W/DOPPLER COMPLETE: CPT

## 2017-06-19 PROCEDURE — 80053 COMPREHEN METABOLIC PANEL: CPT

## 2017-06-19 PROCEDURE — 82565 ASSAY OF CREATININE: CPT

## 2017-06-19 PROCEDURE — 85014 HEMATOCRIT: CPT

## 2017-06-19 PROCEDURE — 70450 CT HEAD/BRAIN W/O DYE: CPT

## 2017-06-19 PROCEDURE — 82947 ASSAY GLUCOSE BLOOD QUANT: CPT

## 2017-06-19 PROCEDURE — 74176 CT ABD & PELVIS W/O CONTRAST: CPT

## 2017-06-19 PROCEDURE — 84145 PROCALCITONIN (PCT): CPT

## 2017-06-19 PROCEDURE — 87040 BLOOD CULTURE FOR BACTERIA: CPT

## 2017-06-19 PROCEDURE — 84132 ASSAY OF SERUM POTASSIUM: CPT

## 2017-06-19 PROCEDURE — 97530 THERAPEUTIC ACTIVITIES: CPT

## 2017-06-19 PROCEDURE — 36600 WITHDRAWAL OF ARTERIAL BLOOD: CPT

## 2017-06-19 PROCEDURE — 94799 UNLISTED PULMONARY SVC/PX: CPT

## 2017-06-19 PROCEDURE — 85027 COMPLETE CBC AUTOMATED: CPT

## 2017-06-19 PROCEDURE — 74018 RADEX ABDOMEN 1 VIEW: CPT

## 2017-06-19 PROCEDURE — 94660 CPAP INITIATION&MGMT: CPT

## 2017-06-19 PROCEDURE — 86706 HEP B SURFACE ANTIBODY: CPT

## 2017-06-19 PROCEDURE — 84100 ASSAY OF PHOSPHORUS: CPT

## 2017-06-19 PROCEDURE — 71045 X-RAY EXAM CHEST 1 VIEW: CPT

## 2017-06-19 PROCEDURE — C1769: CPT

## 2017-06-19 PROCEDURE — C1768: CPT

## 2017-06-19 PROCEDURE — 71250 CT THORAX DX C-: CPT

## 2017-06-19 PROCEDURE — 82553 CREATINE MB FRACTION: CPT

## 2017-06-19 PROCEDURE — 77001 FLUOROGUIDE FOR VEIN DEVICE: CPT

## 2017-06-19 PROCEDURE — 83735 ASSAY OF MAGNESIUM: CPT

## 2017-06-19 PROCEDURE — 80048 BASIC METABOLIC PNL TOTAL CA: CPT

## 2017-06-19 PROCEDURE — 84484 ASSAY OF TROPONIN QUANT: CPT

## 2017-06-19 PROCEDURE — 94640 AIRWAY INHALATION TREATMENT: CPT

## 2017-06-19 PROCEDURE — 99291 CRITICAL CARE FIRST HOUR: CPT | Mod: 25

## 2017-06-19 PROCEDURE — C1889: CPT

## 2017-06-19 PROCEDURE — 82435 ASSAY OF BLOOD CHLORIDE: CPT

## 2017-06-19 PROCEDURE — 87070 CULTURE OTHR SPECIMN AEROBIC: CPT

## 2017-06-19 PROCEDURE — 82550 ASSAY OF CK (CPK): CPT

## 2017-06-19 PROCEDURE — 84436 ASSAY OF TOTAL THYROXINE: CPT

## 2017-06-19 PROCEDURE — 87186 SC STD MICRODIL/AGAR DIL: CPT

## 2017-06-19 PROCEDURE — 36558 INSERT TUNNELED CV CATH: CPT

## 2017-06-19 PROCEDURE — 82272 OCCULT BLD FECES 1-3 TESTS: CPT

## 2017-06-19 PROCEDURE — C1887: CPT

## 2017-06-19 PROCEDURE — 85610 PROTHROMBIN TIME: CPT

## 2017-06-19 PROCEDURE — 87046 STOOL CULTR AEROBIC BACT EA: CPT

## 2017-06-19 PROCEDURE — 96375 TX/PRO/DX INJ NEW DRUG ADDON: CPT | Mod: XU

## 2017-06-19 PROCEDURE — 99261: CPT

## 2017-06-19 PROCEDURE — L8699: CPT

## 2017-06-19 PROCEDURE — 93005 ELECTROCARDIOGRAM TRACING: CPT

## 2017-06-19 PROCEDURE — 86923 COMPATIBILITY TEST ELECTRIC: CPT

## 2017-06-19 PROCEDURE — 49452 REPLACE G-J TUBE PERC: CPT

## 2017-06-19 PROCEDURE — P9017: CPT

## 2017-06-19 PROCEDURE — 81001 URINALYSIS AUTO W/SCOPE: CPT

## 2017-06-19 PROCEDURE — 86900 BLOOD TYPING SEROLOGIC ABO: CPT

## 2017-06-19 PROCEDURE — 94002 VENT MGMT INPAT INIT DAY: CPT

## 2017-06-19 PROCEDURE — 96374 THER/PROPH/DIAG INJ IV PUSH: CPT | Mod: XU

## 2017-06-19 PROCEDURE — 84480 ASSAY TRIIODOTHYRONINE (T3): CPT

## 2017-06-19 PROCEDURE — 86901 BLOOD TYPING SEROLOGIC RH(D): CPT

## 2017-06-19 RX ORDER — ASPIRIN/CALCIUM CARB/MAGNESIUM 324 MG
1 TABLET ORAL
Qty: 0 | Refills: 0 | COMMUNITY

## 2017-06-19 RX ORDER — ASPIRIN/CALCIUM CARB/MAGNESIUM 324 MG
1 TABLET ORAL
Qty: 0 | Refills: 0 | DISCHARGE
Start: 2017-06-19

## 2017-06-19 RX ORDER — MIDODRINE HYDROCHLORIDE 2.5 MG/1
1 TABLET ORAL
Qty: 0 | Refills: 0 | COMMUNITY
Start: 2017-06-19

## 2017-06-19 RX ORDER — METOCLOPRAMIDE HCL 10 MG
10 TABLET ORAL
Qty: 0 | Refills: 0 | COMMUNITY

## 2017-06-19 RX ORDER — ALTEPLASE 100 MG
2 KIT INTRAVENOUS ONCE
Qty: 0 | Refills: 0 | Status: COMPLETED | OUTPATIENT
Start: 2017-06-19 | End: 2017-06-19

## 2017-06-19 RX ORDER — HYDRALAZINE HCL 50 MG
1 TABLET ORAL
Qty: 0 | Refills: 0 | COMMUNITY

## 2017-06-19 RX ORDER — METOCLOPRAMIDE HCL 10 MG
1 TABLET ORAL
Qty: 0 | Refills: 0 | COMMUNITY
Start: 2017-06-19

## 2017-06-19 RX ORDER — INSULIN LISPRO 100/ML
0 VIAL (ML) SUBCUTANEOUS
Qty: 0 | Refills: 0 | COMMUNITY
Start: 2017-06-19

## 2017-06-19 RX ORDER — WARFARIN SODIUM 2.5 MG/1
1 TABLET ORAL
Qty: 0 | Refills: 0 | COMMUNITY

## 2017-06-19 RX ORDER — GABAPENTIN 400 MG/1
0 CAPSULE ORAL
Qty: 0 | Refills: 0 | COMMUNITY

## 2017-06-19 RX ADMIN — Medication 81 MILLIGRAM(S): at 13:00

## 2017-06-19 RX ADMIN — HEPARIN SODIUM 5000 UNIT(S): 5000 INJECTION INTRAVENOUS; SUBCUTANEOUS at 13:00

## 2017-06-19 RX ADMIN — Medication 650 MILLIGRAM(S): at 11:20

## 2017-06-19 RX ADMIN — HEPARIN SODIUM 5000 UNIT(S): 5000 INJECTION INTRAVENOUS; SUBCUTANEOUS at 05:17

## 2017-06-19 RX ADMIN — MIDODRINE HYDROCHLORIDE 2.5 MILLIGRAM(S): 2.5 TABLET ORAL at 13:00

## 2017-06-19 RX ADMIN — Medication 650 MILLIGRAM(S): at 10:50

## 2017-06-19 RX ADMIN — Medication 10 MILLIGRAM(S): at 13:01

## 2017-06-19 RX ADMIN — Medication 10 MILLIGRAM(S): at 05:17

## 2017-06-19 RX ADMIN — ALTEPLASE 2 MILLIGRAM(S): KIT at 08:43

## 2017-06-19 NOTE — PROGRESS NOTE ADULT - PROBLEM SELECTOR PLAN 5
on insulin  endocrine follow up
on Epogen
on insulin  endocrine follow up
continue on coumadin
on Epogen

## 2017-06-19 NOTE — PROGRESS NOTE ADULT - PROBLEM/PLAN-2
DISPLAY PLAN FREE TEXT

## 2017-06-19 NOTE — PROGRESS NOTE ADULT - PROBLEM SELECTOR PROBLEM 6
Renal failure

## 2017-06-19 NOTE — PROGRESS NOTE ADULT - PROBLEM SELECTOR PLAN 6
on HD  s/p HD catheter placement  For HD today  Possible vascular consult for AVF if deemed that renal failure is permanent
on HD  s/p HD catheter placement  For HD today  Possible vascular consult for AVF if deemed that renal failure is permanent
on HD  s/p HD catheter placement  Awaiting AVG creation this week   Acceptable cardiac risk to proceed
on HD  s/p HD catheter placement  Awaiting vein mapping for right arm AVF  Acceptable cardiac risk to proceed
on HD  s/p HD catheter placement  For AVG today    Acceptable cardiac risk to proceed
on HD  s/p HD catheter placement  For HD today  Possible vascular consult for AVF if deemed that renal failure is permanent
on HD  s/p HD catheter placement  s/p AVG   Acceptable cardiac risk to proceed
on HD  s/p HD catheter placement

## 2017-06-19 NOTE — PROGRESS NOTE ADULT - PROBLEM SELECTOR PLAN 7
on statin

## 2017-06-19 NOTE — PROGRESS NOTE ADULT - SUBJECTIVE AND OBJECTIVE BOX
Chief complaint:Bp mildly improved , no dizziness     HPI:  67 F with T2DM, afib on coumadin, HTN, HLD, CKD IV, and ICU admission in August 2016 with prolonged hospitalization where she was hospitalized for RLE cellulitis/osteomyelitis course c/b afib and PEA arrest requiring intubation s/p trach given prolonged wean, NEHAL requiring HD, and dysphagia requiring PEG tube placement  brought in by EMS after being found minimally responsive, and subsequently intubated. Per ED note, EMS noted that patient appeared to be having severe diarrhea and had poor access so IO site was made. Normally a/o x2 and able to hold a conversation per son. Per family by bedside, patient was seen by family the day before and she had no complaints and no changes in mental status.     In the ED:  T 96.7, 146/69, 82-102bpm, 100% O2. Leukocytosis to 14.5, no bandemia. Hyponatremic to 129, NEHAL with Cr to 7.78 with BUN of 173. Acidotic with pH of 7.14 on ABG, AG of 28, bicarb of 14, lactate of 1.3, and pCO2 of 46. CXR revealed opacification of L lung. (25 May 2017 14:32)      REVIEW OF SYSTEMS:    CONSTITUTIONAL: No fever, weight loss, or fatigue  NECK: No pain or stiffness  RESPIRATORY: No cough, wheezing, chills or hemoptysis; No shortness of breath  CARDIOVASCULAR: No chest pain, palpitations, dizziness, or leg swelling  GASTROINTESTINAL: No abdominal or epigastric pain. No nausea, vomiting, or hematemesis; No diarrhea or constipation. No melena or hematochezia.  GENITOURINARY: No dysuria, frequency, hematuria, or incontinence  NEUROLOGICAL: No headaches, memory loss, loss of strength, numbness, or tremors  SKIN: No itching, burning, rashes, or lesions   LYMPH NODES: No enlarged glands  MUSCULOSKELETAL: No joint pain or swelling; No muscle, back, or extremity pain  HEME/LYMPH: No easy bruising, or bleeding gums    MEDICATIONS  (STANDING):  heparin  Injectable 5000Unit(s) SubCutaneous every 8 hours  insulin lispro (HumaLOG) corrective regimen sliding scale  SubCutaneous every 6 hours  metoclopramide 10milliGRAM(s) Oral every 8 hours  aspirin  chewable 81milliGRAM(s) Oral daily  midodrine 2.5milliGRAM(s) Oral daily    MEDICATIONS  (PRN):  acetaminophen    Suspension. 650milliGRAM(s) Oral every 6 hours PRN Mild (1-3) and Moderate Pain (4 - 6)  artificial  tears Solution 1Drop(s) Both EYES every 4 hours PRN Dry Eyes      Allergies    No Known Allergies    Intolerances        Vital Signs Last 24 Hrs  T(C): 36.6, Max: 36.8 (06-18 @ 20:19)  T(F): 97.9, Max: 98.2 (06-18 @ 20:19)  HR: 77 (69 - 99)  BP: 129/70 (80/48 - 129/70)  BP(mean): --  RR: 18 (17 - 18)  SpO2: 93% (93% - 96%)    PHYSICAL EXAM:    GENERAL: NAD, well-groomed, well-developed  HEAD:  Atraumatic, Normocephalic  EYES: EOMI, PERRLA, conjunctiva and sclera clear  ENMT: No tonsillar erythema, exudates, or enlargement; Moist mucous membranes, Good dentition, No lesions  NECK: Supple, No JVD, Normal thyroid  NERVOUS SYSTEM:  Alert & Oriented X3, Good concentration; Motor Strength 5/5 B/L upper and lower extremities; DTRs 2+ intact and symmetric  CHEST/LUNG: Clear to percussion bilaterally; No rales, rhonchi, wheezing, or rubs  HEART: Regular rate and rhythm; No murmurs, rubs, or gallops  ABDOMEN: Soft, Nontender, Nondistended; Bowel sounds present  EXTREMITIES:  2+ Peripheral Pulses, No clubbing, cyanosis, or edema  LYMPH: No lymphadenopathy noted  SKIN: No rashes or lesions      LABS:                        10.1   10.69 )-----------( 625      ( 17 Jun 2017 15:12 )             32.8     06-17    134<L>  |  93<L>  |  40<H>  ----------------------------<  74  4.4   |  22  |  3.41<H>    Ca    10.6<H>      17 Jun 2017 15:12            RADIOLOGY & ADDITIONAL STUDIES:

## 2017-06-19 NOTE — PROGRESS NOTE ADULT - PROBLEM SELECTOR PLAN 4
continue on jejunostomy feeding
continue on coumadin
continue on coumading   keep INR 2-3
on reglan  GI follow up
Heparin SQ
continue on jejunostomy feeding
continue on reglan tid  and continue on jejunostomy feedings
on Epogen
on reglan  GI follow up
on Epogen
continue on jejunostomy feeding

## 2017-06-19 NOTE — PROGRESS NOTE ADULT - ATTENDING COMMENTS
DC planning with outpt HD
Pt seen and examined by me: has been on 2 l for days: will taper off oxygen while at rest: Discussed with Registered Nurse
Marc Riley  Division of Infectious Disease  Pager [phone]  After 5pm/weekend #[phone]    Will sign off, recall ID if needed #[phone].

## 2017-06-19 NOTE — PROGRESS NOTE ADULT - SUBJECTIVE AND OBJECTIVE BOX
Subjective: Patient seen and examined. No new events except as noted.     REVIEW OF SYSTEMS:    CONSTITUTIONAL: + weakness, no fevers or chills  EYES/ENT: No visual changes;  No vertigo or throat pain   NECK: No pain or stiffness  RESPIRATORY: No cough, wheezing, hemoptysis; No shortness of breath  CARDIOVASCULAR: No chest pain or palpitations  GASTROINTESTINAL: No abdominal or epigastric pain. No nausea, vomiting, or hematemesis; No diarrhea or constipation. No melena or hematochezia.  GENITOURINARY: No dysuria, frequency or hematuria  NEUROLOGICAL: No numbness or weakness  SKIN: No itching, burning, rashes, or lesions   All other review of systems is negative unless indicated above.    MEDICATIONS:  MEDICATIONS  (STANDING):  heparin  Injectable 5000Unit(s) SubCutaneous every 8 hours  insulin lispro (HumaLOG) corrective regimen sliding scale  SubCutaneous every 6 hours  metoclopramide 10milliGRAM(s) Oral every 8 hours  aspirin  chewable 81milliGRAM(s) Oral daily  midodrine 2.5milliGRAM(s) Oral daily      PHYSICAL EXAM:  T(C): 36.9, Max: 36.9 (06-19 @ 08:28)  HR: 78 (69 - 99)  BP: 101/48 (80/48 - 129/70)  RR: 18 (17 - 18)  SpO2: 94% (93% - 96%)  Wt(kg): --  I&O's Summary    I & Os for current day (as of 19 Jun 2017 10:32)  =============================================  IN: 1020 ml / OUT: 200 ml / NET: 820 ml        Appearance: Normal	  HEENT:   Normal oral mucosa, PERRL, EOMI	  Lymphatic: No lymphadenopathy , no edema  Cardiovascular: Normal S1 S2, No JVD, No murmurs , Peripheral pulses palpable 2+ bilaterally  Respiratory: Lungs clear to auscultation, normal effort 	  Gastrointestinal:  Soft, Non-tender, + BS	  Skin: No rashes, No ecchymoses, No cyanosis, warm to touch  Musculoskeletal: Normal range of motion, normal strength  Psychiatry:  Mood & affect appropriate  Ext: toe amputation       LABS:    CARDIAC MARKERS:                                9.7    11.69 )-----------( 604      ( 19 Jun 2017 09:14 )             31.4     06-17    134<L>  |  93<L>  |  40<H>  ----------------------------<  74  4.4   |  22  |  3.41<H>    Ca    10.6<H>      17 Jun 2017 15:12      proBNP:   Lipid Profile:   HgA1c:   TSH:           TELEMETRY: 	    ECG:  	  RADIOLOGY:   DIAGNOSTIC TESTING:  [ ] Echocardiogram:  [ ]  Catheterization:  [ ] Stress Test:    OTHER:

## 2017-06-19 NOTE — PROGRESS NOTE ADULT - PROBLEM SELECTOR PROBLEM 7
Mixed hyperlipidemia

## 2017-06-19 NOTE — PROGRESS NOTE ADULT - PROBLEM SELECTOR PLAN 8
Check EKG  CHADSVASc >4. However, given baseline anemia, will not restart anticoagulation at this time

## 2017-06-19 NOTE — PROGRESS NOTE ADULT - SUBJECTIVE AND OBJECTIVE BOX
Patient is a 67y old  Female who presents with a chief complaint of found unresponsive; intubated in field (05 Jun 2017 17:05)    patient has been doing ok, no sob       Any change in ROS:     MEDICATIONS  (STANDING):  heparin  Injectable 5000Unit(s) SubCutaneous every 8 hours  insulin lispro (HumaLOG) corrective regimen sliding scale  SubCutaneous every 6 hours  metoclopramide 10milliGRAM(s) Oral every 8 hours  aspirin  chewable 81milliGRAM(s) Oral daily  midodrine 2.5milliGRAM(s) Oral daily    MEDICATIONS  (PRN):  acetaminophen    Suspension. 650milliGRAM(s) Oral every 6 hours PRN Mild (1-3) and Moderate Pain (4 - 6)  artificial  tears Solution 1Drop(s) Both EYES every 4 hours PRN Dry Eyes    Vital Signs Last 24 Hrs  T(C): 36.9, Max: 36.9 (06-19 @ 08:28)  T(F): 98.4, Max: 98.4 (06-19 @ 08:28)  HR: 78 (69 - 99)  BP: 101/48 (80/48 - 129/70)  BP(mean): --  RR: 18 (17 - 18)  SpO2: 94% (93% - 96%)    I&O's Summary    I & Os for current day (as of 19 Jun 2017 10:35)  =============================================  IN: 1020 ml / OUT: 200 ml / NET: 820 ml        Physical Exam:   GENERAL: NAD, well-groomed, well-developed  HEENT: MALACHI/   Atraumatic, Normocephalic  ENMT: No tonsillar erythema, exudates, or enlargement; Moist mucous membranes, Good dentition, No lesions  NECK: Supple, No JVD, Normal thyroid  CHEST/LUNG: Clear to percussion bilaterally; No rales, rhonchi, wheezing, or rubs  CVS: Regular rate and rhythm; No murmurs, rubs, or gallops  GI: : Soft, Nontender, Nondistended; Bowel sounds present  NERVOUS SYSTEM:  Alert & Oriented X3, Good concentration; Motor Strength 5/5 B/L upper and lower extremities; DTRs 2+ intact and symmetric  EXTREMITIES:  2+ Peripheral Pulses, No clubbing, cyanosis, or edema  LYMPH: No lymphadenopathy noted  SKIN: No rashes or lesions  ENDOCRINOLOGY: No Thyromegaly  PSYCH: Appropriate    Labs:                              9.7    11.69 )-----------( 604      ( 19 Jun 2017 09:14 )             31.4                         10.1   10.69 )-----------( 625      ( 17 Jun 2017 15:12 )             32.8     06-17    134<L>  |  93<L>  |  40<H>  ----------------------------<  74  4.4   |  22  |  3.41<H>    Ca    10.6<H>      17 Jun 2017 15:12      CAPILLARY BLOOD GLUCOSE  91 (19 Jun 2017 06:15)  93 (19 Jun 2017 00:39)  100 (18 Jun 2017 17:10)  110 (18 Jun 2017 11:50)          Studies  Chest X-RAY  CT SCAN Chest COMPARISON:  Radiograph of the chest from June 4, 2017      FINDINGS:    There is a left-sided central venous catheter with its tip in the SVC.   There is right lower lung linear atelectasis. There is no evidence of   pneumothorax. There is a small left pleural effusion. The   cardiomediastinal silhouette is stable in size.    IMPRESSION:  Right lower lung linear atelectasis. Small left pleural effusion.  Venous Dopplers: LE:   CT Abdomen  Others

## 2017-06-19 NOTE — PROGRESS NOTE ADULT - PROBLEM SELECTOR PROBLEM 4
R/O Gastroparesis due to secondary diabetes
Anemia
Chronic atrial fibrillation
Chronic atrial fibrillation
Prophylactic use of unfractionated heparin for venous thromboembolism
R/O Gastroparesis due to secondary diabetes
Anemia

## 2017-06-19 NOTE — PROGRESS NOTE ADULT - PROBLEM/PLAN-3
DISPLAY PLAN FREE TEXT

## 2017-06-19 NOTE — PROGRESS NOTE ADULT - PROBLEM SELECTOR PLAN 1
Again unclear if she will have any recovery.  At present she is anuric.     HD today  AVG done.  Will use in 2-3 weeks  Pt seen at HD

## 2017-06-19 NOTE — PROGRESS NOTE ADULT - PROBLEM SELECTOR PROBLEM 8
Paroxysmal atrial fibrillation

## 2017-06-19 NOTE — PROGRESS NOTE ADULT - PROVIDER SPECIALTY LIST ADULT
Cardiology
Infectious Disease
Internal Medicine
Nephrology
Pulmonology
Vascular Surgery
Internal Medicine
Nephrology
Internal Medicine
Nephrology

## 2017-06-19 NOTE — PROGRESS NOTE ADULT - PROBLEM/PLAN-1
DISPLAY PLAN FREE TEXT

## 2017-06-19 NOTE — PROGRESS NOTE ADULT - PROBLEM SELECTOR PROBLEM 5
R/O Chronic atrial fibrillation
Diabetes mellitus of other type with circulatory complication, without long-term current use of insulin
Anemia
Diabetes mellitus of other type with circulatory complication, without long-term current use of insulin

## 2017-08-08 ENCOUNTER — APPOINTMENT (OUTPATIENT)
Dept: SPEECH THERAPY | Facility: HOSPITAL | Age: 67
End: 2017-08-08

## 2017-08-08 ENCOUNTER — OUTPATIENT (OUTPATIENT)
Dept: OUTPATIENT SERVICES | Facility: HOSPITAL | Age: 67
LOS: 1 days | End: 2017-08-08
Payer: MEDICARE

## 2017-08-08 ENCOUNTER — APPOINTMENT (OUTPATIENT)
Dept: RADIOLOGY | Facility: HOSPITAL | Age: 67
End: 2017-08-08

## 2017-08-08 DIAGNOSIS — Z98.890 OTHER SPECIFIED POSTPROCEDURAL STATES: Chronic | ICD-10-CM

## 2017-08-08 DIAGNOSIS — R13.10 DYSPHAGIA, UNSPECIFIED: ICD-10-CM

## 2017-08-08 DIAGNOSIS — Z93.1 GASTROSTOMY STATUS: Chronic | ICD-10-CM

## 2017-08-08 PROCEDURE — 74230 X-RAY XM SWLNG FUNCJ C+: CPT | Mod: 26

## 2017-08-11 ENCOUNTER — APPOINTMENT (OUTPATIENT)
Age: 67
End: 2017-08-11
Payer: MEDICARE

## 2017-08-11 PROCEDURE — 36215Z: CUSTOM | Mod: 59

## 2017-08-11 PROCEDURE — 36906Z: CUSTOM

## 2017-08-15 ENCOUNTER — APPOINTMENT (OUTPATIENT)
Age: 67
End: 2017-08-15
Payer: MEDICARE

## 2017-08-15 PROCEDURE — 36589 REMOVAL TUNNELED CV CATH: CPT

## 2017-08-30 ENCOUNTER — APPOINTMENT (OUTPATIENT)
Dept: VASCULAR SURGERY | Facility: CLINIC | Age: 67
End: 2017-08-30
Payer: MEDICARE

## 2017-08-30 PROCEDURE — 93923 UPR/LXTR ART STDY 3+ LVLS: CPT

## 2017-09-26 ENCOUNTER — APPOINTMENT (OUTPATIENT)
Age: 67
End: 2017-09-26
Payer: MEDICARE

## 2017-09-26 PROCEDURE — 36902Z: CUSTOM

## 2017-10-16 ENCOUNTER — APPOINTMENT (OUTPATIENT)
Dept: VASCULAR SURGERY | Facility: CLINIC | Age: 67
End: 2017-10-16

## 2017-11-09 ENCOUNTER — APPOINTMENT (OUTPATIENT)
Dept: ULTRASOUND IMAGING | Facility: IMAGING CENTER | Age: 67
End: 2017-11-09
Payer: MEDICARE

## 2017-11-09 ENCOUNTER — APPOINTMENT (OUTPATIENT)
Dept: MAMMOGRAPHY | Facility: IMAGING CENTER | Age: 67
End: 2017-11-09
Payer: MEDICARE

## 2017-11-09 ENCOUNTER — OUTPATIENT (OUTPATIENT)
Dept: OUTPATIENT SERVICES | Facility: HOSPITAL | Age: 67
LOS: 1 days | End: 2017-11-09
Payer: MEDICARE

## 2017-11-09 DIAGNOSIS — Z98.890 OTHER SPECIFIED POSTPROCEDURAL STATES: Chronic | ICD-10-CM

## 2017-11-09 DIAGNOSIS — D24.9 BENIGN NEOPLASM OF UNSPECIFIED BREAST: ICD-10-CM

## 2017-11-09 DIAGNOSIS — Z93.1 GASTROSTOMY STATUS: Chronic | ICD-10-CM

## 2017-11-09 PROCEDURE — 77066 DX MAMMO INCL CAD BI: CPT

## 2017-11-09 PROCEDURE — 77063 BREAST TOMOSYNTHESIS BI: CPT | Mod: 26

## 2017-11-09 PROCEDURE — 76641 ULTRASOUND BREAST COMPLETE: CPT | Mod: 26,LT

## 2017-11-09 PROCEDURE — G0202: CPT | Mod: 26,59

## 2017-11-09 PROCEDURE — 77067 SCR MAMMO BI INCL CAD: CPT

## 2017-11-09 PROCEDURE — G0204: CPT | Mod: 26,GG

## 2017-11-09 PROCEDURE — 76641 ULTRASOUND BREAST COMPLETE: CPT

## 2017-11-09 PROCEDURE — 77063 BREAST TOMOSYNTHESIS BI: CPT

## 2017-12-13 ENCOUNTER — FORM ENCOUNTER (OUTPATIENT)
Age: 67
End: 2017-12-13

## 2017-12-14 ENCOUNTER — APPOINTMENT (OUTPATIENT)
Dept: MRI IMAGING | Facility: CLINIC | Age: 67
End: 2017-12-14
Payer: MEDICARE

## 2017-12-14 ENCOUNTER — OUTPATIENT (OUTPATIENT)
Dept: OUTPATIENT SERVICES | Facility: HOSPITAL | Age: 67
LOS: 1 days | End: 2017-12-14
Payer: MEDICARE

## 2017-12-14 DIAGNOSIS — Z93.1 GASTROSTOMY STATUS: Chronic | ICD-10-CM

## 2017-12-14 DIAGNOSIS — Z00.8 ENCOUNTER FOR OTHER GENERAL EXAMINATION: ICD-10-CM

## 2017-12-14 DIAGNOSIS — Z98.890 OTHER SPECIFIED POSTPROCEDURAL STATES: Chronic | ICD-10-CM

## 2017-12-14 PROCEDURE — 70540 MRI ORBIT/FACE/NECK W/O DYE: CPT | Mod: 26

## 2017-12-14 PROCEDURE — 70540 MRI ORBIT/FACE/NECK W/O DYE: CPT

## 2017-12-18 ENCOUNTER — RESULT REVIEW (OUTPATIENT)
Age: 67
End: 2017-12-18

## 2017-12-18 ENCOUNTER — APPOINTMENT (OUTPATIENT)
Dept: VASCULAR SURGERY | Facility: CLINIC | Age: 67
End: 2017-12-18
Payer: MEDICARE

## 2017-12-18 VITALS — HEIGHT: 60 IN | WEIGHT: 204 LBS | BODY MASS INDEX: 40.05 KG/M2 | TEMPERATURE: 98.7 F

## 2017-12-18 VITALS — DIASTOLIC BLOOD PRESSURE: 80 MMHG | SYSTOLIC BLOOD PRESSURE: 130 MMHG | HEART RATE: 66 BPM

## 2017-12-18 PROCEDURE — 99214 OFFICE O/P EST MOD 30 MIN: CPT

## 2018-01-29 ENCOUNTER — APPOINTMENT (OUTPATIENT)
Dept: VASCULAR SURGERY | Facility: CLINIC | Age: 68
End: 2018-01-29
Payer: MEDICARE

## 2018-01-29 DIAGNOSIS — T82.9XXA UNSPECIFIED COMPLICATION OF CARDIAC AND VASCULAR PROSTHETIC DEVICE, IMPLANT AND GRAFT, INITIAL ENCOUNTER: ICD-10-CM

## 2018-01-29 PROCEDURE — 99214 OFFICE O/P EST MOD 30 MIN: CPT

## 2018-01-29 PROCEDURE — 93990 DOPPLER FLOW TESTING: CPT

## 2018-03-19 ENCOUNTER — INPATIENT (INPATIENT)
Facility: HOSPITAL | Age: 68
LOS: 1 days | Discharge: HOME | DRG: 391 | End: 2018-03-21
Attending: INTERNAL MEDICINE | Admitting: INTERNAL MEDICINE
Payer: MEDICARE

## 2018-03-19 VITALS
OXYGEN SATURATION: 98 % | DIASTOLIC BLOOD PRESSURE: 86 MMHG | RESPIRATION RATE: 18 BRPM | SYSTOLIC BLOOD PRESSURE: 172 MMHG | HEART RATE: 97 BPM

## 2018-03-19 DIAGNOSIS — Z98.890 OTHER SPECIFIED POSTPROCEDURAL STATES: Chronic | ICD-10-CM

## 2018-03-19 DIAGNOSIS — R13.10 DYSPHAGIA, UNSPECIFIED: ICD-10-CM

## 2018-03-19 DIAGNOSIS — Z93.1 GASTROSTOMY STATUS: Chronic | ICD-10-CM

## 2018-03-19 LAB
ALBUMIN SERPL ELPH-MCNC: 4 G/DL — SIGNIFICANT CHANGE UP (ref 3.3–5)
ALP SERPL-CCNC: 100 U/L — SIGNIFICANT CHANGE UP (ref 40–120)
ALT FLD-CCNC: 19 U/L RC — SIGNIFICANT CHANGE UP (ref 10–45)
ANION GAP SERPL CALC-SCNC: 14 MMOL/L — SIGNIFICANT CHANGE UP (ref 5–17)
AST SERPL-CCNC: 17 U/L — SIGNIFICANT CHANGE UP (ref 10–40)
BASOPHILS # BLD AUTO: 0 K/UL — SIGNIFICANT CHANGE UP (ref 0–0.2)
BASOPHILS NFR BLD AUTO: 0.3 % — SIGNIFICANT CHANGE UP (ref 0–2)
BILIRUB SERPL-MCNC: 0.4 MG/DL — SIGNIFICANT CHANGE UP (ref 0.2–1.2)
BUN SERPL-MCNC: 58 MG/DL — HIGH (ref 7–23)
CALCIUM SERPL-MCNC: 9.2 MG/DL — SIGNIFICANT CHANGE UP (ref 8.4–10.5)
CHLORIDE SERPL-SCNC: 93 MMOL/L — LOW (ref 96–108)
CO2 SERPL-SCNC: 30 MMOL/L — SIGNIFICANT CHANGE UP (ref 22–31)
CREAT SERPL-MCNC: 4.8 MG/DL — HIGH (ref 0.5–1.3)
EOSINOPHIL # BLD AUTO: 0.1 K/UL — SIGNIFICANT CHANGE UP (ref 0–0.5)
EOSINOPHIL NFR BLD AUTO: 0.9 % — SIGNIFICANT CHANGE UP (ref 0–6)
GLUCOSE BLDC GLUCOMTR-MCNC: 91 MG/DL — SIGNIFICANT CHANGE UP (ref 70–99)
GLUCOSE SERPL-MCNC: 106 MG/DL — HIGH (ref 70–99)
HCT VFR BLD CALC: 40.2 % — SIGNIFICANT CHANGE UP (ref 34.5–45)
HGB BLD-MCNC: 13 G/DL — SIGNIFICANT CHANGE UP (ref 11.5–15.5)
LYMPHOCYTES # BLD AUTO: 0.8 K/UL — LOW (ref 1–3.3)
LYMPHOCYTES # BLD AUTO: 6.6 % — LOW (ref 13–44)
MCHC RBC-ENTMCNC: 31.7 PG — SIGNIFICANT CHANGE UP (ref 27–34)
MCHC RBC-ENTMCNC: 32.3 GM/DL — SIGNIFICANT CHANGE UP (ref 32–36)
MCV RBC AUTO: 98 FL — SIGNIFICANT CHANGE UP (ref 80–100)
MONOCYTES # BLD AUTO: 0.7 K/UL — SIGNIFICANT CHANGE UP (ref 0–0.9)
MONOCYTES NFR BLD AUTO: 5.8 % — SIGNIFICANT CHANGE UP (ref 2–14)
NEUTROPHILS # BLD AUTO: 10.9 K/UL — HIGH (ref 1.8–7.4)
NEUTROPHILS NFR BLD AUTO: 86.5 % — HIGH (ref 43–77)
PLATELET # BLD AUTO: 335 K/UL — SIGNIFICANT CHANGE UP (ref 150–400)
POTASSIUM SERPL-MCNC: 5.2 MMOL/L — SIGNIFICANT CHANGE UP (ref 3.5–5.3)
POTASSIUM SERPL-SCNC: 5.2 MMOL/L — SIGNIFICANT CHANGE UP (ref 3.5–5.3)
PROT SERPL-MCNC: 8.4 G/DL — HIGH (ref 6–8.3)
RBC # BLD: 4.1 M/UL — SIGNIFICANT CHANGE UP (ref 3.8–5.2)
RBC # FLD: 14.6 % — HIGH (ref 10.3–14.5)
SODIUM SERPL-SCNC: 137 MMOL/L — SIGNIFICANT CHANGE UP (ref 135–145)
WBC # BLD: 12.6 K/UL — HIGH (ref 3.8–10.5)
WBC # FLD AUTO: 12.6 K/UL — HIGH (ref 3.8–10.5)

## 2018-03-19 PROCEDURE — 71045 X-RAY EXAM CHEST 1 VIEW: CPT | Mod: 26

## 2018-03-19 PROCEDURE — 99285 EMERGENCY DEPT VISIT HI MDM: CPT | Mod: GC

## 2018-03-19 RX ORDER — DEXTROSE 50 % IN WATER 50 %
1 SYRINGE (ML) INTRAVENOUS ONCE
Qty: 0 | Refills: 0 | Status: DISCONTINUED | OUTPATIENT
Start: 2018-03-19 | End: 2018-03-21

## 2018-03-19 RX ORDER — GABAPENTIN 400 MG/1
1 CAPSULE ORAL
Qty: 0 | Refills: 0 | COMMUNITY

## 2018-03-19 RX ORDER — ATORVASTATIN CALCIUM 80 MG/1
40 TABLET, FILM COATED ORAL AT BEDTIME
Qty: 0 | Refills: 0 | Status: DISCONTINUED | OUTPATIENT
Start: 2018-03-19 | End: 2018-03-21

## 2018-03-19 RX ORDER — INFLUENZA VIRUS VACCINE 15; 15; 15; 15 UG/.5ML; UG/.5ML; UG/.5ML; UG/.5ML
0.5 SUSPENSION INTRAMUSCULAR ONCE
Qty: 0 | Refills: 0 | Status: DISCONTINUED | OUTPATIENT
Start: 2018-03-19 | End: 2018-03-21

## 2018-03-19 RX ORDER — DEXTROSE 50 % IN WATER 50 %
25 SYRINGE (ML) INTRAVENOUS ONCE
Qty: 0 | Refills: 0 | Status: DISCONTINUED | OUTPATIENT
Start: 2018-03-19 | End: 2018-03-21

## 2018-03-19 RX ORDER — INSULIN LISPRO 100/ML
VIAL (ML) SUBCUTANEOUS
Qty: 0 | Refills: 0 | Status: DISCONTINUED | OUTPATIENT
Start: 2018-03-19 | End: 2018-03-21

## 2018-03-19 RX ORDER — HEPARIN SODIUM 5000 [USP'U]/ML
5000 INJECTION INTRAVENOUS; SUBCUTANEOUS EVERY 12 HOURS
Qty: 0 | Refills: 0 | Status: DISCONTINUED | OUTPATIENT
Start: 2018-03-19 | End: 2018-03-21

## 2018-03-19 RX ORDER — GABAPENTIN 400 MG/1
100 CAPSULE ORAL DAILY
Qty: 0 | Refills: 0 | Status: DISCONTINUED | OUTPATIENT
Start: 2018-03-19 | End: 2018-03-21

## 2018-03-19 RX ORDER — ATORVASTATIN CALCIUM 80 MG/1
1 TABLET, FILM COATED ORAL
Qty: 0 | Refills: 0 | COMMUNITY

## 2018-03-19 RX ORDER — SODIUM CHLORIDE 9 MG/ML
1000 INJECTION, SOLUTION INTRAVENOUS
Qty: 0 | Refills: 0 | Status: DISCONTINUED | OUTPATIENT
Start: 2018-03-19 | End: 2018-03-21

## 2018-03-19 RX ORDER — PIPERACILLIN AND TAZOBACTAM 4; .5 G/20ML; G/20ML
3.38 INJECTION, POWDER, LYOPHILIZED, FOR SOLUTION INTRAVENOUS EVERY 12 HOURS
Qty: 0 | Refills: 0 | Status: DISCONTINUED | OUTPATIENT
Start: 2018-03-19 | End: 2018-03-20

## 2018-03-19 RX ORDER — METOCLOPRAMIDE HCL 10 MG
10 TABLET ORAL THREE TIMES A DAY
Qty: 0 | Refills: 0 | Status: DISCONTINUED | OUTPATIENT
Start: 2018-03-19 | End: 2018-03-21

## 2018-03-19 RX ORDER — ASPIRIN/CALCIUM CARB/MAGNESIUM 324 MG
81 TABLET ORAL DAILY
Qty: 0 | Refills: 0 | Status: DISCONTINUED | OUTPATIENT
Start: 2018-03-19 | End: 2018-03-21

## 2018-03-19 RX ORDER — MIDODRINE HYDROCHLORIDE 2.5 MG/1
2.5 TABLET ORAL ONCE
Qty: 0 | Refills: 0 | Status: DISCONTINUED | OUTPATIENT
Start: 2018-03-19 | End: 2018-03-19

## 2018-03-19 RX ORDER — MIDODRINE HYDROCHLORIDE 2.5 MG/1
2.5 TABLET ORAL
Qty: 0 | Refills: 0 | Status: DISCONTINUED | OUTPATIENT
Start: 2018-03-19 | End: 2018-03-21

## 2018-03-19 RX ORDER — DEXTROSE 50 % IN WATER 50 %
12.5 SYRINGE (ML) INTRAVENOUS ONCE
Qty: 0 | Refills: 0 | Status: DISCONTINUED | OUTPATIENT
Start: 2018-03-19 | End: 2018-03-21

## 2018-03-19 RX ORDER — GLUCAGON INJECTION, SOLUTION 0.5 MG/.1ML
1 INJECTION, SOLUTION SUBCUTANEOUS ONCE
Qty: 0 | Refills: 0 | Status: DISCONTINUED | OUTPATIENT
Start: 2018-03-19 | End: 2018-03-21

## 2018-03-19 RX ADMIN — PIPERACILLIN AND TAZOBACTAM 25 GRAM(S): 4; .5 INJECTION, POWDER, LYOPHILIZED, FOR SOLUTION INTRAVENOUS at 20:08

## 2018-03-19 RX ADMIN — HEPARIN SODIUM 5000 UNIT(S): 5000 INJECTION INTRAVENOUS; SUBCUTANEOUS at 20:08

## 2018-03-19 NOTE — H&P ADULT - ASSESSMENT
pt  with h/o htn, hld,  dm 2,   admitted  with  sob   ,after choking on chicken at  n home   s/p  suctioning, at n home   feels  better now   son at bedside, states that, pt has  been coughing  after almost  every meal, for  months    swallow  eval,  ? need  for  peg  tube   dvt ppx   pt is dnr,    has Molst form in chart  labs/  cxr/  ekg, pending pt  with h/o htn, hld,  dm 2,  ckd 5, on  hd   admitted  with  sob   ,after choking on chicken at  n home   s/p  suctioning, at n home   feels  better now   son at bedside, states that, pt has  been coughing  after almost  every meal, for  months    swallow  eval,  ? need  for  peg  tube   dvt ppx   pt is dnr,    has Molst form in chart  labs/  cxr/  ekg, pending pt  with h/o htn, hld,  dm 2,  ckd 5, on  hd   admitted  with  sob   ,after choking on chicken at  n home   s/p  suctioning, at n home   feels  better now   son at bedside, states that, pt has  been coughing  after almost  every meal, for  months    swallow  eval,  ? need  for  peg  tube   dvt ppx   pt is dnr,    has Molst form in chart  labs/  cxr/  ekg, pending  Dsyphagia 2  diet pt  with h/o htn, hld,  dm 2,  ckd 5, on  hd,  chronic  diastolic  chf,  orthostasis   admitted  with  sob   ,after choking on chicken at  n home   s/p  suctioning, at n home   feels  better now   son at bedside, states that, pt has  been coughing  after almost  every meal, for  months    swallow  eval,  ? need  for  peg  tube   dvt ppx   pt is dnr,    has Molst form in chart  labs/  cxr/  ekg, pending  Dysphagia 2  diet pt  with h/o htn, hld,  dm 2,  ckd 5, on  hd,  chronic  diastolic  chf,  orthostasis   admitted  with  sob/  acute  resp distress,    ,after choking on chicken at  n home   s/p  suctioning, at n home   feels  better now   son at bedside, states that, pt has  been coughing  after almost  every meal, for  months    swallow  eval,   may   need    peg  tube   dvt ppx   pt is dnr,    has Molst form in chart  labs/  cxr/  ekg, pending  Dysphagia 2  diet  addendum,  apparently, pt  was  intubated  in the past,  after  an episode  of   aspiration, and  had  a  peg tube  placed,   and  then, later pulled  out  at pt's  insistence pt  with h/o htn, hld,  dm 2,  ckd 5, on  hd,  chronic  diastolic  chf,  orthostasis   admitted  with  sob/  acute  resp distress,    ,after choking on chicken at  n home   s/p  suctioning, at n home  now  in er  with elevated wbc, on zosyn  cxr  with  chf   feels  better now   son at bedside, states that, pt has  been coughing  after almost  every meal, for  months    swallow  eval,   may   need    peg  tube   dvt ppx   pt is dnr,    has Molst form in chart  labs/  cxr/  ekg, pending  Dysphagia 2  diet  addendum,  apparently, pt  was  intubated  in the past,  after  an episode  of   aspiration, and  had  a  peg tube  placed,   and  then, later pulled  out  at pt's  insistence

## 2018-03-19 NOTE — ED PROVIDER NOTE - MEDICAL DECISION MAKING DETAILS
BIBEMS for episodes of aspiration requiring suctioning. Now feeling well. Per NH pt has had repeated episodes fo aspiration. Will admit for dysphagia study. BIBEMS for episodes of aspiration requiring suctioning. Now feeling well. Per NH pt has had repeated episodes fo aspiration. Will admit for dysphagia study.  Attending [place]: 69 y/o female with multiple medical issues presenting after having difficulty breathing after eating chicken. facility able to suction and pt coughed up food. upon arrival pt awake and alert following commands. concern for dysphagia. pt will likely need swallow evaluation, consider dietary changes. no fevers or chills. history atypical for cva. will cxr and re-eval

## 2018-03-19 NOTE — CONSULT NOTE ADULT - ASSESSMENT
pt with hx of chf acute on chronic, echo noted needs to be repeated.  will adjust cardiac meds  iv lasix  chest ct scan  dvt prophylaxis

## 2018-03-19 NOTE — CONSULT NOTE ADULT - SUBJECTIVE AND OBJECTIVE BOX
CHIEF COMPLAINT:Patient is a 68y old  Female who presents with a chief complaint of sob (19 Mar 2018 16:49)      HPI:   68y Female complaining of shortness of breath.	   69 yo F pmhx of cardiac arrest x 2 ,  sp trach which   has   closed , htn,  hld,  dm 2 , ckd  5  on hd. chf in 2016/ daistolic,   had peg  placed and  [facility] for acute onset shortness of breath and cough with increased phlegm production after choking on chicken.  Pt has  h/o  trouble swallowing since last cardiac arrest. Pt received suction at nh. Now feeling better pt  was  suctioned  at  [facility].  son at bedside   No fever/chills, no change in vision, , no throat pain, no chest pain,  no abdominal pain, no nausea/vomiting,  no dysuria, no new joint pain, no rashes, no new focal numbness or weakness, (19 Mar 2018 16:49)      PAST MEDICAL & SURGICAL HISTORY:  Hyperlipidemia  Diabetes  Hypertension  Osteomyelitis  Diabetic Neuropathy  Cellulitis of Foot  Edema of Both Legs  Diabetes: Type 2  History of Osteoarthritis  HTN - Hypertension  HLD (Hyperlipidemia)  Status post insertion of percutaneous endoscopic gastrostomy (PEG) tube  H/O tracheostomy  S/P amputation of lesser toe, right: 2013 right great toe, 2nd and 3rd right toes  S/P Amputation of Lesser Toe: Rt 1-3 metatarsal      MEDICATIONS  (STANDING):  aspirin  chewable 81 milliGRAM(s) Oral daily  atorvastatin 40 milliGRAM(s) Oral at bedtime  dextrose 5%. 1000 milliLiter(s) (50 mL/Hr) IV Continuous <Continuous>  dextrose 50% Injectable 12.5 Gram(s) IV Push once  dextrose 50% Injectable 25 Gram(s) IV Push once  dextrose 50% Injectable 25 Gram(s) IV Push once  gabapentin 100 milliGRAM(s) Oral daily  heparin  Injectable 5000 Unit(s) SubCutaneous every 12 hours  insulin lispro (HumaLOG) corrective regimen sliding scale   SubCutaneous three times a day before meals  metoclopramide 10 milliGRAM(s) Oral three times a day  midodrine 2.5 milliGRAM(s) Oral <User Schedule>  piperacillin/tazobactam IVPB. 3.375 Gram(s) IV Intermittent every 12 hours    MEDICATIONS  (PRN):  dextrose Gel 1 Dose(s) Oral once PRN Blood Glucose LESS THAN 70 milliGRAM(s)/deciliter  glucagon  Injectable 1 milliGRAM(s) IntraMuscular once PRN Glucose LESS THAN 70 milligrams/deciliter      FAMILY HISTORY:  Family history of diabetes mellitus in father      SOCIAL HISTORY:    [ ] Non-smoker  [ ] Smoker  [ ] Alcohol    Allergies    No Known Allergies    Intolerances    	    REVIEW OF SYSTEMS:  [ ] All others negative	  [X ] Unable to obtain    PHYSICAL EXAM:  T(C): 36.9 (03-19-18 @ 15:15), Max: 36.9 (03-19-18 @ 15:15)  HR: 84 (03-19-18 @ 15:15) (84 - 97)  BP: 147/65 (03-19-18 @ 15:15) (147/65 - 172/86)  RR: 18 (03-19-18 @ 15:15) (18 - 18)  SpO2: 96% (03-19-18 @ 15:15) (96% - 98%)  Wt(kg): --  I&O's Summary      Appearance: cachectic  HEENT:   Normal oral mucosa, PERRL, EOMI	  Lymphatic: No lymphadenopathy  Cardiovascular: Normal S1 S2, No JVD, +murmurs, + edema  Respiratory: Lungs clear to auscultation	  Psychiatry: A & O x 3, Mood & affect appropriate  Gastrointestinal:  Soft, Non-tender, + BS	  Skin: No rashes, No ecchymoses, No cyanosis	  Neurologic: Non-focal  Extremities: Normal range of motion, No clubbing, cyanosis or edema  Vascular: Peripheral pulses palpable 2+ bilaterally    TELEMETRY: 	    ECG:  	  RADIOLOGY:  OTHER: 	  	  LABS:	 	    CARDIAC MARKERS:                              13.0   12.6  )-----------( 335      ( 19 Mar 2018 16:49 )             40.2     03-19    137  |  93<L>  |  58<H>  ----------------------------<  106<H>  5.2   |  30  |  4.80<H>    Ca    9.2      19 Mar 2018 16:49    TPro  8.4<H>  /  Alb  4.0  /  TBili  0.4  /  DBili  x   /  AST  17  /  ALT  19  /  AlkPhos  100  03-19    proBNP:   Lipid Profile:   HgA1c:   TSH:       PREVIOUS DIAGNOSTIC TESTING:      < from: Xray Chest 1 View AP/PA (03.19.18 @ 16:55) >   Low lung volumes limit evaluation.     Increased pulmonary vascular congestion. Right lower lung linear   atelectasis.    < from: Transthoracic Echocardiogram (05.26.17 @ 15:59) >  1. Aortic valve not well visualized; appears calcified.  Peak transaortic valve gradient equals 21 mm Hg, mean  transaortic valve gradient equals 12 mm Hg, aortic valve  velocity time integral equals 45 cm, consistent with mild  aortic stenosis.  2. Endocardium not well visualized; grossly normal left  ventricular systolic function. Cannot rule out wall motion  abnormalities.  3. The right ventricle is not well visualized; grossly  normal right ventricular systolic function.  4. No pericardial effusion seen.  5. Left pleural effusion.  6. Very technically difficult study.  < from: CT Abdomen and Pelvis w/ Oral Cont (05.25.17 @ 15:23) >  Moderate bilateral pleural effusions and pulmonary edema. Associated   compressive atelectasis.    Small pericardial effusion which is new compared to prior exam 3/12/2017.    Nonspecific gallbladder wall edema and perihepatic ascites.      < from: 12 Lead ECG (05.30.17 @ 01:40) >   NORMAL SINUS RHYTHM  LOW VOLTAGE QRS  RIGHT BUNDLE BRANCH BLOCK  ABNORMAL ECG    < end of copied text >

## 2018-03-19 NOTE — ED PROVIDER NOTE - OBJECTIVE STATEMENT
69 yo F pmhx of cardiac arrest x 2 sp trach which was closed, [facility] nursing home for acute onset shortness of breath and cough with increased phlegm production after choking on chicken. Pt haa trouble swallowing since last cardiac arrest. Pt received suction at nh. Now feeling better.     No fever/chills, no change in vision, no rhinorrhea, no throat pain, no chest pain, no sob, no abdominal pain, no nausea/vomiting,  no dysuria, no new joint pain, no rashes, no new focal numbness or weakness, no latex alelrgy

## 2018-03-19 NOTE — ED PROVIDER NOTE - PMH
Cellulitis of Foot    Diabetes    Diabetes  Type 2  Diabetic Neuropathy    Edema of Both Legs    History of Osteoarthritis    HLD (Hyperlipidemia)    HTN - Hypertension    Hyperlipidemia    Hypertension    Osteomyelitis

## 2018-03-19 NOTE — H&P ADULT - NSHPPHYSICALEXAM_GEN_ALL_CORE
PHYSICAL EXAMINATION:  Vital Signs Last 24 Hrs  T(C): 36.9 (19 Mar 2018 15:15), Max: 36.9 (19 Mar 2018 15:15)  T(F): 98.5 (19 Mar 2018 15:15), Max: 98.5 (19 Mar 2018 15:15)  HR: 84 (19 Mar 2018 15:15) (84 - 97)  BP: 147/65 (19 Mar 2018 15:15) (147/65 - 172/86)  BP(mean): --  RR: 18 (19 Mar 2018 15:15) (18 - 18)  SpO2: 96% (19 Mar 2018 15:15) (96% - 98%)  CAPILLARY BLOOD GLUCOSE            GENERAL: NAD, well-groomed,  HEAD:  atraumatic, normocephalic  EYES: sclera anicteric  ENMT: mucous membranes moist  NECK: supple, No JVD,  ild,  closed  trach  CHEST/LUNG: clear to auscultation bilaterally;    no      rales   ,   no rhonchi,   HEART: normal S1, S2  ABDOMEN: BS+, soft, ND, NT ,  EXTREMITIES:    no    edema    b/l LEs  NEURO: awake, ,     moves all extremities  SKIN: no     rash

## 2018-03-19 NOTE — H&P ADULT - NSHPREVIEWOFSYSTEMS_GEN_ALL_CORE
REVIEW OF SYSTEMS:    CONSTITUTIONAL: No weakness,     fevers      EYES/ENT: No visual changes;    NECK: No pain   RESPIRATORY: No cough,     no   wheezing  ,  had  shortness of breath  CARDIOVASCULAR: No chest pain, no   palpitations  GASTROINTESTINAL: No abdominal  pain.   No    nausea, vomiting,   no   hematemesis;   No diarrhea,     no   constipation.       No  melena   ,  no     brbpr  GENITOURINARY: No dysuria,/ frequency   NEUROLOGICAL: No  focal  weakness  SKIN   No  rash  PSYCH    Alert

## 2018-03-19 NOTE — ED PROVIDER NOTE - PHYSICAL EXAMINATION
Attending [place]: Gen: NAD, heent: atrauamtic, eomi, perrla, mmm, op pink, uvula midline, neck; nttp, no nuchal rigidity, chest: nttp, no crepitus, cv: rrr, +murmur, lungs: ctab, abd: soft, nontender, nondistended, no peritoneal signs, +BS, no guarding, ext: wwp, neg homans, skin: no rash, neuro: awake and alert, following commands, speech clear, sensation and strength intact, no focal deficits

## 2018-03-19 NOTE — H&P ADULT - HISTORY OF PRESENT ILLNESS
l Travel? No(1)     68y Female complaining of shortness of breath.	   67 yo F pmhx of cardiac arrest x 2 ,  sp trach which   has   closed , htn,  hld,  dm 2 , [facility] form nursing home for acute onset shortness of breath and cough with increased phlegm production after choking on chicken.  Pt has  h/o  trouble swallowing since last cardiac arrest. Pt received suction at nh. Now feeling better pt  was  suctioned  at   home.  son at bedside   No fever/chills, no change in vision, , no throat pain, no chest pain,  no abdominal pain, no nausea/vomiting,  no dysuria, no new joint pain, no rashes, no new focal numbness or weakness, l Travel? No(1)     68y Female complaining of shortness of breath.	   69 yo F pmhx of cardiac arrest x 2 ,  sp trach which   has   closed , htn,  hld,  dm 2 , ckd  5  on hd. chf in 2016/ [facility] nursing home for acute onset shortness of breath and cough with increased phlegm production after choking on chicken.  Pt has  h/o  trouble swallowing since last cardiac arrest. Pt received suction at nh. Now feeling better pt  was  suctioned  at   home.  son at bedside   No fever/chills, no change in vision, , no throat pain, no chest pain,  no abdominal pain, no nausea/vomiting,  no dysuria, no new joint pain, no rashes, no new focal numbness or weakness, l Travel? No(1)     68y Female complaining of shortness of breath.	   67 yo F pmhx of cardiac arrest x 2 ,  sp trach which   has   closed , htn,  hld,  dm 2 , ckd  5  on hd. chf in 2016/ daistolic,   had peg  placed and  then taken  out at  pts  insistence , [facility] form nursing home for acute onset shortness of breath and cough with increased phlegm production after choking on chicken.  Pt has  h/o  trouble swallowing since last cardiac arrest. Pt received suction at nh. Now feeling better pt  was  suctioned  at   home.  son at bedside   No fever/chills, no change in vision, , no throat pain, no chest pain,  no abdominal pain, no nausea/vomiting,  no dysuria, no new joint pain, no rashes, no new focal numbness or weakness,

## 2018-03-20 LAB
ANION GAP SERPL CALC-SCNC: 18 MMOL/L — HIGH (ref 5–17)
BUN SERPL-MCNC: 74 MG/DL — HIGH (ref 7–23)
CALCIUM SERPL-MCNC: 8.9 MG/DL — SIGNIFICANT CHANGE UP (ref 8.4–10.5)
CHLORIDE SERPL-SCNC: 93 MMOL/L — LOW (ref 96–108)
CO2 SERPL-SCNC: 26 MMOL/L — SIGNIFICANT CHANGE UP (ref 22–31)
CREAT SERPL-MCNC: 5.62 MG/DL — HIGH (ref 0.5–1.3)
GLUCOSE BLDC GLUCOMTR-MCNC: 123 MG/DL — HIGH (ref 70–99)
GLUCOSE BLDC GLUCOMTR-MCNC: 183 MG/DL — HIGH (ref 70–99)
GLUCOSE BLDC GLUCOMTR-MCNC: 86 MG/DL — SIGNIFICANT CHANGE UP (ref 70–99)
GLUCOSE BLDC GLUCOMTR-MCNC: 89 MG/DL — SIGNIFICANT CHANGE UP (ref 70–99)
GLUCOSE BLDC GLUCOMTR-MCNC: 98 MG/DL — SIGNIFICANT CHANGE UP (ref 70–99)
GLUCOSE SERPL-MCNC: 93 MG/DL — SIGNIFICANT CHANGE UP (ref 70–99)
HBA1C BLD-MCNC: 4.9 % — SIGNIFICANT CHANGE UP (ref 4–5.6)
HCT VFR BLD CALC: 34.1 % — LOW (ref 34.5–45)
HGB BLD-MCNC: 11 G/DL — LOW (ref 11.5–15.5)
MCHC RBC-ENTMCNC: 31.1 PG — SIGNIFICANT CHANGE UP (ref 27–34)
MCHC RBC-ENTMCNC: 32.3 GM/DL — SIGNIFICANT CHANGE UP (ref 32–36)
MCV RBC AUTO: 96.3 FL — SIGNIFICANT CHANGE UP (ref 80–100)
NRBC # BLD: 0 /100 WBCS — SIGNIFICANT CHANGE UP (ref 0–0)
PLATELET # BLD AUTO: 319 K/UL — SIGNIFICANT CHANGE UP (ref 150–400)
POTASSIUM SERPL-MCNC: 5.6 MMOL/L — HIGH (ref 3.5–5.3)
POTASSIUM SERPL-SCNC: 5.6 MMOL/L — HIGH (ref 3.5–5.3)
RBC # BLD: 3.54 M/UL — LOW (ref 3.8–5.2)
RBC # FLD: 15.6 % — HIGH (ref 10.3–14.5)
SODIUM SERPL-SCNC: 137 MMOL/L — SIGNIFICANT CHANGE UP (ref 135–145)
WBC # BLD: 10.43 K/UL — SIGNIFICANT CHANGE UP (ref 3.8–10.5)
WBC # FLD AUTO: 10.43 K/UL — SIGNIFICANT CHANGE UP (ref 3.8–10.5)

## 2018-03-20 PROCEDURE — 71250 CT THORAX DX C-: CPT | Mod: 26

## 2018-03-20 RX ADMIN — PIPERACILLIN AND TAZOBACTAM 25 GRAM(S): 4; .5 INJECTION, POWDER, LYOPHILIZED, FOR SOLUTION INTRAVENOUS at 11:36

## 2018-03-20 RX ADMIN — ATORVASTATIN CALCIUM 40 MILLIGRAM(S): 80 TABLET, FILM COATED ORAL at 21:03

## 2018-03-20 RX ADMIN — Medication 81 MILLIGRAM(S): at 13:12

## 2018-03-20 RX ADMIN — MIDODRINE HYDROCHLORIDE 2.5 MILLIGRAM(S): 2.5 TABLET ORAL at 13:12

## 2018-03-20 RX ADMIN — Medication 10 MILLIGRAM(S): at 13:13

## 2018-03-20 RX ADMIN — Medication 10 MILLIGRAM(S): at 21:03

## 2018-03-20 RX ADMIN — GABAPENTIN 100 MILLIGRAM(S): 400 CAPSULE ORAL at 13:12

## 2018-03-20 RX ADMIN — HEPARIN SODIUM 5000 UNIT(S): 5000 INJECTION INTRAVENOUS; SUBCUTANEOUS at 17:47

## 2018-03-20 RX ADMIN — HEPARIN SODIUM 5000 UNIT(S): 5000 INJECTION INTRAVENOUS; SUBCUTANEOUS at 06:04

## 2018-03-20 NOTE — PROGRESS NOTE ADULT - SUBJECTIVE AND OBJECTIVE BOX
cough, after  eating      MEDICATIONS  (STANDING):  aspirin  chewable 81 milliGRAM(s) Oral daily  atorvastatin 40 milliGRAM(s) Oral at bedtime  dextrose 5%. 1000 milliLiter(s) (50 mL/Hr) IV Continuous <Continuous>  dextrose 50% Injectable 12.5 Gram(s) IV Push once  dextrose 50% Injectable 25 Gram(s) IV Push once  dextrose 50% Injectable 25 Gram(s) IV Push once  gabapentin 100 milliGRAM(s) Oral daily  heparin  Injectable 5000 Unit(s) SubCutaneous every 12 hours  influenza   Vaccine 0.5 milliLiter(s) IntraMuscular once  insulin lispro (HumaLOG) corrective regimen sliding scale   SubCutaneous three times a day before meals  metoclopramide 10 milliGRAM(s) Oral three times a day  midodrine 2.5 milliGRAM(s) Oral <User Schedule>  piperacillin/tazobactam IVPB. 3.375 Gram(s) IV Intermittent every 12 hours    MEDICATIONS  (PRN):  dextrose Gel 1 Dose(s) Oral once PRN Blood Glucose LESS THAN 70 milliGRAM(s)/deciliter  glucagon  Injectable 1 milliGRAM(s) IntraMuscular once PRN Glucose LESS THAN 70 milligrams/deciliter      Vital Signs Last 24 Hrs  T(C): 36.7 (20 Mar 2018 05:46), Max: 37.3 (19 Mar 2018 22:05)  T(F): 98.1 (20 Mar 2018 05:46), Max: 99.1 (19 Mar 2018 22:05)  HR: 58 (20 Mar 2018 05:46) (58 - 97)  BP: 106/64 (20 Mar 2018 05:46) (106/56 - 172/86)  BP(mean): --  RR: 18 (20 Mar 2018 05:46) (18 - 18)  SpO2: 95% (20 Mar 2018 05:46) (92% - 98%)  CAPILLARY BLOOD GLUCOSE      POCT Blood Glucose.: 86 mg/dL (20 Mar 2018 07:58)  POCT Blood Glucose.: 91 mg/dL (19 Mar 2018 22:03)    I&O's Summary      PHYSICAL EXAM:  HEAD:  Atraumatic, Normocephalic  NECK: Supple, No JVD  CHEST/LUNG:   no     rales,     no,    rhonchi  HEART: Regular rate and rhythm;         murmur  ABDOMEN: Soft, Nontender, ;   EXTREMITIES:       no      edema  NEUROLOGY:  alert    LABS:                        13.0   12.6  )-----------( 335      ( 19 Mar 2018 16:49 )             40.2     03-20    137  |  93<L>  |  74<H>  ----------------------------<  93  5.6<H>   |  26  |  5.62<H>    Ca    8.9      20 Mar 2018 06:16    TPro  8.4<H>  /  Alb  4.0  /  TBili  0.4  /  DBili  x   /  AST  17  /  ALT  19  /  AlkPhos  100  03-19                            Consultant(s) Notes Reviewed:      Care Discussed with Consultants/Other Providers:

## 2018-03-20 NOTE — CONSULT NOTE ADULT - ASSESSMENT
68 year old female with PMH ESRD on HD, dysphagia with prior history of GJ tube, s/p trach (decannulated) CAD s/p cardiac arrest x2 in past, gastroparesis, HTN. HLD, PVD, DM neuropathy, OM amputation now admitted with SOB following possible aspiration event    PLAN  -Dysphagia diet for now - will change to puree with thickened fluid pending SLP evaluation (pt reports coughing with tea/coffee)  -await CT chest results  -continue reglan as ordered given clinical history of gastroparesis symptoms  -HD/fluid removal per Renal  -Antibiotics per primary team     Discussed with Medicine attending, NP and pt  Will follow with you  Thank you for the courtesy of this consult.    Anthony Roy PA-C    [facility]  [phone] 68 year old female with PMH ESRD on HD, dysphagia with prior history of GJ tube, s/p trach (decannulated) CAD s/p cardiac arrest x2 in past, gastroparesis, HTN. HLD, PVD, DM neuropathy, OM amputation now admitted with SOB following possible aspiration event.    PLAN  -Dysphagia diet for now - will change to puree with thickened fluid pending SLP evaluation (pt reports coughing with tea/coffee)  -Await CT chest results  -Continue Reglan as ordered given clinical history of gastroparesis symptoms  -HD/fluid removal per Renal  -Antibiotics per primary team     Discussed with Medicine attending, NP and pt  Will follow with you  Thank you for the courtesy of this consult.    Anthony Roy PA-C    [facility]  [phone]

## 2018-03-20 NOTE — PROGRESS NOTE ADULT - ASSESSMENT
pt  with h/o htn, hld,  dm 2,  ckd 5, on  hd,  chronic  diastolic  chf,  orthostasis   admitted  with  sob/  acute  resp distress,    ,after choking on chicken at  n home   s/p  suctioning, at n home  now  in er  with elevated wbc, on zosyn  cxr  with  chf   feels  better now   son at bedside, states that, pt has  been coughing  after almost  every meal, for  months    swallow  eval,   may   need    peg  tube   dvt ppx   pt is dnr,    has Molst form in chart  labs/  cxr/  ekg, pending  Dysphagia 2  diet  addendum,  apparently, pt  was  intubated  in the past,  after  an episode  of   aspiration, and  had  a  peg tube  placed,   and  then, later pulled  out  at pt's  insistence  gi  and ID called

## 2018-03-20 NOTE — PROGRESS NOTE ADULT - SUBJECTIVE AND OBJECTIVE BOX
- Patient seen and examined.  - In summary, patient is a 68 year old woman who presented with sob (19 Mar 2018 16:49)  - Today, patient is without complaints.         *****MEDICATIONS:    MEDICATIONS  (STANDING):  aspirin  chewable 81 milliGRAM(s) Oral daily  atorvastatin 40 milliGRAM(s) Oral at bedtime  dextrose 5%. 1000 milliLiter(s) (50 mL/Hr) IV Continuous <Continuous>  dextrose 50% Injectable 12.5 Gram(s) IV Push once  dextrose 50% Injectable 25 Gram(s) IV Push once  dextrose 50% Injectable 25 Gram(s) IV Push once  gabapentin 100 milliGRAM(s) Oral daily  heparin  Injectable 5000 Unit(s) SubCutaneous every 12 hours  influenza   Vaccine 0.5 milliLiter(s) IntraMuscular once  insulin lispro (HumaLOG) corrective regimen sliding scale   SubCutaneous three times a day before meals  metoclopramide 10 milliGRAM(s) Oral three times a day  midodrine 2.5 milliGRAM(s) Oral <User Schedule>  piperacillin/tazobactam IVPB. 3.375 Gram(s) IV Intermittent every 12 hours    MEDICATIONS  (PRN):  dextrose Gel 1 Dose(s) Oral once PRN Blood Glucose LESS THAN 70 milliGRAM(s)/deciliter  glucagon  Injectable 1 milliGRAM(s) IntraMuscular once PRN Glucose LESS THAN 70 milligrams/deciliter           ***** REVIEW OF SYSTEM:  GEN: no fever, no chills, no pain  RESP: no SOB, no cough, no sputum  CVS: no chest pain, no palpitations, no edema  GI: no abdominal pain, no nausea, no vomiting, no constipation, no diarrhea  : no dysuria, no frequency  NEURO: no headache, no dizziness  PSYCH: no depression, not anxious  Derm : no itching, no rash         ***** VITAL SIGNS:  T(F): 98.1 (18 @ 05:46), Max: 99.1 (18 @ 22:05)  HR: 58 (18 @ 05:46) (58 - 97)  BP: 106/64 (18 @ 05:46) (106/56 - 172/86)  RR: 18 (18 @ 05:46) (18 - 18)  SpO2: 95% (03-20-18 @ 05:46) (92% - 98%)  Wt(kg): --  ,   I&O's Summary           *****PHYSICAL EXAM:  GEN: A&O X 3 , NAD , comfortable  HEENT: NCAT, EOMI, MMM, no icterus  NECK: Supple, No JVD  CVS: S1S2 , regular , No M/R/G appreciated  PULM: CTA B/L,  no W/R/R appreciated  ABD.: soft. non tender, non distended,  bowel sounds present  Extrem: intact pulses , no edema noted  Derm: No rash or ecchymosis noted  PSYCH: normal mood, no depression, not anxious         *****LAB AND IMAGIN.0   12.6  )-----------( 335      ( 19 Mar 2018 16:49 )             40.2               03-20    137  |  93<L>  |  74<H>  ----------------------------<  93  5.6<H>   |  26  |  5.62<H>    Ca    8.9      20 Mar 2018 06:16    TPro  8.4<H>  /  Alb  4.0  /  TBili  0.4  /  DBili  x   /  AST  17  /  ALT  19  /  AlkPhos  100  03-19                         [All pertinent recent Imaging/Reports reviewed]         *****A S S E S S M E N T   A N D   P L A N :  68F with hx of chf adm with SOB after possible aspiration/choking  cont current meds  keep O=I  chest ct pending  VSS  S+S  GI eval    __________________________  MONICA Hayden D.O.

## 2018-03-20 NOTE — CONSULT NOTE ADULT - SUBJECTIVE AND OBJECTIVE BOX
Patient is a 68y old  Female who presents with a chief complaint of sob (19 Mar 2018 16:49)      HPI:   68y Female complaining of shortness of breath.	   67 yo F pmhx of cardiac arrest x 2 ,  sp trach - decannulated, htn,  hld,  dm 2 , ckd  5  on hd. chf in 2016/ daistolic,   had IR placed GJ tube - subsequently removed  at  pts  insistence , bibems form nursing home for acute onset shortness of breath and cough with increased phlegm production after "choking on chicken".  Pt has  h/o  trouble swallowing since last cardiac arrest. Pt received suction at nursing home.  Pt. reports feeling SOb and cough at night >daytime. reports cough more prominent after taking liquids. Now feeling better pt  was  suctioned  at  nursing home.    No fever/chills, no change in vision, , no throat pain, no chest pain,  no abdominal pain, no nausea/vomiting,  no dysuria, no new joint pain, no rashes, no new focal numbness or weakness    Last HD reported Monday  During past admission, pt underwent perc Gastrostomy feeding tube placement with IR in 10/2017. Subsequently converted to GJ feeding tube 3/2017 - suspected gastroparesis, persistent increased residuals. Had multiple changes of GJ tube due to clogging, unclear when GJ was removed      PAST MEDICAL & SURGICAL HISTORY:  Hyperlipidemia  Diabetes  Hypertension  Osteomyelitis  Diabetic Neuropathy  Cellulitis of Foot  Edema of Both Legs  Diabetes: Type 2  History of Osteoarthritis  HTN - Hypertension  HLD (Hyperlipidemia)  Status post insertion of percutaneous endoscopic gastrostomy (PEG) tube  H/O tracheostomy  S/P amputation of lesser toe, right: 2013 right great toe, 2nd and 3rd right toes  S/P Amputation of Lesser Toe: Rt 1-3 metatarsal      Allergies  No Known Allergies    MEDICATIONS  (STANDING):  aspirin  chewable 81 milliGRAM(s) Oral daily  atorvastatin 40 milliGRAM(s) Oral at bedtime  dextrose 5%. 1000 milliLiter(s) (50 mL/Hr) IV Continuous <Continuous>  dextrose 50% Injectable 12.5 Gram(s) IV Push once  dextrose 50% Injectable 25 Gram(s) IV Push once  dextrose 50% Injectable 25 Gram(s) IV Push once  gabapentin 100 milliGRAM(s) Oral daily  heparin  Injectable 5000 Unit(s) SubCutaneous every 12 hours  influenza   Vaccine 0.5 milliLiter(s) IntraMuscular once  insulin lispro (HumaLOG) corrective regimen sliding scale   SubCutaneous three times a day before meals  metoclopramide 10 milliGRAM(s) Oral three times a day  midodrine 2.5 milliGRAM(s) Oral <User Schedule>  piperacillin/tazobactam IVPB. 3.375 Gram(s) IV Intermittent every 12 hours    MEDICATIONS  (PRN):  dextrose Gel 1 Dose(s) Oral once PRN Blood Glucose LESS THAN 70 milliGRAM(s)/deciliter  glucagon  Injectable 1 milliGRAM(s) IntraMuscular once PRN Glucose LESS THAN 70 milligrams/deciliter      Social History:    Marital Status:  ( X  )    (   ) Single    (   )    (  )   lives with spouse, but has been in SNF    no ETOH, drug, Tobacco    Family History   IBD (  ) Yes   (X  ) No  GI Malignancy (  )  Yes    ( X ) No    Health Management  Last Colonoscopy - unsure of prior       Advanced Directives: (  X   ) None    (      ) DNR    (     ) DNI    (     ) Health Care Proxy:     Review of Systems:    General:  No wt loss, fevers, chills, night sweats  CV:  No pain, palpitations, +SOB  Resp: +occ SOB No dyspnea, cough, tachypnea, wheezing  GI: see HPI  :  No pain, bleeding +ESRD  Muscle:  No pain, weakness +history fo amputations  Neuro:  No weakness, tingling, memory problems  Psych:  No fatigue, insomnia, mood problems, depression  Endocrine:  No polyuria, polydypsia, cold/heat intolerance  Heme:  No petechiae, ecchymosis, easy bruisability  Skin:  No rash, tattoos, scars, edema      Vital Signs Last 24 Hrs  T(C): 36.8 (20 Mar 2018 10:03), Max: 37.3 (19 Mar 2018 22:05)  T(F): 98.2 (20 Mar 2018 10:03), Max: 99.1 (19 Mar 2018 22:05)  HR: 65 (20 Mar 2018 10:03) (58 - 97)  BP: 148/52 (20 Mar 2018 10:03) (106/56 - 172/86)  BP(mean): --  RR: 18 (20 Mar 2018 10:03) (18 - 18)  SpO2: 95% (20 Mar 2018 10:03) (92% - 98%)    PHYSICAL EXAM:    Constitutional: NAD, well-developed sitting at edge of bed, smiling +uremic odor  Neck: No LAD, supple, no prominent neck veins  Respiratory: decreased SB at bases  Cardiovascular: S1 and S2, RRR  Gastrointestinal: BS+, obese, soft, NT/ND, neg HSM, well healed prior gastrostomy scar  Extremities: s/p partial amputation Rt foot  Neurological: A/O x 3, no focal deficits  Psychiatric: Normal mood, normal affect  Skin: No rashes        LABS:                        13.0   12.6  )-----------( 335      ( 19 Mar 2018 16:49 )             40.2     03-20    137  |  93<L>  |  74<H>  ----------------------------<  93  5.6<H>   |  26  |  5.62<H>    Ca    8.9      20 Mar 2018 06:16    TPro  8.4<H>  /  Alb  4.0  /  TBili  0.4  /  DBili  x   /  AST  17  /  ALT  19  /  AlkPhos  100  03-19               RADIOLOGY & ADDITIONAL TESTS:  < from: Xray Chest 1 View AP/PA (03.19.18 @ 16:55) >  INTERPRETATION:  CLINICAL INFORMATION: Shortness of breath status post   aspiration.    EXAM: AP view of the chest         COMPARISON: Radiograph dated  6/7/2017.    FINDINGS:        Low lung volumes. Increased pulmonary vascular congestion. Right lower   lung linear atelectasis.   There is no pleural effusion.  There is no pneumothorax.  The cardiac silhouette size cannot be accurately assessed on this   radiograph.  No acute abnormality of the visualized osseous structures.    IMPRESSION:    Low lung volumes limit evaluation.     Increased pulmonary vascular congestion. Right lower lung linear   atelectasis. Patient is a 68y old  Female who presents with a chief complaint of sob (19 Mar 2018 16:49)    HPI: 67 yo F pmhx of cardiac arrest x 2 ,  sp trach - decannulated, htn,  hld,  dm 2 , ckd  5  on hd. chf in 2016/ daistolic,   had IR placed GJ tube - subsequently removed  at  pts  insrodrickence , michael form nursing home for acute onset shortness of breath and cough with increased phlegm production after "choking on chicken".  Pt has  h/o  trouble swallowing since last cardiac arrest. Pt received suction at nursing home.  Pt. reports feeling SOb and cough at night >daytime. reports cough more prominent after taking liquids. Now feeling better pt  was  suctioned  at  nursing home.    No fever/chills, no change in vision, , no throat pain, no chest pain,  no abdominal pain, no nausea/vomiting,  no dysuria, no new joint pain, no rashes, no new focal numbness or weakness    Last HD reported Monday  During past admission, pt underwent perc Gastrostomy feeding tube placement with IR in 10/2017. Subsequently converted to GJ feeding tube 3/2017 - suspected gastroparesis, persistent increased residuals. Had multiple changes of GJ tube due to clogging, unclear when GJ was removed    PAST MEDICAL & SURGICAL HISTORY:  Hyperlipidemia  Diabetes  Hypertension  Osteomyelitis  Diabetic Neuropathy  Cellulitis of Foot  Edema of Both Legs  Diabetes: Type 2  History of Osteoarthritis  HTN - Hypertension  HLD (Hyperlipidemia)  Status post insertion of percutaneous endoscopic gastrostomy (PEG) tube  H/O tracheostomy  S/P amputation of lesser toe, right: 2013 right great toe, 2nd and 3rd right toes  S/P Amputation of Lesser Toe: Rt 1-3 metatarsal    Allergies  No Known Allergies    MEDICATIONS  (STANDING):  aspirin  chewable 81 milliGRAM(s) Oral daily  atorvastatin 40 milliGRAM(s) Oral at bedtime  dextrose 5%. 1000 milliLiter(s) (50 mL/Hr) IV Continuous <Continuous>  dextrose 50% Injectable 12.5 Gram(s) IV Push once  dextrose 50% Injectable 25 Gram(s) IV Push once  dextrose 50% Injectable 25 Gram(s) IV Push once  gabapentin 100 milliGRAM(s) Oral daily  heparin  Injectable 5000 Unit(s) SubCutaneous every 12 hours  influenza   Vaccine 0.5 milliLiter(s) IntraMuscular once  insulin lispro (HumaLOG) corrective regimen sliding scale   SubCutaneous three times a day before meals  metoclopramide 10 milliGRAM(s) Oral three times a day  midodrine 2.5 milliGRAM(s) Oral <User Schedule>  piperacillin/tazobactam IVPB. 3.375 Gram(s) IV Intermittent every 12 hours    MEDICATIONS  (PRN):  dextrose Gel 1 Dose(s) Oral once PRN Blood Glucose LESS THAN 70 milliGRAM(s)/deciliter  glucagon  Injectable 1 milliGRAM(s) IntraMuscular once PRN Glucose LESS THAN 70 milligrams/deciliter    Social History:  Marital Status:  ( X  )    (   ) Single    (   )    (  )   lives with spouse, but has been in SNF  no ETOH, drug, Tobacco    Family History   IBD (  ) Yes   (X  ) No  GI Malignancy (  )  Yes    ( X ) No    Health Management  Last Colonoscopy - unsure of prior     Advanced Directives: (  X   ) None    (      ) DNR    (     ) DNI    (     ) Health Care Proxy:     Review of Systems:  General:  No wt loss, fevers, chills, night sweats  CV:  No pain, palpitations, +SOB  Resp: +occ SOB No dyspnea, cough, tachypnea, wheezing  GI: see HPI  :  No pain, bleeding +ESRD  Muscle:  No pain, weakness +history fo amputations  Neuro:  No weakness, tingling, memory problems  Psych:  No fatigue, insomnia, mood problems, depression  Endocrine:  No polyuria, polydypsia, cold/heat intolerance  Heme:  No petechiae, ecchymosis, easy bruisability  Skin:  No rash, tattoos, scars, edema    Vital Signs Last 24 Hrs  T(C): 36.8 (20 Mar 2018 10:03), Max: 37.3 (19 Mar 2018 22:05)  T(F): 98.2 (20 Mar 2018 10:03), Max: 99.1 (19 Mar 2018 22:05)  HR: 65 (20 Mar 2018 10:03) (58 - 97)  BP: 148/52 (20 Mar 2018 10:03) (106/56 - 172/86)  BP(mean): --  RR: 18 (20 Mar 2018 10:03) (18 - 18)  SpO2: 95% (20 Mar 2018 10:03) (92% - 98%)    PHYSICAL EXAM:  Constitutional: NAD, well-developed sitting at edge of bed, smiling +uremic odor  Neck: No LAD, supple, no prominent neck veins  Respiratory: decreased SB at bases  Cardiovascular: S1 and S2, RRR  Gastrointestinal: BS+, obese, soft, NT/ND, neg HSM, well healed prior gastrostomy scar  Extremities: s/p partial amputation Rt foot  Neurological: A/O x 3, no focal deficits  Psychiatric: Normal mood, normal affect  Skin: No rashes    LABS:                      13.0   12.6  )-----------( 335      ( 19 Mar 2018 16:49 )             40.2     03-20    137  |  93<L>  |  74<H>  ----------------------------<  93  5.6<H>   |  26  |  5.62<H>    Ca    8.9      20 Mar 2018 06:16    TPro  8.4<H>  /  Alb  4.0  /  TBili  0.4  /  DBili  x   /  AST  17  /  ALT  19  /  AlkPhos  100  03-19    RADIOLOGY & ADDITIONAL TESTS:  < from: Xray Chest 1 View AP/PA (03.19.18 @ 16:55) >  INTERPRETATION:  CLINICAL INFORMATION: Shortness of breath status post   aspiration.    EXAM: AP view of the chest         COMPARISON: Radiograph dated  6/7/2017.    FINDINGS:    Low lung volumes. Increased pulmonary vascular congestion. Right lower   lung linear atelectasis.   There is no pleural effusion.  There is no pneumothorax.  The cardiac silhouette size cannot be accurately assessed on this   radiograph.  No acute abnormality of the visualized osseous structures.    IMPRESSION:    Low lung volumes limit evaluation.     Increased pulmonary vascular congestion. Right lower lung linear   atelectasis.

## 2018-03-20 NOTE — CHART NOTE - NSCHARTNOTEFT_GEN_A_CORE
Pt was seen and examined.  Briefly this is a elderly female c hx of HTN DM and PVD c ESRD on hd pw choking  Hyperkalemia  Mild CHF    Suggest  -No clinical stigmata to suggest heart failure  -HD in am(Pt refuses today despite the potassium)  -speech and swallow        Sayed Asif  [facility]  [phone]

## 2018-03-20 NOTE — CONSULT NOTE ADULT - SUBJECTIVE AND OBJECTIVE BOX
HPI:   Patient is a 68y female with history of cardiac arrest, peg and trach, both removed and esrd on dialysis who was admitted from nursing home yesterday after she choked on chicken. She had a brief episode of loss of consciousness after and was bringing up some phlegm but not any longer. She denies any fever or chills, has no shortness of breath at present, no chest pain, no n,v,d. She is very hungry at present.     REVIEW OF SYSTEMS:  All other review of systems negative (Comprehensive ROS)    PAST MEDICAL & SURGICAL HISTORY:  Hyperlipidemia  Diabetes  Hypertension  Osteomyelitis  Diabetic Neuropathy  Cellulitis of Foot  Edema of Both Legs  Diabetes: Type 2  History of Osteoarthritis  HTN - Hypertension  HLD (Hyperlipidemia)  Status post insertion of percutaneous endoscopic gastrostomy (PEG) tube  H/O tracheostomy  S/P amputation of lesser toe, right: 2013 right great toe, 2nd and 3rd right toes  S/P Amputation of Lesser Toe: Rt 1-3 metatarsal      Allergies    No Known Allergies    Intolerances        Antimicrobials Day #  :  piperacillin/tazobactam IVPB. 3.375 Gram(s) IV Intermittent every 12 hours    Other Medications:  aspirin  chewable 81 milliGRAM(s) Oral daily  atorvastatin 40 milliGRAM(s) Oral at bedtime  dextrose 5%. 1000 milliLiter(s) IV Continuous <Continuous>  dextrose 50% Injectable 12.5 Gram(s) IV Push once  dextrose 50% Injectable 25 Gram(s) IV Push once  dextrose 50% Injectable 25 Gram(s) IV Push once  dextrose Gel 1 Dose(s) Oral once PRN  gabapentin 100 milliGRAM(s) Oral daily  glucagon  Injectable 1 milliGRAM(s) IntraMuscular once PRN  heparin  Injectable 5000 Unit(s) SubCutaneous every 12 hours  influenza   Vaccine 0.5 milliLiter(s) IntraMuscular once  insulin lispro (HumaLOG) corrective regimen sliding scale   SubCutaneous three times a day before meals  metoclopramide 10 milliGRAM(s) Oral three times a day  midodrine 2.5 milliGRAM(s) Oral <User Schedule>      FAMILY HISTORY:  Family history of diabetes mellitus in father      SOCIAL HISTORY:  Smoking: [ ]Yes [x ]No  ETOH: [ ]Yes [x ]No  Drug Use: [ ]Yes [x ]No   x[ ] Single[ ]    T(F): 98.2 (03-20-18 @ 10:03), Max: 99.1 (03-19-18 @ 22:05)  HR: 65 (03-20-18 @ 10:03)  BP: 148/52 (03-20-18 @ 10:03)  RR: 18 (03-20-18 @ 10:03)  SpO2: 95% (03-20-18 @ 10:03)  Wt(kg): --    PHYSICAL EXAM:  General: alert, no acute distress  Eyes:  anicteric, no conjunctival injection, no discharge  Oropharynx: no lesions or injection 	  Neck: supple, without adenopathy. scar at old trach site  Lungs: clear to auscultation  Heart: s1s2 2/6 sys m  Abdomen: soft, nondistended, nontender, without mass or organomegaly. right foot s/p tma  Skin: no lesions  Extremities: no clubbing, cyanosis,. right leg slight edema, left leg slight edema. right arm avf thrill  Neurologic: alert, moves all extremities    LAB RESULTS:                        13.0   12.6  )-----------( 335      ( 19 Mar 2018 16:49 )             40.2     03-20    137  |  93<L>  |  74<H>  ----------------------------<  93  5.6<H>   |  26  |  5.62<H>    Ca    8.9      20 Mar 2018 06:16    TPro  8.4<H>  /  Alb  4.0  /  TBili  0.4  /  DBili  x   /  AST  17  /  ALT  19  /  AlkPhos  100  03-19    LIVER FUNCTIONS - ( 19 Mar 2018 16:49 )  Alb: 4.0 g/dL / Pro: 8.4 g/dL / ALK PHOS: 100 U/L / ALT: 19 U/L RC / AST: 17 U/L / GGT: x                       RADIOLOGY REVIEWED:  < from: CT Chest No Cont (03.20.18 @ 09:34) >  IMPRESSION:    No pneumonia.    Focal moderate to severe luminal narrowing of the upper trachea unchanged   in appearance since March 12, 2017 may be related to prior intubation.        < end of copied text >    Impression:  Patient with history of cardiac arrest, peg and trach now both removed who choked on chicken yesterday with some transient loc, phlegm and cough now resolved. CT chest shows no pneumonia, she feels well now and is hungry and exam not showing any evidence of pneumonia either. Suspect she needs evaluation for tracheal stenosis.    Recommendations:  Stop zosyn and monitor off antibiotics  should follow up with a cardiothoracic surgeon for tracheal stenosis  aspiration precautions.

## 2018-03-21 ENCOUNTER — TRANSCRIPTION ENCOUNTER (OUTPATIENT)
Age: 68
End: 2018-03-21

## 2018-03-21 VITALS
RESPIRATION RATE: 19 BRPM | OXYGEN SATURATION: 92 % | WEIGHT: 186.29 LBS | DIASTOLIC BLOOD PRESSURE: 52 MMHG | SYSTOLIC BLOOD PRESSURE: 131 MMHG | HEART RATE: 61 BPM | TEMPERATURE: 98 F

## 2018-03-21 DIAGNOSIS — N18.9 CHRONIC KIDNEY DISEASE, UNSPECIFIED: ICD-10-CM

## 2018-03-21 DIAGNOSIS — R13.10 DYSPHAGIA, UNSPECIFIED: ICD-10-CM

## 2018-03-21 DIAGNOSIS — E11.40 TYPE 2 DIABETES MELLITUS WITH DIABETIC NEUROPATHY, UNSPECIFIED: ICD-10-CM

## 2018-03-21 DIAGNOSIS — E78.5 HYPERLIPIDEMIA, UNSPECIFIED: ICD-10-CM

## 2018-03-21 LAB
GLUCOSE BLDC GLUCOMTR-MCNC: 194 MG/DL — HIGH (ref 70–99)
GLUCOSE BLDC GLUCOMTR-MCNC: 82 MG/DL — SIGNIFICANT CHANGE UP (ref 70–99)
GLUCOSE BLDC GLUCOMTR-MCNC: 90 MG/DL — SIGNIFICANT CHANGE UP (ref 70–99)

## 2018-03-21 PROCEDURE — 80048 BASIC METABOLIC PNL TOTAL CA: CPT

## 2018-03-21 PROCEDURE — 80053 COMPREHEN METABOLIC PANEL: CPT

## 2018-03-21 PROCEDURE — 71250 CT THORAX DX C-: CPT

## 2018-03-21 PROCEDURE — 85027 COMPLETE CBC AUTOMATED: CPT

## 2018-03-21 PROCEDURE — 99285 EMERGENCY DEPT VISIT HI MDM: CPT | Mod: 25

## 2018-03-21 PROCEDURE — 71045 X-RAY EXAM CHEST 1 VIEW: CPT

## 2018-03-21 PROCEDURE — 99261: CPT

## 2018-03-21 PROCEDURE — 83036 HEMOGLOBIN GLYCOSYLATED A1C: CPT

## 2018-03-21 PROCEDURE — 82962 GLUCOSE BLOOD TEST: CPT

## 2018-03-21 RX ADMIN — Medication 10 MILLIGRAM(S): at 12:53

## 2018-03-21 RX ADMIN — Medication 1: at 12:49

## 2018-03-21 RX ADMIN — GABAPENTIN 100 MILLIGRAM(S): 400 CAPSULE ORAL at 12:52

## 2018-03-21 RX ADMIN — MIDODRINE HYDROCHLORIDE 2.5 MILLIGRAM(S): 2.5 TABLET ORAL at 08:12

## 2018-03-21 RX ADMIN — HEPARIN SODIUM 5000 UNIT(S): 5000 INJECTION INTRAVENOUS; SUBCUTANEOUS at 05:19

## 2018-03-21 RX ADMIN — Medication 81 MILLIGRAM(S): at 12:52

## 2018-03-21 RX ADMIN — Medication 10 MILLIGRAM(S): at 05:19

## 2018-03-21 NOTE — DISCHARGE NOTE ADULT - SECONDARY DIAGNOSIS.
CKD (chronic kidney disease) stage 5, GFR less than 15 ml/min CHF (congestive heart failure) HTN (hypertension)

## 2018-03-21 NOTE — DISCHARGE NOTE ADULT - PLAN OF CARE
son refusing eval. Take your medications as directed   Follow up at [facility] s/p HD HD on 3/21 stable Continue with meds As above

## 2018-03-21 NOTE — SWALLOW BEDSIDE ASSESSMENT ADULT - SWALLOW EVAL: DIAGNOSIS
Consult received and chart reviewed.  SLP unable to see Pt for swallowing evaluation this am, as the Pt is currently receiving dialysis as per d/w NP.  SLP to f/u as schedule permits.

## 2018-03-21 NOTE — CHART NOTE - NSCHARTNOTEFT_GEN_A_CORE
Medicine NP note:  Pt. rec'd this am in dilaysis.  Son called and is requesting pt. be transferred back to [facility].  Pt.' s son spoke with Dr. Campos.   Pt. discharged to [facility] as per discussion with Dr. Campos

## 2018-03-21 NOTE — PROGRESS NOTE ADULT - SUBJECTIVE AND OBJECTIVE BOX
resting. no complaints    MEDICATIONS  (STANDING):  aspirin  chewable 81 milliGRAM(s) Oral daily  atorvastatin 40 milliGRAM(s) Oral at bedtime  dextrose 5%. 1000 milliLiter(s) (50 mL/Hr) IV Continuous <Continuous>  dextrose 50% Injectable 12.5 Gram(s) IV Push once  dextrose 50% Injectable 25 Gram(s) IV Push once  dextrose 50% Injectable 25 Gram(s) IV Push once  gabapentin 100 milliGRAM(s) Oral daily  heparin  Injectable 5000 Unit(s) SubCutaneous every 12 hours  influenza   Vaccine 0.5 milliLiter(s) IntraMuscular once  insulin lispro (HumaLOG) corrective regimen sliding scale   SubCutaneous three times a day before meals  metoclopramide 10 milliGRAM(s) Oral three times a day  midodrine 2.5 milliGRAM(s) Oral <User Schedule>    MEDICATIONS  (PRN):  dextrose Gel 1 Dose(s) Oral once PRN Blood Glucose LESS THAN 70 milliGRAM(s)/deciliter  glucagon  Injectable 1 milliGRAM(s) IntraMuscular once PRN Glucose LESS THAN 70 milligrams/deciliter      Vital Signs Last 24 Hrs  T(C): 36.7 (20 Mar 2018 20:40), Max: 36.8 (20 Mar 2018 10:03)  T(F): 98 (20 Mar 2018 20:40), Max: 98.2 (20 Mar 2018 10:03)  HR: 62 (20 Mar 2018 20:40) (58 - 65)  BP: 112/66 (20 Mar 2018 20:40) (106/64 - 148/52)  BP(mean): --  RR: 18 (20 Mar 2018 20:40) (18 - 18)  SpO2: 95% (20 Mar 2018 20:40) (94% - 95%)  CAPILLARY BLOOD GLUCOSE      POCT Blood Glucose.: 183 mg/dL (20 Mar 2018 21:51)  POCT Blood Glucose.: 89 mg/dL (20 Mar 2018 17:44)  POCT Blood Glucose.: 98 mg/dL (20 Mar 2018 13:06)  POCT Blood Glucose.: 123 mg/dL (20 Mar 2018 11:49)  POCT Blood Glucose.: 86 mg/dL (20 Mar 2018 07:58)    I&O's Summary    20 Mar 2018 07:01  -  21 Mar 2018 01:08  --------------------------------------------------------  IN: 960 mL / OUT: 0 mL / NET: 960 mL        PHYSICAL EXAM:  HEAD:  Atraumatic, Normocephalic  NECK: Supple, No JVD  CHEST/LUNG:   no     rales,     no,    rhonchi  HEART: Regular rate and rhythm;         murmur  ABDOMEN: Soft, Nontender, ;   EXTREMITIES:       no      edema  NEUROLOGY:  alert    LABS:                        11.0   10.43 )-----------( 319      ( 20 Mar 2018 07:57 )             34.1     03-20    137  |  93<L>  |  74<H>  ----------------------------<  93  5.6<H>   |  26  |  5.62<H>    Ca    8.9      20 Mar 2018 06:16    TPro  8.4<H>  /  Alb  4.0  /  TBili  0.4  /  DBili  x   /  AST  17  /  ALT  19  /  AlkPhos  100  03-19                  Hemoglobin A1C, Whole Blood: 4.9 % (03-20 @ 07:57)            Consultant(s) Notes Reviewed:      Care Discussed with Consultants/Other Providers:

## 2018-03-21 NOTE — DISCHARGE NOTE ADULT - CARE PLAN
Principal Discharge DX:	Dysphagia Principal Discharge DX:	Dysphagia  Goal:	son refusing eval.  Assessment and plan of treatment:	Take your medications as directed   Follow up at [facility]  Secondary Diagnosis:	CKD (chronic kidney disease) stage 5, GFR less than 15 ml/min  Goal:	s/p HD  Assessment and plan of treatment:	HD on 3/21  Secondary Diagnosis:	CHF (congestive heart failure)  Goal:	stable  Assessment and plan of treatment:	Continue with meds  Secondary Diagnosis:	HTN (hypertension)  Assessment and plan of treatment:	As above

## 2018-03-21 NOTE — PROGRESS NOTE ADULT - ASSESSMENT
pt  with h/o htn, hld,  dm 2,  ckd 5, on  hd,  chronic  diastolic  chf,  orthostasis   admitted  with  sob/  acute  resp distress,    ,after choking on chicken at  n home   s/p  suctioning, at n home  now  in er  with elevated wbc, on zosyn  cxr  with  chf   feels  better now   son at bedside, states that, pt has  been coughing  after almost  every meal, for  months    swallow  eval,   may   need    peg  tube   dvt ppx   pt is dnr,    has Molst form in chart  Dysphagia 2  diet  apparently, pt  was  intubated  in the past,  after  an episode  of   aspiration, and  had  a  peg tube  placed,   and  then, later pulled  out  at pt's  insistence  gi  and ID , saw pt, no ab per ID  Card, dr moreno , will see pt pt  with h/o htn, hld,  dm 2,  ckd 5, on  hd,  chronic  diastolic  chf,  orthostasis   admitted  with  sob/  acute  resp distress,    ,after choking on chicken at  n home   s/p  suctioning, at n home  now  in er  with elevated wbc, on zosyn  cxr  with  chf   feels  better now   son at bedside, states that, pt has  been coughing  after almost  every meal, for  months    swallow  eval,   may   need    peg  tube   dvt ppx   pt is dnr,    has Molst form in chart  Dysphagia 2  diet  apparently, pt  was  intubated  in the past,  after  an episode  of   aspiration, and  had  a  peg tube  placed,   and  then, later pulled  out  at pt's  insistence  gi  and ID , saw pt, no ab per ID  Card, dr moreno , will see pt  echo/ swallow eval, pending  ct  chest, tracheal narrowing,/ old,      pt has no stridor  ent to see    pt in morning    < from: CT Chest No Cont (03.20.18 @ 09:34) >  IMPRESSION:    No pneumonia.    Focal moderate to severe luminal narrowing of the upper trachea unchanged   in appearance since March 12, 2017 may be related to prior intubation.          < end of copied text >

## 2018-03-21 NOTE — DISCHARGE NOTE ADULT - MEDICATION SUMMARY - MEDICATIONS TO TAKE
I will START or STAY ON the medications listed below when I get home from the hospital:    aspirin 81 mg oral tablet, chewable  -- 1 tab(s) by mouth once a day  -- Indication: For heart     Tylenol 500 mg oral tablet  -- 2 tab(s) by mouth 2 times a day  -- Indication: For pain    Eliquis 2.5 mg oral tablet  -- 1 tab(s) by mouth 2 times a day  -- Indication: For anticoagulation    gabapentin 300 mg oral capsule  -- 1 cap(s) by mouth 3 times a day  -- Indication: For pain     NovoLOG 100 units/mL subcutaneous solution  -- sliding scale, As needed  -- Indication: For DM2    metoclopramide 10 mg oral tablet  -- 1 tab(s) by mouth every 8 hours  -- Indication: For nausea     atorvastatin 40 mg oral tablet  -- 1 tab(s) by mouth once a day (at bedtime)  -- Indication: For cholesterol    DuoNeb 0.5 mg-2.5 mg/3 mL inhalation solution  -- 3 milliliter(s) inhaled 2 times a day, As Needed  -- Indication: For bronchodilator     MiraLax oral powder for reconstitution  -- 17 gram(s) by mouth once a day  -- Indication: For constipation    midodrine 2.5 mg oral tablet  -- 1 tab(s) by mouth once a day  -- Indication: For heart     Artificial Tears ophthalmic solution  -- 1 drop(s) to each affected eye every 6 hours, As Needed  -- Indication: For Dry eye    Renvela 800 mg oral tablet  -- 1 tab(s) by mouth 3 times a day (with meals)  -- Indication: For renal

## 2018-03-21 NOTE — DISCHARGE NOTE ADULT - HOSPITAL COURSE
67 yo F with pmhx of cardiac arrest x 2 ,  sp trach which has been closed , htn,  hld,  dm 2 , ckd  5  on HD. chf in 2016/ daistolic, had peg  placed and  then taken out at pts insistence, son adds pt  was  intubated  in the past,  after  an episode  of   aspiration, and  had  a  peg tube  placed,   and  then, later pulled  out  at pt's  insistence confirming hx. [facility] and cough with increased phlegm production after choking on chicken. Admitted with dysphagia / asp PNA: on zosyn, s/p suctioning, at ns home/  in ed  with elevated wbc, on zosyn, cxr  with  chf, feels  better,  son adds  pt has  been coughing  after almost  every meal, for  months. swallowing  eval. Pt. initially planned for sw. eval., and son is declining, adding he wants his mother to go back to nursing home.       Dysphagia 2  diet  addendum,  apparently, 69 yo F with pmhx of cardiac arrest x 2 ,  sp trach which has been closed , htn,  hld,  dm 2 , ckd  5  on HD. chf in 2016/ daistolic, had peg  placed and  then taken out at pts insistence, son adds pt  was  intubated  in the past,  after  an episode  of   aspiration, and  had  a  peg tube  placed,   and  then, later pulled  out  at pt's  insistence confirming hx. Now pt. is coming from nursing home [facility] for acute onset shortness of breath and cough with increased phlegm production after choking on chicken. Admitted with dysphagia / asp PNA: on zosyn, s/p suctioning, [facility]/  in ed  with elevated wbc, on zosyn, cxr  with  chf, feels  better,  son adds  pt has  been coughing  after almost  every meal, for  months. swallowing  eval. Pt. initially planned for sw. eval., and son is declining, adding he wants his mother to go back to nursing home and he is declining all testing. Pt. received HD the AM of discharge on 3/21. Discharged to [facility] 67 yo F with pmhx of cardiac arrest x 2 ,  sp trach which has been closed , htn,  hld,  dm 2 , ckd  5  on HD. chf in 2016/ daistolic, had peg  placed and  then taken out at pts insistence, son adds pt  was  intubated  in the past,  after  an episode  of   aspiration, and  had  a  peg tube  placed,   and  then, later pulled  out  at pt's  insistence confirming hx. Now pt. is coming from nursing home [facility] for acute onset shortness of breath and cough with increased phlegm production after choking on chicken. Admitted with dysphagia / asp PNA: on zosyn, s/p suctioning, [place] [facility] [place]  in [place]  with elevated wbc, on zosyn, cxr  with  chf, feels  better now,  Pt. planned for sw. eval., and son is declining, adding he wants his mother to go back to nursing home and he is declining all testing, trina Campos  Pt. received HD the AM of discharge on 3/21. Discharged to [facility] 67 yo F with pmhx of cardiac arrest x 2 ,  sp trach which has been closed , htn,  hld,  dm 2 , ckd  5  on HD. chf in 2016/ daistolic, had peg  placed and  then taken out at pts insistence, son adds pt  was  intubated  in the past,  after  an episode  of   aspiration, and  had  a  peg tube  placed,   and  then, later pulled  out  at pt's  insistence confirming hx. Now pt. is coming from nursing home [facility] for acute onset shortness of breath and cough with increased phlegm production after choking on chicken  . Admitted with dysphagia / asp PNA: was  ruled out, no pna  on ct chest,    on zosyn stopped, , , cxr  with  chronic  diastolic  chf, feels  better now,  Pt. planned for sw. eval., and son is declining, adding he wants his mother to go back to nursing home and he is declining all testing, trina Campos  Pt. received HD the AM of discharge on 3/21. Discharged to [facility]

## 2018-03-21 NOTE — DISCHARGE NOTE ADULT - PATIENT PORTAL LINK FT
You can access the [facility] Patient Portal, offered by [facility], by registering with the following website: [email or web address]

## 2018-03-21 NOTE — CONSULT NOTE ADULT - SUBJECTIVE AND OBJECTIVE BOX
CHIEF COMPLAINT: Chocking     HISTORY OF PRESENT ILLNESS:  69 yo F well known to me from previous extended hospitalizations in past and office sent from [facility] for acute onset chocking and unresponsiveness while eating. Found to be chocking on food. She previously had a PEG placed for dysphagia which was removed as she clinically improved.   Denies chest pain, shortness of breath, orthopnea or pnd.   Of note, shes had two previous cardiac arrest. She has severe gastroparesis.   She also has significant peripheral arterial disease but esentially normal coronary anatomy. Her previous cardiac arrests were thought to be respiratory related.        PAST MEDICAL & SURGICAL HISTORY:  Hyperlipidemia  Diabetes  Hypertension  Osteomyelitis  Diabetic Neuropathy  Cellulitis of Foot  Edema of Both Legs  Diabetes: Type 2  History of Osteoarthritis  HTN - Hypertension  HLD (Hyperlipidemia)  Status post insertion of percutaneous endoscopic gastrostomy (PEG) tube  H/O tracheostomy  S/P amputation of lesser toe, right: 2013 right great toe, 2nd and 3rd right toes  S/P Amputation of Lesser Toe: Rt 1-3 metatarsal          MEDICATIONS:  aspirin  chewable 81 milliGRAM(s) Oral daily  heparin  Injectable 5000 Unit(s) SubCutaneous every 12 hours  midodrine 2.5 milliGRAM(s) Oral <User Schedule>        gabapentin 100 milliGRAM(s) Oral daily    metoclopramide 10 milliGRAM(s) Oral three times a day    atorvastatin 40 milliGRAM(s) Oral at bedtime  dextrose 50% Injectable 12.5 Gram(s) IV Push once  dextrose 50% Injectable 25 Gram(s) IV Push once  dextrose 50% Injectable 25 Gram(s) IV Push once  dextrose Gel 1 Dose(s) Oral once PRN  glucagon  Injectable 1 milliGRAM(s) IntraMuscular once PRN  insulin lispro (HumaLOG) corrective regimen sliding scale   SubCutaneous three times a day before meals    dextrose 5%. 1000 milliLiter(s) IV Continuous <Continuous>  influenza   Vaccine 0.5 milliLiter(s) IntraMuscular once      FAMILY HISTORY:  Family history of diabetes mellitus in father      SOCIAL HISTORY:    [ ] Non-smoker  [ ] Smoker  [ ] Alcohol    Allergies    No Known Allergies    Intolerances    	    REVIEW OF SYSTEMS:  CONSTITUTIONAL: No fever, weight loss, + fatigue  EYES: No eye pain, visual disturbances, or discharge  ENMT:  No difficulty hearing, tinnitus, vertigo; No sinus or throat pain  NECK: No pain or stiffness  RESPIRATORY: No cough, wheezing, chills or hemoptysis; + Shortness of Breath  CARDIOVASCULAR: No chest pain, palpitations, passing out, dizziness, or leg swelling  GASTROINTESTINAL: No abdominal or epigastric pain. No nausea, vomiting, or hematemesis; No diarrhea or constipation. No melena or hematochezia.  GENITOURINARY: No dysuria, frequency, hematuria, or incontinence  NEUROLOGICAL: No headaches, memory loss, loss of strength, numbness, or tremors  SKIN: No itching, burning, rashes, or lesions   LYMPH Nodes: No enlarged glands  ENDOCRINE: No heat or cold intolerance; No hair loss  MUSCULOSKELETAL: + joint pain or swelling; No muscle, back, or extremity pain  PSYCHIATRIC: No depression, anxiety, mood swings, or difficulty sleeping  HEME/LYMPH: No easy bruising, or bleeding gums  ALLERGY AND IMMUNOLOGIC: No hives or eczema	    [ ] All others negative	  [ ] Unable to obtain    PHYSICAL EXAM:  T(C): 36.8 (03-21-18 @ 08:50), Max: 36.8 (03-21-18 @ 08:50)  HR: 65 (03-21-18 @ 08:50) (59 - 65)  BP: 103/50 (03-21-18 @ 08:50) (103/50 - 141/67)  RR: 20 (03-21-18 @ 08:50) (18 - 20)  SpO2: 91% (03-21-18 @ 08:50) (91% - 95%)  Wt(kg): --  I&O's Summary    20 Mar 2018 07:01  -  21 Mar 2018 07:00  --------------------------------------------------------  IN: 1080 mL / OUT: 300 mL / NET: 780 mL        Appearance: Normal, cachectic 	  HEENT:   dry oral mucosa, PERRL, EOMI	  Lymphatic: No lymphadenopathy  Cardiovascular: Normal S1 S2, No JVD, No murmurs, No edema  Respiratory: Lungs clear to auscultation	  Psychiatry: A & O x 3, Mood & affect appropriate  Gastrointestinal:  Soft, Non-tender, + BS	  Skin: No rashes, No ecchymoses, No cyanosis	  Neurologic: Non-focal  Extremities: +toe amputations  Vascular: significantly decreased distal pulses bilaterally     TELEMETRY: SR	    ECG:  	  RADIOLOGY:  < from: CT Chest No Cont (03.20.18 @ 09:34) >    EXAM:  CT CHEST                            PROCEDURE DATE:  03/20/2018            INTERPRETATION:  CLINICAL INFORMATION: Shortness of breath, diastolic   congestive heart failure.    PROCEDURE:   CT of the Chest was performed without intravenous contrast.   Intravenous contrast: None.    Reconstructions were performed in axial thin, axial maximum intensity   projection, sagittal and coronal planes.    COMPARISON: Chest x-ray 2/19/2018. CT chest 5/25/2017.    FINDINGS:    LUNGS: Patent central airways. Scattered bilateral areas of linear or   subsegmental atelectasis. No focal consolidation.  PLEURA: No pleural effusion.  VESSELS: Atherosclerotic disease of the aorta and coronary arteries.  HEART: Mild left-sided cardiomegaly. No pericardialeffusion. Aortic   valvular calcifications.  MEDIASTINUM AND ELO: Small mediastinal lymph nodes with the largest in   the azygoesophageal recess measuring about 1.2 cm are unchanged.  CHEST WALL AND LOWER NECK: Enlarged heterogeneous thyroid gland. There is   associated adjacent focal luminal narrowing of the upper trachea   measuring about 5 mm in transverse and 8 mm in anteroposterior dimension   predominantly unchanged since March 12, 2017. The normal sized trachea   distal to this area of narrowing measures about 1.4 cm in transverse x   1.4 cm in anteroposterior dimension.  UPPER ABDOMEN: Tiny hiatal hernia. Gallstones. Trace ascites.  BONES: Degenerative changes of the spine.  Multiple healed bilateral rib   fractures.    IMPRESSION:    No pneumonia.    Focal moderate to severe luminal narrowing of the upper trachea unchanged   in appearance since March 12, 2017 may be related to prior intubation.                  GUSTAVO ROSARIO M.D., RADIOLOGY RESIDENT  This document has been electronically signed.  CASEY LEON M.D. ATTENDING RADIOLOGIST  This document has been electronically signed. Mar 20 2018 12:11PM                < from: Xray Chest 1 View AP/PA (03.19.18 @ 16:55) >    EXAM:  XR CHEST AP OR PA 1V                            PROCEDURE DATE:  03/19/2018            INTERPRETATION:  CLINICAL INFORMATION: Shortness of breath status post   aspiration.    EXAM: AP view of the chest         COMPARISON: Radiograph dated  6/7/2017.    FINDINGS:        Low lung volumes. Increased pulmonary vascular congestion. Right lower   lung linear atelectasis.   There is no pleural effusion.  There is no pneumothorax.  The cardiac silhouette size cannot be accurately assessed on this   radiograph.  No acute abnormality of the visualized osseous structures.    IMPRESSION:    Low lung volumes limit evaluation.     Increased pulmonary vascular congestion. Right lower lung linear   atelectasis.                 AMBERLY ABEBE M.D., RADIOLOGY RESIDENT  This document has been electronically signed.  DERRICK GUY M.D., ATTENDING RADIOLOGIST  This document has been electronically signed. Mar 20 2018 10:08AM                    OTHER: 	  	  LABS:	 	    CARDIAC MARKERS:                                  11.0   10.43 )-----------( 319      ( 20 Mar 2018 07:57 )             34.1     03-20    137  |  93<L>  |  74<H>  ----------------------------<  93  5.6<H>   |  26  |  5.62<H>    Ca    8.9      20 Mar 2018 06:16    TPro  8.4<H>  /  Alb  4.0  /  TBili  0.4  /  DBili  x   /  AST  17  /  ALT  19  /  AlkPhos  100  03-19    proBNP:   Lipid Profile:   HgA1c:   TSH:

## 2018-03-21 NOTE — PROGRESS NOTE ADULT - ASSESSMENT
A 68-year-old female with past medical history of hypertension, diabetes, peripheral vascular disease, end-stage renal disease, who presents after choking at a piece of chicken.         1.	Renal.  HD today  2.	Cardiology.  The patient has no clinical stigmata of decompensated heart failure. Fluid removal at HD  3.	Gastrointestinal.  Speech and Swallow is pending   The patient is currently on a dysphagia diet. A 68-year-old female with past medical history of hypertension, diabetes, peripheral vascular disease, end-stage renal disease, who presents after choking at a piece of chicken.  1.	Renal.  HD today as ordered, midodrine prior to HD, subsequent HD will be on Friday  2.	Cardiology.  The patient has no clinical stigmata of decompensated heart failure. Fluid removal at HD, for eventual echocardiogram   3.	Gastrointestinal. Mod-severe luminal narrowing of upper trach, hx of intubation / peg s/p removal of peg.  Speech and Swallow is pending. The patient is currently on a dysphagia diet. A 68-year-old female with past medical history of hypertension, diabetes, peripheral vascular disease, end-stage renal disease, who presents after choking at a piece of chicken.  1.	Renal.  HD today as ordered, midodrine prior to HD, subsequent HD will be on Friday  2.	Cardiology.  The patient has no clinical stigmata of decompensated heart failure. Fluid removal at HD   3.	Gastrointestinal. Mod-severe luminal narrowing of upper trach, hx of intubation / peg s/p removal of peg.  Speech and Swallow is pending. The patient is currently on a dysphagia diet.

## 2018-03-21 NOTE — PROGRESS NOTE ADULT - SUBJECTIVE AND OBJECTIVE BOX
[facility]PHROLOGY[facility] [phone]        Patient seen and examined         MEDICATIONS  (STANDING):  aspirin  chewable 81 milliGRAM(s) Oral daily  atorvastatin 40 milliGRAM(s) Oral at bedtime  dextrose 5%. 1000 milliLiter(s) (50 mL/Hr) IV Continuous <Continuous>  dextrose 50% Injectable 12.5 Gram(s) IV Push once  dextrose 50% Injectable 25 Gram(s) IV Push once  dextrose 50% Injectable 25 Gram(s) IV Push once  gabapentin 100 milliGRAM(s) Oral daily  heparin  Injectable 5000 Unit(s) SubCutaneous every 12 hours  influenza   Vaccine 0.5 milliLiter(s) IntraMuscular once  insulin lispro (HumaLOG) corrective regimen sliding scale   SubCutaneous three times a day before meals  metoclopramide 10 milliGRAM(s) Oral three times a day  midodrine 2.5 milliGRAM(s) Oral <User Schedule>      VITAL:  T(C): , Max: 36.8 (03-20-18 @ 10:03)  T(F): , Max: 98.2 (03-20-18 @ 10:03)  HR: 59 (03-21-18 @ 05:16)  BP: 103/56 (03-21-18 @ 05:16)  BP(mean): --  RR: 18 (03-21-18 @ 05:16)  SpO2: 95% (03-21-18 @ 05:16)  Wt(kg): --    I and O's:    03-20 @ 07:01  -  03-21 @ 07:00  --------------------------------------------------------  IN: 1080 mL / OUT: 300 mL / NET: 780 mL          PHYSICAL EXAM:    Constitutional: NAD  HEENT: PERRLA    Neck:  No JVD  Respiratory: CTAB/L  Cardiovascular: S1 and S2  Gastrointestinal: BS+, soft, NT/ND  Extremities: No peripheral edema  Neurological: A/O x 3, no focal deficits  Psychiatric: Normal mood, normal affect  : No Raphael  Skin: No rashes  Access: Not applicable    LABS:                        11.0   10.43 )-----------( 319      ( 20 Mar 2018 07:57 )             34.1     03-20    137  |  93<L>  |  74<H>  ----------------------------<  93  5.6<H>   |  26  |  5.62<H>    Ca    8.9      20 Mar 2018 06:16    TPro  8.4<H>  /  Alb  4.0  /  TBili  0.4  /  DBili  x   /  AST  17  /  ALT  19  /  AlkPhos  100  03-19          Urine Studies:          RADIOLOGY & ADDITIONAL STUDIES: NEPHROLOGY[facility] [phone]      Patient seen and examined. She is about to start hemodialysis. No acute complaints. Appears comfortable in bed.     MEDICATIONS  (STANDING):  aspirin  chewable 81 milliGRAM(s) Oral daily  atorvastatin 40 milliGRAM(s) Oral at bedtime  dextrose 5%. 1000 milliLiter(s) (50 mL/Hr) IV Continuous <Continuous>  dextrose 50% Injectable 12.5 Gram(s) IV Push once  dextrose 50% Injectable 25 Gram(s) IV Push once  dextrose 50% Injectable 25 Gram(s) IV Push once  gabapentin 100 milliGRAM(s) Oral daily  heparin  Injectable 5000 Unit(s) SubCutaneous every 12 hours  influenza   Vaccine 0.5 milliLiter(s) IntraMuscular once  insulin lispro (HumaLOG) corrective regimen sliding scale   SubCutaneous three times a day before meals  metoclopramide 10 milliGRAM(s) Oral three times a day  midodrine 2.5 milliGRAM(s) Oral <User Schedule>    VITAL:  T(C): , Max: 36.8 (03-20-18 @ 10:03)  T(F): , Max: 98.2 (03-20-18 @ 10:03)  HR: 59 (03-21-18 @ 05:16)  BP: 103/56 (03-21-18 @ 05:16)  BP(mean): --  RR: 18 (03-21-18 @ 05:16)  SpO2: 95% (03-21-18 @ 05:16)  Wt(kg): --    I and O's:    03-20 @ 07:01  -  03-21 @ 07:00  --------------------------------------------------------  IN: 1080 mL / OUT: 300 mL / NET: 780 mL      PHYSICAL EXAM:    Constitutional: NAD  HEENT: PERRLA    Neck:  No JVD  Respiratory: CTAB/L  Cardiovascular: S1 and S2  Gastrointestinal: BS+, soft, + distended  Extremities: + edema  Neurological: A/O x 3, no focal deficits  Psychiatric: Normal mood, normal affect  : No Raphael  Skin: No rashes  Access: LUE AVF (+ thrill)    LABS:                        11.0   10.43 )-----------( 319      ( 20 Mar 2018 07:57 )             34.1     03-20    137  |  93<L>  |  74<H>  ----------------------------<  93  5.6<H>   |  26  |  5.62<H>    Ca    8.9      20 Mar 2018 06:16    TPro  8.4<H>  /  Alb  4.0  /  TBili  0.4  /  DBili  x   /  AST  17  /  ALT  19  /  AlkPhos  100  03-19    RADIOLOGY & ADDITIONAL STUDIES:  < from: CT Chest No Cont (03.20.18 @ 09:34) >    EXAM:  CT CHEST                            PROCEDURE DATE:  03/20/2018            INTERPRETATION:  CLINICAL INFORMATION: Shortness of breath, diastolic   congestive heart failure.    PROCEDURE:   CT of the Chest was performed without intravenous contrast.   Intravenous contrast: None.    Reconstructions were performed in axial thin, axial maximum intensity   projection, sagittal and coronal planes.    COMPARISON: Chest x-ray 2/19/2018. CT chest 5/25/2017.    FINDINGS:    LUNGS: Patent central airways. Scattered bilateral areas of linear or   subsegmental atelectasis. No focal consolidation.  PLEURA: No pleural effusion.  VESSELS: Atherosclerotic disease of the aorta and coronary arteries.  HEART: Mild left-sided cardiomegaly. No pericardialeffusion. Aortic   valvular calcifications.  MEDIASTINUM AND ELO: Small mediastinal lymph nodes with the largest in   the azygoesophageal recess measuring about 1.2 cm are unchanged.  CHEST WALL AND LOWER NECK: Enlarged heterogeneous thyroid gland. There is   associated adjacent focal luminal narrowing of the upper trachea   measuring about 5 mm in transverse and 8 mm in anteroposterior dimension   predominantly unchanged since March 12, 2017. The normal sized trachea   distal to this area of narrowing measures about 1.4 cm in transverse x   1.4 cm in anteroposterior dimension.  UPPER ABDOMEN: Tiny hiatal hernia. Gallstones. Trace ascites.  BONES: Degenerative changes of the spine.  Multiple healed bilateral rib   fractures.    IMPRESSION:    No pneumonia.    Focal moderate to severe luminal narrowing of the upper trachea unchanged   in appearance since March 12, 2017 may be related to prior intubation.                  GUSTAVO ROSARIO M.D., RADIOLOGY RESIDENT  This document has been electronically signed.  CASEY LEON M.D. ATTENDING RADIOLOGIST  This document has been electronically signed. Mar 20 2018 12:11PM                < end of copied text > [facility]OLOGY[facility] [phone]      Patient seen and examined. She is about to start hemodialysis. No acute complaints. Appears comfortable in bed.     MEDICATIONS  (STANDING):  aspirin  chewable 81 milliGRAM(s) Oral daily  atorvastatin 40 milliGRAM(s) Oral at bedtime  dextrose 5%. 1000 milliLiter(s) (50 mL/Hr) IV Continuous <Continuous>  dextrose 50% Injectable 12.5 Gram(s) IV Push once  dextrose 50% Injectable 25 Gram(s) IV Push once  dextrose 50% Injectable 25 Gram(s) IV Push once  gabapentin 100 milliGRAM(s) Oral daily  heparin  Injectable 5000 Unit(s) SubCutaneous every 12 hours  influenza   Vaccine 0.5 milliLiter(s) IntraMuscular once  insulin lispro (HumaLOG) corrective regimen sliding scale   SubCutaneous three times a day before meals  metoclopramide 10 milliGRAM(s) Oral three times a day  midodrine 2.5 milliGRAM(s) Oral <User Schedule>    VITAL:  T(C): , Max: 36.8 (03-20-18 @ 10:03)  T(F): , Max: 98.2 (03-20-18 @ 10:03)  HR: 59 (03-21-18 @ 05:16)  BP: 103/56 (03-21-18 @ 05:16)  BP(mean): --  RR: 18 (03-21-18 @ 05:16)  SpO2: 95% (03-21-18 @ 05:16)  Wt(kg): --    I and O's:    03-20 @ 07:01  -  03-21 @ 07:00  --------------------------------------------------------  IN: 1080 mL / OUT: 300 mL / NET: 780 mL      PHYSICAL EXAM:    Constitutional: NAD  HEENT: PERRLA , dry mucous membranes  Neck:  No JVD  Respiratory: diminished BS b/l  Cardiovascular: S1 and S2  Gastrointestinal: BS+, soft, + distended  Extremities: + edema  Neurological: A/O x 2/3  Psychiatric: Normal mood, normal affect  : No Raphael  Skin: No rashes  Access: LUE AVF (+ thrill)    LABS:                        11.0   10.43 )-----------( 319      ( 20 Mar 2018 07:57 )             34.1     03-20    137  |  93<L>  |  74<H>  ----------------------------<  93  5.6<H>   |  26  |  5.62<H>    Ca    8.9      20 Mar 2018 06:16    TPro  8.4<H>  /  Alb  4.0  /  TBili  0.4  /  DBili  x   /  AST  17  /  ALT  19  /  AlkPhos  100  03-19    RADIOLOGY & ADDITIONAL STUDIES:  < from: CT Chest No Cont (03.20.18 @ 09:34) >    EXAM:  CT CHEST                            PROCEDURE DATE:  03/20/2018            INTERPRETATION:  CLINICAL INFORMATION: Shortness of breath, diastolic   congestive heart failure.    PROCEDURE:   CT of the Chest was performed without intravenous contrast.   Intravenous contrast: None.    Reconstructions were performed in axial thin, axial maximum intensity   projection, sagittal and coronal planes.    COMPARISON: Chest x-ray 2/19/2018. CT chest 5/25/2017.    FINDINGS:    LUNGS: Patent central airways. Scattered bilateral areas of linear or   subsegmental atelectasis. No focal consolidation.  PLEURA: No pleural effusion.  VESSELS: Atherosclerotic disease of the aorta and coronary arteries.  HEART: Mild left-sided cardiomegaly. No pericardialeffusion. Aortic   valvular calcifications.  MEDIASTINUM AND ELO: Small mediastinal lymph nodes with the largest in   the azygoesophageal recess measuring about 1.2 cm are unchanged.  CHEST WALL AND LOWER NECK: Enlarged heterogeneous thyroid gland. There is   associated adjacent focal luminal narrowing of the upper trachea   measuring about 5 mm in transverse and 8 mm in anteroposterior dimension   predominantly unchanged since March 12, 2017. The normal sized trachea   distal to this area of narrowing measures about 1.4 cm in transverse x   1.4 cm in anteroposterior dimension.  UPPER ABDOMEN: Tiny hiatal hernia. Gallstones. Trace ascites.  BONES: Degenerative changes of the spine.  Multiple healed bilateral rib   fractures.    IMPRESSION:    No pneumonia.    Focal moderate to severe luminal narrowing of the upper trachea unchanged   in appearance since March 12, 2017 may be related to prior intubation.                  GUSTAVO ROSARIO M.D., RADIOLOGY RESIDENT  This document has been electronically signed.  CASEY CAROLYN M.D. ATTENDING RADIOLOGIST  This document has been electronically signed. Mar 20 2018 12:11PM                < end of copied text >

## 2018-03-21 NOTE — PROGRESS NOTE ADULT - SUBJECTIVE AND OBJECTIVE BOX
Patient is a 68y old  Female who presented with a chief complaint of sob (19 Mar 2018 16:49)      INTERVAL HPI/OVERNIGHT EVENTS:  no fever or chills  no cough reported by RN staff  denies SOB  SLP evaluation pending    MEDICATIONS  (STANDING):  aspirin  chewable 81 milliGRAM(s) Oral daily  atorvastatin 40 milliGRAM(s) Oral at bedtime  dextrose 5%. 1000 milliLiter(s) (50 mL/Hr) IV Continuous <Continuous>  dextrose 50% Injectable 12.5 Gram(s) IV Push once  dextrose 50% Injectable 25 Gram(s) IV Push once  dextrose 50% Injectable 25 Gram(s) IV Push once  gabapentin 100 milliGRAM(s) Oral daily  heparin  Injectable 5000 Unit(s) SubCutaneous every 12 hours  influenza   Vaccine 0.5 milliLiter(s) IntraMuscular once  insulin lispro (HumaLOG) corrective regimen sliding scale   SubCutaneous three times a day before meals  metoclopramide 10 milliGRAM(s) Oral three times a day  midodrine 2.5 milliGRAM(s) Oral <User Schedule>    MEDICATIONS  (PRN):  dextrose Gel 1 Dose(s) Oral once PRN Blood Glucose LESS THAN 70 milliGRAM(s)/deciliter  glucagon  Injectable 1 milliGRAM(s) IntraMuscular once PRN Glucose LESS THAN 70 milligrams/deciliter      Allergies  No Known Allergies    Review of Systems:  General:  No wt loss, fevers, chills, night sweats  CV:  No pain, palpitations, +SOB  Resp: +occ SOB No dyspnea, cough, tachypnea, wheezing  :  No pain, bleeding +ESRD  Muscle:  No pain, weakness +history fo amputations  Neuro:  No weakness, tingling, memory problems  Heme:  No petechiae, ecchymosis, easy bruisability  Skin:  No rash, tattoos, scars, edema    Vital Signs Last 24 Hrs  T(C): 36.4 (21 Mar 2018 05:16), Max: 36.8 (20 Mar 2018 10:03)  T(F): 97.5 (21 Mar 2018 05:16), Max: 98.2 (20 Mar 2018 10:03)  HR: 59 (21 Mar 2018 05:16) (59 - 65)  BP: 103/56 (21 Mar 2018 05:16) (103/56 - 148/52)  BP(mean): --  RR: 18 (21 Mar 2018 05:16) (18 - 18)  SpO2: 95% (21 Mar 2018 05:16) (94% - 95%)    PHYSICAL EXAM:  Constitutional: NAD, well-developed lying in bed, smiling   Neck: No LAD, supple, no prominent neck veins  Respiratory: decreased SB at bases  Cardiovascular: S1 and S2, RRR  Gastrointestinal: BS+, obese, soft, NT/ND, neg HSM, well healed prior gastrostomy scar  Extremities: s/p partial amputation Rt foot  Neurological: A/O x 3, no focal deficits  Psychiatric: Normal mood, normal affect  Skin: No rashes    LABS:                        11.0   10.43 )-----------( 319      ( 20 Mar 2018 07:57 )             34.1     03-20    137  |  93<L>  |  74<H>  ----------------------------<  93  5.6<H>   |  26  |  5.62<H>    Ca    8.9      20 Mar 2018 06:16    TPro  8.4<H>  /  Alb  4.0  /  TBili  0.4  /  DBili  x   /  AST  17  /  ALT  19  /  AlkPhos  100  03-19        RADIOLOGY & ADDITIONAL TESTS:  < from: CT Chest No Cont (03.20.18 @ 09:34) >  INTERPRETATION:  CLINICAL INFORMATION: Shortness of breath, diastolic   congestive heart failure.    PROCEDURE:   CT of the Chest was performed without intravenous contrast.   Intravenous contrast: None.    Reconstructions were performed in axial thin, axial maximum intensity   projection, sagittal and coronal planes.    COMPARISON: Chest x-ray 2/19/2018. CT chest 5/25/2017.    FINDINGS:    LUNGS: Patent central airways. Scattered bilateral areas of linear or   subsegmental atelectasis. No focal consolidation.  PLEURA: No pleural effusion.  VESSELS: Atherosclerotic disease of the aorta and coronary arteries.  HEART: Mild left-sided cardiomegaly. No pericardialeffusion. Aortic   valvular calcifications.  MEDIASTINUM AND ELO: Small mediastinal lymph nodes with the largest in   the azygoesophageal recess measuring about 1.2 cm are unchanged.  CHEST WALL AND LOWER NECK: Enlarged heterogeneous thyroid gland. There is   associated adjacent focal luminal narrowing of the upper trachea   measuring about 5 mm in transverse and 8 mm in anteroposterior dimension   predominantly unchanged since March 12, 2017. The normal sized trachea   distal to this area of narrowing measures about 1.4 cm in transverse x   1.4 cm in anteroposterior dimension.  UPPER ABDOMEN: Tiny hiatal hernia. Gallstones. Trace ascites.  BONES: Degenerative changes of the spine.  Multiple healed bilateral rib   fractures.    IMPRESSION:    No pneumonia.    Focal moderate to severe luminal narrowing of the upper trachea unchanged   in appearance since March 12, 2017 may be related to prior intubation.

## 2018-04-30 ENCOUNTER — APPOINTMENT (OUTPATIENT)
Dept: VASCULAR SURGERY | Facility: CLINIC | Age: 68
End: 2018-04-30
Payer: MEDICARE

## 2018-04-30 PROCEDURE — 93990 DOPPLER FLOW TESTING: CPT

## 2018-04-30 PROCEDURE — 99212 OFFICE O/P EST SF 10 MIN: CPT

## 2018-05-08 ENCOUNTER — OUTPATIENT (OUTPATIENT)
Dept: OUTPATIENT SERVICES | Facility: HOSPITAL | Age: 68
LOS: 1 days | End: 2018-05-08
Payer: MEDICARE

## 2018-05-08 ENCOUNTER — APPOINTMENT (OUTPATIENT)
Dept: ULTRASOUND IMAGING | Facility: IMAGING CENTER | Age: 68
End: 2018-05-08
Payer: MEDICARE

## 2018-05-08 ENCOUNTER — APPOINTMENT (OUTPATIENT)
Dept: MAMMOGRAPHY | Facility: IMAGING CENTER | Age: 68
End: 2018-05-08
Payer: MEDICARE

## 2018-05-08 DIAGNOSIS — Z98.890 OTHER SPECIFIED POSTPROCEDURAL STATES: Chronic | ICD-10-CM

## 2018-05-08 DIAGNOSIS — Z00.8 ENCOUNTER FOR OTHER GENERAL EXAMINATION: ICD-10-CM

## 2018-05-08 DIAGNOSIS — Z93.1 GASTROSTOMY STATUS: Chronic | ICD-10-CM

## 2018-05-08 PROCEDURE — G0279: CPT

## 2018-05-08 PROCEDURE — G0279: CPT | Mod: 26

## 2018-05-08 PROCEDURE — 77066 DX MAMMO INCL CAD BI: CPT | Mod: 26

## 2018-05-08 PROCEDURE — 76642 ULTRASOUND BREAST LIMITED: CPT

## 2018-05-08 PROCEDURE — 76642 ULTRASOUND BREAST LIMITED: CPT | Mod: 26,LT

## 2018-05-08 PROCEDURE — 77066 DX MAMMO INCL CAD BI: CPT

## 2018-05-14 NOTE — PATIENT PROFILE ADULT. - DOES PATIENT HAVE ADVANCE DIRECTIVE
Krishna is now having constipation and not diarrhea, so he was not able to turn in his stool samples yet for his upcoming appointment.  He would like to keep his appointment still at this time for abdominal pain and alternating constipation with diarrhea at this time.   No

## 2018-06-07 ENCOUNTER — APPOINTMENT (OUTPATIENT)
Dept: OPHTHALMOLOGY | Facility: CLINIC | Age: 68
End: 2018-06-07

## 2018-07-16 NOTE — PROVIDER CONTACT NOTE (OTHER) - BACKGROUND
Per [facility] encounter, okay to leave a detailed message.    Informed pt of wound culture results and to continue the antibiotic until finished. Patient was informed this is the correct antibiotic for her infection and to follow up with PCP if problem is not relieved with antibiotic. Left call back number for any questions.   Pt admitted for respiratory distress. PMH DM, HTN, HLD, right TMA, ESRD

## 2018-07-30 ENCOUNTER — APPOINTMENT (OUTPATIENT)
Dept: VASCULAR SURGERY | Facility: CLINIC | Age: 68
End: 2018-07-30
Payer: MEDICARE

## 2018-07-30 PROCEDURE — 93990 DOPPLER FLOW TESTING: CPT

## 2018-07-30 PROCEDURE — 99213 OFFICE O/P EST LOW 20 MIN: CPT

## 2018-08-13 ENCOUNTER — APPOINTMENT (OUTPATIENT)
Dept: ENDOVASCULAR SURGERY | Facility: CLINIC | Age: 68
End: 2018-08-13

## 2018-09-06 ENCOUNTER — APPOINTMENT (OUTPATIENT)
Dept: OPHTHALMOLOGY | Facility: CLINIC | Age: 68
End: 2018-09-06
Payer: MEDICARE

## 2018-09-06 DIAGNOSIS — H26.9 UNSPECIFIED CATARACT: ICD-10-CM

## 2018-09-06 DIAGNOSIS — E11.3299 TYPE 2 DIABETES MELLITUS WITH MILD NONPROLIFERATIVE DIABETIC RETINOPATHY WITHOUT MACULAR EDEMA, UNSPECIFIED EYE: ICD-10-CM

## 2018-09-06 PROCEDURE — 92012 INTRM OPH EXAM EST PATIENT: CPT

## 2018-09-06 PROCEDURE — 92015 DETERMINE REFRACTIVE STATE: CPT

## 2018-10-11 ENCOUNTER — APPOINTMENT (OUTPATIENT)
Dept: OPHTHALMOLOGY | Facility: CLINIC | Age: 68
End: 2018-10-11

## 2018-10-29 ENCOUNTER — APPOINTMENT (OUTPATIENT)
Dept: VASCULAR SURGERY | Facility: CLINIC | Age: 68
End: 2018-10-29
Payer: MEDICARE

## 2018-10-29 VITALS
SYSTOLIC BLOOD PRESSURE: 105 MMHG | HEART RATE: 64 BPM | WEIGHT: 170 LBS | HEIGHT: 60 IN | BODY MASS INDEX: 33.38 KG/M2 | DIASTOLIC BLOOD PRESSURE: 43 MMHG

## 2018-10-29 PROCEDURE — 93990 DOPPLER FLOW TESTING: CPT

## 2018-10-29 PROCEDURE — 99213 OFFICE O/P EST LOW 20 MIN: CPT

## 2018-11-13 ENCOUNTER — APPOINTMENT (OUTPATIENT)
Dept: VASCULAR SURGERY | Facility: CLINIC | Age: 68
End: 2018-11-13

## 2018-11-13 ENCOUNTER — APPOINTMENT (OUTPATIENT)
Dept: OPHTHALMOLOGY | Facility: CLINIC | Age: 68
End: 2018-11-13
Payer: MEDICARE

## 2018-11-13 ENCOUNTER — APPOINTMENT (OUTPATIENT)
Dept: ENDOVASCULAR SURGERY | Facility: CLINIC | Age: 68
End: 2018-11-13

## 2018-11-13 DIAGNOSIS — E11.9 TYPE 2 DIABETES MELLITUS W/OUT COMPLICATIONS: ICD-10-CM

## 2018-11-13 DIAGNOSIS — H04.203 UNSPECIFIED EPIPHORA, BILATERAL: ICD-10-CM

## 2018-11-13 PROCEDURE — 92012 INTRM OPH EXAM EST PATIENT: CPT

## 2018-11-13 NOTE — H&P ADULT. - CONSTITUTIONAL
[facility] Nurse Message Pool Gh/Cc/Em Cc: Elinor Streeter             The pt doesn't have coverage for these injections.     james iniguez at [employer]   Phone # [phone]   Effective date of coverage 1/1/17     Is precert/preauth required for CPT code(s) 7325, no coverage under medical but [facility].  Per jack mckeon At [phone] no coverage for this under pharmacy     Call Reference #: [id number]    Patient aware.  
Patient is calling to check on the status for the gel injection for both of her knees. Patient can be reached at [phone].  
detailed exam

## 2018-11-21 ENCOUNTER — EMERGENCY (EMERGENCY)
Facility: HOSPITAL | Age: 68
LOS: 1 days | Discharge: HOME | End: 2018-11-21
Attending: EMERGENCY MEDICINE | Admitting: INTERNAL MEDICINE
Payer: MEDICARE

## 2018-11-21 VITALS
DIASTOLIC BLOOD PRESSURE: 66 MMHG | TEMPERATURE: 98 F | OXYGEN SATURATION: 96 % | SYSTOLIC BLOOD PRESSURE: 128 MMHG | HEIGHT: 62 IN | RESPIRATION RATE: 18 BRPM | HEART RATE: 67 BPM | WEIGHT: 179.9 LBS

## 2018-11-21 DIAGNOSIS — Z98.890 OTHER SPECIFIED POSTPROCEDURAL STATES: Chronic | ICD-10-CM

## 2018-11-21 DIAGNOSIS — Z93.1 GASTROSTOMY STATUS: Chronic | ICD-10-CM

## 2018-11-21 DIAGNOSIS — E87.5 HYPERKALEMIA: ICD-10-CM

## 2018-11-21 DIAGNOSIS — E11.9 TYPE 2 DIABETES MELLITUS WITHOUT COMPLICATIONS: ICD-10-CM

## 2018-11-21 DIAGNOSIS — I10 ESSENTIAL (PRIMARY) HYPERTENSION: ICD-10-CM

## 2018-11-21 DIAGNOSIS — N18.6 END STAGE RENAL DISEASE: ICD-10-CM

## 2018-11-21 DIAGNOSIS — I50.9 HEART FAILURE, UNSPECIFIED: ICD-10-CM

## 2018-11-21 DIAGNOSIS — E78.5 HYPERLIPIDEMIA, UNSPECIFIED: ICD-10-CM

## 2018-11-21 DIAGNOSIS — Z29.9 ENCOUNTER FOR PROPHYLACTIC MEASURES, UNSPECIFIED: ICD-10-CM

## 2018-11-21 LAB
ALBUMIN SERPL ELPH-MCNC: 3.8 G/DL — SIGNIFICANT CHANGE UP (ref 3.3–5)
ALP SERPL-CCNC: 112 U/L — SIGNIFICANT CHANGE UP (ref 40–120)
ALT FLD-CCNC: 10 U/L — SIGNIFICANT CHANGE UP (ref 10–45)
ANION GAP SERPL CALC-SCNC: 23 MMOL/L — HIGH (ref 5–17)
AST SERPL-CCNC: <5 U/L — LOW (ref 10–40)
BASOPHILS # BLD AUTO: 0 K/UL — SIGNIFICANT CHANGE UP (ref 0–0.2)
BASOPHILS NFR BLD AUTO: 0.4 % — SIGNIFICANT CHANGE UP (ref 0–2)
BILIRUB SERPL-MCNC: 0.2 MG/DL — SIGNIFICANT CHANGE UP (ref 0.2–1.2)
BUN SERPL-MCNC: 104 MG/DL — HIGH (ref 7–23)
CALCIUM SERPL-MCNC: 7.8 MG/DL — LOW (ref 8.4–10.5)
CHLORIDE SERPL-SCNC: 92 MMOL/L — LOW (ref 96–108)
CO2 SERPL-SCNC: 21 MMOL/L — LOW (ref 22–31)
CREAT SERPL-MCNC: 8.7 MG/DL — HIGH (ref 0.5–1.3)
EOSINOPHIL # BLD AUTO: 0.2 K/UL — SIGNIFICANT CHANGE UP (ref 0–0.5)
EOSINOPHIL NFR BLD AUTO: 1.6 % — SIGNIFICANT CHANGE UP (ref 0–6)
GLUCOSE SERPL-MCNC: 170 MG/DL — HIGH (ref 70–99)
HCT VFR BLD CALC: 28.5 % — LOW (ref 34.5–45)
HGB BLD-MCNC: 9.8 G/DL — LOW (ref 11.5–15.5)
LYMPHOCYTES # BLD AUTO: 1.2 K/UL — SIGNIFICANT CHANGE UP (ref 1–3.3)
LYMPHOCYTES # BLD AUTO: 10.7 % — LOW (ref 13–44)
MCHC RBC-ENTMCNC: 32.4 PG — SIGNIFICANT CHANGE UP (ref 27–34)
MCHC RBC-ENTMCNC: 34.3 GM/DL — SIGNIFICANT CHANGE UP (ref 32–36)
MCV RBC AUTO: 94.3 FL — SIGNIFICANT CHANGE UP (ref 80–100)
MONOCYTES # BLD AUTO: 0.5 K/UL — SIGNIFICANT CHANGE UP (ref 0–0.9)
MONOCYTES NFR BLD AUTO: 4.8 % — SIGNIFICANT CHANGE UP (ref 2–14)
NEUTROPHILS # BLD AUTO: 9.5 K/UL — HIGH (ref 1.8–7.4)
NEUTROPHILS NFR BLD AUTO: 82.5 % — HIGH (ref 43–77)
PLATELET # BLD AUTO: 349 K/UL — SIGNIFICANT CHANGE UP (ref 150–400)
POTASSIUM SERPL-MCNC: 6.4 MMOL/L — CRITICAL HIGH (ref 3.5–5.3)
POTASSIUM SERPL-SCNC: 6.4 MMOL/L — CRITICAL HIGH (ref 3.5–5.3)
PROT SERPL-MCNC: 7.4 G/DL — SIGNIFICANT CHANGE UP (ref 6–8.3)
RBC # BLD: 3.02 M/UL — LOW (ref 3.8–5.2)
RBC # FLD: 14.4 % — SIGNIFICANT CHANGE UP (ref 10.3–14.5)
SODIUM SERPL-SCNC: 136 MMOL/L — SIGNIFICANT CHANGE UP (ref 135–145)
WBC # BLD: 11.5 K/UL — HIGH (ref 3.8–10.5)
WBC # FLD AUTO: 11.5 K/UL — HIGH (ref 3.8–10.5)

## 2018-11-21 RX ORDER — FAMOTIDINE 10 MG/ML
20 INJECTION INTRAVENOUS DAILY
Qty: 0 | Refills: 0 | Status: DISCONTINUED | OUTPATIENT
Start: 2018-11-21 | End: 2018-11-22

## 2018-11-21 RX ORDER — DEXTROSE 50 % IN WATER 50 %
15 SYRINGE (ML) INTRAVENOUS ONCE
Qty: 0 | Refills: 0 | Status: DISCONTINUED | OUTPATIENT
Start: 2018-11-21 | End: 2018-11-22

## 2018-11-21 RX ORDER — SODIUM CHLORIDE 9 MG/ML
1000 INJECTION, SOLUTION INTRAVENOUS
Qty: 0 | Refills: 0 | Status: DISCONTINUED | OUTPATIENT
Start: 2018-11-21 | End: 2018-11-22

## 2018-11-21 RX ORDER — DEXTROSE 50 % IN WATER 50 %
25 SYRINGE (ML) INTRAVENOUS ONCE
Qty: 0 | Refills: 0 | Status: DISCONTINUED | OUTPATIENT
Start: 2018-11-21 | End: 2018-11-22

## 2018-11-21 RX ORDER — INSULIN LISPRO 100/ML
VIAL (ML) SUBCUTANEOUS
Qty: 0 | Refills: 0 | Status: DISCONTINUED | OUTPATIENT
Start: 2018-11-21 | End: 2018-11-22

## 2018-11-21 RX ORDER — APIXABAN 2.5 MG/1
2.5 TABLET, FILM COATED ORAL ONCE
Qty: 0 | Refills: 0 | Status: COMPLETED | OUTPATIENT
Start: 2018-11-21 | End: 2018-11-21

## 2018-11-21 RX ORDER — METOCLOPRAMIDE HCL 10 MG
1 TABLET ORAL
Qty: 0 | Refills: 0 | COMMUNITY

## 2018-11-21 RX ORDER — SEVELAMER CARBONATE 2400 MG/1
1600 POWDER, FOR SUSPENSION ORAL EVERY 8 HOURS
Qty: 0 | Refills: 0 | Status: DISCONTINUED | OUTPATIENT
Start: 2018-11-21 | End: 2018-11-22

## 2018-11-21 RX ORDER — DEXTROSE 50 % IN WATER 50 %
50 SYRINGE (ML) INTRAVENOUS ONCE
Qty: 0 | Refills: 0 | Status: COMPLETED | OUTPATIENT
Start: 2018-11-21 | End: 2018-11-21

## 2018-11-21 RX ORDER — DEXTROSE 50 % IN WATER 50 %
12.5 SYRINGE (ML) INTRAVENOUS ONCE
Qty: 0 | Refills: 0 | Status: DISCONTINUED | OUTPATIENT
Start: 2018-11-21 | End: 2018-11-22

## 2018-11-21 RX ORDER — ATORVASTATIN CALCIUM 80 MG/1
1 TABLET, FILM COATED ORAL
Qty: 0 | Refills: 0 | COMMUNITY

## 2018-11-21 RX ORDER — SEVELAMER CARBONATE 2400 MG/1
1 POWDER, FOR SUSPENSION ORAL
Qty: 0 | Refills: 0 | COMMUNITY

## 2018-11-21 RX ORDER — INSULIN LISPRO 100/ML
VIAL (ML) SUBCUTANEOUS AT BEDTIME
Qty: 0 | Refills: 0 | Status: DISCONTINUED | OUTPATIENT
Start: 2018-11-21 | End: 2018-11-22

## 2018-11-21 RX ORDER — IPRATROPIUM/ALBUTEROL SULFATE 18-103MCG
3 AEROSOL WITH ADAPTER (GRAM) INHALATION
Qty: 0 | Refills: 0 | COMMUNITY

## 2018-11-21 RX ORDER — INSULIN HUMAN 100 [IU]/ML
10 INJECTION, SOLUTION SUBCUTANEOUS ONCE
Qty: 0 | Refills: 0 | Status: COMPLETED | OUTPATIENT
Start: 2018-11-21 | End: 2018-11-21

## 2018-11-21 RX ORDER — ATORVASTATIN CALCIUM 80 MG/1
20 TABLET, FILM COATED ORAL AT BEDTIME
Qty: 0 | Refills: 0 | Status: DISCONTINUED | OUTPATIENT
Start: 2018-11-21 | End: 2018-11-22

## 2018-11-21 RX ORDER — GABAPENTIN 400 MG/1
300 CAPSULE ORAL THREE TIMES A DAY
Qty: 0 | Refills: 0 | Status: DISCONTINUED | OUTPATIENT
Start: 2018-11-21 | End: 2018-11-22

## 2018-11-21 RX ORDER — MIDODRINE HYDROCHLORIDE 2.5 MG/1
2.5 TABLET ORAL
Qty: 0 | Refills: 0 | Status: DISCONTINUED | OUTPATIENT
Start: 2018-11-21 | End: 2018-11-22

## 2018-11-21 RX ORDER — ASPIRIN/CALCIUM CARB/MAGNESIUM 324 MG
81 TABLET ORAL DAILY
Qty: 0 | Refills: 0 | Status: DISCONTINUED | OUTPATIENT
Start: 2018-11-21 | End: 2018-11-22

## 2018-11-21 RX ORDER — GLUCAGON INJECTION, SOLUTION 0.5 MG/.1ML
1 INJECTION, SOLUTION SUBCUTANEOUS ONCE
Qty: 0 | Refills: 0 | Status: DISCONTINUED | OUTPATIENT
Start: 2018-11-21 | End: 2018-11-22

## 2018-11-21 RX ORDER — CALCIUM GLUCONATE 100 MG/ML
3 VIAL (ML) INTRAVENOUS ONCE
Qty: 0 | Refills: 0 | Status: DISCONTINUED | OUTPATIENT
Start: 2018-11-21 | End: 2018-11-21

## 2018-11-21 RX ORDER — APIXABAN 2.5 MG/1
2.5 TABLET, FILM COATED ORAL EVERY 12 HOURS
Qty: 0 | Refills: 0 | Status: DISCONTINUED | OUTPATIENT
Start: 2018-11-22 | End: 2018-11-22

## 2018-11-21 RX ORDER — POLYETHYLENE GLYCOL 3350 17 G/17G
17 POWDER, FOR SOLUTION ORAL DAILY
Qty: 0 | Refills: 0 | Status: DISCONTINUED | OUTPATIENT
Start: 2018-11-21 | End: 2018-11-22

## 2018-11-21 RX ORDER — ALBUTEROL 90 UG/1
7.5 AEROSOL, METERED ORAL ONCE
Qty: 0 | Refills: 0 | Status: COMPLETED | OUTPATIENT
Start: 2018-11-21 | End: 2018-11-21

## 2018-11-21 RX ADMIN — Medication 50 MILLILITER(S): at 18:49

## 2018-11-21 RX ADMIN — INSULIN HUMAN 10 UNIT(S): 100 INJECTION, SOLUTION SUBCUTANEOUS at 18:50

## 2018-11-21 RX ADMIN — MIDODRINE HYDROCHLORIDE 2.5 MILLIGRAM(S): 2.5 TABLET ORAL at 21:57

## 2018-11-21 RX ADMIN — ALBUTEROL 7.5 MILLIGRAM(S): 90 AEROSOL, METERED ORAL at 18:57

## 2018-11-21 NOTE — ED PROVIDER NOTE - MUSCULOSKELETAL, MLM
Spine appears normal, range of motion is not limited, no muscle or joint tenderness, (-) pitting edema.

## 2018-11-21 NOTE — ED PROVIDER NOTE - PROGRESS NOTE DETAILS
Magno Fellow: Discussed case with HD facility, patient missed yesterday HD due to transport issue. Was scheduled for today, but had another transport issue and missed her session. Nurse confirms HD at 1030 AM on Friday. Magno fellow: Discussed with Dr. Schmid about admission, requests admission under himself. Paged placed to Dr. Gold nephrology. K 6.7, treated with albuterol, insulin/glucose, calcium. Magno Fellow: Discussed with Dr. Gold nephrology, will set up for dialysis. Patient is hemodynamically stable, mentating well, and ready for admission.

## 2018-11-21 NOTE — ED PROVIDER NOTE - MEDICAL DECISION MAKING DETAILS
68F ESRD on HD MWF, pw need for "dialysis". Will contact nephrology and confirm planned HD for Friday. Otherwise she is asymptomatic. 68F ESRD on HD MWF, pw need for "dialysis". Will contact nephrology and confirm planned HD for Friday. Otherwise she is asymptomatic.  will check electrolytes.

## 2018-11-21 NOTE — H&P ADULT - NSHPREVIEWOFSYSTEMS_GEN_ALL_CORE
CONSTITUTIONAL: No fevers or chills  EYES/ENT: No visual changes  NECK: No pain or stiffness  RESPIRATORY: No cough, No shortness of breath  CARDIOVASCULAR: No chest pain or palpitations;   EXTREMITIES: + le edema, s/p right toe amputation  MUSCULOSKELETAL: no joint pain, swelling  GASTROINTESTINAL: No abdominal or epigastric pain. No nausea, vomiting, or hematemesis; No diarrhea or constipation. No melena or hematochezia.  BACK: No back pain  GENITOURINARY: No dysuria, frequency or hematuria  NEUROLOGICAL: No numbness or weakness  SKIN: No itching, burning, rashes, or lesions   PSYCH: no agitation  All other review of systems is negative unless indicated above.

## 2018-11-21 NOTE — H&P ADULT - ASSESSMENT
69 yo female with PMH of ESRD on HD MWF, DM, cardiac arrest x 2, s/p trache, s/p reversal, HTN, HLD, CHF, here from rehab for missed dialysis.

## 2018-11-21 NOTE — ED ADULT NURSE NOTE - OBJECTIVE STATEMENT
Patient accompanied by son to ER. Patient denies any pain or discomfort. Right upper arm AV fistula in place +bruit and +thrill. As per son, patient resides in rehab currently and receives dialysis at an outside facility on Mon, Wed and Fri. Patient last received HD last Sunday as per son, and is not scheduled to go to HD again until this Friday. Patient's son states her HD was delayed due to the upcoming holiday tomorrow limiting the amount of patients the dialysis center can treat. Patient's son states he decided to take her here to the ER because he feels she should have dialysis sooner than Friday. Patient denies any shortness of breath, any chest pain, nausea, fever, vomiting or diarrhea. Patient has swelling to BLE but as per patient and son, the swelling is chronic and does not appear any worse than usual. Breathing is unlabored and even, no cough noted. Patient's son was supposed to return patient to her rehab by 1600 today, but transportation will be provided by the hospital back to her rehab if she is d/ce'd to go home as per Micaela from social work.

## 2018-11-21 NOTE — H&P ADULT - NSHPPHYSICALEXAM_GEN_ALL_CORE
PHYSICAL EXAM:  Vital Signs Last 24 Hrs  T(C): 37 (11-21-18 @ 20:15)  T(F): 98.6 (11-21-18 @ 20:15), Max: 98.6 (11-21-18 @ 20:15)  HR: 80 (11-21-18 @ 20:15) (66 - 80)  BP: 118/57 (11-21-18 @ 20:15)  BP(mean): --  RR: 18 (11-21-18 @ 20:15) (16 - 18)  SpO2: 94% (11-21-18 @ 20:15) (94% - 96%)  Wt(kg): --    Constitutional: NAD, awake and alert  EYES: EOMI  ENT:  Normal Hearing, no tonsillar exudates   Neck: Soft and supple, No JVD  Lungs: Breath sounds are clear bilaterally, No wheezing, rales or rhonchi  Heart: S1 and S2, regular rate and rhythm, no Murmurs, gallops or rubs  Abdomen: Bowel Sounds present, soft, nontender, nondistended, no guarding, no rebound  Extremities: right toe amputation  Vascular: 2+ peripheral pulses lower ex  Neurological: A/O x 3, no focal deficits  Musculoskeletal: 5/5 strength b/l upper and lower extremities  Skin: No rashes  Psych: no depression or anhedonia  HEME: no bruises, no nose bleeds PHYSICAL EXAM:  Vital Signs Last 24 Hrs  T(C): 37 (11-21-18 @ 20:15)  T(F): 98.6 (11-21-18 @ 20:15), Max: 98.6 (11-21-18 @ 20:15)  HR: 80 (11-21-18 @ 20:15) (66 - 80)  BP: 118/57 (11-21-18 @ 20:15)  BP(mean): --  RR: 18 (11-21-18 @ 20:15) (16 - 18)  SpO2: 94% (11-21-18 @ 20:15) (94% - 96%)  Wt(kg): --    Constitutional: NAD, awake and alert  EYES: EOMI  ENT:  Normal Hearing, no tonsillar exudates   Neck: Soft and supple, No JVD  Lungs: Breath sounds are clear bilaterally, No wheezing, rales or rhonchi  Heart: S1 and S2, regular rate and rhythm, no Murmurs, gallops or rubs  Abdomen: Bowel Sounds present, soft, nontender, nondistended, no guarding, no rebound  Extremities: right toe amputation  Vascular: 2+ peripheral pulses lower ex  Neurological: A/O x 3, no focal deficits  Musculoskeletal: 5/5 strength b/l upper and lower extremities; +3 edema b/l   Skin: No rashes  Psych: no depression or anhedonia  HEME: no bruises, no nose bleeds

## 2018-11-21 NOTE — ED PROVIDER NOTE - OBJECTIVE STATEMENT
68F from rehabilitation facility with PMHx of ESRD on HD (MWF), DM presenting for dialysis today. Historian is the son. Last dialysis was Sunday. Due to holiday schedule, she was rescheduled for dialysis on Friday. Son brought the patient to the ED for HD because he feels it is "dangerous" to wait until Friday. She denies any chest pain, difficulty breathing, generalized edema, weakness, abdominal pain.    Nephrology: Dr. Pat Gold 68F from rehabilitation facility with PMHx of ESRD on HD (MWF), DM presenting for dialysis today. Historian is the son. Last dialysis was . Due to holiday schedule, she was rescheduled for dialysis on Friday. Son brought the patient to the ED for HD because he feels it is "dangerous" to wait until Friday. She denies any chest pain, difficulty breathing, generalized edema, weakness, abdominal pain.    Nephrology: Dr. Pat Gold    Attendinyo female presents with her son for scheduled dialysis.  Pt states she did not have her Wed dialysis today because of schedule changes for Thanksgiving holiday.  Pt is concerned that this is unsafe and wants her dialysis.  no medical complaints.  pt feels well and has no shortness of breath or weakness.

## 2018-11-21 NOTE — H&P ADULT - HISTORY OF PRESENT ILLNESS
67 yo female with PMH of ESRD on HD MWF, DM, 67 yo female with PMH of ESRD on HD MWF, DM, cardiac arrest x 2, s/p trache, s/p reversal, HTN, HLD, CHF, here from rehab for missed dialysis.  History obtained from son, Leonides.  Per son, patient had her last dialysis on Sunday. She is usually scheduled for dialysis on MWF, but due to holidays, here dialysis sessions were changed.  Today nobody came to pick her for dialysis so son brought patient to hospital.  Patient otherwise denies nausea, vomiting, fever, chills, cough, chest pain, palpitation, SOB.  On arrival to ED, patient was found to have hyperkalemia to 6.4.  She received calcium gluc, albuterol, d50, insulin 10u.  Her vitals show temp 97.6, HR 67, /66, saturation 96%, RR 18.  Patient now in dialysis. 67 yo female with PMH of ESRD on HD MWF, DM, cardiac arrest x 2, s/p trache, s/p reversal, HTN, HLD, CHF, here from rehab for missed dialysis.  History obtained from patient, her son, Leonides and NH staff. Per son, patient had her last dialysis on Sunday. She is usually scheduled for dialysis on MWF, but due to holidays, here dialysis sessions were changed.  Today nobody came to pick her for dialysis so son brought patient to hospital.  Patient otherwise denies nausea, vomiting, fever, chills, cough, chest pain, palpitation, SOB.  On arrival to ED, patient was found to have hyperkalemia to 6.4.  She received calcium gluc, albuterol, d50, insulin 10u.  Her vitals show temp 97.6, HR 67, /66, saturation 96%, RR 18.  Patient now in dialysis.

## 2018-11-21 NOTE — ED PROVIDER NOTE - CONSTITUTIONAL, MLM
normal... Well appearing, well nourished, awake, alert, oriented to person, place, time/situation and in no apparent distress. speaking full sentences

## 2018-11-21 NOTE — H&P ADULT - PROBLEM SELECTOR PLAN 1
Patient found to have hyperkalemia   s/p calcium gluc, albuterol, dextrose 50, insulin 10u  no peak t waves on EKG  now getting dialysis  continue to monitor closely

## 2018-11-21 NOTE — ED PROVIDER NOTE - CARE PLAN
Principal Discharge DX:	Medical care complication, initial encounter Principal Discharge DX:	Hyperkalemia  Secondary Diagnosis:	Dialysis complication, sequela

## 2018-11-22 ENCOUNTER — TRANSCRIPTION ENCOUNTER (OUTPATIENT)
Age: 68
End: 2018-11-22

## 2018-11-22 VITALS — WEIGHT: 186.51 LBS

## 2018-11-22 LAB
ALBUMIN SERPL ELPH-MCNC: 3.8 G/DL — SIGNIFICANT CHANGE UP (ref 3.3–5)
ALP SERPL-CCNC: 108 U/L — SIGNIFICANT CHANGE UP (ref 40–120)
ALT FLD-CCNC: 9 U/L — LOW (ref 10–45)
ANION GAP SERPL CALC-SCNC: 18 MMOL/L — HIGH (ref 5–17)
AST SERPL-CCNC: 5 U/L — LOW (ref 10–40)
BASOPHILS # BLD AUTO: 0.02 K/UL — SIGNIFICANT CHANGE UP (ref 0–0.2)
BASOPHILS NFR BLD AUTO: 0.2 % — SIGNIFICANT CHANGE UP (ref 0–2)
BILIRUB SERPL-MCNC: 0.3 MG/DL — SIGNIFICANT CHANGE UP (ref 0.2–1.2)
BUN SERPL-MCNC: 55 MG/DL — HIGH (ref 7–23)
CALCIUM SERPL-MCNC: 8.3 MG/DL — LOW (ref 8.4–10.5)
CHLORIDE SERPL-SCNC: 91 MMOL/L — LOW (ref 96–108)
CO2 SERPL-SCNC: 26 MMOL/L — SIGNIFICANT CHANGE UP (ref 22–31)
CREAT SERPL-MCNC: 5.23 MG/DL — HIGH (ref 0.5–1.3)
EOSINOPHIL # BLD AUTO: 0.13 K/UL — SIGNIFICANT CHANGE UP (ref 0–0.5)
EOSINOPHIL NFR BLD AUTO: 1 % — SIGNIFICANT CHANGE UP (ref 0–6)
GLUCOSE SERPL-MCNC: 144 MG/DL — HIGH (ref 70–99)
HBA1C BLD-MCNC: 5.9 % — HIGH (ref 4–5.6)
HCT VFR BLD CALC: 31 % — LOW (ref 34.5–45)
HGB BLD-MCNC: 9.8 G/DL — LOW (ref 11.5–15.5)
IMM GRANULOCYTES NFR BLD AUTO: 0.4 % — SIGNIFICANT CHANGE UP (ref 0–1.5)
LYMPHOCYTES # BLD AUTO: 1.43 K/UL — SIGNIFICANT CHANGE UP (ref 1–3.3)
LYMPHOCYTES # BLD AUTO: 11.3 % — LOW (ref 13–44)
MAGNESIUM SERPL-MCNC: 2.2 MG/DL — SIGNIFICANT CHANGE UP (ref 1.6–2.6)
MCHC RBC-ENTMCNC: 30.8 PG — SIGNIFICANT CHANGE UP (ref 27–34)
MCHC RBC-ENTMCNC: 31.6 GM/DL — LOW (ref 32–36)
MCV RBC AUTO: 97.5 FL — SIGNIFICANT CHANGE UP (ref 80–100)
MONOCYTES # BLD AUTO: 0.62 K/UL — SIGNIFICANT CHANGE UP (ref 0–0.9)
MONOCYTES NFR BLD AUTO: 4.9 % — SIGNIFICANT CHANGE UP (ref 2–14)
NEUTROPHILS # BLD AUTO: 10.42 K/UL — HIGH (ref 1.8–7.4)
NEUTROPHILS NFR BLD AUTO: 82.2 % — HIGH (ref 43–77)
PHOSPHATE SERPL-MCNC: 4.7 MG/DL — HIGH (ref 2.5–4.5)
PLATELET # BLD AUTO: 315 K/UL — SIGNIFICANT CHANGE UP (ref 150–400)
POTASSIUM SERPL-MCNC: 4.8 MMOL/L — SIGNIFICANT CHANGE UP (ref 3.5–5.3)
POTASSIUM SERPL-SCNC: 4.8 MMOL/L — SIGNIFICANT CHANGE UP (ref 3.5–5.3)
PROT SERPL-MCNC: 7.3 G/DL — SIGNIFICANT CHANGE UP (ref 6–8.3)
RBC # BLD: 3.18 M/UL — LOW (ref 3.8–5.2)
RBC # FLD: 15 % — HIGH (ref 10.3–14.5)
SODIUM SERPL-SCNC: 135 MMOL/L — SIGNIFICANT CHANGE UP (ref 135–145)
WBC # BLD: 12.67 K/UL — HIGH (ref 3.8–10.5)
WBC # FLD AUTO: 12.67 K/UL — HIGH (ref 3.8–10.5)

## 2018-11-22 PROCEDURE — 82962 GLUCOSE BLOOD TEST: CPT

## 2018-11-22 PROCEDURE — 83036 HEMOGLOBIN GLYCOSYLATED A1C: CPT

## 2018-11-22 PROCEDURE — 80053 COMPREHEN METABOLIC PANEL: CPT

## 2018-11-22 PROCEDURE — 85027 COMPLETE CBC AUTOMATED: CPT

## 2018-11-22 PROCEDURE — 71045 X-RAY EXAM CHEST 1 VIEW: CPT

## 2018-11-22 PROCEDURE — 93005 ELECTROCARDIOGRAM TRACING: CPT

## 2018-11-22 PROCEDURE — 94640 AIRWAY INHALATION TREATMENT: CPT

## 2018-11-22 PROCEDURE — 84100 ASSAY OF PHOSPHORUS: CPT

## 2018-11-22 PROCEDURE — 99261: CPT

## 2018-11-22 PROCEDURE — 83735 ASSAY OF MAGNESIUM: CPT

## 2018-11-22 PROCEDURE — 99285 EMERGENCY DEPT VISIT HI MDM: CPT

## 2018-11-22 RX ORDER — INFLUENZA VIRUS VACCINE 15; 15; 15; 15 UG/.5ML; UG/.5ML; UG/.5ML; UG/.5ML
0.5 SUSPENSION INTRAMUSCULAR ONCE
Qty: 0 | Refills: 0 | Status: DISCONTINUED | OUTPATIENT
Start: 2018-11-22 | End: 2018-11-22

## 2018-11-22 RX ADMIN — APIXABAN 2.5 MILLIGRAM(S): 2.5 TABLET, FILM COATED ORAL at 00:38

## 2018-11-22 RX ADMIN — SEVELAMER CARBONATE 1600 MILLIGRAM(S): 2400 POWDER, FOR SUSPENSION ORAL at 07:50

## 2018-11-22 RX ADMIN — ATORVASTATIN CALCIUM 20 MILLIGRAM(S): 80 TABLET, FILM COATED ORAL at 00:38

## 2018-11-22 RX ADMIN — APIXABAN 2.5 MILLIGRAM(S): 2.5 TABLET, FILM COATED ORAL at 07:49

## 2018-11-22 RX ADMIN — FAMOTIDINE 20 MILLIGRAM(S): 10 INJECTION INTRAVENOUS at 07:50

## 2018-11-22 RX ADMIN — GABAPENTIN 300 MILLIGRAM(S): 400 CAPSULE ORAL at 00:39

## 2018-11-22 RX ADMIN — GABAPENTIN 300 MILLIGRAM(S): 400 CAPSULE ORAL at 05:23

## 2018-11-22 RX ADMIN — SEVELAMER CARBONATE 1600 MILLIGRAM(S): 2400 POWDER, FOR SUSPENSION ORAL at 13:00

## 2018-11-22 RX ADMIN — MIDODRINE HYDROCHLORIDE 2.5 MILLIGRAM(S): 2.5 TABLET ORAL at 07:50

## 2018-11-22 RX ADMIN — GABAPENTIN 300 MILLIGRAM(S): 400 CAPSULE ORAL at 13:00

## 2018-11-22 RX ADMIN — Medication 81 MILLIGRAM(S): at 07:50

## 2018-11-22 NOTE — DISCHARGE NOTE ADULT - CARE PLAN
Principal Discharge DX:	Hyperkalemia  Goal:	Resolved  Assessment and plan of treatment:	emergency dialysis  Secondary Diagnosis:	ESRD (end stage renal disease)  Assessment and plan of treatment:	c/w dialysis as ordered M/W/F  last dialysis was yesterday.  Secondary Diagnosis:	Hypertension  Assessment and plan of treatment:	was found to be hypotensive started on midodrine  Secondary Diagnosis:	Diabetes  Assessment and plan of treatment:	Your next appointment with endocrinologist ([facility] endocrine ph: [phone])/PMD is -   HgA1C this admission -  Make sure you get your HgA1c checked every three months.  If you take oral diabetes medications, check your blood glucose two times a day.  If you take insulin, check your blood glucose before meals and at bedtime.  It's important not to skip any meals.  Keep a log of your blood glucose results and always take it with you to your doctor appointments.  Keep a list of your current medications including injectables and over the counter medications and bring this medication list with you to all your doctor appointments.  If you have not seen your ophthalmologist this year call for appointment.  Check your feet daily for redness, sores, or openings. Do not self treat. If no improvement in two days call your primary care physician for an appointment.  Low blood sugar (hypoglycemia) is a blood sugar below 70mg/dl. Check your blood sugar if you feel signs/symptoms of hypoglycemia. If your blood sugar is below 70 take 15 grams of carbohydrates (ex 4 oz of apple juice, 3-4 glucose tablets, or 4-6 oz of regular soda) wait 15 minutes and repeat blood sugar to make sure it comes up above 70.  If your blood sugar is above 70 and you are due for a meal, have a meal.  If you are not due for a meal have a snack.  This snack helps keeps your blood sugar at a safe range.

## 2018-11-22 NOTE — PATIENT PROFILE ADULT - NSPROHMCARDIOMGMTSTRAT_GEN_A_NUR
fluid modification/diet modification/exercise/adequate rest/coping strategies/weight management/activity

## 2018-11-22 NOTE — PROGRESS NOTE ADULT - ASSESSMENT
67 yo female with PMH of ESRD on HD MWF, DM, cardiac arrest x 2, s/p trache, s/p reversal, HTN, HLD, CHF, here from rehab for missed dialysis. now s/p emegent HD for hyperkalemia, feeling fine and potassium normalized.

## 2018-11-22 NOTE — PROGRESS NOTE ADULT - ASSESSMENT
The patient is a 68-year-old female with past medical history of hypertension, diabetes, end-stage renal disease who presents after missing dialysis.  Patient with hyperkalemia and evidence of mild congestive heart failure.      1.  Renal:  Next HD as outpt  2.  Anemia:  Epogen at dialysis.    3.  GI:  Low potassium diet.     Care coordination for dc planning

## 2018-11-22 NOTE — DISCHARGE NOTE ADULT - CARE PROVIDER_API CALL
Bryan Schmid), [address]  [address] [place] [id number]  Phone: [phone]  Fax: [phone]

## 2018-11-22 NOTE — PROGRESS NOTE ADULT - SUBJECTIVE AND OBJECTIVE BOX
NEPHROLOGY-[facility] [phone]        Patient seen and examined in bed.  She felt the same  "I want to go home"        MEDICATIONS  (STANDING):  apixaban 2.5 milliGRAM(s) Oral every 12 hours  aspirin  chewable 81 milliGRAM(s) Oral daily  atorvastatin 20 milliGRAM(s) Oral at bedtime  dextrose 5%. 1000 milliLiter(s) (50 mL/Hr) IV Continuous <Continuous>  dextrose 50% Injectable 12.5 Gram(s) IV Push once  dextrose 50% Injectable 25 Gram(s) IV Push once  dextrose 50% Injectable 25 Gram(s) IV Push once  famotidine    Tablet 20 milliGRAM(s) Oral daily  gabapentin 300 milliGRAM(s) Oral three times a day  influenza   Vaccine 0.5 milliLiter(s) IntraMuscular once  insulin lispro (HumaLOG) corrective regimen sliding scale   SubCutaneous three times a day before meals  insulin lispro (HumaLOG) corrective regimen sliding scale   SubCutaneous at bedtime  midodrine 2.5 milliGRAM(s) Oral <User Schedule>  polyethylene glycol 3350 17 Gram(s) Oral daily  sevelamer hydrochloride 1600 milliGRAM(s) Oral every 8 hours      VITAL:  T(C): , Max: 37 (11-21-18 @ 20:15)  T(F): , Max: 98.6 (11-21-18 @ 20:15)  HR: 66 (11-22-18 @ 04:42)  BP: 117/57 (11-22-18 @ 04:42)  BP(mean): --  RR: 18 (11-22-18 @ 04:42)  SpO2: 95% (11-22-18 @ 04:42)  Wt(kg): --    I and O's:    11-21 @ 07:01  -  11-22 @ 07:00  --------------------------------------------------------  IN: 800 mL / OUT: 2300 mL / NET: -1500 mL    11-22 @ 07:01  -  11-22 @ 10:21  --------------------------------------------------------  IN: 240 mL / OUT: 0 mL / NET: 240 mL      Height (cm): 157.48 (11-21 @ 14:35)  Weight (kg): 85.2 (11-21 @ 23:50)  BMI (kg/m2): 34.4 (11-21 @ 23:50)  BSA (m2): 1.86 (11-21 @ 23:50)    PHYSICAL EXAM:    Constitutional: NAD  Neck:  No JVD  Respiratory: CTAB/L  Cardiovascular: S1 and S2  Gastrointestinal: BS+, soft, NT/ND  Extremities: No peripheral edema  Neurological: A/O x 3, no focal deficits  Psychiatric: Normal mood, normal affect  : No Raphael  Skin: No rashes  Access: av graft    LABS:                        9.8    12.67 )-----------( 315      ( 22 Nov 2018 04:50 )             31.0     11-22    135  |  91<L>  |  55<H>  ----------------------------<  144<H>  4.8   |  26  |  5.23<H>    Ca    8.3<L>      22 Nov 2018 03:37  Phos  4.7     11-22  Mg     2.2     11-22    TPro  7.3  /  Alb  3.8  /  TBili  0.3  /  DBili  x   /  AST  5<L>  /  ALT  9<L>  /  AlkPhos  108  11-22          Urine Studies:          RADIOLOGY & ADDITIONAL STUDIES:

## 2018-11-22 NOTE — DISCHARGE NOTE ADULT - PATIENT PORTAL LINK FT
You can access the [facility] Patient Portal, offered by [facility], by registering with the following website: [email or web address]

## 2018-11-22 NOTE — DISCHARGE NOTE ADULT - MEDICATION SUMMARY - MEDICATIONS TO STOP TAKING
I will STOP taking the medications listed below when I get home from the hospital:    DuoNeb 0.5 mg-2.5 mg/3 mL inhalation solution  -- 3 milliliter(s) inhaled 2 times a day, As Needed    metoclopramide 10 mg oral tablet  -- 1 tab(s) by mouth every 8 hours

## 2018-11-22 NOTE — DISCHARGE NOTE ADULT - HOSPITAL COURSE
The patient is a 68-year-old female with past medical history of hypertension, diabetes, end-stage renal disease who presents after missing dialysis.  Patient with hyperkalemia and evidence of mild congestive heart failure.      1.  Renal:  Next HD as outpt  2.  Anemia:  Epogen at dialysis.    3.  GI:  Low potassium diet.    s/p emegent HD for hyperkalemia, feeling fine and potassium normalized. The patient is a 68-year-old female with past medical history of hypertension, diabetes, end-stage renal disease who presents after missing dialysis.  Patient with hyperkalemia and had urgent HD     1.  Renal:  Next HD as outpt  2.  Anemia:  Epogen at dialysis.    3.  GI:  Low potassium diet.    s/p emegent HD for hyperkalemia, feeling fine and potassium normalized.   stable for discharge.

## 2018-11-22 NOTE — DISCHARGE NOTE ADULT - MEDICATION SUMMARY - MEDICATIONS TO TAKE
I will START or STAY ON the medications listed below when I get home from the hospital:    aspirin 81 mg oral tablet, chewable  -- 1 tab(s) by mouth once a day  -- Indication: For antiplatelet    Tylenol 500 mg oral tablet  -- 2 tab(s) by mouth 2 times a day  -- Indication: For Pain    Eliquis 2.5 mg oral tablet  -- 1 tab(s) by mouth 2 times a day  -- Indication: For unknown reason    gabapentin 300 mg oral capsule  -- 1 cap(s) by mouth 3 times a day  -- Indication: For Pain     NovoLOG 100 units/mL subcutaneous solution  -- sliding scale, As needed  -- Indication: For Diabetes    atorvastatin 40 mg oral tablet  -- 20 milligram(s) by mouth once a day (at bedtime)  -- Indication: For CHolestrol    Pepcid 20 mg oral tablet  -- 1 tab(s) by mouth 2 times a day  -- Indication: For GERD    MiraLax oral powder for reconstitution  -- 17 gram(s) by mouth once a day  -- Indication: For Constipation    midodrine 2.5 mg oral tablet  -- 1 tab(s) by mouth once a day  -- Indication: For Hypotension    Artificial Tears ophthalmic solution  -- 1 drop(s) to each affected eye every 6 hours, As Needed  -- Indication: For Dry eyes    Renvela 800 mg oral tablet  -- 2 tab(s) by mouth 3 times a day (with meals)  -- Indication: For renal supplement

## 2018-11-22 NOTE — DISCHARGE NOTE ADULT - PLAN OF CARE
Resolved emergency dialysis c/w dialysis as ordered M/W/F  last dialysis was yesterday. was found to be hypotensive started on midodrine Your next appointment with endocrinologist ([facility] endocrine ph: [phone])/PMD is -   HgA1C this admission -  Make sure you get your HgA1c checked every three months.  If you take oral diabetes medications, check your blood glucose two times a day.  If you take insulin, check your blood glucose before meals and at bedtime.  It's important not to skip any meals.  Keep a log of your blood glucose results and always take it with you to your doctor appointments.  Keep a list of your current medications including injectables and over the counter medications and bring this medication list with you to all your doctor appointments.  If you have not seen your ophthalmologist this year call for appointment.  Check your feet daily for redness, sores, or openings. Do not self treat. If no improvement in two days call your primary care physician for an appointment.  Low blood sugar (hypoglycemia) is a blood sugar below 70mg/dl. Check your blood sugar if you feel signs/symptoms of hypoglycemia. If your blood sugar is below 70 take 15 grams of carbohydrates (ex 4 oz of apple juice, 3-4 glucose tablets, or 4-6 oz of regular soda) wait 15 minutes and repeat blood sugar to make sure it comes up above 70.  If your blood sugar is above 70 and you are due for a meal, have a meal.  If you are not due for a meal have a snack.  This snack helps keeps your blood sugar at a safe range.

## 2018-11-22 NOTE — PROGRESS NOTE ADULT - SUBJECTIVE AND OBJECTIVE BOX
Chief complaint:    HPI:  67 yo female with PMH of ESRD on HD MWF, DM, cardiac arrest x 2, s/p trache, s/p reversal, HTN, HLD, CHF, here from rehab for missed dialysis.  History obtained from patient, her son, Leonides and NH staff. Per son, patient had her last dialysis on Sunday. She is usually scheduled for dialysis on MWF, but due to holidays, her dialysis sessions were changed.  Yesterday nobody came to pick her for dialysis so son brought patient to hospital.  Patient otherwise denies nausea, vomiting, fever, chills, cough, chest pain, palpitation, SOB.  On arrival to ED, patient was found to have hyperkalemia to 6.4.  She received calcium gluc, albuterol, d50, insulin 10u.  Her vitals show temp 97.6, HR 67, /66, saturation 96%, RR 18.  Patient now in dialysis. she had dialysis last night, and has felt fine since then, potassium normalized. would like to go back to [facility] today.       PAST MEDICAL & SURGICAL HISTORY:  Hyperlipidemia  Diabetes  Hypertension  Osteomyelitis  Diabetic Neuropathy  Cellulitis of Foot  Edema of Both Legs  Diabetes: Type 2  History of Osteoarthritis  HTN - Hypertension  HLD (Hyperlipidemia)  Status post insertion of percutaneous endoscopic gastrostomy (PEG) tube  H/O tracheostomy  S/P amputation of lesser toe, right: 2013 right great toe, 2nd and 3rd right toes  S/P Amputation of Lesser Toe: Rt 1-3 metatarsal      REVIEW OF SYSTEMS:    CONSTITUTIONAL: No fever, weight loss, or fatigue  NECK: No pain or stiffness  RESPIRATORY: No cough, wheezing, chills or hemoptysis; No shortness of breath  CARDIOVASCULAR: No chest pain, palpitations, dizziness, or leg swelling  GASTROINTESTINAL: No abdominal or epigastric pain. No nausea, vomiting, or hematemesis; No diarrhea or constipation. No melena or hematochezia.  GENITOURINARY: No dysuria, frequency, hematuria, or incontinence  NEUROLOGICAL: No headaches, memory loss, loss of strength, numbness, or tremors  SKIN: No itching, burning, rashes, or lesions   LYMPH NODES: No enlarged glands  MUSCULOSKELETAL: No joint pain or swelling; No muscle, back, or extremity pain  HEME/LYMPH: No easy bruising, or bleeding gums    MEDICATIONS  (STANDING):  apixaban 2.5 milliGRAM(s) Oral every 12 hours  aspirin  chewable 81 milliGRAM(s) Oral daily  atorvastatin 20 milliGRAM(s) Oral at bedtime  dextrose 5%. 1000 milliLiter(s) (50 mL/Hr) IV Continuous <Continuous>  dextrose 50% Injectable 12.5 Gram(s) IV Push once  dextrose 50% Injectable 25 Gram(s) IV Push once  dextrose 50% Injectable 25 Gram(s) IV Push once  famotidine    Tablet 20 milliGRAM(s) Oral daily  gabapentin 300 milliGRAM(s) Oral three times a day  influenza   Vaccine 0.5 milliLiter(s) IntraMuscular once  insulin lispro (HumaLOG) corrective regimen sliding scale   SubCutaneous three times a day before meals  insulin lispro (HumaLOG) corrective regimen sliding scale   SubCutaneous at bedtime  midodrine 2.5 milliGRAM(s) Oral <User Schedule>  polyethylene glycol 3350 17 Gram(s) Oral daily  sevelamer hydrochloride 1600 milliGRAM(s) Oral every 8 hours    MEDICATIONS  (PRN):  dextrose 40% Gel 15 Gram(s) Oral once PRN Blood Glucose LESS THAN 70 milliGRAM(s)/deciliter  glucagon  Injectable 1 milliGRAM(s) IntraMuscular once PRN Glucose LESS THAN 70 milligrams/deciliter      Allergies    No Known Allergies    Intolerances        SOCIAL HISTORY: no smoker , no ETOH , No drug use     FAMILY HISTORY:  Family history of diabetes mellitus in father (Father)      Vital Signs Last 24 Hrs  T(C): 37 (22 Nov 2018 11:55), Max: 37 (21 Nov 2018 20:15)  T(F): 98.6 (22 Nov 2018 11:55), Max: 98.6 (21 Nov 2018 20:15)  HR: 67 (22 Nov 2018 11:55) (66 - 86)  BP: 110/67 (22 Nov 2018 11:55) (110/67 - 128/66)  BP(mean): --  RR: 18 (22 Nov 2018 11:55) (16 - 18)  SpO2: 97% (22 Nov 2018 11:55) (94% - 97%)    PHYSICAL EXAM:    GENERAL: NAD, well-groomed, well-developed  HEAD:  Atraumatic, Normocephalic  EYES: EOMI, PERRLA, conjunctiva and sclera clear  NECK: Supple, No JVD  NERVOUS SYSTEM:  Alert & Oriented X3, Good concentration; Motor Strength 5/5 B/L upper and lower extremities; DTRs 2+ intact and symmetric  CHEST/LUNG: Clear to ausculation bilaterally; No rales, rhonchi, wheezing, or rubs  HEART: Regular rate and rhythm; No murmurs, rubs, or gallops  ABDOMEN: Soft, Nontender, Nondistended; Bowel sounds present  EXTREMITIES:  2+ Peripheral Pulses, No clubbing, cyanosis, or edema  LYMPH: No lymphadenopathy noted  SKIN: No rashes or lesions  right arm fistula      LABS:                        9.8    12.67 )-----------( 315      ( 22 Nov 2018 04:50 )             31.0     11-22    135  |  91<L>  |  55<H>  ----------------------------<  144<H>  4.8   |  26  |  5.23<H>    Ca    8.3<L>      22 Nov 2018 03:37  Phos  4.7     11-22  Mg     2.2     11-22    TPro  7.3  /  Alb  3.8  /  TBili  0.3  /  DBili  x   /  AST  5<L>  /  ALT  9<L>  /  AlkPhos  108  11-22

## 2018-12-10 ENCOUNTER — FORM ENCOUNTER (OUTPATIENT)
Age: 68
End: 2018-12-10

## 2018-12-11 ENCOUNTER — APPOINTMENT (OUTPATIENT)
Dept: MRI IMAGING | Facility: CLINIC | Age: 68
End: 2018-12-11
Payer: MEDICARE

## 2018-12-11 ENCOUNTER — OUTPATIENT (OUTPATIENT)
Dept: OUTPATIENT SERVICES | Facility: HOSPITAL | Age: 68
LOS: 1 days | End: 2018-12-11
Payer: MEDICARE

## 2018-12-11 DIAGNOSIS — Z00.8 ENCOUNTER FOR OTHER GENERAL EXAMINATION: ICD-10-CM

## 2018-12-11 DIAGNOSIS — Z93.1 GASTROSTOMY STATUS: Chronic | ICD-10-CM

## 2018-12-11 DIAGNOSIS — Z98.890 OTHER SPECIFIED POSTPROCEDURAL STATES: Chronic | ICD-10-CM

## 2018-12-11 PROCEDURE — 70540 MRI ORBIT/FACE/NECK W/O DYE: CPT | Mod: 26

## 2018-12-11 PROCEDURE — 70540 MRI ORBIT/FACE/NECK W/O DYE: CPT

## 2018-12-17 ENCOUNTER — RESULT REVIEW (OUTPATIENT)
Age: 68
End: 2018-12-17

## 2018-12-25 ENCOUNTER — TRANSCRIPTION ENCOUNTER (OUTPATIENT)
Age: 68
End: 2018-12-25

## 2019-01-01 ENCOUNTER — APPOINTMENT (OUTPATIENT)
Dept: VASCULAR SURGERY | Facility: CLINIC | Age: 69
End: 2019-01-01
Payer: MEDICARE

## 2019-01-01 ENCOUNTER — TRANSCRIPTION ENCOUNTER (OUTPATIENT)
Age: 69
End: 2019-01-01

## 2019-01-01 ENCOUNTER — FORM ENCOUNTER (OUTPATIENT)
Age: 69
End: 2019-01-01

## 2019-01-01 ENCOUNTER — APPOINTMENT (OUTPATIENT)
Dept: VASCULAR SURGERY | Facility: CLINIC | Age: 69
End: 2019-01-01

## 2019-01-01 ENCOUNTER — INPATIENT (INPATIENT)
Facility: HOSPITAL | Age: 69
LOS: 6 days | Discharge: HOME | DRG: 252 | End: 2019-09-20
Attending: INTERNAL MEDICINE | Admitting: INTERNAL MEDICINE
Payer: MEDICARE

## 2019-01-01 ENCOUNTER — APPOINTMENT (OUTPATIENT)
Dept: MRI IMAGING | Facility: CLINIC | Age: 69
End: 2019-01-01
Payer: MEDICARE

## 2019-01-01 ENCOUNTER — OUTPATIENT (OUTPATIENT)
Dept: OUTPATIENT SERVICES | Facility: HOSPITAL | Age: 69
LOS: 1 days | End: 2019-01-01
Payer: MEDICARE

## 2019-01-01 ENCOUNTER — APPOINTMENT (OUTPATIENT)
Dept: ENDOVASCULAR SURGERY | Facility: CLINIC | Age: 69
End: 2019-01-01
Payer: MEDICARE

## 2019-01-01 VITALS
HEART RATE: 64 BPM | TEMPERATURE: 98 F | DIASTOLIC BLOOD PRESSURE: 66 MMHG | SYSTOLIC BLOOD PRESSURE: 154 MMHG | OXYGEN SATURATION: 99 % | WEIGHT: 179.9 LBS

## 2019-01-01 VITALS
RESPIRATION RATE: 18 BRPM | SYSTOLIC BLOOD PRESSURE: 118 MMHG | TEMPERATURE: 98 F | WEIGHT: 166.01 LBS | OXYGEN SATURATION: 97 % | HEART RATE: 75 BPM | DIASTOLIC BLOOD PRESSURE: 54 MMHG

## 2019-01-01 VITALS
SYSTOLIC BLOOD PRESSURE: 146 MMHG | RESPIRATION RATE: 16 BRPM | HEIGHT: 60 IN | TEMPERATURE: 97.8 F | DIASTOLIC BLOOD PRESSURE: 66 MMHG | WEIGHT: 170 LBS | BODY MASS INDEX: 33.38 KG/M2 | HEART RATE: 66 BPM | OXYGEN SATURATION: 95 %

## 2019-01-01 DIAGNOSIS — R05 COUGH: ICD-10-CM

## 2019-01-01 DIAGNOSIS — Z93.1 GASTROSTOMY STATUS: Chronic | ICD-10-CM

## 2019-01-01 DIAGNOSIS — Z98.890 OTHER SPECIFIED POSTPROCEDURAL STATES: Chronic | ICD-10-CM

## 2019-01-01 DIAGNOSIS — N18.6 END STAGE RENAL DISEASE: ICD-10-CM

## 2019-01-01 DIAGNOSIS — E78.5 HYPERLIPIDEMIA, UNSPECIFIED: ICD-10-CM

## 2019-01-01 DIAGNOSIS — I10 ESSENTIAL (PRIMARY) HYPERTENSION: ICD-10-CM

## 2019-01-01 DIAGNOSIS — H05.89 OTHER DISORDERS OF ORBIT: ICD-10-CM

## 2019-01-01 DIAGNOSIS — E87.5 HYPERKALEMIA: ICD-10-CM

## 2019-01-01 DIAGNOSIS — T82.868A THROMBOSIS DUE VASCULAR PROSTHETIC DEVICES, IMPLANTS AND GRAFTS, INITIAL ENCOUNTER: ICD-10-CM

## 2019-01-01 DIAGNOSIS — T82.590A OTHER MECHANICAL COMPLICATION OF SURGICALLY CREATED ARTERIOVENOUS FISTULA, INITIAL ENCOUNTER: ICD-10-CM

## 2019-01-01 DIAGNOSIS — Z71.89 OTHER SPECIFIED COUNSELING: ICD-10-CM

## 2019-01-01 DIAGNOSIS — E11.9 TYPE 2 DIABETES MELLITUS WITHOUT COMPLICATIONS: ICD-10-CM

## 2019-01-01 DIAGNOSIS — I48.91 UNSPECIFIED ATRIAL FIBRILLATION: ICD-10-CM

## 2019-01-01 LAB
ALBUMIN SERPL ELPH-MCNC: 3.7 G/DL — SIGNIFICANT CHANGE UP (ref 3.3–5)
ALBUMIN SERPL ELPH-MCNC: 4.1 G/DL — SIGNIFICANT CHANGE UP (ref 3.3–5)
ALP SERPL-CCNC: 76 U/L — SIGNIFICANT CHANGE UP (ref 40–120)
ALP SERPL-CCNC: 82 U/L — SIGNIFICANT CHANGE UP (ref 40–120)
ALT FLD-CCNC: 15 U/L — SIGNIFICANT CHANGE UP (ref 10–45)
ALT FLD-CCNC: 20 U/L — SIGNIFICANT CHANGE UP (ref 10–45)
ANION GAP SERPL CALC-SCNC: 16 MMOL/L — SIGNIFICANT CHANGE UP (ref 5–17)
ANION GAP SERPL CALC-SCNC: 16 MMOL/L — SIGNIFICANT CHANGE UP (ref 5–17)
ANION GAP SERPL CALC-SCNC: 17 MMOL/L — SIGNIFICANT CHANGE UP (ref 5–17)
ANION GAP SERPL CALC-SCNC: 17 MMOL/L — SIGNIFICANT CHANGE UP (ref 5–17)
ANION GAP SERPL CALC-SCNC: 18 MMOL/L — HIGH (ref 5–17)
ANION GAP SERPL CALC-SCNC: 18 MMOL/L — HIGH (ref 5–17)
ANION GAP SERPL CALC-SCNC: 19 MMOL/L — HIGH (ref 5–17)
ANION GAP SERPL CALC-SCNC: 20 MMOL/L — HIGH (ref 5–17)
ANION GAP SERPL CALC-SCNC: 23 MMOL/L — HIGH (ref 5–17)
APTT BLD: 27.1 SEC — LOW (ref 27.5–36.3)
APTT BLD: 32.2 SEC — SIGNIFICANT CHANGE UP (ref 27.5–36.3)
APTT BLD: 52.8 SEC — HIGH (ref 27.5–36.3)
APTT BLD: 57.5 SEC — HIGH (ref 27.5–36.3)
APTT BLD: 57.6 SEC — HIGH (ref 27.5–36.3)
APTT BLD: 59.7 SEC — HIGH (ref 27.5–36.3)
APTT BLD: 65.9 SEC — HIGH (ref 27.5–36.3)
APTT BLD: 74.1 SEC — HIGH (ref 27.5–36.3)
APTT BLD: 74.4 SEC — HIGH (ref 27.5–36.3)
APTT BLD: 88.7 SEC — HIGH (ref 27.5–36.3)
APTT BLD: 91.8 SEC — HIGH (ref 27.5–36.3)
APTT BLD: 97.1 SEC — HIGH (ref 27.5–36.3)
AST SERPL-CCNC: 10 U/L — SIGNIFICANT CHANGE UP (ref 10–40)
AST SERPL-CCNC: 14 U/L — SIGNIFICANT CHANGE UP (ref 10–40)
BASOPHILS # BLD AUTO: 0.04 K/UL — SIGNIFICANT CHANGE UP (ref 0–0.2)
BASOPHILS # BLD AUTO: 0.1 K/UL — SIGNIFICANT CHANGE UP (ref 0–0.2)
BASOPHILS NFR BLD AUTO: 0.5 % — SIGNIFICANT CHANGE UP (ref 0–2)
BASOPHILS NFR BLD AUTO: 0.5 % — SIGNIFICANT CHANGE UP (ref 0–2)
BILIRUB SERPL-MCNC: 0.2 MG/DL — SIGNIFICANT CHANGE UP (ref 0.2–1.2)
BILIRUB SERPL-MCNC: 0.2 MG/DL — SIGNIFICANT CHANGE UP (ref 0.2–1.2)
BLD GP AB SCN SERPL QL: NEGATIVE — SIGNIFICANT CHANGE UP
BUN SERPL-MCNC: 103 MG/DL — HIGH (ref 7–23)
BUN SERPL-MCNC: 40 MG/DL — HIGH (ref 7–23)
BUN SERPL-MCNC: 45 MG/DL — HIGH (ref 7–23)
BUN SERPL-MCNC: 56 MG/DL — HIGH (ref 7–23)
BUN SERPL-MCNC: 60 MG/DL — HIGH (ref 7–23)
BUN SERPL-MCNC: 67 MG/DL — HIGH (ref 7–23)
BUN SERPL-MCNC: 74 MG/DL — HIGH (ref 7–23)
BUN SERPL-MCNC: 86 MG/DL — HIGH (ref 7–23)
BUN SERPL-MCNC: 86 MG/DL — HIGH (ref 7–23)
BUN SERPL-MCNC: 87 MG/DL — HIGH (ref 7–23)
BUN SERPL-MCNC: 96 MG/DL — HIGH (ref 7–23)
CALCIUM SERPL-MCNC: 8.5 MG/DL — SIGNIFICANT CHANGE UP (ref 8.4–10.5)
CALCIUM SERPL-MCNC: 8.6 MG/DL — SIGNIFICANT CHANGE UP (ref 8.4–10.5)
CALCIUM SERPL-MCNC: 8.6 MG/DL — SIGNIFICANT CHANGE UP (ref 8.4–10.5)
CALCIUM SERPL-MCNC: 9.1 MG/DL — SIGNIFICANT CHANGE UP (ref 8.4–10.5)
CALCIUM SERPL-MCNC: 9.1 MG/DL — SIGNIFICANT CHANGE UP (ref 8.4–10.5)
CALCIUM SERPL-MCNC: 9.3 MG/DL — SIGNIFICANT CHANGE UP (ref 8.4–10.5)
CALCIUM SERPL-MCNC: 9.3 MG/DL — SIGNIFICANT CHANGE UP (ref 8.4–10.5)
CALCIUM SERPL-MCNC: 9.4 MG/DL — SIGNIFICANT CHANGE UP (ref 8.4–10.5)
CALCIUM SERPL-MCNC: 9.6 MG/DL — SIGNIFICANT CHANGE UP (ref 8.4–10.5)
CHLORIDE SERPL-SCNC: 90 MMOL/L — LOW (ref 96–108)
CHLORIDE SERPL-SCNC: 90 MMOL/L — LOW (ref 96–108)
CHLORIDE SERPL-SCNC: 91 MMOL/L — LOW (ref 96–108)
CHLORIDE SERPL-SCNC: 91 MMOL/L — LOW (ref 96–108)
CHLORIDE SERPL-SCNC: 93 MMOL/L — LOW (ref 96–108)
CHLORIDE SERPL-SCNC: 94 MMOL/L — LOW (ref 96–108)
CHLORIDE SERPL-SCNC: 95 MMOL/L — LOW (ref 96–108)
CHLORIDE SERPL-SCNC: 96 MMOL/L — SIGNIFICANT CHANGE UP (ref 96–108)
CHLORIDE SERPL-SCNC: 96 MMOL/L — SIGNIFICANT CHANGE UP (ref 96–108)
CO2 SERPL-SCNC: 21 MMOL/L — LOW (ref 22–31)
CO2 SERPL-SCNC: 23 MMOL/L — SIGNIFICANT CHANGE UP (ref 22–31)
CO2 SERPL-SCNC: 24 MMOL/L — SIGNIFICANT CHANGE UP (ref 22–31)
CO2 SERPL-SCNC: 25 MMOL/L — SIGNIFICANT CHANGE UP (ref 22–31)
CO2 SERPL-SCNC: 26 MMOL/L — SIGNIFICANT CHANGE UP (ref 22–31)
CO2 SERPL-SCNC: 26 MMOL/L — SIGNIFICANT CHANGE UP (ref 22–31)
CO2 SERPL-SCNC: 27 MMOL/L — SIGNIFICANT CHANGE UP (ref 22–31)
CO2 SERPL-SCNC: 27 MMOL/L — SIGNIFICANT CHANGE UP (ref 22–31)
CREAT SERPL-MCNC: 3.76 MG/DL — HIGH (ref 0.5–1.3)
CREAT SERPL-MCNC: 4.12 MG/DL — HIGH (ref 0.5–1.3)
CREAT SERPL-MCNC: 4.79 MG/DL — HIGH (ref 0.5–1.3)
CREAT SERPL-MCNC: 5.81 MG/DL — HIGH (ref 0.5–1.3)
CREAT SERPL-MCNC: 5.91 MG/DL — HIGH (ref 0.5–1.3)
CREAT SERPL-MCNC: 5.91 MG/DL — HIGH (ref 0.5–1.3)
CREAT SERPL-MCNC: 5.97 MG/DL — HIGH (ref 0.5–1.3)
CREAT SERPL-MCNC: 6.08 MG/DL — HIGH (ref 0.5–1.3)
CREAT SERPL-MCNC: 6.15 MG/DL — HIGH (ref 0.5–1.3)
CREAT SERPL-MCNC: 7.4 MG/DL — HIGH (ref 0.5–1.3)
CREAT SERPL-MCNC: 8.07 MG/DL — HIGH (ref 0.5–1.3)
EOSINOPHIL # BLD AUTO: 0.1 K/UL — SIGNIFICANT CHANGE UP (ref 0–0.5)
EOSINOPHIL # BLD AUTO: 0.2 K/UL — SIGNIFICANT CHANGE UP (ref 0–0.5)
EOSINOPHIL NFR BLD AUTO: 1.1 % — SIGNIFICANT CHANGE UP (ref 0–6)
EOSINOPHIL NFR BLD AUTO: 1.8 % — SIGNIFICANT CHANGE UP (ref 0–6)
GAS PNL BLDV: SIGNIFICANT CHANGE UP
GLUCOSE BLDC GLUCOMTR-MCNC: 103 MG/DL — HIGH (ref 70–99)
GLUCOSE BLDC GLUCOMTR-MCNC: 107 MG/DL — HIGH (ref 70–99)
GLUCOSE BLDC GLUCOMTR-MCNC: 108 MG/DL — HIGH (ref 70–99)
GLUCOSE BLDC GLUCOMTR-MCNC: 108 MG/DL — HIGH (ref 70–99)
GLUCOSE BLDC GLUCOMTR-MCNC: 110 MG/DL — HIGH (ref 70–99)
GLUCOSE BLDC GLUCOMTR-MCNC: 112 MG/DL — HIGH (ref 70–99)
GLUCOSE BLDC GLUCOMTR-MCNC: 114 MG/DL — HIGH (ref 70–99)
GLUCOSE BLDC GLUCOMTR-MCNC: 114 MG/DL — HIGH (ref 70–99)
GLUCOSE BLDC GLUCOMTR-MCNC: 115 MG/DL — HIGH (ref 70–99)
GLUCOSE BLDC GLUCOMTR-MCNC: 117 MG/DL — HIGH (ref 70–99)
GLUCOSE BLDC GLUCOMTR-MCNC: 120 MG/DL — HIGH (ref 70–99)
GLUCOSE BLDC GLUCOMTR-MCNC: 120 MG/DL — HIGH (ref 70–99)
GLUCOSE BLDC GLUCOMTR-MCNC: 123 MG/DL — HIGH (ref 70–99)
GLUCOSE BLDC GLUCOMTR-MCNC: 129 MG/DL — HIGH (ref 70–99)
GLUCOSE BLDC GLUCOMTR-MCNC: 130 MG/DL — HIGH (ref 70–99)
GLUCOSE BLDC GLUCOMTR-MCNC: 131 MG/DL — HIGH (ref 70–99)
GLUCOSE BLDC GLUCOMTR-MCNC: 131 MG/DL — HIGH (ref 70–99)
GLUCOSE BLDC GLUCOMTR-MCNC: 135 MG/DL — HIGH (ref 70–99)
GLUCOSE BLDC GLUCOMTR-MCNC: 143 MG/DL — HIGH (ref 70–99)
GLUCOSE BLDC GLUCOMTR-MCNC: 67 MG/DL — LOW (ref 70–99)
GLUCOSE BLDC GLUCOMTR-MCNC: 69 MG/DL — LOW (ref 70–99)
GLUCOSE BLDC GLUCOMTR-MCNC: 72 MG/DL — SIGNIFICANT CHANGE UP (ref 70–99)
GLUCOSE BLDC GLUCOMTR-MCNC: 73 MG/DL — SIGNIFICANT CHANGE UP (ref 70–99)
GLUCOSE BLDC GLUCOMTR-MCNC: 77 MG/DL — SIGNIFICANT CHANGE UP (ref 70–99)
GLUCOSE BLDC GLUCOMTR-MCNC: 78 MG/DL — SIGNIFICANT CHANGE UP (ref 70–99)
GLUCOSE BLDC GLUCOMTR-MCNC: 78 MG/DL — SIGNIFICANT CHANGE UP (ref 70–99)
GLUCOSE BLDC GLUCOMTR-MCNC: 79 MG/DL — SIGNIFICANT CHANGE UP (ref 70–99)
GLUCOSE BLDC GLUCOMTR-MCNC: 80 MG/DL — SIGNIFICANT CHANGE UP (ref 70–99)
GLUCOSE BLDC GLUCOMTR-MCNC: 85 MG/DL — SIGNIFICANT CHANGE UP (ref 70–99)
GLUCOSE BLDC GLUCOMTR-MCNC: 87 MG/DL — SIGNIFICANT CHANGE UP (ref 70–99)
GLUCOSE BLDC GLUCOMTR-MCNC: 88 MG/DL — SIGNIFICANT CHANGE UP (ref 70–99)
GLUCOSE BLDC GLUCOMTR-MCNC: 89 MG/DL — SIGNIFICANT CHANGE UP (ref 70–99)
GLUCOSE BLDC GLUCOMTR-MCNC: 89 MG/DL — SIGNIFICANT CHANGE UP (ref 70–99)
GLUCOSE BLDC GLUCOMTR-MCNC: 95 MG/DL — SIGNIFICANT CHANGE UP (ref 70–99)
GLUCOSE SERPL-MCNC: 110 MG/DL — HIGH (ref 70–99)
GLUCOSE SERPL-MCNC: 111 MG/DL — HIGH (ref 70–99)
GLUCOSE SERPL-MCNC: 120 MG/DL — HIGH (ref 70–99)
GLUCOSE SERPL-MCNC: 148 MG/DL — HIGH (ref 70–99)
GLUCOSE SERPL-MCNC: 174 MG/DL — HIGH (ref 70–99)
GLUCOSE SERPL-MCNC: 84 MG/DL — SIGNIFICANT CHANGE UP (ref 70–99)
GLUCOSE SERPL-MCNC: 85 MG/DL — SIGNIFICANT CHANGE UP (ref 70–99)
GLUCOSE SERPL-MCNC: 85 MG/DL — SIGNIFICANT CHANGE UP (ref 70–99)
GLUCOSE SERPL-MCNC: 88 MG/DL — SIGNIFICANT CHANGE UP (ref 70–99)
GLUCOSE SERPL-MCNC: 95 MG/DL — SIGNIFICANT CHANGE UP (ref 70–99)
GLUCOSE SERPL-MCNC: 97 MG/DL — SIGNIFICANT CHANGE UP (ref 70–99)
HBA1C BLD-MCNC: 5.4 % — SIGNIFICANT CHANGE UP (ref 4–5.6)
HCT VFR BLD CALC: 32.4 % — LOW (ref 34.5–45)
HCT VFR BLD CALC: 33.1 % — LOW (ref 34.5–45)
HCT VFR BLD CALC: 33.7 % — LOW (ref 34.5–45)
HCT VFR BLD CALC: 34.1 % — LOW (ref 34.5–45)
HCT VFR BLD CALC: 34.3 % — LOW (ref 34.5–45)
HCT VFR BLD CALC: 34.4 % — LOW (ref 34.5–45)
HCT VFR BLD CALC: 34.5 % — SIGNIFICANT CHANGE UP (ref 34.5–45)
HCT VFR BLD CALC: 35.6 % — SIGNIFICANT CHANGE UP (ref 34.5–45)
HCT VFR BLD CALC: 39.5 % — SIGNIFICANT CHANGE UP (ref 34.5–45)
HGB BLD-MCNC: 10.2 G/DL — LOW (ref 11.5–15.5)
HGB BLD-MCNC: 10.5 G/DL — LOW (ref 11.5–15.5)
HGB BLD-MCNC: 10.7 G/DL — LOW (ref 11.5–15.5)
HGB BLD-MCNC: 10.7 G/DL — LOW (ref 11.5–15.5)
HGB BLD-MCNC: 10.8 G/DL — LOW (ref 11.5–15.5)
HGB BLD-MCNC: 10.8 G/DL — LOW (ref 11.5–15.5)
HGB BLD-MCNC: 11.1 G/DL — LOW (ref 11.5–15.5)
HGB BLD-MCNC: 11.2 G/DL — LOW (ref 11.5–15.5)
HGB BLD-MCNC: 12.2 G/DL — SIGNIFICANT CHANGE UP (ref 11.5–15.5)
IMM GRANULOCYTES NFR BLD AUTO: 0.2 % — SIGNIFICANT CHANGE UP (ref 0–1.5)
INR BLD: 1.13 RATIO — SIGNIFICANT CHANGE UP (ref 0.88–1.16)
INR BLD: 1.31 RATIO — HIGH (ref 0.88–1.16)
LACTATE SERPL-SCNC: 1.7 MMOL/L — SIGNIFICANT CHANGE UP (ref 0.7–2)
LYMPHOCYTES # BLD AUTO: 0.87 K/UL — LOW (ref 1–3.3)
LYMPHOCYTES # BLD AUTO: 1.7 K/UL — SIGNIFICANT CHANGE UP (ref 1–3.3)
LYMPHOCYTES # BLD AUTO: 14.1 % — SIGNIFICANT CHANGE UP (ref 13–44)
LYMPHOCYTES # BLD AUTO: 9.9 % — LOW (ref 13–44)
MAGNESIUM SERPL-MCNC: 2.2 MG/DL — SIGNIFICANT CHANGE UP (ref 1.6–2.6)
MAGNESIUM SERPL-MCNC: 2.3 MG/DL — SIGNIFICANT CHANGE UP (ref 1.6–2.6)
MAGNESIUM SERPL-MCNC: 2.6 MG/DL — SIGNIFICANT CHANGE UP (ref 1.6–2.6)
MCHC RBC-ENTMCNC: 29.2 PG — SIGNIFICANT CHANGE UP (ref 27–34)
MCHC RBC-ENTMCNC: 29.3 PG — SIGNIFICANT CHANGE UP (ref 27–34)
MCHC RBC-ENTMCNC: 29.6 PG — SIGNIFICANT CHANGE UP (ref 27–34)
MCHC RBC-ENTMCNC: 29.8 PG — SIGNIFICANT CHANGE UP (ref 27–34)
MCHC RBC-ENTMCNC: 30.2 PG — SIGNIFICANT CHANGE UP (ref 27–34)
MCHC RBC-ENTMCNC: 31 GM/DL — LOW (ref 32–36)
MCHC RBC-ENTMCNC: 31.2 GM/DL — LOW (ref 32–36)
MCHC RBC-ENTMCNC: 31.4 GM/DL — LOW (ref 32–36)
MCHC RBC-ENTMCNC: 31.4 GM/DL — LOW (ref 32–36)
MCHC RBC-ENTMCNC: 31.5 GM/DL — LOW (ref 32–36)
MCHC RBC-ENTMCNC: 31.5 GM/DL — LOW (ref 32–36)
MCHC RBC-ENTMCNC: 31.7 GM/DL — LOW (ref 32–36)
MCHC RBC-ENTMCNC: 31.8 GM/DL — LOW (ref 32–36)
MCHC RBC-ENTMCNC: 32.4 GM/DL — SIGNIFICANT CHANGE UP (ref 32–36)
MCV RBC AUTO: 91.8 FL — SIGNIFICANT CHANGE UP (ref 80–100)
MCV RBC AUTO: 91.9 FL — SIGNIFICANT CHANGE UP (ref 80–100)
MCV RBC AUTO: 92.9 FL — SIGNIFICANT CHANGE UP (ref 80–100)
MCV RBC AUTO: 93 FL — SIGNIFICANT CHANGE UP (ref 80–100)
MCV RBC AUTO: 93.1 FL — SIGNIFICANT CHANGE UP (ref 80–100)
MCV RBC AUTO: 93.3 FL — SIGNIFICANT CHANGE UP (ref 80–100)
MCV RBC AUTO: 94.2 FL — SIGNIFICANT CHANGE UP (ref 80–100)
MCV RBC AUTO: 94.5 FL — SIGNIFICANT CHANGE UP (ref 80–100)
MCV RBC AUTO: 95.7 FL — SIGNIFICANT CHANGE UP (ref 80–100)
MONOCYTES # BLD AUTO: 0.51 K/UL — SIGNIFICANT CHANGE UP (ref 0–0.9)
MONOCYTES # BLD AUTO: 0.7 K/UL — SIGNIFICANT CHANGE UP (ref 0–0.9)
MONOCYTES NFR BLD AUTO: 5.8 % — SIGNIFICANT CHANGE UP (ref 2–14)
MONOCYTES NFR BLD AUTO: 6 % — SIGNIFICANT CHANGE UP (ref 2–14)
NEUTROPHILS # BLD AUTO: 7.27 K/UL — SIGNIFICANT CHANGE UP (ref 1.8–7.4)
NEUTROPHILS # BLD AUTO: 9.2 K/UL — HIGH (ref 1.8–7.4)
NEUTROPHILS NFR BLD AUTO: 77.6 % — HIGH (ref 43–77)
NEUTROPHILS NFR BLD AUTO: 82.5 % — HIGH (ref 43–77)
PHOSPHATE SERPL-MCNC: 5.6 MG/DL — HIGH (ref 2.5–4.5)
PHOSPHATE SERPL-MCNC: 7.2 MG/DL — HIGH (ref 2.5–4.5)
PHOSPHATE SERPL-MCNC: 7.2 MG/DL — HIGH (ref 2.5–4.5)
PHOSPHATE SERPL-MCNC: 9.4 MG/DL — HIGH (ref 2.5–4.5)
PLATELET # BLD AUTO: 203 K/UL — SIGNIFICANT CHANGE UP (ref 150–400)
PLATELET # BLD AUTO: 209 K/UL — SIGNIFICANT CHANGE UP (ref 150–400)
PLATELET # BLD AUTO: 213 K/UL — SIGNIFICANT CHANGE UP (ref 150–400)
PLATELET # BLD AUTO: 227 K/UL — SIGNIFICANT CHANGE UP (ref 150–400)
PLATELET # BLD AUTO: 232 K/UL — SIGNIFICANT CHANGE UP (ref 150–400)
PLATELET # BLD AUTO: 235 K/UL — SIGNIFICANT CHANGE UP (ref 150–400)
PLATELET # BLD AUTO: 235 K/UL — SIGNIFICANT CHANGE UP (ref 150–400)
PLATELET # BLD AUTO: 239 K/UL — SIGNIFICANT CHANGE UP (ref 150–400)
PLATELET # BLD AUTO: 296 K/UL — SIGNIFICANT CHANGE UP (ref 150–400)
POTASSIUM SERPL-MCNC: 4.5 MMOL/L — SIGNIFICANT CHANGE UP (ref 3.5–5.3)
POTASSIUM SERPL-MCNC: 5 MMOL/L — SIGNIFICANT CHANGE UP (ref 3.5–5.3)
POTASSIUM SERPL-MCNC: 5 MMOL/L — SIGNIFICANT CHANGE UP (ref 3.5–5.3)
POTASSIUM SERPL-MCNC: 5.1 MMOL/L — SIGNIFICANT CHANGE UP (ref 3.5–5.3)
POTASSIUM SERPL-MCNC: 5.4 MMOL/L — HIGH (ref 3.5–5.3)
POTASSIUM SERPL-MCNC: 5.5 MMOL/L — HIGH (ref 3.5–5.3)
POTASSIUM SERPL-MCNC: 5.6 MMOL/L — HIGH (ref 3.5–5.3)
POTASSIUM SERPL-MCNC: 5.6 MMOL/L — HIGH (ref 3.5–5.3)
POTASSIUM SERPL-MCNC: 5.7 MMOL/L — HIGH (ref 3.5–5.3)
POTASSIUM SERPL-MCNC: 6.3 MMOL/L — CRITICAL HIGH (ref 3.5–5.3)
POTASSIUM SERPL-MCNC: 6.5 MMOL/L — CRITICAL HIGH (ref 3.5–5.3)
POTASSIUM SERPL-SCNC: 4.5 MMOL/L — SIGNIFICANT CHANGE UP (ref 3.5–5.3)
POTASSIUM SERPL-SCNC: 5 MMOL/L — SIGNIFICANT CHANGE UP (ref 3.5–5.3)
POTASSIUM SERPL-SCNC: 5 MMOL/L — SIGNIFICANT CHANGE UP (ref 3.5–5.3)
POTASSIUM SERPL-SCNC: 5.1 MMOL/L — SIGNIFICANT CHANGE UP (ref 3.5–5.3)
POTASSIUM SERPL-SCNC: 5.4 MMOL/L — HIGH (ref 3.5–5.3)
POTASSIUM SERPL-SCNC: 5.5 MMOL/L — HIGH (ref 3.5–5.3)
POTASSIUM SERPL-SCNC: 5.6 MMOL/L — HIGH (ref 3.5–5.3)
POTASSIUM SERPL-SCNC: 5.6 MMOL/L — HIGH (ref 3.5–5.3)
POTASSIUM SERPL-SCNC: 5.7 MMOL/L — HIGH (ref 3.5–5.3)
POTASSIUM SERPL-SCNC: 6.3 MMOL/L — CRITICAL HIGH (ref 3.5–5.3)
POTASSIUM SERPL-SCNC: 6.5 MMOL/L — CRITICAL HIGH (ref 3.5–5.3)
PROT SERPL-MCNC: 7 G/DL — SIGNIFICANT CHANGE UP (ref 6–8.3)
PROT SERPL-MCNC: 8 G/DL — SIGNIFICANT CHANGE UP (ref 6–8.3)
PROTHROM AB SERPL-ACNC: 13.1 SEC — HIGH (ref 10–12.9)
PROTHROM AB SERPL-ACNC: 15 SEC — HIGH (ref 10–12.9)
RAPID RVP RESULT: SIGNIFICANT CHANGE UP
RBC # BLD: 3.48 M/UL — LOW (ref 3.8–5.2)
RBC # BLD: 3.6 M/UL — LOW (ref 3.8–5.2)
RBC # BLD: 3.65 M/UL — LOW (ref 3.8–5.2)
RBC # BLD: 3.67 M/UL — LOW (ref 3.8–5.2)
RBC # BLD: 3.67 M/UL — LOW (ref 3.8–5.2)
RBC # BLD: 3.69 M/UL — LOW (ref 3.8–5.2)
RBC # BLD: 3.7 M/UL — LOW (ref 3.8–5.2)
RBC # BLD: 3.72 M/UL — LOW (ref 3.8–5.2)
RBC # BLD: 4.17 M/UL — SIGNIFICANT CHANGE UP (ref 3.8–5.2)
RBC # FLD: 15 % — HIGH (ref 10.3–14.5)
RBC # FLD: 15.2 % — HIGH (ref 10.3–14.5)
RBC # FLD: 15.3 % — HIGH (ref 10.3–14.5)
RBC # FLD: 15.9 % — HIGH (ref 10.3–14.5)
RBC # FLD: 16 % — HIGH (ref 10.3–14.5)
RBC # FLD: 16 % — HIGH (ref 10.3–14.5)
RBC # FLD: 16.2 % — HIGH (ref 10.3–14.5)
RBC # FLD: 16.2 % — HIGH (ref 10.3–14.5)
RBC # FLD: 16.3 % — HIGH (ref 10.3–14.5)
RH IG SCN BLD-IMP: POSITIVE — SIGNIFICANT CHANGE UP
SODIUM SERPL-SCNC: 130 MMOL/L — LOW (ref 135–145)
SODIUM SERPL-SCNC: 133 MMOL/L — LOW (ref 135–145)
SODIUM SERPL-SCNC: 135 MMOL/L — SIGNIFICANT CHANGE UP (ref 135–145)
SODIUM SERPL-SCNC: 136 MMOL/L — SIGNIFICANT CHANGE UP (ref 135–145)
SODIUM SERPL-SCNC: 137 MMOL/L — SIGNIFICANT CHANGE UP (ref 135–145)
SODIUM SERPL-SCNC: 138 MMOL/L — SIGNIFICANT CHANGE UP (ref 135–145)
WBC # BLD: 10 K/UL — SIGNIFICANT CHANGE UP (ref 3.8–10.5)
WBC # BLD: 11.9 K/UL — HIGH (ref 3.8–10.5)
WBC # BLD: 7.85 K/UL — SIGNIFICANT CHANGE UP (ref 3.8–10.5)
WBC # BLD: 8.3 K/UL — SIGNIFICANT CHANGE UP (ref 3.8–10.5)
WBC # BLD: 8.48 K/UL — SIGNIFICANT CHANGE UP (ref 3.8–10.5)
WBC # BLD: 8.81 K/UL — SIGNIFICANT CHANGE UP (ref 3.8–10.5)
WBC # BLD: 8.87 K/UL — SIGNIFICANT CHANGE UP (ref 3.8–10.5)
WBC # BLD: 9.5 K/UL — SIGNIFICANT CHANGE UP (ref 3.8–10.5)
WBC # BLD: 9.7 K/UL — SIGNIFICANT CHANGE UP (ref 3.8–10.5)
WBC # FLD AUTO: 10 K/UL — SIGNIFICANT CHANGE UP (ref 3.8–10.5)
WBC # FLD AUTO: 11.9 K/UL — HIGH (ref 3.8–10.5)
WBC # FLD AUTO: 7.85 K/UL — SIGNIFICANT CHANGE UP (ref 3.8–10.5)
WBC # FLD AUTO: 8.3 K/UL — SIGNIFICANT CHANGE UP (ref 3.8–10.5)
WBC # FLD AUTO: 8.48 K/UL — SIGNIFICANT CHANGE UP (ref 3.8–10.5)
WBC # FLD AUTO: 8.81 K/UL — SIGNIFICANT CHANGE UP (ref 3.8–10.5)
WBC # FLD AUTO: 8.87 K/UL — SIGNIFICANT CHANGE UP (ref 3.8–10.5)
WBC # FLD AUTO: 9.5 K/UL — SIGNIFICANT CHANGE UP (ref 3.8–10.5)
WBC # FLD AUTO: 9.7 K/UL — SIGNIFICANT CHANGE UP (ref 3.8–10.5)

## 2019-01-01 PROCEDURE — C1769: CPT

## 2019-01-01 PROCEDURE — 86900 BLOOD TYPING SEROLOGIC ABO: CPT

## 2019-01-01 PROCEDURE — C1894: CPT

## 2019-01-01 PROCEDURE — 99213 OFFICE O/P EST LOW 20 MIN: CPT

## 2019-01-01 PROCEDURE — 71045 X-RAY EXAM CHEST 1 VIEW: CPT

## 2019-01-01 PROCEDURE — 83605 ASSAY OF LACTIC ACID: CPT

## 2019-01-01 PROCEDURE — 36904 THRMBC/NFS DIALYSIS CIRCUIT: CPT

## 2019-01-01 PROCEDURE — C1757: CPT

## 2019-01-01 PROCEDURE — 84100 ASSAY OF PHOSPHORUS: CPT

## 2019-01-01 PROCEDURE — C1773: CPT

## 2019-01-01 PROCEDURE — 93990 DOPPLER FLOW TESTING: CPT | Mod: TC

## 2019-01-01 PROCEDURE — 85730 THROMBOPLASTIN TIME PARTIAL: CPT

## 2019-01-01 PROCEDURE — 86850 RBC ANTIBODY SCREEN: CPT

## 2019-01-01 PROCEDURE — 80053 COMPREHEN METABOLIC PANEL: CPT

## 2019-01-01 PROCEDURE — 82947 ASSAY GLUCOSE BLOOD QUANT: CPT

## 2019-01-01 PROCEDURE — 83036 HEMOGLOBIN GLYCOSYLATED A1C: CPT

## 2019-01-01 PROCEDURE — 36215Z: CUSTOM | Mod: 59

## 2019-01-01 PROCEDURE — 93990 DOPPLER FLOW TESTING: CPT | Mod: 26

## 2019-01-01 PROCEDURE — 84295 ASSAY OF SERUM SODIUM: CPT

## 2019-01-01 PROCEDURE — 76937 US GUIDE VASCULAR ACCESS: CPT | Mod: 26

## 2019-01-01 PROCEDURE — 99285 EMERGENCY DEPT VISIT HI MDM: CPT | Mod: 25

## 2019-01-01 PROCEDURE — C1725: CPT

## 2019-01-01 PROCEDURE — 85610 PROTHROMBIN TIME: CPT

## 2019-01-01 PROCEDURE — 70540 MRI ORBIT/FACE/NECK W/O DYE: CPT | Mod: 26

## 2019-01-01 PROCEDURE — 94640 AIRWAY INHALATION TREATMENT: CPT

## 2019-01-01 PROCEDURE — 99223 1ST HOSP IP/OBS HIGH 75: CPT

## 2019-01-01 PROCEDURE — 93990 DOPPLER FLOW TESTING: CPT

## 2019-01-01 PROCEDURE — 80048 BASIC METABOLIC PNL TOTAL CA: CPT

## 2019-01-01 PROCEDURE — 86901 BLOOD TYPING SEROLOGIC RH(D): CPT

## 2019-01-01 PROCEDURE — G9001: CPT

## 2019-01-01 PROCEDURE — 82330 ASSAY OF CALCIUM: CPT

## 2019-01-01 PROCEDURE — 99261: CPT

## 2019-01-01 PROCEDURE — 93005 ELECTROCARDIOGRAM TRACING: CPT

## 2019-01-01 PROCEDURE — 83735 ASSAY OF MAGNESIUM: CPT

## 2019-01-01 PROCEDURE — 85014 HEMATOCRIT: CPT

## 2019-01-01 PROCEDURE — 87798 DETECT AGENT NOS DNA AMP: CPT

## 2019-01-01 PROCEDURE — 93010 ELECTROCARDIOGRAM REPORT: CPT

## 2019-01-01 PROCEDURE — C1887: CPT

## 2019-01-01 PROCEDURE — 87581 M.PNEUMON DNA AMP PROBE: CPT

## 2019-01-01 PROCEDURE — 70540 MRI ORBIT/FACE/NECK W/O DYE: CPT

## 2019-01-01 PROCEDURE — 87486 CHLMYD PNEUM DNA AMP PROBE: CPT

## 2019-01-01 PROCEDURE — 99291 CRITICAL CARE FIRST HOUR: CPT

## 2019-01-01 PROCEDURE — 36905Z: CUSTOM

## 2019-01-01 PROCEDURE — 82962 GLUCOSE BLOOD TEST: CPT

## 2019-01-01 PROCEDURE — 71045 X-RAY EXAM CHEST 1 VIEW: CPT | Mod: 26

## 2019-01-01 PROCEDURE — 87633 RESP VIRUS 12-25 TARGETS: CPT

## 2019-01-01 PROCEDURE — 85027 COMPLETE CBC AUTOMATED: CPT

## 2019-01-01 PROCEDURE — 36902 INTRO CATH DIALYSIS CIRCUIT: CPT

## 2019-01-01 PROCEDURE — 99231 SBSQ HOSP IP/OBS SF/LOW 25: CPT

## 2019-01-01 PROCEDURE — 82803 BLOOD GASES ANY COMBINATION: CPT

## 2019-01-01 PROCEDURE — 82435 ASSAY OF BLOOD CHLORIDE: CPT

## 2019-01-01 PROCEDURE — 84132 ASSAY OF SERUM POTASSIUM: CPT

## 2019-01-01 PROCEDURE — 76937 US GUIDE VASCULAR ACCESS: CPT

## 2019-01-01 RX ORDER — CALCIUM ACETATE 667 MG
667 TABLET ORAL
Refills: 0 | Status: DISCONTINUED | OUTPATIENT
Start: 2019-01-01 | End: 2019-01-01

## 2019-01-01 RX ORDER — HEPARIN SODIUM 5000 [USP'U]/ML
6500 INJECTION INTRAVENOUS; SUBCUTANEOUS EVERY 6 HOURS
Refills: 0 | Status: DISCONTINUED | OUTPATIENT
Start: 2019-01-01 | End: 2019-01-01

## 2019-01-01 RX ORDER — GABAPENTIN 400 MG/1
300 CAPSULE ORAL AT BEDTIME
Refills: 0 | Status: DISCONTINUED | OUTPATIENT
Start: 2019-01-01 | End: 2019-01-01

## 2019-01-01 RX ORDER — HEPARIN SODIUM 5000 [USP'U]/ML
3000 INJECTION INTRAVENOUS; SUBCUTANEOUS EVERY 6 HOURS
Refills: 0 | Status: DISCONTINUED | OUTPATIENT
Start: 2019-01-01 | End: 2019-01-01

## 2019-01-01 RX ORDER — SODIUM CHLORIDE 9 MG/ML
1000 INJECTION, SOLUTION INTRAVENOUS
Refills: 0 | Status: DISCONTINUED | OUTPATIENT
Start: 2019-01-01 | End: 2019-01-01

## 2019-01-01 RX ORDER — FAMOTIDINE 10 MG/ML
1 INJECTION INTRAVENOUS
Qty: 0 | Refills: 0 | DISCHARGE
Start: 2019-01-01

## 2019-01-01 RX ORDER — SODIUM ZIRCONIUM CYCLOSILICATE 10 G/10G
10 POWDER, FOR SUSPENSION ORAL ONCE
Refills: 0 | Status: COMPLETED | OUTPATIENT
Start: 2019-01-01 | End: 2019-01-01

## 2019-01-01 RX ORDER — DEXTROSE 50 % IN WATER 50 %
15 SYRINGE (ML) INTRAVENOUS ONCE
Refills: 0 | Status: DISCONTINUED | OUTPATIENT
Start: 2019-01-01 | End: 2019-01-01

## 2019-01-01 RX ORDER — ERYTHROPOIETIN 10000 [IU]/ML
4000 INJECTION, SOLUTION INTRAVENOUS; SUBCUTANEOUS ONCE
Refills: 0 | Status: COMPLETED | OUTPATIENT
Start: 2019-01-01 | End: 2019-01-01

## 2019-01-01 RX ORDER — GLUCAGON INJECTION, SOLUTION 0.5 MG/.1ML
1 INJECTION, SOLUTION SUBCUTANEOUS ONCE
Refills: 0 | Status: DISCONTINUED | OUTPATIENT
Start: 2019-01-01 | End: 2019-01-01

## 2019-01-01 RX ORDER — SEVELAMER CARBONATE 2400 MG/1
2400 POWDER, FOR SUSPENSION ORAL
Refills: 0 | Status: DISCONTINUED | OUTPATIENT
Start: 2019-01-01 | End: 2019-01-01

## 2019-01-01 RX ORDER — ASPIRIN/CALCIUM CARB/MAGNESIUM 324 MG
81 TABLET ORAL DAILY
Refills: 0 | Status: DISCONTINUED | OUTPATIENT
Start: 2019-01-01 | End: 2019-01-01

## 2019-01-01 RX ORDER — ACETAMINOPHEN 500 MG
650 TABLET ORAL ONCE
Refills: 0 | Status: COMPLETED | OUTPATIENT
Start: 2019-01-01 | End: 2019-01-01

## 2019-01-01 RX ORDER — HEPARIN SODIUM 5000 [USP'U]/ML
INJECTION INTRAVENOUS; SUBCUTANEOUS
Qty: 25000 | Refills: 0 | Status: DISCONTINUED | OUTPATIENT
Start: 2019-01-01 | End: 2019-01-01

## 2019-01-01 RX ORDER — APIXABAN 2.5 MG/1
1 TABLET, FILM COATED ORAL
Qty: 0 | Refills: 0 | DISCHARGE
Start: 2019-01-01

## 2019-01-01 RX ORDER — ATORVASTATIN CALCIUM 80 MG/1
1 TABLET, FILM COATED ORAL
Qty: 0 | Refills: 0 | DISCHARGE

## 2019-01-01 RX ORDER — INSULIN LISPRO 100/ML
0 VIAL (ML) SUBCUTANEOUS
Qty: 0 | Refills: 0 | DISCHARGE

## 2019-01-01 RX ORDER — INSULIN LISPRO 100/ML
VIAL (ML) SUBCUTANEOUS
Refills: 0 | Status: DISCONTINUED | OUTPATIENT
Start: 2019-01-01 | End: 2019-01-01

## 2019-01-01 RX ORDER — ASPIRIN/CALCIUM CARB/MAGNESIUM 324 MG
1 TABLET ORAL
Qty: 0 | Refills: 0 | DISCHARGE
Start: 2019-01-01

## 2019-01-01 RX ORDER — CALCIUM ACETATE 667 MG
1 TABLET ORAL
Qty: 90 | Refills: 0
Start: 2019-01-01 | End: 2019-01-01

## 2019-01-01 RX ORDER — ONDANSETRON 8 MG/1
4 TABLET, FILM COATED ORAL ONCE
Refills: 0 | Status: DISCONTINUED | OUTPATIENT
Start: 2019-01-01 | End: 2019-01-01

## 2019-01-01 RX ORDER — ACETAMINOPHEN 500 MG
1000 TABLET ORAL ONCE
Refills: 0 | Status: DISCONTINUED | OUTPATIENT
Start: 2019-01-01 | End: 2019-01-01

## 2019-01-01 RX ORDER — DEXTROSE 50 % IN WATER 50 %
12.5 SYRINGE (ML) INTRAVENOUS ONCE
Refills: 0 | Status: DISCONTINUED | OUTPATIENT
Start: 2019-01-01 | End: 2019-01-01

## 2019-01-01 RX ORDER — FAMOTIDINE 10 MG/ML
20 INJECTION INTRAVENOUS DAILY
Refills: 0 | Status: DISCONTINUED | OUTPATIENT
Start: 2019-01-01 | End: 2019-01-01

## 2019-01-01 RX ORDER — GABAPENTIN 400 MG/1
1 CAPSULE ORAL
Qty: 0 | Refills: 0 | DISCHARGE
Start: 2019-01-01

## 2019-01-01 RX ORDER — HEPARIN SODIUM 5000 [USP'U]/ML
1200 INJECTION INTRAVENOUS; SUBCUTANEOUS
Qty: 25000 | Refills: 0 | Status: DISCONTINUED | OUTPATIENT
Start: 2019-01-01 | End: 2019-01-01

## 2019-01-01 RX ORDER — SODIUM BICARBONATE 1 MEQ/ML
0.46 SYRINGE (ML) INTRAVENOUS
Qty: 150 | Refills: 0 | Status: DISCONTINUED | OUTPATIENT
Start: 2019-01-01 | End: 2019-01-01

## 2019-01-01 RX ORDER — SEVELAMER CARBONATE 2400 MG/1
2 POWDER, FOR SUSPENSION ORAL
Qty: 0 | Refills: 0 | DISCHARGE
Start: 2019-01-01 | End: 2019-01-01

## 2019-01-01 RX ORDER — ATORVASTATIN CALCIUM 80 MG/1
20 TABLET, FILM COATED ORAL AT BEDTIME
Refills: 0 | Status: DISCONTINUED | OUTPATIENT
Start: 2019-01-01 | End: 2019-01-01

## 2019-01-01 RX ORDER — DEXTROSE 50 % IN WATER 50 %
25 SYRINGE (ML) INTRAVENOUS ONCE
Refills: 0 | Status: DISCONTINUED | OUTPATIENT
Start: 2019-01-01 | End: 2019-01-01

## 2019-01-01 RX ORDER — APIXABAN 2.5 MG/1
2.5 TABLET, FILM COATED ORAL EVERY 12 HOURS
Refills: 0 | Status: DISCONTINUED | OUTPATIENT
Start: 2019-01-01 | End: 2019-01-01

## 2019-01-01 RX ORDER — ALBUTEROL 90 UG/1
10 AEROSOL, METERED ORAL ONCE
Refills: 0 | Status: COMPLETED | OUTPATIENT
Start: 2019-01-01 | End: 2019-01-01

## 2019-01-01 RX ORDER — DEXTROSE 50 % IN WATER 50 %
15 SYRINGE (ML) INTRAVENOUS ONCE
Refills: 0 | Status: COMPLETED | OUTPATIENT
Start: 2019-01-01 | End: 2019-01-01

## 2019-01-01 RX ORDER — APIXABAN 2.5 MG/1
1 TABLET, FILM COATED ORAL
Qty: 0 | Refills: 0 | DISCHARGE

## 2019-01-01 RX ORDER — INFLUENZA VIRUS VACCINE 15; 15; 15; 15 UG/.5ML; UG/.5ML; UG/.5ML; UG/.5ML
0.5 SUSPENSION INTRAMUSCULAR ONCE
Refills: 0 | Status: DISCONTINUED | OUTPATIENT
Start: 2019-01-01 | End: 2019-01-01

## 2019-01-01 RX ORDER — SEVELAMER CARBONATE 2400 MG/1
2 POWDER, FOR SUSPENSION ORAL
Qty: 0 | Refills: 0 | DISCHARGE

## 2019-01-01 RX ORDER — SEVELAMER CARBONATE 2400 MG/1
3 POWDER, FOR SUSPENSION ORAL
Qty: 270 | Refills: 0
Start: 2019-01-01 | End: 2019-01-01

## 2019-01-01 RX ORDER — SEVELAMER CARBONATE 2400 MG/1
1600 POWDER, FOR SUSPENSION ORAL
Refills: 0 | Status: DISCONTINUED | OUTPATIENT
Start: 2019-01-01 | End: 2019-01-01

## 2019-01-01 RX ORDER — CALCIUM GLUCONATE 100 MG/ML
1 VIAL (ML) INTRAVENOUS ONCE
Refills: 0 | Status: COMPLETED | OUTPATIENT
Start: 2019-01-01 | End: 2019-01-01

## 2019-01-01 RX ORDER — ATORVASTATIN CALCIUM 80 MG/1
1 TABLET, FILM COATED ORAL
Qty: 30 | Refills: 0
Start: 2019-01-01 | End: 2019-01-01

## 2019-01-01 RX ORDER — INSULIN HUMAN 100 [IU]/ML
10 INJECTION, SOLUTION SUBCUTANEOUS ONCE
Refills: 0 | Status: COMPLETED | OUTPATIENT
Start: 2019-01-01 | End: 2019-01-01

## 2019-01-01 RX ORDER — ATORVASTATIN CALCIUM 80 MG/1
20 TABLET, FILM COATED ORAL
Qty: 0 | Refills: 0 | DISCHARGE

## 2019-01-01 RX ORDER — DEXTROSE 50 % IN WATER 50 %
50 SYRINGE (ML) INTRAVENOUS ONCE
Refills: 0 | Status: COMPLETED | OUTPATIENT
Start: 2019-01-01 | End: 2019-01-01

## 2019-01-01 RX ADMIN — Medication 1 DROP(S): at 19:07

## 2019-01-01 RX ADMIN — HEPARIN SODIUM 12 UNIT(S)/HR: 5000 INJECTION INTRAVENOUS; SUBCUTANEOUS at 20:24

## 2019-01-01 RX ADMIN — Medication 1 DROP(S): at 23:08

## 2019-01-01 RX ADMIN — HEPARIN SODIUM 12 UNIT(S)/HR: 5000 INJECTION INTRAVENOUS; SUBCUTANEOUS at 22:12

## 2019-01-01 RX ADMIN — Medication 1 DROP(S): at 05:46

## 2019-01-01 RX ADMIN — Medication 100 MILLIGRAM(S): at 05:53

## 2019-01-01 RX ADMIN — Medication 1 DROP(S): at 23:37

## 2019-01-01 RX ADMIN — SEVELAMER CARBONATE 2400 MILLIGRAM(S): 2400 POWDER, FOR SUSPENSION ORAL at 17:19

## 2019-01-01 RX ADMIN — HEPARIN SODIUM 1500 UNIT(S)/HR: 5000 INJECTION INTRAVENOUS; SUBCUTANEOUS at 10:56

## 2019-01-01 RX ADMIN — GABAPENTIN 300 MILLIGRAM(S): 400 CAPSULE ORAL at 22:11

## 2019-01-01 RX ADMIN — SODIUM ZIRCONIUM CYCLOSILICATE 10 GRAM(S): 10 POWDER, FOR SUSPENSION ORAL at 23:05

## 2019-01-01 RX ADMIN — HEPARIN SODIUM 12 UNIT(S)/HR: 5000 INJECTION INTRAVENOUS; SUBCUTANEOUS at 12:53

## 2019-01-01 RX ADMIN — Medication 100 MILLIGRAM(S): at 15:41

## 2019-01-01 RX ADMIN — GABAPENTIN 300 MILLIGRAM(S): 400 CAPSULE ORAL at 23:08

## 2019-01-01 RX ADMIN — Medication 200 GRAM(S): at 15:51

## 2019-01-01 RX ADMIN — Medication 81 MILLIGRAM(S): at 11:57

## 2019-01-01 RX ADMIN — Medication 100 MILLIGRAM(S): at 11:43

## 2019-01-01 RX ADMIN — Medication 667 MILLIGRAM(S): at 12:05

## 2019-01-01 RX ADMIN — Medication 667 MILLIGRAM(S): at 11:21

## 2019-01-01 RX ADMIN — ATORVASTATIN CALCIUM 20 MILLIGRAM(S): 80 TABLET, FILM COATED ORAL at 21:10

## 2019-01-01 RX ADMIN — SEVELAMER CARBONATE 1600 MILLIGRAM(S): 2400 POWDER, FOR SUSPENSION ORAL at 15:41

## 2019-01-01 RX ADMIN — Medication 650 MILLIGRAM(S): at 20:24

## 2019-01-01 RX ADMIN — Medication 667 MILLIGRAM(S): at 17:03

## 2019-01-01 RX ADMIN — Medication 650 MILLIGRAM(S): at 19:56

## 2019-01-01 RX ADMIN — Medication 1 DROP(S): at 11:25

## 2019-01-01 RX ADMIN — GABAPENTIN 300 MILLIGRAM(S): 400 CAPSULE ORAL at 21:12

## 2019-01-01 RX ADMIN — ATORVASTATIN CALCIUM 20 MILLIGRAM(S): 80 TABLET, FILM COATED ORAL at 23:08

## 2019-01-01 RX ADMIN — Medication 1 DROP(S): at 18:13

## 2019-01-01 RX ADMIN — HEPARIN SODIUM 1500 UNIT(S)/HR: 5000 INJECTION INTRAVENOUS; SUBCUTANEOUS at 11:08

## 2019-01-01 RX ADMIN — ATORVASTATIN CALCIUM 20 MILLIGRAM(S): 80 TABLET, FILM COATED ORAL at 00:34

## 2019-01-01 RX ADMIN — SEVELAMER CARBONATE 2400 MILLIGRAM(S): 2400 POWDER, FOR SUSPENSION ORAL at 12:04

## 2019-01-01 RX ADMIN — Medication 81 MILLIGRAM(S): at 11:43

## 2019-01-01 RX ADMIN — Medication 1 DROP(S): at 11:44

## 2019-01-01 RX ADMIN — Medication 100 MILLIGRAM(S): at 09:18

## 2019-01-01 RX ADMIN — FAMOTIDINE 20 MILLIGRAM(S): 10 INJECTION INTRAVENOUS at 23:07

## 2019-01-01 RX ADMIN — GABAPENTIN 300 MILLIGRAM(S): 400 CAPSULE ORAL at 21:10

## 2019-01-01 RX ADMIN — Medication 100 MILLIGRAM(S): at 21:12

## 2019-01-01 RX ADMIN — Medication 81 MILLIGRAM(S): at 11:22

## 2019-01-01 RX ADMIN — SEVELAMER CARBONATE 2400 MILLIGRAM(S): 2400 POWDER, FOR SUSPENSION ORAL at 11:21

## 2019-01-01 RX ADMIN — Medication 1 DROP(S): at 17:19

## 2019-01-01 RX ADMIN — SEVELAMER CARBONATE 1600 MILLIGRAM(S): 2400 POWDER, FOR SUSPENSION ORAL at 11:26

## 2019-01-01 RX ADMIN — FAMOTIDINE 20 MILLIGRAM(S): 10 INJECTION INTRAVENOUS at 11:22

## 2019-01-01 RX ADMIN — SEVELAMER CARBONATE 1600 MILLIGRAM(S): 2400 POWDER, FOR SUSPENSION ORAL at 13:07

## 2019-01-01 RX ADMIN — FAMOTIDINE 20 MILLIGRAM(S): 10 INJECTION INTRAVENOUS at 13:07

## 2019-01-01 RX ADMIN — HEPARIN SODIUM 12 UNIT(S)/HR: 5000 INJECTION INTRAVENOUS; SUBCUTANEOUS at 07:51

## 2019-01-01 RX ADMIN — Medication 81 MILLIGRAM(S): at 13:07

## 2019-01-01 RX ADMIN — HEPARIN SODIUM 1500 UNIT(S)/HR: 5000 INJECTION INTRAVENOUS; SUBCUTANEOUS at 20:05

## 2019-01-01 RX ADMIN — Medication 667 MILLIGRAM(S): at 07:52

## 2019-01-01 RX ADMIN — HEPARIN SODIUM 1500 UNIT(S)/HR: 5000 INJECTION INTRAVENOUS; SUBCUTANEOUS at 01:12

## 2019-01-01 RX ADMIN — Medication 1 DROP(S): at 05:40

## 2019-01-01 RX ADMIN — Medication 1 DROP(S): at 21:12

## 2019-01-01 RX ADMIN — Medication 100 MILLIGRAM(S): at 23:11

## 2019-01-01 RX ADMIN — SEVELAMER CARBONATE 1600 MILLIGRAM(S): 2400 POWDER, FOR SUSPENSION ORAL at 18:13

## 2019-01-01 RX ADMIN — Medication 1 DROP(S): at 05:34

## 2019-01-01 RX ADMIN — Medication 667 MILLIGRAM(S): at 08:37

## 2019-01-01 RX ADMIN — Medication 250 MEQ/KG/HR: at 16:39

## 2019-01-01 RX ADMIN — SEVELAMER CARBONATE 2400 MILLIGRAM(S): 2400 POWDER, FOR SUSPENSION ORAL at 07:52

## 2019-01-01 RX ADMIN — Medication 650 MILLIGRAM(S): at 01:18

## 2019-01-01 RX ADMIN — Medication 81 MILLIGRAM(S): at 11:26

## 2019-01-01 RX ADMIN — ALBUTEROL 10 MILLIGRAM(S): 90 AEROSOL, METERED ORAL at 15:51

## 2019-01-01 RX ADMIN — Medication 1 DROP(S): at 00:15

## 2019-01-01 RX ADMIN — Medication 1 DROP(S): at 11:19

## 2019-01-01 RX ADMIN — Medication 1 DROP(S): at 00:56

## 2019-01-01 RX ADMIN — Medication 100 MILLIGRAM(S): at 05:34

## 2019-01-01 RX ADMIN — GABAPENTIN 300 MILLIGRAM(S): 400 CAPSULE ORAL at 21:11

## 2019-01-01 RX ADMIN — Medication 1 DROP(S): at 05:03

## 2019-01-01 RX ADMIN — Medication 1 DROP(S): at 14:36

## 2019-01-01 RX ADMIN — ATORVASTATIN CALCIUM 20 MILLIGRAM(S): 80 TABLET, FILM COATED ORAL at 21:12

## 2019-01-01 RX ADMIN — Medication 100 MILLIGRAM(S): at 00:13

## 2019-01-01 RX ADMIN — SEVELAMER CARBONATE 1600 MILLIGRAM(S): 2400 POWDER, FOR SUSPENSION ORAL at 08:20

## 2019-01-01 RX ADMIN — SEVELAMER CARBONATE 1600 MILLIGRAM(S): 2400 POWDER, FOR SUSPENSION ORAL at 19:05

## 2019-01-01 RX ADMIN — Medication 1 DROP(S): at 17:02

## 2019-01-01 RX ADMIN — Medication 1 DROP(S): at 13:06

## 2019-01-01 RX ADMIN — SEVELAMER CARBONATE 2400 MILLIGRAM(S): 2400 POWDER, FOR SUSPENSION ORAL at 08:37

## 2019-01-01 RX ADMIN — Medication 81 MILLIGRAM(S): at 12:04

## 2019-01-01 RX ADMIN — Medication 15 GRAM(S): at 20:47

## 2019-01-01 RX ADMIN — HEPARIN SODIUM 1500 UNIT(S)/HR: 5000 INJECTION INTRAVENOUS; SUBCUTANEOUS at 14:37

## 2019-01-01 RX ADMIN — ATORVASTATIN CALCIUM 20 MILLIGRAM(S): 80 TABLET, FILM COATED ORAL at 21:11

## 2019-01-01 RX ADMIN — Medication 100 MILLIGRAM(S): at 00:33

## 2019-01-01 RX ADMIN — Medication 1 DROP(S): at 05:26

## 2019-01-01 RX ADMIN — Medication 650 MILLIGRAM(S): at 20:45

## 2019-01-01 RX ADMIN — SODIUM ZIRCONIUM CYCLOSILICATE 10 GRAM(S): 10 POWDER, FOR SUSPENSION ORAL at 17:26

## 2019-01-01 RX ADMIN — APIXABAN 2.5 MILLIGRAM(S): 2.5 TABLET, FILM COATED ORAL at 09:24

## 2019-01-01 RX ADMIN — Medication 1 DROP(S): at 12:05

## 2019-01-01 RX ADMIN — SEVELAMER CARBONATE 1600 MILLIGRAM(S): 2400 POWDER, FOR SUSPENSION ORAL at 23:09

## 2019-01-01 RX ADMIN — ERYTHROPOIETIN 4000 UNIT(S): 10000 INJECTION, SOLUTION INTRAVENOUS; SUBCUTANEOUS at 16:59

## 2019-01-01 RX ADMIN — Medication 1 DROP(S): at 00:43

## 2019-01-01 RX ADMIN — Medication 1 DROP(S): at 05:53

## 2019-01-01 RX ADMIN — SEVELAMER CARBONATE 1600 MILLIGRAM(S): 2400 POWDER, FOR SUSPENSION ORAL at 11:57

## 2019-01-01 RX ADMIN — HEPARIN SODIUM 12 UNIT(S)/HR: 5000 INJECTION INTRAVENOUS; SUBCUTANEOUS at 03:30

## 2019-01-01 RX ADMIN — SEVELAMER CARBONATE 2400 MILLIGRAM(S): 2400 POWDER, FOR SUSPENSION ORAL at 17:02

## 2019-01-01 RX ADMIN — INSULIN HUMAN 10 UNIT(S): 100 INJECTION, SOLUTION SUBCUTANEOUS at 15:52

## 2019-01-01 RX ADMIN — Medication 1 DROP(S): at 23:09

## 2019-01-01 RX ADMIN — Medication 667 MILLIGRAM(S): at 15:13

## 2019-01-01 RX ADMIN — Medication 650 MILLIGRAM(S): at 20:50

## 2019-01-01 RX ADMIN — ATORVASTATIN CALCIUM 20 MILLIGRAM(S): 80 TABLET, FILM COATED ORAL at 22:11

## 2019-01-01 RX ADMIN — GABAPENTIN 300 MILLIGRAM(S): 400 CAPSULE ORAL at 00:35

## 2019-01-01 RX ADMIN — HEPARIN SODIUM 1500 UNIT(S)/HR: 5000 INJECTION INTRAVENOUS; SUBCUTANEOUS at 03:21

## 2019-01-01 RX ADMIN — Medication 1 DROP(S): at 06:40

## 2019-01-01 RX ADMIN — SEVELAMER CARBONATE 1600 MILLIGRAM(S): 2400 POWDER, FOR SUSPENSION ORAL at 09:00

## 2019-01-01 RX ADMIN — FAMOTIDINE 20 MILLIGRAM(S): 10 INJECTION INTRAVENOUS at 12:04

## 2019-01-01 RX ADMIN — FAMOTIDINE 20 MILLIGRAM(S): 10 INJECTION INTRAVENOUS at 11:43

## 2019-01-01 RX ADMIN — Medication 1 DROP(S): at 11:20

## 2019-01-01 RX ADMIN — Medication 667 MILLIGRAM(S): at 17:20

## 2019-01-01 RX ADMIN — Medication 50 MILLILITER(S): at 15:51

## 2019-01-01 RX ADMIN — Medication 100 MILLIGRAM(S): at 11:22

## 2019-01-01 RX ADMIN — Medication 100 MILLIGRAM(S): at 05:26

## 2019-01-01 RX ADMIN — Medication 100 MILLIGRAM(S): at 14:36

## 2019-01-04 ENCOUNTER — TRANSCRIPTION ENCOUNTER (OUTPATIENT)
Age: 69
End: 2019-01-04

## 2019-01-06 NOTE — DISCHARGE NOTE ADULT - MEDICATION SUMMARY - MEDICATIONS TO CHANGE
No
I will SWITCH the dose or number of times a day I take the medications listed below when I get home from the hospital:  None

## 2019-01-13 ENCOUNTER — TRANSCRIPTION ENCOUNTER (OUTPATIENT)
Age: 69
End: 2019-01-13

## 2019-01-14 ENCOUNTER — APPOINTMENT (OUTPATIENT)
Dept: VASCULAR SURGERY | Facility: CLINIC | Age: 69
End: 2019-01-14

## 2019-01-14 ENCOUNTER — APPOINTMENT (OUTPATIENT)
Dept: VASCULAR SURGERY | Facility: CLINIC | Age: 69
End: 2019-01-14
Payer: MEDICARE

## 2019-01-14 PROCEDURE — 99213 OFFICE O/P EST LOW 20 MIN: CPT

## 2019-01-14 NOTE — PHYSICAL EXAM
[Thrill] : thrill [aneurysm] : no aneurysm [Bleeding] : no bleeding [Normal] : normoactive bowel sounds, soft and nontender, no hepatosplenomegaly or masses appreciated

## 2019-01-14 NOTE — ASSESSMENT
[FreeTextEntry1] : Patient with renal failure on hemodialysis with a right upper arm AV graft. Patient complaining of burning sensation over the area of the AV graft. No evidence of infection. Patient has a good thrill. Plan for fistula duplex in 1-2 weeks.\par \par Patient complaining of bilateral lower extremity swelling. Recommend compression and elevation.

## 2019-01-15 ENCOUNTER — APPOINTMENT (OUTPATIENT)
Dept: VASCULAR SURGERY | Facility: CLINIC | Age: 69
End: 2019-01-15
Payer: MEDICARE

## 2019-01-15 PROCEDURE — 93990 DOPPLER FLOW TESTING: CPT

## 2019-03-06 ENCOUNTER — INPATIENT (INPATIENT)
Facility: HOSPITAL | Age: 69
LOS: 3 days | Discharge: HOME | DRG: 640 | End: 2019-03-10
Attending: INTERNAL MEDICINE | Admitting: INTERNAL MEDICINE
Payer: MEDICARE

## 2019-03-06 VITALS
WEIGHT: 169.98 LBS | RESPIRATION RATE: 18 BRPM | SYSTOLIC BLOOD PRESSURE: 152 MMHG | TEMPERATURE: 98 F | HEART RATE: 66 BPM | DIASTOLIC BLOOD PRESSURE: 61 MMHG | OXYGEN SATURATION: 97 % | HEIGHT: 64 IN

## 2019-03-06 DIAGNOSIS — I48.2 CHRONIC ATRIAL FIBRILLATION: ICD-10-CM

## 2019-03-06 DIAGNOSIS — E11.9 TYPE 2 DIABETES MELLITUS WITHOUT COMPLICATIONS: ICD-10-CM

## 2019-03-06 DIAGNOSIS — N18.6 END STAGE RENAL DISEASE: ICD-10-CM

## 2019-03-06 DIAGNOSIS — E87.5 HYPERKALEMIA: ICD-10-CM

## 2019-03-06 DIAGNOSIS — E11.43 TYPE 2 DIABETES MELLITUS WITH DIABETIC AUTONOMIC (POLY)NEUROPATHY: ICD-10-CM

## 2019-03-06 DIAGNOSIS — Z93.1 GASTROSTOMY STATUS: Chronic | ICD-10-CM

## 2019-03-06 DIAGNOSIS — Z98.890 OTHER SPECIFIED POSTPROCEDURAL STATES: Chronic | ICD-10-CM

## 2019-03-06 DIAGNOSIS — E78.49 OTHER HYPERLIPIDEMIA: ICD-10-CM

## 2019-03-06 DIAGNOSIS — K21.9 GASTRO-ESOPHAGEAL REFLUX DISEASE WITHOUT ESOPHAGITIS: ICD-10-CM

## 2019-03-06 LAB
ALBUMIN SERPL ELPH-MCNC: 3.8 G/DL — SIGNIFICANT CHANGE UP (ref 3.3–5)
ALP SERPL-CCNC: 119 U/L — SIGNIFICANT CHANGE UP (ref 40–120)
ALT FLD-CCNC: 20 U/L — SIGNIFICANT CHANGE UP (ref 10–45)
ANION GAP SERPL CALC-SCNC: 17 MMOL/L — SIGNIFICANT CHANGE UP (ref 5–17)
ANION GAP SERPL CALC-SCNC: 19 MMOL/L — HIGH (ref 5–17)
ANION GAP SERPL CALC-SCNC: 19 MMOL/L — HIGH (ref 5–17)
AST SERPL-CCNC: 29 U/L — SIGNIFICANT CHANGE UP (ref 10–40)
BASOPHILS # BLD AUTO: 0 K/UL — SIGNIFICANT CHANGE UP (ref 0–0.2)
BASOPHILS NFR BLD AUTO: 0.1 % — SIGNIFICANT CHANGE UP (ref 0–2)
BILIRUB SERPL-MCNC: 0.2 MG/DL — SIGNIFICANT CHANGE UP (ref 0.2–1.2)
BUN SERPL-MCNC: 89 MG/DL — HIGH (ref 7–23)
BUN SERPL-MCNC: 96 MG/DL — HIGH (ref 7–23)
BUN SERPL-MCNC: 97 MG/DL — HIGH (ref 7–23)
CALCIUM SERPL-MCNC: 8.8 MG/DL — SIGNIFICANT CHANGE UP (ref 8.4–10.5)
CALCIUM SERPL-MCNC: 9 MG/DL — SIGNIFICANT CHANGE UP (ref 8.4–10.5)
CALCIUM SERPL-MCNC: 9.3 MG/DL — SIGNIFICANT CHANGE UP (ref 8.4–10.5)
CHLORIDE SERPL-SCNC: 93 MMOL/L — LOW (ref 96–108)
CHLORIDE SERPL-SCNC: 93 MMOL/L — LOW (ref 96–108)
CHLORIDE SERPL-SCNC: 94 MMOL/L — LOW (ref 96–108)
CO2 SERPL-SCNC: 21 MMOL/L — LOW (ref 22–31)
CO2 SERPL-SCNC: 23 MMOL/L — SIGNIFICANT CHANGE UP (ref 22–31)
CO2 SERPL-SCNC: 23 MMOL/L — SIGNIFICANT CHANGE UP (ref 22–31)
CREAT SERPL-MCNC: 6.1 MG/DL — HIGH (ref 0.5–1.3)
CREAT SERPL-MCNC: 6.21 MG/DL — HIGH (ref 0.5–1.3)
CREAT SERPL-MCNC: 6.24 MG/DL — HIGH (ref 0.5–1.3)
EOSINOPHIL # BLD AUTO: 0.2 K/UL — SIGNIFICANT CHANGE UP (ref 0–0.5)
EOSINOPHIL NFR BLD AUTO: 1.8 % — SIGNIFICANT CHANGE UP (ref 0–6)
GAS PNL BLDV: SIGNIFICANT CHANGE UP
GLUCOSE BLDC GLUCOMTR-MCNC: 151 MG/DL — HIGH (ref 70–99)
GLUCOSE BLDC GLUCOMTR-MCNC: 155 MG/DL — HIGH (ref 70–99)
GLUCOSE SERPL-MCNC: 82 MG/DL — SIGNIFICANT CHANGE UP (ref 70–99)
GLUCOSE SERPL-MCNC: 84 MG/DL — SIGNIFICANT CHANGE UP (ref 70–99)
GLUCOSE SERPL-MCNC: 87 MG/DL — SIGNIFICANT CHANGE UP (ref 70–99)
HCT VFR BLD CALC: 37.1 % — SIGNIFICANT CHANGE UP (ref 34.5–45)
HGB BLD-MCNC: 12.4 G/DL — SIGNIFICANT CHANGE UP (ref 11.5–15.5)
LYMPHOCYTES # BLD AUTO: 1.4 K/UL — SIGNIFICANT CHANGE UP (ref 1–3.3)
LYMPHOCYTES # BLD AUTO: 14 % — SIGNIFICANT CHANGE UP (ref 13–44)
MCHC RBC-ENTMCNC: 30.1 PG — SIGNIFICANT CHANGE UP (ref 27–34)
MCHC RBC-ENTMCNC: 33.3 GM/DL — SIGNIFICANT CHANGE UP (ref 32–36)
MCV RBC AUTO: 90.3 FL — SIGNIFICANT CHANGE UP (ref 80–100)
MONOCYTES # BLD AUTO: 0.6 K/UL — SIGNIFICANT CHANGE UP (ref 0–0.9)
MONOCYTES NFR BLD AUTO: 5.9 % — SIGNIFICANT CHANGE UP (ref 2–14)
NEUTROPHILS # BLD AUTO: 8 K/UL — HIGH (ref 1.8–7.4)
NEUTROPHILS NFR BLD AUTO: 78.2 % — HIGH (ref 43–77)
PHOSPHATE SERPL-MCNC: 6 MG/DL — HIGH (ref 2.5–4.5)
PLATELET # BLD AUTO: 253 K/UL — SIGNIFICANT CHANGE UP (ref 150–400)
POTASSIUM SERPL-MCNC: 8.4 MMOL/L — CRITICAL HIGH (ref 3.5–5.3)
POTASSIUM SERPL-MCNC: >9 MMOL/L — CRITICAL HIGH (ref 3.5–5.3)
POTASSIUM SERPL-MCNC: >9 MMOL/L — CRITICAL HIGH (ref 3.5–5.3)
POTASSIUM SERPL-SCNC: 8.4 MMOL/L — CRITICAL HIGH (ref 3.5–5.3)
POTASSIUM SERPL-SCNC: >9 MMOL/L — CRITICAL HIGH (ref 3.5–5.3)
POTASSIUM SERPL-SCNC: >9 MMOL/L — CRITICAL HIGH (ref 3.5–5.3)
PROT SERPL-MCNC: 7.9 G/DL — SIGNIFICANT CHANGE UP (ref 6–8.3)
RBC # BLD: 4.11 M/UL — SIGNIFICANT CHANGE UP (ref 3.8–5.2)
RBC # FLD: 13.9 % — SIGNIFICANT CHANGE UP (ref 10.3–14.5)
SODIUM SERPL-SCNC: 133 MMOL/L — LOW (ref 135–145)
SODIUM SERPL-SCNC: 134 MMOL/L — LOW (ref 135–145)
SODIUM SERPL-SCNC: 135 MMOL/L — SIGNIFICANT CHANGE UP (ref 135–145)
WBC # BLD: 10.3 K/UL — SIGNIFICANT CHANGE UP (ref 3.8–10.5)
WBC # FLD AUTO: 10.3 K/UL — SIGNIFICANT CHANGE UP (ref 3.8–10.5)

## 2019-03-06 PROCEDURE — 70450 CT HEAD/BRAIN W/O DYE: CPT | Mod: 26

## 2019-03-06 PROCEDURE — 93010 ELECTROCARDIOGRAM REPORT: CPT

## 2019-03-06 PROCEDURE — 99285 EMERGENCY DEPT VISIT HI MDM: CPT | Mod: GC,25

## 2019-03-06 PROCEDURE — 73564 X-RAY EXAM KNEE 4 OR MORE: CPT | Mod: 26,RT

## 2019-03-06 PROCEDURE — 99223 1ST HOSP IP/OBS HIGH 75: CPT

## 2019-03-06 PROCEDURE — 71045 X-RAY EXAM CHEST 1 VIEW: CPT | Mod: 26

## 2019-03-06 RX ORDER — POLYETHYLENE GLYCOL 3350 17 G/17G
17 POWDER, FOR SOLUTION ORAL DAILY
Qty: 0 | Refills: 0 | Status: DISCONTINUED | OUTPATIENT
Start: 2019-03-06 | End: 2019-03-10

## 2019-03-06 RX ORDER — ACETAMINOPHEN 500 MG
2 TABLET ORAL
Qty: 0 | Refills: 0 | COMMUNITY

## 2019-03-06 RX ORDER — FAMOTIDINE 10 MG/ML
20 INJECTION INTRAVENOUS
Qty: 0 | Refills: 0 | Status: DISCONTINUED | OUTPATIENT
Start: 2019-03-06 | End: 2019-03-08

## 2019-03-06 RX ORDER — MIDODRINE HYDROCHLORIDE 2.5 MG/1
2.5 TABLET ORAL
Qty: 0 | Refills: 0 | Status: DISCONTINUED | OUTPATIENT
Start: 2019-03-06 | End: 2019-03-10

## 2019-03-06 RX ORDER — GABAPENTIN 400 MG/1
300 CAPSULE ORAL THREE TIMES A DAY
Qty: 0 | Refills: 0 | Status: DISCONTINUED | OUTPATIENT
Start: 2019-03-06 | End: 2019-03-08

## 2019-03-06 RX ORDER — GLUCAGON INJECTION, SOLUTION 0.5 MG/.1ML
1 INJECTION, SOLUTION SUBCUTANEOUS ONCE
Qty: 0 | Refills: 0 | Status: DISCONTINUED | OUTPATIENT
Start: 2019-03-06 | End: 2019-03-10

## 2019-03-06 RX ORDER — ASPIRIN/CALCIUM CARB/MAGNESIUM 324 MG
81 TABLET ORAL DAILY
Qty: 0 | Refills: 0 | Status: DISCONTINUED | OUTPATIENT
Start: 2019-03-06 | End: 2019-03-10

## 2019-03-06 RX ORDER — SODIUM CHLORIDE 9 MG/ML
1000 INJECTION, SOLUTION INTRAVENOUS
Qty: 0 | Refills: 0 | Status: DISCONTINUED | OUTPATIENT
Start: 2019-03-06 | End: 2019-03-10

## 2019-03-06 RX ORDER — DEXTROSE 50 % IN WATER 50 %
15 SYRINGE (ML) INTRAVENOUS ONCE
Qty: 0 | Refills: 0 | Status: DISCONTINUED | OUTPATIENT
Start: 2019-03-06 | End: 2019-03-10

## 2019-03-06 RX ORDER — CALCIUM GLUCONATE 100 MG/ML
2 VIAL (ML) INTRAVENOUS ONCE
Qty: 0 | Refills: 0 | Status: COMPLETED | OUTPATIENT
Start: 2019-03-06 | End: 2019-03-06

## 2019-03-06 RX ORDER — ATORVASTATIN CALCIUM 80 MG/1
20 TABLET, FILM COATED ORAL AT BEDTIME
Qty: 0 | Refills: 0 | Status: DISCONTINUED | OUTPATIENT
Start: 2019-03-06 | End: 2019-03-10

## 2019-03-06 RX ORDER — DEXTROSE 50 % IN WATER 50 %
12.5 SYRINGE (ML) INTRAVENOUS ONCE
Qty: 0 | Refills: 0 | Status: DISCONTINUED | OUTPATIENT
Start: 2019-03-06 | End: 2019-03-10

## 2019-03-06 RX ORDER — APIXABAN 2.5 MG/1
2.5 TABLET, FILM COATED ORAL EVERY 12 HOURS
Qty: 0 | Refills: 0 | Status: DISCONTINUED | OUTPATIENT
Start: 2019-03-06 | End: 2019-03-10

## 2019-03-06 RX ORDER — INFLUENZA VIRUS VACCINE 15; 15; 15; 15 UG/.5ML; UG/.5ML; UG/.5ML; UG/.5ML
0.5 SUSPENSION INTRAMUSCULAR ONCE
Qty: 0 | Refills: 0 | Status: DISCONTINUED | OUTPATIENT
Start: 2019-03-06 | End: 2019-03-10

## 2019-03-06 RX ORDER — DEXTROSE 50 % IN WATER 50 %
50 SYRINGE (ML) INTRAVENOUS ONCE
Qty: 0 | Refills: 0 | Status: COMPLETED | OUTPATIENT
Start: 2019-03-06 | End: 2019-03-06

## 2019-03-06 RX ORDER — ACETAMINOPHEN 500 MG
975 TABLET ORAL ONCE
Qty: 0 | Refills: 0 | Status: COMPLETED | OUTPATIENT
Start: 2019-03-06 | End: 2019-03-06

## 2019-03-06 RX ORDER — ALBUTEROL 90 UG/1
10 AEROSOL, METERED ORAL ONCE
Qty: 0 | Refills: 0 | Status: COMPLETED | OUTPATIENT
Start: 2019-03-06 | End: 2019-03-06

## 2019-03-06 RX ORDER — SEVELAMER CARBONATE 2400 MG/1
1600 POWDER, FOR SUSPENSION ORAL
Qty: 0 | Refills: 0 | Status: DISCONTINUED | OUTPATIENT
Start: 2019-03-06 | End: 2019-03-10

## 2019-03-06 RX ORDER — CALCIUM GLUCONATE 100 MG/ML
2 VIAL (ML) INTRAVENOUS ONCE
Qty: 0 | Refills: 0 | Status: DISCONTINUED | OUTPATIENT
Start: 2019-03-06 | End: 2019-03-06

## 2019-03-06 RX ORDER — INSULIN HUMAN 100 [IU]/ML
10 INJECTION, SOLUTION SUBCUTANEOUS ONCE
Qty: 0 | Refills: 0 | Status: COMPLETED | OUTPATIENT
Start: 2019-03-06 | End: 2019-03-06

## 2019-03-06 RX ORDER — INSULIN LISPRO 100/ML
VIAL (ML) SUBCUTANEOUS
Qty: 0 | Refills: 0 | Status: DISCONTINUED | OUTPATIENT
Start: 2019-03-06 | End: 2019-03-10

## 2019-03-06 RX ADMIN — FAMOTIDINE 20 MILLIGRAM(S): 10 INJECTION INTRAVENOUS at 18:22

## 2019-03-06 RX ADMIN — INSULIN HUMAN 10 UNIT(S): 100 INJECTION, SOLUTION SUBCUTANEOUS at 12:52

## 2019-03-06 RX ADMIN — ATORVASTATIN CALCIUM 20 MILLIGRAM(S): 80 TABLET, FILM COATED ORAL at 23:05

## 2019-03-06 RX ADMIN — APIXABAN 2.5 MILLIGRAM(S): 2.5 TABLET, FILM COATED ORAL at 18:22

## 2019-03-06 RX ADMIN — Medication 50 MILLILITER(S): at 12:53

## 2019-03-06 RX ADMIN — Medication 81 MILLIGRAM(S): at 18:22

## 2019-03-06 RX ADMIN — ALBUTEROL 10 MILLIGRAM(S): 90 AEROSOL, METERED ORAL at 12:42

## 2019-03-06 RX ADMIN — Medication 100 GRAM(S): at 14:45

## 2019-03-06 RX ADMIN — Medication 1: at 18:22

## 2019-03-06 RX ADMIN — GABAPENTIN 300 MILLIGRAM(S): 400 CAPSULE ORAL at 23:05

## 2019-03-06 NOTE — CONSULT NOTE ADULT - SUBJECTIVE AND OBJECTIVE BOX
NEPHROLOGY - NSN    Patient seen and examined.    HPI:       PAST MEDICAL & SURGICAL HISTORY:  Hyperlipidemia  Diabetes: Type 2  Hypertension  Osteomyelitis  Diabetic Neuropathy  Cellulitis of Foot  Edema of Both Legs  History of Osteoarthritis  Status post insertion of percutaneous endoscopic gastrostomy (PEG) tube  H/O tracheostomy  S/P amputation of lesser toe, right: 2013 right great toe, 2nd and 3rd right toes  S/P Amputation of Lesser Toe: Rt 1-3 metatarsal      MEDICATIONS  (STANDING):  calcium gluconate IVPB 2 Gram(s) IV Intermittent once      ALLERGIES:  No Known Allergies      SOCIAL HISTORY:  Denies alcohol abuse, drug abuse or tobacco usage.     FAMILY HISTORY:  Family history of diabetes mellitus in father      VITALS:  T(C): 36.7 (03-06-19 @ 12:58), Max: 36.7 (03-06-19 @ 12:58)  HR: 71 (03-06-19 @ 12:58) (66 - 75)  BP: 134/62 (03-06-19 @ 12:58) (134/62 - 154/56)  RR: 20 (03-06-19 @ 12:58) (18 - 20)  SpO2: 100% (03-06-19 @ 12:58) (96% - 100%)    REVIEW OF SYSTEMS:  Denies any nausea, vomiting, diarrhea, fever or chills. Denies chest pain, SOB, focal weakness, hematuria or dysuria. Good oral intake and denies fatigue or weakness. All other pertinent systems are reviewed and are negative.    PHYSICAL EXAM:  Constitutional: NAD  HEENT: EOMI  Neck:  No JVD, supple   Respiratory: CTA B/L  Cardiovascular: S1 and S2, RRR  Gastrointestinal: + BS, soft, NT, ND  Extremities: No peripheral edema, + peripheral pulses  Neurological: A/O x 3, CN2-12 intact  Psychiatric: Normal mood, normal affect  : No Raphael  Skin: No rashes, C/D/I  Access: Not applicable    I and O's:    Height (cm): 162.56 (03-06 @ 09:05)  Weight (kg): 77.1 (03-06 @ 09:05)  BMI (kg/m2): 29.2 (03-06 @ 09:05)  BSA (m2): 1.83 (03-06 @ 09:05)    LABS:                        12.4   10.3  )-----------( 253      ( 06 Mar 2019 09:53 )             37.1     03-06    134<L>  |  94<L>  |  96<H>  ----------------------------<  82  >9.0<HH>   |  21<L>  |  6.24<H>    Ca    8.8      06 Mar 2019 12:58    TPro  7.9  /  Alb  3.8  /  TBili  0.2  /  DBili  x   /  AST  29  /  ALT  20  /  AlkPhos  119  03-06      RADIOLOGY & ADDITIONAL STUDIES:    < from: Xray Knee 4 Views, Right (03.06.19 @ 10:35) >    Impression: No fracture or dislocation of the right knee.    < end of copied text >      ASSESSMENT:    1. Renal - ESRD on HD - MWF - due for HD today  2. Hyperkalemia - due to ESRD - indicated for urgent HD today    RECOMMEND:  1. HD today as ordered  2. Phosphorus added on to AM labs      Kylee Serra NP-C  [facility]  [phone] NEPHROLOGY - NSN    Patient seen and examined in ER. +weakness    HPI: 68-year-old female with past medical history of diabetes, hypertension, PVD, CAD, and ESRD on HD, presents s/p fall. Upon admission her potassium is noted to be >9, but specimen is hemolyzed.    She has had diabetes for about 20 years. She has hypertension but it has been well controlled. She has been on dialysis for about 2-3 years.  She undergoes hemodialysis on Monday, Wednesday, and Friday at [facility] in [place].  She does not know how much fluid is removed at each dialysis session.       PAST MEDICAL & SURGICAL HISTORY:  Hyperlipidemia  Diabetes: Type 2  Hypertension  Osteomyelitis  Diabetic Neuropathy  Cellulitis of Foot  Edema of Both Legs  History of Osteoarthritis  Status post insertion of percutaneous endoscopic gastrostomy (PEG) tube  H/O tracheostomy  S/P amputation of lesser toe, right: 2013 right great toe, 2nd and 3rd right toes      MEDICATIONS  (STANDING):  calcium gluconate IVPB 2 Gram(s) IV Intermittent once      ALLERGIES:  No Known Allergies      SOCIAL HISTORY:  Denies alcohol abuse, drug abuse or tobacco usage.       FAMILY HISTORY:  Family history of diabetes mellitus in father      VITALS:  T(C): 36.7 (03-06-19 @ 12:58), Max: 36.7 (03-06-19 @ 12:58)  HR: 71 (03-06-19 @ 12:58) (66 - 75)  BP: 134/62 (03-06-19 @ 12:58) (134/62 - 154/56)  RR: 20 (03-06-19 @ 12:58) (18 - 20)  SpO2: 100% (03-06-19 @ 12:58) (96% - 100%)    REVIEW OF SYSTEMS:  Denies any nausea, vomiting, diarrhea, fever or chills. Denies chest pain, SOB, focal weakness, hematuria or dysuria. All other pertinent systems are reviewed and are negative.      PHYSICAL EXAM:  Constitutional: NAD  HEENT: EOMI  Neck:  No JVD, supple   Respiratory: CTA B/L  Cardiovascular: S1 and S2, RRR  Gastrointestinal: + BS, soft, NT, ND  Extremities: No peripheral edema, + peripheral pulses  Neurological: grossly normal  : No Raphael  Skin: No rashes, C/D/I  Access: Right AV graft, +thrill    I and O's:    Height (cm): 162.56 (03-06 @ 09:05)  Weight (kg): 77.1 (03-06 @ 09:05)  BMI (kg/m2): 29.2 (03-06 @ 09:05)  BSA (m2): 1.83 (03-06 @ 09:05)    LABS:                        12.4   10.3  )-----------( 253      ( 06 Mar 2019 09:53 )             37.1     03-06    134<L>  |  94<L>  |  96<H>  ----------------------------<  82  >9.0<HH>   |  21<L>  |  6.24<H>    Ca    8.8      06 Mar 2019 12:58    TPro  7.9  /  Alb  3.8  /  TBili  0.2  /  DBili  x   /  AST  29  /  ALT  20  /  AlkPhos  119  03-06      RADIOLOGY & ADDITIONAL STUDIES:    < from: Xray Knee 4 Views, Right (03.06.19 @ 10:35) >    Impression: No fracture or dislocation of the right knee.    < end of copied text >    < from: Xray Chest 1 View AP/PA (03.06.19 @ 10:29) >    IMPRESSION: Linear scarring versus atelectasis in the mid left lung   field. No focal consolidation is seen.    < end of copied text >    ASSESSMENT:  68 year old female with PMH of HTN, DM, ESRD on HD, who presents to [facility] ER s/p fall. She is hyperkalemic and will require HD today.   1. Renal - ESRD on HD - MWF - due for HD today  2. Hyperkalemia - due to ESRD - indicated for HD today with low potassium bath  3. Hyperphosphatemia - due to ESRD - indicated for Phos binders    RECOMMEND:  1. HD today as ordered  2. No needlesticks RUE  3. Renal diet  4. Renagel 1600mg PO TID with meals      BREANNE Javier  [facility]  [phone]

## 2019-03-06 NOTE — ED ADULT NURSE REASSESSMENT NOTE - NS ED NURSE REASSESS COMMENT FT1
CBC and CMP sent to lab. Pt declining Tylenol at this time, BEENA Dior made aware. Awaiting X-ray at this time. Pt and family aware of plan of care. CBC and CMP sent to lab. Pt declining Tylenol at this time, BEENA Liz made aware. Awaiting X-ray at this time. Pt and family aware of plan of care.

## 2019-03-06 NOTE — ED PROVIDER NOTE - PROGRESS NOTE DETAILS
K 8.4 with mild hemolysis. d/w Dr. Schmid states admit to his service. case d/w patient's nephrologist Dr. Gold, states that he will ensure patient is scheduled for dialysis today and states no need for contact of nephrology fellow. -Shalonda Smith PA-C

## 2019-03-06 NOTE — H&P ADULT - HISTORY OF PRESENT ILLNESS
[id number] used for Farsi     Patient is a 68 year old female with DM2, HTN, PVD, CAD and ESRD on HD presents to the ED s/p mechanical fall. Patient remarks that her legs felt very weak and "crampy" today and she fell on her right knee when she was walking today. She denies chest pain, shortness of breath, palpitations, dizziness, lightheadedness prior to fall. She did not lose consciousness. She has been compliant with her medications and has been going to dialysis regularly on Monday Wednesday and Friday at  [facility] in [place]. At bedside, patient does not have any complaints.     In the ED, VSS. K >9. EKG NSR with no T wave changes.  [id number] used for Farsi     Patient is a 68 year old female with DM2, HTN, PVD, CAD, atrial fibrillation and ESRD on HD presents to the ED s/p mechanical fall. Patient remarks that her legs felt very weak and "crampy" today and she fell on her right knee when she was walking today. She denies chest pain, shortness of breath, palpitations, dizziness, lightheadedness prior to fall. She did not lose consciousness. She has been compliant with her medications and has been going to dialysis regularly on Monday Wednesday and Friday at  [facility]. At bedside, patient does not have any complaints.     In the ED, VSS. K >9. EKG NSR with no T wave changes.

## 2019-03-06 NOTE — ED PROVIDER NOTE - CLINICAL SUMMARY MEDICAL DECISION MAKING FREE TEXT BOX
Unsteady footing s/p fall today.  will get xray knee.  will get ekg and labs to screen for a need for emergent dialysis.  will reassess.

## 2019-03-06 NOTE — ED ADULT NURSE REASSESSMENT NOTE - NS ED NURSE REASSESS COMMENT FT1
Patient noted to have hemolyzed potassium. Multiple attempts to obtain Potassium level made by multiple RNs due to difficult vasculature. ED MD Thomas Cerda made aware. Safety and comfort measures provided.

## 2019-03-06 NOTE — H&P ADULT - NSHPPHYSICALEXAM_GEN_ALL_CORE
Vital Signs Last 24 Hrs  T(C): 36.7 (06 Mar 2019 12:58), Max: 36.7 (06 Mar 2019 12:58)  T(F): 98 (06 Mar 2019 12:58), Max: 98 (06 Mar 2019 12:58)  HR: 108 (06 Mar 2019 14:47) (66 - 108)  BP: 127/58 (06 Mar 2019 14:37) (127/58 - 154/56)  BP(mean): 84 (06 Mar 2019 12:58) (84 - 84)  RR: 19 (06 Mar 2019 14:37) (18 - 20)  SpO2: 100% (06 Mar 2019 14:37) (96% - 100%)    GENERAL: NAD, well-developed  HEAD:  Atraumatic, Normocephalic  EYES: EOMI, PERRLA, conjunctiva and sclera clear  ENT: Pharynx not erythematous  PULMONARY: Clear to auscultation bilaterally; No wheeze  CARDIOVASCULAR: Regular rate and rhythm; No murmurs, rubs, or gallops  ABDOMEN: Soft, Nontender, Nondistended; Bowel sounds present  EXTREMITIES:  2+ Peripheral Pulses, No clubbing, cyanosis, or edema  MUSCULOSKELETAL: right AV graft  PSYCH: AAOx3, normal affect  SKIN: warm and dry, No rashes or lesions

## 2019-03-06 NOTE — ED ADULT NURSE REASSESSMENT NOTE - NS ED NURSE REASSESS COMMENT FT1
Patient noted to have hemolyzed potassium again. Per ED MD Cerda to give hyperkalemic medications. Patient remains with no IV lock due to difficult vasculature. ED MD Cerda aware. Ultrasound team paged by BEENA Smith at this time. Safety and comfort measures provided.

## 2019-03-06 NOTE — H&P ADULT - ASSESSMENT
Patient is a 68 year old female with DM2, HTN, PVD, CAD and ESRD on HD presents to the ED s/p mechanical fall. Patient remarks that her legs felt very weak and "crampy" today and she fell on her right knee when she was walking today admitted for mechanical fall found to have hyperkalemia. Patient is a 68 year old female with DM2, HTN, PVD, CAD, chronic fibrillation and ESRD on HD presents to the ED s/p mechanical fall. Patient remarks that her legs felt very weak and "crampy" today and she fell on her right knee when she was walking today admitted for mechanical fall found to have hyperkalemia.

## 2019-03-06 NOTE — CONSULT NOTE ADULT - ATTENDING COMMENTS
Renal attd   Hyperkalemia and will need HD.  No EKG  changes  Pt has fluid noncompliance at home  HD today Renal attd   Hyperkalemia and will need HD.  No EKG  changes  Pt has fluid noncompliance at home  HD today    Addendum-Seen at HD

## 2019-03-06 NOTE — ED ADULT NURSE NOTE - NSIMPLEMENTINTERV_GEN_ALL_ED
Implemented All Fall with Harm Risk Interventions:  [place] to call system. Call bell, personal items and telephone within reach. Instruct patient to call for assistance. Room bathroom lighting operational. Non-slip footwear when patient is off stretcher. Physically safe environment: no spills, clutter or unnecessary equipment. Stretcher in lowest position, wheels locked, appropriate side rails in place. Provide visual cue, wrist band, yellow gown, etc. Monitor gait and stability. Monitor for mental status changes and reorient to person, place, and time. Review medications for side effects contributing to fall risk. Reinforce activity limits and safety measures with patient and family. Provide visual clues: red socks.

## 2019-03-06 NOTE — H&P ADULT - NSHPLABSRESULTS_GEN_ALL_CORE
.  LABS:                         12.4   10.3  )-----------( 253      ( 06 Mar 2019 09:53 )             37.1     03-06    134<L>  |  94<L>  |  96<H>  ----------------------------<  82  >9.0<HH>   |  21<L>  |  6.24<H>    Ca    8.8      06 Mar 2019 12:58  Phos  6.0     03-06    TPro  7.9  /  Alb  3.8  /  TBili  0.2  /  DBili  x   /  AST  29  /  ALT  20  /  AlkPhos  119  03-06              RADIOLOGY, EKG & ADDITIONAL TESTS: Reviewed.     ekg reviewed by me--> NSR, with no acute st- t changes, no t wave changes

## 2019-03-06 NOTE — ED ADULT NURSE NOTE - OBJECTIVE STATEMENT
67 y/o female PMH diabetes, HTN and 1 kidney on dialysis (M, W, F) presents to ED reporting a fall today via EMS. Pt reports falling off toilet to floor when going to the bathroom. Pt fell onto bilateral knees. R knee small abrasion. Pt denies hitting head, reports being on Eliquis. Pt has dialysis for 9:30AM today. EMS reports per son pt was on floor for 45 minutes. Pt also reports having difficulty ambulating recently, last fall was Sunday. On exam, AOx3, speaking in complete sentences. Lung sounds CTA, NAD, O2 sat 96%. PERRL 3mm, equal strength in upper extremities and equal weakness in lower extremities, pt reports this is her baseline. ROM to all 4 extremities. Bilateral lower leg cellulitis per son, redness to bilateral lower extremities, no warmth. R foot amputation of all 5 toes. +1 pitting edema to bilateral lower extremities. EKG completed by EDT & given to attending. FS 88. Pt denies SOB, dizziness, fever, n/v/d, CP and urinary symptoms at this time. Family at bedside.

## 2019-03-06 NOTE — PATIENT PROFILE ADULT - CHOOSE INDICATION TO IMMUNIZE (AN ORDER WILL BE GENERATED WHEN THIS NOTE IS SAVED):
Continued Stay Note  [facility]     Patient Name: Sarah Jeffrey  MRN: [id number]  Today's Date: 12/4/2018    Admit Date: 12/2/2018    Discharge Plan     Row Name 12/04/18 5012       Plan    Plan  Referrals to [facility], [facility] and [facility]    Patient/Family in Agreement with Plan  other (see comments)    Plan Comments  Spoke with APS  Supervisor Demetri Desouza in CCP office. He stated Shaina Lujan ([phone]) is pts assigned  and he is doing the investigation and will report back to her. [facility] called Shaina, introduced self and updated on plan and referrals to SNF. She request CCP to call her once dc plan is confirmed. CCP received vm from Ashleigh at [facility] and Sabrina at [facility] and no LTC beds available. CCP spoke with Fannie[facility] she will have referral outcome in the morning. CCP still has not heard back from Fox at [facility]. CCP to follow up tomorrow. packet in ccp office. sam crystal/ccp    Row Name 12/04/18 [id number]       Plan    Plan  Referrals to SNF    Plan Comments  Recieved VM Glendy[facility], pt is accepted and bed available. CCP called [facility] ([phone]) and left vm requesting referral outcome. [facility] ([phone]) and left vm requesting referral outcome. CCP called [facility] and left message for fox to call ccp back. CCP called Cindy/[facility] and unable to take medicaid pending. CCP called local APS Office [phone] and left vm for Cindy Gillis to determine who is assigned  for pts case. Requested call back, as no one had called CCP back yesterday. Spoke with Jessy pts son and updated and he would also like referral to [facility] for eval. Packet in CCP office. sam crystal/ccp        Discharge Codes    No documentation.             Neida Pearson RN     Patient is 18 years or older (and not pregnant)

## 2019-03-06 NOTE — ED ADULT NURSE REASSESSMENT NOTE - NS ED NURSE REASSESS COMMENT FT1
Pharmacy called x 3rd time at this time for calcium gluconate. Per pharmacy will bring calcium gluconate at this time.

## 2019-03-06 NOTE — ED ADULT NURSE REASSESSMENT NOTE - NS ED NURSE REASSESS COMMENT FT1
Iv established at this time. Medication administered as per order. Patient appears to be resting comfortably in stretcher. Patient denies any chest pain, dizziness, palpitations, SOB, n/v/d, numbness, tingling. A&OX3. Safety and comfort measures provided.

## 2019-03-06 NOTE — ED PROVIDER NOTE - OBJECTIVE STATEMENT
Attendinyo female presents with fall today.  pt states that she feels unsteady at baseline.  Today pt was going to the bathroom and lost her footing.  her right leg gave out and she landed on the right knee.  family was unable to get her up so EMS was called.  pt states that usually she feels more steady on her feet after dialysis.  last dialysis on Mon.  usually dialyzed M-W-.

## 2019-03-06 NOTE — ED ADULT NURSE REASSESSMENT NOTE - NS ED NURSE REASSESS COMMENT FT1
Patient appears to be resting comfortably in stretcher. Patient denies any chest pain, dizziness, n/v/d, numbness, tingling, SOB. Cardiac monitor in place. Safety and comfort measures provided. A&OX3. ED MD Cerda aware of patient's tachycardia. No further intervention at this time.

## 2019-03-06 NOTE — ED ADULT TRIAGE NOTE - BSA (M2)
Telephone [phone] when telephone number dialed, the call does not go through    Left message on the [facility] area code phone number [phone]

## 2019-03-07 LAB
ANION GAP SERPL CALC-SCNC: 13 MMOL/L — SIGNIFICANT CHANGE UP (ref 5–17)
BUN SERPL-MCNC: 33 MG/DL — HIGH (ref 7–23)
BUN SERPL-MCNC: 55 MG/DL — HIGH (ref 7–23)
CALCIUM SERPL-MCNC: 8.8 MG/DL — SIGNIFICANT CHANGE UP (ref 8.4–10.5)
CALCIUM SERPL-MCNC: 8.8 MG/DL — SIGNIFICANT CHANGE UP (ref 8.4–10.5)
CHLORIDE SERPL-SCNC: 95 MMOL/L — LOW (ref 96–108)
CHLORIDE SERPL-SCNC: 96 MMOL/L — SIGNIFICANT CHANGE UP (ref 96–108)
CO2 SERPL-SCNC: 23 MMOL/L — SIGNIFICANT CHANGE UP (ref 22–31)
CO2 SERPL-SCNC: 26 MMOL/L — SIGNIFICANT CHANGE UP (ref 22–31)
CREAT SERPL-MCNC: 3.27 MG/DL — HIGH (ref 0.5–1.3)
CREAT SERPL-MCNC: 4.57 MG/DL — HIGH (ref 0.5–1.3)
GAS PNL BLDV: SIGNIFICANT CHANGE UP
GLUCOSE BLDC GLUCOMTR-MCNC: 147 MG/DL — HIGH (ref 70–99)
GLUCOSE BLDC GLUCOMTR-MCNC: 168 MG/DL — HIGH (ref 70–99)
GLUCOSE BLDC GLUCOMTR-MCNC: 78 MG/DL — SIGNIFICANT CHANGE UP (ref 70–99)
GLUCOSE BLDC GLUCOMTR-MCNC: 96 MG/DL — SIGNIFICANT CHANGE UP (ref 70–99)
GLUCOSE SERPL-MCNC: 169 MG/DL — HIGH (ref 70–99)
GLUCOSE SERPL-MCNC: 221 MG/DL — HIGH (ref 70–99)
HBA1C BLD-MCNC: 5.2 % — SIGNIFICANT CHANGE UP (ref 4–5.6)
HCT VFR BLD CALC: 33.9 % — LOW (ref 34.5–45)
HGB BLD-MCNC: 10.4 G/DL — LOW (ref 11.5–15.5)
MAGNESIUM SERPL-MCNC: 2.3 MG/DL — SIGNIFICANT CHANGE UP (ref 1.6–2.6)
MCHC RBC-ENTMCNC: 29.5 PG — SIGNIFICANT CHANGE UP (ref 27–34)
MCHC RBC-ENTMCNC: 30.7 GM/DL — LOW (ref 32–36)
MCV RBC AUTO: 96.3 FL — SIGNIFICANT CHANGE UP (ref 80–100)
PLATELET # BLD AUTO: 282 K/UL — SIGNIFICANT CHANGE UP (ref 150–400)
POTASSIUM SERPL-MCNC: 4.8 MMOL/L — SIGNIFICANT CHANGE UP (ref 3.5–5.3)
POTASSIUM SERPL-MCNC: 7.2 MMOL/L — CRITICAL HIGH (ref 3.5–5.3)
POTASSIUM SERPL-SCNC: 4.8 MMOL/L — SIGNIFICANT CHANGE UP (ref 3.5–5.3)
POTASSIUM SERPL-SCNC: 7.2 MMOL/L — CRITICAL HIGH (ref 3.5–5.3)
RBC # BLD: 3.52 M/UL — LOW (ref 3.8–5.2)
RBC # FLD: 14.8 % — HIGH (ref 10.3–14.5)
SODIUM SERPL-SCNC: 134 MMOL/L — LOW (ref 135–145)
SODIUM SERPL-SCNC: 135 MMOL/L — SIGNIFICANT CHANGE UP (ref 135–145)
WBC # BLD: 9.23 K/UL — SIGNIFICANT CHANGE UP (ref 3.8–10.5)
WBC # FLD AUTO: 9.23 K/UL — SIGNIFICANT CHANGE UP (ref 3.8–10.5)

## 2019-03-07 PROCEDURE — 93010 ELECTROCARDIOGRAM REPORT: CPT

## 2019-03-07 RX ORDER — ACETAMINOPHEN 500 MG
650 TABLET ORAL ONCE
Qty: 0 | Refills: 0 | Status: COMPLETED | OUTPATIENT
Start: 2019-03-07 | End: 2019-03-07

## 2019-03-07 RX ADMIN — Medication 650 MILLIGRAM(S): at 01:55

## 2019-03-07 RX ADMIN — SEVELAMER CARBONATE 1600 MILLIGRAM(S): 2400 POWDER, FOR SUSPENSION ORAL at 12:47

## 2019-03-07 RX ADMIN — MIDODRINE HYDROCHLORIDE 2.5 MILLIGRAM(S): 2.5 TABLET ORAL at 09:07

## 2019-03-07 RX ADMIN — GABAPENTIN 300 MILLIGRAM(S): 400 CAPSULE ORAL at 13:24

## 2019-03-07 RX ADMIN — GABAPENTIN 300 MILLIGRAM(S): 400 CAPSULE ORAL at 06:07

## 2019-03-07 RX ADMIN — Medication 650 MILLIGRAM(S): at 09:07

## 2019-03-07 RX ADMIN — Medication 1 DROP(S): at 12:48

## 2019-03-07 RX ADMIN — APIXABAN 2.5 MILLIGRAM(S): 2.5 TABLET, FILM COATED ORAL at 17:18

## 2019-03-07 RX ADMIN — SEVELAMER CARBONATE 1600 MILLIGRAM(S): 2400 POWDER, FOR SUSPENSION ORAL at 17:18

## 2019-03-07 RX ADMIN — FAMOTIDINE 20 MILLIGRAM(S): 10 INJECTION INTRAVENOUS at 06:07

## 2019-03-07 RX ADMIN — FAMOTIDINE 20 MILLIGRAM(S): 10 INJECTION INTRAVENOUS at 17:18

## 2019-03-07 RX ADMIN — Medication 1 DROP(S): at 06:14

## 2019-03-07 RX ADMIN — APIXABAN 2.5 MILLIGRAM(S): 2.5 TABLET, FILM COATED ORAL at 06:07

## 2019-03-07 RX ADMIN — SEVELAMER CARBONATE 1600 MILLIGRAM(S): 2400 POWDER, FOR SUSPENSION ORAL at 09:07

## 2019-03-07 RX ADMIN — Medication 650 MILLIGRAM(S): at 09:45

## 2019-03-07 RX ADMIN — Medication 650 MILLIGRAM(S): at 01:11

## 2019-03-07 RX ADMIN — Medication 81 MILLIGRAM(S): at 12:48

## 2019-03-07 RX ADMIN — ATORVASTATIN CALCIUM 20 MILLIGRAM(S): 80 TABLET, FILM COATED ORAL at 21:23

## 2019-03-07 RX ADMIN — POLYETHYLENE GLYCOL 3350 17 GRAM(S): 17 POWDER, FOR SOLUTION ORAL at 12:49

## 2019-03-07 RX ADMIN — GABAPENTIN 300 MILLIGRAM(S): 400 CAPSULE ORAL at 21:23

## 2019-03-07 RX ADMIN — Medication 1 DROP(S): at 17:18

## 2019-03-07 RX ADMIN — Medication 1: at 12:48

## 2019-03-07 RX ADMIN — Medication 1 DROP(S): at 23:35

## 2019-03-07 NOTE — PROGRESS NOTE ADULT - SUBJECTIVE AND OBJECTIVE BOX
NEPHROLOGY-NSN [phone]        Patient seen and examined in the chair.  The potassium was elevated again        MEDICATIONS  (STANDING):  apixaban 2.5 milliGRAM(s) Oral every 12 hours  artificial tears (preservative free) Ophthalmic Solution 1 Drop(s) Both EYES every 6 hours  aspirin  chewable 81 milliGRAM(s) Oral daily  atorvastatin 20 milliGRAM(s) Oral at bedtime  dextrose 5%. 1000 milliLiter(s) (50 mL/Hr) IV Continuous <Continuous>  dextrose 50% Injectable 12.5 Gram(s) IV Push once  famotidine    Tablet 20 milliGRAM(s) Oral two times a day  gabapentin 300 milliGRAM(s) Oral three times a day  influenza   Vaccine 0.5 milliLiter(s) IntraMuscular once  insulin lispro (HumaLOG) corrective regimen sliding scale   SubCutaneous three times a day before meals  midodrine 2.5 milliGRAM(s) Oral <User Schedule>  polyethylene glycol 3350 17 Gram(s) Oral daily  sevelamer hydrochloride 1600 milliGRAM(s) Oral three times a day with meals      VITAL:  T(C): , Max: 37.1 (03-07-19 @ 04:08)  T(F): , Max: 98.8 (03-07-19 @ 04:08)  HR: 83 (03-07-19 @ 04:08)  BP: 106/60 (03-07-19 @ 08:45)  BP(mean): 84 (03-06-19 @ 12:58)  RR: 18 (03-07-19 @ 04:08)  SpO2: 94% (03-07-19 @ 04:08)  Wt(kg): --    I and O's:    03-06 @ 07:01  -  03-07 @ 07:00  --------------------------------------------------------  IN: 1100 mL / OUT: 2300 mL / NET: -1200 mL    03-07 @ 07:01  -  03-07 @ 10:04  --------------------------------------------------------  IN: 220 mL / OUT: 0 mL / NET: 220 mL      Height (cm): 152.4 (03-06 @ 22:35)  Weight (kg): 83 (03-06 @ 22:35)  BMI (kg/m2): 35.7 (03-06 @ 22:35)  BSA (m2): 1.8 (03-06 @ 22:35)    PHYSICAL EXAM:    Constitutional: NAD  Neck:  No JVD  Respiratory: CTAB/L  Cardiovascular: S1 and S2  Gastrointestinal: BS+, soft, NT/ND  Extremities: wrinkled peripheral edema  Neurological: A/O x 3, no focal deficits  Psychiatric: Normal mood, normal affect  : No Raphael  Skin: No rashes  Access: av access    LABS:                        10.4   9.23  )-----------( 282      ( 07 Mar 2019 08:35 )             33.9     03-07    x   |  x   |  x   ----------------------------<  x   7.2<HH>   |  x   |  x     Ca    8.8      06 Mar 2019 12:58  Phos  6.0     03-06    TPro  7.9  /  Alb  3.8  /  TBili  0.2  /  DBili  x   /  AST  29  /  ALT  20  /  AlkPhos  119  03-06          Urine Studies:          RADIOLOGY & ADDITIONAL STUDIES:

## 2019-03-07 NOTE — PROGRESS NOTE ADULT - SUBJECTIVE AND OBJECTIVE BOX
Chief complaint:pt  had  a mechanical  fall  injured  righ  knee joint  , Xray  normal, found to  have  elevated  K . today 6.6 for  HD  today    HPI:   [id number] used for Johnsi     Patient is a 68 year old female with DM2, HTN, PVD, CAD, atrial fibrillation and ESRD on HD presents to the ED s/p mechanical fall. Patient remarks that her legs felt very weak and "crampy" today and she fell on her right knee when she was walking today. She denies chest pain, shortness of breath, palpitations, dizziness, lightheadedness prior to fall. She did not lose consciousness. She has been compliant with her medications and has been going to dialysis regularly on Monday Wednesday and Friday at  [facility] in [place]. At bedside, patient does not have any complaints.     In the ED, VSS. K >9. EKG NSR with no T wave changes. (06 Mar 2019 15:41)      REVIEW OF SYSTEMS:    CONSTITUTIONAL: No fever, weight loss, or fatigue  NECK: No pain or stiffness  RESPIRATORY: No cough, wheezing, chills or hemoptysis; No shortness of breath  CARDIOVASCULAR: No chest pain, palpitations, dizziness, or leg swelling  GASTROINTESTINAL: No abdominal or epigastric pain. No nausea, vomiting, or hematemesis; No diarrhea or constipation. No melena or hematochezia.  GENITOURINARY: No dysuria, frequency, hematuria, or incontinence  NEUROLOGICAL: No headaches, memory loss, loss of strength, numbness, or tremors  SKIN: No itching, burning, rashes, or lesions   LYMPH NODES: No enlarged glands  MUSCULOSKELETAL: No joint pain or swelling; No muscle, back, or extremity pain  HEME/LYMPH: No easy bruising, or bleeding gums    MEDICATIONS  (STANDING):  apixaban 2.5 milliGRAM(s) Oral every 12 hours  artificial tears (preservative free) Ophthalmic Solution 1 Drop(s) Both EYES every 6 hours  aspirin  chewable 81 milliGRAM(s) Oral daily  atorvastatin 20 milliGRAM(s) Oral at bedtime  dextrose 5%. 1000 milliLiter(s) (50 mL/Hr) IV Continuous <Continuous>  dextrose 50% Injectable 12.5 Gram(s) IV Push once  famotidine    Tablet 20 milliGRAM(s) Oral two times a day  gabapentin 300 milliGRAM(s) Oral three times a day  influenza   Vaccine 0.5 milliLiter(s) IntraMuscular once  insulin lispro (HumaLOG) corrective regimen sliding scale   SubCutaneous three times a day before meals  midodrine 2.5 milliGRAM(s) Oral <User Schedule>  polyethylene glycol 3350 17 Gram(s) Oral daily  sevelamer hydrochloride 1600 milliGRAM(s) Oral three times a day with meals    MEDICATIONS  (PRN):  acetaminophen   Tablet .. 650 milliGRAM(s) Oral once PRN Moderate Pain (4 - 6)  dextrose 40% Gel 15 Gram(s) Oral once PRN Blood Glucose LESS THAN 70 milliGRAM(s)/deciliter  glucagon  Injectable 1 milliGRAM(s) IntraMuscular once PRN Glucose LESS THAN 70 milligrams/deciliter      Allergies    No Known Allergies    Intolerances        Vital Signs Last 24 Hrs  T(C): 37.1 (07 Mar 2019 04:08), Max: 37.1 (07 Mar 2019 04:08)  T(F): 98.8 (07 Mar 2019 04:08), Max: 98.8 (07 Mar 2019 04:08)  HR: 83 (07 Mar 2019 04:08) (66 - 108)  BP: 113/53 (07 Mar 2019 04:08) (102/60 - 154/56)  BP(mean): 84 (06 Mar 2019 12:58) (84 - 84)  RR: 18 (07 Mar 2019 04:08) (17 - 20)  SpO2: 94% (07 Mar 2019 04:08) (93% - 100%)    PHYSICAL EXAM:    GENERAL: NAD, well-groomed, well-developed  HEAD:  Atraumatic, Normocephalic  EYES: EOMI, PERRLA, conjunctiva and sclera clear  ENMT: No tonsillar erythema, exudates, or enlargement; Moist mucous membranes, Good dentition, No lesions  NECK: Supple, No JVD, Normal thyroid  NERVOUS SYSTEM:  Alert & Oriented X3, Good concentration; Motor Strength 5/5 B/L upper and lower extremities; DTRs 2+ intact and symmetric  CHEST/LUNG: Clear to percussion bilaterally; No rales, rhonchi, wheezing, or rubs  HEART: Regular rate and rhythm; No murmurs, rubs, or gallops  ABDOMEN: Soft, Nontender, Nondistended; Bowel sounds present  EXTREMITIES:  2+ Peripheral Pulses, No clubbing, cyanosis, or edema  LYMPH: No lymphadenopathy noted  SKIN: No rashes or lesions      LABS:                        10.4   9.23  )-----------( 282      ( 07 Mar 2019 08:35 )             33.9     03-07    x   |  x   |  x   ----------------------------<  x   7.2<HH>   |  x   |  x     Ca    8.8      06 Mar 2019 12:58  Phos  6.0     03-06    TPro  7.9  /  Alb  3.8  /  TBili  0.2  /  DBili  x   /  AST  29  /  ALT  20  /  AlkPhos  119  03-06          RADIOLOGY & ADDITIONAL STUDIES:

## 2019-03-07 NOTE — PROGRESS NOTE ADULT - ASSESSMENT
68 year old female with PMH of HTN, DM, ESRD on HD, who presents to [facility] ER s/p fall. She is hyperkalemic and will require HD today.   1. Renal - ESRD on HD - MWF;  hyperkalemia   2. Mechanical fall in light of neuropathic pain   3. Hyperphosphatemia - due to ESRD - indicated for Phos binders    RECOMMEND:  1. HD today as ordered x hours   2. No needlesticks RUE  3. Renal diet  4. Renagel 1600mg PO TID with meals      Physical therapy

## 2019-03-08 ENCOUNTER — TRANSCRIPTION ENCOUNTER (OUTPATIENT)
Age: 69
End: 2019-03-08

## 2019-03-08 LAB
ANION GAP SERPL CALC-SCNC: 16 MMOL/L — SIGNIFICANT CHANGE UP (ref 5–17)
BUN SERPL-MCNC: 62 MG/DL — HIGH (ref 7–23)
CALCIUM SERPL-MCNC: 9.3 MG/DL — SIGNIFICANT CHANGE UP (ref 8.4–10.5)
CHLORIDE SERPL-SCNC: 101 MMOL/L — SIGNIFICANT CHANGE UP (ref 96–108)
CO2 SERPL-SCNC: 24 MMOL/L — SIGNIFICANT CHANGE UP (ref 22–31)
CREAT SERPL-MCNC: 5.1 MG/DL — HIGH (ref 0.5–1.3)
GAS PNL BLDV: SIGNIFICANT CHANGE UP
GLUCOSE BLDC GLUCOMTR-MCNC: 100 MG/DL — HIGH (ref 70–99)
GLUCOSE BLDC GLUCOMTR-MCNC: 124 MG/DL — HIGH (ref 70–99)
GLUCOSE BLDC GLUCOMTR-MCNC: 95 MG/DL — SIGNIFICANT CHANGE UP (ref 70–99)
GLUCOSE SERPL-MCNC: 111 MG/DL — HIGH (ref 70–99)
HCT VFR BLD CALC: 33.9 % — LOW (ref 34.5–45)
HGB BLD-MCNC: 10.2 G/DL — LOW (ref 11.5–15.5)
MCHC RBC-ENTMCNC: 29 PG — SIGNIFICANT CHANGE UP (ref 27–34)
MCHC RBC-ENTMCNC: 30.1 GM/DL — LOW (ref 32–36)
MCV RBC AUTO: 96.3 FL — SIGNIFICANT CHANGE UP (ref 80–100)
PLATELET # BLD AUTO: 306 K/UL — SIGNIFICANT CHANGE UP (ref 150–400)
POTASSIUM SERPL-MCNC: 6.3 MMOL/L — CRITICAL HIGH (ref 3.5–5.3)
POTASSIUM SERPL-SCNC: 6.3 MMOL/L — CRITICAL HIGH (ref 3.5–5.3)
RBC # BLD: 3.52 M/UL — LOW (ref 3.8–5.2)
RBC # FLD: 14.6 % — HIGH (ref 10.3–14.5)
SODIUM SERPL-SCNC: 141 MMOL/L — SIGNIFICANT CHANGE UP (ref 135–145)
WBC # BLD: 8.13 K/UL — SIGNIFICANT CHANGE UP (ref 3.8–10.5)
WBC # FLD AUTO: 8.13 K/UL — SIGNIFICANT CHANGE UP (ref 3.8–10.5)

## 2019-03-08 RX ORDER — FAMOTIDINE 10 MG/ML
20 INJECTION INTRAVENOUS DAILY
Qty: 0 | Refills: 0 | Status: DISCONTINUED | OUTPATIENT
Start: 2019-03-08 | End: 2019-03-10

## 2019-03-08 RX ORDER — FAMOTIDINE 10 MG/ML
1 INJECTION INTRAVENOUS
Qty: 0 | Refills: 0 | DISCHARGE
Start: 2019-03-08

## 2019-03-08 RX ORDER — FAMOTIDINE 10 MG/ML
1 INJECTION INTRAVENOUS
Qty: 0 | Refills: 0 | COMMUNITY

## 2019-03-08 RX ORDER — GABAPENTIN 400 MG/1
300 CAPSULE ORAL AT BEDTIME
Qty: 0 | Refills: 0 | Status: DISCONTINUED | OUTPATIENT
Start: 2019-03-08 | End: 2019-03-10

## 2019-03-08 RX ORDER — GABAPENTIN 400 MG/1
1 CAPSULE ORAL
Qty: 0 | Refills: 0 | DISCHARGE
Start: 2019-03-08

## 2019-03-08 RX ORDER — GABAPENTIN 400 MG/1
1 CAPSULE ORAL
Qty: 0 | Refills: 0 | COMMUNITY

## 2019-03-08 RX ADMIN — ATORVASTATIN CALCIUM 20 MILLIGRAM(S): 80 TABLET, FILM COATED ORAL at 21:11

## 2019-03-08 RX ADMIN — FAMOTIDINE 20 MILLIGRAM(S): 10 INJECTION INTRAVENOUS at 21:17

## 2019-03-08 RX ADMIN — APIXABAN 2.5 MILLIGRAM(S): 2.5 TABLET, FILM COATED ORAL at 18:31

## 2019-03-08 RX ADMIN — SEVELAMER CARBONATE 1600 MILLIGRAM(S): 2400 POWDER, FOR SUSPENSION ORAL at 08:12

## 2019-03-08 RX ADMIN — SEVELAMER CARBONATE 1600 MILLIGRAM(S): 2400 POWDER, FOR SUSPENSION ORAL at 18:31

## 2019-03-08 RX ADMIN — Medication 1 DROP(S): at 12:09

## 2019-03-08 RX ADMIN — FAMOTIDINE 20 MILLIGRAM(S): 10 INJECTION INTRAVENOUS at 05:45

## 2019-03-08 RX ADMIN — SEVELAMER CARBONATE 1600 MILLIGRAM(S): 2400 POWDER, FOR SUSPENSION ORAL at 12:08

## 2019-03-08 RX ADMIN — POLYETHYLENE GLYCOL 3350 17 GRAM(S): 17 POWDER, FOR SOLUTION ORAL at 12:09

## 2019-03-08 RX ADMIN — Medication 81 MILLIGRAM(S): at 12:09

## 2019-03-08 RX ADMIN — MIDODRINE HYDROCHLORIDE 2.5 MILLIGRAM(S): 2.5 TABLET ORAL at 10:34

## 2019-03-08 RX ADMIN — Medication 1 DROP(S): at 05:45

## 2019-03-08 RX ADMIN — GABAPENTIN 300 MILLIGRAM(S): 400 CAPSULE ORAL at 21:11

## 2019-03-08 RX ADMIN — GABAPENTIN 300 MILLIGRAM(S): 400 CAPSULE ORAL at 05:45

## 2019-03-08 RX ADMIN — APIXABAN 2.5 MILLIGRAM(S): 2.5 TABLET, FILM COATED ORAL at 05:45

## 2019-03-08 RX ADMIN — Medication 1 DROP(S): at 18:32

## 2019-03-08 NOTE — DISCHARGE NOTE NURSING/CASE MANAGEMENT/SOCIAL WORK - NSDCDPATPORTLINK_GEN_ALL_CORE
You can access the [facility] Patient Portal, offered by [facility], by registering with the following website: [email or web address]

## 2019-03-08 NOTE — PHYSICAL THERAPY INITIAL EVALUATION ADULT - PLANNED THERAPY INTERVENTIONS, PT EVAL
balance training/gait training/transfer training/bed mobility training/stair training; GOAL: Pt will negotiate up & down 3 stairs independently with bilateral HR, in 2 weeks.

## 2019-03-08 NOTE — DISCHARGE NOTE PROVIDER - CARE PROVIDER_API CALL
Bryan Schmid)  Medicine  [address], [address]  [place] [address]  Phone: [phone]  Fax: [phone]  Follow Up Time:     Pat Gold)  Internal Medicine; Nephrology  [address], [address]  [place] [address]  Phone: [phone]  Fax: [phone]  Follow Up Time:

## 2019-03-08 NOTE — PROGRESS NOTE ADULT - SUBJECTIVE AND OBJECTIVE BOX
Chief complaint: alert   , no  chest  pain    K 6.2      HPI:   [id number] used for Farsi     Patient is a 68 year old female with DM2, HTN, PVD, CAD, atrial fibrillation and ESRD on HD presents to the ED s/p mechanical fall. Patient remarks that her legs felt very weak and "crampy" today and she fell on her right knee when she was walking today. She denies chest pain, shortness of breath, palpitations, dizziness, lightheadedness prior to fall. She did not lose consciousness. She has been compliant with her medications and has been going to dialysis regularly on Monday Wednesday and Friday at  [facility] in [place]. At bedside, patient does not have any complaints.     In the ED, VSS. K >9. EKG NSR with no T wave changes. (06 Mar 2019 15:41)      REVIEW OF SYSTEMS:    CONSTITUTIONAL: No fever, weight loss, or fatigue  NECK: No pain or stiffness  RESPIRATORY: No cough, wheezing, chills or hemoptysis; No shortness of breath  CARDIOVASCULAR: No chest pain, palpitations, dizziness, or leg swelling  GASTROINTESTINAL: No abdominal or epigastric pain. No nausea, vomiting, or hematemesis; No diarrhea or constipation. No melena or hematochezia.  GENITOURINARY: No dysuria, frequency, hematuria, or incontinence  NEUROLOGICAL: No headaches, memory loss, loss of strength, numbness, or tremors  SKIN: No itching, burning, rashes, or lesions   LYMPH NODES: No enlarged glands  MUSCULOSKELETAL: No joint pain or swelling; No muscle, back, or extremity pain  HEME/LYMPH: No easy bruising, or bleeding gums    MEDICATIONS  (STANDING):  apixaban 2.5 milliGRAM(s) Oral every 12 hours  artificial tears (preservative free) Ophthalmic Solution 1 Drop(s) Both EYES every 6 hours  aspirin  chewable 81 milliGRAM(s) Oral daily  atorvastatin 20 milliGRAM(s) Oral at bedtime  dextrose 5%. 1000 milliLiter(s) (50 mL/Hr) IV Continuous <Continuous>  dextrose 50% Injectable 12.5 Gram(s) IV Push once  famotidine    Tablet 20 milliGRAM(s) Oral two times a day  gabapentin 300 milliGRAM(s) Oral three times a day  influenza   Vaccine 0.5 milliLiter(s) IntraMuscular once  insulin lispro (HumaLOG) corrective regimen sliding scale   SubCutaneous three times a day before meals  midodrine 2.5 milliGRAM(s) Oral <User Schedule>  polyethylene glycol 3350 17 Gram(s) Oral daily  sevelamer hydrochloride 1600 milliGRAM(s) Oral three times a day with meals    MEDICATIONS  (PRN):  dextrose 40% Gel 15 Gram(s) Oral once PRN Blood Glucose LESS THAN 70 milliGRAM(s)/deciliter  glucagon  Injectable 1 milliGRAM(s) IntraMuscular once PRN Glucose LESS THAN 70 milligrams/deciliter      Allergies    No Known Allergies    Intolerances        Vital Signs Last 24 Hrs  T(C): 36.8 (08 Mar 2019 04:08), Max: 37.1 (07 Mar 2019 12:38)  T(F): 98.3 (08 Mar 2019 04:08), Max: 98.7 (07 Mar 2019 12:38)  HR: 69 (08 Mar 2019 05:42) (65 - 78)  BP: 110/50 (08 Mar 2019 05:42) (99/62 - 135/63)  BP(mean): --  RR: 18 (08 Mar 2019 04:08) (16 - 18)  SpO2: 97% (08 Mar 2019 04:08) (95% - 97%)    PHYSICAL EXAM:    GENERAL: NAD, well-groomed, well-developed  HEAD:  Atraumatic, Normocephalic  EYES: EOMI, PERRLA, conjunctiva and sclera clear  ENMT: No tonsillar erythema, exudates, or enlargement; Moist mucous membranes, Good dentition, No lesions  NECK: Supple, No JVD, Normal thyroid  NERVOUS SYSTEM:  Alert & Oriented X3, Good concentration; Motor Strength 5/5 B/L upper and lower extremities; DTRs 2+ intact and symmetric  CHEST/LUNG: Clear to percussion bilaterally; No rales, rhonchi, wheezing, or rubs  HEART: Regular rate and rhythm; No murmurs, rubs, or gallops  ABDOMEN: Soft, Nontender, Nondistended; Bowel sounds present  EXTREMITIES:  2+ Peripheral Pulses, No clubbing, cyanosis, or edema  LYMPH: No lymphadenopathy noted  SKIN: No rashes or lesions      LABS:                        10.4   9.23  )-----------( 282      ( 07 Mar 2019 08:35 )             33.9     03-08    141  |  101  |  62<H>  ----------------------------<  111<H>  6.3<HH>   |  24  |  5.10<H>    Ca    9.3      08 Mar 2019 06:01  Phos  6.0     03-06  Mg     2.3     03-07    TPro  7.9  /  Alb  3.8  /  TBili  0.2  /  DBili  x   /  AST  29  /  ALT  20  /  AlkPhos  119  03-06          RADIOLOGY & ADDITIONAL STUDIES:

## 2019-03-08 NOTE — PHYSICAL THERAPY INITIAL EVALUATION ADULT - CRITERIA FOR SKILLED THERAPEUTIC INTERVENTIONS
impairments found/therapy frequency/predicted duration of therapy intervention/functional limitations in following categories/anticipated discharge recommendation/risk reduction/prevention/rehab potential

## 2019-03-08 NOTE — PROGRESS NOTE ADULT - SUBJECTIVE AND OBJECTIVE BOX
NEPHROLOGY-NSN [phone]        Patient seen and examined in bed.  She was in good spirits         MEDICATIONS  (STANDING):  apixaban 2.5 milliGRAM(s) Oral every 12 hours  artificial tears (preservative free) Ophthalmic Solution 1 Drop(s) Both EYES every 6 hours  aspirin  chewable 81 milliGRAM(s) Oral daily  atorvastatin 20 milliGRAM(s) Oral at bedtime  dextrose 5%. 1000 milliLiter(s) (50 mL/Hr) IV Continuous <Continuous>  dextrose 50% Injectable 12.5 Gram(s) IV Push once  famotidine    Tablet 20 milliGRAM(s) Oral two times a day  gabapentin 300 milliGRAM(s) Oral three times a day  influenza   Vaccine 0.5 milliLiter(s) IntraMuscular once  insulin lispro (HumaLOG) corrective regimen sliding scale   SubCutaneous three times a day before meals  midodrine 2.5 milliGRAM(s) Oral <User Schedule>  polyethylene glycol 3350 17 Gram(s) Oral daily  sevelamer hydrochloride 1600 milliGRAM(s) Oral three times a day with meals      VITAL:  T(C): , Max: 37.1 (03-07-19 @ 12:38)  T(F): , Max: 98.7 (03-07-19 @ 12:38)  HR: 69 (03-08-19 @ 05:42)  BP: 110/50 (03-08-19 @ 05:42)  BP(mean): --  RR: 18 (03-08-19 @ 04:08)  SpO2: 97% (03-08-19 @ 04:08)  Wt(kg): --    I and O's:    03-07 @ 07:01  -  03-08 @ 07:00  --------------------------------------------------------  IN: 870 mL / OUT: 800 mL / NET: 70 mL          PHYSICAL EXAM:    Constitutional: NAD  Neck:  No JVD  Respiratory: CTAB/L  Cardiovascular: S1 and S2  Gastrointestinal: BS+, soft, NT/ND  Extremities: No peripheral edema  Neurological: A/O x 3, no focal deficits  Psychiatric: Normal mood, normal affect  : No Raphael  Skin: No rashes  Access: Not applicable    LABS:                        10.4   9.23  )-----------( 282      ( 07 Mar 2019 08:35 )             33.9     03-08    141  |  101  |  62<H>  ----------------------------<  111<H>  6.3<HH>   |  24  |  5.10<H>    Ca    9.3      08 Mar 2019 06:01  Phos  6.0     03-06  Mg     2.3     03-07    TPro  7.9  /  Alb  3.8  /  TBili  0.2  /  DBili  x   /  AST  29  /  ALT  20  /  AlkPhos  119  03-06          Urine Studies:          RADIOLOGY & ADDITIONAL STUDIES:

## 2019-03-08 NOTE — PHYSICAL THERAPY INITIAL EVALUATION ADULT - ADDITIONAL COMMENTS
3/06/19 XR CHEST:  Linear scarring versus atelectasis in the mid left lung field. No focal consolidation is seen.  XR KNEE & CT HEAD: (-)

## 2019-03-08 NOTE — DISCHARGE NOTE NURSING/CASE MANAGEMENT/SOCIAL WORK - NSSCNAMETXT_GEN_ALL_CORE
A nurse will visit you in your home from [facility].  They will contact you to confirm their visit.

## 2019-03-08 NOTE — DISCHARGE NOTE PROVIDER - NSDCCPCAREPLAN_GEN_ALL_CORE_FT
PRINCIPAL DISCHARGE DIAGNOSIS  Problem: Hyperkalemia  Assessment and Plan of Treatment: Continue hemodialysis as scheduled      SECONDARY DISCHARGE DIAGNOSES  Problem: Fall at home  Assessment and Plan of Treatment: Keep hallways and room free of clutter   Use assistive devices while ambulating    Problem: Afib  Assessment and Plan of Treatment: Atrial fibrillation is the most common heart rhythm problem & has the risk of stroke & heart attack  It helps if you control your blood pressure, not drink more than 1-2 alcohol drinks per day, cut down on caffeine, getting treatment for over active thyroid gland, & getting exercise  Call your doctor if you feel your heart racing or beating unusually, chest tightness or pain, lightheaded, faint, shortness of breath especially with exercise  It is important to take your heart medication as prescribed      Problem: ESRD on hemodialysis  Assessment and Plan of Treatment: Continue with your dialysis treatments. Follow with your nephrologist (kidney physician). Continue your medications as prescribed.    Problem: Controlled type 2 diabetes mellitus  Assessment and Plan of Treatment: HgA1C this admission.  Make sure you get your HgA1c checked every three months.  If you take oral diabetes medications, check your blood glucose two times a day.  If you take insulin, check your blood glucose before meals and at bedtime.  It's important not to skip any meals.  Keep a log of your blood glucose results and always take it with you to your doctor appointments.  Keep a list of your current medications including injectables and over the counter medications and bring this medication list with you to all your doctor appointments.  If you have not seen your ophthalmologist this year call for appointment.  Check your feet daily for redness, sores, or openings. Do not self treat. If no improvement in two days call your primary care physician for an appointment.  Low blood sugar (hypoglycemia) is a blood sugar below 70mg/dl. Check your blood sugar if you feel signs/symptoms of hypoglycemia. If your blood sugar is below 70 take 15 grams of carbohydrates (ex 4 oz of apple juice, 3-4 glucose tablets, or 4-6 oz of regular soda) wait 15 minutes and repeat blood sugar to make sure it comes up above 70.  If your blood sugar is above 70 and you are due for a meal, have a meal.  If you are not due for a meal have a snack.  This snack helps keeps your blood sugar at a safe range.

## 2019-03-08 NOTE — DISCHARGE NOTE PROVIDER - HOSPITAL COURSE
Patient is a 68 year old female with DM2, HTN, PVD, CAD, atrial fibrillation and ESRD on HD presents to the ED s/p mechanical fall. Patient remarks that her legs felt very weak and "crampy" today and she fell on her right knee when she was walking today. She denies chest pain, shortness of breath, palpitations, dizziness, lightheadedness prior to fall. She did not lose consciousness. She has been compliant with her medications and has been going to dialysis regularly on Monday Wednesday and Friday at  [facility].  Hyperkalemia.  for  HD  today  and  to  be  discharged  after  HD.         ESRD (end stage renal disease) on dialysis.  Plan: K 6.2  for  HD   today  and  to  be  discharged  after  HD.         Pt needs KASHIF aid reinstated prior to D/C.

## 2019-03-08 NOTE — PHYSICAL THERAPY INITIAL EVALUATION ADULT - LIVES WITH, PROFILE
Pt lives with spouse & son in a private home  with 3 steps to enter +bilateral handrail.  Prior to admission pt ambulating with RW, mostly independently & required assist with ADL's./spouse

## 2019-03-08 NOTE — PHYSICAL THERAPY INITIAL EVALUATION ADULT - PERTINENT HX OF CURRENT PROBLEM, REHAB EVAL
68 y/oF admitted 3/6 s/p fall on R knee, c/o feelings of weakness. Xrays R knee neg. Found to have hyperkalemia. PMH DM2, HTN, PVD, CAD, atrial fibrillation and ESRD on HD

## 2019-03-08 NOTE — PROVIDER CONTACT NOTE (CRITICAL VALUE NOTIFICATION) - ACTION/TREATMENT ORDERED:
EKG done. pt to go for extra HD today to correct potassium. will continue to monitor patient
Orders to obtain STAT blood gas venous. Endorsed to the am staff RN.
provider notified, orders pending

## 2019-03-09 LAB
ANION GAP SERPL CALC-SCNC: 15 MMOL/L — SIGNIFICANT CHANGE UP (ref 5–17)
BUN SERPL-MCNC: 40 MG/DL — HIGH (ref 7–23)
CALCIUM SERPL-MCNC: 9.9 MG/DL — SIGNIFICANT CHANGE UP (ref 8.4–10.5)
CHLORIDE SERPL-SCNC: 99 MMOL/L — SIGNIFICANT CHANGE UP (ref 96–108)
CO2 SERPL-SCNC: 26 MMOL/L — SIGNIFICANT CHANGE UP (ref 22–31)
CREAT SERPL-MCNC: 3.39 MG/DL — HIGH (ref 0.5–1.3)
GLUCOSE BLDC GLUCOMTR-MCNC: 107 MG/DL — HIGH (ref 70–99)
GLUCOSE BLDC GLUCOMTR-MCNC: 109 MG/DL — HIGH (ref 70–99)
GLUCOSE BLDC GLUCOMTR-MCNC: 86 MG/DL — SIGNIFICANT CHANGE UP (ref 70–99)
GLUCOSE BLDC GLUCOMTR-MCNC: 87 MG/DL — SIGNIFICANT CHANGE UP (ref 70–99)
GLUCOSE SERPL-MCNC: 79 MG/DL — SIGNIFICANT CHANGE UP (ref 70–99)
HCT VFR BLD CALC: 38.3 % — SIGNIFICANT CHANGE UP (ref 34.5–45)
HGB BLD-MCNC: 11.7 G/DL — SIGNIFICANT CHANGE UP (ref 11.5–15.5)
MCHC RBC-ENTMCNC: 29.4 PG — SIGNIFICANT CHANGE UP (ref 27–34)
MCHC RBC-ENTMCNC: 30.5 GM/DL — LOW (ref 32–36)
MCV RBC AUTO: 96.2 FL — SIGNIFICANT CHANGE UP (ref 80–100)
PLATELET # BLD AUTO: 317 K/UL — SIGNIFICANT CHANGE UP (ref 150–400)
POTASSIUM SERPL-MCNC: 5.5 MMOL/L — HIGH (ref 3.5–5.3)
POTASSIUM SERPL-SCNC: 5.5 MMOL/L — HIGH (ref 3.5–5.3)
RBC # BLD: 3.98 M/UL — SIGNIFICANT CHANGE UP (ref 3.8–5.2)
RBC # FLD: 14.5 % — SIGNIFICANT CHANGE UP (ref 10.3–14.5)
SODIUM SERPL-SCNC: 140 MMOL/L — SIGNIFICANT CHANGE UP (ref 135–145)
WBC # BLD: 8.76 K/UL — SIGNIFICANT CHANGE UP (ref 3.8–10.5)
WBC # FLD AUTO: 8.76 K/UL — SIGNIFICANT CHANGE UP (ref 3.8–10.5)

## 2019-03-09 RX ADMIN — Medication 1 DROP(S): at 05:38

## 2019-03-09 RX ADMIN — MIDODRINE HYDROCHLORIDE 2.5 MILLIGRAM(S): 2.5 TABLET ORAL at 10:01

## 2019-03-09 RX ADMIN — GABAPENTIN 300 MILLIGRAM(S): 400 CAPSULE ORAL at 22:14

## 2019-03-09 RX ADMIN — APIXABAN 2.5 MILLIGRAM(S): 2.5 TABLET, FILM COATED ORAL at 05:38

## 2019-03-09 RX ADMIN — APIXABAN 2.5 MILLIGRAM(S): 2.5 TABLET, FILM COATED ORAL at 17:00

## 2019-03-09 RX ADMIN — SEVELAMER CARBONATE 1600 MILLIGRAM(S): 2400 POWDER, FOR SUSPENSION ORAL at 08:23

## 2019-03-09 RX ADMIN — Medication 1 DROP(S): at 23:37

## 2019-03-09 RX ADMIN — Medication 81 MILLIGRAM(S): at 11:42

## 2019-03-09 RX ADMIN — ATORVASTATIN CALCIUM 20 MILLIGRAM(S): 80 TABLET, FILM COATED ORAL at 22:14

## 2019-03-09 RX ADMIN — SEVELAMER CARBONATE 1600 MILLIGRAM(S): 2400 POWDER, FOR SUSPENSION ORAL at 17:00

## 2019-03-09 RX ADMIN — Medication 1 DROP(S): at 11:42

## 2019-03-09 RX ADMIN — FAMOTIDINE 20 MILLIGRAM(S): 10 INJECTION INTRAVENOUS at 11:42

## 2019-03-09 RX ADMIN — SEVELAMER CARBONATE 1600 MILLIGRAM(S): 2400 POWDER, FOR SUSPENSION ORAL at 11:43

## 2019-03-09 RX ADMIN — Medication 1 DROP(S): at 17:00

## 2019-03-09 NOTE — PROGRESS NOTE ADULT - SUBJECTIVE AND OBJECTIVE BOX
Chief complaint: K improved   after  HD   K5,5   stable   for  discharge     HPI:   [id number] used for Johnsi     Patient is a 68 year old female with DM2, HTN, PVD, CAD, atrial fibrillation and ESRD on HD presents to the ED s/p mechanical fall. Patient remarks that her legs felt very weak and "crampy" today and she fell on her right knee when she was walking today. She denies chest pain, shortness of breath, palpitations, dizziness, lightheadedness prior to fall. She did not lose consciousness. She has been compliant with her medications and has been going to dialysis regularly on Monday Wednesday and Friday at  [facility] in [place]. At bedside, patient does not have any complaints.     In the ED, VSS. K >9. EKG NSR with no T wave changes. (06 Mar 2019 15:41)      REVIEW OF SYSTEMS:    CONSTITUTIONAL: No fever, weight loss, or fatigue  NECK: No pain or stiffness  RESPIRATORY: No cough, wheezing, chills or hemoptysis; No shortness of breath  CARDIOVASCULAR: No chest pain, palpitations, dizziness, or leg swelling  GASTROINTESTINAL: No abdominal or epigastric pain. No nausea, vomiting, or hematemesis; No diarrhea or constipation. No melena or hematochezia.  GENITOURINARY: No dysuria, frequency, hematuria, or incontinence  NEUROLOGICAL: No headaches, memory loss, loss of strength, numbness, or tremors  SKIN: No itching, burning, rashes, or lesions   LYMPH NODES: No enlarged glands  MUSCULOSKELETAL: No joint pain or swelling; No muscle, back, or extremity pain  HEME/LYMPH: No easy bruising, or bleeding gums    MEDICATIONS  (STANDING):  apixaban 2.5 milliGRAM(s) Oral every 12 hours  artificial tears (preservative free) Ophthalmic Solution 1 Drop(s) Both EYES every 6 hours  aspirin  chewable 81 milliGRAM(s) Oral daily  atorvastatin 20 milliGRAM(s) Oral at bedtime  dextrose 5%. 1000 milliLiter(s) (50 mL/Hr) IV Continuous <Continuous>  dextrose 50% Injectable 12.5 Gram(s) IV Push once  famotidine    Tablet 20 milliGRAM(s) Oral daily  gabapentin 300 milliGRAM(s) Oral at bedtime  influenza   Vaccine 0.5 milliLiter(s) IntraMuscular once  insulin lispro (HumaLOG) corrective regimen sliding scale   SubCutaneous three times a day before meals  midodrine 2.5 milliGRAM(s) Oral <User Schedule>  polyethylene glycol 3350 17 Gram(s) Oral daily  sevelamer hydrochloride 1600 milliGRAM(s) Oral three times a day with meals    MEDICATIONS  (PRN):  dextrose 40% Gel 15 Gram(s) Oral once PRN Blood Glucose LESS THAN 70 milliGRAM(s)/deciliter  glucagon  Injectable 1 milliGRAM(s) IntraMuscular once PRN Glucose LESS THAN 70 milligrams/deciliter      Allergies    No Known Allergies    Intolerances        Vital Signs Last 24 Hrs  T(C): 36.6 (09 Mar 2019 05:44), Max: 36.6 (08 Mar 2019 10:32)  T(F): 97.9 (09 Mar 2019 05:44), Max: 97.9 (08 Mar 2019 10:32)  HR: 62 (09 Mar 2019 05:44) (61 - 74)  BP: 128/58 (09 Mar 2019 05:44) (88/48 - 141/58)  BP(mean): --  RR: 18 (09 Mar 2019 05:44) (18 - 18)  SpO2: 98% (09 Mar 2019 05:44) (96% - 99%)    PHYSICAL EXAM:    GENERAL: NAD, well-groomed, well-developed  HEAD:  Atraumatic, Normocephalic  EYES: EOMI, PERRLA, conjunctiva and sclera clear  ENMT: No tonsillar erythema, exudates, or enlargement; Moist mucous membranes, Good dentition, No lesions  NECK: Supple, No JVD, Normal thyroid  NERVOUS SYSTEM:  Alert & Oriented X3, Good concentration; Motor Strength 5/5 B/L upper and lower extremities; DTRs 2+ intact and symmetric  CHEST/LUNG: Clear to percussion bilaterally; No rales, rhonchi, wheezing, or rubs  HEART: Regular rate and rhythm; No murmurs, rubs, or gallops  ABDOMEN: Soft, Nontender, Nondistended; Bowel sounds present  EXTREMITIES:  2+ Peripheral Pulses, No clubbing, cyanosis, or edema  LYMPH: No lymphadenopathy noted  SKIN: No rashes or lesions      LABS:                        10.2   8.13  )-----------( 306      ( 08 Mar 2019 09:32 )             33.9     03-09    140  |  99  |  40<H>  ----------------------------<  79  5.5<H>   |  26  |  3.39<H>    Ca    9.9      09 Mar 2019 06:35            RADIOLOGY & ADDITIONAL STUDIES:

## 2019-03-10 VITALS
HEART RATE: 59 BPM | TEMPERATURE: 98 F | RESPIRATION RATE: 18 BRPM | SYSTOLIC BLOOD PRESSURE: 113 MMHG | OXYGEN SATURATION: 96 % | WEIGHT: 185.63 LBS | DIASTOLIC BLOOD PRESSURE: 63 MMHG

## 2019-03-10 LAB
ANION GAP SERPL CALC-SCNC: 15 MMOL/L — SIGNIFICANT CHANGE UP (ref 5–17)
BUN SERPL-MCNC: 61 MG/DL — HIGH (ref 7–23)
CALCIUM SERPL-MCNC: 9.3 MG/DL — SIGNIFICANT CHANGE UP (ref 8.4–10.5)
CHLORIDE SERPL-SCNC: 101 MMOL/L — SIGNIFICANT CHANGE UP (ref 96–108)
CO2 SERPL-SCNC: 23 MMOL/L — SIGNIFICANT CHANGE UP (ref 22–31)
CREAT SERPL-MCNC: 4.68 MG/DL — HIGH (ref 0.5–1.3)
GLUCOSE BLDC GLUCOMTR-MCNC: 103 MG/DL — HIGH (ref 70–99)
GLUCOSE BLDC GLUCOMTR-MCNC: 78 MG/DL — SIGNIFICANT CHANGE UP (ref 70–99)
GLUCOSE SERPL-MCNC: 135 MG/DL — HIGH (ref 70–99)
HCT VFR BLD CALC: 33.8 % — LOW (ref 34.5–45)
HGB BLD-MCNC: 10.2 G/DL — LOW (ref 11.5–15.5)
MCHC RBC-ENTMCNC: 29.2 PG — SIGNIFICANT CHANGE UP (ref 27–34)
MCHC RBC-ENTMCNC: 30.2 GM/DL — LOW (ref 32–36)
MCV RBC AUTO: 96.8 FL — SIGNIFICANT CHANGE UP (ref 80–100)
PLATELET # BLD AUTO: 297 K/UL — SIGNIFICANT CHANGE UP (ref 150–400)
POTASSIUM SERPL-MCNC: 5.6 MMOL/L — HIGH (ref 3.5–5.3)
POTASSIUM SERPL-SCNC: 5.6 MMOL/L — HIGH (ref 3.5–5.3)
RBC # BLD: 3.49 M/UL — LOW (ref 3.8–5.2)
RBC # FLD: 14.3 % — SIGNIFICANT CHANGE UP (ref 10.3–14.5)
SODIUM SERPL-SCNC: 139 MMOL/L — SIGNIFICANT CHANGE UP (ref 135–145)
WBC # BLD: 9.9 K/UL — SIGNIFICANT CHANGE UP (ref 3.8–10.5)
WBC # FLD AUTO: 9.9 K/UL — SIGNIFICANT CHANGE UP (ref 3.8–10.5)

## 2019-03-10 PROCEDURE — 82330 ASSAY OF CALCIUM: CPT

## 2019-03-10 PROCEDURE — 99285 EMERGENCY DEPT VISIT HI MDM: CPT | Mod: 25

## 2019-03-10 PROCEDURE — 70450 CT HEAD/BRAIN W/O DYE: CPT

## 2019-03-10 PROCEDURE — 71045 X-RAY EXAM CHEST 1 VIEW: CPT

## 2019-03-10 PROCEDURE — 94640 AIRWAY INHALATION TREATMENT: CPT

## 2019-03-10 PROCEDURE — 80053 COMPREHEN METABOLIC PANEL: CPT

## 2019-03-10 PROCEDURE — 99261: CPT

## 2019-03-10 PROCEDURE — 97162 PT EVAL MOD COMPLEX 30 MIN: CPT

## 2019-03-10 PROCEDURE — 82435 ASSAY OF BLOOD CHLORIDE: CPT

## 2019-03-10 PROCEDURE — 85014 HEMATOCRIT: CPT

## 2019-03-10 PROCEDURE — 83036 HEMOGLOBIN GLYCOSYLATED A1C: CPT

## 2019-03-10 PROCEDURE — 84100 ASSAY OF PHOSPHORUS: CPT

## 2019-03-10 PROCEDURE — 96374 THER/PROPH/DIAG INJ IV PUSH: CPT

## 2019-03-10 PROCEDURE — 83735 ASSAY OF MAGNESIUM: CPT

## 2019-03-10 PROCEDURE — 93005 ELECTROCARDIOGRAM TRACING: CPT

## 2019-03-10 PROCEDURE — 80048 BASIC METABOLIC PNL TOTAL CA: CPT

## 2019-03-10 PROCEDURE — 85027 COMPLETE CBC AUTOMATED: CPT

## 2019-03-10 PROCEDURE — 82803 BLOOD GASES ANY COMBINATION: CPT

## 2019-03-10 PROCEDURE — 82947 ASSAY GLUCOSE BLOOD QUANT: CPT

## 2019-03-10 PROCEDURE — 82962 GLUCOSE BLOOD TEST: CPT

## 2019-03-10 PROCEDURE — 84132 ASSAY OF SERUM POTASSIUM: CPT

## 2019-03-10 PROCEDURE — 73564 X-RAY EXAM KNEE 4 OR MORE: CPT

## 2019-03-10 PROCEDURE — 96375 TX/PRO/DX INJ NEW DRUG ADDON: CPT

## 2019-03-10 PROCEDURE — 84295 ASSAY OF SERUM SODIUM: CPT

## 2019-03-10 PROCEDURE — 83605 ASSAY OF LACTIC ACID: CPT

## 2019-03-10 RX ADMIN — Medication 81 MILLIGRAM(S): at 11:35

## 2019-03-10 RX ADMIN — APIXABAN 2.5 MILLIGRAM(S): 2.5 TABLET, FILM COATED ORAL at 05:01

## 2019-03-10 RX ADMIN — MIDODRINE HYDROCHLORIDE 2.5 MILLIGRAM(S): 2.5 TABLET ORAL at 08:08

## 2019-03-10 RX ADMIN — SEVELAMER CARBONATE 1600 MILLIGRAM(S): 2400 POWDER, FOR SUSPENSION ORAL at 13:15

## 2019-03-10 RX ADMIN — Medication 1 DROP(S): at 11:34

## 2019-03-10 RX ADMIN — Medication 1 DROP(S): at 05:01

## 2019-03-10 RX ADMIN — SEVELAMER CARBONATE 1600 MILLIGRAM(S): 2400 POWDER, FOR SUSPENSION ORAL at 08:08

## 2019-03-10 NOTE — PROGRESS NOTE ADULT - PROBLEM SELECTOR PLAN 1
pt  asymptomatic  ,  no  chest  pain   HD  done    friday    repeate   K 5.5  ,  pt  stable  to  be  discharged  today

## 2019-03-10 NOTE — PROGRESS NOTE ADULT - SUBJECTIVE AND OBJECTIVE BOX
Chief complaint: pt  asymptomatic  ,  no  chest  pain   HD  done    friday    repeate   K 5.5  ,  pt  stable  to  be  discharged  today   HPI:   [id number] used for Froy     Patient is a 68 year old female with DM2, HTN, PVD, CAD, atrial fibrillation and ESRD on HD presents to the ED s/p mechanical fall. Patient remarks that her legs felt very weak and "crampy" today and she fell on her right knee when she was walking today. She denies chest pain, shortness of breath, palpitations, dizziness, lightheadedness prior to fall. She did not lose consciousness. She has been compliant with her medications and has been going to dialysis regularly on Monday Wednesday and Friday at  [facility] in [place]. At bedside, patient does not have any complaints.     In the ED, VSS. K >9. EKG NSR with no T wave changes. (06 Mar 2019 15:41)      REVIEW OF SYSTEMS:    CONSTITUTIONAL: No fever, weight loss, or fatigue  NECK: No pain or stiffness  RESPIRATORY: No cough, wheezing, chills or hemoptysis; No shortness of breath  CARDIOVASCULAR: No chest pain, palpitations, dizziness, or leg swelling  GASTROINTESTINAL: No abdominal or epigastric pain. No nausea, vomiting, or hematemesis; No diarrhea or constipation. No melena or hematochezia.  GENITOURINARY: No dysuria, frequency, hematuria, or incontinence  NEUROLOGICAL: No headaches, memory loss, loss of strength, numbness, or tremors  SKIN: No itching, burning, rashes, or lesions   LYMPH NODES: No enlarged glands  MUSCULOSKELETAL: No joint pain or swelling; No muscle, back, or extremity pain  HEME/LYMPH: No easy bruising, or bleeding gums    MEDICATIONS  (STANDING):  apixaban 2.5 milliGRAM(s) Oral every 12 hours  artificial tears (preservative free) Ophthalmic Solution 1 Drop(s) Both EYES every 6 hours  aspirin  chewable 81 milliGRAM(s) Oral daily  atorvastatin 20 milliGRAM(s) Oral at bedtime  dextrose 5%. 1000 milliLiter(s) (50 mL/Hr) IV Continuous <Continuous>  dextrose 50% Injectable 12.5 Gram(s) IV Push once  famotidine    Tablet 20 milliGRAM(s) Oral daily  gabapentin 300 milliGRAM(s) Oral at bedtime  influenza   Vaccine 0.5 milliLiter(s) IntraMuscular once  insulin lispro (HumaLOG) corrective regimen sliding scale   SubCutaneous three times a day before meals  midodrine 2.5 milliGRAM(s) Oral <User Schedule>  polyethylene glycol 3350 17 Gram(s) Oral daily  sevelamer hydrochloride 1600 milliGRAM(s) Oral three times a day with meals    MEDICATIONS  (PRN):  dextrose 40% Gel 15 Gram(s) Oral once PRN Blood Glucose LESS THAN 70 milliGRAM(s)/deciliter  glucagon  Injectable 1 milliGRAM(s) IntraMuscular once PRN Glucose LESS THAN 70 milligrams/deciliter      Allergies    No Known Allergies    Intolerances        Vital Signs Last 24 Hrs  T(C): 36.7 (10 Mar 2019 11:38), Max: 36.9 (10 Mar 2019 04:04)  T(F): 98 (10 Mar 2019 11:38), Max: 98.4 (10 Mar 2019 04:04)  HR: 59 (10 Mar 2019 11:38) (59 - 83)  BP: 113/63 (10 Mar 2019 11:38) (113/63 - 129/72)  BP(mean): --  RR: 18 (10 Mar 2019 11:38) (18 - 18)  SpO2: 96% (10 Mar 2019 11:38) (96% - 99%)    PHYSICAL EXAM:    GENERAL: NAD, well-groomed, well-developed  HEAD:  Atraumatic, Normocephalic  EYES: EOMI, PERRLA, conjunctiva and sclera clear  ENMT: No tonsillar erythema, exudates, or enlargement; Moist mucous membranes, Good dentition, No lesions  NECK: Supple, No JVD, Normal thyroid  NERVOUS SYSTEM:  Alert & Oriented X3, Good concentration; Motor Strength 5/5 B/L upper and lower extremities; DTRs 2+ intact and symmetric  CHEST/LUNG: Clear to percussion bilaterally; No rales, rhonchi, wheezing, or rubs  HEART: Regular rate and rhythm; No murmurs, rubs, or gallops  ABDOMEN: Soft, Nontender, Nondistended; Bowel sounds present  EXTREMITIES:  2+ Peripheral Pulses, No clubbing, cyanosis, or edema  LYMPH: No lymphadenopathy noted  SKIN: No rashes or lesions      LABS:                        10.2   9.90  )-----------( 297      ( 10 Mar 2019 07:55 )             33.8     03-10    139  |  101  |  61<H>  ----------------------------<  135<H>  5.6<H>   |  23  |  4.68<H>    Ca    9.3      10 Mar 2019 05:51            RADIOLOGY & ADDITIONAL STUDIES:

## 2019-03-10 NOTE — PROGRESS NOTE ADULT - REASON FOR ADMISSION
s/p mechanical fall

## 2019-03-29 ENCOUNTER — APPOINTMENT (OUTPATIENT)
Dept: ENDOVASCULAR SURGERY | Facility: CLINIC | Age: 69
End: 2019-03-29
Payer: MEDICARE

## 2019-03-29 VITALS
SYSTOLIC BLOOD PRESSURE: 127 MMHG | HEART RATE: 65 BPM | DIASTOLIC BLOOD PRESSURE: 67 MMHG | RESPIRATION RATE: 18 BRPM | OXYGEN SATURATION: 96 % | TEMPERATURE: 97.7 F

## 2019-03-29 PROCEDURE — 36907Z: CUSTOM | Mod: 59

## 2019-03-29 PROCEDURE — 36906Z: CUSTOM

## 2019-03-29 PROCEDURE — 36215Z: CUSTOM | Mod: 59

## 2019-04-02 NOTE — PAST MEDICAL HISTORY
[Increasing age ( >40 years old)] : Increasing age ( >40 years old) [Altered mobility] : Altered mobility [FreeTextEntry1] : Malignant Hyperthermia Screening Tool and Risk of Bleeding Assessment\par \par Ms. BENNY INGRAM denies family history of unexpected death following Anesthesia or Exercise.\par Denies Family history of Malignant Hyperthermia, Muscle or Neuromuscular disorder and High Temperature following exercise.\par \par Ms. BENNY INGRAM denies history of Muscle Spasm, Dark or Chocolate - Colored urine and Unanticipated fever immediately following anesthesia or serious exercise. \par Ms. INGRAM also denies bleeding tendencies/ Risks of Bleeding.\par

## 2019-04-02 NOTE — PROCEDURE
[FreeTextEntry1] : right AV Graft/thrombectomy/stent [FreeTextEntry3] : TPA 2mg/3ml sterile water instilled right AVG via 10 cm Speedlyser by Dr. Johnson

## 2019-04-02 NOTE — HISTORY OF PRESENT ILLNESS
[] : in right upper arm [FreeTextEntry1] : sent from dialysis\par alert and oriented\par spoke to sonPatrick Coyle [phone]\par no falls reported\par breakfast 9AM\par w/c but can stand\par living at home- [place] [FreeTextEntry3] : 6/15/2017 Dr. Caban [FreeTextEntry4] : wednesday [FreeTextEntry5] : 9AM [FreeTextEntry6] : Dr Delong

## 2019-04-22 ENCOUNTER — INPATIENT (INPATIENT)
Facility: HOSPITAL | Age: 69
LOS: 8 days | Discharge: NURSING, REHAB OR LONG-TERM CARE | DRG: 640 | End: 2019-05-01
Attending: INTERNAL MEDICINE | Admitting: INTERNAL MEDICINE
Payer: MEDICARE

## 2019-04-22 VITALS — HEART RATE: 95 BPM | OXYGEN SATURATION: 99 %

## 2019-04-22 DIAGNOSIS — Z98.890 OTHER SPECIFIED POSTPROCEDURAL STATES: Chronic | ICD-10-CM

## 2019-04-22 DIAGNOSIS — Z93.1 GASTROSTOMY STATUS: Chronic | ICD-10-CM

## 2019-04-22 DIAGNOSIS — J96.90 RESPIRATORY FAILURE, UNSPECIFIED, UNSPECIFIED WHETHER WITH HYPOXIA OR HYPERCAPNIA: ICD-10-CM

## 2019-04-22 DIAGNOSIS — I48.91 UNSPECIFIED ATRIAL FIBRILLATION: ICD-10-CM

## 2019-04-22 DIAGNOSIS — N18.6 END STAGE RENAL DISEASE: ICD-10-CM

## 2019-04-22 DIAGNOSIS — E87.5 HYPERKALEMIA: ICD-10-CM

## 2019-04-22 LAB
ALBUMIN SERPL ELPH-MCNC: 3.5 G/DL — SIGNIFICANT CHANGE UP (ref 3.3–5)
ALBUMIN SERPL ELPH-MCNC: 3.5 G/DL — SIGNIFICANT CHANGE UP (ref 3.3–5)
ALBUMIN SERPL ELPH-MCNC: 3.9 G/DL — SIGNIFICANT CHANGE UP (ref 3.3–5)
ALP SERPL-CCNC: 113 U/L — SIGNIFICANT CHANGE UP (ref 40–120)
ALP SERPL-CCNC: 119 U/L — SIGNIFICANT CHANGE UP (ref 40–120)
ALP SERPL-CCNC: 140 U/L — HIGH (ref 40–120)
ALT FLD-CCNC: 37 U/L — SIGNIFICANT CHANGE UP (ref 10–45)
ALT FLD-CCNC: 49 U/L — HIGH (ref 10–45)
ALT FLD-CCNC: 63 U/L — HIGH (ref 10–45)
ANION GAP SERPL CALC-SCNC: 15 MMOL/L — SIGNIFICANT CHANGE UP (ref 5–17)
ANION GAP SERPL CALC-SCNC: 17 MMOL/L — SIGNIFICANT CHANGE UP (ref 5–17)
ANION GAP SERPL CALC-SCNC: 17 MMOL/L — SIGNIFICANT CHANGE UP (ref 5–17)
ANION GAP SERPL CALC-SCNC: 19 MMOL/L — HIGH (ref 5–17)
APTT BLD: 22 SEC — LOW (ref 27.5–36.3)
APTT BLD: 26.7 SEC — LOW (ref 27.5–36.3)
APTT BLD: 28.8 SEC — SIGNIFICANT CHANGE UP (ref 27.5–36.3)
AST SERPL-CCNC: 24 U/L — SIGNIFICANT CHANGE UP (ref 10–40)
AST SERPL-CCNC: 25 U/L — SIGNIFICANT CHANGE UP (ref 10–40)
AST SERPL-CCNC: 55 U/L — HIGH (ref 10–40)
BASOPHILS # BLD AUTO: 0 K/UL — SIGNIFICANT CHANGE UP (ref 0–0.2)
BASOPHILS NFR BLD AUTO: 0.2 % — SIGNIFICANT CHANGE UP (ref 0–2)
BILIRUB SERPL-MCNC: 0.2 MG/DL — SIGNIFICANT CHANGE UP (ref 0.2–1.2)
BILIRUB SERPL-MCNC: 0.3 MG/DL — SIGNIFICANT CHANGE UP (ref 0.2–1.2)
BILIRUB SERPL-MCNC: 0.4 MG/DL — SIGNIFICANT CHANGE UP (ref 0.2–1.2)
BLD GP AB SCN SERPL QL: NEGATIVE — SIGNIFICANT CHANGE UP
BUN SERPL-MCNC: 114 MG/DL — HIGH (ref 7–23)
BUN SERPL-MCNC: 116 MG/DL — HIGH (ref 7–23)
BUN SERPL-MCNC: 59 MG/DL — HIGH (ref 7–23)
BUN SERPL-MCNC: 68 MG/DL — HIGH (ref 7–23)
CALCIUM SERPL-MCNC: 10.1 MG/DL — SIGNIFICANT CHANGE UP (ref 8.4–10.5)
CALCIUM SERPL-MCNC: 10.4 MG/DL — SIGNIFICANT CHANGE UP (ref 8.4–10.5)
CALCIUM SERPL-MCNC: 13 MG/DL — CRITICAL HIGH (ref 8.4–10.5)
CALCIUM SERPL-MCNC: 15.4 MG/DL — CRITICAL HIGH (ref 8.4–10.5)
CHLORIDE SERPL-SCNC: 95 MMOL/L — LOW (ref 96–108)
CHLORIDE SERPL-SCNC: 97 MMOL/L — SIGNIFICANT CHANGE UP (ref 96–108)
CK MB CFR SERPL CALC: 8.3 NG/ML — HIGH (ref 0–3.8)
CK SERPL-CCNC: 93 U/L — SIGNIFICANT CHANGE UP (ref 25–170)
CO2 SERPL-SCNC: 20 MMOL/L — LOW (ref 22–31)
CO2 SERPL-SCNC: 22 MMOL/L — SIGNIFICANT CHANGE UP (ref 22–31)
CO2 SERPL-SCNC: 22 MMOL/L — SIGNIFICANT CHANGE UP (ref 22–31)
CO2 SERPL-SCNC: 24 MMOL/L — SIGNIFICANT CHANGE UP (ref 22–31)
CREAT SERPL-MCNC: 5.02 MG/DL — HIGH (ref 0.5–1.3)
CREAT SERPL-MCNC: 6.17 MG/DL — HIGH (ref 0.5–1.3)
CREAT SERPL-MCNC: 8.34 MG/DL — HIGH (ref 0.5–1.3)
CREAT SERPL-MCNC: 8.46 MG/DL — HIGH (ref 0.5–1.3)
EOSINOPHIL # BLD AUTO: 0.1 K/UL — SIGNIFICANT CHANGE UP (ref 0–0.5)
EOSINOPHIL NFR BLD AUTO: 0.4 % — SIGNIFICANT CHANGE UP (ref 0–6)
EOSINOPHIL NFR BLD AUTO: 0.5 % — SIGNIFICANT CHANGE UP (ref 0–6)
EOSINOPHIL NFR BLD AUTO: 1.7 % — SIGNIFICANT CHANGE UP (ref 0–6)
GAS PNL BLDA: SIGNIFICANT CHANGE UP
GLUCOSE BLDC GLUCOMTR-MCNC: 105 MG/DL — HIGH (ref 70–99)
GLUCOSE BLDC GLUCOMTR-MCNC: 128 MG/DL — HIGH (ref 70–99)
GLUCOSE BLDC GLUCOMTR-MCNC: 132 MG/DL — HIGH (ref 70–99)
GLUCOSE BLDC GLUCOMTR-MCNC: 210 MG/DL — HIGH (ref 70–99)
GLUCOSE BLDC GLUCOMTR-MCNC: 480 MG/DL — CRITICAL HIGH (ref 70–99)
GLUCOSE BLDC GLUCOMTR-MCNC: 84 MG/DL — SIGNIFICANT CHANGE UP (ref 70–99)
GLUCOSE BLDC GLUCOMTR-MCNC: 87 MG/DL — SIGNIFICANT CHANGE UP (ref 70–99)
GLUCOSE BLDC GLUCOMTR-MCNC: 97 MG/DL — SIGNIFICANT CHANGE UP (ref 70–99)
GLUCOSE SERPL-MCNC: 130 MG/DL — HIGH (ref 70–99)
GLUCOSE SERPL-MCNC: 177 MG/DL — HIGH (ref 70–99)
GLUCOSE SERPL-MCNC: 339 MG/DL — HIGH (ref 70–99)
GLUCOSE SERPL-MCNC: 99 MG/DL — SIGNIFICANT CHANGE UP (ref 70–99)
HBA1C BLD-MCNC: 5.9 % — HIGH (ref 4–5.6)
HCT VFR BLD CALC: 33.4 % — LOW (ref 34.5–45)
HCT VFR BLD CALC: 34.4 % — LOW (ref 34.5–45)
HCT VFR BLD CALC: 39.7 % — SIGNIFICANT CHANGE UP (ref 34.5–45)
HGB BLD-MCNC: 11.3 G/DL — LOW (ref 11.5–15.5)
HGB BLD-MCNC: 11.6 G/DL — SIGNIFICANT CHANGE UP (ref 11.5–15.5)
HGB BLD-MCNC: 13.3 G/DL — SIGNIFICANT CHANGE UP (ref 11.5–15.5)
INR BLD: 1.06 RATIO — SIGNIFICANT CHANGE UP (ref 0.88–1.16)
INR BLD: 1.14 RATIO — SIGNIFICANT CHANGE UP (ref 0.88–1.16)
LIDOCAIN IGE QN: 125 U/L — HIGH (ref 7–60)
LYMPHOCYTES # BLD AUTO: 0.6 K/UL — LOW (ref 1–3.3)
LYMPHOCYTES # BLD AUTO: 0.8 K/UL — LOW (ref 1–3.3)
LYMPHOCYTES # BLD AUTO: 3 K/UL — SIGNIFICANT CHANGE UP (ref 1–3.3)
LYMPHOCYTES # BLD AUTO: 3.9 % — LOW (ref 13–44)
LYMPHOCYTES # BLD AUTO: 35.2 % — SIGNIFICANT CHANGE UP (ref 13–44)
LYMPHOCYTES # BLD AUTO: 4.3 % — LOW (ref 13–44)
MAGNESIUM SERPL-MCNC: 2.1 MG/DL — SIGNIFICANT CHANGE UP (ref 1.6–2.6)
MAGNESIUM SERPL-MCNC: 2.2 MG/DL — SIGNIFICANT CHANGE UP (ref 1.6–2.6)
MAGNESIUM SERPL-MCNC: 3 MG/DL — HIGH (ref 1.6–2.6)
MCHC RBC-ENTMCNC: 30.5 PG — SIGNIFICANT CHANGE UP (ref 27–34)
MCHC RBC-ENTMCNC: 30.8 PG — SIGNIFICANT CHANGE UP (ref 27–34)
MCHC RBC-ENTMCNC: 30.9 PG — SIGNIFICANT CHANGE UP (ref 27–34)
MCHC RBC-ENTMCNC: 33.5 GM/DL — SIGNIFICANT CHANGE UP (ref 32–36)
MCHC RBC-ENTMCNC: 33.8 GM/DL — SIGNIFICANT CHANGE UP (ref 32–36)
MCHC RBC-ENTMCNC: 33.8 GM/DL — SIGNIFICANT CHANGE UP (ref 32–36)
MCV RBC AUTO: 91.1 FL — SIGNIFICANT CHANGE UP (ref 80–100)
MCV RBC AUTO: 91.1 FL — SIGNIFICANT CHANGE UP (ref 80–100)
MCV RBC AUTO: 91.5 FL — SIGNIFICANT CHANGE UP (ref 80–100)
MONOCYTES # BLD AUTO: 0.1 K/UL — SIGNIFICANT CHANGE UP (ref 0–0.9)
MONOCYTES # BLD AUTO: 0.5 K/UL — SIGNIFICANT CHANGE UP (ref 0–0.9)
MONOCYTES # BLD AUTO: 0.6 K/UL — SIGNIFICANT CHANGE UP (ref 0–0.9)
MONOCYTES NFR BLD AUTO: 1.1 % — LOW (ref 2–14)
MONOCYTES NFR BLD AUTO: 3 % — SIGNIFICANT CHANGE UP (ref 2–14)
MONOCYTES NFR BLD AUTO: 3.1 % — SIGNIFICANT CHANGE UP (ref 2–14)
NEUTROPHILS # BLD AUTO: 13.8 K/UL — HIGH (ref 1.8–7.4)
NEUTROPHILS # BLD AUTO: 18.3 K/UL — HIGH (ref 1.8–7.4)
NEUTROPHILS # BLD AUTO: 5.2 K/UL — SIGNIFICANT CHANGE UP (ref 1.8–7.4)
NEUTROPHILS NFR BLD AUTO: 61.8 % — SIGNIFICANT CHANGE UP (ref 43–77)
NEUTROPHILS NFR BLD AUTO: 91.9 % — HIGH (ref 43–77)
NEUTROPHILS NFR BLD AUTO: 92.5 % — HIGH (ref 43–77)
PHOSPHATE SERPL-MCNC: 3.7 MG/DL — SIGNIFICANT CHANGE UP (ref 2.5–4.5)
PHOSPHATE SERPL-MCNC: 4.4 MG/DL — SIGNIFICANT CHANGE UP (ref 2.5–4.5)
PHOSPHATE SERPL-MCNC: 5.6 MG/DL — HIGH (ref 2.5–4.5)
PLATELET # BLD AUTO: 283 K/UL — SIGNIFICANT CHANGE UP (ref 150–400)
PLATELET # BLD AUTO: 284 K/UL — SIGNIFICANT CHANGE UP (ref 150–400)
PLATELET # BLD AUTO: 311 K/UL — SIGNIFICANT CHANGE UP (ref 150–400)
POTASSIUM SERPL-MCNC: 5.9 MMOL/L — HIGH (ref 3.5–5.3)
POTASSIUM SERPL-MCNC: 7.9 MMOL/L — CRITICAL HIGH (ref 3.5–5.3)
POTASSIUM SERPL-MCNC: 8.4 MMOL/L — CRITICAL HIGH (ref 3.5–5.3)
POTASSIUM SERPL-MCNC: 8.7 MMOL/L — CRITICAL HIGH (ref 3.5–5.3)
POTASSIUM SERPL-SCNC: 5.9 MMOL/L — HIGH (ref 3.5–5.3)
POTASSIUM SERPL-SCNC: 7.9 MMOL/L — CRITICAL HIGH (ref 3.5–5.3)
POTASSIUM SERPL-SCNC: 8.4 MMOL/L — CRITICAL HIGH (ref 3.5–5.3)
POTASSIUM SERPL-SCNC: 8.7 MMOL/L — CRITICAL HIGH (ref 3.5–5.3)
PROCALCITONIN SERPL-MCNC: 1.22 NG/ML — HIGH (ref 0.02–0.1)
PROT SERPL-MCNC: 6.7 G/DL — SIGNIFICANT CHANGE UP (ref 6–8.3)
PROT SERPL-MCNC: 6.8 G/DL — SIGNIFICANT CHANGE UP (ref 6–8.3)
PROT SERPL-MCNC: 7.7 G/DL — SIGNIFICANT CHANGE UP (ref 6–8.3)
PROTHROM AB SERPL-ACNC: 12.2 SEC — SIGNIFICANT CHANGE UP (ref 10–12.9)
PROTHROM AB SERPL-ACNC: 13.2 SEC — HIGH (ref 10–12.9)
RBC # BLD: 3.65 M/UL — LOW (ref 3.8–5.2)
RBC # BLD: 3.78 M/UL — LOW (ref 3.8–5.2)
RBC # BLD: 4.36 M/UL — SIGNIFICANT CHANGE UP (ref 3.8–5.2)
RBC # FLD: 14.5 % — SIGNIFICANT CHANGE UP (ref 10.3–14.5)
RBC # FLD: 14.5 % — SIGNIFICANT CHANGE UP (ref 10.3–14.5)
RBC # FLD: 14.9 % — HIGH (ref 10.3–14.5)
RH IG SCN BLD-IMP: POSITIVE — SIGNIFICANT CHANGE UP
SODIUM SERPL-SCNC: 134 MMOL/L — LOW (ref 135–145)
SODIUM SERPL-SCNC: 136 MMOL/L — SIGNIFICANT CHANGE UP (ref 135–145)
T3 SERPL-MCNC: 83 NG/DL — SIGNIFICANT CHANGE UP (ref 80–200)
TROPONIN T, HIGH SENSITIVITY RESULT: 146 NG/L — HIGH (ref 0–51)
TROPONIN T, HIGH SENSITIVITY RESULT: 204 NG/L — HIGH (ref 0–51)
TSH SERPL-MCNC: 1.5 UIU/ML — SIGNIFICANT CHANGE UP (ref 0.27–4.2)
WBC # BLD: 15 K/UL — HIGH (ref 3.8–10.5)
WBC # BLD: 19.8 K/UL — HIGH (ref 3.8–10.5)
WBC # BLD: 8.5 K/UL — SIGNIFICANT CHANGE UP (ref 3.8–10.5)
WBC # FLD AUTO: 15 K/UL — HIGH (ref 3.8–10.5)
WBC # FLD AUTO: 19.8 K/UL — HIGH (ref 3.8–10.5)
WBC # FLD AUTO: 8.5 K/UL — SIGNIFICANT CHANGE UP (ref 3.8–10.5)

## 2019-04-22 PROCEDURE — 71045 X-RAY EXAM CHEST 1 VIEW: CPT | Mod: 26

## 2019-04-22 PROCEDURE — 31500 INSERT EMERGENCY AIRWAY: CPT | Mod: GC

## 2019-04-22 PROCEDURE — 93306 TTE W/DOPPLER COMPLETE: CPT | Mod: 26

## 2019-04-22 PROCEDURE — 99291 CRITICAL CARE FIRST HOUR: CPT

## 2019-04-22 PROCEDURE — 92960 CARDIOVERSION ELECTRIC EXT: CPT | Mod: GC,59

## 2019-04-22 PROCEDURE — 36556 INSERT NON-TUNNEL CV CATH: CPT | Mod: GC

## 2019-04-22 PROCEDURE — 93010 ELECTROCARDIOGRAM REPORT: CPT | Mod: 59

## 2019-04-22 PROCEDURE — 99292 CRITICAL CARE ADDL 30 MIN: CPT | Mod: 25,GC

## 2019-04-22 PROCEDURE — 93010 ELECTROCARDIOGRAM REPORT: CPT | Mod: 76

## 2019-04-22 PROCEDURE — 36680 INSERT NEEDLE BONE CAVITY: CPT | Mod: GC

## 2019-04-22 PROCEDURE — 99291 CRITICAL CARE FIRST HOUR: CPT | Mod: 25,GC

## 2019-04-22 PROCEDURE — 71045 X-RAY EXAM CHEST 1 VIEW: CPT | Mod: 26,77

## 2019-04-22 RX ORDER — CALCIUM GLUCONATE 100 MG/ML
2 VIAL (ML) INTRAVENOUS ONCE
Qty: 0 | Refills: 0 | Status: COMPLETED | OUTPATIENT
Start: 2019-04-22 | End: 2019-04-22

## 2019-04-22 RX ORDER — EPINEPHRINE 0.3 MG/.3ML
0.5 INJECTION INTRAMUSCULAR; SUBCUTANEOUS ONCE
Qty: 0 | Refills: 0 | Status: COMPLETED | OUTPATIENT
Start: 2019-04-22 | End: 2019-04-22

## 2019-04-22 RX ORDER — PIPERACILLIN AND TAZOBACTAM 4; .5 G/20ML; G/20ML
3.38 INJECTION, POWDER, LYOPHILIZED, FOR SOLUTION INTRAVENOUS EVERY 12 HOURS
Qty: 0 | Refills: 0 | Status: DISCONTINUED | OUTPATIENT
Start: 2019-04-22 | End: 2019-04-30

## 2019-04-22 RX ORDER — DEXTROSE 50 % IN WATER 50 %
50 SYRINGE (ML) INTRAVENOUS ONCE
Qty: 0 | Refills: 0 | Status: COMPLETED | OUTPATIENT
Start: 2019-04-22 | End: 2019-04-22

## 2019-04-22 RX ORDER — SODIUM BICARBONATE 1 MEQ/ML
50 SYRINGE (ML) INTRAVENOUS ONCE
Qty: 0 | Refills: 0 | Status: COMPLETED | OUTPATIENT
Start: 2019-04-22 | End: 2019-04-22

## 2019-04-22 RX ORDER — CALCIUM CHLORIDE
1000 POWDER (GRAM) MISCELLANEOUS ONCE
Qty: 0 | Refills: 0 | Status: COMPLETED | OUTPATIENT
Start: 2019-04-22 | End: 2019-04-22

## 2019-04-22 RX ORDER — ETOMIDATE 2 MG/ML
20 INJECTION INTRAVENOUS ONCE
Qty: 0 | Refills: 0 | Status: COMPLETED | OUTPATIENT
Start: 2019-04-22 | End: 2019-04-22

## 2019-04-22 RX ORDER — SODIUM BICARBONATE 1 MEQ/ML
0.15 SYRINGE (ML) INTRAVENOUS
Qty: 150 | Refills: 0 | Status: DISCONTINUED | OUTPATIENT
Start: 2019-04-22 | End: 2019-04-22

## 2019-04-22 RX ORDER — ATORVASTATIN CALCIUM 80 MG/1
40 TABLET, FILM COATED ORAL AT BEDTIME
Qty: 0 | Refills: 0 | Status: DISCONTINUED | OUTPATIENT
Start: 2019-04-22 | End: 2019-04-30

## 2019-04-22 RX ORDER — INSULIN LISPRO 100/ML
VIAL (ML) SUBCUTANEOUS EVERY 4 HOURS
Qty: 0 | Refills: 0 | Status: DISCONTINUED | OUTPATIENT
Start: 2019-04-22 | End: 2019-04-23

## 2019-04-22 RX ORDER — VANCOMYCIN HCL 1 G
1000 VIAL (EA) INTRAVENOUS ONCE
Qty: 0 | Refills: 0 | Status: COMPLETED | OUTPATIENT
Start: 2019-04-22 | End: 2019-04-22

## 2019-04-22 RX ORDER — DEXTROSE 50 % IN WATER 50 %
25 SYRINGE (ML) INTRAVENOUS ONCE
Qty: 0 | Refills: 0 | Status: COMPLETED | OUTPATIENT
Start: 2019-04-22 | End: 2019-04-22

## 2019-04-22 RX ORDER — INFLUENZA VIRUS VACCINE 15; 15; 15; 15 UG/.5ML; UG/.5ML; UG/.5ML; UG/.5ML
0.5 SUSPENSION INTRAMUSCULAR ONCE
Qty: 0 | Refills: 0 | Status: DISCONTINUED | OUTPATIENT
Start: 2019-04-22 | End: 2019-05-01

## 2019-04-22 RX ORDER — PHENYLEPHRINE HYDROCHLORIDE 10 MG/ML
0.8 INJECTION INTRAVENOUS
Qty: 160 | Refills: 0 | Status: DISCONTINUED | OUTPATIENT
Start: 2019-04-22 | End: 2019-04-24

## 2019-04-22 RX ORDER — ROCURONIUM BROMIDE 10 MG/ML
100 VIAL (ML) INTRAVENOUS ONCE
Qty: 0 | Refills: 0 | Status: COMPLETED | OUTPATIENT
Start: 2019-04-22 | End: 2019-04-22

## 2019-04-22 RX ORDER — ALBUTEROL 90 UG/1
2.5 AEROSOL, METERED ORAL ONCE
Qty: 0 | Refills: 0 | Status: COMPLETED | OUTPATIENT
Start: 2019-04-22 | End: 2019-04-22

## 2019-04-22 RX ORDER — CHLORHEXIDINE GLUCONATE 213 G/1000ML
1 SOLUTION TOPICAL DAILY
Qty: 0 | Refills: 0 | Status: DISCONTINUED | OUTPATIENT
Start: 2019-04-22 | End: 2019-04-22

## 2019-04-22 RX ORDER — INSULIN HUMAN 100 [IU]/ML
6 INJECTION, SOLUTION SUBCUTANEOUS ONCE
Qty: 0 | Refills: 0 | Status: COMPLETED | OUTPATIENT
Start: 2019-04-22 | End: 2019-04-22

## 2019-04-22 RX ORDER — PROPOFOL 10 MG/ML
20 INJECTION, EMULSION INTRAVENOUS
Qty: 1000 | Refills: 0 | Status: DISCONTINUED | OUTPATIENT
Start: 2019-04-22 | End: 2019-04-23

## 2019-04-22 RX ORDER — HEPARIN SODIUM 5000 [USP'U]/ML
5000 INJECTION INTRAVENOUS; SUBCUTANEOUS EVERY 8 HOURS
Qty: 0 | Refills: 0 | Status: DISCONTINUED | OUTPATIENT
Start: 2019-04-22 | End: 2019-04-24

## 2019-04-22 RX ORDER — PANTOPRAZOLE SODIUM 20 MG/1
40 TABLET, DELAYED RELEASE ORAL DAILY
Qty: 0 | Refills: 0 | Status: DISCONTINUED | OUTPATIENT
Start: 2019-04-22 | End: 2019-04-25

## 2019-04-22 RX ORDER — ASPIRIN/CALCIUM CARB/MAGNESIUM 324 MG
81 TABLET ORAL DAILY
Qty: 0 | Refills: 0 | Status: DISCONTINUED | OUTPATIENT
Start: 2019-04-22 | End: 2019-04-30

## 2019-04-22 RX ORDER — FENTANYL CITRATE 50 UG/ML
25 INJECTION INTRAVENOUS ONCE
Qty: 0 | Refills: 0 | Status: DISCONTINUED | OUTPATIENT
Start: 2019-04-22 | End: 2019-04-22

## 2019-04-22 RX ORDER — SODIUM CHLORIDE 9 MG/ML
10 INJECTION INTRAMUSCULAR; INTRAVENOUS; SUBCUTANEOUS
Qty: 0 | Refills: 0 | Status: DISCONTINUED | OUTPATIENT
Start: 2019-04-22 | End: 2019-04-25

## 2019-04-22 RX ORDER — FENTANYL CITRATE 50 UG/ML
50 INJECTION INTRAVENOUS ONCE
Qty: 0 | Refills: 0 | Status: DISCONTINUED | OUTPATIENT
Start: 2019-04-22 | End: 2019-04-22

## 2019-04-22 RX ORDER — INSULIN HUMAN 100 [IU]/ML
10 INJECTION, SOLUTION SUBCUTANEOUS ONCE
Qty: 0 | Refills: 0 | Status: COMPLETED | OUTPATIENT
Start: 2019-04-22 | End: 2019-04-22

## 2019-04-22 RX ORDER — PIPERACILLIN AND TAZOBACTAM 4; .5 G/20ML; G/20ML
3.38 INJECTION, POWDER, LYOPHILIZED, FOR SOLUTION INTRAVENOUS ONCE
Qty: 0 | Refills: 0 | Status: COMPLETED | OUTPATIENT
Start: 2019-04-22 | End: 2019-04-22

## 2019-04-22 RX ORDER — SODIUM POLYSTYRENE SULFONATE 4.1 MEQ/G
30 POWDER, FOR SUSPENSION ORAL ONCE
Qty: 0 | Refills: 0 | Status: COMPLETED | OUTPATIENT
Start: 2019-04-22 | End: 2019-04-22

## 2019-04-22 RX ORDER — CHLORHEXIDINE GLUCONATE 213 G/1000ML
1 SOLUTION TOPICAL
Qty: 0 | Refills: 0 | Status: DISCONTINUED | OUTPATIENT
Start: 2019-04-22 | End: 2019-04-25

## 2019-04-22 RX ORDER — INSULIN HUMAN 100 [IU]/ML
8 INJECTION, SOLUTION SUBCUTANEOUS ONCE
Qty: 0 | Refills: 0 | Status: COMPLETED | OUTPATIENT
Start: 2019-04-22 | End: 2019-04-22

## 2019-04-22 RX ORDER — CHLORHEXIDINE GLUCONATE 213 G/1000ML
1 SOLUTION TOPICAL
Qty: 0 | Refills: 0 | Status: DISCONTINUED | OUTPATIENT
Start: 2019-04-22 | End: 2019-04-22

## 2019-04-22 RX ORDER — HYDRALAZINE HCL 50 MG
10 TABLET ORAL ONCE
Qty: 0 | Refills: 0 | Status: COMPLETED | OUTPATIENT
Start: 2019-04-22 | End: 2019-04-22

## 2019-04-22 RX ADMIN — Medication 50 MILLIEQUIVALENT(S): at 08:26

## 2019-04-22 RX ADMIN — Medication 1000 MILLIGRAM(S): at 08:37

## 2019-04-22 RX ADMIN — Medication 81 MILLIGRAM(S): at 15:27

## 2019-04-22 RX ADMIN — Medication 200 GRAM(S): at 23:20

## 2019-04-22 RX ADMIN — Medication 1000 MILLIGRAM(S): at 08:25

## 2019-04-22 RX ADMIN — Medication 50 MILLILITER(S): at 23:02

## 2019-04-22 RX ADMIN — Medication 50 MILLIEQUIVALENT(S): at 10:52

## 2019-04-22 RX ADMIN — HEPARIN SODIUM 5000 UNIT(S): 5000 INJECTION INTRAVENOUS; SUBCUTANEOUS at 21:51

## 2019-04-22 RX ADMIN — Medication 25 MILLILITER(S): at 12:26

## 2019-04-22 RX ADMIN — Medication 50 MILLILITER(S): at 10:53

## 2019-04-22 RX ADMIN — INSULIN HUMAN 8 UNIT(S): 100 INJECTION, SOLUTION SUBCUTANEOUS at 09:32

## 2019-04-22 RX ADMIN — ATORVASTATIN CALCIUM 40 MILLIGRAM(S): 80 TABLET, FILM COATED ORAL at 21:52

## 2019-04-22 RX ADMIN — CHLORHEXIDINE GLUCONATE 1 APPLICATION(S): 213 SOLUTION TOPICAL at 16:54

## 2019-04-22 RX ADMIN — FENTANYL CITRATE 25 MICROGRAM(S): 50 INJECTION INTRAVENOUS at 11:15

## 2019-04-22 RX ADMIN — Medication 250 MILLIGRAM(S): at 22:43

## 2019-04-22 RX ADMIN — ALBUTEROL 2.5 MILLIGRAM(S): 90 AEROSOL, METERED ORAL at 23:19

## 2019-04-22 RX ADMIN — PHENYLEPHRINE HYDROCHLORIDE 12.62 MICROGRAM(S)/KG/MIN: 10 INJECTION INTRAVENOUS at 11:30

## 2019-04-22 RX ADMIN — PROPOFOL 10.09 MICROGRAM(S)/KG/MIN: 10 INJECTION, EMULSION INTRAVENOUS at 11:05

## 2019-04-22 RX ADMIN — INSULIN HUMAN 6 UNIT(S): 100 INJECTION, SOLUTION SUBCUTANEOUS at 08:41

## 2019-04-22 RX ADMIN — Medication 50 MILLIEQUIVALENT(S): at 23:00

## 2019-04-22 RX ADMIN — ETOMIDATE 20 MILLIGRAM(S): 2 INJECTION INTRAVENOUS at 08:29

## 2019-04-22 RX ADMIN — FENTANYL CITRATE 25 MICROGRAM(S): 50 INJECTION INTRAVENOUS at 11:01

## 2019-04-22 RX ADMIN — HEPARIN SODIUM 5000 UNIT(S): 5000 INJECTION INTRAVENOUS; SUBCUTANEOUS at 15:26

## 2019-04-22 RX ADMIN — Medication 10 MILLIGRAM(S): at 10:15

## 2019-04-22 RX ADMIN — PIPERACILLIN AND TAZOBACTAM 200 GRAM(S): 4; .5 INJECTION, POWDER, LYOPHILIZED, FOR SOLUTION INTRAVENOUS at 22:43

## 2019-04-22 RX ADMIN — INSULIN HUMAN 10 UNIT(S): 100 INJECTION, SOLUTION SUBCUTANEOUS at 10:52

## 2019-04-22 RX ADMIN — Medication 50 MILLILITER(S): at 22:43

## 2019-04-22 RX ADMIN — Medication 50 MILLIEQUIVALENT(S): at 08:33

## 2019-04-22 RX ADMIN — Medication 1000 MILLIGRAM(S): at 10:53

## 2019-04-22 RX ADMIN — Medication 25 MILLILITER(S): at 17:15

## 2019-04-22 RX ADMIN — Medication 25 MILLILITER(S): at 09:32

## 2019-04-22 RX ADMIN — Medication 100 MILLIGRAM(S): at 08:29

## 2019-04-22 RX ADMIN — EPINEPHRINE 0.5 MILLIGRAM(S): 0.3 INJECTION INTRAMUSCULAR; SUBCUTANEOUS at 08:28

## 2019-04-22 RX ADMIN — Medication 50 MILLIEQUIVALENT(S): at 22:51

## 2019-04-22 RX ADMIN — Medication 50 MILLIEQUIVALENT(S): at 23:02

## 2019-04-22 RX ADMIN — Medication 50 MILLILITER(S): at 08:38

## 2019-04-22 RX ADMIN — PANTOPRAZOLE SODIUM 40 MILLIGRAM(S): 20 TABLET, DELAYED RELEASE ORAL at 15:26

## 2019-04-22 RX ADMIN — FENTANYL CITRATE 50 MICROGRAM(S): 50 INJECTION INTRAVENOUS at 08:34

## 2019-04-22 RX ADMIN — INSULIN HUMAN 10 UNIT(S): 100 INJECTION, SOLUTION SUBCUTANEOUS at 23:01

## 2019-04-22 NOTE — ED PROCEDURE NOTE - CPROC ED INFUS LINE DETAIL1
The location was identified, and the area was draped and prepped./The catheter was placed using sterile technique.
The location was identified, and the area was draped and prepped./Ultrasound guidance was used during placement./The catheter was placed using sterile technique./The guidewire was recovered./All lumen(s) aspirated and flushed without difficulty.

## 2019-04-22 NOTE — ED PROVIDER NOTE - ATTENDING CONTRIBUTION TO CARE
Attending MD Avelar:  I personally have seen and examined this patient.  Resident note reviewed and agree on plan of care and except where noted.  See HPI, PE, and MDM for details.

## 2019-04-22 NOTE — ED PROCEDURE NOTE - ATTENDING CONTRIBUTION TO CARE
Attending MD Avelar:    I was present during the key portions of the procedure.

## 2019-04-22 NOTE — H&P ADULT - HISTORY OF PRESENT ILLNESS
69 yr old female with PMHx ESRD on HD (mon-wed-fri) last HD session this past friday 4/19/19 (unknown removal of fluid), Afib on apixaban, CAD, Mod Ao stenosis (last TTE 5/2017), HTN, PVD, DM2, recent hospitalization 3/2019 s/p fall found to have hyperkalemia requiring emergent dialysis who now presents this morning (4/22/19) from home via EMS weakness and fatigue.            As endorsed by son pt was in usual state of health AOX3 after HD session, developing progressive weakness and fatigue saturday (4/20/19) with worsening this morning. In E.D. pt was found with thready pulse, initial ECG afib with ventricular response of 90's with RBBB slight peaked T waves in anterior leads and widened QRS complexes. Pt's heart rate deteriorated to rate of 20 (as endorsed by ED of AIVR) without loss of pulse, Pt received CaCl IVP (total 3 amps), Reg insulin initially 6 units IVP, then 8 units IVP, HCO3 IVP (total 100 meq), D50W IVP (total 100 cc's), and developed resp distress requiring intubation. Heart rate responded with development of SVT with widened QRS requiring cardioversion to afib with controlled rate. Pt's subsequent lab work demonstrated K+ 8.5, sCR 8.34 , . Last know sCr 4.68 from 3/2019.        Consult called and admission to MICU  for hyperkalemia requiring urgent hemodialysis and resp failure 69 yr old female with PMHx ESRD on HD (mon-wed-fri) last HD session this past friday 4/19/19 (unknown removal of fluid), Afib on apixaban, CAD, Mod Ao stenosis (last TTE 5/2017), HTN, PVD, DM2, recent hospitalization 3/2019 s/p fall found to have hyperkalemia requiring emergent dialysis who now presents this morning (4/22/19) from home via EMS with worsening weakness and fatigue found to have hyperkalemia (8.5)             As endorsed by son pt was in usual state of health AOX3 after HD session, developing progressive weakness and fatigue saturday (4/20/19) with worsening this morning. In E.D. pt was found with thready pulse, initial ECG afib with ventricular response of 90's with RBBB slight peaked T waves in anterior leads and widened QRS complexes. Pt's heart rate deteriorated to rate of 20 (as endorsed by ED of AIVR) without loss of pulse, Pt received CaCl IVP (total 3 amps), Reg insulin initially 6 units IVP, then 8 units IVP, HCO3 IVP (total 100 meq), D50W IVP (total 100 cc's), and developed resp distress requiring intubation. Heart rate responded with development of SVT with widened QRS requiring cardioversion to afib with controlled rate. Pt's subsequent lab work demonstrated K+ 8.5, sCR 8.34 , . Last know sCr 4.68 from 3/2019.        Consult called and admission to MICU  for hyperkalemia requiring urgent hemodialysis and resp failure

## 2019-04-22 NOTE — ED PROCEDURE NOTE - CPROC ED INDICATIONS1
airway protection/mental status change
hemodynamic instability/wide complex tachycardia to 170s concerning for ventricular tachycardia
no peripheral access
hyperkalemia, candi-coding/emergency venous access

## 2019-04-22 NOTE — H&P ADULT - NSICDXPASTSURGICALHX_GEN_ALL_CORE_FT
PAST SURGICAL HISTORY:  H/O tracheostomy     S/P Amputation of Lesser Toe Rt 1-3 metatarsal    S/P amputation of lesser toe, right 2013 right great toe, 2nd and 3rd right toes    Status post insertion of percutaneous endoscopic gastrostomy (PEG) tube

## 2019-04-22 NOTE — CONSULT NOTE ADULT - SUBJECTIVE AND OBJECTIVE BOX
HPI:  68 yo female with PMH as below who has been c/o weakness for the past 1-2 days, came to the ER, found to be in bradycardia and cardiac arrest, now intubated, treated medically for severe hyperkalemia and Nephrology consulted for HD management.  The patient is on HD MWF, her Nephrologist is My partner, Dr. Pat Gold. HD center is [facility]    PAST MEDICAL & SURGICAL HISTORY:  Hyperlipidemia  Diabetes  Hypertension  Osteomyelitis  Diabetic Neuropathy  Cellulitis of Foot  Edema of Both Legs  Diabetes: Type 2  History of Osteoarthritis  HTN - Hypertension  HLD (Hyperlipidemia)  Status post insertion of percutaneous endoscopic gastrostomy (PEG) tube  H/O tracheostomy  S/P amputation of lesser toe, right: 2013 right great toe, 2nd and 3rd right toes  S/P Amputation of Lesser Toe: Rt 1-3 metatarsal      MEDICATIONS  (STANDING):  etomidate Injectable 20 milliGRAM(s) IV Push once  rocuronium Injectable 100 milliGRAM(s) IV Push Once  sodium bicarbonate  Infusion 0.15 mEq/kG/Hr (100 mL/Hr) IV Continuous <Continuous>  sodium bicarbonate  Injectable 50 milliEquivalent(s) IV Push Once  sodium bicarbonate  Injectable 50 milliEquivalent(s) IV Push once      Allergies    No Known Allergies    Intolerances        SOCIAL HISTORY:  Denies ETOh,Smoking,     FAMILY HISTORY:  Family history of diabetes mellitus in father (Father)      REVIEW OF SYSTEMS:  UTO  Patient is intubated      VITAL:  /80  HR 29  RR 18        PHYSICAL EXAM:    Constitutional: Critically ill, intubated   HEENT: PERRLA, EOMI,  MMM  Neck: No LAD, No JVD  Respiratory: CTAB  Cardiovascular: S1 and S2  Gastrointestinal: BS+, soft, NT/ND  Extremities: No peripheral edema  Neurological: Limited     LABS:                        11.6   8.5   )-----------( 283      ( 22 Apr 2019 09:04 )             34.4     04-22    136  |  97  |  x   ----------------------------<  339<H>  x    |  22  |  8.34<H>      TPro  6.7  /  Alb  3.5  /  TBili  0.2  /  DBili  x   /  AST  25  /  ALT  37  /  AlkPhos  113  04-22      Urine Studies:          RADIOLOGY & ADDITIONAL STUDIES:        ASSESSMENT:  68 yo female with PMH as below who has been c/o weakness for the past 1-2 days, came to the ER, found to be in bradycardia and cardiac arrest, now intubated, treated medically for severe hyperkalemia and Nephrology consulted for HD management.      - ESRD on HD MWF last HD was on Friday  - Hyperkalemia  - Hypertension    PLAN:   - STAT HD today 3.5 hours 2kg UF 2K bath and last 1.5 hours 1 K bath  - Repeat BMP no sooner than 5 hours post HD  - Check serum Phos  - CXR   - Renal dosing of meds to creatinine clearance of <10 ml/min      d/w ER and MICU attending    Mel Bronson MD  [facility]  [phone]

## 2019-04-22 NOTE — H&P ADULT - ASSESSMENT
Assessment:    Plan:    #Neuro:  -pt received rocuronium in ED for intubation  -presented with progressive weakness  -neuro checks q 2 hrs and prn for changes  -physical therapy when stable  -as pt presented with progressive weakness/lethargy if no improvement when stable - consider CT head    #Pulm:  -Pt orally intubated in ED for resp distress  -maintain mechanical vent therapy - titrate to ph 7.35-7.45; PCO2 35-45; SPO2 92%  -HOB >/= 30 degrees   -duoneb therapy q 6 hrs    #CV:  -pt found to be hyperkalemic with widened QRS complex on ECG  -ECG now and q am x 3 and prn  -cardiac enzymes now and q 8 hrs x 3  -pt on midodrine with home meds - will hold at present  -home med of ASA 81 mg po qd - will continue  -home med of atorvastatin 40 mg po qhs - will continue    #GI/:  -will place OGT  -NPO at present  -obtain dietician consult  -Emergent Hemodialysis now     #ID:  -Pt currently without need for antibx therapy  -pan culture for surveillance     #FEN/ENDO/HEME:  -cmp/mg++/po--4/coags/cbc with diff now and q am  -bmp/mg++/po--4 prn  -abg prn  -as K+ >/=8 meq - 1gm CaCl/reg insulin 10 units/D50W IVP now and prn  -consider high dose albuterol nebs to assist to decrease K+  -ISS q 4 hrs - maintain glucose 140 - 160 Assessment:    Plan:    #Neuro:  -pt received rocuronium in ED for intubation  -presented with progressive weakness  -neuro checks q 2 hrs and prn for changes  -physical therapy when stable  -as pt presented with progressive weakness/lethargy if no improvement when stable - consider CT head    #Pulm:  -Pt orally intubated in ED for resp distress  -maintain mechanical vent therapy - titrate to ph 7.35-7.45; PCO2 35-45; SPO2 92%  -HOB >/= 30 degrees   -duoneb therapy q 6 hrs    #CV:  -pt found to be hyperkalemic with widened QRS complex on ECG  -ECG now and q am x 3 and prn  -cardiac enzymes now and q 8 hrs x 3  -pt on midodrine with home meds - will hold at present  -home med of ASA 81 mg po qd - will continue  -home med of atorvastatin 40 mg po qhs - will continue  -Pt with Hx of Afib on abixaban 2.5 mg po q 12 hrs - will resume in a.m.  -Pt with hx of Mod Ao Stenosis (5/2017)  -Obtain TTE for eval of progression of disease    #GI/:  -will place OGT  -NPO at present  -obtain dietician consult  -Emergent Hemodialysis now     #ID:  -Pt currently without need for antibx therapy  -pan culture for surveillance     #FEN/ENDO/HEME:  -cmp/mg++/po--4/coags/cbc with diff now and q am  -bmp/mg++/po--4 prn  -abg prn  -as K+ >/=8 meq - 1gm CaCl/reg insulin 10 units/D50W IVP now and prn  -consider high dose albuterol nebs to assist to decrease K+  -ISS q 4 hrs - maintain glucose 140 - 160 Assessment:  69 yr old female with stated hx significant for esrd, afib on apixaban, found to have severe hyperkalemia with slight peaked T waves. Complicated by initial AIVR to wide complex tachycardia requiring cardioversion and resp distress requiring intubation.      Consult called and admission to MICU  for hyperkalemia requiring urgent hemodialysis and resp failure    Plan:    #Neuro:  -pt received rocuronium in ED for intubation  -presented with progressive weakness  -neuro checks q 2 hrs and prn for changes  -physical therapy when stable  -as pt presented with progressive weakness/lethargy if no improvement when stable - consider CT head    #Pulm:  -Pt orally intubated in ED for resp distress  -maintain mechanical vent therapy - titrate to ph 7.35-7.45; PCO2 35-45; SPO2 92%  -HOB >/= 30 degrees   -duoneb therapy q 6 hrs    #CV:  -pt found to be hyperkalemic with widened QRS complex on ECG  -ECG now and q am x 3 and prn  -cardiac enzymes now and q 8 hrs x 3  -pt on midodrine with home meds - will hold at present  -home med of ASA 81 mg po qd - will continue  -home med of atorvastatin 40 mg po qhs - will continue  -Pt with Hx of Afib on abixaban 2.5 mg po q 12 hrs - will resume in a.m.  -Pt with hx of Mod Ao Stenosis (5/2017)  -Obtain TTE for eval of progression of disease    #GI/:  -will place OGT  -NPO at present  -obtain dietician consult  -Emergent Hemodialysis now     #ID:  -Pt currently without need for antibx therapy  -pan culture for surveillance     #FEN/ENDO/HEME:  -cmp/mg++/po--4/coags/cbc with diff now and q am  -bmp/mg++/po--4 prn  -abg prn  -as K+ >/=8 meq - 1gm CaCl/reg insulin 10 units/D50W IVP now and prn  -consider high dose albuterol nebs to assist to decrease K+  -ISS q 4 hrs - maintain glucose 140 - 160

## 2019-04-22 NOTE — ED PROVIDER NOTE - CLINICAL SUMMARY MEDICAL DECISION MAKING FREE TEXT BOX
Attending MD Avelar: 69F with ESRD on HD MWF presenting with fatigue and difficulty breathing, arrives with agonal respirations, HR in 20s, treated presumptively for hyperkalemia given bradydysrhythmia with multiple doses of IV calcium and bicarb with improved to sinus tachycardia, intubated emergently for hypoxic resp failure. MICU and nephro consulted, hyperK confirmed on blood gas

## 2019-04-22 NOTE — CONSULT NOTE ADULT - SUBJECTIVE AND OBJECTIVE BOX
CHIEF COMPLAINT: Respiratory failure      HISTORY OF PRESENT ILLNESS:  69 yr old female with PMHx ESRD on HD (mon-wed-fri) last HD session this past friday 4/19/19 (unknown removal of fluid), Afib on apixaban, CAD, Mod Ao stenosis (last TTE 5/2017), HTN, PVD, DM2, recent hospitalization 3/2019 s/p fall found to have hyperkalemia requiring emergent dialysis who now presents this morning (4/22/19) from home via EMS weakness and fatigue.  As endorsed by son pt was in usual state of health AOX3 after HD session, developing progressive weakness and fatigue saturday (4/20/19) with worsening this morning. In E.D. pt was found with thready pulse, initial ECG afib with ventricular response of 90's with RBBB slight peaked T waves in anterior leads and widened QRS complexes. Pt's heart rate deteriorated to rate of 20 (as endorsed by ED of AIVR) without loss of pulse, Pt received CaCl IVP (total 3 amps), Reg insulin initially 6 units IVP, then 8 units IVP, HCO3 IVP (total 100 meq), D50W IVP (total 100 cc's), and developed resp distress requiring intubation. Heart rate responded with development of SVT with widened QRS requiring cardioversion to afib with controlled rate. Pt's subsequent lab work demonstrated K+ 8.5, sCR 8.34 , . Last know sCr 4.68 from 3/2019.     PAST MEDICAL & SURGICAL HISTORY:  Hyperlipidemia  Diabetes  Hypertension  Osteomyelitis  Diabetic Neuropathy  Cellulitis of Foot  Edema of Both Legs  Diabetes: Type 2  History of Osteoarthritis  HTN - Hypertension  HLD (Hyperlipidemia)  Status post insertion of percutaneous endoscopic gastrostomy (PEG) tube  H/O tracheostomy  S/P amputation of lesser toe, right: 2013 right great toe, 2nd and 3rd right toes  S/P Amputation of Lesser Toe: Rt 1-3 metatarsal          MEDICATIONS:  aspirin  chewable 81 milliGRAM(s) Oral daily  heparin  Injectable 5000 Unit(s) SubCutaneous every 8 hours  phenylephrine    Infusion 0.8 MICROgram(s)/kG/Min IV Continuous <Continuous>        propofol Infusion 20 MICROgram(s)/kG/Min IV Continuous <Continuous>    pantoprazole  Injectable 40 milliGRAM(s) IV Push daily    atorvastatin 40 milliGRAM(s) Oral at bedtime  dextrose 50% Injectable 25 milliLiter(s) IV Push once  insulin lispro (HumaLOG) corrective regimen sliding scale   SubCutaneous every 4 hours    chlorhexidine 2% Cloths 1 Application(s) Topical daily  chlorhexidine 4% Liquid 1 Application(s) Topical <User Schedule>  chlorhexidine 4% Liquid 1 Application(s) Topical <User Schedule>  influenza   Vaccine 0.5 milliLiter(s) IntraMuscular once  sodium chloride 0.9% lock flush 10 milliLiter(s) IV Push every 1 hour PRN      FAMILY HISTORY:      SOCIAL HISTORY:    [ ] Non-smoker  [ ] Smoker  [ ] Alcohol    Allergies    No Known Allergies    Intolerances    	    REVIEW OF SYSTEMS  :[ ] All others negative	  [ XX] Unable to obtain    PHYSICAL EXAM:  T(C): 35.5 (04-22-19 @ 10:29), Max: 35.5 (04-22-19 @ 10:08)  HR: 76 (04-22-19 @ 12:00) (29 - 122)  BP: 80/42 (04-22-19 @ 12:00) (80/42 - 222/91)  RR: 20 (04-22-19 @ 12:00) (5 - 33)  SpO2: 99% (04-22-19 @ 12:00) (87% - 100%)  Wt(kg): --  I&O's Summary    22 Apr 2019 07:01  -  22 Apr 2019 12:22  --------------------------------------------------------  IN: 56.1 mL / OUT: 0 mL / NET: 56.1 mL        Appearance: Intubated, sedated but arousable   HEENT:   Normal oral mucosa, PERRL, EOMI	  Lymphatic: No lymphadenopathy  Cardiovascular: Normal S1 S2, No JVD, No murmurs, No edema  Respiratory: ventilated   Psychiatry: A & O x 1, sedated   Gastrointestinal:  Soft, Non-tender, + BS	  Skin: No rashes, No ecchymoses, No cyanosis	  Neurologic: Non-focal  Extremities: Decreased range of motion, +partial amputation   Vascular: Peripheral pulses palpable 2+ bilaterally    TELEMETRY: 	    ECG:  	AF,   RADIOLOGY:  < from: Xray Chest 1 View- PORTABLE-Urgent (04.22.19 @ 11:17) >    EXAM:  XR CHEST PORTABLE URGENT 1V                            PROCEDURE DATE:  04/22/2019            INTERPRETATION:  A single chest x-ray was obtained on October 22, 2019.    Indication: Prior bradycardia. Position of endotracheal tube.    Impression:    The heart is normal in size. Bibasilar platelike atelectasis.   Endotracheal tube is in good position. A central line is seen on the left   and the tip is in the superior vena cava. No pneumothorax. NG tube is in   the stomach however its tip is not seen on the current study.                    JESE GARCIA M.D., ATTENDING RADIOLOGIST  This document has been electronically signed. Apr 22 2019 11:19AM                < end of copied text >    OTHER: 	  	  LABS:	 	    CARDIAC MARKERS:                                  13.3   15.0  )-----------( 311      ( 22 Apr 2019 11:22 )             39.7     04-22    136  |  97  |  114<H>  ----------------------------<  177<H>  8.7<HH>   |  20<L>  |  8.46<H>    Ca    13.0<HH>      22 Apr 2019 11:22  Phos  5.6     04-22  Mg     3.0     04-22    TPro  7.7  /  Alb  3.9  /  TBili  0.4  /  DBili  x   /  AST  55<H>  /  ALT  63<H>  /  AlkPhos  140<H>  04-22    proBNP:   Lipid Profile:   HgA1c:   TSH: CHIEF COMPLAINT: Respiratory failure      HISTORY OF PRESENT ILLNESS:  69 yr old female with PMHx ESRD on HD (mon-wed-fri) last HD session this past friday 4/19/19 (unknown removal of fluid), Afib on apixaban, CAD, Mod Ao stenosis (last TTE 5/2017), HTN, PVD, DM2, recent hospitalization 3/2019 s/p fall found to have hyperkalemia requiring emergent dialysis who now presents this morning (4/22/19) from home via EMS weakness and fatigue.  As endorsed by son pt was in usual state of health AOX3 after HD session, developing progressive weakness and fatigue saturday (4/20/19) with worsening this morning. In E.D. pt was found with thready pulse, initial ECG afib with ventricular response of 90's with RBBB slight peaked T waves in anterior leads and widened QRS complexes. Pt's heart rate deteriorated to rate of 20 (as endorsed by ED of AIVR) without loss of pulse, Pt received CaCl IVP (total 3 amps), Reg insulin initially 6 units IVP, then 8 units IVP, HCO3 IVP (total 100 meq), D50W IVP (total 100 cc's), and developed resp distress requiring intubation. Heart rate responded with development of SVT with widened QRS requiring cardioversion to afib with controlled rate. Pt's subsequent lab work demonstrated K+ 8.5, sCR 8.34 , . Last know sCr 4.68 from 3/2019.   Currently intubated in ICU. Family at bedside.     PAST MEDICAL & SURGICAL HISTORY:  Hyperlipidemia  Diabetes  Hypertension  Osteomyelitis  Diabetic Neuropathy  Cellulitis of Foot  Edema of Both Legs  Diabetes: Type 2  History of Osteoarthritis  HTN - Hypertension  HLD (Hyperlipidemia)  Status post insertion of percutaneous endoscopic gastrostomy (PEG) tube  H/O tracheostomy  S/P amputation of lesser toe, right: 2013 right great toe, 2nd and 3rd right toes  S/P Amputation of Lesser Toe: Rt 1-3 metatarsal          MEDICATIONS:  aspirin  chewable 81 milliGRAM(s) Oral daily  heparin  Injectable 5000 Unit(s) SubCutaneous every 8 hours  phenylephrine    Infusion 0.8 MICROgram(s)/kG/Min IV Continuous <Continuous>        propofol Infusion 20 MICROgram(s)/kG/Min IV Continuous <Continuous>    pantoprazole  Injectable 40 milliGRAM(s) IV Push daily    atorvastatin 40 milliGRAM(s) Oral at bedtime  dextrose 50% Injectable 25 milliLiter(s) IV Push once  insulin lispro (HumaLOG) corrective regimen sliding scale   SubCutaneous every 4 hours    chlorhexidine 2% Cloths 1 Application(s) Topical daily  chlorhexidine 4% Liquid 1 Application(s) Topical <User Schedule>  chlorhexidine 4% Liquid 1 Application(s) Topical <User Schedule>  influenza   Vaccine 0.5 milliLiter(s) IntraMuscular once  sodium chloride 0.9% lock flush 10 milliLiter(s) IV Push every 1 hour PRN      FAMILY HISTORY:      SOCIAL HISTORY:    [ ] Non-smoker  [ ] Smoker  [ ] Alcohol    Allergies    No Known Allergies    Intolerances    	    REVIEW OF SYSTEMS  :[ ] All others negative	  [ XX] Unable to obtain    PHYSICAL EXAM:  T(C): 35.5 (04-22-19 @ 10:29), Max: 35.5 (04-22-19 @ 10:08)  HR: 76 (04-22-19 @ 12:00) (29 - 122)  BP: 80/42 (04-22-19 @ 12:00) (80/42 - 222/91)  RR: 20 (04-22-19 @ 12:00) (5 - 33)  SpO2: 99% (04-22-19 @ 12:00) (87% - 100%)  Wt(kg): --  I&O's Summary    22 Apr 2019 07:01  -  22 Apr 2019 12:22  --------------------------------------------------------  IN: 56.1 mL / OUT: 0 mL / NET: 56.1 mL        Appearance: Intubated, sedated but arousable   HEENT:   Normal oral mucosa, PERRL, EOMI	  Lymphatic: No lymphadenopathy  Cardiovascular: Irergular  S1 S2, No JVD,  Respiratory: ventilated   Psychiatry: A & O x 1, sedated   Gastrointestinal:  Soft, Non-tender, + BS	  Skin: No rashes, No ecchymoses, No cyanosis	  Neurologic: Sedated   Extremities: Decreased range of motion, +partial amputation   Vascular: Peripheral pulses palpable 2+ bilaterally    TELEMETRY: AF	    ECG:  	AF, non specific stt changes   RADIOLOGY:  < from: Xray Chest 1 View- PORTABLE-Urgent (04.22.19 @ 11:17) >    EXAM:  XR CHEST PORTABLE URGENT 1V                            PROCEDURE DATE:  04/22/2019            INTERPRETATION:  A single chest x-ray was obtained on October 22, 2019.    Indication: Prior bradycardia. Position of endotracheal tube.    Impression:    The heart is normal in size. Bibasilar platelike atelectasis.   Endotracheal tube is in good position. A central line is seen on the left   and the tip is in the superior vena cava. No pneumothorax. NG tube is in   the stomach however its tip is not seen on the current study.                    JESE GARCIA M.D., ATTENDING RADIOLOGIST  This document has been electronically signed. Apr 22 2019 11:19AM                < end of copied text >    OTHER: 	  	  LABS:	 	    CARDIAC MARKERS:                                  13.3   15.0  )-----------( 311      ( 22 Apr 2019 11:22 )             39.7     04-22    136  |  97  |  114<H>  ----------------------------<  177<H>  8.7<HH>   |  20<L>  |  8.46<H>    Ca    13.0<HH>      22 Apr 2019 11:22  Phos  5.6     04-22  Mg     3.0     04-22    TPro  7.7  /  Alb  3.9  /  TBili  0.4  /  DBili  x   /  AST  55<H>  /  ALT  63<H>  /  AlkPhos  140<H>  04-22    proBNP:   Lipid Profile:   HgA1c:   TSH:

## 2019-04-22 NOTE — ED PROVIDER NOTE - PROGRESS NOTE DETAILS
Attending MD Avelar: Dr. Bronson of nephro contacted and will see patient. MICU consulted and will see patient as well. intubated, L IJ CVL being placed. Awaiting return of blood work to confirm suspicion of likely hyperK

## 2019-04-22 NOTE — H&P ADULT - ATTENDING COMMENTS
1. Acute hypoxemic respiratory failure due to arrythmia secondary to hyperkalemia. Continue current AC vent settings until  hyperkalemia resolved.  2. Renal. Hyperkalemia in patient with ESRD on dialysis. Pt had dialysis on Friday. Etiology of Hyperkalemia unclear.? dietary indiscretion. No evidence of infection or sepsis at this time. For emergent dialysis after pt cardioverted for AIVR. Back in afib now. Still needing phenylephrine for BP support.  3. ID . No evidence of infection . Blood cultures taken.    I have spent 35 minutes providing care for this patient with acute hypoxemic respiratory failure , hypotension and hyperkalemia.  I reviewed the NPs documentation and agree with  the plan of care.

## 2019-04-22 NOTE — ED PROVIDER NOTE - CARE PLAN
Principal Discharge DX:	Hyperkalemia  Secondary Diagnosis:	Bradycardia Principal Discharge DX:	Hyperkalemia  Secondary Diagnosis:	Bradycardia  Secondary Diagnosis:	Acute respiratory failure with hypoxia

## 2019-04-22 NOTE — ED PROVIDER NOTE - OBJECTIVE STATEMENT
70 yo female with PMH of ESRD (HD MWF, no reported missed sessions), DM, HTN, presents to the ED for AMS and weakness BIBA, when pt arrived found immediately to be critically ill with agonal respirations and very thready pulse and bradycardia. Stat emergent IO access established and patient given stat calcium with improvement of braydycardia. Pt continued to have AMS, unable to provide history or answer questions, no family initially at bedside. Pt initiated hyper-k cocktail, intubated for airway protection, went into vtach but had pulse, cardioverted at 200J, central line placed in left IJ (right IJ w/ poor anatomy and diameter). + RUE fistula thrill. MICU and nephrology paged stat, pt's nephrologist at bedside, planning for emergent dialysis in MICU. Pt packaged for transport. Labs confirming hyperkalemia to 8+

## 2019-04-22 NOTE — ED PROCEDURE NOTE - CPROC ED TRACHE INTUB DETAIL1
Patient connected to ventilator with settings as ordered./Patient was pre-oxygenated. An endotracheal tube (ETT) was placed through the vocal cords into the trachea.  ETT position was confirmed by auscultation of bilateral breath sounds to all lung fields. ETCO2 level was appropriate./Difficult/crash intubation (see additional details section).

## 2019-04-22 NOTE — ED ADULT NURSE NOTE - OBJECTIVE STATEMENT
69 y f came to the ed by ems at 0823 lethargic and not maintaing her airway. ems reports the initial call was for weakness. patient was dialysis patient m,w,f. family states patient went on friday although lately has been having issues making it over the weekend without dialysis. patient had bilateral IO inserted by MD as patient has right arm fistula and poor vascular access. decision to intubate was made and patient was successfully intubated at 0831. confirmed with bilateral breath sounds and color change on the capnographer. patient went into wide complex tachycardia with a  pulse and was successfully syncronized cardioverted at 0833. upon arrival patient was nonverbal and not responding to verbal stimuli.

## 2019-04-22 NOTE — ED PROCEDURE NOTE - NS ED PROCEDURE ASSISTED BY
Assistance was available
Supervision was available
The procedure was performed independently
Supervision was available

## 2019-04-22 NOTE — CHART NOTE - NSCHARTNOTEFT_GEN_A_CORE
Called by MICU team for hyperkalemia with K of 7.9 tonight. Patient is s/p HD earlier today with low-K bath. Nonetheless, she requires further emergent HD.    Intubated/on vent/ETT+OGT/defibrillator pads in place/coarse BS/irreg s1s2/(+)large b/l LE edema/L-TMA/RUE AVF (+)bruit      Will plan for HD x 2 hours tonight; 1k bath. HD staff made aware.

## 2019-04-22 NOTE — H&P ADULT - NSICDXPASTMEDICALHX_GEN_ALL_CORE_FT
PAST MEDICAL HISTORY:  Cellulitis of Foot     Diabetes Type 2    Diabetes     Diabetic Neuropathy     Edema of Both Legs     History of Osteoarthritis     HLD (Hyperlipidemia)     HTN - Hypertension     Hyperlipidemia     Hypertension     Osteomyelitis

## 2019-04-22 NOTE — ED PROCEDURE NOTE - PROCEDURE ADDITIONAL DETAILS
Pt agonal respirations with thready pulse, candi-code in setting of profound hyperkalemia
bilateral IO needles placed right proximal tibia and left proximal tibia
Pt had very poor IJ on right, decision made to go to left IJ before initial attempt. Upon initial attempt, difficulty advancing wire and dilating, decision made to abort and reattempt procedure for patient safety

## 2019-04-22 NOTE — CHART NOTE - NSCHARTNOTEFT_GEN_A_CORE
Rt and Lt tibial IO's d/c'ed via aseptic technique. IO's removed in entirety. sites dressed with dry sterile dressing. sites are without redness, swelling, infiltration, drainage, tenderness, pain. Pt tolerated procedure

## 2019-04-22 NOTE — ED ADULT NURSE NOTE - NSIMPLEMENTINTERV_GEN_ALL_ED
Implemented All Fall with Harm Risk Interventions:  [place] to call system. Call bell, personal items and telephone within reach. Instruct patient to call for assistance. Room bathroom lighting operational. Non-slip footwear when patient is off stretcher. Physically safe environment: no spills, clutter or unnecessary equipment. Stretcher in lowest position, wheels locked, appropriate side rails in place. Provide visual cue, wrist band, yellow gown, etc. Monitor gait and stability. Monitor for mental status changes and reorient to person, place, and time. Review medications for side effects contributing to fall risk. Reinforce activity limits and safety measures with patient and family. Provide visual clues: red socks.

## 2019-04-22 NOTE — ED PROVIDER NOTE - PHYSICAL EXAMINATION
Attending MD Valentino:    Gen:  agonal respirations, ill appearing, grey, not following commands  Neck: supple, no swelling, trachea midline  CV: heart with reg rhythm, no obvious murmur appreciated   Resp: CTAB, agonal respirations  Abd: soft, NT, ND  Extremities: extremities warm to the touch, trace peripheral edema  Msk: no extremity deformities or bony tenderness  Pysch: unresponsive, agonal respirations  Neuro: moves all extremities spontaneously

## 2019-04-22 NOTE — H&P ADULT - EXTREMITIES COMMENTS
pt received rocuronium in ED for intubation. however pt with slight spontaneous movement of all 4 extremities, upper 3/5 lower 2/5. pt with hx of LRE digit amputaions pt received rocuronium in ED for intubation. however pt with slight spontaneous movement of all 4 extremities, upper 3/5 lower 2/5. pt with hx of LRE digit amputaions. Rt brachial AV fistula with + Bruit and 3+ Thrill

## 2019-04-22 NOTE — CHART NOTE - NSCHARTNOTEFT_GEN_A_CORE
Contact Note    Pt admitted to MICU this am, intubated, asked by team for EN recommendation: Nepro 35 cc/hr x 18 hrs (1134 kcals, 51 gm protein), and No Carb Prosource 3 times/day (180 kcals, 45 gm protein). Initial assessment to follow.

## 2019-04-22 NOTE — ED PROCEDURE NOTE - CPROC ED POST PROC [id number]
wide complex rhythm of uncertain etiology/wide complex tachycardia in 120s, similar appearing QRS complex to pre-cardioversion

## 2019-04-23 DIAGNOSIS — I48.2 CHRONIC ATRIAL FIBRILLATION: ICD-10-CM

## 2019-04-23 DIAGNOSIS — A41.9 SEPSIS, UNSPECIFIED ORGANISM: ICD-10-CM

## 2019-04-23 DIAGNOSIS — J96.01 ACUTE RESPIRATORY FAILURE WITH HYPOXIA: ICD-10-CM

## 2019-04-23 LAB
ALBUMIN SERPL ELPH-MCNC: 3.1 G/DL — LOW (ref 3.3–5)
ALP SERPL-CCNC: 110 U/L — SIGNIFICANT CHANGE UP (ref 40–120)
ALT FLD-CCNC: 44 U/L — SIGNIFICANT CHANGE UP (ref 10–45)
ANION GAP SERPL CALC-SCNC: 16 MMOL/L — SIGNIFICANT CHANGE UP (ref 5–17)
ANION GAP SERPL CALC-SCNC: 17 MMOL/L — SIGNIFICANT CHANGE UP (ref 5–17)
ANION GAP SERPL CALC-SCNC: 18 MMOL/L — HIGH (ref 5–17)
APPEARANCE UR: CLEAR — SIGNIFICANT CHANGE UP
AST SERPL-CCNC: 23 U/L — SIGNIFICANT CHANGE UP (ref 10–40)
BASE EXCESS BLDV CALC-SCNC: 6.3 MMOL/L — HIGH (ref -2–2)
BASE EXCESS BLDV CALC-SCNC: 6.3 MMOL/L — HIGH (ref -2–2)
BASE EXCESS BLDV CALC-SCNC: 7.6 MMOL/L — HIGH (ref -2–2)
BASOPHILS # BLD AUTO: 0 K/UL — SIGNIFICANT CHANGE UP (ref 0–0.2)
BASOPHILS NFR BLD AUTO: 0.3 % — SIGNIFICANT CHANGE UP (ref 0–2)
BILIRUB SERPL-MCNC: 0.2 MG/DL — SIGNIFICANT CHANGE UP (ref 0.2–1.2)
BILIRUB UR-MCNC: NEGATIVE — SIGNIFICANT CHANGE UP
BUN SERPL-MCNC: 36 MG/DL — HIGH (ref 7–23)
BUN SERPL-MCNC: 39 MG/DL — HIGH (ref 7–23)
BUN SERPL-MCNC: 43 MG/DL — HIGH (ref 7–23)
BUN SERPL-MCNC: 45 MG/DL — HIGH (ref 7–23)
BUN SERPL-MCNC: 69 MG/DL — HIGH (ref 7–23)
CA-I SERPL-SCNC: 1.1 MMOL/L — LOW (ref 1.12–1.3)
CA-I SERPL-SCNC: 1.14 MMOL/L — SIGNIFICANT CHANGE UP (ref 1.12–1.3)
CA-I SERPL-SCNC: 1.15 MMOL/L — SIGNIFICANT CHANGE UP (ref 1.12–1.3)
CALCIUM SERPL-MCNC: 8.3 MG/DL — LOW (ref 8.4–10.5)
CALCIUM SERPL-MCNC: 8.7 MG/DL — SIGNIFICANT CHANGE UP (ref 8.4–10.5)
CALCIUM SERPL-MCNC: 9.2 MG/DL — SIGNIFICANT CHANGE UP (ref 8.4–10.5)
CALCIUM SERPL-MCNC: 9.5 MG/DL — SIGNIFICANT CHANGE UP (ref 8.4–10.5)
CALCIUM SERPL-MCNC: 9.8 MG/DL — SIGNIFICANT CHANGE UP (ref 8.4–10.5)
CHLORIDE BLDV-SCNC: 100 MMOL/L — SIGNIFICANT CHANGE UP (ref 96–108)
CHLORIDE BLDV-SCNC: 96 MMOL/L — SIGNIFICANT CHANGE UP (ref 96–108)
CHLORIDE BLDV-SCNC: 98 MMOL/L — SIGNIFICANT CHANGE UP (ref 96–108)
CHLORIDE SERPL-SCNC: 92 MMOL/L — LOW (ref 96–108)
CHLORIDE SERPL-SCNC: 93 MMOL/L — LOW (ref 96–108)
CHLORIDE SERPL-SCNC: 93 MMOL/L — LOW (ref 96–108)
CHLORIDE SERPL-SCNC: 94 MMOL/L — LOW (ref 96–108)
CHLORIDE SERPL-SCNC: 96 MMOL/L — SIGNIFICANT CHANGE UP (ref 96–108)
CK MB CFR SERPL CALC: 4.5 NG/ML — HIGH (ref 0–3.8)
CK SERPL-CCNC: 55 U/L — SIGNIFICANT CHANGE UP (ref 25–170)
CO2 BLDV-SCNC: 32 MMOL/L — HIGH (ref 22–30)
CO2 BLDV-SCNC: 32 MMOL/L — HIGH (ref 22–30)
CO2 BLDV-SCNC: 34 MMOL/L — HIGH (ref 22–30)
CO2 SERPL-SCNC: 26 MMOL/L — SIGNIFICANT CHANGE UP (ref 22–31)
CO2 SERPL-SCNC: 27 MMOL/L — SIGNIFICANT CHANGE UP (ref 22–31)
CO2 SERPL-SCNC: 28 MMOL/L — SIGNIFICANT CHANGE UP (ref 22–31)
COLOR SPEC: SIGNIFICANT CHANGE UP
CREAT SERPL-MCNC: 3.65 MG/DL — HIGH (ref 0.5–1.3)
CREAT SERPL-MCNC: 4.25 MG/DL — HIGH (ref 0.5–1.3)
CREAT SERPL-MCNC: 4.6 MG/DL — HIGH (ref 0.5–1.3)
CREAT SERPL-MCNC: 4.78 MG/DL — HIGH (ref 0.5–1.3)
CREAT SERPL-MCNC: 6.05 MG/DL — HIGH (ref 0.5–1.3)
DIFF PNL FLD: NEGATIVE — SIGNIFICANT CHANGE UP
EOSINOPHIL # BLD AUTO: 0.2 K/UL — SIGNIFICANT CHANGE UP (ref 0–0.5)
EOSINOPHIL NFR BLD AUTO: 1.7 % — SIGNIFICANT CHANGE UP (ref 0–6)
GAS PNL BLDA: SIGNIFICANT CHANGE UP
GAS PNL BLDA: SIGNIFICANT CHANGE UP
GAS PNL BLDV: 130 MMOL/L — LOW (ref 136–145)
GAS PNL BLDV: 135 MMOL/L — LOW (ref 136–145)
GAS PNL BLDV: 135 MMOL/L — LOW (ref 136–145)
GAS PNL BLDV: SIGNIFICANT CHANGE UP
GLUCOSE BLDC GLUCOMTR-MCNC: 100 MG/DL — HIGH (ref 70–99)
GLUCOSE BLDC GLUCOMTR-MCNC: 114 MG/DL — HIGH (ref 70–99)
GLUCOSE BLDC GLUCOMTR-MCNC: 115 MG/DL — HIGH (ref 70–99)
GLUCOSE BLDC GLUCOMTR-MCNC: 164 MG/DL — HIGH (ref 70–99)
GLUCOSE BLDC GLUCOMTR-MCNC: 231 MG/DL — HIGH (ref 70–99)
GLUCOSE BLDV-MCNC: 119 MG/DL — HIGH (ref 70–99)
GLUCOSE BLDV-MCNC: 185 MG/DL — HIGH (ref 70–99)
GLUCOSE BLDV-MCNC: 408 MG/DL — HIGH (ref 70–99)
GLUCOSE SERPL-MCNC: 115 MG/DL — HIGH (ref 70–99)
GLUCOSE SERPL-MCNC: 132 MG/DL — HIGH (ref 70–99)
GLUCOSE SERPL-MCNC: 139 MG/DL — HIGH (ref 70–99)
GLUCOSE SERPL-MCNC: 184 MG/DL — HIGH (ref 70–99)
GLUCOSE SERPL-MCNC: 410 MG/DL — HIGH (ref 70–99)
GLUCOSE UR QL: ABNORMAL
HCO3 BLDV-SCNC: 30 MMOL/L — HIGH (ref 21–29)
HCO3 BLDV-SCNC: 31 MMOL/L — HIGH (ref 21–29)
HCO3 BLDV-SCNC: 33 MMOL/L — HIGH (ref 21–29)
HCT VFR BLD CALC: 28.1 % — LOW (ref 34.5–45)
HCT VFR BLDA CALC: 30 % — LOW (ref 39–50)
HCT VFR BLDA CALC: 32 % — LOW (ref 39–50)
HCT VFR BLDA CALC: 32 % — LOW (ref 39–50)
HGB BLD CALC-MCNC: 10.5 G/DL — LOW (ref 11.5–15.5)
HGB BLD CALC-MCNC: 10.5 G/DL — LOW (ref 11.5–15.5)
HGB BLD CALC-MCNC: 9.8 G/DL — LOW (ref 11.5–15.5)
HGB BLD-MCNC: 9.5 G/DL — LOW (ref 11.5–15.5)
HOROWITZ INDEX BLDV+IHG-RTO: 30 — SIGNIFICANT CHANGE UP
HOROWITZ INDEX BLDV+IHG-RTO: 30 — SIGNIFICANT CHANGE UP
KETONES UR-MCNC: NEGATIVE — SIGNIFICANT CHANGE UP
LACTATE BLDV-MCNC: 2.6 MMOL/L — HIGH (ref 0.7–2)
LACTATE BLDV-MCNC: 3.6 MMOL/L — HIGH (ref 0.7–2)
LACTATE BLDV-MCNC: 4.5 MMOL/L — CRITICAL HIGH (ref 0.7–2)
LEUKOCYTE ESTERASE UR-ACNC: NEGATIVE — SIGNIFICANT CHANGE UP
LYMPHOCYTES # BLD AUTO: 0.8 K/UL — LOW (ref 1–3.3)
LYMPHOCYTES # BLD AUTO: 6.6 % — LOW (ref 13–44)
MAGNESIUM SERPL-MCNC: 1.9 MG/DL — SIGNIFICANT CHANGE UP (ref 1.6–2.6)
MAGNESIUM SERPL-MCNC: 1.9 MG/DL — SIGNIFICANT CHANGE UP (ref 1.6–2.6)
MAGNESIUM SERPL-MCNC: 2 MG/DL — SIGNIFICANT CHANGE UP (ref 1.6–2.6)
MCHC RBC-ENTMCNC: 31.1 PG — SIGNIFICANT CHANGE UP (ref 27–34)
MCHC RBC-ENTMCNC: 33.9 GM/DL — SIGNIFICANT CHANGE UP (ref 32–36)
MCV RBC AUTO: 91.8 FL — SIGNIFICANT CHANGE UP (ref 80–100)
MONOCYTES # BLD AUTO: 0.6 K/UL — SIGNIFICANT CHANGE UP (ref 0–0.9)
MONOCYTES NFR BLD AUTO: 4.9 % — SIGNIFICANT CHANGE UP (ref 2–14)
NEUTROPHILS # BLD AUTO: 10.7 K/UL — HIGH (ref 1.8–7.4)
NEUTROPHILS NFR BLD AUTO: 86.5 % — HIGH (ref 43–77)
NITRITE UR-MCNC: NEGATIVE — SIGNIFICANT CHANGE UP
OTHER CELLS CSF MANUAL: 10 ML/DL — LOW (ref 18–22)
OTHER CELLS CSF MANUAL: 11 ML/DL — LOW (ref 18–22)
OTHER CELLS CSF MANUAL: 12 ML/DL — LOW (ref 18–22)
PCO2 BLDV: 43 MMHG — SIGNIFICANT CHANGE UP (ref 35–50)
PCO2 BLDV: 45 MMHG — SIGNIFICANT CHANGE UP (ref 35–50)
PCO2 BLDV: 50 MMHG — SIGNIFICANT CHANGE UP (ref 35–50)
PH BLDV: 7.43 — SIGNIFICANT CHANGE UP (ref 7.35–7.45)
PH BLDV: 7.45 — SIGNIFICANT CHANGE UP (ref 7.35–7.45)
PH BLDV: 7.46 — HIGH (ref 7.35–7.45)
PH UR: 8.5 — HIGH (ref 5–8)
PHOSPHATE SERPL-MCNC: 4.5 MG/DL — SIGNIFICANT CHANGE UP (ref 2.5–4.5)
PHOSPHATE SERPL-MCNC: 4.7 MG/DL — HIGH (ref 2.5–4.5)
PHOSPHATE SERPL-MCNC: 4.9 MG/DL — HIGH (ref 2.5–4.5)
PLATELET # BLD AUTO: 184 K/UL — SIGNIFICANT CHANGE UP (ref 150–400)
PO2 BLDV: 43 MMHG — SIGNIFICANT CHANGE UP (ref 25–45)
PO2 BLDV: 43 MMHG — SIGNIFICANT CHANGE UP (ref 25–45)
PO2 BLDV: 47 MMHG — HIGH (ref 25–45)
POTASSIUM BLDV-SCNC: 4 MMOL/L — SIGNIFICANT CHANGE UP (ref 3.5–5.3)
POTASSIUM BLDV-SCNC: 4.5 MMOL/L — SIGNIFICANT CHANGE UP (ref 3.5–5.3)
POTASSIUM BLDV-SCNC: 6.4 MMOL/L — CRITICAL HIGH (ref 3.5–5.3)
POTASSIUM SERPL-MCNC: 4.3 MMOL/L — SIGNIFICANT CHANGE UP (ref 3.5–5.3)
POTASSIUM SERPL-MCNC: 4.8 MMOL/L — SIGNIFICANT CHANGE UP (ref 3.5–5.3)
POTASSIUM SERPL-MCNC: 5.4 MMOL/L — HIGH (ref 3.5–5.3)
POTASSIUM SERPL-MCNC: 5.7 MMOL/L — HIGH (ref 3.5–5.3)
POTASSIUM SERPL-MCNC: 6.8 MMOL/L — CRITICAL HIGH (ref 3.5–5.3)
POTASSIUM SERPL-SCNC: 4.3 MMOL/L — SIGNIFICANT CHANGE UP (ref 3.5–5.3)
POTASSIUM SERPL-SCNC: 4.8 MMOL/L — SIGNIFICANT CHANGE UP (ref 3.5–5.3)
POTASSIUM SERPL-SCNC: 5.4 MMOL/L — HIGH (ref 3.5–5.3)
POTASSIUM SERPL-SCNC: 5.7 MMOL/L — HIGH (ref 3.5–5.3)
POTASSIUM SERPL-SCNC: 6.8 MMOL/L — CRITICAL HIGH (ref 3.5–5.3)
PROT SERPL-MCNC: 6.5 G/DL — SIGNIFICANT CHANGE UP (ref 6–8.3)
PROT UR-MCNC: ABNORMAL
RAPID RVP RESULT: SIGNIFICANT CHANGE UP
RBC # BLD: 3.06 M/UL — LOW (ref 3.8–5.2)
RBC # FLD: 15 % — HIGH (ref 10.3–14.5)
SAO2 % BLDV: 74 % — SIGNIFICANT CHANGE UP (ref 67–88)
SAO2 % BLDV: 78 % — SIGNIFICANT CHANGE UP (ref 67–88)
SAO2 % BLDV: 81 % — SIGNIFICANT CHANGE UP (ref 67–88)
SODIUM SERPL-SCNC: 134 MMOL/L — LOW (ref 135–145)
SODIUM SERPL-SCNC: 137 MMOL/L — SIGNIFICANT CHANGE UP (ref 135–145)
SODIUM SERPL-SCNC: 137 MMOL/L — SIGNIFICANT CHANGE UP (ref 135–145)
SODIUM SERPL-SCNC: 139 MMOL/L — SIGNIFICANT CHANGE UP (ref 135–145)
SODIUM SERPL-SCNC: 139 MMOL/L — SIGNIFICANT CHANGE UP (ref 135–145)
SP GR SPEC: 1.01 — SIGNIFICANT CHANGE UP (ref 1.01–1.02)
TROPONIN T, HIGH SENSITIVITY RESULT: 396 NG/L — HIGH (ref 0–51)
UROBILINOGEN FLD QL: NEGATIVE — SIGNIFICANT CHANGE UP
VANCOMYCIN FLD-MCNC: 15.1 UG/ML — SIGNIFICANT CHANGE UP
WBC # BLD: 12.4 K/UL — HIGH (ref 3.8–10.5)
WBC # FLD AUTO: 12.4 K/UL — HIGH (ref 3.8–10.5)

## 2019-04-23 PROCEDURE — 93308 TTE F-UP OR LMTD: CPT | Mod: 26,GC

## 2019-04-23 PROCEDURE — 99291 CRITICAL CARE FIRST HOUR: CPT | Mod: 25

## 2019-04-23 PROCEDURE — 93010 ELECTROCARDIOGRAM REPORT: CPT

## 2019-04-23 PROCEDURE — 76604 US EXAM CHEST: CPT | Mod: 26,GC

## 2019-04-23 RX ORDER — ACETAMINOPHEN 500 MG
1000 TABLET ORAL ONCE
Qty: 0 | Refills: 0 | Status: COMPLETED | OUTPATIENT
Start: 2019-04-23 | End: 2019-04-23

## 2019-04-23 RX ORDER — INSULIN LISPRO 100/ML
VIAL (ML) SUBCUTANEOUS EVERY 6 HOURS
Qty: 0 | Refills: 0 | Status: DISCONTINUED | OUTPATIENT
Start: 2019-04-23 | End: 2019-04-24

## 2019-04-23 RX ORDER — MIDODRINE HYDROCHLORIDE 2.5 MG/1
10 TABLET ORAL EVERY 8 HOURS
Qty: 0 | Refills: 0 | Status: DISCONTINUED | OUTPATIENT
Start: 2019-04-23 | End: 2019-04-24

## 2019-04-23 RX ORDER — VANCOMYCIN HCL 1 G
750 VIAL (EA) INTRAVENOUS ONCE
Qty: 0 | Refills: 0 | Status: COMPLETED | OUTPATIENT
Start: 2019-04-23 | End: 2019-04-23

## 2019-04-23 RX ORDER — ALBUMIN HUMAN 25 %
250 VIAL (ML) INTRAVENOUS ONCE
Qty: 0 | Refills: 0 | Status: COMPLETED | OUTPATIENT
Start: 2019-04-23 | End: 2019-04-23

## 2019-04-23 RX ORDER — DEXMEDETOMIDINE HYDROCHLORIDE IN 0.9% SODIUM CHLORIDE 4 UG/ML
0.3 INJECTION INTRAVENOUS
Qty: 200 | Refills: 0 | Status: DISCONTINUED | OUTPATIENT
Start: 2019-04-23 | End: 2019-04-24

## 2019-04-23 RX ADMIN — CHLORHEXIDINE GLUCONATE 1 APPLICATION(S): 213 SOLUTION TOPICAL at 05:42

## 2019-04-23 RX ADMIN — HEPARIN SODIUM 5000 UNIT(S): 5000 INJECTION INTRAVENOUS; SUBCUTANEOUS at 05:42

## 2019-04-23 RX ADMIN — Medication 1000 MILLIGRAM(S): at 19:00

## 2019-04-23 RX ADMIN — PHENYLEPHRINE HYDROCHLORIDE 12.62 MICROGRAM(S)/KG/MIN: 10 INJECTION INTRAVENOUS at 09:58

## 2019-04-23 RX ADMIN — HEPARIN SODIUM 5000 UNIT(S): 5000 INJECTION INTRAVENOUS; SUBCUTANEOUS at 22:36

## 2019-04-23 RX ADMIN — Medication 250 MILLIGRAM(S): at 17:31

## 2019-04-23 RX ADMIN — DEXMEDETOMIDINE HYDROCHLORIDE IN 0.9% SODIUM CHLORIDE 6.31 MICROGRAM(S)/KG/HR: 4 INJECTION INTRAVENOUS at 18:10

## 2019-04-23 RX ADMIN — PIPERACILLIN AND TAZOBACTAM 25 GRAM(S): 4; .5 INJECTION, POWDER, LYOPHILIZED, FOR SOLUTION INTRAVENOUS at 22:35

## 2019-04-23 RX ADMIN — MIDODRINE HYDROCHLORIDE 10 MILLIGRAM(S): 2.5 TABLET ORAL at 22:36

## 2019-04-23 RX ADMIN — MIDODRINE HYDROCHLORIDE 10 MILLIGRAM(S): 2.5 TABLET ORAL at 14:59

## 2019-04-23 RX ADMIN — HEPARIN SODIUM 5000 UNIT(S): 5000 INJECTION INTRAVENOUS; SUBCUTANEOUS at 14:20

## 2019-04-23 RX ADMIN — PANTOPRAZOLE SODIUM 40 MILLIGRAM(S): 20 TABLET, DELAYED RELEASE ORAL at 11:19

## 2019-04-23 RX ADMIN — Medication 400 MILLIGRAM(S): at 18:09

## 2019-04-23 RX ADMIN — Medication 81 MILLIGRAM(S): at 11:19

## 2019-04-23 RX ADMIN — DEXMEDETOMIDINE HYDROCHLORIDE IN 0.9% SODIUM CHLORIDE 6.31 MICROGRAM(S)/KG/HR: 4 INJECTION INTRAVENOUS at 11:35

## 2019-04-23 RX ADMIN — ATORVASTATIN CALCIUM 40 MILLIGRAM(S): 80 TABLET, FILM COATED ORAL at 22:36

## 2019-04-23 RX ADMIN — Medication 250 MILLILITER(S): at 11:19

## 2019-04-23 RX ADMIN — PROPOFOL 10.09 MICROGRAM(S)/KG/MIN: 10 INJECTION, EMULSION INTRAVENOUS at 09:57

## 2019-04-23 RX ADMIN — SODIUM POLYSTYRENE SULFONATE 30 GRAM(S): 4.1 POWDER, FOR SUSPENSION ORAL at 01:24

## 2019-04-23 RX ADMIN — PIPERACILLIN AND TAZOBACTAM 25 GRAM(S): 4; .5 INJECTION, POWDER, LYOPHILIZED, FOR SOLUTION INTRAVENOUS at 09:58

## 2019-04-23 NOTE — DIETITIAN INITIAL EVALUATION ADULT. - PERTINENT MEDS FT
MEDICATIONS  (STANDING):  aspirin  chewable 81 milliGRAM(s) Oral daily  atorvastatin 40 milliGRAM(s) Oral at bedtime  chlorhexidine 4% Liquid 1 Application(s) Topical <User Schedule>  heparin  Injectable 5000 Unit(s) SubCutaneous every 8 hours  influenza   Vaccine 0.5 milliLiter(s) IntraMuscular once  insulin lispro (HumaLOG) corrective regimen sliding scale   SubCutaneous every 6 hours  pantoprazole  Injectable 40 milliGRAM(s) IV Push daily  phenylephrine    Infusion 0.8 MICROgram(s)/kG/Min (12.615 mL/Hr) IV Continuous <Continuous>  piperacillin/tazobactam IVPB. 3.375 Gram(s) IV Intermittent every 12 hours  propofol Infusion 20 MICROgram(s)/kG/Min (10.092 mL/Hr) IV Continuous <Continuous>    MEDICATIONS  (PRN):  sodium chloride 0.9% lock flush 10 milliLiter(s) IV Push every 1 hour PRN Pre/post blood products, medications, blood draw, and to maintain line patency

## 2019-04-23 NOTE — PROGRESS NOTE ADULT - SUBJECTIVE AND OBJECTIVE BOX
Chief complaint: pt  intubated  this   AM   opens   eyes  on   verbal   commands ,  BP  mainteined   on   pressors ,  on  sedation  ,    HPI:  69 yr old female with PMHx ESRD on HD (mon-wed-fri) last HD session this past friday 4/19/19 (unknown removal of fluid), Afib on apixaban, CAD, Mod Ao stenosis (last TTE 5/2017), HTN, PVD, DM2, recent hospitalization 3/2019 s/p fall found to have hyperkalemia requiring emergent dialysis who now presents this morning (4/22/19) from home via EMS with worsening weakness and fatigue found to have hyperkalemia (8.5)             As endorsed by son pt was in usual state of health AOX3 after HD session, developing progressive weakness and fatigue saturday (4/20/19) with worsening this morning. In E.D. pt was found with thready pulse, initial ECG afib with ventricular response of 90's with RBBB slight peaked T waves in anterior leads and widened QRS complexes. Pt's heart rate deteriorated to rate of 20 (as endorsed by ED of AIVR) without loss of pulse, Pt received CaCl IVP (total 3 amps), Reg insulin initially 6 units IVP, then 8 units IVP, HCO3 IVP (total 100 meq), D50W IVP (total 100 cc's), and developed resp distress requiring intubation. Heart rate responded with development of SVT with widened QRS requiring cardioversion to afib with controlled rate. Pt's subsequent lab work demonstrated K+ 8.5, sCR 8.34 , . Last know sCr 4.68 from 3/2019.        Consult called and admission to MICU  for hyperkalemia requiring urgent hemodialysis and resp failure (22 Apr 2019 10:29)      REVIEW OF SYSTEMS:    CONSTITUTIONAL: No fever, weight loss, or fatigue  NECK: No pain or stiffness  RESPIRATORY: No cough, wheezing, chills or hemoptysis; No shortness of breath  CARDIOVASCULAR: No chest pain, palpitations, dizziness, or leg swelling  GASTROINTESTINAL: No abdominal or epigastric pain. No nausea, vomiting, or hematemesis; No diarrhea or constipation. No melena or hematochezia.  GENITOURINARY: No dysuria, frequency, hematuria, or incontinence  NEUROLOGICAL: No headaches, memory loss, loss of strength, numbness, or tremors  SKIN: No itching, burning, rashes, or lesions   LYMPH NODES: No enlarged glands  MUSCULOSKELETAL: No joint pain or swelling; No muscle, back, or extremity pain  HEME/LYMPH: No easy bruising, or bleeding gums    MEDICATIONS  (STANDING):  aspirin  chewable 81 milliGRAM(s) Oral daily  atorvastatin 40 milliGRAM(s) Oral at bedtime  chlorhexidine 4% Liquid 1 Application(s) Topical <User Schedule>  heparin  Injectable 5000 Unit(s) SubCutaneous every 8 hours  influenza   Vaccine 0.5 milliLiter(s) IntraMuscular once  insulin lispro (HumaLOG) corrective regimen sliding scale   SubCutaneous every 6 hours  pantoprazole  Injectable 40 milliGRAM(s) IV Push daily  phenylephrine    Infusion 0.8 MICROgram(s)/kG/Min (12.615 mL/Hr) IV Continuous <Continuous>  piperacillin/tazobactam IVPB. 3.375 Gram(s) IV Intermittent every 12 hours  propofol Infusion 20 MICROgram(s)/kG/Min (10.092 mL/Hr) IV Continuous <Continuous>    MEDICATIONS  (PRN):  sodium chloride 0.9% lock flush 10 milliLiter(s) IV Push every 1 hour PRN Pre/post blood products, medications, blood draw, and to maintain line patency      Allergies    No Known Allergies    Intolerances        Vital Signs Last 24 Hrs  T(C): 37.1 (23 Apr 2019 08:00), Max: 37.8 (22 Apr 2019 22:00)  T(F): 98.8 (23 Apr 2019 08:00), Max: 100 (22 Apr 2019 22:00)  HR: 81 (23 Apr 2019 08:45) (58 - 109)  BP: 100/54 (23 Apr 2019 08:45) (72/40 - 219/104)  BP(mean): 72 (23 Apr 2019 08:45) (50 - 149)  RR: 18 (23 Apr 2019 08:45) (16 - 53)  SpO2: 98% (23 Apr 2019 08:45) (90% - 100%)    PHYSICAL EXAM:    GENERAL: NAD, well-groomed, well-developed  HEAD:  Atraumatic, Normocephalic  EYES: EOMI, PERRLA, conjunctiva and sclera clear  ENMT: No tonsillar erythema, exudates, or enlargement; Moist mucous membranes, Good dentition, No lesions  NECK: Supple, No JVD, Normal thyroid  NERVOUS SYSTEM:  Alert & Oriented X3, Good concentration; Motor Strength 5/5 B/L upper and lower extremities; DTRs 2+ intact and symmetric  CHEST/LUNG: Clear to percussion bilaterally; No rales, rhonchi, wheezing, or rubs  HEART: Regular rate and rhythm; No murmurs, rubs, or gallops  ABDOMEN: Soft, Nontender, Nondistended; Bowel sounds present  EXTREMITIES:  2+ Peripheral Pulses, No clubbing, cyanosis, or edema  LYMPH: No lymphadenopathy noted  SKIN: No rashes or lesions      LABS:                        11.3   19.8  )-----------( 284      ( 22 Apr 2019 22:10 )             33.4     04-23    139  |  96  |  36<H>  ----------------------------<  184<H>  4.3   |  27  |  3.65<H>    Ca    9.5      23 Apr 2019 04:23  Phos  4.4     04-22  Mg     2.2     04-22    TPro  6.5  /  Alb  3.1<L>  /  TBili  0.2  /  DBili  x   /  AST  23  /  ALT  44  /  AlkPhos  110  04-23    PT/INR - ( 22 Apr 2019 11:22 )   PT: 12.2 sec;   INR: 1.06 ratio         PTT - ( 22 Apr 2019 22:10 )  PTT:28.8 sec      RADIOLOGY & ADDITIONAL STUDIES:

## 2019-04-23 NOTE — PHYSICAL THERAPY INITIAL EVALUATION ADULT - PERTINENT HX OF CURRENT PROBLEM, REHAB EVAL
69 yr old female with PMHx ESRD on HD (mon-wed-fri) last HD session this past friday 4/19/19 (unknown removal of fluid), Afib on apixaban, CAD, Mod Ao stenosis (last TTE 5/2017), HTN, PVD, DM2, recent hospitalization 3/2019 s/p fall found to have hyperkalemia requiring emergent dialysis who now presents this morning (4/22/19) from home via EMS with worsening weakness and fatigue found to have hyperkalemia (8.5)

## 2019-04-23 NOTE — PROGRESS NOTE ADULT - ATTENDING COMMENTS
1. Hyperkalemia resolving after emergent dialysis x 2.. Pt with ESRD. Pt now appears to have infection with increase wbc and fever overnight. Unclear of origin  infection.  2.ID. Blood cx taken yesterday. Pt with fever last night. Started on Vancomycin and Zosyn.   On ultrasound pt with  large amount of fluid in bladder. Will straight cath and send for UA and cx. No evidence of lung consolidation on ultrasound.  3. Hypotension. On POCUS echo there is effacement of  ventricular walls in short axis suggesting volume depletion. Will give bolus of 5% albumin. Switch Propofol to Precedex. Still requiring pressors.  4. Acute hypoxemic respiratory failure. Decrease sedation and attempt weaning trials as tolerated.    I have spent 35 min independently with this patient in acute hypoxemic respiratory failure, and  hypotension . Plan as above. 1. Hyperkalemia resolving after emergent dialysis x 2.. Pt with ESRD. Pt now appears to have infection with increase wbc and fever overnight. Unclear of origin  infection.  2.ID. Blood cx taken yesterday. Pt with fever last night. Started on Vancomycin and Zosyn.   On ultrasound pt with  large amount of fluid in bladder. Will straight cath and send for UA and cx. No evidence of lung consolidation on ultrasound.  3. Hypotension. On POCUS echo there is effacement of  ventricular walls in short axis suggesting volume depletion. Will give bolus of 5% albumin. Switch Propofol to Precedex. Still requiring pressors.  4. Acute hypoxemic respiratory failure. Decrease sedation and attempt weaning trials as tolerated.    I have spent 35 min independently with this patient (not including procedures) in acute hypoxemic respiratory failure, and  hypotension . Plan as above.

## 2019-04-23 NOTE — DIETITIAN INITIAL EVALUATION ADULT. - ENERGY NEEDS
Estimated needs using critical care guidelines [id number] kcals (11-14 kclas/kg dosing wt 84.1 kg)  Ht: 65"  Wt: 185  BMI: 30.9 kg/m2   IBW: 125 (+/-10%)     148% IBW  Edema: 1+ generalized      Skin: no pressure injuries documented

## 2019-04-23 NOTE — PROGRESS NOTE ADULT - SUBJECTIVE AND OBJECTIVE BOX
CHIEF COMPLAINT:  Patient is a 69y old  Female who presents with a chief complaint of hyperkalemia, resp failure (22 Apr 2019 12:21)    HPI:  69 yr old female with PMHx ESRD on HD, Afib on apixaban, CAD, Mod Ao stenosis, HTN, PVD, DM2, episodes of hyperkalemia requiring urgent dialysis in past who presented to hospital 4/22/19 after developing progressive weakness and fatigue. Found to be hyperkalemic to 8.5, widened QRS with slight peaked T waves anteriorly.         ED course complicated by AIVR rate of 20, resp distress requiring intubation. Tx with CaCl/Reg Insulin/HCO3 with development of wide complex SVT requiring cardioversion to afib with controlled rate. Admitted to MICU for hyperkalemia requiring urgent HD.       MICU course to date (4/23/19) has been c/b development of hypotension during 1st urgent HD requiring phenylephrine,  recurrent hyperkalemia (8.5-5.9-7.9) requiring 2nd tx of urgent dialysis, development of leukocytosis to 19.8, started on zosyn, vanco x1 dose.          Interval Events:    REVIEW OF SYSTEMS:    CONSTITUTIONAL:  EYES/ENT:   NECK:   RESPIRATORY:   CARDIOVASCULAR:   GASTROINTESTINAL:   GENITOURINARY:   NEUROLOGICAL:   SKIN:       OBJECTIVE:  ICU Vital Signs Last 24 Hrs  T(C): 37.3 (23 Apr 2019 04:00), Max: 37.8 (22 Apr 2019 22:00)  T(F): 99.2 (23 Apr 2019 04:00), Max: 100 (22 Apr 2019 22:00)  HR: 74 (23 Apr 2019 04:30) (29 - 122)  BP: 91/48 (23 Apr 2019 04:30) (72/40 - 222/91)  BP(mean): 67 (23 Apr 2019 04:30) (50 - 149)  ABP: --  ABP(mean): --  RR: 18 (23 Apr 2019 04:30) (5 - 53)  SpO2: 100% (23 Apr 2019 04:30) (87% - 100%)    Mode: AC/ CMV (Assist Control/ Continuous Mandatory Ventilation), RR (machine): 18, TV (machine): 450, FiO2: 30, PEEP: 5, ITime: 1, MAP: 10, PIP: 25    04-22 @ 07:01  -  04-23 @ 05:38  --------------------------------------------------------  IN: 1099 mL / OUT: 1300 mL / NET: -201 mL      CAPILLARY BLOOD GLUCOSE      POCT Blood Glucose.: 231 mg/dL (23 Apr 2019 02:10)      PHYSICAL EXAM:          HOSPITAL MEDICATIONS:  MEDICATIONS  (STANDING):  aspirin  chewable 81 milliGRAM(s) Oral daily  atorvastatin 40 milliGRAM(s) Oral at bedtime  chlorhexidine 4% Liquid 1 Application(s) Topical <User Schedule>  heparin  Injectable 5000 Unit(s) SubCutaneous every 8 hours  influenza   Vaccine 0.5 milliLiter(s) IntraMuscular once  pantoprazole  Injectable 40 milliGRAM(s) IV Push daily  phenylephrine    Infusion 0.8 MICROgram(s)/kG/Min (12.615 mL/Hr) IV Continuous <Continuous>  piperacillin/tazobactam IVPB. 3.375 Gram(s) IV Intermittent every 12 hours  propofol Infusion 20 MICROgram(s)/kG/Min (10.092 mL/Hr) IV Continuous <Continuous>    MEDICATIONS  (PRN):  sodium chloride 0.9% lock flush 10 milliLiter(s) IV Push every 1 hour PRN Pre/post blood products, medications, blood draw, and to maintain line patency      LABS:                        11.3   19.8  )-----------( 284      ( 22 Apr 2019 22:10 )             33.4     Hgb Trend: 11.3<--, 13.3<--, 11.6<--  04-23    139  |  96  |  36<H>  ----------------------------<  184<H>  4.3   |  27  |  3.65<H>    Ca    9.5      23 Apr 2019 04:23  Phos  4.4     04-22  Mg     2.2     04-22    TPro  6.5  /  Alb  3.1<L>  /  TBili  0.2  /  DBili  x   /  AST  23  /  ALT  44  /  AlkPhos  110  04-23    LIVER FUNCTIONS - ( 23 Apr 2019 04:23 )  Alb: 3.1 g/dL / Pro: 6.5 g/dL / ALK PHOS: 110 U/L / ALT: 44 U/L / AST: 23 U/L / GGT: x           Creatinine Trend: 3.65<--, 6.05<--, 6.17<--, 5.02<--, 8.46<--, 8.34<--  PT/INR - ( 22 Apr 2019 11:22 )   PT: 12.2 sec;   INR: 1.06 ratio         PTT - ( 22 Apr 2019 22:10 )  PTT:28.8 sec    Arterial Blood Gas:  04-22 @ 22:04  7.48/36/93/26/97/2.9  ABG lactate: --  Arterial Blood Gas:  04-22 @ 16:14  7.47/36/157/25/99/2.4  ABG lactate: --  Arterial Blood Gas:  04-22 @ 10:37  7.42/37/275/23/99/-.6  ABG lactate: --  Arterial Blood Gas:  04-22 @ 09:04  7.37/45/245/26/99/.8  ABG lactate: --    Venous Blood Gas:  04-23 @ 04:16  7.45/45/47/31/81  VBG Lactate: 3.6  Venous Blood Gas:  04-23 @ 00:22  7.46/43/43/30/78  VBG Lactate: 4.5      MICROBIOLOGY:     RADIOLOGY:  [ ] Reviewed and interpreted by me    EKG:      Carol [facility] (ext [phone]) CHIEF COMPLAINT:  Patient is a 69y old  Female who presents with a chief complaint of hyperkalemia, resp failure (22 Apr 2019 12:21)    HPI:  69 yr old female with PMHx ESRD on HD, Afib on apixaban, CAD, Mod Ao stenosis, HTN, PVD, DM2, episodes of hyperkalemia requiring urgent dialysis in past who presented to hospital 4/22/19 after developing progressive weakness and fatigue. Found to be hyperkalemic to 8.5, widened QRS with slight peaked T waves anteriorly.         ED course complicated by AIVR rate of 20, resp distress requiring intubation. Tx with CaCl/Reg Insulin/HCO3 with development of wide complex SVT requiring cardioversion to afib with controlled rate. Admitted to MICU for hyperkalemia requiring urgent HD.       MICU course to date (4/23/19) has been c/b development of hypotension during 1st urgent HD requiring phenylephrine,  recurrent hyperkalemia (8.5-5.9-7.9) requiring 2nd tx of urgent dialysis, development of leukocytosis to 19.8, started on zosyn, vanco x1 dose.          Interval Events:    REVIEW OF SYSTEMS:  unable secondary to acute illness      OBJECTIVE:  ICU Vital Signs Last 24 Hrs  T(C): 37.3 (23 Apr 2019 04:00), Max: 37.8 (22 Apr 2019 22:00)  T(F): 99.2 (23 Apr 2019 04:00), Max: 100 (22 Apr 2019 22:00)  HR: 74 (23 Apr 2019 04:30) (29 - 122)  BP: 91/48 (23 Apr 2019 04:30) (72/40 - 222/91)  BP(mean): 67 (23 Apr 2019 04:30) (50 - 149)  ABP: --  ABP(mean): --  RR: 18 (23 Apr 2019 04:30) (5 - 53)  SpO2: 100% (23 Apr 2019 04:30) (87% - 100%)    Mode: AC/ CMV (Assist Control/ Continuous Mandatory Ventilation), RR (machine): 18, TV (machine): 450, FiO2: 30, PEEP: 5, ITime: 1, MAP: 10, PIP: 25    04-22 @ 07:01  -  04-23 @ 05:38  --------------------------------------------------------  IN: 1099 mL / OUT: 1300 mL / NET: -201 mL      CAPILLARY BLOOD GLUCOSE      POCT Blood Glucose.: 231 mg/dL (23 Apr 2019 02:10)      PHYSICAL EXAM:  PHYSICAL EXAM:  Neuro:  sedated on propofol, intubated. responsive to noxious and verbal stimuli by opening eyes and focusing upon examiner, not following commands or answeing quetions (pt is farsi speaking), however has spontaneous purposeful movements of all 4 extremities , upper 3-4/5, lower extremities 3/5 well and appropriately.. Pupils 3 mm sluggishly reactive equal    Pulm:  orally intubated,  breath sounds bilat, diminished in lower lung fields bases to 1/4 up. crackles bibasilar to 1/4 up, When sedation decreased pt triggers vent. SPO2 98%.  POCUS with anterior predominance of A lines, few focal B lines in bases L>R, RLL with non dynamic bronchograms    CV:  cardiac monitor NSR with occasional APC's/PVC's  s1/s2 II/VI sys murmur appreciated , peripheral pulses palpable with radial 2+ bilat, dp/pt 1+/1+ bilat, digits warm to touch with good cap refill < 3 secs , Lower extremity with 3+ pitting edema L>R. POCUS with good LVFx VIT 24.6, RV<LV slight LAE noted. IVC 1.33 . Rt brachial area AVF with 2+ Thrill, + bruit    GI/:  abd obese  soft  non distended non tender , + hypoactive bowel sounds.  bladder non distended non palpable    Skin:   warm dry intact. without palpable nodes. without JVD appreciated        HOSPITAL MEDICATIONS:  MEDICATIONS  (STANDING):  aspirin  chewable 81 milliGRAM(s) Oral daily  atorvastatin 40 milliGRAM(s) Oral at bedtime  chlorhexidine 4% Liquid 1 Application(s) Topical <User Schedule>  heparin  Injectable 5000 Unit(s) SubCutaneous every 8 hours  influenza   Vaccine 0.5 milliLiter(s) IntraMuscular once  pantoprazole  Injectable 40 milliGRAM(s) IV Push daily  phenylephrine    Infusion 0.8 MICROgram(s)/kG/Min (12.615 mL/Hr) IV Continuous <Continuous>  piperacillin/tazobactam IVPB. 3.375 Gram(s) IV Intermittent every 12 hours  propofol Infusion 20 MICROgram(s)/kG/Min (10.092 mL/Hr) IV Continuous <Continuous>    MEDICATIONS  (PRN):  sodium chloride 0.9% lock flush 10 milliLiter(s) IV Push every 1 hour PRN Pre/post blood products, medications, blood draw, and to maintain line patency      LABS:                        11.3   19.8  )-----------( 284      ( 22 Apr 2019 22:10 )             33.4     Hgb Trend: 11.3<--, 13.3<--, 11.6<--  04-23    139  |  96  |  36<H>  ----------------------------<  184<H>  4.3   |  27  |  3.65<H>    Ca    9.5      23 Apr 2019 04:23  Phos  4.4     04-22  Mg     2.2     04-22    TPro  6.5  /  Alb  3.1<L>  /  TBili  0.2  /  DBili  x   /  AST  23  /  ALT  44  /  AlkPhos  110  04-23    LIVER FUNCTIONS - ( 23 Apr 2019 04:23 )  Alb: 3.1 g/dL / Pro: 6.5 g/dL / ALK PHOS: 110 U/L / ALT: 44 U/L / AST: 23 U/L / GGT: x           Creatinine Trend: 3.65<--, 6.05<--, 6.17<--, 5.02<--, 8.46<--, 8.34<--  PT/INR - ( 22 Apr 2019 11:22 )   PT: 12.2 sec;   INR: 1.06 ratio         PTT - ( 22 Apr 2019 22:10 )  PTT:28.8 sec    Arterial Blood Gas:  04-22 @ 22:04  7.48/36/93/26/97/2.9  ABG lactate: --  Arterial Blood Gas:  04-22 @ 16:14  7.47/36/157/25/99/2.4  ABG lactate: --  Arterial Blood Gas:  04-22 @ 10:37  7.42/37/275/23/99/-.6  ABG lactate: --  Arterial Blood Gas:  04-22 @ 09:04  7.37/45/245/26/99/.8  ABG lactate: --    Venous Blood Gas:  04-23 @ 04:16  7.45/45/47/31/81  VBG Lactate: 3.6  Venous Blood Gas:  04-23 @ 00:22  7.46/43/43/30/78  VBG Lactate: 4.5      MICROBIOLOGY:     RADIOLOGY:  [ ] Reviewed and interpreted by me    EKG:      Carol [facility] (ext [phone])

## 2019-04-23 NOTE — PROGRESS NOTE ADULT - SUBJECTIVE AND OBJECTIVE BOX
Intubated/sedated. On Phenylephrine gtt.    VITAL:  T(C): , Max: 37.8 (04-22-19 @ 22:00)  T(F): , Max: 100 (04-22-19 @ 22:00)  HR: 81 (04-23-19 @ 08:22)  BP: 111/56 (04-23-19 @ 08:00)  BP(mean): 79 (04-23-19 @ 08:00)  RR: 18 (04-23-19 @ 08:00)  SpO2: 98% (04-23-19 @ 08:22)      PHYSICAL EXAM:  Constitutional: intubated/sedated  HEENT:  (+)ETT/OGT  Neck: No JVD; supple  Respiratory: coarse BS b/l  Cardiac: irreg s1s2  Gastrointestinal: BS+, soft, NT/ND  Urologic: No mondragon  Extremities: (+)large b/l LE edema; (+)L TMA  Access: RUE AVF (+)bruit      LABS:                        11.3   19.8  )-----------( 284      ( 22 Apr 2019 22:10 )             33.4     Na(139)/K(4.3)/Cl(96)/HCO3(27)/BUN(36)/Cr(3.65)Glu(184)/Ca(9.5)/Mg(--)/PO4(--)    04-23 @ 04:23  Na(134)/K(6.8)/Cl(92)/HCO3(26)/BUN(69)/Cr(6.05)Glu(410)/Ca(9.8)/Mg(--)/PO4(--)    04-23 @ 00:30  Na(134)/K(7.9)/Cl(95)/HCO3(24)/BUN(68)/Cr(6.17)Glu(130)/Ca(10.1)/Mg(2.2)/PO4(4.4)    04-22 @ 22:10  Na(136)/K(5.9)/Cl(97)/HCO3(22)/BUN(59)/Cr(5.02)Glu(99)/Ca(10.4)/Mg(2.1)/PO4(3.7)    04-22 @ 16:40  Na(136)/K(8.7)/Cl(97)/HCO3(20)/BUN(114)/Cr(8.46)Glu(177)/Ca(13.0)/Mg(3.0)/PO4(5.6)    04-22 @ 11:22  Na(136)/K(8.4)/Cl(97)/HCO3(22)/BUN(116)/Cr(8.34)Glu(339)/Ca(15.4)/Mg(--)/PO4(--)    04-22 @ 09:04      IMPRESSION: 69F w/ DM2, HTN, and ESRD-HD, 4/22/19 a/w severe hyperkalemia/cardiac arrest  (1)Renal - ESRD - HD [facility]  (2)Hyperkalemia - received HD twice/past 24h (including after midnight this a.m., after his post-HD potassium returned at 7.9). Does she require further HD today?  (3)CV - shock; pressor requirements decreasing    RECOMMEND:  (1)Repeat K+ early this pm; if K is 6 or higher, I would look to dialyze again today        Dave Barfield MD  [facility]  [phone] Intubated/sedated on Precedex. On Phenylephrine gtt.    VITAL:  T(C): , Max: 37.8 (04-22-19 @ 22:00)  T(F): , Max: 100 (04-22-19 @ 22:00)  HR: 81 (04-23-19 @ 08:22)  BP: 111/56 (04-23-19 @ 08:00)  BP(mean): 79 (04-23-19 @ 08:00)  RR: 18 (04-23-19 @ 08:00)  SpO2: 98% (04-23-19 @ 08:22)      PHYSICAL EXAM:  Constitutional: intubated/sedated  HEENT:  (+)ETT/OGT  Neck: No JVD; supple  Respiratory: coarse BS b/l  Cardiac: irreg s1s2  Gastrointestinal: BS+, soft, NT/ND  Urologic: No mondragon  Extremities: (+)large b/l LE edema; (+)R TMA  Access: RUE AVF (+)thrill      LABS:                        11.3   19.8  )-----------( 284      ( 22 Apr 2019 22:10 )             33.4     Na(139)/K(4.3)/Cl(96)/HCO3(27)/BUN(36)/Cr(3.65)Glu(184)/Ca(9.5)/Mg(--)/PO4(--)    04-23 @ 04:23  Na(134)/K(6.8)/Cl(92)/HCO3(26)/BUN(69)/Cr(6.05)Glu(410)/Ca(9.8)/Mg(--)/PO4(--)    04-23 @ 00:30  Na(134)/K(7.9)/Cl(95)/HCO3(24)/BUN(68)/Cr(6.17)Glu(130)/Ca(10.1)/Mg(2.2)/PO4(4.4)    04-22 @ 22:10  Na(136)/K(5.9)/Cl(97)/HCO3(22)/BUN(59)/Cr(5.02)Glu(99)/Ca(10.4)/Mg(2.1)/PO4(3.7)    04-22 @ 16:40  Na(136)/K(8.7)/Cl(97)/HCO3(20)/BUN(114)/Cr(8.46)Glu(177)/Ca(13.0)/Mg(3.0)/PO4(5.6)    04-22 @ 11:22  Na(136)/K(8.4)/Cl(97)/HCO3(22)/BUN(116)/Cr(8.34)Glu(339)/Ca(15.4)/Mg(--)/PO4(--)    04-22 @ 09:04      IMPRESSION: 69F w/ DM2, HTN, and ESRD-HD, 4/22/19 a/w severe hyperkalemia/cardiac arrest  (1)Renal - ESRD - HD [facility]  (2)Hyperkalemia - received HD twice/past 24h (including after midnight this a.m., after his post-HD potassium returned at 7.9). Does she require further HD today?  (3)CV - shock; pressor requirements decreasing    RECOMMEND:  (1)Repeat K+ early this pm; if K is 6 or higher, I would look to dialyze again today        Dave Barfield MD  [facility]  [phone]

## 2019-04-23 NOTE — DIETITIAN INITIAL EVALUATION ADULT. - OTHER INFO
Pt seen for: MICU Length Of Stay   Adm dx:  69 yr old female with stated hx significant for ESRD, afib, found to have severe hyperkalemia. Complicated by tachycardia requiring cardioversion and respiratory distress requiring intubation. Admission to MICU  for hyperkalemia requiring urgent hemodialysis and respiratory failure requiring intubation. Plan for HD today   GI issues: no vomiting, no abd distention   Last BM: none since adm   Food allergies/Intolerances: NKFA    Vit/supplement PTA: renvela

## 2019-04-23 NOTE — PHYSICAL THERAPY INITIAL EVALUATION ADULT - CRITERIA FOR SKILLED THERAPEUTIC INTERVENTIONS
impairments found/functional limitations in following categories/predicted duration of therapy intervention/rehab potential/therapy frequency/anticipated equipment needs at discharge/anticipated discharge recommendation/risk reduction/prevention

## 2019-04-23 NOTE — PROGRESS NOTE ADULT - ASSESSMENT
Assessment:  69 yr old female with stated hx significant for esrd, afib on apixaban, found to have severe hyperkalemia with slight peaked T waves. Complicated by initial AIVR to wide complex tachycardia requiring cardioversion and resp distress requiring intubation. Admission to MICU  for hyperkalemia requiring urgent hemodialysis and resp failure requiring intubation.    Plan:    #Neuro:  -presented with progressive weakness  -pt responsive to verbal/painful stimuli as above  -sedation with propofol gtt maintain RASS 0 to -2  -neuro checks q 2 hrs and prn for changes  -physical therapy when stable    #Pulm:  -Pt orally intubated in ED for resp distress  -maintain mechanical vent therapy - titrate to ph 7.35-7.45; PCO2 35-45; SPO2 92%  -HOB >/= 30 degrees   -chest PT q 2 hrs  -duoneb therapy q 6 hrs    #CV:  -pt found to be hyperkalemic with widened QRS complex on ECG  -ECG now and q am x 3 and prn  -cardiac enzymes now and q 8 hrs x 3  -pt on midodrine with home meds - will hold at present. pt currently on phenylephrine gtt  -home med of ASA 81 mg po qd - will continue  -home med of atorvastatin 40 mg po qhs - will continue  -Pt with Hx of Afib on abixaban 2.5 mg po q 12 hrs - will resume this a.m.  -Pt with hx of Mod Ao Stenosis (5/2017)  -TTE obtained, Mod Ao stenosis persist, EF 70%  -daily weights       #GI/:  -will place OGT  -NPO at present  -continue tube feeds to goal  -will eval electrolytes for need for additional HD today  -as endorsed by RN staff, HD with high flow, concern for stenotic AVF  -obtain fistulogram to eval patency of AVF   -Protonix 40mg IVP daily for     #ID:  -Pt with worsening leukocytosis, increased lactate   -started on zosyn with one dose of vanco (given prior to urgent dialysis last evening)  -obtain random vanco level   -redose vanco as needed to maintain trough of 15 to 20  -f/u pan culture for surveillance     #FEN/ENDO/HEME:  -cmp/mg++/po--4/coags/cbc with diff now and q am  -bmp/mg++/po--4 prn  -abg prn  -ISS q 4 hrs - maintain glucose 140 - 160 Assessment:  69 yr old female with stated hx significant for esrd, afib on apixaban, found to have severe hyperkalemia with slight peaked T waves. Complicated by initial AIVR to wide complex tachycardia requiring cardioversion and resp distress requiring intubation. Admission to MICU  for hyperkalemia requiring urgent hemodialysis and resp failure requiring intubation.    Plan:    #Neuro:  -presented with progressive weakness  -pt responsive to verbal/painful stimuli as above  -sedation with propofol gtt maintain RASS 0 to -2  -neuro checks q 2 hrs and prn for changes  -physical therapy when stable    #Pulm:  -Pt orally intubated in ED for resp distress  -maintain mechanical vent therapy - titrate to ph 7.35-7.45; PCO2 35-45; SPO2 92%  -HOB >/= 30 degrees   -chest PT q 2 hrs  -duoneb therapy q 6 hrs    #CV:  -pt found to be hyperkalemic with widened QRS complex on ECG  -ECG now and q am x 3 and prn  -cardiac enzymes now and q 8 hrs x 3  -pt on midodrine with home meds - will hold at present. pt currently on phenylephrine gtt  -home med of ASA 81 mg po qd - will continue  -home med of atorvastatin 40 mg po qhs - will continue  -Pt with Hx of Afib on abixaban 2.5 mg po q 12 hrs - [id number] score 5 ; will resume this a.m.  -Pt with hx of Mod Ao Stenosis (5/2017)  -TTE obtained, Mod Ao stenosis persist, EF 70%  -daily weights       #GI/:  -OGT in place  -NPO at present  -continue tube feeds to goal  -will eval electrolytes for need for additional HD today  -as endorsed by RN staff, HD with high flow, concern for stenotic AVF  -obtain fistulogram to eval patency of AVF   -Protonix 40mg IVP daily for     #ID:  -Pt with worsening leukocytosis, increased lactate   -started on zosyn with one dose of vanco (given prior to urgent dialysis last evening)  -obtain random vanco level   -redose vanco as needed to maintain trough of 15 to 20  -f/u pan culture for surveillance     #FEN/ENDO/HEME:  -cmp/mg++/po--4/coags/cbc with diff now and q am  -bmp/mg++/po--4 prn  -abg prn  -ISS q 4 hrs - maintain glucose 140 - 160 Assessment:  69 yr old female with stated hx significant for esrd, afib on apixaban, found to have severe hyperkalemia with slight peaked T waves. Complicated by initial AIVR to wide complex tachycardia requiring cardioversion and resp distress requiring intubation. Admission to MICU  for hyperkalemia requiring urgent hemodialysis and resp failure requiring intubation.    Plan:    #Neuro:  -presented with progressive weakness  -pt responsive to verbal/painful stimuli as above  -sedation with propofol gtt maintain RASS 0 to -2- titrate off and start precedex gtt   -neuro checks q 2 hrs and prn for changes  -physical therapy when stable    #Pulm:  -Pt orally intubated in ED for resp distress  -maintain mechanical vent therapy - titrate to ph 7.35-7.45; PCO2 35-45; SPO2 92%  -HOB >/= 30 degrees   -chest PT q 2 hrs  -duoneb therapy q 6 hrs  -PST as tolerated     #CV:  -pt found to be hyperkalemic with widened QRS complex on ECG on 4/22/19  -ECG now and q am x 3 and prn  -cardiac enzymes now and q 8 hrs x 3  -pt on midodrine with home meds - will hold at present. pt currently on phenylephrine gtt  -home med of ASA 81 mg po qd - will continue  -home med of atorvastatin 40 mg po qhs - will continue  -Pt with Hx of Afib (currently NSR) on abixaban 2.5 mg po q 12 hrs - will initiate heparin gtt vs apixiban on 4/24/19  -Pt with hx of Mod Ao Stenosis (5/2017)  -TTE obtained, Mod Ao stenosis persist, EF 70%  -daily weights   -albumin 5% in 250ml x1 iso LV effacement      #GI/:  -OGT in place  -NPO at present  -continue tube feeds to goal- hold for goal of extubation   -will eval electrolytes for need for additional HD today  -as endorsed by RN staff, HD with high flow, concern for stenotic AVF  -obtain fistulogram to eval patency of AVF   -Protonix 40mg IVP daily   -straight cath -UA and UC ordered      #ID:  -Pt with worsening leukocytosis, increased lactate   -started on zosyn with one dose of vanco (given prior to urgent dialysis last evening)  -obtain random vanco level   -redose vanco as needed to maintain trough of 15 to 20  -f/u pan culture for surveillance     #FEN/ENDO/HEME:  -cmp/mg++/po--4/coags/cbc with diff now and q am  -bmp/mg++/po--4 prn  -abg prn  -ISS q 6 hrs - maintain glucose 140 - 160

## 2019-04-23 NOTE — DIETITIAN INITIAL EVALUATION ADULT. - NS AS NUTRI INTERV ENTERAL NUTRITION
Nepro 35 cc/hr x 18 hrs (1134 kcals, 51 gm protein), and No Carb Prosource 3 times/day (180 kcals, 45 gm protein), together provide 1314 kcals (16 kcals/kg dosing wt 84.1 kg), 96gm protein (1.7 gm/kg IBW 56.8 kg).  Adjust EN if propofol intake increases

## 2019-04-23 NOTE — PROGRESS NOTE ADULT - ASSESSMENT
68 yo female with ESRD on HD and known vasculopath admitted to ICU with respiratory failure and hyperkalemia

## 2019-04-23 NOTE — DIETITIAN INITIAL EVALUATION ADULT. - DIET TYPE
4/22 Nepor 35 cc/hr x 18 hrs, No Carb prosource 3 times daily    24 hr EN intake 4/23 350cc, 24 hr propofol intake 4/23 367 kcals/enteral feed

## 2019-04-23 NOTE — PROGRESS NOTE ADULT - SUBJECTIVE AND OBJECTIVE BOX
Subjective: Patient seen and examined. No new events except as noted.   repeat HD last night   started on IV abx     REVIEW OF SYSTEMS:  Unable to obtain    MEDICATIONS:  MEDICATIONS  (STANDING):  aspirin  chewable 81 milliGRAM(s) Oral daily  atorvastatin 40 milliGRAM(s) Oral at bedtime  chlorhexidine 4% Liquid 1 Application(s) Topical <User Schedule>  heparin  Injectable 5000 Unit(s) SubCutaneous every 8 hours  influenza   Vaccine 0.5 milliLiter(s) IntraMuscular once  insulin lispro (HumaLOG) corrective regimen sliding scale   SubCutaneous every 6 hours  pantoprazole  Injectable 40 milliGRAM(s) IV Push daily  phenylephrine    Infusion 0.8 MICROgram(s)/kG/Min (12.615 mL/Hr) IV Continuous <Continuous>  piperacillin/tazobactam IVPB. 3.375 Gram(s) IV Intermittent every 12 hours  propofol Infusion 20 MICROgram(s)/kG/Min (10.092 mL/Hr) IV Continuous <Continuous>      PHYSICAL EXAM:  T(C): 37.1 (04-23-19 @ 08:00), Max: 37.8 (04-22-19 @ 22:00)  HR: 81 (04-23-19 @ 08:45) (58 - 109)  BP: 100/54 (04-23-19 @ 08:45) (72/40 - 219/104)  RR: 18 (04-23-19 @ 08:45) (18 - 53)  SpO2: 98% (04-23-19 @ 08:45) (90% - 100%)  Wt(kg): --  I&O's Summary    22 Apr 2019 07:01  -  23 Apr 2019 07:00  --------------------------------------------------------  IN: 1540.9 mL / OUT: 1300 mL / NET: 240.9 mL    23 Apr 2019 07:01  -  23 Apr 2019 09:48  --------------------------------------------------------  IN: 22.9 mL / OUT: 0 mL / NET: 22.9 mL      Height (cm): 165 (04-22 @ 10:18)  Weight (kg): 84.1 (04-22 @ 10:18)  BMI (kg/m2): 30.9 (04-22 @ 10:18)  BSA (m2): 1.91 (04-22 @ 10:18)        Appearance: Intubated, sedated  HEENT:   Normal oral mucosa, PERRL, EOMI	  Lymphatic: No lymphadenopathy  Cardiovascular: Irergular  S1 S2, No JVD,  Respiratory: ventilated   Psychiatry: sedated   Gastrointestinal:  Soft, Non-tender, + BS	  Skin: No rashes, No ecchymoses, No cyanosis	  Neurologic: Sedated   Extremities: Decreased range of motion, +partial amputation   Vascular: Peripheral pulses palpable 2+ bilaterally      LABS:    CARDIAC MARKERS:  CARDIAC MARKERS ( 23 Apr 2019 04:23 )  x     / x     / 55 U/L / x     / 4.5 ng/mL  CARDIAC MARKERS ( 22 Apr 2019 11:22 )  x     / x     / 93 U/L / x     / 8.3 ng/mL                                11.3   19.8  )-----------( 284      ( 22 Apr 2019 22:10 )             33.4     04-23    137  |  93<L>  |  39<H>  ----------------------------<  115<H>  4.8   |  27  |  4.25<H>    Ca    9.2      23 Apr 2019 08:17  Phos  4.7     04-23  Mg     2.0     04-23    TPro  6.5  /  Alb  3.1<L>  /  TBili  0.2  /  DBili  x   /  AST  23  /  ALT  44  /  AlkPhos  110  04-23    proBNP:   Lipid Profile:   HgA1c: Hemoglobin A1C, Whole Blood: 5.9 % (04-22 @ 19:38)    TSH: Thyroid Stimulating Hormone, Serum: 1.50 uIU/mL (04-22 @ 19:47)              TELEMETRY: 	  AF  ECG:  	  RADIOLOGY:   DIAGNOSTIC TESTING:  [ ] Echocardiogram:  [ ]  Catheterization:  [ ] Stress Test:    OTHER:

## 2019-04-23 NOTE — CHART NOTE - NSCHARTNOTEFT_GEN_A_CORE
: Jatin Rhodes    INDICATION:  daily P.E.    PROCEDURE:  [ x] LIMITED ECHO  [ x] LIMITED CHEST  [ ] LIMITED RETROPERITONEAL  [ ] LIMITED ABDOMINAL  [ ] LIMITED DVT  [ ] NEEDLE GUIDANCE VASCULAR  [ ] NEEDLE GUIDANCE THORACENTESIS  [ ] NEEDLE GUIDANCE PARACENTESIS  [ ] NEEDLE GUIDANCE PERICARDIOCENTESIS  [ ] OTHER    FINDINGS:  POCUS of Lungs demonstrated anterior A line predominance with focal B lines in bases L > R, small RLL non dynamic bronchograms  POCUS of cardiac demonstrated good LVFx with VTI 24.6, RV<LV with LV effacement noted in parasternal long and short views. LAE. IVC 1.33    INTERPRETATION:  essentially clear lung fields with small atelectasis of RLL, intravascular depleted as demonstrated by LV effacement

## 2019-04-23 NOTE — CHART NOTE - NSCHARTNOTEFT_GEN_A_CORE
:  Meagan  INDICATION:  shock  PROCEDURE:  [ x] LIMITED ECHO  [ x] LIMITED CHEST  [ ] LIMITED RETROPERITONEAL  [ ] LIMITED ABDOMINAL  [ ] LIMITED DVT  [ ] NEEDLE GUIDANCE VASCULAR  [ ] NEEDLE GUIDANCE THORACENTESIS  [ ] NEEDLE GUIDANCE PARACENTESIS  [ ] NEEDLE GUIDANCE PERICARDIOCENTESIS  [ ] OTHER    FINDINGS:  ECHO. Grossly normal LVF. Small pericardial effusion. Effacement of  LV in short axis.                   CHEST: Predominant a lines bilaterally.  No pleural effusions.      INTERPRETATION: 1. Effacement of LV suggests volume depletion. Normal LVF.2. No evidence of pneumonia.

## 2019-04-24 LAB
ALBUMIN SERPL ELPH-MCNC: 2.9 G/DL — LOW (ref 3.3–5)
ALP SERPL-CCNC: 97 U/L — SIGNIFICANT CHANGE UP (ref 40–120)
ALT FLD-CCNC: 24 U/L — SIGNIFICANT CHANGE UP (ref 10–45)
ANION GAP SERPL CALC-SCNC: 16 MMOL/L — SIGNIFICANT CHANGE UP (ref 5–17)
ANION GAP SERPL CALC-SCNC: 18 MMOL/L — HIGH (ref 5–17)
ANION GAP SERPL CALC-SCNC: 19 MMOL/L — HIGH (ref 5–17)
APTT BLD: 28.3 SEC — SIGNIFICANT CHANGE UP (ref 27.5–36.3)
AST SERPL-CCNC: 10 U/L — SIGNIFICANT CHANGE UP (ref 10–40)
BASOPHILS # BLD AUTO: 0 K/UL — SIGNIFICANT CHANGE UP (ref 0–0.2)
BASOPHILS NFR BLD AUTO: 0.2 % — SIGNIFICANT CHANGE UP (ref 0–2)
BILIRUB SERPL-MCNC: 0.3 MG/DL — SIGNIFICANT CHANGE UP (ref 0.2–1.2)
BUN SERPL-MCNC: 20 MG/DL — SIGNIFICANT CHANGE UP (ref 7–23)
BUN SERPL-MCNC: 51 MG/DL — HIGH (ref 7–23)
BUN SERPL-MCNC: 56 MG/DL — HIGH (ref 7–23)
CALCIUM SERPL-MCNC: 8.2 MG/DL — LOW (ref 8.4–10.5)
CALCIUM SERPL-MCNC: 8.3 MG/DL — LOW (ref 8.4–10.5)
CALCIUM SERPL-MCNC: 8.4 MG/DL — SIGNIFICANT CHANGE UP (ref 8.4–10.5)
CHLORIDE SERPL-SCNC: 93 MMOL/L — LOW (ref 96–108)
CHLORIDE SERPL-SCNC: 94 MMOL/L — LOW (ref 96–108)
CHLORIDE SERPL-SCNC: 96 MMOL/L — SIGNIFICANT CHANGE UP (ref 96–108)
CO2 SERPL-SCNC: 26 MMOL/L — SIGNIFICANT CHANGE UP (ref 22–31)
CO2 SERPL-SCNC: 26 MMOL/L — SIGNIFICANT CHANGE UP (ref 22–31)
CO2 SERPL-SCNC: 28 MMOL/L — SIGNIFICANT CHANGE UP (ref 22–31)
CREAT SERPL-MCNC: 2.62 MG/DL — HIGH (ref 0.5–1.3)
CREAT SERPL-MCNC: 5.44 MG/DL — HIGH (ref 0.5–1.3)
CREAT SERPL-MCNC: 5.98 MG/DL — HIGH (ref 0.5–1.3)
EOSINOPHIL # BLD AUTO: 0.2 K/UL — SIGNIFICANT CHANGE UP (ref 0–0.5)
EOSINOPHIL NFR BLD AUTO: 1.4 % — SIGNIFICANT CHANGE UP (ref 0–6)
ERYTHROCYTE [SEDIMENTATION RATE] IN BLOOD: 107 MM/HR — HIGH (ref 0–20)
GAS PNL BLDA: SIGNIFICANT CHANGE UP
GLUCOSE BLDC GLUCOMTR-MCNC: 118 MG/DL — HIGH (ref 70–99)
GLUCOSE BLDC GLUCOMTR-MCNC: 152 MG/DL — HIGH (ref 70–99)
GLUCOSE BLDC GLUCOMTR-MCNC: 78 MG/DL — SIGNIFICANT CHANGE UP (ref 70–99)
GLUCOSE BLDC GLUCOMTR-MCNC: 84 MG/DL — SIGNIFICANT CHANGE UP (ref 70–99)
GLUCOSE BLDC GLUCOMTR-MCNC: 88 MG/DL — SIGNIFICANT CHANGE UP (ref 70–99)
GLUCOSE SERPL-MCNC: 138 MG/DL — HIGH (ref 70–99)
GLUCOSE SERPL-MCNC: 159 MG/DL — HIGH (ref 70–99)
GLUCOSE SERPL-MCNC: 87 MG/DL — SIGNIFICANT CHANGE UP (ref 70–99)
HCT VFR BLD CALC: 29.6 % — LOW (ref 34.5–45)
HGB BLD-MCNC: 9.9 G/DL — LOW (ref 11.5–15.5)
LDH SERPL L TO P-CCNC: 222 U/L — SIGNIFICANT CHANGE UP (ref 50–242)
LYMPHOCYTES # BLD AUTO: 0.8 K/UL — LOW (ref 1–3.3)
LYMPHOCYTES # BLD AUTO: 6.1 % — LOW (ref 13–44)
MAGNESIUM SERPL-MCNC: 1.7 MG/DL — SIGNIFICANT CHANGE UP (ref 1.6–2.6)
MAGNESIUM SERPL-MCNC: 2 MG/DL — SIGNIFICANT CHANGE UP (ref 1.6–2.6)
MAGNESIUM SERPL-MCNC: 2 MG/DL — SIGNIFICANT CHANGE UP (ref 1.6–2.6)
MCHC RBC-ENTMCNC: 30.8 PG — SIGNIFICANT CHANGE UP (ref 27–34)
MCHC RBC-ENTMCNC: 33.5 GM/DL — SIGNIFICANT CHANGE UP (ref 32–36)
MCV RBC AUTO: 91.9 FL — SIGNIFICANT CHANGE UP (ref 80–100)
MONOCYTES # BLD AUTO: 0.5 K/UL — SIGNIFICANT CHANGE UP (ref 0–0.9)
MONOCYTES NFR BLD AUTO: 3.8 % — SIGNIFICANT CHANGE UP (ref 2–14)
NEUTROPHILS # BLD AUTO: 11.4 K/UL — HIGH (ref 1.8–7.4)
NEUTROPHILS NFR BLD AUTO: 88.5 % — HIGH (ref 43–77)
PHOSPHATE SERPL-MCNC: 2.9 MG/DL — SIGNIFICANT CHANGE UP (ref 2.5–4.5)
PHOSPHATE SERPL-MCNC: 6 MG/DL — HIGH (ref 2.5–4.5)
PHOSPHATE SERPL-MCNC: 6.8 MG/DL — HIGH (ref 2.5–4.5)
PLATELET # BLD AUTO: 188 K/UL — SIGNIFICANT CHANGE UP (ref 150–400)
POTASSIUM SERPL-MCNC: 3.3 MMOL/L — LOW (ref 3.5–5.3)
POTASSIUM SERPL-MCNC: 4.8 MMOL/L — SIGNIFICANT CHANGE UP (ref 3.5–5.3)
POTASSIUM SERPL-MCNC: 5.3 MMOL/L — SIGNIFICANT CHANGE UP (ref 3.5–5.3)
POTASSIUM SERPL-SCNC: 3.3 MMOL/L — LOW (ref 3.5–5.3)
POTASSIUM SERPL-SCNC: 4.8 MMOL/L — SIGNIFICANT CHANGE UP (ref 3.5–5.3)
POTASSIUM SERPL-SCNC: 5.3 MMOL/L — SIGNIFICANT CHANGE UP (ref 3.5–5.3)
PROCALCITONIN SERPL-MCNC: 1.35 NG/ML — HIGH (ref 0.02–0.1)
PROT SERPL-MCNC: 6.4 G/DL — SIGNIFICANT CHANGE UP (ref 6–8.3)
RBC # BLD: 3.22 M/UL — LOW (ref 3.8–5.2)
RBC # FLD: 14.7 % — HIGH (ref 10.3–14.5)
SODIUM SERPL-SCNC: 138 MMOL/L — SIGNIFICANT CHANGE UP (ref 135–145)
SODIUM SERPL-SCNC: 138 MMOL/L — SIGNIFICANT CHANGE UP (ref 135–145)
SODIUM SERPL-SCNC: 140 MMOL/L — SIGNIFICANT CHANGE UP (ref 135–145)
T4 SERPL-MCNC: 7.5 UG/DL — SIGNIFICANT CHANGE UP
VANCOMYCIN TROUGH SERPL-MCNC: 15.8 UG/ML — SIGNIFICANT CHANGE UP (ref 10–20)
WBC # BLD: 12.9 K/UL — HIGH (ref 3.8–10.5)
WBC # FLD AUTO: 12.9 K/UL — HIGH (ref 3.8–10.5)

## 2019-04-24 PROCEDURE — 99291 CRITICAL CARE FIRST HOUR: CPT | Mod: 25

## 2019-04-24 PROCEDURE — 71045 X-RAY EXAM CHEST 1 VIEW: CPT | Mod: 26

## 2019-04-24 PROCEDURE — 93308 TTE F-UP OR LMTD: CPT | Mod: 26,GC

## 2019-04-24 RX ORDER — MIDODRINE HYDROCHLORIDE 2.5 MG/1
15 TABLET ORAL EVERY 8 HOURS
Qty: 0 | Refills: 0 | Status: DISCONTINUED | OUTPATIENT
Start: 2019-04-24 | End: 2019-04-30

## 2019-04-24 RX ORDER — DEXTROSE 50 % IN WATER 50 %
50 SYRINGE (ML) INTRAVENOUS ONCE
Qty: 0 | Refills: 0 | Status: COMPLETED | OUTPATIENT
Start: 2019-04-24 | End: 2019-04-24

## 2019-04-24 RX ORDER — ALBUMIN HUMAN 25 %
250 VIAL (ML) INTRAVENOUS ONCE
Qty: 0 | Refills: 0 | Status: COMPLETED | OUTPATIENT
Start: 2019-04-24 | End: 2019-04-24

## 2019-04-24 RX ORDER — ACETAMINOPHEN 500 MG
1000 TABLET ORAL ONCE
Qty: 0 | Refills: 0 | Status: COMPLETED | OUTPATIENT
Start: 2019-04-24 | End: 2019-04-24

## 2019-04-24 RX ORDER — SEVELAMER CARBONATE 2400 MG/1
800 POWDER, FOR SUSPENSION ORAL
Qty: 0 | Refills: 0 | Status: DISCONTINUED | OUTPATIENT
Start: 2019-04-24 | End: 2019-04-30

## 2019-04-24 RX ORDER — POTASSIUM CHLORIDE 20 MEQ
10 PACKET (EA) ORAL ONCE
Qty: 0 | Refills: 0 | Status: DISCONTINUED | OUTPATIENT
Start: 2019-04-24 | End: 2019-04-24

## 2019-04-24 RX ORDER — APIXABAN 2.5 MG/1
2.5 TABLET, FILM COATED ORAL EVERY 12 HOURS
Qty: 0 | Refills: 0 | Status: DISCONTINUED | OUTPATIENT
Start: 2019-04-24 | End: 2019-04-30

## 2019-04-24 RX ORDER — SEVELAMER CARBONATE 2400 MG/1
800 POWDER, FOR SUSPENSION ORAL EVERY 8 HOURS
Qty: 0 | Refills: 0 | Status: DISCONTINUED | OUTPATIENT
Start: 2019-04-24 | End: 2019-04-24

## 2019-04-24 RX ORDER — VANCOMYCIN HCL 1 G
750 VIAL (EA) INTRAVENOUS ONCE
Qty: 0 | Refills: 0 | Status: COMPLETED | OUTPATIENT
Start: 2019-04-24 | End: 2019-04-24

## 2019-04-24 RX ADMIN — Medication 50 MILLILITER(S): at 17:48

## 2019-04-24 RX ADMIN — HEPARIN SODIUM 5000 UNIT(S): 5000 INJECTION INTRAVENOUS; SUBCUTANEOUS at 05:04

## 2019-04-24 RX ADMIN — PIPERACILLIN AND TAZOBACTAM 25 GRAM(S): 4; .5 INJECTION, POWDER, LYOPHILIZED, FOR SOLUTION INTRAVENOUS at 09:26

## 2019-04-24 RX ADMIN — PANTOPRAZOLE SODIUM 40 MILLIGRAM(S): 20 TABLET, DELAYED RELEASE ORAL at 11:22

## 2019-04-24 RX ADMIN — Medication 250 MILLIGRAM(S): at 18:50

## 2019-04-24 RX ADMIN — MIDODRINE HYDROCHLORIDE 15 MILLIGRAM(S): 2.5 TABLET ORAL at 18:03

## 2019-04-24 RX ADMIN — MIDODRINE HYDROCHLORIDE 10 MILLIGRAM(S): 2.5 TABLET ORAL at 05:04

## 2019-04-24 RX ADMIN — APIXABAN 2.5 MILLIGRAM(S): 2.5 TABLET, FILM COATED ORAL at 09:16

## 2019-04-24 RX ADMIN — PIPERACILLIN AND TAZOBACTAM 25 GRAM(S): 4; .5 INJECTION, POWDER, LYOPHILIZED, FOR SOLUTION INTRAVENOUS at 22:13

## 2019-04-24 RX ADMIN — Medication 83.33 MILLILITER(S): at 09:26

## 2019-04-24 RX ADMIN — Medication 400 MILLIGRAM(S): at 23:54

## 2019-04-24 RX ADMIN — MIDODRINE HYDROCHLORIDE 15 MILLIGRAM(S): 2.5 TABLET ORAL at 09:16

## 2019-04-24 RX ADMIN — CHLORHEXIDINE GLUCONATE 1 APPLICATION(S): 213 SOLUTION TOPICAL at 05:04

## 2019-04-24 NOTE — CHART NOTE - NSCHARTNOTEFT_GEN_A_CORE
:  Meagan  INDICATION:  Shock  PROCEDURE:  [ x] LIMITED ECHO  [ ] LIMITED CHEST  [ ] LIMITED RETROPERITONEAL  [ ] LIMITED ABDOMINAL  [ ] LIMITED DVT  [ ] NEEDLE GUIDANCE VASCULAR  [ ] NEEDLE GUIDANCE THORACENTESIS  [ ] NEEDLE GUIDANCE PARACENTESIS  [ ] NEEDLE GUIDANCE PERICARDIOCENTESIS  [ ] OTHER    FINDINGS: ECHO: Normal LVF. Minimal effacement of LV.      INTERPRETATION: Normal LVF. Minimal effacement of LV.

## 2019-04-24 NOTE — AIRWAY REMOVAL NOTE  ADULT & PEDS - ARTIFICAL AIRWAY REMOVAL COMMENTS
Written order for extubation verified.The patient was identified by full name and birth date compared to the identification band. Present during the procedure was Phu Stephens RN.

## 2019-04-24 NOTE — PROGRESS NOTE ADULT - ATTENDING COMMENTS
1. Hyperkalemia  resolved.  For routine dialysis today  2.ID. All cx taken negative to date. On Vancomycin and Zosyn. WBC decreasing. Continue abx.  3. Hypotension resolving. On POCUS echo Effacement of LV resolving. Will additional  give bolus of 5% albumin. Switch Propofol to Precedex.  4. Acute hypoxemic respiratory failure. Pt tolerated weaning trials and extubated this am. tolerating extubation so far    I have spent 35 min independently with this patient (not including procedures) in acute hypoxemic respiratory failure, and  hypotension . Plan as above.   I have discussed  A/P  with NP.

## 2019-04-24 NOTE — PROGRESS NOTE ADULT - SUBJECTIVE AND OBJECTIVE BOX
Patient seen and examined  Remains intubated  off neosynephrine gtt     REVIEW OF SYSTEMS:  UTO    MEDICATIONS  (STANDING):  apixaban 2.5 milliGRAM(s) Oral every 12 hours  aspirin  chewable 81 milliGRAM(s) Oral daily  atorvastatin 40 milliGRAM(s) Oral at bedtime  chlorhexidine 4% Liquid 1 Application(s) Topical <User Schedule>  dexmedetomidine Infusion 0.3 MICROgram(s)/kG/Hr (6.307 mL/Hr) IV Continuous <Continuous>  influenza   Vaccine 0.5 milliLiter(s) IntraMuscular once  insulin lispro (HumaLOG) corrective regimen sliding scale   SubCutaneous every 6 hours  midodrine 15 milliGRAM(s) Oral every 8 hours  pantoprazole  Injectable 40 milliGRAM(s) IV Push daily  phenylephrine    Infusion 0.8 MICROgram(s)/kG/Min (12.615 mL/Hr) IV Continuous <Continuous>  piperacillin/tazobactam IVPB. 3.375 Gram(s) IV Intermittent every 12 hours  sevelamer carbonate Powder 800 milliGRAM(s) Oral three times a day with meals      VITAL:  T(C): , Max: 38.8 (19 @ 16:30)  T(F): , Max: 101.8 (19 @ 16:30)  HR: 89 (19 @ 09:45)  BP: 101/49 (19 @ 09:45)  BP(mean): 71 (19 @ 09:45)  RR: 19 (19 @ 09:45)  SpO2: 94% (19 @ 09:45)  Wt(kg): --    I and O's:     @ 07:01  -   @ 07:00  --------------------------------------------------------  IN: 864.7 mL / OUT: 900 mL / NET: -35.3 mL     @ 07:01  -   @ 10:18  --------------------------------------------------------  IN: 108 mL / OUT: 0 mL / NET: 108 mL          PHYSICAL EXAM:  Constitutional: intubated/sedated  HEENT:  (+)ETT/OGT  Neck: No JVD; supple  Respiratory: coarse BS b/l  Cardiac: irreg s1s2  Gastrointestinal: BS+, soft, NT/ND  Urologic: No mondragon  Extremities: (+)large b/l LE edema; (+)R TMA  Access: RUE AVF (+)thrill      LABS:                        9.9    12.9  )-----------( 188      ( 2019 02:24 )             29.6     04-24    140  |  94<L>  |  51<H>  ----------------------------<  138<H>  5.3   |  28  |  5.44<H>    Ca    8.3<L>      2019 02:24  Phos  6.0     04-  Mg     2.0     -24    TPro  6.4  /  Alb  2.9<L>  /  TBili  0.3  /  DBili  x   /  AST  10  /  ALT  24  /  AlkPhos  97  04-24      Urine Studies:  Urinalysis Basic - ( 2019 11:12 )    Color: Light Yellow / Appearance: Clear / S.010 / pH: x  Gluc: x / Ketone: Negative  / Bili: Negative / Urobili: Negative   Blood: x / Protein: 100 mg/dL / Nitrite: Negative   Leuk Esterase: Negative / RBC: 3 /hpf / WBC 2 /HPF   Sq Epi: x / Non Sq Epi: 0 /hpf / Bacteria: Negative        IMPRESSION: 69F w/ DM2, HTN, and ESRD-HD, 20 a/w severe hyperkalemia/cardiac arrest    (1)Renal - ESRD - HD MWF  (2)Hyperkalemia - received HD twice/past 24h  (3)CV - shock; off Travis-Synephrine on midodrine  (4)Hyperphosphatemia    RECOMMEND:  - HD today for clearance- no UF 2K bath  - a/w Renvela 800 mg po TID  - c/w midodrine 10 mg po TID  - if K is high tomorrow or between HD treatments, we can start her on Veltassa 8.4 g po daily    d/w MICU team    Mel Bronson MD  [facility]   [phone]         Dave Barfield MD  [facility]  [phone]

## 2019-04-24 NOTE — CHART NOTE - NSCHARTNOTEFT_GEN_A_CORE
MICU Transfer Note    Transfer from: MICU    Transfer to: (  ) Medicine    (  ) Telemetry     (   ) RCU        (    ) Palliative         (   ) Stroke Unit          (   ) __________________    Accepting Physician:  Signout given to:     MICU COURSE:    HPI:  69 year old female with PMHx ESRD on HD, Afib on apixaban, CAD, Mod Aortic stenosis, HTN, PVD, DM2, episodes of hyperkalemia requiring urgent dialysis in past who presented to hospital 4/22/19 after developing progressive weakness and fatigue. Found to be hyperkalemic to 8.5, widened QRS with slight peaked T waves anteriorly on admission.  ED course complicated by AIVR rate of 20, resp distress requiring intubation. Tx with CaCl/Reg Insulin/HCO3 with development of wide complex SVT requiring cardioversion to afib with controlled rate. Admitted to MICU for hyperkalemia requiring urgent HD.     MICU course has been c/b development of hypotension during 1st urgent HD requiring phenylephrine, recurrent hyperkalemia (8.5-5.9-7.9) requiring two treatments of urgent dialysis on 4/22/19, development of leukocytosis to 19.8, pt started on broad spectrum antibiotics. Pt weaned off sedation AM of 4/24/19 and on minimal ventilator settings tolerated PSV trials - was successfully extubated to face tent in AM. Patient's midodrine increased in dosage while off vasopressors. Pt tolerated HD session today with repeat labs drawn. Also dosed for vanco by trough s/p HD at 750 mg IV. Pt failed bedside speech and swallow evaluation, official speech and swallow eval recommending NPO for now, to reassess in the AM 4/25 (pt has history of dysphagia and PEG, family and patient aware that pt may need NGT during this hospital stay. Pt seen at bedside 4/24 PM, oxygenating well on face tent and hemodynamically stable off all drips. Pt mentating well and answering appropriately to questions (with son at bedside for translation), states she is feeling OK however endorses R shin pain described as "burning," nontender to touch and non-edematous however slightly erythematous. Pt ordered Tylenol for pain.       ASSESSMENT & PLAN:             FOR FOLLOW UP: MICU Transfer Note    Transfer from: MICU    Transfer to: (  ) Medicine    (  ) Telemetry     (   ) RCU        (    ) Palliative         (   ) Stroke Unit          (   ) __________________    Accepting Physician:  Signout given to:     MICU COURSE:    HPI:  69 year old female with PMHx ESRD on HD, Afib on apixaban, CAD, Mod Aortic stenosis, HTN, PVD, DM2, episodes of hyperkalemia requiring urgent dialysis in past who presented to hospital 4/22/19 after developing progressive weakness and fatigue. Found to be hyperkalemic to 8.5, widened QRS with slight peaked T waves anteriorly on admission.  ED course complicated by AIVR rate of 20, resp distress requiring intubation. Tx with CaCl/Reg Insulin/HCO3 with development of wide complex SVT requiring cardioversion to afib with controlled rate. Admitted to MICU for hyperkalemia requiring urgent HD.     MICU course has been c/b development of hypotension during 1st urgent HD requiring phenylephrine, recurrent hyperkalemia (8.5-5.9-7.9) requiring two treatments of urgent dialysis on 4/22/19, development of leukocytosis to 19.8, pt started on broad spectrum antibiotics. Pt weaned off sedation AM of 4/24/19 and on minimal ventilator settings tolerated PSV trials - was successfully extubated to face tent in AM. Patient's midodrine increased in dosage while off vasopressors. Pt tolerated HD session today with repeat labs drawn. Also dosed for vanco by trough s/p HD at 750 mg IV. Pt failed bedside speech and swallow evaluation, official speech and swallow eval recommending NPO for now, to reassess in the AM 4/25 (pt has history of dysphagia and PEG, family and patient aware that pt may need NGT during this hospital stay. Pt seen at bedside 4/24 PM, oxygenating well on face tent and hemodynamically stable off all drips. Pt mentating well and answering appropriately to questions (with son at bedside for translation), states she is feeling OK however endorses R shin pain described as "burning," nontender to touch and non-edematous however slightly erythematous. Pt ordered Tylenol for pain.       ASSESSMENT & PLAN:   Assessment	  69 yr old female with stated hx significant for ESRD, afib on apixaban, found to have severe hyperkalemia with slight peaked T waves. Complicated by initial AIVR to wide complex tachycardia requiring cardioversion and respiratory distress requiring intubation. Admission to MICU 4/22  for hyperkalemia requiring urgent hemodialysis x 2 treatments on subsequent day and resp failure requiring intubation. Pt now improving 4/24, successfully extubated and weaned off all sedation and vasopressors, tolerated HD session well.     Plan:    #Neuro:  -AAOx3, follows commands and answers questions appropriately  -neuro checks q4 hrs  -physical therapy when stable    #Pulm:  -Pt orally intubated in ED for resp distress  -successfully extubated 4/24 to face tent  -supplemental oxygen to maintain SpO2 >92%  -HOB >/= 30 degrees, aspiration precautions    #CV:  -pt found to be hyperkalemic with widened QRS complex on ECG on 4/22/19  -ECG prn if pt hyperkalemic  -pt on midodrine with home meds - restarted 4/23/19 at 10 mg q 8 hrs, increased to 15 q8 hrs 4/24 to allow for titration off vasopressors  -home med of ASA 81 mg po qd - continued  -home med of atorvastatin 40 mg po qhs - continued  -Pt with Hx of Afib (currently NSR) on abixaban 2.5 mg po q 12 hrs - apixaban restarted on 4/24  -Pt with hx of Mod Aortic Stenosis (5/2017), TTE obtained, Mod Aortic stenosis persist, EF 70%  -daily weights     #GI/:  -NPO at present with medications for now, failed bedside and official speech and swallow (on same day of extubation) in setting of h/o dysphagia and PEG placement  -NGT placement to allow for non-oral nutrition and medication  -s/p HD treatment today with post-HD labs completed, vanc dosed s/p HD   -appreciate Nephrology reccs for further HD, considering Veltassa 8.4 g PO daily   -obtain fistulogram to eval patency of AVF if difficulty with fistula occurs     #ID:  -leukocytosis and lactate improved  -started on zosyn and vanco by level, vanc most recently dose today (4/24) s/p HD session at 750 mg IVP  -redose vanco as needed to maintain trough of 15 to 20  -cultures neg growth to date  -f/u pan culture for surveillance     #FEN/ENDO/HEME:  -cmp/mg++/po--4/coags/cbc with diff q am  -bmp/mg++/po--4 prn  -holding ISS q 6 hrs while NPO, r/s once begin feeds    #MSK:  -follow R shin "burning" pain (in setting of frequent falls and h/o DM requiring toe amputations), tylenol PRN, consider venous doppler vs Xray if worsens MICU Transfer Note    Transfer from: MICU    Transfer to: ( X ) Medicine    (  ) Telemetry     (   ) RCU        (    ) Palliative         (   ) Stroke Unit          (   ) __________________    Accepting Physician:  Signout given to:     MICU COURSE:    HPI:  69 year old female with PMHx ESRD on HD, Afib on apixaban, CAD, Mod Aortic stenosis, HTN, PVD, DM2, episodes of hyperkalemia requiring urgent dialysis in past who presented to hospital 4/22/19 after developing progressive weakness and fatigue. Found to be hyperkalemic to 8.5, widened QRS with slight peaked T waves anteriorly on admission.  ED course complicated by AIVR rate of 20, resp distress requiring intubation. Tx with CaCl/Reg Insulin/HCO3 with development of wide complex SVT requiring cardioversion to afib with controlled rate. Admitted to MICU for hyperkalemia requiring urgent HD.     MICU course has been c/b development of hypotension during 1st urgent HD requiring phenylephrine, recurrent hyperkalemia (8.5-5.9-7.9) requiring two treatments of urgent dialysis on 4/22/19, development of leukocytosis to 19.8, pt started on broad spectrum antibiotics. Pt weaned off sedation AM of 4/24/19 and on minimal ventilator settings tolerated PSV trials - was successfully extubated to face tent in AM. Patient's midodrine increased in dosage while off vasopressors. Pt tolerated HD session today with repeat labs drawn. Also dosed for vanco by trough s/p HD at 750 mg IV. Pt failed bedside speech and swallow evaluation, official speech and swallow eval recommending NPO for now, NGT placed. Pt seen at bedside 4/24 PM, oxygenating well on face tent and hemodynamically stable off all drips. Pt mentating well and answering appropriately to questions (with son at bedside for translation), states she is feeling OK however endorses R shin pain described as "burning," nontender to touch and non-edematous however slightly erythematous. Pt ordered Tylenol for pain.       ASSESSMENT & PLAN:   Assessment	  69 yr old female with stated hx significant for ESRD, afib on apixaban, found to have severe hyperkalemia with slight peaked T waves. Complicated by initial AIVR to wide complex tachycardia requiring cardioversion and respiratory distress requiring intubation. Admission to MICU 4/22  for hyperkalemia requiring urgent hemodialysis x 2 treatments on subsequent day and resp failure requiring intubation. Pt now improving 4/24, successfully extubated and weaned off all sedation and vasopressors, tolerated HD session well.     Plan:    #Neuro:  -AAOx3, follows commands and answers questions appropriately  -neuro checks q4 hrs  -physical therapy when stable    #Pulm:  -Pt orally intubated in ED for resp distress  -successfully extubated 4/24 to face tent  -supplemental oxygen to maintain SpO2 >92%  -HOB >/= 30 degrees, aspiration precautions    #CV:  -pt found to be hyperkalemic with widened QRS complex on ECG on 4/22/19  -ECG prn if pt hyperkalemic  -pt on midodrine with home meds - restarted 4/23/19 at 10 mg q 8 hrs, increased to 15 q8 hrs 4/24 to allow for titration off vasopressors  -home med of ASA 81 mg po qd - continued  -home med of atorvastatin 40 mg po qhs - continued  -Pt with Hx of Afib (currently NSR) on abixaban 2.5 mg po q 12 hrs - apixaban restarted on 4/24  -Pt with hx of Mod Aortic Stenosis (5/2017), TTE obtained, Mod Aortic stenosis persist, EF 70%  -daily weights     #GI/:  -NPO at present with medications for now, failed bedside and official speech and swallow (on same day of extubation) in setting of h/o dysphagia and PEG placement  -NGT placement to allow for non-oral nutrition and medication, r/s tube feeds once we confirm placement  -s/p HD treatment today with post-HD labs completed, vanc dosed s/p HD   -appreciate Nephrology reccs for further HD, considering Veltassa 8.4 g PO daily   -obtain fistulogram to eval patency of AVF if difficulty with fistula occurs     #ID:  -leukocytosis and lactate improved  -started on zosyn and vanco by level, vanc most recently dose today (4/24) s/p HD session at 750 mg IVP  -redose vanco as needed to maintain trough of 15 to 20  -cultures neg growth to date  -f/u pan culture for surveillance     #FEN/ENDO/HEME:  -cmp/mg++/po--4/coags/cbc with diff q am  -bmp/mg++/po--4 prn  -holding ISS q 6 hrs while NPO, r/s once begin feeds    #MSK:  -follow R shin "burning" pain (in setting of frequent falls and h/o DM requiring toe amputations), tylenol PRN, consider venous doppler vs Xray if worsens MICU Transfer Note    Transfer from: MICU    Transfer to: ( X ) Medicine    (  ) Telemetry     (   ) RCU        (    ) Palliative         (   ) Stroke Unit          (   ) __________________    Accepting Physician: Dr. Bryan Schmid  Signout given to:     MICU COURSE:    HPI:  69 year old female with PMHx ESRD on HD, Afib on apixaban, CAD, Mod Aortic stenosis, HTN, PVD, DM2, episodes of hyperkalemia requiring urgent dialysis in past who presented to hospital 4/22/19 after developing progressive weakness and fatigue. Found to be hyperkalemic to 8.5, widened QRS with slight peaked T waves anteriorly on admission.  ED course complicated by AIVR rate of 20, resp distress requiring intubation. Tx with CaCl/Reg Insulin/HCO3 with development of wide complex SVT requiring cardioversion to afib with controlled rate. Admitted to MICU for hyperkalemia requiring urgent HD.     MICU course has been c/b development of hypotension during 1st urgent HD requiring phenylephrine, recurrent hyperkalemia (8.5-5.9-7.9) requiring two treatments of urgent dialysis on 4/22/19, development of leukocytosis to 19.8, pt started on broad spectrum antibiotics. Pt weaned off sedation AM of 4/24/19 and on minimal ventilator settings tolerated PSV trials - was successfully extubated to face tent in AM. Patient's midodrine increased in dosage while off vasopressors. Pt tolerated HD session today with repeat labs drawn. Also dosed for vanco by trough s/p HD at 750 mg IV. Pt failed bedside speech and swallow evaluation, official speech and swallow eval recommending NPO for now, NGT placed. Pt seen at bedside 4/24 PM, oxygenating well on face tent and hemodynamically stable off all drips. Pt mentating well and answering appropriately to questions (with son at bedside for translation), states she is feeling OK however endorses R shin pain described as "burning," nontender to touch and non-edematous however slightly erythematous. Pt ordered Tylenol for pain.       ASSESSMENT & PLAN:   Assessment	  69 yr old female with stated hx significant for ESRD, afib on apixaban, found to have severe hyperkalemia with slight peaked T waves. Complicated by initial AIVR to wide complex tachycardia requiring cardioversion and respiratory distress requiring intubation. Admission to MICU 4/22  for hyperkalemia requiring urgent hemodialysis x 2 treatments on subsequent day and resp failure requiring intubation. Pt now improving 4/24, successfully extubated and weaned off all sedation and vasopressors, tolerated HD session well.     Plan:    #Neuro:  -AAOx3, follows commands and answers questions appropriately  -neuro checks q4 hrs  -physical therapy when stable    #Pulm:  -Pt orally intubated in ED for resp distress  -successfully extubated 4/24 to face tent  -supplemental oxygen to maintain SpO2 >92%  -HOB >/= 30 degrees, aspiration precautions    #CV:  -pt found to be hyperkalemic with widened QRS complex on ECG on 4/22/19  -ECG prn if pt hyperkalemic  -pt on midodrine with home meds - restarted 4/23/19 at 10 mg q 8 hrs, increased to 15 q8 hrs 4/24 to allow for titration off vasopressors  -home med of ASA 81 mg po qd - continued  -home med of atorvastatin 40 mg po qhs - continued  -Pt with Hx of Afib (currently NSR) on abixaban 2.5 mg po q 12 hrs - apixaban restarted on 4/24  -Pt with hx of Mod Aortic Stenosis (5/2017), TTE obtained, Mod Aortic stenosis persist, EF 70%  -daily weights     #GI/:  -NPO at present with medications for now, failed bedside and official speech and swallow (on same day of extubation) in setting of h/o dysphagia and PEG placement  -NGT placement to allow for non-oral nutrition and medication, r/s tube feeds once we confirm placement  -s/p HD treatment today with post-HD labs completed, vanc dosed s/p HD   -appreciate Nephrology reccs for further HD, considering Veltassa 8.4 g PO daily   -obtain fistulogram to eval patency of AVF if difficulty with fistula occurs     #ID:  -leukocytosis and lactate improved  -started on zosyn and vanco by level, vanc most recently dose today (4/24) s/p HD session at 750 mg IVP  -redose vanco as needed to maintain trough of 15 to 20  -cultures neg growth to date  -f/u pan culture for surveillance     #FEN/ENDO/HEME:  -cmp/mg++/po--4/coags/cbc with diff q am  -bmp/mg++/po--4 prn  -holding ISS q 6 hrs while NPO, r/s once begin feeds    #MSK:  -follow R shin "burning" pain (in setting of frequent falls and h/o DM requiring toe amputations), tylenol PRN, consider venous doppler vs Xray if worsens

## 2019-04-24 NOTE — PROGRESS NOTE ADULT - SUBJECTIVE AND OBJECTIVE BOX
Subjective: Patient seen and examined. No new events except as noted.   Extubated this am   feels weak     REVIEW OF SYSTEMS:    CONSTITUTIONAL:+ weakness, fevers or chills  EYES/ENT: No visual changes;  No vertigo or throat pain   NECK: No pain or stiffness  RESPIRATORY: No cough, wheezing, hemoptysis; No shortness of breath  CARDIOVASCULAR: No chest pain or palpitations  GASTROINTESTINAL: No abdominal or epigastric pain. No nausea, vomiting, or hematemesis; No diarrhea or constipation. No melena or hematochezia.  GENITOURINARY: No dysuria, frequency or hematuria  NEUROLOGICAL: No numbness or weakness  SKIN: No itching, burning, rashes, or lesions   All other review of systems is negative unless indicated above.    MEDICATIONS:  MEDICATIONS  (STANDING):  apixaban 2.5 milliGRAM(s) Oral every 12 hours  aspirin  chewable 81 milliGRAM(s) Oral daily  atorvastatin 40 milliGRAM(s) Oral at bedtime  chlorhexidine 4% Liquid 1 Application(s) Topical <User Schedule>  dexmedetomidine Infusion 0.3 MICROgram(s)/kG/Hr (6.307 mL/Hr) IV Continuous <Continuous>  influenza   Vaccine 0.5 milliLiter(s) IntraMuscular once  insulin lispro (HumaLOG) corrective regimen sliding scale   SubCutaneous every 6 hours  midodrine 15 milliGRAM(s) Oral every 8 hours  pantoprazole  Injectable 40 milliGRAM(s) IV Push daily  phenylephrine    Infusion 0.8 MICROgram(s)/kG/Min (12.615 mL/Hr) IV Continuous <Continuous>  piperacillin/tazobactam IVPB. 3.375 Gram(s) IV Intermittent every 12 hours  sevelamer carbonate Powder 800 milliGRAM(s) Oral three times a day with meals      PHYSICAL EXAM:  T(C): 36.8 (04-24-19 @ 04:00), Max: 38.8 (04-23-19 @ 16:30)  HR: 89 (04-24-19 @ 09:45) (60 - 95)  BP: 101/49 (04-24-19 @ 09:45) (69/37 - 162/123)  RR: 19 (04-24-19 @ 09:45) (14 - 39)  SpO2: 94% (04-24-19 @ 09:45) (86% - 100%)  Wt(kg): --  I&O's Summary    23 Apr 2019 07:01  -  24 Apr 2019 07:00  --------------------------------------------------------  IN: 864.7 mL / OUT: 900 mL / NET: -35.3 mL    24 Apr 2019 07:01  -  24 Apr 2019 10:36  --------------------------------------------------------  IN: 108 mL / OUT: 0 mL / NET: 108 mL          Appearance: NAd extubate don facemask 	  HEENT:   Dry  oral mucosa, PERRL, EOMI	  Lymphatic: No lymphadenopathy  Cardiovascular: Normal S1 S2, No JVD, No murmurs , Peripheral pulses palpable 2+ bilaterally  Respiratory: Decreased bs   Gastrointestinal:  Soft, Non-tender, + BS	  Skin: No rashes, No ecchymoses, No cyanosis, warm to touch  Musculoskeletal: Decreased range of motion and  strength  Psychiatry:  lethargic   Ext: No edema      LABS:    CARDIAC MARKERS:  CARDIAC MARKERS ( 23 Apr 2019 04:23 )  x     / x     / 55 U/L / x     / 4.5 ng/mL  CARDIAC MARKERS ( 22 Apr 2019 11:22 )  x     / x     / 93 U/L / x     / 8.3 ng/mL                                9.9    12.9  )-----------( 188      ( 24 Apr 2019 02:24 )             29.6     04-24    140  |  94<L>  |  51<H>  ----------------------------<  138<H>  5.3   |  28  |  5.44<H>    Ca    8.3<L>      24 Apr 2019 02:24  Phos  6.0     04-24  Mg     2.0     04-24    TPro  6.4  /  Alb  2.9<L>  /  TBili  0.3  /  DBili  x   /  AST  10  /  ALT  24  /  AlkPhos  97  04-24    proBNP:   Lipid Profile:   HgA1c:   TSH:     0          TELEMETRY: 	 SR   ECG:  	  RADIOLOGY:     DIAGNOSTIC TESTING:  [ ] Echocardiogram:  [ ]  Catheterization:  [ ] Stress Test:    OTHER:

## 2019-04-24 NOTE — PROGRESS NOTE ADULT - SUBJECTIVE AND OBJECTIVE BOX
CHIEF COMPLAINT:  Patient is a 69y old  Female who presents with a chief complaint of hyperkalemia, resp failure (2019 09:48)    HPI:  69 yr old female with PMHx ESRD on HD, Afib on apixaban, CAD, Mod Ao stenosis, HTN, PVD, DM2, episodes of hyperkalemia requiring urgent dialysis in past who presented to hospital 19 after developing progressive weakness and fatigue. Found to be hyperkalemic to 8.5, widened QRS with slight peaked T waves anteriorly.         ED course complicated by AIVR rate of 20, resp distress requiring intubation. Tx with CaCl/Reg Insulin/HCO3 with development of wide complex SVT requiring cardioversion to afib with controlled rate. Admitted to MICU for hyperkalemia requiring urgent HD.       MICU course has been c/b development of hypotension during 1st urgent HD requiring phenylephrine,  recurrent hyperkalemia (8.5-5.9-7.9) requiring two treatments of urgent dialysis, development of leukocytosis to 19.8, started on zosyn, vanco x1 dose.     Interval Events:  On P.E. a.m. of 19 found to have LV effacement , received 250cc's 5% albumin. changed to precedex from propofol. Pt received PSV trials in afternoon, tolerated. Re instituted midodrine however with increased dose to 10 mg q 8 hr. vanco dosed by trough, received 750 mg IV.      REVIEW OF SYSTEMS:  unable due to acute illness      OBJECTIVE:  ICU Vital Signs Last 24 Hrs  T(C): 36.6 (2019 20:15), Max: 38.8 (2019 16:30)  T(F): 97.9 (2019 20:15), Max: 101.8 (2019 16:30)  HR: 65 (2019 04:30) (61 - 90)  BP: 114/57 (2019 04:15) (69/37 - 185/77)  BP(mean): 82 (2019 04:15) (49 - 111)  ABP: --  ABP(mean): --  RR: 14 (2019 04:30) (14 - 40)  SpO2: 96% (2019 04:30) (90% - 100%)    Mode: AC/ CMV (Assist Control/ Continuous Mandatory Ventilation), RR (machine): 14, TV (machine): 450, FiO2: 30, PEEP: 5, ITime: 1, MAP: 10, PIP: 32     @ 07: @ 07:00  --------------------------------------------------------  IN: 1540.9 mL / OUT: 1300 mL / NET: 240.9 mL     @ 07:01   @ 05:19  --------------------------------------------------------  IN: 758.1 mL / OUT: 900 mL / NET: -141.9 mL      CAPILLARY BLOOD GLUCOSE      POCT Blood Glucose.: 118 mg/dL (2019 05:02)      PHYSICAL EXAM:          HOSPITAL MEDICATIONS:  MEDICATIONS  (STANDING):  aspirin  chewable 81 milliGRAM(s) Oral daily  atorvastatin 40 milliGRAM(s) Oral at bedtime  chlorhexidine 4% Liquid 1 Application(s) Topical <User Schedule>  dexmedetomidine Infusion 0.3 MICROgram(s)/kG/Hr (6.307 mL/Hr) IV Continuous <Continuous>  heparin  Injectable 5000 Unit(s) SubCutaneous every 8 hours  influenza   Vaccine 0.5 milliLiter(s) IntraMuscular once  insulin lispro (HumaLOG) corrective regimen sliding scale   SubCutaneous every 6 hours  midodrine 10 milliGRAM(s) Oral every 8 hours  pantoprazole  Injectable 40 milliGRAM(s) IV Push daily  phenylephrine    Infusion 0.8 MICROgram(s)/kG/Min (12.615 mL/Hr) IV Continuous <Continuous>  piperacillin/tazobactam IVPB. 3.375 Gram(s) IV Intermittent every 12 hours    MEDICATIONS  (PRN):  sodium chloride 0.9% lock flush 10 milliLiter(s) IV Push every 1 hour PRN Pre/post blood products, medications, blood draw, and to maintain line patency      LABS:                        9.9    12.9  )-----------( 188      ( 2019 02:24 )             29.6     Hgb Trend: 9.9<--, 9.5<--, 11.3<--, 13.3<--, 11.6<--      140  |  94<L>  |  51<H>  ----------------------------<  138<H>  5.3   |  28  |  5.44<H>    Ca    8.3<L>      2019 02:24  Phos  6.0       Mg     2.0         TPro  6.4  /  Alb  2.9<L>  /  TBili  0.3  /  DBili  x   /  AST  10  /  ALT  24  /  AlkPhos  97      LIVER FUNCTIONS - ( 2019 02:24 )  Alb: 2.9 g/dL / Pro: 6.4 g/dL / ALK PHOS: 97 U/L / ALT: 24 U/L / AST: 10 U/L / GGT: x           Creatinine Trend: 5.44<--, 4.78<--, 4.60<--, 4.25<--, 3.65<--, 6.05<--  PT/INR - ( 2019 11:22 )   PT: 12.2 sec;   INR: 1.06 ratio         PTT - ( 2019 02:24 )  PTT:28.3 sec  Urinalysis Basic - ( 2019 11:12 )    Color: Light Yellow / Appearance: Clear / S.010 / pH: x  Gluc: x / Ketone: Negative  / Bili: Negative / Urobili: Negative   Blood: x / Protein: 100 mg/dL / Nitrite: Negative   Leuk Esterase: Negative / RBC: 3 /hpf / WBC 2 /HPF   Sq Epi: x / Non Sq Epi: 0 /hpf / Bacteria: Negative      Arterial Blood Gas:   @ 18:09  7.48/44/94/33/95/8.5  ABG lactate: --  Arterial Blood Gas:   @ 14:14  7.55/35/87/31/97/7.8  ABG lactate: --  Arterial Blood Gas:   @ 22:04  7.48/36/93/26/97/2.9  ABG lactate: --  Arterial Blood Gas:   @ 16:14  7.47/36/157/25/99/2.4  ABG lactate: --  Arterial Blood Gas:   @ 10:37  7.42/37/275/23/99/-.6  ABG lactate: --  Arterial Blood Gas:   @ 09:04  7.37/45/245/26/99/.8  ABG lactate: --    Venous Blood Gas:   @ 08:15  7.43/50/43/33/74  VBG Lactate: 2.6  Venous Blood Gas:   @ 04:16  7.45/45/47/31/81  VBG Lactate: 3.6  Venous Blood Gas:   @ 00:22  7.46/43/43/30/78  VBG Lactate: 4.5      MICROBIOLOGY:     RADIOLOGY:  [ ] Reviewed and interpreted by me    EKG:      Carol [facility] (ext [phone]) CHIEF COMPLAINT:  Patient is a 69y old  Female who presents with a chief complaint of hyperkalemia, resp failure (2019 09:48)    HPI:  69 yr old female with PMHx ESRD on HD, Afib on apixaban, CAD, Mod Ao stenosis, HTN, PVD, DM2, episodes of hyperkalemia requiring urgent dialysis in past who presented to hospital 19 after developing progressive weakness and fatigue. Found to be hyperkalemic to 8.5, widened QRS with slight peaked T waves anteriorly.         ED course complicated by AIVR rate of 20, resp distress requiring intubation. Tx with CaCl/Reg Insulin/HCO3 with development of wide complex SVT requiring cardioversion to afib with controlled rate. Admitted to MICU for hyperkalemia requiring urgent HD.       MICU course has been c/b development of hypotension during 1st urgent HD requiring phenylephrine,  recurrent hyperkalemia (8.5-5.9-7.9) requiring two treatments of urgent dialysis, development of leukocytosis to 19.8, started on zosyn, vanco x1 dose.     Interval Events:  On P.E. a.m. of 19 found to have LV effacement , received 250cc's 5% albumin. changed to precedex from propofol. Pt received PSV trials in afternoon, tolerated. Re instituted midodrine however with increased dose to 10 mg q 8 hr. vanco dosed by trough, received 750 mg IV.      REVIEW OF SYSTEMS:  unable due to acute illness      OBJECTIVE:  ICU Vital Signs Last 24 Hrs  T(C): 36.6 (2019 20:15), Max: 38.8 (2019 16:30)  T(F): 97.9 (2019 20:15), Max: 101.8 (2019 16:30)  HR: 65 (2019 04:30) (61 - 90)  BP: 114/57 (2019 04:15) (69/37 - 185/77)  BP(mean): 82 (2019 04:15) (49 - 111)  ABP: --  ABP(mean): --  RR: 14 (2019 04:30) (14 - 40)  SpO2: 96% (2019 04:30) (90% - 100%)    Mode: AC/ CMV (Assist Control/ Continuous Mandatory Ventilation), RR (machine): 14, TV (machine): 450, FiO2: 30, PEEP: 5, ITime: 1, MAP: 10, PIP: 32     @ 07:01  -   @ 07:00  --------------------------------------------------------  IN: 1540.9 mL / OUT: 1300 mL / NET: 240.9 mL     @ 07:01  -   @ 05:19  --------------------------------------------------------  IN: 758.1 mL / OUT: 900 mL / NET: -141.9 mL      CAPILLARY BLOOD GLUCOSE      POCT Blood Glucose.: 118 mg/dL (2019 05:02)      PHYSICAL EXAM:  Neuro:  sedated on precedex, intubated. responsive to noxious and verbal stimuli by opening eyes and focusing upon examiner, not following commands or answering questions (pt is farsi speaking), however has spontaneous purposeful movements of all 4 extremities , upper 3-4/5, lower extremities 3/5 well and appropriately.. Pupils 3 mm sluggishly reactive equal    Pulm:  orally intubated,  breath sounds bilat, diminished in lower lung fields bases to 1/4 up. crackles bibasilar to 1/4 up, When sedation decreased pt triggers vent. SPO2 98%.  POCUS with anterior predominance of A lines, with increased focal B lines, RLL with non dynamic bronchograms.    CV:  cardiac monitor NSR with occasional APC's/PVC's  s1/s2 II/VI sys murmur appreciated , peripheral pulses palpable with radial 2+ bilat, dp/pt 1+/1+ bilat, digits warm to touch with good cap refill < 3 secs , Lower extremity with 3+ pitting edema L>R. POCUS with good LVFx VIT 27, RV<LV slight LAE noted. IVC 1.99 . Rt brachial area AVF with 2+ Thrill, + bruit    GI/:  abd obese  soft  non distended non tender , + hypoactive bowel sounds.  bladder non distended non palpable    Skin:   warm dry intact. without palpable nodes. without JVD appreciated          HOSPITAL MEDICATIONS:  MEDICATIONS  (STANDING):  aspirin  chewable 81 milliGRAM(s) Oral daily  atorvastatin 40 milliGRAM(s) Oral at bedtime  chlorhexidine 4% Liquid 1 Application(s) Topical <User Schedule>  dexmedetomidine Infusion 0.3 MICROgram(s)/kG/Hr (6.307 mL/Hr) IV Continuous <Continuous>  heparin  Injectable 5000 Unit(s) SubCutaneous every 8 hours  influenza   Vaccine 0.5 milliLiter(s) IntraMuscular once  insulin lispro (HumaLOG) corrective regimen sliding scale   SubCutaneous every 6 hours  midodrine 10 milliGRAM(s) Oral every 8 hours  pantoprazole  Injectable 40 milliGRAM(s) IV Push daily  phenylephrine    Infusion 0.8 MICROgram(s)/kG/Min (12.615 mL/Hr) IV Continuous <Continuous>  piperacillin/tazobactam IVPB. 3.375 Gram(s) IV Intermittent every 12 hours    MEDICATIONS  (PRN):  sodium chloride 0.9% lock flush 10 milliLiter(s) IV Push every 1 hour PRN Pre/post blood products, medications, blood draw, and to maintain line patency      LABS:                        9.9    12.9  )-----------( 188      ( 2019 02:24 )             29.6     Hgb Trend: 9.9<--, 9.5<--, 11.3<--, 13.3<--, 11.6<--  04-24    140  |  94<L>  |  51<H>  ----------------------------<  138<H>  5.3   |  28  |  5.44<H>    Ca    8.3<L>      2019 02:24  Phos  6.0     04-24  Mg     2.0     -24    TPro  6.4  /  Alb  2.9<L>  /  TBili  0.3  /  DBili  x   /  AST  10  /  ALT  24  /  AlkPhos  97  04-24    LIVER FUNCTIONS - ( 2019 02:24 )  Alb: 2.9 g/dL / Pro: 6.4 g/dL / ALK PHOS: 97 U/L / ALT: 24 U/L / AST: 10 U/L / GGT: x           Creatinine Trend: 5.44<--, 4.78<--, 4.60<--, 4.25<--, 3.65<--, 6.05<--  PT/INR - ( 2019 11:22 )   PT: 12.2 sec;   INR: 1.06 ratio         PTT - ( 2019 02:24 )  PTT:28.3 sec  Urinalysis Basic - ( 2019 11:12 )    Color: Light Yellow / Appearance: Clear / S.010 / pH: x  Gluc: x / Ketone: Negative  / Bili: Negative / Urobili: Negative   Blood: x / Protein: 100 mg/dL / Nitrite: Negative   Leuk Esterase: Negative / RBC: 3 /hpf / WBC 2 /HPF   Sq Epi: x / Non Sq Epi: 0 /hpf / Bacteria: Negative      Arterial Blood Gas:   @ 18:09  7.48/44/94/33/95/8.5  ABG lactate: --  Arterial Blood Gas:   @ 14:14  7.55/35/87/31/97/7.8  ABG lactate: --  Arterial Blood Gas:   @ 22:04  7.48/36/93/26/97/2.9  ABG lactate: --  Arterial Blood Gas:   @ 16:14  7.47/36/157/25/99/2.4  ABG lactate: --  Arterial Blood Gas:   @ 10:37  7.42/37/275/23/99/-.6  ABG lactate: --  Arterial Blood Gas:   @ 09:04  7.37/45/245/26/99/.8  ABG lactate: --    Venous Blood Gas:   @ 08:15  7.43/50/43/33/74  VBG Lactate: 2.6  Venous Blood Gas:   @ 04:16  7.45/45/47/31/81  VBG Lactate: 3.6  Venous Blood Gas:   @ 00:22  7.46/43/43/30/78  VBG Lactate: 4.5      MICROBIOLOGY:     RADIOLOGY:  [ ] Reviewed and interpreted by me    EKG:      Carol [facility] (ext [phone])

## 2019-04-24 NOTE — PROGRESS NOTE ADULT - SUBJECTIVE AND OBJECTIVE BOX
Chief complaint:pt   intubated   , alert  , follows  commands , borderline   BP  on  modrin . for  HD  today     HPI:  69 yr old female with PMHx ESRD on HD (mon-wed-fri) last HD session this past 19 (unknown removal of fluid), Afib on apixaban, CAD, Mod Ao stenosis (last TTE 2017), HTN, PVD, DM2, recent hospitalization 3/2019 s/p fall found to have hyperkalemia requiring emergent dialysis who now presents this morning (19) from home via EMS with worsening weakness and fatigue found to have hyperkalemia (8.5)             As endorsed by son pt was in usual state of health AOX3 after HD session, developing progressive weakness and fatigue saturday (19) with worsening this morning. In E.D. pt was found with thready pulse, initial ECG afib with ventricular response of 90's with RBBB slight peaked T waves in anterior leads and widened QRS complexes. Pt's heart rate deteriorated to rate of 20 (as endorsed by ED of AIVR) without loss of pulse, Pt received CaCl IVP (total 3 amps), Reg insulin initially 6 units IVP, then 8 units IVP, HCO3 IVP (total 100 meq), D50W IVP (total 100 cc's), and developed resp distress requiring intubation. Heart rate responded with development of SVT with widened QRS requiring cardioversion to afib with controlled rate. Pt's subsequent lab work demonstrated K+ 8.5, sCR 8.34 , . Last know sCr 4.68 from 3/2019.        Consult called and admission to MICU  for hyperkalemia requiring urgent hemodialysis and resp failure (2019 10:29)      REVIEW OF SYSTEMS:    CONSTITUTIONAL: No fever, weight loss, or fatigue  NECK: No pain or stiffness  RESPIRATORY: No cough, wheezing, chills or hemoptysis; No shortness of breath  CARDIOVASCULAR: No chest pain, palpitations, dizziness, or leg swelling  GASTROINTESTINAL: No abdominal or epigastric pain. No nausea, vomiting, or hematemesis; No diarrhea or constipation. No melena or hematochezia.  GENITOURINARY: No dysuria, frequency, hematuria, or incontinence  NEUROLOGICAL: No headaches, memory loss, loss of strength, numbness, or tremors  SKIN: No itching, burning, rashes, or lesions   LYMPH NODES: No enlarged glands  MUSCULOSKELETAL: No joint pain or swelling; No muscle, back, or extremity pain  HEME/LYMPH: No easy bruising, or bleeding gums    MEDICATIONS  (STANDING):  apixaban 2.5 milliGRAM(s) Oral every 12 hours  aspirin  chewable 81 milliGRAM(s) Oral daily  atorvastatin 40 milliGRAM(s) Oral at bedtime  chlorhexidine 4% Liquid 1 Application(s) Topical <User Schedule>  dexmedetomidine Infusion 0.3 MICROgram(s)/kG/Hr (6.307 mL/Hr) IV Continuous <Continuous>  heparin  Injectable 5000 Unit(s) SubCutaneous every 8 hours  influenza   Vaccine 0.5 milliLiter(s) IntraMuscular once  insulin lispro (HumaLOG) corrective regimen sliding scale   SubCutaneous every 6 hours  midodrine 15 milliGRAM(s) Oral every 8 hours  pantoprazole  Injectable 40 milliGRAM(s) IV Push daily  phenylephrine    Infusion 0.8 MICROgram(s)/kG/Min (12.615 mL/Hr) IV Continuous <Continuous>  piperacillin/tazobactam IVPB. 3.375 Gram(s) IV Intermittent every 12 hours  sevelamer carbonate Powder 800 milliGRAM(s) Oral three times a day with meals    MEDICATIONS  (PRN):  sodium chloride 0.9% lock flush 10 milliLiter(s) IV Push every 1 hour PRN Pre/post blood products, medications, blood draw, and to maintain line patency      Allergies    No Known Allergies    Intolerances        Vital Signs Last 24 Hrs  T(C): 36.8 (2019 04:00), Max: 38.8 (2019 16:30)  T(F): 98.2 (2019 04:00), Max: 101.8 (2019 16:30)  HR: 68 (2019 08:00) (60 - 90)  BP: 89/48 (2019 08:00) (69/37 - 158/72)  BP(mean): 66 (2019 08:00) (49 - 104)  RR: 16 (2019 08:00) (14 - 23)  SpO2: 96% (2019 08:00) (90% - 100%)    PHYSICAL EXAM:    GENERAL: NAD, well-groomed, well-developed  HEAD:  Atraumatic, Normocephalic  EYES: EOMI, PERRLA, conjunctiva and sclera clear  ENMT: No tonsillar erythema, exudates, or enlargement; Moist mucous membranes, Good dentition, No lesions  NECK: Supple, No JVD, Normal thyroid  NERVOUS SYSTEM:  Alert & Oriented X3, Good concentration; Motor Strength 5/5 B/L upper and lower extremities; DTRs 2+ intact and symmetric  CHEST/LUNG: Clear to percussion bilaterally; No rales, rhonchi, wheezing, or rubs  HEART: Regular rate and rhythm; No murmurs, rubs, or gallops  ABDOMEN: Soft, Nontender, Nondistended; Bowel sounds present  EXTREMITIES:  2+ Peripheral Pulses, No clubbing, cyanosis, or edema  LYMPH: No lymphadenopathy noted  SKIN: No rashes or lesions      LABS:                        9.9    12.9  )-----------( 188      ( 2019 02:24 )             29.6     04-24    140  |  94<L>  |  51<H>  ----------------------------<  138<H>  5.3   |  28  |  5.44<H>    Ca    8.3<L>      2019 02:24  Phos  6.0     04-24  Mg     2.0     04-24    TPro  6.4  /  Alb  2.9<L>  /  TBili  0.3  /  DBili  x   /  AST  10  /  ALT  24  /  AlkPhos  97  04-24    PT/INR - ( 2019 11:22 )   PT: 12.2 sec;   INR: 1.06 ratio         PTT - ( 2019 02:24 )  PTT:28.3 sec  Urinalysis Basic - ( 2019 11:12 )    Color: Light Yellow / Appearance: Clear / S.010 / pH: x  Gluc: x / Ketone: Negative  / Bili: Negative / Urobili: Negative   Blood: x / Protein: 100 mg/dL / Nitrite: Negative   Leuk Esterase: Negative / RBC: 3 /hpf / WBC 2 /HPF   Sq Epi: x / Non Sq Epi: 0 /hpf / Bacteria: Negative        RADIOLOGY & ADDITIONAL STUDIES:

## 2019-04-24 NOTE — PROGRESS NOTE ADULT - ASSESSMENT
69 yr old female with stated hx significant for esrd, afib on apixaban, found to have severe hyperkalemia with slight peaked T waves. Complicated by initial AIVR to wide complex tachycardia requiring cardioversion and resp distress requiring intubation. Admission to MICU  for hyperkalemia requiring urgent hemodialysis x 2 treatments and resp failure requiring intubation.    Plan:    #Neuro:  -presented with progressive weakness  -pt responsive to verbal/painful stimuli as above  -sedation with precedex gtt maintain RASS 0 to -2- titrate off and start precedex gtt   -neuro checks q 2 hrs and prn for changes  -physical therapy when stable    #Pulm:  -Pt orally intubated in ED for resp distress  -maintain mechanical vent therapy - titrate to ph 7.35-7.45; PCO2 35-45; SPO2 92%  -HOB >/= 30 degrees   -chest PT q 2 hrs  -duoneb therapy q 6 hrs  -PST as tolerated     #CV:  -pt found to be hyperkalemic with widened QRS complex on ECG on 4/22/19  -ECG now and q am x 3 and prn  -pt on midodrine with home meds - will hold at present. pt currently on phenylephrine gtt  -home med of ASA 81 mg po qd - will continue  -home med of atorvastatin 40 mg po qhs - will continue  -Pt with Hx of Afib (currently NSR) on abixaban 2.5 mg po q 12 hrs - will initiate heparin gtt vs apixiban on 4/24/19  -Pt with hx of Mod Ao Stenosis (5/2017)  -TTE obtained, Mod Ao stenosis persist, EF 70%  -daily weights   -albumin 5% in 250ml x1 iso LV effacement      #GI/:  -OGT in place  -NPO at present  -continue tube feeds to goal- hold for goal of extubation   -will eval electrolytes for need for additional HD today  -as endorsed by RN staff, HD with high flow, concern for stenotic AVF  -obtain fistulogram to eval patency of AVF   -Protonix 40mg IVP daily   -straight cath -UA and UC ordered      #ID:  -Pt with worsening leukocytosis, increased lactate   -started on zosyn with one dose of vanco (given prior to urgent dialysis last evening)  -obtain random vanco level   -redose vanco as needed to maintain trough of 15 to 20  -f/u pan culture for surveillance     #FEN/ENDO/HEME:  -cmp/mg++/po--4/coags/cbc with diff now and q am  -bmp/mg++/po--4 prn  -abg prn  -ISS q 6 hrs - maintain glucose 140 - 160 69 yr old female with stated hx significant for esrd, afib on apixaban, found to have severe hyperkalemia with slight peaked T waves. Complicated by initial AIVR to wide complex tachycardia requiring cardioversion and resp distress requiring intubation. Admission to MICU  for hyperkalemia requiring urgent hemodialysis x 2 treatments and resp failure requiring intubation.    Plan:    #Neuro:  -presented with progressive weakness  -pt responsive to verbal/painful stimuli as above  -sedation with precedex gtt maintain RASS 0 to -2- titrate off and start precedex gtt   -neuro checks q 2 hrs and prn for changes  -physical therapy when stable    #Pulm:  -Pt orally intubated in ED for resp distress  -maintain mechanical vent therapy - titrate to ph 7.35-7.45; PCO2 35-45; SPO2 92%  -HOB >/= 30 degrees   -chest PT q 2 hrs  -duoneb therapy q 6 hrs  -PST as tolerated   -as pt with increased B lines on POCUS and increased IVC to 1.99 - will discuss with renal regarding H.D. today    #CV:  -pt found to be hyperkalemic with widened QRS complex on ECG on 4/22/19  -ECG now and q am x 3 and prn  -pt on midodrine with home meds - restarted 4/23/19 at 10 mg q 8 hrs, will increase to 15 mg q 8 hrs  -remains on small doses of phenylephrine  -home med of ASA 81 mg po qd - continued  -home med of atorvastatin 40 mg po qhs - continued  -Pt with Hx of Afib (currently NSR) on abixaban 2.5 mg po q 12 hrs - will initiate heparin gtt vs apixiban on 4/24/19  -Pt with hx of Mod Ao Stenosis (5/2017)  -TTE obtained, Mod Ao stenosis persist, EF 70%  -daily weights         #GI/:  -OGT in place  -NPO at present  -continue tube feeds to goal- hold for goal of extubation   -will eval electrolytes for need for additional HD today  -as endorsed by RN staff, HD with high flow on 4/22-23/19, concern for stenotic AVF, will re eval today during HD  -obtain fistulogram to eval patency of AVF if difficulty with fistula occurs   -Protonix 40mg IVP daily     #ID:  -leukocytosis and lactate improved  -started on zosyn and vanco by level  -obtain vanco trough @ 1600 today  -redose vanco as needed to maintain trough of 15 to 20  -cultures neg growth to date  -f/u pan culture for surveillance     #FEN/ENDO/HEME:  -cmp/mg++/po--4/coags/cbc with diff now and q am  -bmp/mg++/po--4 prn  -abg prn  -ISS q 6 hrs - maintain glucose 140 - 160

## 2019-04-24 NOTE — PROGRESS NOTE ADULT - ASSESSMENT
70 yo female with ESRD on HD and known vasculopath admitted to ICU with respiratory failure and hyperkalemia

## 2019-04-25 DIAGNOSIS — R13.14 DYSPHAGIA, PHARYNGOESOPHAGEAL PHASE: ICD-10-CM

## 2019-04-25 LAB
ALBUMIN SERPL ELPH-MCNC: 3 G/DL — LOW (ref 3.3–5)
ALP SERPL-CCNC: 82 U/L — SIGNIFICANT CHANGE UP (ref 40–120)
ALT FLD-CCNC: 17 U/L — SIGNIFICANT CHANGE UP (ref 10–45)
ANION GAP SERPL CALC-SCNC: 15 MMOL/L — SIGNIFICANT CHANGE UP (ref 5–17)
APTT BLD: 27.4 SEC — LOW (ref 27.5–36.3)
AST SERPL-CCNC: 13 U/L — SIGNIFICANT CHANGE UP (ref 10–40)
BASOPHILS # BLD AUTO: 0.1 K/UL — SIGNIFICANT CHANGE UP (ref 0–0.2)
BASOPHILS NFR BLD AUTO: 0.4 % — SIGNIFICANT CHANGE UP (ref 0–2)
BILIRUB SERPL-MCNC: 0.5 MG/DL — SIGNIFICANT CHANGE UP (ref 0.2–1.2)
BUN SERPL-MCNC: 24 MG/DL — HIGH (ref 7–23)
CALCIUM SERPL-MCNC: 8.6 MG/DL — SIGNIFICANT CHANGE UP (ref 8.4–10.5)
CHLORIDE SERPL-SCNC: 95 MMOL/L — LOW (ref 96–108)
CO2 SERPL-SCNC: 27 MMOL/L — SIGNIFICANT CHANGE UP (ref 22–31)
CREAT SERPL-MCNC: 3.42 MG/DL — HIGH (ref 0.5–1.3)
EOSINOPHIL # BLD AUTO: 0.3 K/UL — SIGNIFICANT CHANGE UP (ref 0–0.5)
EOSINOPHIL NFR BLD AUTO: 2.3 % — SIGNIFICANT CHANGE UP (ref 0–6)
GLUCOSE BLDC GLUCOMTR-MCNC: 129 MG/DL — HIGH (ref 70–99)
GLUCOSE BLDC GLUCOMTR-MCNC: 68 MG/DL — LOW (ref 70–99)
GLUCOSE BLDC GLUCOMTR-MCNC: 68 MG/DL — LOW (ref 70–99)
GLUCOSE BLDC GLUCOMTR-MCNC: 73 MG/DL — SIGNIFICANT CHANGE UP (ref 70–99)
GLUCOSE BLDC GLUCOMTR-MCNC: 76 MG/DL — SIGNIFICANT CHANGE UP (ref 70–99)
GLUCOSE BLDC GLUCOMTR-MCNC: 82 MG/DL — SIGNIFICANT CHANGE UP (ref 70–99)
GLUCOSE SERPL-MCNC: 95 MG/DL — SIGNIFICANT CHANGE UP (ref 70–99)
HCT VFR BLD CALC: 24.9 % — LOW (ref 34.5–45)
HGB BLD-MCNC: 8.3 G/DL — LOW (ref 11.5–15.5)
LYMPHOCYTES # BLD AUTO: 0.8 K/UL — LOW (ref 1–3.3)
LYMPHOCYTES # BLD AUTO: 6.4 % — LOW (ref 13–44)
MAGNESIUM SERPL-MCNC: 1.9 MG/DL — SIGNIFICANT CHANGE UP (ref 1.6–2.6)
MCHC RBC-ENTMCNC: 30.9 PG — SIGNIFICANT CHANGE UP (ref 27–34)
MCHC RBC-ENTMCNC: 33.4 GM/DL — SIGNIFICANT CHANGE UP (ref 32–36)
MCV RBC AUTO: 92.5 FL — SIGNIFICANT CHANGE UP (ref 80–100)
MONOCYTES # BLD AUTO: 0.8 K/UL — SIGNIFICANT CHANGE UP (ref 0–0.9)
MONOCYTES NFR BLD AUTO: 6.6 % — SIGNIFICANT CHANGE UP (ref 2–14)
NEUTROPHILS # BLD AUTO: 10.4 K/UL — HIGH (ref 1.8–7.4)
NEUTROPHILS NFR BLD AUTO: 84.3 % — HIGH (ref 43–77)
PHOSPHATE SERPL-MCNC: 3.7 MG/DL — SIGNIFICANT CHANGE UP (ref 2.5–4.5)
PLATELET # BLD AUTO: 191 K/UL — SIGNIFICANT CHANGE UP (ref 150–400)
POTASSIUM SERPL-MCNC: 3.9 MMOL/L — SIGNIFICANT CHANGE UP (ref 3.5–5.3)
POTASSIUM SERPL-SCNC: 3.9 MMOL/L — SIGNIFICANT CHANGE UP (ref 3.5–5.3)
PROT SERPL-MCNC: 5.8 G/DL — LOW (ref 6–8.3)
RBC # BLD: 2.69 M/UL — LOW (ref 3.8–5.2)
RBC # FLD: 14.4 % — SIGNIFICANT CHANGE UP (ref 10.3–14.5)
SODIUM SERPL-SCNC: 137 MMOL/L — SIGNIFICANT CHANGE UP (ref 135–145)
WBC # BLD: 12.3 K/UL — HIGH (ref 3.8–10.5)
WBC # FLD AUTO: 12.3 K/UL — HIGH (ref 3.8–10.5)

## 2019-04-25 RX ORDER — INSULIN LISPRO 100/ML
VIAL (ML) SUBCUTANEOUS EVERY 6 HOURS
Qty: 0 | Refills: 0 | Status: DISCONTINUED | OUTPATIENT
Start: 2019-04-25 | End: 2019-04-30

## 2019-04-25 RX ORDER — DEXTROSE 50 % IN WATER 50 %
25 SYRINGE (ML) INTRAVENOUS ONCE
Qty: 0 | Refills: 0 | Status: COMPLETED | OUTPATIENT
Start: 2019-04-25 | End: 2019-04-25

## 2019-04-25 RX ORDER — ERYTHROPOIETIN 10000 [IU]/ML
10000 INJECTION, SOLUTION INTRAVENOUS; SUBCUTANEOUS
Qty: 0 | Refills: 0 | Status: DISCONTINUED | OUTPATIENT
Start: 2019-04-25 | End: 2019-05-01

## 2019-04-25 RX ADMIN — Medication 1000 MILLIGRAM(S): at 00:24

## 2019-04-25 RX ADMIN — ATORVASTATIN CALCIUM 40 MILLIGRAM(S): 80 TABLET, FILM COATED ORAL at 21:30

## 2019-04-25 RX ADMIN — MIDODRINE HYDROCHLORIDE 15 MILLIGRAM(S): 2.5 TABLET ORAL at 10:04

## 2019-04-25 RX ADMIN — APIXABAN 2.5 MILLIGRAM(S): 2.5 TABLET, FILM COATED ORAL at 21:30

## 2019-04-25 RX ADMIN — PIPERACILLIN AND TAZOBACTAM 25 GRAM(S): 4; .5 INJECTION, POWDER, LYOPHILIZED, FOR SOLUTION INTRAVENOUS at 22:00

## 2019-04-25 RX ADMIN — MIDODRINE HYDROCHLORIDE 15 MILLIGRAM(S): 2.5 TABLET ORAL at 01:43

## 2019-04-25 RX ADMIN — Medication 25 MILLILITER(S): at 21:52

## 2019-04-25 RX ADMIN — PIPERACILLIN AND TAZOBACTAM 25 GRAM(S): 4; .5 INJECTION, POWDER, LYOPHILIZED, FOR SOLUTION INTRAVENOUS at 10:04

## 2019-04-25 RX ADMIN — MIDODRINE HYDROCHLORIDE 15 MILLIGRAM(S): 2.5 TABLET ORAL at 17:27

## 2019-04-25 RX ADMIN — APIXABAN 2.5 MILLIGRAM(S): 2.5 TABLET, FILM COATED ORAL at 10:04

## 2019-04-25 RX ADMIN — Medication 81 MILLIGRAM(S): at 15:34

## 2019-04-25 NOTE — PROGRESS NOTE ADULT - ASSESSMENT
Chief complaint:pt   tolerating   extubation  ,  awake  alert, BP  110/70  . no  fever ,  K 3.3    HPI:  69 yr old female with PMHx ESRD on HD (mon-wed-fri) last HD session this past 19 (unknown removal of fluid), Afib on apixaban, CAD, Mod Ao stenosis (last TTE 2017), HTN, PVD, DM2, recent hospitalization 3/2019 s/p fall found to have hyperkalemia requiring emergent dialysis who now presents this morning (19) from home via EMS with worsening weakness and fatigue found to have hyperkalemia (8.5)             As endorsed by son pt was in usual state of health AOX3 after HD session, developing progressive weakness and fatigue saturday (19) with worsening this morning. In E.D. pt was found with thready pulse, initial ECG afib with ventricular response of 90's with RBBB slight peaked T waves in anterior leads and widened QRS complexes. Pt's heart rate deteriorated to rate of 20 (as endorsed by ED of AIVR) without loss of pulse, Pt received CaCl IVP (total 3 amps), Reg insulin initially 6 units IVP, then 8 units IVP, HCO3 IVP (total 100 meq), D50W IVP (total 100 cc's), and developed resp distress requiring intubation. Heart rate responded with development of SVT with widened QRS requiring cardioversion to afib with controlled rate. Pt's subsequent lab work demonstrated K+ 8.5, sCR 8.34 , . Last know sCr 4.68 from 3/2019.        Consult called and admission to MICU  for hyperkalemia requiring urgent hemodialysis and resp failure (2019 10:29)      REVIEW OF SYSTEMS:    CONSTITUTIONAL: No fever, weight loss, or fatigue  NECK: No pain or stiffness  RESPIRATORY: No cough, wheezing, chills or hemoptysis; No shortness of breath  CARDIOVASCULAR: No chest pain, palpitations, dizziness, or leg swelling  GASTROINTESTINAL: No abdominal or epigastric pain. No nausea, vomiting, or hematemesis; No diarrhea or constipation. No melena or hematochezia.  GENITOURINARY: No dysuria, frequency, hematuria, or incontinence  NEUROLOGICAL: No headaches, memory loss, loss of strength, numbness, or tremors  SKIN: No itching, burning, rashes, or lesions   LYMPH NODES: No enlarged glands  MUSCULOSKELETAL: No joint pain or swelling; No muscle, back, or extremity pain  HEME/LYMPH: No easy bruising, or bleeding gums    MEDICATIONS  (STANDING):  apixaban 2.5 milliGRAM(s) Oral every 12 hours  aspirin  chewable 81 milliGRAM(s) Oral daily  atorvastatin 40 milliGRAM(s) Oral at bedtime  influenza   Vaccine 0.5 milliLiter(s) IntraMuscular once  insulin lispro (HumaLOG) corrective regimen sliding scale   SubCutaneous every 6 hours  midodrine 15 milliGRAM(s) Oral every 8 hours  piperacillin/tazobactam IVPB. 3.375 Gram(s) IV Intermittent every 12 hours  sevelamer carbonate Powder 800 milliGRAM(s) Oral three times a day with meals    MEDICATIONS  (PRN):      Allergies    No Known Allergies    Intolerances        Vital Signs Last 24 Hrs  T(C): 36.8 (2019 05:38), Max: 37.3 (2019 03:00)  T(F): 98.2 (2019 05:38), Max: 99.2 (2019 03:00)  HR: 74 (2019 05:38) (69 - 97)  BP: 113/45 (2019 05:38) (92/44 - 162/123)  BP(mean): 69 (2019 02:00) (61 - 137)  RR: 20 (2019 05:38) (18 - 40)  SpO2: 93% (2019 05:38) (80% - 98%)    PHYSICAL EXAM:    GENERAL: NAD, well-groomed, well-developed  HEAD:  Atraumatic, Normocephalic  EYES: EOMI, PERRLA, conjunctiva and sclera clear  ENMT: No tonsillar erythema, exudates, or enlargement; Moist mucous membranes, Good dentition, No lesions  NECK: Supple, No JVD, Normal thyroid  NERVOUS SYSTEM:  Alert & Oriented X3, Good concentration; Motor Strength 5/5 B/L upper and lower extremities; DTRs 2+ intact and symmetric  CHEST/LUNG: Clear to percussion bilaterally; No rales, rhonchi, wheezing, or rubs  HEART: Regular rate and rhythm; No murmurs, rubs, or gallops  ABDOMEN: Soft, Nontender, Nondistended; Bowel sounds present  EXTREMITIES:  2+ Peripheral Pulses, No clubbing, cyanosis, or edema  LYMPH: No lymphadenopathy noted  SKIN: No rashes or lesions      LABS:                        8.3    12.3  )-----------( 191      ( 2019 00:54 )             24.9     04-25    137  |  95<L>  |  24<H>  ----------------------------<  95  3.9   |  27  |  3.42<H>    Ca    8.6      2019 00:54  Phos  3.7       Mg     1.9         TPro  5.8<L>  /  Alb  3.0<L>  /  TBili  0.5  /  DBili  x   /  AST  13  /  ALT  17  /  AlkPhos  82  04-25    PTT - ( 2019 00:54 )  PTT:27.4 sec  Urinalysis Basic - ( 2019 11:12 )    Color: Light Yellow / Appearance: Clear / S.010 / pH: x  Gluc: x / Ketone: Negative  / Bili: Negative / Urobili: Negative   Blood: x / Protein: 100 mg/dL / Nitrite: Negative   Leuk Esterase: Negative / RBC: 3 /hpf / WBC 2 /HPF   Sq Epi: x / Non Sq Epi: 0 /hpf / Bacteria: Negative        RADIOLOGY & ADDITIONAL STUDIES:

## 2019-04-25 NOTE — PROGRESS NOTE ADULT - SUBJECTIVE AND OBJECTIVE BOX
Patient seen and examined  Slightly confused  Extubated  now on     REVIEW OF SYSTEMS:  As per HPI, otherwise 8 full 10 ROS were unremarkable    MEDICATIONS  (STANDING):  apixaban 2.5 milliGRAM(s) Oral every 12 hours  aspirin  chewable 81 milliGRAM(s) Oral daily  atorvastatin 40 milliGRAM(s) Oral at bedtime  epoetin ramón Injectable 94682 Unit(s) IV Push <User Schedule>  influenza   Vaccine 0.5 milliLiter(s) IntraMuscular once  insulin lispro (HumaLOG) corrective regimen sliding scale   SubCutaneous every 6 hours  midodrine 15 milliGRAM(s) Oral every 8 hours  piperacillin/tazobactam IVPB. 3.375 Gram(s) IV Intermittent every 12 hours  sevelamer carbonate Powder 800 milliGRAM(s) Oral three times a day with meals      VITAL:  T(C): , Max: 37.3 (04-25-19 @ 03:00)  T(F): , Max: 99.2 (04-25-19 @ 03:00)  HR: 77 (04-25-19 @ 09:56)  BP: 122/53 (04-25-19 @ 09:56)  BP(mean): 69 (04-25-19 @ 02:00)  RR: 20 (04-25-19 @ 09:56)  SpO2: 95% (04-25-19 @ 09:56)  Wt(kg): --    I and O's:    04-24 @ 07:01  -  04-25 @ 07:00  --------------------------------------------------------  IN: 474 mL / OUT: 0 mL / NET: 474 mL            PHYSICAL EXAM:  Constitutional: NAD  HEENT:  Normocephalic   Neck: No JVD; supple  Respiratory: coarse BS b/l  Cardiac: irreg s1s2  Gastrointestinal: BS+, soft, NT/ND  Urologic: No mondragon  Extremities: +1 L edema  Access: AVF (+)thrill    LABS:                        8.3    12.3  )-----------( 191      ( 25 Apr 2019 00:54 )             24.9     04-25    137  |  95<L>  |  24<H>  ----------------------------<  95  3.9   |  27  |  3.42<H>    Ca    8.6      25 Apr 2019 00:54  Phos  3.7     04-25  Mg     1.9     04-25    TPro  5.8<L>  /  Alb  3.0<L>  /  TBili  0.5  /  DBili  x   /  AST  13  /  ALT  17  /  AlkPhos  82  04-25      IMPRESSION: 69F w/ DM2, HTN, and ESRD-HD, 4/22/19 a/w severe hyperkalemia/cardiac arrest    (1)Renal - ESRD - HD MWF  (2)Hyperkalemia - resolved - s/p HD yesterday   (3)CV - Hypotension on midodrine- resolved  (4)Hyperphosphatemia  (5) Hypervolemic on imaging     RECOMMEND:  - Isolated UF today 2.5 hours UF 2 kg as able  - HD in AM  - Epogen 10K units with HD  - c/w Renvela 800 mg po TID  - c/w midodrine 10 mg po TID      Mel Bronson MD  [facility]  [phone]

## 2019-04-25 NOTE — PROGRESS NOTE ADULT - SUBJECTIVE AND OBJECTIVE BOX
Subjective: Patient seen and examined. No new events except as noted.   moved out of ICU   very weak     REVIEW OF SYSTEMS:    CONSTITUTIONAL: +weakness, fevers or chills  EYES/ENT: No visual changes;  No vertigo or throat pain   NECK: No pain or stiffness  RESPIRATORY: No cough, wheezing, hemoptysis; No shortness of breath  CARDIOVASCULAR: No chest pain or palpitations  GASTROINTESTINAL: No abdominal or epigastric pain. No nausea, vomiting, or hematemesis; No diarrhea or constipation. No melena or hematochezia.  GENITOURINARY: No dysuria, frequency or hematuria  NEUROLOGICAL: No numbness or weakness  SKIN: No itching, burning, rashes, or lesions   All other review of systems is negative unless indicated above.    MEDICATIONS:  MEDICATIONS  (STANDING):  apixaban 2.5 milliGRAM(s) Oral every 12 hours  aspirin  chewable 81 milliGRAM(s) Oral daily  atorvastatin 40 milliGRAM(s) Oral at bedtime  epoetin ramón Injectable 31154 Unit(s) IV Push <User Schedule>  influenza   Vaccine 0.5 milliLiter(s) IntraMuscular once  insulin lispro (HumaLOG) corrective regimen sliding scale   SubCutaneous every 6 hours  midodrine 15 milliGRAM(s) Oral every 8 hours  piperacillin/tazobactam IVPB. 3.375 Gram(s) IV Intermittent every 12 hours  sevelamer carbonate Powder 800 milliGRAM(s) Oral three times a day with meals      PHYSICAL EXAM:  T(C): 36.7 (04-25-19 @ 09:56), Max: 37.3 (04-25-19 @ 03:00)  HR: 77 (04-25-19 @ 09:56) (70 - 97)  BP: 122/53 (04-25-19 @ 09:56) (93/44 - 134/62)  RR: 20 (04-25-19 @ 09:56) (20 - 40)  SpO2: 95% (04-25-19 @ 09:56) (80% - 97%)  Wt(kg): --  I&O's Summary    24 Apr 2019 07:01  -  25 Apr 2019 07:00  --------------------------------------------------------  IN: 474 mL / OUT: 0 mL / NET: 474 mL            Appearance: NAd extubate don facemask 	  HEENT:   Dry  oral mucosa, PERRL, EOMI	  Lymphatic: No lymphadenopathy  Cardiovascular: Normal S1 S2, No JVD, No murmurs , Peripheral pulses palpable 2+ bilaterally  Respiratory: Decreased bs   Gastrointestinal:  Soft, Non-tender, + BS	  Skin: No rashes, No ecchymoses, No cyanosis, warm to touch  Musculoskeletal: Decreased range of motion and  strength  Psychiatry:  lethargic   Ext: No edema      LABS:    CARDIAC MARKERS:  CARDIAC MARKERS ( 23 Apr 2019 04:23 )  x     / x     / 55 U/L / x     / 4.5 ng/mL                                8.3    12.3  )-----------( 191      ( 25 Apr 2019 00:54 )             24.9     04-25    137  |  95<L>  |  24<H>  ----------------------------<  95  3.9   |  27  |  3.42<H>    Ca    8.6      25 Apr 2019 00:54  Phos  3.7     04-25  Mg     1.9     04-25    TPro  5.8<L>  /  Alb  3.0<L>  /  TBili  0.5  /  DBili  x   /  AST  13  /  ALT  17  /  AlkPhos  82  04-25    proBNP:   Lipid Profile:   HgA1c:   TSH:     0          TELEMETRY: 	    ECG:  	  RADIOLOGY:   DIAGNOSTIC TESTING:  [ ] Echocardiogram:  [ ]  Catheterization:  [ ] Stress Test:    OTHER:

## 2019-04-26 LAB
ANION GAP SERPL CALC-SCNC: 18 MMOL/L — HIGH (ref 5–17)
APTT BLD: 26.3 SEC — LOW (ref 27.5–36.3)
BASE EXCESS BLDA CALC-SCNC: 4.2 MMOL/L — HIGH (ref -2–2)
BUN SERPL-MCNC: 43 MG/DL — HIGH (ref 7–23)
CALCIUM SERPL-MCNC: 9 MG/DL — SIGNIFICANT CHANGE UP (ref 8.4–10.5)
CHLORIDE SERPL-SCNC: 95 MMOL/L — LOW (ref 96–108)
CO2 BLDA-SCNC: 30 MMOL/L — SIGNIFICANT CHANGE UP (ref 22–30)
CO2 SERPL-SCNC: 24 MMOL/L — SIGNIFICANT CHANGE UP (ref 22–31)
CREAT SERPL-MCNC: 5.25 MG/DL — HIGH (ref 0.5–1.3)
GAS PNL BLDA: SIGNIFICANT CHANGE UP
GLUCOSE BLDC GLUCOMTR-MCNC: 115 MG/DL — HIGH (ref 70–99)
GLUCOSE BLDC GLUCOMTR-MCNC: 131 MG/DL — HIGH (ref 70–99)
GLUCOSE BLDC GLUCOMTR-MCNC: 74 MG/DL — SIGNIFICANT CHANGE UP (ref 70–99)
GLUCOSE BLDC GLUCOMTR-MCNC: 84 MG/DL — SIGNIFICANT CHANGE UP (ref 70–99)
GLUCOSE SERPL-MCNC: 74 MG/DL — SIGNIFICANT CHANGE UP (ref 70–99)
HCO3 BLDA-SCNC: 28 MMOL/L — SIGNIFICANT CHANGE UP (ref 21–29)
HCT VFR BLD CALC: 27.5 % — LOW (ref 34.5–45)
HGB BLD-MCNC: 8.6 G/DL — LOW (ref 11.5–15.5)
HOROWITZ INDEX BLDA+IHG-RTO: 80 — SIGNIFICANT CHANGE UP
INR BLD: 1.26 RATIO — HIGH (ref 0.88–1.16)
MAGNESIUM SERPL-MCNC: 2.2 MG/DL — SIGNIFICANT CHANGE UP (ref 1.6–2.6)
MCHC RBC-ENTMCNC: 29.1 PG — SIGNIFICANT CHANGE UP (ref 27–34)
MCHC RBC-ENTMCNC: 31.3 GM/DL — LOW (ref 32–36)
MCV RBC AUTO: 92.9 FL — SIGNIFICANT CHANGE UP (ref 80–100)
PCO2 BLDA: 42 MMHG — SIGNIFICANT CHANGE UP (ref 32–46)
PH BLDA: 7.44 — SIGNIFICANT CHANGE UP (ref 7.35–7.45)
PHOSPHATE SERPL-MCNC: 5.8 MG/DL — HIGH (ref 2.5–4.5)
PHOSPHATE SERPL-MCNC: 6 MG/DL — HIGH (ref 2.5–4.5)
PLATELET # BLD AUTO: 253 K/UL — SIGNIFICANT CHANGE UP (ref 150–400)
PO2 BLDA: 150 MMHG — HIGH (ref 74–108)
POTASSIUM SERPL-MCNC: 4.1 MMOL/L — SIGNIFICANT CHANGE UP (ref 3.5–5.3)
POTASSIUM SERPL-SCNC: 4.1 MMOL/L — SIGNIFICANT CHANGE UP (ref 3.5–5.3)
PROTHROM AB SERPL-ACNC: 14.3 SEC — HIGH (ref 10–13.1)
RBC # BLD: 2.96 M/UL — LOW (ref 3.8–5.2)
RBC # FLD: 14.9 % — HIGH (ref 10.3–14.5)
SAO2 % BLDA: 99 % — HIGH (ref 92–96)
SODIUM SERPL-SCNC: 137 MMOL/L — SIGNIFICANT CHANGE UP (ref 135–145)
WBC # BLD: 11.37 K/UL — HIGH (ref 3.8–10.5)
WBC # FLD AUTO: 11.37 K/UL — HIGH (ref 3.8–10.5)

## 2019-04-26 PROCEDURE — 74230 X-RAY XM SWLNG FUNCJ C+: CPT | Mod: 26

## 2019-04-26 PROCEDURE — 71045 X-RAY EXAM CHEST 1 VIEW: CPT | Mod: 26

## 2019-04-26 RX ORDER — GABAPENTIN 400 MG/1
300 CAPSULE ORAL AT BEDTIME
Qty: 0 | Refills: 0 | Status: DISCONTINUED | OUTPATIENT
Start: 2019-04-26 | End: 2019-04-28

## 2019-04-26 RX ADMIN — PIPERACILLIN AND TAZOBACTAM 25 GRAM(S): 4; .5 INJECTION, POWDER, LYOPHILIZED, FOR SOLUTION INTRAVENOUS at 10:48

## 2019-04-26 RX ADMIN — Medication 81 MILLIGRAM(S): at 12:28

## 2019-04-26 RX ADMIN — SEVELAMER CARBONATE 800 MILLIGRAM(S): 2400 POWDER, FOR SUSPENSION ORAL at 10:47

## 2019-04-26 RX ADMIN — SEVELAMER CARBONATE 800 MILLIGRAM(S): 2400 POWDER, FOR SUSPENSION ORAL at 12:28

## 2019-04-26 RX ADMIN — MIDODRINE HYDROCHLORIDE 15 MILLIGRAM(S): 2.5 TABLET ORAL at 02:29

## 2019-04-26 RX ADMIN — APIXABAN 2.5 MILLIGRAM(S): 2.5 TABLET, FILM COATED ORAL at 10:47

## 2019-04-26 RX ADMIN — SEVELAMER CARBONATE 800 MILLIGRAM(S): 2400 POWDER, FOR SUSPENSION ORAL at 18:07

## 2019-04-26 NOTE — SWALLOW VFSS/MBS ASSESSMENT ADULT - ORAL PHASE COMMENTS
Oral transit time noted to be 3.9  seconds. Maximal spillover to the level of the valleculae and maximal passive overflow to the pyriform sinuses. There was minimal lingual residue post swallow with spillage of the same to the oropharynx Oral transit time noted to be 3.9  seconds. Maximal spillover to the level of the valleculae and maximal passive overflow to the pyriform sinuses. Noted evidence of trace penetration prior to trigger over the laryngeal surface of the a-e folds and appeared to be via interarytenoid space. There was minimal lingual residue post swallow with spillage of the same to the oropharynx Oral transit time noted to be approximately 8 seconds for the primary swallow. Maximal spillover to the level of the pyriform sinuses, with evidence of silent penetration over the arytenoid. noted mild to moderate oral residue. There was spillage of oral residue to the hypopharynx prior to onset of repetitive swallow. Total oral transit time was 25 sec for a single bolus. Oral transit time noted to be 9.3 seconds. Maximal spillover to the level of the valleculae and moderate passive overflow to the pyriform sinuses. Oral residue noted . Oral transit time noted to be 2.1  seconds. Maximal spillover to the level of the pyriform sinuses

## 2019-04-26 NOTE — SWALLOW BEDSIDE ASSESSMENT ADULT - SWALLOW EVAL: DIAGNOSIS
Consult for bedside swallow evaluation received and appreciated. Chart reviewed and case d/w MD: Nguyễn pt currently receiving HD therefore exam to be deferred. Service will f/u as schedule permits.
Pt seen for bedside swallow evaluation. Pt with known hx of oropharyngeal dysphagia with prior recommendations for NPO, with non-oral nutrition/hydration/medications (see addendum for swallow hx). Teaspoon trial of nectar thick liquids presented to assess candidacy for objective study given aforementioned hx. Pt noted with reduced hyolaryngeal elevation upon a delayed pharyngeal swallow trigger. No overt s/s of laryngeal penetration/aspiration.

## 2019-04-26 NOTE — SWALLOW VFSS/MBS ASSESSMENT ADULT - ESOPHAGEAL STAGE
Evidence of small CP bar Partially visualized residue in the distal cervical esophagus. Due to the physical constraints of testing, unable to fully visualize or determine the etiology. Suggest GI consult to assess.

## 2019-04-26 NOTE — SWALLOW VFSS/MBS ASSESSMENT ADULT - RESIDUE IN VALLECULAE
[facility] Hospitalist Progress Note    Date of Service: 2018    Chief Complaint  29 y.o. female admitted 2018 with spinal stenosis.    Interval Problem Update   sinus tachycardia improving, possibly due to pain. Patient otherwise denies fever, chills, nausea, vomiting, adb pain, SOB, CP, headache, constipation, diarrhea, cough, or sputum.   stable overnight and working with PT OT in the morning. No significant chief complaint other than pain. Mildly tachycardia. Patient has no symptoms regarding that.      Review of Systems   Constitutional: Negative for chills and weight loss.   HENT: Negative for congestion, ear pain and hearing loss.    Eyes: Negative for blurred vision and pain.   Respiratory: Negative for cough, sputum production and shortness of breath.    Cardiovascular: Negative for chest pain, palpitations, leg swelling and PND.   Gastrointestinal: Negative for abdominal pain, constipation, heartburn and vomiting.   Genitourinary: Negative for dysuria, frequency and hematuria.   Musculoskeletal: Positive for back pain and neck pain. Negative for falls and joint pain.   Skin: Negative for rash.   Neurological: Negative for dizziness, tremors, speech change, seizures, weakness and headaches.   Psychiatric/Behavioral: Negative for depression, substance abuse and suicidal ideas.      Physical Exam  Laboratory/Imaging   Hemodynamics  Temp (24hrs), Av.4 °C (97.5 °F), Min:36.1 °C (97 °F), Max:36.7 °C (98 °F)   Temperature: 36.6 °C (97.8 °F)  Pulse  Av  Min: 96  Max: 135   Blood Pressure: 121/71      Respiratory      Respiration: 16, Pulse Oximetry: 95 %        RUL Breath Sounds: Clear, RML Breath Sounds: Clear, RLL Breath Sounds: Clear, PREMA Breath Sounds: Clear, LLL Breath Sounds: Clear    Fluids    Intake/Output Summary (Last 24 hours) at 18 0824  Last data filed at 18 0915   Gross per 24 hour   Intake               66 ml   Output                0 ml   Net               66 ml        Nutrition  Orders Placed This Encounter   Procedures   • DIET ORDER     Standing Status:   Standing     Number of Occurrences:   1     Order Specific Question:   Diet:     Answer:   Regular [1]     Physical Exam   Constitutional: She is oriented to person, place, and time. She appears well-developed and well-nourished.   HENT:   Head: Normocephalic and atraumatic.   Nose: Nose normal.   Mouth/Throat: No oropharyngeal exudate.   Eyes: EOM are normal. Pupils are equal, round, and reactive to light.   Neck: Normal range of motion. Neck supple. No JVD present. No thyromegaly present.   Cardiovascular: Regular rhythm and normal heart sounds.  Exam reveals no gallop.    No murmur heard.  Slightly tachy 100-110   Pulmonary/Chest: Effort normal and breath sounds normal. No respiratory distress. She has no wheezes. She has no rales.   Abdominal: Soft. Bowel sounds are normal. She exhibits no distension and no mass. There is no tenderness. There is no guarding.   Musculoskeletal: Normal range of motion. She exhibits no edema or tenderness.   Neurological: She is alert and oriented to person, place, and time. No cranial nerve deficit.   Skin: Skin is warm. No rash noted.   Psychiatric: Her behavior is normal.       Recent Labs      01/24/18 0138  01/26/18   0329   WBC  12.9*  9.9   RBC  4.20  3.48*   HEMOGLOBIN  11.6*  9.6*   HEMATOCRIT  35.4*  29.7*   MCV  84.3  85.3   MCH  27.6  27.6   MCHC  32.8*  32.3*   RDW  42.0  42.5   PLATELETCT  190  191   MPV  10.0  9.7     Recent Labs      01/24/18   0138  01/26/18   0329   SODIUM  141  140   POTASSIUM  3.3*  3.9   CHLORIDE  108  109   CO2  28  27   GLUCOSE  96  95   BUN  8  8   CREATININE  0.61  0.58   CALCIUM  8.1*  9.0                      Assessment/Plan     * Sinus tachycardia   Assessment & Plan    Likely from uncontrolled pain  Improving currently low 100  No other signs of hypoxemia on CHF  No fever  Okay to be discharged home and follow-up as outpatient.         Hypokalemia   Assessment & Plan    K 3.3->3.9  Replaced        Leukocytosis   Assessment & Plan    WBC 15->12->9.9  Possible reactive        Degeneration of lumbar intervertebral disc- (present on admission)   Assessment & Plan    S/p surgery  Defer to neurosurgery        Spinal stenosis, lumbar region, without neurogenic claudication- (present on admission)   Assessment & Plan    Defer to neuro sx            Reviewed items::  Labs reviewed, Medications reviewed and Radiology images reviewed  Dias catheter::  No Dias  DVT prophylaxis - mechanical:  SCDs  Ulcer Prophylaxis::  Not indicated         Trace Moderate/Mild Mild

## 2019-04-26 NOTE — PROGRESS NOTE ADULT - SUBJECTIVE AND OBJECTIVE BOX
Cardiovascular Disease Progress Note  (Covering for Dr. Hernandes)    Overnight events: No acute events overnight. Patient denies pain.   Otherwise review of systems negative    Objective Findings:  T(C): 36.4 (19 @ 05:39), Max: 37.3 (19 @ 11:55)  HR: 74 (19 @ 05:39) (71 - 84)  BP: 163/61 (19 @ 05:39) (114/61 - 163/61)  RR: 18 (19 @ 05:39) (18 - 21)  SpO2: 95% (19 @ 05:39) (95% - 100%)  Wt(kg): --  Daily     Daily Weight in k.5 (2019 14:34)      Physical Exam:  Gen: NAD  HEENT: EOMI  CV: RRR, normal S1 + S2, no m/r/g  Lungs: CTAB  Abd: soft, non-tender  Ext: No edema    Telemetry: Sinus; Frequent PADs    Laboratory Data:                        8.6    11.37 )-----------( 253      ( 2019 07:40 )             27.5     04-    137  |  95<L>  |  43<H>  ----------------------------<  74  4.1   |  24  |  5.25<H>    Ca    9.0      2019 06:13  Phos  5.8       Mg     2.2         TPro  5.8<L>  /  Alb  3.0<L>  /  TBili  0.5  /  DBili  x   /  AST  13  /  ALT  17  /  AlkPhos  82  -    PTT - ( 2019 00:54 )  PTT:27.4 sec          Inpatient Medications:  MEDICATIONS  (STANDING):  apixaban 2.5 milliGRAM(s) Oral every 12 hours  aspirin  chewable 81 milliGRAM(s) Oral daily  atorvastatin 40 milliGRAM(s) Oral at bedtime  epoetin ramón Injectable 10205 Unit(s) IV Push <User Schedule>  influenza   Vaccine 0.5 milliLiter(s) IntraMuscular once  insulin lispro (HumaLOG) corrective regimen sliding scale   SubCutaneous every 6 hours  midodrine 15 milliGRAM(s) Oral every 8 hours  piperacillin/tazobactam IVPB. 3.375 Gram(s) IV Intermittent every 12 hours  sevelamer carbonate Powder 800 milliGRAM(s) Oral three times a day with meals      Assessment: 68 yo female with ESRD on HD and peripheral vascular disease presented with hypoxic respiratory failure and hyperkalemia     Problem/Plan - 1:  ·  Problem: Hyperkalemia.    Plan: Electrolytes WNL this morning.  Plan for HD as per renal team.      Problem/Plan - 2:  ·  Problem: Hypoxic respiratory failure.    Plan: Patient successfully extubated.  Fluid removal with HD.  IV abx.      Problem/Plan - 3:  ·  Problem: ESRD (end stage renal disease).    Plan: HD as per renal team.      Problem/Plan - 4:  ·  Problem: Afib.    Plan: Currently in sinus with frequent PACs.  No significant mitral valvular disease.   Eliquis as ordered.       Over 25 minutes spent on total encounter; more than 50% of the visit was spent counseling and/or coordinating care by the attending physician.      Alexandre Moore MD [facility]  Cardiovascular Disease  [phone]

## 2019-04-26 NOTE — SWALLOW VFSS/MBS ASSESSMENT ADULT - RECOMMENDED CONSISTENCY
Given results of persistent dysphagia, continue to suggest NPO, with non-oral nutrition/hydration/medications.

## 2019-04-26 NOTE — SWALLOW VFSS/MBS ASSESSMENT ADULT - LARYNGEAL PENETRATION DURING THE SWALLOW - SILENT
Silent laryngeal penetration noted along the laryngeal surface of the arytenoids. Residue was evident and appeared to eventually reach the level of the ventricular folds./Mild Silent laryngeal penetration noted along the laryngeal surface of the arytenoids. Residue was evident and appeared to eventually reach the level of the vocal folds./Trace Silent laryngeal penetration noted along the laryngeal surface of the arytenoids. Residue was evident and appeared to eventually reach the level of the vocal folds./Mild

## 2019-04-26 NOTE — PROGRESS NOTE ADULT - SUBJECTIVE AND OBJECTIVE BOX
Patient seen and examined  Lethargic  tolerated 2L UF yesterday     REVIEW OF SYSTEMS:  limited     MEDICATIONS  (STANDING):  apixaban 2.5 milliGRAM(s) Oral every 12 hours  aspirin  chewable 81 milliGRAM(s) Oral daily  atorvastatin 40 milliGRAM(s) Oral at bedtime  epoetin ramón Injectable 64788 Unit(s) IV Push <User Schedule>  gabapentin 300 milliGRAM(s) Oral at bedtime  influenza   Vaccine 0.5 milliLiter(s) IntraMuscular once  insulin lispro (HumaLOG) corrective regimen sliding scale   SubCutaneous every 6 hours  midodrine 15 milliGRAM(s) Oral every 8 hours  piperacillin/tazobactam IVPB. 3.375 Gram(s) IV Intermittent every 12 hours  sevelamer carbonate Powder 800 milliGRAM(s) Oral three times a day with meals      VITAL:  T(C): , Max: 37.3 (04-25-19 @ 11:55)  T(F): , Max: 99.1 (04-25-19 @ 11:55)  HR: 75 (04-26-19 @ 09:51)  BP: 130/51 (04-26-19 @ 09:51)  BP(mean): --  RR: 18 (04-26-19 @ 09:51)  SpO2: 95% (04-26-19 @ 09:51)  Wt(kg): --    I and O's:    04-25 @ 07:01  -  04-26 @ 07:00  --------------------------------------------------------  IN: 10 mL / OUT: 2005 mL / NET: -1995 mL          PHYSICAL EXAM:  Constitutional: NAD  HEENT:  Normocephalic   Neck: No JVD; supple  Respiratory: coarse BS b/l  Cardiac: irreg s1s2  Gastrointestinal: BS+, soft, NT/ND  Urologic: No mondragon  Extremities: +1 L edema  Access: AVF (+)thrill    LABS:                        8.6    11.37 )-----------( 253      ( 26 Apr 2019 07:40 )             27.5     04-26    137  |  95<L>  |  43<H>  ----------------------------<  74  4.1   |  24  |  5.25<H>    Ca    9.0      26 Apr 2019 06:13  Phos  5.8     04-26  Mg     2.2     04-26    TPro  5.8<L>  /  Alb  3.0<L>  /  TBili  0.5  /  DBili  x   /  AST  13  /  ALT  17  /  AlkPhos  82  04-25    IMPRESSION: 69F w/ DM2, HTN, and ESRD-HD, 4/22/19 a/w severe hyperkalemia/cardiac arrest    (1)Renal - ESRD - HD MWF  (2)Hyperkalemia - resolved   (3)CV - Hypotension on midodrine- controlled   (4) Hyperphosphatemia  (5) Hypervolemic on imaging     RECOMMEND:  - HD today as ordered, UF 2-2.5 kg as able   - Check phos level   - Epogen 10K units with HD  - c/w Renvela 800 mg po TID, may need to increase if phos goes up higher  - c/w midodrine 15 mg po TID      Mel Bronson MD  [facility]  [phone]

## 2019-04-26 NOTE — SWALLOW VFSS/MBS ASSESSMENT ADULT - ORAL PHASE
Uncontrolled bolus / spillover in anand-pharynx/Reduced anterior - posterior transport/Incomplete tongue to palate contact/Delayed oral transit time/Residue in oral cavity Reduced anterior - posterior transport/Incomplete tongue to palate contact/Laryngeal penetration before swallow - silent/Delayed oral transit time/Residue in oral cavity/Uncontrolled bolus / spillover in anand-pharynx/Uncontrolled bolus / spillover in hypopharynx Delayed oral transit time/Reduced anterior - posterior transport/Incomplete tongue to palate contact/Residue in oral cavity Delayed oral transit time/Reduced anterior - posterior transport/Incomplete tongue to palate contact/Uncontrolled bolus / spillover in anand-pharynx/Residue in oral cavity/Uncontrolled bolus / spillover in hypopharynx

## 2019-04-26 NOTE — PROGRESS NOTE ADULT - SUBJECTIVE AND OBJECTIVE BOX
Chief complaint:pt  more   alert  , on  TF  , no  dyspnea .    HPI:  69 yr old female with PMHx ESRD on HD (mon-wed-fri) last HD session this past friday 4/19/19 (unknown removal of fluid), Afib on apixaban, CAD, Mod Ao stenosis (last TTE 5/2017), HTN, PVD, DM2, recent hospitalization 3/2019 s/p fall found to have hyperkalemia requiring emergent dialysis who now presents this morning (4/22/19) from home via EMS with worsening weakness and fatigue found to have hyperkalemia (8.5)             As endorsed by son pt was in usual state of health AOX3 after HD session, developing progressive weakness and fatigue saturday (4/20/19) with worsening this morning. In E.D. pt was found with thready pulse, initial ECG afib with ventricular response of 90's with RBBB slight peaked T waves in anterior leads and widened QRS complexes. Pt's heart rate deteriorated to rate of 20 (as endorsed by ED of [facility]) without loss of pulse, Pt received CaCl IVP (total 3 amps), Reg insulin initially 6 units IVP, then 8 units IVP, HCO3 IVP (total 100 meq), D50W IVP (total 100 cc's), and developed resp distress requiring intubation. Heart rate responded with development of SVT with widened QRS requiring cardioversion to afib with controlled rate. Pt's subsequent lab work demonstrated K+ 8.5, sCR 8.34 , . Last know sCr 4.68 from 3/2019.        Consult called and admission to MICU  for hyperkalemia requiring urgent hemodialysis and resp failure (22 Apr 2019 10:29)      REVIEW OF SYSTEMS:    CONSTITUTIONAL: No fever, weight loss, or fatigue  NECK: No pain or stiffness  RESPIRATORY: No cough, wheezing, chills or hemoptysis; No shortness of breath  CARDIOVASCULAR: No chest pain, palpitations, dizziness, or leg swelling  GASTROINTESTINAL: No abdominal or epigastric pain. No nausea, vomiting, or hematemesis; No diarrhea or constipation. No melena or hematochezia.  GENITOURINARY: No dysuria, frequency, hematuria, or incontinence  NEUROLOGICAL: No headaches, memory loss, loss of strength, numbness, or tremors  SKIN: No itching, burning, rashes, or lesions   LYMPH NODES: No enlarged glands  MUSCULOSKELETAL: No joint pain or swelling; No muscle, back, or extremity pain  HEME/LYMPH: No easy bruising, or bleeding gums    MEDICATIONS  (STANDING):  apixaban 2.5 milliGRAM(s) Oral every 12 hours  aspirin  chewable 81 milliGRAM(s) Oral daily  atorvastatin 40 milliGRAM(s) Oral at bedtime  epoetin ramón Injectable 37021 Unit(s) IV Push <User Schedule>  gabapentin 300 milliGRAM(s) Oral at bedtime  influenza   Vaccine 0.5 milliLiter(s) IntraMuscular once  insulin lispro (HumaLOG) corrective regimen sliding scale   SubCutaneous every 6 hours  midodrine 15 milliGRAM(s) Oral every 8 hours  piperacillin/tazobactam IVPB. 3.375 Gram(s) IV Intermittent every 12 hours  sevelamer carbonate Powder 800 milliGRAM(s) Oral three times a day with meals    MEDICATIONS  (PRN):      Allergies    No Known Allergies    Intolerances        Vital Signs Last 24 Hrs  T(C): 36.8 (26 Apr 2019 09:51), Max: 37.3 (25 Apr 2019 11:55)  T(F): 98.2 (26 Apr 2019 09:51), Max: 99.1 (25 Apr 2019 11:55)  HR: 75 (26 Apr 2019 09:51) (71 - 84)  BP: 130/51 (26 Apr 2019 09:51) (114/61 - 163/61)  BP(mean): --  RR: 18 (26 Apr 2019 09:51) (18 - 21)  SpO2: 95% (26 Apr 2019 09:51) (95% - 100%)    PHYSICAL EXAM:    GENERAL: NAD, well-groomed, well-developed  HEAD:  Atraumatic, Normocephalic  EYES: EOMI, PERRLA, conjunctiva and sclera clear  ENMT: No tonsillar erythema, exudates, or enlargement; Moist mucous membranes, Good dentition, No lesions  NECK: Supple, No JVD, Normal thyroid  NERVOUS SYSTEM:  Alert & Oriented X3, Good concentration; Motor Strength 5/5 B/L upper and lower extremities; DTRs 2+ intact and symmetric  CHEST/LUNG: Clear to percussion bilaterally; No rales, rhonchi, wheezing, or rubs  HEART: Regular rate and rhythm; No murmurs, rubs, or gallops  ABDOMEN: Soft, Nontender, Nondistended; Bowel sounds present  EXTREMITIES:  2+ Peripheral Pulses, No clubbing, cyanosis, or edema  LYMPH: No lymphadenopathy noted  SKIN: No rashes or lesions      LABS:                        8.6    11.37 )-----------( 253      ( 26 Apr 2019 07:40 )             27.5     04-26    137  |  95<L>  |  43<H>  ----------------------------<  74  4.1   |  24  |  5.25<H>    Ca    9.0      26 Apr 2019 06:13  Phos  5.8     04-26  Mg     2.2     04-26    TPro  5.8<L>  /  Alb  3.0<L>  /  TBili  0.5  /  DBili  x   /  AST  13  /  ALT  17  /  AlkPhos  82  04-25    PTT - ( 25 Apr 2019 00:54 )  PTT:27.4 sec      RADIOLOGY & ADDITIONAL STUDIES:

## 2019-04-26 NOTE — CHART NOTE - NSCHARTNOTEFT_GEN_A_CORE
Replaced NGT    Feeding tube dislodge, replaced new tube to left nare.    < from: Xray Chest 1 View- PORTABLE-Urgent (04.26.19 @ 00:45) >    INTERPRETATION:  NG tube tip in the stomach.    < end of copied text >    Plan  okay to use NGT  Will endorse to primary day team, to follow up on final chest x-ray.    Patito López NP  [facility] [id number]

## 2019-04-26 NOTE — SWALLOW VFSS/MBS ASSESSMENT ADULT - DIAGNOSTIC IMPRESSIONS
Patient presents with ccd-ivixcxac-nzranunflz dysphagia with impaired oral management, marked delay in trigger of the swallow reflex, post swallow residue in the oral cavity and esophagus, and impaired airway protection with silent laryngeal penetration/aspiration risk. Results are consistent with prior exams completed in 2016 and 2017. Patient presents with mct-psaftqgz-bdylyxrvee dysphagia with impaired oral management, marked delay in trigger of the swallow reflex, post swallow residue in the oral cavity/pharynx/esophagus, and impaired airway protection with silent laryngeal penetration/aspiration risk. Results are consistent with prior exams completed in 2016 and 2017.

## 2019-04-26 NOTE — SWALLOW VFSS/MBS ASSESSMENT ADULT - ADDITIONAL INFORMATION
+ KFt in place. C4-6 anterior cervical osteophytes + KFt in place. C4-6 anterior cervical osteophytes.    Disorders: cognitive deficits, impaired buccal tension, reduced lingual strength/ROM/Rate of motion, reduced BOT to posterior pharyngeal wall contact with a narrow column of contrast between structures, delay in trigger of the swallow reflex with low trigger points, impaired epiglottic retroflexion (to stage 1), reduced laryngeal elevation, incomplete laryngeal vestibule closure, reduced supraglottic sensation, evidence suggestive of a fatigue factor with decompensation of swallow function when multiple back-to-back trials were presented (this took place off fluoro to assess for this issues) Etiology of esophageal dysphagia is unclear - suggest GI consult for assessment.     Impaired ability to follow commands for the use of strategies

## 2019-04-26 NOTE — SWALLOW BEDSIDE ASSESSMENT ADULT - SLP GENERAL OBSERVATIONS
Pt found in bed awake. +Contractures. +NGT +2L O2 via NC. RN assisted with repositioning. Pt with paucity of response and reduced command following as required visual cues.

## 2019-04-27 LAB
CULTURE RESULTS: SIGNIFICANT CHANGE UP
GLUCOSE BLDC GLUCOMTR-MCNC: 126 MG/DL — HIGH (ref 70–99)
GLUCOSE BLDC GLUCOMTR-MCNC: 130 MG/DL — HIGH (ref 70–99)
GLUCOSE BLDC GLUCOMTR-MCNC: 83 MG/DL — SIGNIFICANT CHANGE UP (ref 70–99)
GLUCOSE BLDC GLUCOMTR-MCNC: 89 MG/DL — SIGNIFICANT CHANGE UP (ref 70–99)
GLUCOSE BLDC GLUCOMTR-MCNC: 90 MG/DL — SIGNIFICANT CHANGE UP (ref 70–99)
HCT VFR BLD CALC: 26.1 % — LOW (ref 34.5–45)
HGB BLD-MCNC: 8.3 G/DL — LOW (ref 11.5–15.5)
MCHC RBC-ENTMCNC: 29.1 PG — SIGNIFICANT CHANGE UP (ref 27–34)
MCHC RBC-ENTMCNC: 31.8 GM/DL — LOW (ref 32–36)
MCV RBC AUTO: 91.6 FL — SIGNIFICANT CHANGE UP (ref 80–100)
PLATELET # BLD AUTO: 263 K/UL — SIGNIFICANT CHANGE UP (ref 150–400)
RBC # BLD: 2.85 M/UL — LOW (ref 3.8–5.2)
RBC # FLD: 14.7 % — HIGH (ref 10.3–14.5)
SPECIMEN SOURCE: SIGNIFICANT CHANGE UP
WBC # BLD: 9.7 K/UL — SIGNIFICANT CHANGE UP (ref 3.8–10.5)
WBC # FLD AUTO: 9.7 K/UL — SIGNIFICANT CHANGE UP (ref 3.8–10.5)

## 2019-04-27 PROCEDURE — 71045 X-RAY EXAM CHEST 1 VIEW: CPT | Mod: 26

## 2019-04-27 RX ORDER — SODIUM CHLORIDE 9 MG/ML
250 INJECTION INTRAMUSCULAR; INTRAVENOUS; SUBCUTANEOUS ONCE
Qty: 0 | Refills: 0 | Status: COMPLETED | OUTPATIENT
Start: 2019-04-27 | End: 2019-04-27

## 2019-04-27 RX ORDER — MIDODRINE HYDROCHLORIDE 2.5 MG/1
5 TABLET ORAL ONCE
Qty: 0 | Refills: 0 | Status: COMPLETED | OUTPATIENT
Start: 2019-04-27 | End: 2019-04-27

## 2019-04-27 RX ADMIN — Medication 81 MILLIGRAM(S): at 12:27

## 2019-04-27 RX ADMIN — PIPERACILLIN AND TAZOBACTAM 25 GRAM(S): 4; .5 INJECTION, POWDER, LYOPHILIZED, FOR SOLUTION INTRAVENOUS at 10:10

## 2019-04-27 RX ADMIN — SODIUM CHLORIDE 5000 MILLILITER(S): 9 INJECTION INTRAMUSCULAR; INTRAVENOUS; SUBCUTANEOUS at 05:56

## 2019-04-27 RX ADMIN — SEVELAMER CARBONATE 800 MILLIGRAM(S): 2400 POWDER, FOR SUSPENSION ORAL at 08:47

## 2019-04-27 RX ADMIN — GABAPENTIN 300 MILLIGRAM(S): 400 CAPSULE ORAL at 02:02

## 2019-04-27 RX ADMIN — ERYTHROPOIETIN 10000 UNIT(S): 10000 INJECTION, SOLUTION INTRAVENOUS; SUBCUTANEOUS at 00:43

## 2019-04-27 RX ADMIN — SEVELAMER CARBONATE 800 MILLIGRAM(S): 2400 POWDER, FOR SUSPENSION ORAL at 18:25

## 2019-04-27 RX ADMIN — APIXABAN 2.5 MILLIGRAM(S): 2.5 TABLET, FILM COATED ORAL at 23:56

## 2019-04-27 RX ADMIN — ATORVASTATIN CALCIUM 40 MILLIGRAM(S): 80 TABLET, FILM COATED ORAL at 02:00

## 2019-04-27 RX ADMIN — MIDODRINE HYDROCHLORIDE 5 MILLIGRAM(S): 2.5 TABLET ORAL at 05:56

## 2019-04-27 RX ADMIN — APIXABAN 2.5 MILLIGRAM(S): 2.5 TABLET, FILM COATED ORAL at 02:02

## 2019-04-27 RX ADMIN — MIDODRINE HYDROCHLORIDE 15 MILLIGRAM(S): 2.5 TABLET ORAL at 02:00

## 2019-04-27 RX ADMIN — ATORVASTATIN CALCIUM 40 MILLIGRAM(S): 80 TABLET, FILM COATED ORAL at 23:56

## 2019-04-27 RX ADMIN — APIXABAN 2.5 MILLIGRAM(S): 2.5 TABLET, FILM COATED ORAL at 08:47

## 2019-04-27 RX ADMIN — PIPERACILLIN AND TAZOBACTAM 25 GRAM(S): 4; .5 INJECTION, POWDER, LYOPHILIZED, FOR SOLUTION INTRAVENOUS at 23:57

## 2019-04-27 RX ADMIN — MIDODRINE HYDROCHLORIDE 15 MILLIGRAM(S): 2.5 TABLET ORAL at 10:11

## 2019-04-27 RX ADMIN — SEVELAMER CARBONATE 800 MILLIGRAM(S): 2400 POWDER, FOR SUSPENSION ORAL at 12:27

## 2019-04-27 RX ADMIN — PIPERACILLIN AND TAZOBACTAM 25 GRAM(S): 4; .5 INJECTION, POWDER, LYOPHILIZED, FOR SOLUTION INTRAVENOUS at 01:59

## 2019-04-27 NOTE — CHART NOTE - NSCHARTNOTEFT_GEN_A_CORE
NGT clogged and dislodged. Reinserted and ordered CXR to check placement. Pt tolerated procedure well    [id number]

## 2019-04-27 NOTE — PROGRESS NOTE ADULT - SUBJECTIVE AND OBJECTIVE BOX
Cardiovascular Disease Progress Note  (Covering for Dr. Hernandes)    Overnight events: Hypotension noted this morning. Patient denies chest pain or SOB.   Otherwise review of systems negative    Objective Findings:  T(C): 37.3 (19 @ 05:45), Max: 37.3 (19 @ 01:56)  HR: 94 (19 @ 06:30) (61 - 111)  BP: 99/58 (19 @ 06:30) (80/42 - 130/51)  RR: 18 (19 @ 05:45) (18 - 18)  SpO2: 96% (19 @ 05:45) (93% - 97%)  Wt(kg): --  Daily     Daily Weight in k.7 (2019 01:21)      Physical Exam:  Gen: NAD  HEENT: EOMI  CV: RRR, normal S1 + S2, no m/r/g  Lungs: CTAB  Abd: soft, non-tender  Ext: No edema    Telemetry: Sinus; PADs    Laboratory Data:                        8.6    11.37 )-----------( 253      ( 2019 07:40 )             27.5     04-26    137  |  95<L>  |  43<H>  ----------------------------<  74  4.1   |  24  |  5.25<H>    Ca    9.0      2019 06:13  Phos  6.0     04-  Mg     2.2     04-26      PT/INR - ( 2019 09:00 )   PT: 14.3 sec;   INR: 1.26 ratio         PTT - ( 2019 09:00 )  PTT:26.3 sec          Inpatient Medications:  MEDICATIONS  (STANDING):  apixaban 2.5 milliGRAM(s) Oral every 12 hours  aspirin  chewable 81 milliGRAM(s) Oral daily  atorvastatin 40 milliGRAM(s) Oral at bedtime  epoetin ramón Injectable 30241 Unit(s) IV Push <User Schedule>  gabapentin 300 milliGRAM(s) Oral at bedtime  influenza   Vaccine 0.5 milliLiter(s) IntraMuscular once  insulin lispro (HumaLOG) corrective regimen sliding scale   SubCutaneous every 6 hours  midodrine 15 milliGRAM(s) Oral every 8 hours  piperacillin/tazobactam IVPB. 3.375 Gram(s) IV Intermittent every 12 hours  sevelamer carbonate Powder 800 milliGRAM(s) Oral three times a day with meals      Assessment: 70 yo female with ESRD on HD and peripheral vascular disease presented with hypoxic respiratory failure and hyperkalemia     Problem/Plan - 1:  ·  Problem: Hypotension after HD   Plan:   Asymptomatic.  Midodrine as ordered.  I would not give back IV fluids unless patient has symptoms.      Problem/Plan - 2:  ·  Problem: Hypoxic respiratory failure.    Plan: Patient successfully extubated.  Fluid removal with HD.  IV abx.      Problem/Plan - 3:  ·  Problem: ESRD (end stage renal disease).    Plan: HD as per renal team.      Problem/Plan - 4:  ·  Problem: Afib.    Plan: Currently in sinus with frequent PACs.  No significant mitral valvular disease.   Eliquis as ordered.         Over 25 minutes spent on total encounter; more than 50% of the visit was spent counseling and/or coordinating care by the attending physician.      Alexandre Moore MD [facility]  Cardiovascular Disease  [phone]

## 2019-04-27 NOTE — PROGRESS NOTE ADULT - SUBJECTIVE AND OBJECTIVE BOX
Chief complaint: pt   had  a  BP  80/50  improved  on  midran  5  mg  stat , pt  had  S&S   and  demostrate  silent  aspiration ,    HPI:  69 yr old female with PMHx ESRD on HD (mon-wed-fri) last HD session this past friday 4/19/19 (unknown removal of fluid), Afib on apixaban, CAD, Mod Ao stenosis (last TTE 5/2017), HTN, PVD, DM2, recent hospitalization 3/2019 s/p fall found to have hyperkalemia requiring emergent dialysis who now presents this morning (4/22/19) from home via EMS with worsening weakness and fatigue found to have hyperkalemia (8.5)             As endorsed by son pt was in usual state of health AOX3 after HD session, developing progressive weakness and fatigue saturday (4/20/19) with worsening this morning. In E.D. pt was found with thready pulse, initial ECG afib with ventricular response of 90's with RBBB slight peaked T waves in anterior leads and widened QRS complexes. Pt's heart rate deteriorated to rate of 20 (as endorsed by ED of [facility]) without loss of pulse, Pt received CaCl IVP (total 3 amps), Reg insulin initially 6 units IVP, then 8 units IVP, HCO3 IVP (total 100 meq), D50W IVP (total 100 cc's), and developed resp distress requiring intubation. Heart rate responded with development of SVT with widened QRS requiring cardioversion to afib with controlled rate. Pt's subsequent lab work demonstrated K+ 8.5, sCR 8.34 , . Last know sCr 4.68 from 3/2019.        Consult called and admission to MICU  for hyperkalemia requiring urgent hemodialysis and resp failure (22 Apr 2019 10:29)      REVIEW OF SYSTEMS:    CONSTITUTIONAL: No fever, weight loss, or fatigue  NECK: No pain or stiffness  RESPIRATORY: No cough, wheezing, chills or hemoptysis; No shortness of breath  CARDIOVASCULAR: No chest pain, palpitations, dizziness, or leg swelling  GASTROINTESTINAL: No abdominal or epigastric pain. No nausea, vomiting, or hematemesis; No diarrhea or constipation. No melena or hematochezia.  GENITOURINARY: No dysuria, frequency, hematuria, or incontinence  NEUROLOGICAL: No headaches, memory loss, loss of strength, numbness, or tremors  SKIN: No itching, burning, rashes, or lesions   LYMPH NODES: No enlarged glands  MUSCULOSKELETAL: No joint pain or swelling; No muscle, back, or extremity pain  HEME/LYMPH: No easy bruising, or bleeding gums    MEDICATIONS  (STANDING):  apixaban 2.5 milliGRAM(s) Oral every 12 hours  aspirin  chewable 81 milliGRAM(s) Oral daily  atorvastatin 40 milliGRAM(s) Oral at bedtime  epoetin ramón Injectable 09538 Unit(s) IV Push <User Schedule>  gabapentin 300 milliGRAM(s) Oral at bedtime  influenza   Vaccine 0.5 milliLiter(s) IntraMuscular once  insulin lispro (HumaLOG) corrective regimen sliding scale   SubCutaneous every 6 hours  midodrine 15 milliGRAM(s) Oral every 8 hours  piperacillin/tazobactam IVPB. 3.375 Gram(s) IV Intermittent every 12 hours  sevelamer carbonate Powder 800 milliGRAM(s) Oral three times a day with meals    MEDICATIONS  (PRN):      Allergies    No Known Allergies    Intolerances        Vital Signs Last 24 Hrs  T(C): 37.2 (27 Apr 2019 08:29), Max: 37.3 (27 Apr 2019 01:56)  T(F): 99 (27 Apr 2019 08:29), Max: 99.2 (27 Apr 2019 01:56)  HR: 61 (27 Apr 2019 08:29) (61 - 111)  BP: 97/43 (27 Apr 2019 08:29) (80/42 - 127/65)  BP(mean): --  RR: 18 (27 Apr 2019 08:29) (18 - 18)  SpO2: 95% (27 Apr 2019 08:29) (93% - 97%)    PHYSICAL EXAM:    GENERAL: NAD, well-groomed, well-developed  HEAD:  Atraumatic, Normocephalic  EYES: EOMI, PERRLA, conjunctiva and sclera clear  ENMT: No tonsillar erythema, exudates, or enlargement; Moist mucous membranes, Good dentition, No lesions  NECK: Supple, No JVD, Normal thyroid  NERVOUS SYSTEM:  Alert & Oriented X3, Good concentration; Motor Strength 5/5 B/L upper and lower extremities; DTRs 2+ intact and symmetric  CHEST/LUNG: Clear to percussion bilaterally; No rales, rhonchi, wheezing, or rubs  HEART: Regular rate and rhythm; No murmurs, rubs, or gallops  ABDOMEN: Soft, Nontender, Nondistended; Bowel sounds present  EXTREMITIES:  2+ Peripheral Pulses, No clubbing, cyanosis, or edema  LYMPH: No lymphadenopathy noted  SKIN: No rashes or lesions      LABS:                        8.3    9.70  )-----------( 263      ( 27 Apr 2019 09:20 )             26.1     04-26    137  |  95<L>  |  43<H>  ----------------------------<  74  4.1   |  24  |  5.25<H>    Ca    9.0      26 Apr 2019 06:13  Phos  6.0     04-26  Mg     2.2     04-26      PT/INR - ( 26 Apr 2019 09:00 )   PT: 14.3 sec;   INR: 1.26 ratio         PTT - ( 26 Apr 2019 09:00 )  PTT:26.3 sec      RADIOLOGY & ADDITIONAL STUDIES:

## 2019-04-28 DIAGNOSIS — R05 COUGH: ICD-10-CM

## 2019-04-28 DIAGNOSIS — I95.9 HYPOTENSION, UNSPECIFIED: ICD-10-CM

## 2019-04-28 LAB
ANION GAP SERPL CALC-SCNC: 17 MMOL/L — SIGNIFICANT CHANGE UP (ref 5–17)
BUN SERPL-MCNC: 45 MG/DL — HIGH (ref 7–23)
CALCIUM SERPL-MCNC: 8.8 MG/DL — SIGNIFICANT CHANGE UP (ref 8.4–10.5)
CHLORIDE SERPL-SCNC: 92 MMOL/L — LOW (ref 96–108)
CO2 SERPL-SCNC: 27 MMOL/L — SIGNIFICANT CHANGE UP (ref 22–31)
CREAT SERPL-MCNC: 4.65 MG/DL — HIGH (ref 0.5–1.3)
CULTURE RESULTS: SIGNIFICANT CHANGE UP
GLUCOSE BLDC GLUCOMTR-MCNC: 110 MG/DL — HIGH (ref 70–99)
GLUCOSE BLDC GLUCOMTR-MCNC: 116 MG/DL — HIGH (ref 70–99)
GLUCOSE BLDC GLUCOMTR-MCNC: 84 MG/DL — SIGNIFICANT CHANGE UP (ref 70–99)
GLUCOSE BLDC GLUCOMTR-MCNC: 84 MG/DL — SIGNIFICANT CHANGE UP (ref 70–99)
GLUCOSE BLDC GLUCOMTR-MCNC: 88 MG/DL — SIGNIFICANT CHANGE UP (ref 70–99)
GLUCOSE BLDC GLUCOMTR-MCNC: 91 MG/DL — SIGNIFICANT CHANGE UP (ref 70–99)
GLUCOSE SERPL-MCNC: 120 MG/DL — HIGH (ref 70–99)
HCT VFR BLD CALC: 26.6 % — LOW (ref 34.5–45)
HGB BLD-MCNC: 8.1 G/DL — LOW (ref 11.5–15.5)
MCHC RBC-ENTMCNC: 28.7 PG — SIGNIFICANT CHANGE UP (ref 27–34)
MCHC RBC-ENTMCNC: 30.5 GM/DL — LOW (ref 32–36)
MCV RBC AUTO: 94.3 FL — SIGNIFICANT CHANGE UP (ref 80–100)
PLATELET # BLD AUTO: 308 K/UL — SIGNIFICANT CHANGE UP (ref 150–400)
POTASSIUM SERPL-MCNC: 3.7 MMOL/L — SIGNIFICANT CHANGE UP (ref 3.5–5.3)
POTASSIUM SERPL-SCNC: 3.7 MMOL/L — SIGNIFICANT CHANGE UP (ref 3.5–5.3)
RBC # BLD: 2.82 M/UL — LOW (ref 3.8–5.2)
RBC # FLD: 14.6 % — HIGH (ref 10.3–14.5)
SODIUM SERPL-SCNC: 136 MMOL/L — SIGNIFICANT CHANGE UP (ref 135–145)
SPECIMEN SOURCE: SIGNIFICANT CHANGE UP
WBC # BLD: 8.84 K/UL — SIGNIFICANT CHANGE UP (ref 3.8–10.5)
WBC # FLD AUTO: 8.84 K/UL — SIGNIFICANT CHANGE UP (ref 3.8–10.5)

## 2019-04-28 PROCEDURE — 71045 X-RAY EXAM CHEST 1 VIEW: CPT | Mod: 26

## 2019-04-28 PROCEDURE — 99222 1ST HOSP IP/OBS MODERATE 55: CPT | Mod: 25

## 2019-04-28 PROCEDURE — 31505 DIAGNOSTIC LARYNGOSCOPY: CPT

## 2019-04-28 RX ORDER — GABAPENTIN 400 MG/1
300 CAPSULE ORAL AT BEDTIME
Qty: 0 | Refills: 0 | Status: DISCONTINUED | OUTPATIENT
Start: 2019-04-28 | End: 2019-04-30

## 2019-04-28 RX ADMIN — MIDODRINE HYDROCHLORIDE 15 MILLIGRAM(S): 2.5 TABLET ORAL at 00:45

## 2019-04-28 RX ADMIN — SEVELAMER CARBONATE 800 MILLIGRAM(S): 2400 POWDER, FOR SUSPENSION ORAL at 12:53

## 2019-04-28 RX ADMIN — Medication 81 MILLIGRAM(S): at 12:53

## 2019-04-28 RX ADMIN — APIXABAN 2.5 MILLIGRAM(S): 2.5 TABLET, FILM COATED ORAL at 10:14

## 2019-04-28 RX ADMIN — MIDODRINE HYDROCHLORIDE 15 MILLIGRAM(S): 2.5 TABLET ORAL at 10:13

## 2019-04-28 RX ADMIN — PIPERACILLIN AND TAZOBACTAM 25 GRAM(S): 4; .5 INJECTION, POWDER, LYOPHILIZED, FOR SOLUTION INTRAVENOUS at 10:30

## 2019-04-28 RX ADMIN — MIDODRINE HYDROCHLORIDE 15 MILLIGRAM(S): 2.5 TABLET ORAL at 17:53

## 2019-04-28 RX ADMIN — GABAPENTIN 300 MILLIGRAM(S): 400 CAPSULE ORAL at 00:44

## 2019-04-28 RX ADMIN — SEVELAMER CARBONATE 800 MILLIGRAM(S): 2400 POWDER, FOR SUSPENSION ORAL at 17:54

## 2019-04-28 NOTE — PROGRESS NOTE ADULT - ASSESSMENT
69F w/ DM2, HTN, and ESRD-HD, 4/22/19 a/w severe hyperkalemia/cardiac arrest    (1)Renal - ESRD - HD MWF  (3)CV - Hypotension on midodrine- controlled   (4) Hyperphosphatemia- no PO 4 done since 4/26  (5) Hypervolemic on imaging     RECOMMEND:  - Next HD on Monday as ordered, UF 2 kg as able   - Check phos, BMP level with dialysis  - Epogen 10K units with HD  - c/w Renvela 800 mg po TID, may need to increase if phos goes up higher  - c/w midodrine 15 mg po TID

## 2019-04-28 NOTE — PROGRESS NOTE ADULT - SUBJECTIVE AND OBJECTIVE BOX
Subjective: Patient seen and examined. No new events except as noted.   resting comfortably in bed   no cp or sob     REVIEW OF SYSTEMS:    CONSTITUTIONAL: + weakness, fevers or chills  EYES/ENT: No visual changes;  No vertigo or throat pain   NECK: No pain or stiffness  RESPIRATORY: No cough, wheezing, hemoptysis; No shortness of breath  CARDIOVASCULAR: No chest pain or palpitations  GASTROINTESTINAL: No abdominal or epigastric pain. No nausea, vomiting, or hematemesis; No diarrhea or constipation. No melena or hematochezia.  GENITOURINARY: No dysuria, frequency or hematuria  NEUROLOGICAL: No numbness or weakness  SKIN: No itching, burning, rashes, or lesions   All other review of systems is negative unless indicated above.    MEDICATIONS:  MEDICATIONS  (STANDING):  apixaban 2.5 milliGRAM(s) Oral every 12 hours  aspirin  chewable 81 milliGRAM(s) Oral daily  atorvastatin 40 milliGRAM(s) Oral at bedtime  epoetin ramón Injectable 79060 Unit(s) IV Push <User Schedule>  gabapentin   Solution 300 milliGRAM(s) Oral at bedtime  influenza   Vaccine 0.5 milliLiter(s) IntraMuscular once  insulin lispro (HumaLOG) corrective regimen sliding scale   SubCutaneous every 6 hours  midodrine 15 milliGRAM(s) Oral every 8 hours  piperacillin/tazobactam IVPB. 3.375 Gram(s) IV Intermittent every 12 hours  sevelamer carbonate Powder 800 milliGRAM(s) Oral three times a day with meals      PHYSICAL EXAM:  T(C): 36.8 (04-28-19 @ 05:00), Max: 37.3 (04-27-19 @ 21:16)  HR: 62 (04-28-19 @ 05:00) (62 - 75)  BP: 114/59 (04-28-19 @ 05:00) (100/51 - 153/66)  RR: 18 (04-28-19 @ 05:00) (18 - 18)  SpO2: 97% (04-28-19 @ 05:00) (96% - 98%)  Wt(kg): --  I&O's Summary    27 Apr 2019 07:01  -  28 Apr 2019 07:00  --------------------------------------------------------  IN: 240 mL / OUT: 0 mL / NET: 240 mL            Appearance: NAD 	  HEENT:   Dry  oral mucosa, PERRL, EOMI	  Lymphatic: No lymphadenopathy  Cardiovascular: Normal S1 S2, No JVD, No murmurs , Peripheral pulses palpable 2+ bilaterally  Respiratory: Decreased bs   Gastrointestinal:  Soft, Non-tender, + BS	  Skin: No rashes, No ecchymoses, No cyanosis, warm to touch  Musculoskeletal: Decreased range of motion and  strength  Psychiatry:  lethargic   Ext: No edema        LABS:    CARDIAC MARKERS:                                8.1    8.84  )-----------( 308      ( 28 Apr 2019 09:53 )             26.6     04-28    136  |  92<L>  |  45<H>  ----------------------------<  120<H>  3.7   |  27  |  4.65<H>    Ca    8.8      28 Apr 2019 07:02  Phos  6.0     04-26      proBNP:   Lipid Profile:   HgA1c:   TSH:             TELEMETRY: 	SR    ECG:  	  RADIOLOGY:   DIAGNOSTIC TESTING:  [ ] Echocardiogram:  [ ]  Catheterization:  [ ] Stress Test:    OTHER:

## 2019-04-28 NOTE — CONSULT NOTE ADULT - ASSESSMENT
69 yr old female with PMHx ESRD on HD (mon-wed-fri) last HD session this past friday 4/19/19 (unknown removal of fluid), Afib on apixaban, CAD, Mod Ao stenosis (last TTE 5/2017), HTN, PVD, DM2, recent hospitalization 3/2019 s/p fall. Actively admitted to Med Team for Hyperkalemia. ENT asked to evaluate patient for cough/upper airway evaluation as pt recently intubated- extubated 4/22- 4/24 Laryngoscopy reveals widely patent non obstructive upper airway
70 yo female with ESRD on HD and known vasculopath admitted to ICU with respiratory failure and hyperkalemia

## 2019-04-28 NOTE — CONSULT NOTE ADULT - PROBLEM SELECTOR RECOMMENDATION 9
No Acute ENT intervention  Diet reccs per S&S -NPO  NGT-continue enteral feeds  Care per primary team
Needs HD   Discussed with family   scheduled for HD today

## 2019-04-28 NOTE — PROGRESS NOTE ADULT - SUBJECTIVE AND OBJECTIVE BOX
Chief complaint:pt   awake  alert, BP  improved  110/70  ,  pt  on  TF       HPI:  69 yr old female with PMHx ESRD on HD (mon-wed-fri) last HD session this past friday 4/19/19 (unknown removal of fluid), Afib on apixaban, CAD, Mod Ao stenosis (last TTE 5/2017), HTN, PVD, DM2, recent hospitalization 3/2019 s/p fall found to have hyperkalemia requiring emergent dialysis who now presents this morning (4/22/19) from home via EMS with worsening weakness and fatigue found to have hyperkalemia (8.5)             As endorsed by son pt was in usual state of health AOX3 after HD session, developing progressive weakness and fatigue saturday (4/20/19) with worsening this morning. In E.D. pt was found with thready pulse, initial ECG afib with ventricular response of 90's with RBBB slight peaked T waves in anterior leads and widened QRS complexes. Pt's heart rate deteriorated to rate of 20 (as endorsed by ED of [facility]IVR) without loss of pulse, Pt received CaCl IVP (total 3 amps), Reg insulin initially 6 units IVP, then 8 units IVP, HCO3 IVP (total 100 meq), D50W IVP (total 100 cc's), and developed resp distress requiring intubation. Heart rate responded with development of SVT with widened QRS requiring cardioversion to afib with controlled rate. Pt's subsequent lab work demonstrated K+ 8.5, sCR 8.34 , . Last know sCr 4.68 from 3/2019.        Consult called and admission to MICU  for hyperkalemia requiring urgent hemodialysis and resp failure (22 Apr 2019 10:29)      REVIEW OF SYSTEMS:    CONSTITUTIONAL: No fever, weight loss, or fatigue  NECK: No pain or stiffness  RESPIRATORY: No cough, wheezing, chills or hemoptysis; No shortness of breath  CARDIOVASCULAR: No chest pain, palpitations, dizziness, or leg swelling  GASTROINTESTINAL: No abdominal or epigastric pain. No nausea, vomiting, or hematemesis; No diarrhea or constipation. No melena or hematochezia.  GENITOURINARY: No dysuria, frequency, hematuria, or incontinence  NEUROLOGICAL: No headaches, memory loss, loss of strength, numbness, or tremors  SKIN: No itching, burning, rashes, or lesions   LYMPH NODES: No enlarged glands  MUSCULOSKELETAL: No joint pain or swelling; No muscle, back, or extremity pain  HEME/LYMPH: No easy bruising, or bleeding gums    MEDICATIONS  (STANDING):  apixaban 2.5 milliGRAM(s) Oral every 12 hours  aspirin  chewable 81 milliGRAM(s) Oral daily  atorvastatin 40 milliGRAM(s) Oral at bedtime  epoetin ramón Injectable 24537 Unit(s) IV Push <User Schedule>  gabapentin   Solution 300 milliGRAM(s) Oral at bedtime  influenza   Vaccine 0.5 milliLiter(s) IntraMuscular once  insulin lispro (HumaLOG) corrective regimen sliding scale   SubCutaneous every 6 hours  midodrine 15 milliGRAM(s) Oral every 8 hours  piperacillin/tazobactam IVPB. 3.375 Gram(s) IV Intermittent every 12 hours  sevelamer carbonate Powder 800 milliGRAM(s) Oral three times a day with meals    MEDICATIONS  (PRN):      Allergies    No Known Allergies    Intolerances        Vital Signs Last 24 Hrs  T(C): 36.8 (28 Apr 2019 05:00), Max: 37.3 (27 Apr 2019 21:16)  T(F): 98.3 (28 Apr 2019 05:00), Max: 99.1 (27 Apr 2019 21:16)  HR: 62 (28 Apr 2019 05:00) (62 - 75)  BP: 114/59 (28 Apr 2019 05:00) (100/51 - 153/66)  BP(mean): --  RR: 18 (28 Apr 2019 05:00) (18 - 18)  SpO2: 97% (28 Apr 2019 05:00) (96% - 98%)    PHYSICAL EXAM:    GENERAL: NAD, well-groomed, well-developed  HEAD:  Atraumatic, Normocephalic  EYES: EOMI, PERRLA, conjunctiva and sclera clear  ENMT: No tonsillar erythema, exudates, or enlargement; Moist mucous membranes, Good dentition, No lesions  NECK: Supple, No JVD, Normal thyroid  NERVOUS SYSTEM:  Alert & Oriented X3, Good concentration; Motor Strength 5/5 B/L upper and lower extremities; DTRs 2+ intact and symmetric  CHEST/LUNG: Clear to percussion bilaterally; No rales, rhonchi, wheezing, or rubs  HEART: Regular rate and rhythm; No murmurs, rubs, or gallops  ABDOMEN: Soft, Nontender, Nondistended; Bowel sounds present  EXTREMITIES:  2+ Peripheral Pulses, No clubbing, cyanosis, or edema  LYMPH: No lymphadenopathy noted  SKIN: No rashes or lesions      LABS:                        8.1    8.84  )-----------( 308      ( 28 Apr 2019 09:53 )             26.6     04-28    136  |  92<L>  |  45<H>  ----------------------------<  120<H>  3.7   |  27  |  4.65<H>    Ca    8.8      28 Apr 2019 07:02  Phos  6.0     04-26            RADIOLOGY & ADDITIONAL STUDIES:

## 2019-04-28 NOTE — PROGRESS NOTE ADULT - SUBJECTIVE AND OBJECTIVE BOX
Patient seen and examined.  NAD in bed, family at bedside. No complaints of SOB.  Last HD 4/26/2019: 2L removed    REVIEW OF SYSTEMS:  As per HPI, otherwise 8 full 10 ROS were unremarkable    MEDICATIONS  (STANDING):  apixaban 2.5 milliGRAM(s) Oral every 12 hours  aspirin  chewable 81 milliGRAM(s) Oral daily  atorvastatin 40 milliGRAM(s) Oral at bedtime  epoetin ramón Injectable 53794 Unit(s) IV Push <User Schedule>  gabapentin   Solution 300 milliGRAM(s) Oral at bedtime  influenza   Vaccine 0.5 milliLiter(s) IntraMuscular once  insulin lispro (HumaLOG) corrective regimen sliding scale   SubCutaneous every 6 hours  midodrine 15 milliGRAM(s) Oral every 8 hours  piperacillin/tazobactam IVPB. 3.375 Gram(s) IV Intermittent every 12 hours  sevelamer carbonate Powder 800 milliGRAM(s) Oral three times a day with meals      VITAL:  T(C): , Max: 37.3 (04-27-19 @ 21:16)  T(F): , Max: 99.1 (04-27-19 @ 21:16)  HR: 62 (04-28-19 @ 05:00)  BP: 114/59 (04-28-19 @ 05:00)  BP(mean): --  RR: 18 (04-28-19 @ 05:00)  SpO2: 97% (04-28-19 @ 05:00)  Wt(kg): --    I and O's:    04-27 @ 07:01  -  04-28 @ 07:00  --------------------------------------------------------  IN: 240 mL / OUT: 0 mL / NET: 240 mL          PHYSICAL EXAM:    Constitutional: NAD  HEENT:  Normocephalic   Neck: No JVD; supple  Respiratory: coarse BS b/l  Cardiac: irreg s1s2  Gastrointestinal: BS+, soft, NT/ND  Urologic: No mondragon  Extremities:  trace edema  Access: AVF (+)thrill    LABS:                        8.1    8.84  )-----------( 308      ( 28 Apr 2019 09:53 )             26.6     04-28    136  |  92<L>  |  45<H>  ----------------------------<  120<H>  3.7   |  27  |  4.65<H>    Ca    8.8      28 Apr 2019 07:02  Phos  6.0     04-26

## 2019-04-28 NOTE — CONSULT NOTE ADULT - SUBJECTIVE AND OBJECTIVE BOX
CC: I cough with eating    HPI: 69 yr old female with PMHx ESRD on HD (mon-wed-fri) last HD session this past friday 4/19/19 (unknown removal of fluid), Afib on apixaban, CAD, Mod Ao stenosis (last TTE 5/2017), HTN, PVD, DM2, recent hospitalization 3/2019 s/p fall. Actively admitted to Med Team for Hyperkalemia. ENT asked to evaluate patient for cough/upper airway evaluation as pt recently intubated- extubated 4/22-4/24. Of note pt reports coughing, with foods only, denies odynophagia/nasal congestion/post nasal drip/voice change/SOB/CP Pt with long standing hx of dysphagia,     PAST MEDICAL & SURGICAL HISTORY:  Hyperlipidemia  Diabetes  Hypertension  Osteomyelitis  Diabetic Neuropathy  Cellulitis of Foot  Edema of Both Legs  Diabetes: Type 2  History of Osteoarthritis  HTN - Hypertension  HLD (Hyperlipidemia)  Status post insertion of percutaneous endoscopic gastrostomy (PEG) tube  H/O tracheostomy  S/P amputation of lesser toe, right: 2013 right great toe, 2nd and 3rd right toes  S/P Amputation of Lesser Toe: Rt 1-3 metatarsal    Allergies    No Known Allergies    Intolerances      MEDICATIONS  (STANDING):  apixaban 2.5 milliGRAM(s) Oral every 12 hours  aspirin  chewable 81 milliGRAM(s) Oral daily  atorvastatin 40 milliGRAM(s) Oral at bedtime  epoetin ramón Injectable 49499 Unit(s) IV Push <User Schedule>  gabapentin   Solution 300 milliGRAM(s) Oral at bedtime  influenza   Vaccine 0.5 milliLiter(s) IntraMuscular once  insulin lispro (HumaLOG) corrective regimen sliding scale   SubCutaneous every 6 hours  midodrine 15 milliGRAM(s) Oral every 8 hours  piperacillin/tazobactam IVPB. 3.375 Gram(s) IV Intermittent every 12 hours  sevelamer carbonate Powder 800 milliGRAM(s) Oral three times a day with meals    MEDICATIONS  (PRN):      Social History: denies etoh/tobacco/recreational drug use    Family history: non contributory    ROS:   ENT: all negative except as noted in HPI   CV: denies palpitations  Pulm: denies SOB, cough, hemoptysis  GI: denies change in apetite, indigestion, n/v  : denies pertinent urinary symptoms, urgency  Neuro: denies numbness/tingling, loss of sensation  Psych: denies anxiety  MS: denies muscle weakness, instability  Heme: denies easy bruising or bleeding  Endo: denies heat/cold intolerance, excessive sweating  Vascular: denies LE edema    Vital Signs Last 24 Hrs  T(C): 37 (28 Apr 2019 21:29), Max: 37 (28 Apr 2019 21:29)  T(F): 98.6 (28 Apr 2019 21:29), Max: 98.6 (28 Apr 2019 21:29)  HR: 64 (28 Apr 2019 21:29) (60 - 75)  BP: 159/63 (28 Apr 2019 21:29) (100/51 - 159/63)  BP(mean): --  RR: 18 (28 Apr 2019 21:29) (18 - 20)  SpO2: 97% (28 Apr 2019 21:29) (97% - 100%)                          8.1    8.84  )-----------( 308      ( 28 Apr 2019 09:53 )             26.6    04-28    136  |  92<L>  |  45<H>  ----------------------------<  120<H>  3.7   |  27  |  4.65<H>    Ca    8.8      28 Apr 2019 07:02         PHYSICAL EXAM:  Gen: NAD  Skin: No rashes, bruises, or lesions  Head: Normocephalic, Atraumatic  Face: no edema, erythema, or fluctuance. Parotid glands soft without mass  Eyes: no scleral injection  Nose: Nares bilaterally patent, no discharge  Mouth: No Stridor / Drooling / Trismus.  Mucosa moist, tongue/uvula midline, oropharynx clear  Neck: Flat, supple, no lymphadenopathy, trachea midline, no masses  Lymphatic: No lymphadenopathy  Resp: breathing easily, no stridor  Neuro: facial nerve intact, no facial droop          Fiberoptic Indirect laryngoscopy:  (Scope #2 used)  Nasopharynx, oropharynx, and hypopharynx clear, no bleeding. Tongue base, posterior wall, vallecula, epiglottis, and subglottis appear normal. . No erythema, edema, pooling of secretions, masses or lesions. . Airway patent, no foreign body visualized.No glottic/supraglottic edema True vocal cords, arytenoids, vestibular folds, ventricles, pyriform sinuses, and aryepiglottic folds appear normal bilaterally.  Vocal cords mobile with good contact b/l.

## 2019-04-29 LAB
ANION GAP SERPL CALC-SCNC: 18 MMOL/L — HIGH (ref 5–17)
BUN SERPL-MCNC: 57 MG/DL — HIGH (ref 7–23)
CALCIUM SERPL-MCNC: 9.3 MG/DL — SIGNIFICANT CHANGE UP (ref 8.4–10.5)
CHLORIDE SERPL-SCNC: 88 MMOL/L — LOW (ref 96–108)
CO2 SERPL-SCNC: 28 MMOL/L — SIGNIFICANT CHANGE UP (ref 22–31)
CREAT SERPL-MCNC: 6.04 MG/DL — HIGH (ref 0.5–1.3)
GLUCOSE BLDC GLUCOMTR-MCNC: 110 MG/DL — HIGH (ref 70–99)
GLUCOSE BLDC GLUCOMTR-MCNC: 113 MG/DL — HIGH (ref 70–99)
GLUCOSE BLDC GLUCOMTR-MCNC: 138 MG/DL — HIGH (ref 70–99)
GLUCOSE BLDC GLUCOMTR-MCNC: 84 MG/DL — SIGNIFICANT CHANGE UP (ref 70–99)
GLUCOSE BLDC GLUCOMTR-MCNC: 88 MG/DL — SIGNIFICANT CHANGE UP (ref 70–99)
GLUCOSE SERPL-MCNC: 104 MG/DL — HIGH (ref 70–99)
PHOSPHATE SERPL-MCNC: 6 MG/DL — HIGH (ref 2.5–4.5)
POTASSIUM SERPL-MCNC: 3.9 MMOL/L — SIGNIFICANT CHANGE UP (ref 3.5–5.3)
POTASSIUM SERPL-SCNC: 3.9 MMOL/L — SIGNIFICANT CHANGE UP (ref 3.5–5.3)
SODIUM SERPL-SCNC: 134 MMOL/L — LOW (ref 135–145)

## 2019-04-29 RX ADMIN — ATORVASTATIN CALCIUM 40 MILLIGRAM(S): 80 TABLET, FILM COATED ORAL at 00:36

## 2019-04-29 RX ADMIN — APIXABAN 2.5 MILLIGRAM(S): 2.5 TABLET, FILM COATED ORAL at 00:36

## 2019-04-29 RX ADMIN — SEVELAMER CARBONATE 800 MILLIGRAM(S): 2400 POWDER, FOR SUSPENSION ORAL at 17:15

## 2019-04-29 RX ADMIN — GABAPENTIN 300 MILLIGRAM(S): 400 CAPSULE ORAL at 00:42

## 2019-04-29 RX ADMIN — APIXABAN 2.5 MILLIGRAM(S): 2.5 TABLET, FILM COATED ORAL at 22:29

## 2019-04-29 RX ADMIN — APIXABAN 2.5 MILLIGRAM(S): 2.5 TABLET, FILM COATED ORAL at 08:41

## 2019-04-29 RX ADMIN — MIDODRINE HYDROCHLORIDE 15 MILLIGRAM(S): 2.5 TABLET ORAL at 00:36

## 2019-04-29 RX ADMIN — MIDODRINE HYDROCHLORIDE 15 MILLIGRAM(S): 2.5 TABLET ORAL at 08:40

## 2019-04-29 RX ADMIN — Medication 81 MILLIGRAM(S): at 13:06

## 2019-04-29 RX ADMIN — PIPERACILLIN AND TAZOBACTAM 25 GRAM(S): 4; .5 INJECTION, POWDER, LYOPHILIZED, FOR SOLUTION INTRAVENOUS at 00:36

## 2019-04-29 RX ADMIN — PIPERACILLIN AND TAZOBACTAM 25 GRAM(S): 4; .5 INJECTION, POWDER, LYOPHILIZED, FOR SOLUTION INTRAVENOUS at 22:29

## 2019-04-29 RX ADMIN — ATORVASTATIN CALCIUM 40 MILLIGRAM(S): 80 TABLET, FILM COATED ORAL at 22:29

## 2019-04-29 RX ADMIN — PIPERACILLIN AND TAZOBACTAM 25 GRAM(S): 4; .5 INJECTION, POWDER, LYOPHILIZED, FOR SOLUTION INTRAVENOUS at 13:07

## 2019-04-29 RX ADMIN — MIDODRINE HYDROCHLORIDE 15 MILLIGRAM(S): 2.5 TABLET ORAL at 17:15

## 2019-04-29 RX ADMIN — SEVELAMER CARBONATE 800 MILLIGRAM(S): 2400 POWDER, FOR SUSPENSION ORAL at 13:07

## 2019-04-29 RX ADMIN — ERYTHROPOIETIN 10000 UNIT(S): 10000 INJECTION, SOLUTION INTRAVENOUS; SUBCUTANEOUS at 10:48

## 2019-04-29 NOTE — PROGRESS NOTE ADULT - SUBJECTIVE AND OBJECTIVE BOX
Subjective: Patient seen and examined. No new events except as noted.   no cp or sob   feels uncomfortable   Sore throat   REVIEW OF SYSTEMS:    CONSTITUTIONAL: +weakness, fevers or chills  EYES/ENT: No visual changes;  No vertigo or throat pain   NECK: No pain or stiffness  RESPIRATORY: No cough, wheezing, hemoptysis; No shortness of breath  CARDIOVASCULAR: No chest pain or palpitations  GASTROINTESTINAL: No abdominal or epigastric pain. No nausea, vomiting, or hematemesis; No diarrhea or constipation. No melena or hematochezia.  GENITOURINARY: No dysuria, frequency or hematuria  NEUROLOGICAL: No numbness or weakness  SKIN: No itching, burning, rashes, or lesions   All other review of systems is negative unless indicated above.    MEDICATIONS:  MEDICATIONS  (STANDING):  apixaban 2.5 milliGRAM(s) Oral every 12 hours  aspirin  chewable 81 milliGRAM(s) Oral daily  atorvastatin 40 milliGRAM(s) Oral at bedtime  epoetin ramón Injectable 81344 Unit(s) IV Push <User Schedule>  gabapentin   Solution 300 milliGRAM(s) Oral at bedtime  influenza   Vaccine 0.5 milliLiter(s) IntraMuscular once  insulin lispro (HumaLOG) corrective regimen sliding scale   SubCutaneous every 6 hours  midodrine 15 milliGRAM(s) Oral every 8 hours  piperacillin/tazobactam IVPB. 3.375 Gram(s) IV Intermittent every 12 hours  sevelamer carbonate Powder 800 milliGRAM(s) Oral three times a day with meals      PHYSICAL EXAM:  T(C): 36.6 (04-29-19 @ 09:13), Max: 37 (04-28-19 @ 21:29)  HR: 65 (04-29-19 @ 09:13) (60 - 69)  BP: 140/63 (04-29-19 @ 09:13) (116/51 - 159/63)  RR: 18 (04-29-19 @ 09:13) (18 - 20)  SpO2: 98% (04-29-19 @ 09:13) (97% - 100%)  Wt(kg): --  I&O's Summary    28 Apr 2019 07:01  -  29 Apr 2019 07:00  --------------------------------------------------------  IN: 460 mL / OUT: 0 mL / NET: 460 mL          Appearance: NAD 	  HEENT:   Dry  oral mucosa, PERRL, EOMI	  Lymphatic: No lymphadenopathy  Cardiovascular: Normal S1 S2, No JVD, No murmurs , Peripheral pulses palpable 2+ bilaterally  Respiratory: Decreased bs   Gastrointestinal:  Soft, Non-tender, + BS	  Skin: No rashes, No ecchymoses, No cyanosis, warm to touch  Musculoskeletal: Decreased range of motion and  strength  Psychiatry:  lethargic   Ext: + partial amputations of toes             LABS:    CARDIAC MARKERS:                                8.1    8.84  )-----------( 308      ( 28 Apr 2019 09:53 )             26.6     04-29    134<L>  |  88<L>  |  57<H>  ----------------------------<  104<H>  3.9   |  28  |  6.04<H>    Ca    9.3      29 Apr 2019 07:23  Phos  6.0     04-29      proBNP:   Lipid Profile:   HgA1c:   TSH:             TELEMETRY: 	    ECG:  	  RADIOLOGY:   DIAGNOSTIC TESTING:  [ ] Echocardiogram:  [ ]  Catheterization:  [ ] Stress Test:    OTHER:

## 2019-04-29 NOTE — PROGRESS NOTE ADULT - ASSESSMENT
69F w/ DM2, HTN, and ESRD-HD, 4/22/19 a/w severe hyperkalemia/cardiac arrest    (1)Renal - ESRD - HD MWF  (3)CV - Hypotension on midodrine- controlled   (4) Hyperphosphatemia-        RECOMMEND:  - Next HD today   - Epogen 10K units with HD.  May increase the dose   - c/w Renvela 800 mg po TID, may need to increase if phos goes up higher  - c/w midodrine 15 mg po TID  -Issue with hyperkalemia even at HD.  Her family has been consoled re food high in potassium but her dietary habits do NOT change.  Safe issue with her should be peg and tube feeds

## 2019-04-29 NOTE — PROGRESS NOTE ADULT - SUBJECTIVE AND OBJECTIVE BOX
[facility] [phone]        Patient seen and examined in bed.  SHe is on feeds         MEDICATIONS  (STANDING):  apixaban 2.5 milliGRAM(s) Oral every 12 hours  aspirin  chewable 81 milliGRAM(s) Oral daily  atorvastatin 40 milliGRAM(s) Oral at bedtime  epoetin ramón Injectable 15688 Unit(s) IV Push <User Schedule>  gabapentin   Solution 300 milliGRAM(s) Oral at bedtime  influenza   Vaccine 0.5 milliLiter(s) IntraMuscular once  insulin lispro (HumaLOG) corrective regimen sliding scale   SubCutaneous every 6 hours  midodrine 15 milliGRAM(s) Oral every 8 hours  piperacillin/tazobactam IVPB. 3.375 Gram(s) IV Intermittent every 12 hours  sevelamer carbonate Powder 800 milliGRAM(s) Oral three times a day with meals      VITAL:  T(C): , Max: 37 (04-28-19 @ 21:29)  T(F): , Max: 98.6 (04-28-19 @ 21:29)  HR: 74 (04-29-19 @ 12:45)  BP: 126/56 (04-29-19 @ 12:45)  BP(mean): --  RR: 18 (04-29-19 @ 12:45)  SpO2: 97% (04-29-19 @ 12:45)  Wt(kg): --    I and O's:    04-28 @ 07:01  -  04-29 @ 07:00  --------------------------------------------------------  IN: 460 mL / OUT: 0 mL / NET: 460 mL    04-29 @ 07:01  -  04-29 @ 15:40  --------------------------------------------------------  IN: 995 mL / OUT: 2900 mL / NET: -1905 mL          PHYSICAL EXAM:    Constitutional: NAD  Neck:  No JVD  Respiratory: CTAB/L  Cardiovascular: S1 and S2  Gastrointestinal: BS+, soft, NT/ND  Extremities: No peripheral edema  Neurological: A/O x 3, no focal deficits  Psychiatric: Normal mood, normal affect  : No Raphael  Skin: No rashes  Access: av access    LABS:                        8.1    8.84  )-----------( 308      ( 28 Apr 2019 09:53 )             26.6     04-29    134<L>  |  88<L>  |  57<H>  ----------------------------<  104<H>  3.9   |  28  |  6.04<H>    Ca    9.3      29 Apr 2019 07:23  Phos  6.0     04-29            Urine Studies:          RADIOLOGY & ADDITIONAL STUDIES:

## 2019-04-29 NOTE — PROGRESS NOTE ADULT - SUBJECTIVE AND OBJECTIVE BOX
Chief complaint:pt  is  awake  alert  , c/o  sore throat  ,  ENT  consult  appreciated    vocal  cords   normal  , for  barium  swallow  today     HPI:  69 yr old female with PMHx ESRD on HD (mon-wed-fri) last HD session this past friday 4/19/19 (unknown removal of fluid), Afib on apixaban, CAD, Mod Ao stenosis (last TTE 5/2017), HTN, PVD, DM2, recent hospitalization 3/2019 s/p fall found to have hyperkalemia requiring emergent dialysis who now presents this morning (4/22/19) from home via EMS with worsening weakness and fatigue found to have hyperkalemia (8.5)             As endorsed by son pt was in usual state of health AOX3 after HD session, developing progressive weakness and fatigue saturday (4/20/19) with worsening this morning. In E.D. pt was found with thready pulse, initial ECG afib with ventricular response of 90's with RBBB slight peaked T waves in anterior leads and widened QRS complexes. Pt's heart rate deteriorated to rate of 20 (as endorsed by ED of [facility]) without loss of pulse, Pt received CaCl IVP (total 3 amps), Reg insulin initially 6 units IVP, then 8 units IVP, HCO3 IVP (total 100 meq), D50W IVP (total 100 cc's), and developed resp distress requiring intubation. Heart rate responded with development of SVT with widened QRS requiring cardioversion to afib with controlled rate. Pt's subsequent lab work demonstrated K+ 8.5, sCR 8.34 , . Last know sCr 4.68 from 3/2019.        Consult called and admission to MICU  for hyperkalemia requiring urgent hemodialysis and resp failure (22 Apr 2019 10:29)      REVIEW OF SYSTEMS:    CONSTITUTIONAL: No fever, weight loss, or fatigue  NECK: No pain or stiffness  RESPIRATORY: No cough, wheezing, chills or hemoptysis; No shortness of breath  CARDIOVASCULAR: No chest pain, palpitations, dizziness, or leg swelling  GASTROINTESTINAL: No abdominal or epigastric pain. No nausea, vomiting, or hematemesis; No diarrhea or constipation. No melena or hematochezia.  GENITOURINARY: No dysuria, frequency, hematuria, or incontinence  NEUROLOGICAL: No headaches, memory loss, loss of strength, numbness, or tremors  SKIN: No itching, burning, rashes, or lesions   LYMPH NODES: No enlarged glands  MUSCULOSKELETAL: No joint pain or swelling; No muscle, back, or extremity pain  HEME/LYMPH: No easy bruising, or bleeding gums    MEDICATIONS  (STANDING):  apixaban 2.5 milliGRAM(s) Oral every 12 hours  aspirin  chewable 81 milliGRAM(s) Oral daily  atorvastatin 40 milliGRAM(s) Oral at bedtime  epoetin ramón Injectable 41284 Unit(s) IV Push <User Schedule>  gabapentin   Solution 300 milliGRAM(s) Oral at bedtime  influenza   Vaccine 0.5 milliLiter(s) IntraMuscular once  insulin lispro (HumaLOG) corrective regimen sliding scale   SubCutaneous every 6 hours  midodrine 15 milliGRAM(s) Oral every 8 hours  piperacillin/tazobactam IVPB. 3.375 Gram(s) IV Intermittent every 12 hours  sevelamer carbonate Powder 800 milliGRAM(s) Oral three times a day with meals    MEDICATIONS  (PRN):      Allergies    No Known Allergies    Intolerances        Vital Signs Last 24 Hrs  T(C): 36.8 (29 Apr 2019 05:21), Max: 37 (28 Apr 2019 21:29)  T(F): 98.2 (29 Apr 2019 05:21), Max: 98.6 (28 Apr 2019 21:29)  HR: 62 (29 Apr 2019 05:21) (60 - 75)  BP: 116/51 (29 Apr 2019 05:21) (116/51 - 159/63)  BP(mean): --  RR: 18 (29 Apr 2019 05:21) (18 - 20)  SpO2: 97% (29 Apr 2019 05:21) (97% - 100%)    PHYSICAL EXAM:    GENERAL: NAD, well-groomed, well-developed  HEAD:  Atraumatic, Normocephalic  EYES: EOMI, PERRLA, conjunctiva and sclera clear  ENMT: No tonsillar erythema, exudates, or enlargement; Moist mucous membranes, Good dentition, No lesions  NECK: Supple, No JVD, Normal thyroid  NERVOUS SYSTEM:  Alert & Oriented X3, Good concentration; Motor Strength 5/5 B/L upper and lower extremities; DTRs 2+ intact and symmetric  CHEST/LUNG: Clear to percussion bilaterally; No rales, rhonchi, wheezing, or rubs  HEART: Regular rate and rhythm; No murmurs, rubs, or gallops  ABDOMEN: Soft, Nontender, Nondistended; Bowel sounds present  EXTREMITIES:  2+ Peripheral Pulses, No clubbing, cyanosis, or edema  LYMPH: No lymphadenopathy noted  SKIN: No rashes or lesions      LABS:                        8.1    8.84  )-----------( 308      ( 28 Apr 2019 09:53 )             26.6     04-29    134<L>  |  88<L>  |  57<H>  ----------------------------<  104<H>  3.9   |  28  |  6.04<H>    Ca    9.3      29 Apr 2019 07:23  Phos  6.0     04-29            RADIOLOGY & ADDITIONAL STUDIES:

## 2019-04-30 LAB
ANION GAP SERPL CALC-SCNC: 15 MMOL/L — SIGNIFICANT CHANGE UP (ref 5–17)
BUN SERPL-MCNC: 29 MG/DL — HIGH (ref 7–23)
CALCIUM SERPL-MCNC: 9.6 MG/DL — SIGNIFICANT CHANGE UP (ref 8.4–10.5)
CHLORIDE SERPL-SCNC: 91 MMOL/L — LOW (ref 96–108)
CO2 SERPL-SCNC: 28 MMOL/L — SIGNIFICANT CHANGE UP (ref 22–31)
CREAT SERPL-MCNC: 4.16 MG/DL — HIGH (ref 0.5–1.3)
GLUCOSE BLDC GLUCOMTR-MCNC: 105 MG/DL — HIGH (ref 70–99)
GLUCOSE BLDC GLUCOMTR-MCNC: 107 MG/DL — HIGH (ref 70–99)
GLUCOSE BLDC GLUCOMTR-MCNC: 110 MG/DL — HIGH (ref 70–99)
GLUCOSE BLDC GLUCOMTR-MCNC: 122 MG/DL — HIGH (ref 70–99)
GLUCOSE BLDC GLUCOMTR-MCNC: 96 MG/DL — SIGNIFICANT CHANGE UP (ref 70–99)
GLUCOSE SERPL-MCNC: 111 MG/DL — HIGH (ref 70–99)
HCT VFR BLD CALC: 27.5 % — LOW (ref 34.5–45)
HGB BLD-MCNC: 8.7 G/DL — LOW (ref 11.5–15.5)
MCHC RBC-ENTMCNC: 29.1 PG — SIGNIFICANT CHANGE UP (ref 27–34)
MCHC RBC-ENTMCNC: 31.6 GM/DL — LOW (ref 32–36)
MCV RBC AUTO: 92 FL — SIGNIFICANT CHANGE UP (ref 80–100)
PLATELET # BLD AUTO: 438 K/UL — HIGH (ref 150–400)
POTASSIUM SERPL-MCNC: 3.8 MMOL/L — SIGNIFICANT CHANGE UP (ref 3.5–5.3)
POTASSIUM SERPL-SCNC: 3.8 MMOL/L — SIGNIFICANT CHANGE UP (ref 3.5–5.3)
RBC # BLD: 2.99 M/UL — LOW (ref 3.8–5.2)
RBC # FLD: 14.7 % — HIGH (ref 10.3–14.5)
SODIUM SERPL-SCNC: 134 MMOL/L — LOW (ref 135–145)
WBC # BLD: 11.09 K/UL — HIGH (ref 3.8–10.5)
WBC # FLD AUTO: 11.09 K/UL — HIGH (ref 3.8–10.5)

## 2019-04-30 RX ORDER — INSULIN LISPRO 100/ML
VIAL (ML) SUBCUTANEOUS AT BEDTIME
Qty: 0 | Refills: 0 | Status: DISCONTINUED | OUTPATIENT
Start: 2019-04-30 | End: 2019-05-01

## 2019-04-30 RX ORDER — DEXTROSE 50 % IN WATER 50 %
25 SYRINGE (ML) INTRAVENOUS ONCE
Qty: 0 | Refills: 0 | Status: DISCONTINUED | OUTPATIENT
Start: 2019-04-30 | End: 2019-05-01

## 2019-04-30 RX ORDER — APIXABAN 2.5 MG/1
2.5 TABLET, FILM COATED ORAL EVERY 12 HOURS
Qty: 0 | Refills: 0 | Status: DISCONTINUED | OUTPATIENT
Start: 2019-04-30 | End: 2019-05-01

## 2019-04-30 RX ORDER — ASPIRIN/CALCIUM CARB/MAGNESIUM 324 MG
81 TABLET ORAL DAILY
Qty: 0 | Refills: 0 | Status: DISCONTINUED | OUTPATIENT
Start: 2019-04-30 | End: 2019-05-01

## 2019-04-30 RX ORDER — GLUCAGON INJECTION, SOLUTION 0.5 MG/.1ML
1 INJECTION, SOLUTION SUBCUTANEOUS ONCE
Qty: 0 | Refills: 0 | Status: DISCONTINUED | OUTPATIENT
Start: 2019-04-30 | End: 2019-05-01

## 2019-04-30 RX ORDER — SEVELAMER CARBONATE 2400 MG/1
800 POWDER, FOR SUSPENSION ORAL
Qty: 0 | Refills: 0 | Status: DISCONTINUED | OUTPATIENT
Start: 2019-04-30 | End: 2019-05-01

## 2019-04-30 RX ORDER — ATORVASTATIN CALCIUM 80 MG/1
40 TABLET, FILM COATED ORAL AT BEDTIME
Qty: 0 | Refills: 0 | Status: DISCONTINUED | OUTPATIENT
Start: 2019-04-30 | End: 2019-05-01

## 2019-04-30 RX ORDER — DEXTROSE 50 % IN WATER 50 %
15 SYRINGE (ML) INTRAVENOUS ONCE
Qty: 0 | Refills: 0 | Status: DISCONTINUED | OUTPATIENT
Start: 2019-04-30 | End: 2019-05-01

## 2019-04-30 RX ORDER — SODIUM CHLORIDE 9 MG/ML
1000 INJECTION, SOLUTION INTRAVENOUS
Qty: 0 | Refills: 0 | Status: DISCONTINUED | OUTPATIENT
Start: 2019-04-30 | End: 2019-05-01

## 2019-04-30 RX ORDER — DEXTROSE 50 % IN WATER 50 %
12.5 SYRINGE (ML) INTRAVENOUS ONCE
Qty: 0 | Refills: 0 | Status: DISCONTINUED | OUTPATIENT
Start: 2019-04-30 | End: 2019-05-01

## 2019-04-30 RX ORDER — GABAPENTIN 400 MG/1
300 CAPSULE ORAL AT BEDTIME
Qty: 0 | Refills: 0 | Status: DISCONTINUED | OUTPATIENT
Start: 2019-04-30 | End: 2019-05-01

## 2019-04-30 RX ORDER — INSULIN LISPRO 100/ML
VIAL (ML) SUBCUTANEOUS
Qty: 0 | Refills: 0 | Status: DISCONTINUED | OUTPATIENT
Start: 2019-04-30 | End: 2019-05-01

## 2019-04-30 RX ORDER — MIDODRINE HYDROCHLORIDE 2.5 MG/1
15 TABLET ORAL EVERY 8 HOURS
Qty: 0 | Refills: 0 | Status: DISCONTINUED | OUTPATIENT
Start: 2019-04-30 | End: 2019-05-01

## 2019-04-30 RX ADMIN — SEVELAMER CARBONATE 800 MILLIGRAM(S): 2400 POWDER, FOR SUSPENSION ORAL at 18:20

## 2019-04-30 RX ADMIN — ATORVASTATIN CALCIUM 40 MILLIGRAM(S): 80 TABLET, FILM COATED ORAL at 22:38

## 2019-04-30 RX ADMIN — MIDODRINE HYDROCHLORIDE 15 MILLIGRAM(S): 2.5 TABLET ORAL at 13:50

## 2019-04-30 RX ADMIN — SEVELAMER CARBONATE 800 MILLIGRAM(S): 2400 POWDER, FOR SUSPENSION ORAL at 13:49

## 2019-04-30 RX ADMIN — GABAPENTIN 300 MILLIGRAM(S): 400 CAPSULE ORAL at 22:38

## 2019-04-30 RX ADMIN — APIXABAN 2.5 MILLIGRAM(S): 2.5 TABLET, FILM COATED ORAL at 18:20

## 2019-04-30 RX ADMIN — Medication 81 MILLIGRAM(S): at 13:50

## 2019-04-30 RX ADMIN — MIDODRINE HYDROCHLORIDE 15 MILLIGRAM(S): 2.5 TABLET ORAL at 01:17

## 2019-04-30 RX ADMIN — MIDODRINE HYDROCHLORIDE 15 MILLIGRAM(S): 2.5 TABLET ORAL at 22:38

## 2019-04-30 RX ADMIN — GABAPENTIN 300 MILLIGRAM(S): 400 CAPSULE ORAL at 00:25

## 2019-04-30 NOTE — CHART NOTE - NSCHARTNOTEFT_GEN_A_CORE
Nutrition Follow Up Note  Patient seen for: Nutrition follow up assessment. Per chart, 70 y/o male with ESRD on HD, afib, found to have severe hyperkalemia. Complicated by tachycardia requiring cardioversion and respiratory distress requiring intubation in MICU s/p extubation (), s/p MBS recommended NPO (), ENT following. Per chart, pt accidentally removed NGT yesterday, trial of dysphagia 2 nectar consistency diet today. Pt awake at time of visit, looking forward to po diet. Per chart, pt speaks Farsi, but also able to communicate in English and declined  service.     Source: medical records, RN, patient    Diet : Consistent Carbohydrate Renal Dysphagia 2 nectar consistency    Patient reports: no GI distress, +BM yesterday     PO intake : n/a - pt diet just advanced today and had not received po diet yet       Enteral /Parenteral Nutrition: Pt was receiving Nepro @ 35cc/hr x 18 hours with 3 Prosource       Daily Weight in k.8 (-), Weight in k.8 (-), Weight in k.7 (-), Weight in k.1 (-), Weight in k.5 (-25), Weight in k.5 (-25), Weight in k.3 (-25)  % Weight Change - wt fluctuation suspect 2/2 fluid shift in setting of edema and HD    Pertinent Medications: MEDICATIONS  (STANDING):  apixaban 2.5 milliGRAM(s) Oral every 12 hours  aspirin  chewable 81 milliGRAM(s) Oral daily  atorvastatin 40 milliGRAM(s) Oral at bedtime  dextrose 5%. 1000 milliLiter(s) (50 mL/Hr) IV Continuous <Continuous>  dextrose 50% Injectable 12.5 Gram(s) IV Push once  dextrose 50% Injectable 25 Gram(s) IV Push once  dextrose 50% Injectable 25 Gram(s) IV Push once  epoetin ramón Injectable 60826 Unit(s) IV Push <User Schedule>  gabapentin   Solution 300 milliGRAM(s) Oral at bedtime  influenza   Vaccine 0.5 milliLiter(s) IntraMuscular once  insulin lispro (HumaLOG) corrective regimen sliding scale   SubCutaneous three times a day before meals  insulin lispro (HumaLOG) corrective regimen sliding scale   SubCutaneous at bedtime  midodrine 15 milliGRAM(s) Oral every 8 hours  sevelamer carbonate Powder 800 milliGRAM(s) Oral three times a day with meals    MEDICATIONS  (PRN):  dextrose 40% Gel 15 Gram(s) Oral once PRN Blood Glucose LESS THAN 70 milliGRAM(s)/deciliter  glucagon  Injectable 1 milliGRAM(s) IntraMuscular once PRN Glucose LESS THAN 70 milligrams/deciliter     @ 06:43: Na 134<L>, BUN 29<H>, Cr 4.16<H>, <H>, K+ 3.8, Phos --, Mg --, Alk Phos --, ALT/SGPT --, AST/SGOT --, HbA1c --    Finger Sticks:  POCT Blood Glucose.: 110 mg/dL ( @ 06:13)  POCT Blood Glucose.: 122 mg/dL ( @ 00:13)  POCT Blood Glucose.: 110 mg/dL ( @ 18:31)  POCT Blood Glucose.: 88 mg/dL ( @ 12:15)      Skin per nursing documentation: no pressure injuries  Edema: +1 generalized and +2 bilateral ankle and feet edema    Estimated Needs:   [x ] no change since previous assessment  [ ] recalculated:     Previous Nutrition Diagnosis: increased nutrition needs  Nutrition Diagnosis is: being addressed with current po diet     As evidenced by:      Interventions:     Recommend  1) Defer diet consistency to medical team. Recommend to continue with Consistent Carbohydrate Renal diet.  2) Will assess need for supplement once po diet tolerance established.   3) RD remains available.     Monitoring and Evaluation:     Continue to monitor Nutritional intake, Tolerance to diet prescription, weights, labs, skin integrity    RD remains available upon request and will follow up per protocol

## 2019-04-30 NOTE — PROGRESS NOTE ADULT - SUBJECTIVE AND OBJECTIVE BOX
Subjective: Patient seen and examined. No new events except as noted.   feels ok     Review of Systems:    CONSTITUTIONAL:+weakness, fevers or chills  EYES/ENT: No visual changes;  No vertigo or throat pain   NECK: No pain or stiffness  RESPIRATORY: No cough, wheezing, hemoptysis; No shortness of breath  CARDIOVASCULAR: No chest pain or palpitations  GASTROINTESTINAL: No abdominal or epigastric pain. No nausea, vomiting, or hematemesis; No diarrhea or constipation. No melena or hematochezia.  GENITOURINARY: No dysuria, frequency or hematuria  NEUROLOGICAL: No numbness or weakness  SKIN: No itching, burning, rashes, or lesions   All other review of systems is negative unless indicated above.    MEDICATIONS:  MEDICATIONS  (STANDING):  apixaban 2.5 milliGRAM(s) Oral every 12 hours  aspirin  chewable 81 milliGRAM(s) Oral daily  atorvastatin 40 milliGRAM(s) Oral at bedtime  epoetin ramón Injectable 05311 Unit(s) IV Push <User Schedule>  gabapentin   Solution 300 milliGRAM(s) Oral at bedtime  influenza   Vaccine 0.5 milliLiter(s) IntraMuscular once  insulin lispro (HumaLOG) corrective regimen sliding scale   SubCutaneous every 6 hours  midodrine 15 milliGRAM(s) Oral every 8 hours  piperacillin/tazobactam IVPB. 3.375 Gram(s) IV Intermittent every 12 hours  sevelamer carbonate Powder 800 milliGRAM(s) Oral three times a day with meals      PHYSICAL EXAM:  T(C): 36.8 (04-30-19 @ 05:46), Max: 36.8 (04-29-19 @ 16:00)  HR: 55 (04-30-19 @ 05:46) (55 - 74)  BP: 131/58 (04-30-19 @ 05:46) (117/58 - 131/58)  RR: 18 (04-30-19 @ 05:46) (18 - 18)  SpO2: 97% (04-30-19 @ 05:46) (97% - 100%)  Wt(kg): --  I&O's Summary    29 Apr 2019 07:01  -  30 Apr 2019 07:00  --------------------------------------------------------  IN: 1665 mL / OUT: 2900 mL / NET: -1235 mL            Appearance: NAD 	  HEENT:   Dry  oral mucosa, PERRL, EOMI	  Lymphatic: No lymphadenopathy  Cardiovascular: Normal S1 S2, No JVD, No murmurs , Peripheral pulses palpable 2+ bilaterally  Respiratory: Decreased bs   Gastrointestinal:  Soft, Non-tender, + BS	  Skin: No rashes, No ecchymoses, No cyanosis, warm to touch  Musculoskeletal: Decreased range of motion and  strength  Psychiatry:  lethargic   Ext: + partial amputations of toes     LABS:    CARDIAC MARKERS:                                8.7    11.09 )-----------( 438      ( 30 Apr 2019 09:02 )             27.5     04-30    134<L>  |  91<L>  |  29<H>  ----------------------------<  111<H>  3.8   |  28  |  4.16<H>    Ca    9.6      30 Apr 2019 06:43  Phos  6.0     04-29      proBNP:   Lipid Profile:   HgA1c:   TSH:             TELEMETRY: 	    ECG:  	  RADIOLOGY:   DIAGNOSTIC TESTING:  [ ] Echocardiogram:  [ ]  Catheterization:  [ ] Stress Test:    OTHER: 	      < from: Xray Modified Barium Swallow (04.26.19 @ 15:25) >    EXAM:  MODIFIED BARIUM SWALLOW                            PROCEDURE DATE:  04/26/2019            INTERPRETATION:  CLINICAL INFORMATION: Dysphagia.    TECHNIQUE: A Modified Barium Swallow was performed. The patient was given   oral contrast in varied consistencies to swallow and fluoroscopy was   performed with a speech therapist from the Speech and Hearing Department.     FLUORO TIME: 114.3 seconds     IMPRESSION:         Silent penetration without aspiration. Partially imaged enteric tube. For  further information and recommendations, please refer to the speech   pathologist final report which is available for review in the electronic   medical record.                JOVON ANGEL M.D., RADIOLOGY RESIDENT  This document has been electronically signed.  JESE GARCIA M.D., ATTENDING RADIOLOGIST  This document has been electronically signed. Apr 29 2019  2:51PM                < end of copied text >

## 2019-04-30 NOTE — PROGRESS NOTE ADULT - SUBJECTIVE AND OBJECTIVE BOX
NEPHROLOGY    Patient seen and examined.  NAD, last HD yesterday  No NGT    MEDICATIONS  (STANDING):  apixaban 2.5 milliGRAM(s) Oral every 12 hours  aspirin  chewable 81 milliGRAM(s) Oral daily  atorvastatin 40 milliGRAM(s) Oral at bedtime  epoetin ramón Injectable 04491 Unit(s) IV Push <User Schedule>  gabapentin   Solution 300 milliGRAM(s) Oral at bedtime  midodrine 15 milliGRAM(s) Oral every 8 hours  sevelamer carbonate Powder 800 milliGRAM(s) Oral three times a day with meals    VITALS:  T(C): , Max: 36.8 (04-29-19 @ 16:00)  T(F): , Max: 98.3 (04-29-19 @ 16:00)  HR: 64 (04-30-19 @ 09:45)  BP: 123/68 (04-30-19 @ 09:45)  RR: 18 (04-30-19 @ 09:45)  SpO2: 99% (04-30-19 @ 09:45)    04-29 @ 07:01  -  04-30 @ 07:00  --------------------------------------------------------  IN: 1665 mL / OUT: 2900 mL / NET: -1235 mL    REVIEW OF SYSTEMS:  Full ROS done and were negative unless otherwise indicated in HPI/assessment.     PHYSICAL EXAM:  Constitutional: NAD  Respiratory: diminished B/L  Cardiovascular: S1 and S2, RRR  Gastrointestinal: + BS, soft, NT, ND  Extremities: +/trace edema  Neurological: awake and interactive   : no mondragon  Access: evon avf (+ thrill)    LABS:                        8.7    11.09 )-----------( 438      ( 30 Apr 2019 09:02 )             27.5     04-30    134<L>  |  91<L>  |  29<H>  ----------------------------<  111<H>  3.8   |  28  |  4.16<H>    Ca    9.6      30 Apr 2019 06:43  Phos  6.0     04-29    ASSESSMENT   69F w/ DM2, HTN, and ESRD-HD, 4/22/19 a/w severe hyperkalemia/cardiac arrest  -Renal - ESRD - HD MWF  -CV - BP acceptable on midodrine  -Hyperphosphatemia in esrd on binders     RECOMMEND:  - HD in AM  - Epogen 10K TIW with HD  - continue renvela 800 tid for now  - continue midodrine 15 tid  - maintain renal diet  - dose meds for a GFR <10    Patito Douglass, NP  [facility]  [phone]

## 2019-04-30 NOTE — PROGRESS NOTE ADULT - SUBJECTIVE AND OBJECTIVE BOX
Chief complaint: pt  accidentally  pulled  FT  out  ,  will  try  disphagia  2  diet .    HPI:  69 yr old female with PMHx ESRD on HD (mon-wed-fri) last HD session this past friday 4/19/19 (unknown removal of fluid), Afib on apixaban, CAD, Mod Ao stenosis (last TTE 5/2017), HTN, PVD, DM2, recent hospitalization 3/2019 s/p fall found to have hyperkalemia requiring emergent dialysis who now presents this morning (4/22/19) from home via EMS with worsening weakness and fatigue found to have hyperkalemia (8.5)             As endorsed by son pt was in usual state of health AOX3 after HD session, developing progressive weakness and fatigue saturday (4/20/19) with worsening this morning. In E.D. pt was found with thready pulse, initial ECG afib with ventricular response of 90's with RBBB slight peaked T waves in anterior leads and widened QRS complexes. Pt's heart rate deteriorated to rate of 20 (as endorsed by ED of AIVR) without loss of pulse, Pt received CaCl IVP (total 3 amps), Reg insulin initially 6 units IVP, then 8 units IVP, HCO3 IVP (total 100 meq), D50W IVP (total 100 cc's), and developed resp distress requiring intubation. Heart rate responded with development of SVT with widened QRS requiring cardioversion to afib with controlled rate. Pt's subsequent lab work demonstrated K+ 8.5, sCR 8.34 , . Last know sCr 4.68 from 3/2019.        Consult called and admission to MICU  for hyperkalemia requiring urgent hemodialysis and resp failure (22 Apr 2019 10:29)      REVIEW OF SYSTEMS:    CONSTITUTIONAL: No fever, weight loss, or fatigue  NECK: No pain or stiffness  RESPIRATORY: No cough, wheezing, chills or hemoptysis; No shortness of breath  CARDIOVASCULAR: No chest pain, palpitations, dizziness, or leg swelling  GASTROINTESTINAL: No abdominal or epigastric pain. No nausea, vomiting, or hematemesis; No diarrhea or constipation. No melena or hematochezia.  GENITOURINARY: No dysuria, frequency, hematuria, or incontinence  NEUROLOGICAL: No headaches, memory loss, loss of strength, numbness, or tremors  SKIN: No itching, burning, rashes, or lesions   LYMPH NODES: No enlarged glands  MUSCULOSKELETAL: No joint pain or swelling; No muscle, back, or extremity pain  HEME/LYMPH: No easy bruising, or bleeding gums    MEDICATIONS  (STANDING):  apixaban 2.5 milliGRAM(s) Oral every 12 hours  aspirin  chewable 81 milliGRAM(s) Oral daily  atorvastatin 40 milliGRAM(s) Oral at bedtime  epoetin armón Injectable 35536 Unit(s) IV Push <User Schedule>  gabapentin   Solution 300 milliGRAM(s) Oral at bedtime  influenza   Vaccine 0.5 milliLiter(s) IntraMuscular once  insulin lispro (HumaLOG) corrective regimen sliding scale   SubCutaneous every 6 hours  midodrine 15 milliGRAM(s) Oral every 8 hours  piperacillin/tazobactam IVPB. 3.375 Gram(s) IV Intermittent every 12 hours  sevelamer carbonate Powder 800 milliGRAM(s) Oral three times a day with meals    MEDICATIONS  (PRN):      Allergies    No Known Allergies    Intolerances        Vital Signs Last 24 Hrs  T(C): 36.8 (30 Apr 2019 05:46), Max: 36.8 (29 Apr 2019 16:00)  T(F): 98.3 (30 Apr 2019 05:46), Max: 98.3 (29 Apr 2019 16:00)  HR: 55 (30 Apr 2019 05:46) (55 - 74)  BP: 131/58 (30 Apr 2019 05:46) (117/58 - 140/63)  BP(mean): --  RR: 18 (30 Apr 2019 05:46) (18 - 18)  SpO2: 97% (30 Apr 2019 05:46) (97% - 100%)    PHYSICAL EXAM:    GENERAL: NAD, well-groomed, well-developed  HEAD:  Atraumatic, Normocephalic  EYES: EOMI, PERRLA, conjunctiva and sclera clear  ENMT: No tonsillar erythema, exudates, or enlargement; Moist mucous membranes, Good dentition, No lesions  NECK: Supple, No JVD, Normal thyroid  NERVOUS SYSTEM:  Alert & Oriented X3, Good concentration; Motor Strength 5/5 B/L upper and lower extremities; DTRs 2+ intact and symmetric  CHEST/LUNG: Clear to percussion bilaterally; No rales, rhonchi, wheezing, or rubs  HEART: Regular rate and rhythm; No murmurs, rubs, or gallops  ABDOMEN: Soft, Nontender, Nondistended; Bowel sounds present  EXTREMITIES:  2+ Peripheral Pulses, No clubbing, cyanosis, or edema  LYMPH: No lymphadenopathy noted  SKIN: No rashes or lesions      LABS:                        8.1    8.84  )-----------( 308      ( 28 Apr 2019 09:53 )             26.6     04-30    134<L>  |  91<L>  |  29<H>  ----------------------------<  111<H>  3.8   |  28  |  4.16<H>    Ca    9.6      30 Apr 2019 06:43  Phos  6.0     04-29            RADIOLOGY & ADDITIONAL STUDIES:

## 2019-05-01 ENCOUNTER — TRANSCRIPTION ENCOUNTER (OUTPATIENT)
Age: 69
End: 2019-05-01

## 2019-05-01 VITALS
TEMPERATURE: 98 F | DIASTOLIC BLOOD PRESSURE: 71 MMHG | SYSTOLIC BLOOD PRESSURE: 150 MMHG | RESPIRATION RATE: 20 BRPM | OXYGEN SATURATION: 97 % | HEART RATE: 81 BPM

## 2019-05-01 LAB
ANION GAP SERPL CALC-SCNC: 17 MMOL/L — SIGNIFICANT CHANGE UP (ref 5–17)
BASOPHILS # BLD AUTO: 0.1 K/UL — SIGNIFICANT CHANGE UP (ref 0–0.2)
BASOPHILS NFR BLD AUTO: 0.9 % — SIGNIFICANT CHANGE UP (ref 0–2)
BUN SERPL-MCNC: 44 MG/DL — HIGH (ref 7–23)
CALCIUM SERPL-MCNC: 9.6 MG/DL — SIGNIFICANT CHANGE UP (ref 8.4–10.5)
CHLORIDE SERPL-SCNC: 91 MMOL/L — LOW (ref 96–108)
CO2 SERPL-SCNC: 26 MMOL/L — SIGNIFICANT CHANGE UP (ref 22–31)
CREAT SERPL-MCNC: 6.43 MG/DL — HIGH (ref 0.5–1.3)
EOSINOPHIL # BLD AUTO: 0.51 K/UL — HIGH (ref 0–0.5)
EOSINOPHIL NFR BLD AUTO: 4.7 % — SIGNIFICANT CHANGE UP (ref 0–6)
GLUCOSE BLDC GLUCOMTR-MCNC: 79 MG/DL — SIGNIFICANT CHANGE UP (ref 70–99)
GLUCOSE BLDC GLUCOMTR-MCNC: 85 MG/DL — SIGNIFICANT CHANGE UP (ref 70–99)
GLUCOSE SERPL-MCNC: 81 MG/DL — SIGNIFICANT CHANGE UP (ref 70–99)
HCT VFR BLD CALC: 35.7 % — SIGNIFICANT CHANGE UP (ref 34.5–45)
HGB BLD-MCNC: 10.9 G/DL — LOW (ref 11.5–15.5)
IMM GRANULOCYTES NFR BLD AUTO: 1.5 % — SIGNIFICANT CHANGE UP (ref 0–1.5)
LYMPHOCYTES # BLD AUTO: 18.7 % — SIGNIFICANT CHANGE UP (ref 13–44)
LYMPHOCYTES # BLD AUTO: 2.03 K/UL — SIGNIFICANT CHANGE UP (ref 1–3.3)
MAGNESIUM SERPL-MCNC: 2.7 MG/DL — HIGH (ref 1.6–2.6)
MCHC RBC-ENTMCNC: 28.8 PG — SIGNIFICANT CHANGE UP (ref 27–34)
MCHC RBC-ENTMCNC: 30.5 GM/DL — LOW (ref 32–36)
MCV RBC AUTO: 94.4 FL — SIGNIFICANT CHANGE UP (ref 80–100)
MONOCYTES # BLD AUTO: 0.63 K/UL — SIGNIFICANT CHANGE UP (ref 0–0.9)
MONOCYTES NFR BLD AUTO: 5.8 % — SIGNIFICANT CHANGE UP (ref 2–14)
NEUTROPHILS # BLD AUTO: 7.45 K/UL — HIGH (ref 1.8–7.4)
NEUTROPHILS NFR BLD AUTO: 68.4 % — SIGNIFICANT CHANGE UP (ref 43–77)
PHOSPHATE SERPL-MCNC: 5.5 MG/DL — HIGH (ref 2.5–4.5)
PLATELET # BLD AUTO: 496 K/UL — HIGH (ref 150–400)
POTASSIUM SERPL-MCNC: 4.5 MMOL/L — SIGNIFICANT CHANGE UP (ref 3.5–5.3)
POTASSIUM SERPL-SCNC: 4.5 MMOL/L — SIGNIFICANT CHANGE UP (ref 3.5–5.3)
RBC # BLD: 3.78 M/UL — LOW (ref 3.8–5.2)
RBC # FLD: 15 % — HIGH (ref 10.3–14.5)
SODIUM SERPL-SCNC: 134 MMOL/L — LOW (ref 135–145)
WBC # BLD: 10.88 K/UL — HIGH (ref 3.8–10.5)
WBC # FLD AUTO: 10.88 K/UL — HIGH (ref 3.8–10.5)

## 2019-05-01 PROCEDURE — C1751: CPT

## 2019-05-01 PROCEDURE — 83605 ASSAY OF LACTIC ACID: CPT

## 2019-05-01 PROCEDURE — 36680 INSERT NEEDLE BONE CAVITY: CPT

## 2019-05-01 PROCEDURE — 97162 PT EVAL MOD COMPLEX 30 MIN: CPT

## 2019-05-01 PROCEDURE — 99291 CRITICAL CARE FIRST HOUR: CPT | Mod: 25

## 2019-05-01 PROCEDURE — 84145 PROCALCITONIN (PCT): CPT

## 2019-05-01 PROCEDURE — 87581 M.PNEUMON DNA AMP PROBE: CPT

## 2019-05-01 PROCEDURE — 74230 X-RAY XM SWLNG FUNCJ C+: CPT

## 2019-05-01 PROCEDURE — 80048 BASIC METABOLIC PNL TOTAL CA: CPT

## 2019-05-01 PROCEDURE — 86901 BLOOD TYPING SEROLOGIC RH(D): CPT

## 2019-05-01 PROCEDURE — 87798 DETECT AGENT NOS DNA AMP: CPT

## 2019-05-01 PROCEDURE — 93005 ELECTROCARDIOGRAM TRACING: CPT

## 2019-05-01 PROCEDURE — 92610 EVALUATE SWALLOWING FUNCTION: CPT

## 2019-05-01 PROCEDURE — 85014 HEMATOCRIT: CPT

## 2019-05-01 PROCEDURE — C8929: CPT

## 2019-05-01 PROCEDURE — 94003 VENT MGMT INPAT SUBQ DAY: CPT

## 2019-05-01 PROCEDURE — 80202 ASSAY OF VANCOMYCIN: CPT

## 2019-05-01 PROCEDURE — 84443 ASSAY THYROID STIM HORMONE: CPT

## 2019-05-01 PROCEDURE — 99292 CRITICAL CARE ADDL 30 MIN: CPT

## 2019-05-01 PROCEDURE — 36555 INSERT NON-TUNNEL CV CATH: CPT

## 2019-05-01 PROCEDURE — 83735 ASSAY OF MAGNESIUM: CPT

## 2019-05-01 PROCEDURE — 97116 GAIT TRAINING THERAPY: CPT

## 2019-05-01 PROCEDURE — 94002 VENT MGMT INPAT INIT DAY: CPT

## 2019-05-01 PROCEDURE — 83690 ASSAY OF LIPASE: CPT

## 2019-05-01 PROCEDURE — 92611 MOTION FLUOROSCOPY/SWALLOW: CPT

## 2019-05-01 PROCEDURE — 85652 RBC SED RATE AUTOMATED: CPT

## 2019-05-01 PROCEDURE — 87486 CHLMYD PNEUM DNA AMP PROBE: CPT

## 2019-05-01 PROCEDURE — 85610 PROTHROMBIN TIME: CPT

## 2019-05-01 PROCEDURE — 99261: CPT

## 2019-05-01 PROCEDURE — 85730 THROMBOPLASTIN TIME PARTIAL: CPT

## 2019-05-01 PROCEDURE — 82330 ASSAY OF CALCIUM: CPT

## 2019-05-01 PROCEDURE — 83036 HEMOGLOBIN GLYCOSYLATED A1C: CPT

## 2019-05-01 PROCEDURE — 84484 ASSAY OF TROPONIN QUANT: CPT

## 2019-05-01 PROCEDURE — P9045: CPT

## 2019-05-01 PROCEDURE — 82962 GLUCOSE BLOOD TEST: CPT

## 2019-05-01 PROCEDURE — 96375 TX/PRO/DX INJ NEW DRUG ADDON: CPT | Mod: XU

## 2019-05-01 PROCEDURE — 81001 URINALYSIS AUTO W/SCOPE: CPT

## 2019-05-01 PROCEDURE — 84295 ASSAY OF SERUM SODIUM: CPT

## 2019-05-01 PROCEDURE — 82553 CREATINE MB FRACTION: CPT

## 2019-05-01 PROCEDURE — 84100 ASSAY OF PHOSPHORUS: CPT

## 2019-05-01 PROCEDURE — 31500 INSERT EMERGENCY AIRWAY: CPT

## 2019-05-01 PROCEDURE — 96374 THER/PROPH/DIAG INJ IV PUSH: CPT | Mod: XU

## 2019-05-01 PROCEDURE — 85027 COMPLETE CBC AUTOMATED: CPT

## 2019-05-01 PROCEDURE — 86850 RBC ANTIBODY SCREEN: CPT

## 2019-05-01 PROCEDURE — 84436 ASSAY OF TOTAL THYROXINE: CPT

## 2019-05-01 PROCEDURE — 87040 BLOOD CULTURE FOR BACTERIA: CPT

## 2019-05-01 PROCEDURE — 94640 AIRWAY INHALATION TREATMENT: CPT

## 2019-05-01 PROCEDURE — 86900 BLOOD TYPING SEROLOGIC ABO: CPT

## 2019-05-01 PROCEDURE — 82550 ASSAY OF CK (CPK): CPT

## 2019-05-01 PROCEDURE — 87633 RESP VIRUS 12-25 TARGETS: CPT

## 2019-05-01 PROCEDURE — 82947 ASSAY GLUCOSE BLOOD QUANT: CPT

## 2019-05-01 PROCEDURE — 84132 ASSAY OF SERUM POTASSIUM: CPT

## 2019-05-01 PROCEDURE — 97530 THERAPEUTIC ACTIVITIES: CPT

## 2019-05-01 PROCEDURE — 36600 WITHDRAWAL OF ARTERIAL BLOOD: CPT

## 2019-05-01 PROCEDURE — 83615 LACTATE (LD) (LDH) ENZYME: CPT

## 2019-05-01 PROCEDURE — 84480 ASSAY TRIIODOTHYRONINE (T3): CPT

## 2019-05-01 PROCEDURE — 82435 ASSAY OF BLOOD CHLORIDE: CPT

## 2019-05-01 PROCEDURE — 80053 COMPREHEN METABOLIC PANEL: CPT

## 2019-05-01 PROCEDURE — 82803 BLOOD GASES ANY COMBINATION: CPT

## 2019-05-01 PROCEDURE — 71045 X-RAY EXAM CHEST 1 VIEW: CPT

## 2019-05-01 RX ORDER — ERYTHROPOIETIN 10000 [IU]/ML
1000 INJECTION, SOLUTION INTRAVENOUS; SUBCUTANEOUS
Qty: 0 | Refills: 0 | DISCHARGE
Start: 2019-05-01

## 2019-05-01 RX ORDER — MIDODRINE HYDROCHLORIDE 2.5 MG/1
2 TABLET ORAL
Qty: 0 | Refills: 0 | DISCHARGE
Start: 2019-05-01

## 2019-05-01 RX ORDER — POLYETHYLENE GLYCOL 3350 17 G/17G
17 POWDER, FOR SOLUTION ORAL
Qty: 0 | Refills: 0 | COMMUNITY

## 2019-05-01 RX ORDER — MIDODRINE HYDROCHLORIDE 2.5 MG/1
1 TABLET ORAL
Qty: 0 | Refills: 0 | COMMUNITY

## 2019-05-01 RX ORDER — INSULIN ASPART 100 [IU]/ML
0 INJECTION, SOLUTION SUBCUTANEOUS
Qty: 0 | Refills: 0 | COMMUNITY

## 2019-05-01 RX ORDER — MIDODRINE HYDROCHLORIDE 2.5 MG/1
3 TABLET ORAL
Qty: 0 | Refills: 0 | COMMUNITY
Start: 2019-05-01

## 2019-05-01 RX ORDER — MIDODRINE HYDROCHLORIDE 2.5 MG/1
3 TABLET ORAL
Qty: 0 | Refills: 0 | DISCHARGE
Start: 2019-05-01

## 2019-05-01 RX ADMIN — SEVELAMER CARBONATE 800 MILLIGRAM(S): 2400 POWDER, FOR SUSPENSION ORAL at 13:09

## 2019-05-01 RX ADMIN — ERYTHROPOIETIN 10000 UNIT(S): 10000 INJECTION, SOLUTION INTRAVENOUS; SUBCUTANEOUS at 10:03

## 2019-05-01 RX ADMIN — APIXABAN 2.5 MILLIGRAM(S): 2.5 TABLET, FILM COATED ORAL at 05:39

## 2019-05-01 RX ADMIN — Medication 81 MILLIGRAM(S): at 13:10

## 2019-05-01 RX ADMIN — MIDODRINE HYDROCHLORIDE 15 MILLIGRAM(S): 2.5 TABLET ORAL at 05:39

## 2019-05-01 NOTE — DISCHARGE NOTE PROVIDER - NSDCCPCAREPLAN_GEN_ALL_CORE_FT
PRINCIPAL DISCHARGE DIAGNOSIS  Diagnosis: Hyperkalemia  Assessment and Plan of Treatment: resolved; avoid foods with high potassium      SECONDARY DISCHARGE DIAGNOSES  Diagnosis: Pharyngoesophageal dysphagia  Assessment and Plan of Treatment: failed MBS post extubation; now on dysphagia 2 diet with nectar thickened liquids    Diagnosis: Hypotension, unspecified hypotension type  Assessment and Plan of Treatment: take prescribed medication; f/u with PCP and renal    Diagnosis: Acute respiratory failure with hypoxia  Assessment and Plan of Treatment: resolved; s/p intubation and extubation; wean form O2 in rehab    Diagnosis: ESRD (end stage renal disease)  Assessment and Plan of Treatment: HD as scheduled; f/u with renal    Diagnosis: Chronic atrial fibrillation  Assessment and Plan of Treatment: take prescribed medication; f/u with PCP PRINCIPAL DISCHARGE DIAGNOSIS  Diagnosis: Hyperkalemia  Assessment and Plan of Treatment: resolved; avoid foods with high potassium      SECONDARY DISCHARGE DIAGNOSES  Diagnosis: Pharyngoesophageal dysphagia  Assessment and Plan of Treatment: failed MBS post extubation; now on dysphagia 2 diet with nectar thickened liquids    Diagnosis: Hypotension, unspecified hypotension type  Assessment and Plan of Treatment: take prescribed medication;hold midodrine for SBP > 100; f/u with renal    Diagnosis: Acute respiratory failure with hypoxia  Assessment and Plan of Treatment: resolved; s/p intubation and extubation; wean form O2 in rehab    Diagnosis: ESRD (end stage renal disease)  Assessment and Plan of Treatment: HD as scheduled; f/u with renal    Diagnosis: Chronic atrial fibrillation  Assessment and Plan of Treatment: take prescribed medication; f/u with PCP

## 2019-05-01 NOTE — PROGRESS NOTE ADULT - PROBLEM SELECTOR PROBLEM 1
Sepsis associated hypotension
Acute respiratory failure with hypoxia
Acute respiratory failure with hypoxia
Chronic atrial fibrillation
Pharyngoesophageal dysphagia
Hyperkalemia

## 2019-05-01 NOTE — PROGRESS NOTE ADULT - PROBLEM SELECTOR PLAN 1
improved  on  zosyn   and  vanco .
: pt  accidentally  pulled  FT  out  ,  will  try  disphagia  2  diet . with aspirations  precations
awating  for  S&S   eval , continue on  TF
continue on  eliquis  bid, continue on  midrin  for  BP  support
pt   alert  tolerating  po  , occational  cough
pt   had  a  BP  80/50  improved  on  midran  5  mg  stat , pt  had  S&S   and  demostrate  silent  aspiration ,recommend  swallow  studies  on  monday   , recommend  NPO  on  TF
pt   to  continue on   respiratory   support  , will   try   weaning  proses
pt  continue on  NPO   and  TF   needs  swallow  studies  in  AM
pt  is  awake  alert  , c/o  sore throat  ,  ENT  consult  appreciated    vocal  cords   normal  , for  barium  swallow  today
Improved s/p HD  Low K diet
Improved s/p HD x 2

## 2019-05-01 NOTE — PROGRESS NOTE ADULT - REASON FOR ADMISSION
hyperkalemia, resp failure

## 2019-05-01 NOTE — DISCHARGE NOTE PROVIDER - INSTRUCTIONS
dysphagia 2 with nectar thickened liquids; renal restrictions; consistent carbohydrate dysphagia 2 with nectar thickened liquids; renal restrictions; consistent carbohydrate  strict aspiration precautions - pt must be upright in chair

## 2019-05-01 NOTE — PROGRESS NOTE ADULT - PROBLEM SELECTOR PROBLEM 3
Hyperkalemia
Acute respiratory failure with hypoxia
ESRD (end stage renal disease)
Hyperkalemia
ESRD (end stage renal disease)

## 2019-05-01 NOTE — PROGRESS NOTE ADULT - PROBLEM SELECTOR PLAN 3
K 5.7  for  HD   today
continue on  HD
continue on  HD
continue on  HD  as  schedulled .
continue on  HD  as  schedulled .
continue on  regular  HD   K 3.3
improved  continue  on  NC
requiring    urgent  HD   twice  , now   K  4.3  , elevated   troponins
Nephrology following
ON hD
repeat HD today

## 2019-05-01 NOTE — PROGRESS NOTE ADULT - SUBJECTIVE AND OBJECTIVE BOX
Subjective: Patient seen and examined. No new events except as noted.   feels ok   weak     REVIEW OF SYSTEMS:    CONSTITUTIONAL: + weakness, fevers or chills  EYES/ENT: No visual changes;  No vertigo or throat pain   NECK: No pain or stiffness  RESPIRATORY: No cough, wheezing, hemoptysis; No shortness of breath  CARDIOVASCULAR: No chest pain or palpitations  GASTROINTESTINAL: No abdominal or epigastric pain. No nausea, vomiting, or hematemesis; No diarrhea or constipation. No melena or hematochezia.  GENITOURINARY: No dysuria, frequency or hematuria  NEUROLOGICAL: No numbness or weakness  SKIN: No itching, burning, rashes, or lesions   All other review of systems is negative unless indicated above.    MEDICATIONS:  MEDICATIONS  (STANDING):  apixaban 2.5 milliGRAM(s) Oral every 12 hours  aspirin  chewable 81 milliGRAM(s) Oral daily  atorvastatin 40 milliGRAM(s) Oral at bedtime  dextrose 5%. 1000 milliLiter(s) (50 mL/Hr) IV Continuous <Continuous>  dextrose 50% Injectable 12.5 Gram(s) IV Push once  dextrose 50% Injectable 25 Gram(s) IV Push once  dextrose 50% Injectable 25 Gram(s) IV Push once  epoetin ramón Injectable 02813 Unit(s) IV Push <User Schedule>  gabapentin 300 milliGRAM(s) Oral at bedtime  influenza   Vaccine 0.5 milliLiter(s) IntraMuscular once  insulin lispro (HumaLOG) corrective regimen sliding scale   SubCutaneous three times a day before meals  insulin lispro (HumaLOG) corrective regimen sliding scale   SubCutaneous at bedtime  midodrine 15 milliGRAM(s) Oral every 8 hours  sevelamer carbonate 800 milliGRAM(s) Oral three times a day with meals      PHYSICAL EXAM:  T(C): 36.9 (05-01-19 @ 08:58), Max: 37.1 (04-30-19 @ 18:18)  HR: 63 (05-01-19 @ 08:58) (55 - 70)  BP: 128/52 (05-01-19 @ 08:58) (99/47 - 138/67)  RR: 18 (05-01-19 @ 08:58) (18 - 18)  SpO2: 95% (05-01-19 @ 08:58) (94% - 100%)  Wt(kg): --  I&O's Summary    30 Apr 2019 07:01  -  01 May 2019 07:00  --------------------------------------------------------  IN: 770 mL / OUT: 0 mL / NET: 770 mL          Appearance: NAD 	  HEENT:   Dry  oral mucosa, PERRL, EOMI	  Lymphatic: No lymphadenopathy  Cardiovascular: Normal S1 S2, No JVD, No murmurs , Peripheral pulses palpable 2+ bilaterally  Respiratory: Decreased bs   Gastrointestinal:  Soft, Non-tender, + BS	  Skin: No rashes, No ecchymoses, No cyanosis, warm to touch  Musculoskeletal: Decreased range of motion and  strength  Psychiatry:  lethargic   Ext: + partial amputations of toes       LABS:    CARDIAC MARKERS:                                10.9   10.88 )-----------( 496      ( 01 May 2019 08:40 )             35.7     05-01    134<L>  |  91<L>  |  44<H>  ----------------------------<  81  4.5   |  26  |  6.43<H>    Ca    9.6      01 May 2019 06:15  Phos  5.5     05-01  Mg     2.7     05-01      proBNP:   Lipid Profile:   HgA1c:   TSH:             TELEMETRY: 	    ECG:  	  RADIOLOGY:   DIAGNOSTIC TESTING:  [ ] Echocardiogram:  [ ]  Catheterization:  [ ] Stress Test:    OTHER:

## 2019-05-01 NOTE — PROGRESS NOTE ADULT - PROBLEM SELECTOR PLAN 2
intubated , more  alert  tolerating  weaning  process .will  try  extubation
: pt  accidentally  pulled  FT  out  ,  will  try  disphagia  2  diet . with aspirations  precations
continue o  midrodrin  15 mg  tid  BP  110/70
continue on  antibiotics   and  midran  15  mg  tid
improved  on  antibiotics
improved  on  antibiotics  , zosyn  d/c
pt   tolerating  extubation  ,  continue  O2 , OOB  to   chair , PT  consult
stable  BP,continue on  midrane  15 mg  tid  ,
will  continue on   epixaban  2.5   mg  bid
s/p Intubation   IV abx
s/p Intubation. now extubated   nebulizers   Completed IV abx  high aspiration risk. To start Dysphagia 2 diet. Aspiration precautions
s/p Intubation. now extubated   nebulizers   IV abx
s/p Intubation. now extubated   nebulizers   IV abx  Awaiting for MBS
s/p Intubation. now extubated   nebulizers   IV abx  high aspiration risk. To start Dysphagia 2 diet. Aspiration precautions

## 2019-05-01 NOTE — PROGRESS NOTE ADULT - PROBLEM SELECTOR PLAN 4
stable   for   HD  today
conttinue on  scheduled   HD
start on  TF  , need  S&S  in AM   for  evaluation  of  dysphagia
to   continue on  HD  as  needed
Now in sinus
Now in sinus  Eliquis as ordered
Rate controlled

## 2019-05-01 NOTE — DISCHARGE NOTE PROVIDER - CARE PROVIDER_API CALL
Bryan Schmid)  [address], [address]  [place] [address]  Phone: [phone]  Fax: [phone]  Follow Up Time:

## 2019-05-01 NOTE — PROGRESS NOTE ADULT - PROVIDER SPECIALTY LIST ADULT
Cardiology
Internal Medicine
MICU
MICU
Nephrology
Internal Medicine

## 2019-05-01 NOTE — DISCHARGE NOTE NURSING/CASE MANAGEMENT/SOCIAL WORK - NSDCDPATPORTLINK_GEN_ALL_CORE
You can access the [facility] Patient Portal, offered by [facility], by registering with the following website: [email or web address]

## 2019-05-01 NOTE — PROGRESS NOTE ADULT - SUBJECTIVE AND OBJECTIVE BOX
Patient seen and examined  Seen during HD  no complaints     REVIEW OF SYSTEMS:  As per HPI, otherwise 8 full 10 ROS were unremarkable    MEDICATIONS  (STANDING):  apixaban 2.5 milliGRAM(s) Oral every 12 hours  aspirin  chewable 81 milliGRAM(s) Oral daily  atorvastatin 40 milliGRAM(s) Oral at bedtime  dextrose 5%. 1000 milliLiter(s) (50 mL/Hr) IV Continuous <Continuous>  dextrose 50% Injectable 12.5 Gram(s) IV Push once  dextrose 50% Injectable 25 Gram(s) IV Push once  dextrose 50% Injectable 25 Gram(s) IV Push once  epoetin ramón Injectable 98753 Unit(s) IV Push <User Schedule>  gabapentin 300 milliGRAM(s) Oral at bedtime  influenza   Vaccine 0.5 milliLiter(s) IntraMuscular once  insulin lispro (HumaLOG) corrective regimen sliding scale   SubCutaneous three times a day before meals  insulin lispro (HumaLOG) corrective regimen sliding scale   SubCutaneous at bedtime  midodrine 15 milliGRAM(s) Oral every 8 hours  sevelamer carbonate 800 milliGRAM(s) Oral three times a day with meals      VITAL:  T(C): , Max: 37.1 (04-30-19 @ 18:18)  T(F): , Max: 98.7 (04-30-19 @ 18:18)  HR: 63 (05-01-19 @ 08:58)  BP: 128/52 (05-01-19 @ 08:58)  BP(mean): --  RR: 18 (05-01-19 @ 08:58)  SpO2: 95% (05-01-19 @ 08:58)  Wt(kg): --    I and O's:    04-30 @ 07:01  -  05-01 @ 07:00  --------------------------------------------------------  IN: 770 mL / OUT: 0 mL / NET: 770 mL          PHYSICAL EXAM:  Constitutional: NAD  Respiratory: diminished B/L  Cardiovascular: S1 and S2, RRR  Gastrointestinal: + BS, soft, NT, ND  Extremities: +/trace edema  Neurological: awake and interactive   : no mondragon  Access: evon avf     LABS:                        10.9   10.88 )-----------( 496      ( 01 May 2019 08:40 )             35.7     05-01    134<L>  |  91<L>  |  44<H>  ----------------------------<  81  4.5   |  26  |  6.43<H>    Ca    9.6      01 May 2019 06:15  Phos  5.5     05-01  Mg     2.7     05-01          ASSESSMENT   69F w/ DM2, HTN, and ESRD-HD, 4/22/19 a/w severe hyperkalemia/cardiac arrest    -Renal - ESRD - HD MWF  -CV - BP acceptable on midodrine  -Hyperphosphatemia in esrd on binders     RECOMMEND:  - HD today  - Epogen 10K TIW with HD  - continue renvela 800 tid for now  - continue midodrine 15 tid- Hold if SBP > 100 mmHg  - maintain renal diet  - dose meds for a GFR <10    Emad Audie RM  [facility] [employer]  [phone]

## 2019-05-01 NOTE — DISCHARGE NOTE PROVIDER - HOSPITAL COURSE
69 yr old female with stated hx significant for esrd, afib on apixaban, found to have severe hyperkalemia with slight peaked T waves. Complicated by initial AIVR to wide complex tachycardia requiring cardioversion and resp distress requiring intubation. 69 yr old female with stated hx significant for esrd, afib on apixaban, found to have severe hyperkalemia with slight peaked T waves. requiring emergent HD;pt also with rep failure requring intubation and admission to the MICU 69 yr old female with stated hx significant for esrd, afib on apixaban, found to have severe hyperkalemia with slight peaked T waves. requiring emergent HD;pt also with rep failure requring intubation and admission to the MICU; pt extubated - transferred to [address] [place] [address]; failed MBS - ngt placed with nepro feeds; pt pulled ngt out 4/30 and started on dysphagia 2 with nectar thickened liquids 69 yr old female with stated hx significant for esrd, afib on apixaban, found to have severe hyperkalemia with slight peaked T waves. requiring emergent HD;pt also with rep failure requring intubation and admission to the MICU; pt extubated - transferred to [address] [place] [address]; failed MBS - ngt placed with nepro feeds; pt pulled ngt out 4/30 and started on dysphagia 2 with nectar thickened liquids; while in the MICU pt developed SVT with widened QRS requring cardioversion to afib with controlled rate 69 yr old female with stated hx significant for esrd, afib on apixaban, found to have severe hyperkalemia with slight peaked T waves. requiring emergent HD;pt also with rep failure requring intubation and admission to the MICU; pt extubated - transferred to [address] [place] [address]; failed MBS - ngt placed with nepro feeds; pt pulled ngt out 4/30 and started on dysphagia 2 with nectar thickened liquids; while in the MICU pt developed SVT with widened QRS requring cardioversion to afib with controlled rate; followed by renal - dialyzed 3x week; seen by PT - RAHEEM recommended

## 2019-05-01 NOTE — PROGRESS NOTE ADULT - PROBLEM SELECTOR PROBLEM 2
Acute respiratory failure with hypoxia
Acute respiratory failure with hypoxia
Chronic atrial fibrillation
Hypotension, unspecified hypotension type
Hypotension, unspecified hypotension type
Pharyngoesophageal dysphagia
Sepsis associated hypotension
Respiratory failure

## 2019-05-01 NOTE — PROGRESS NOTE ADULT - SUBJECTIVE AND OBJECTIVE BOX
Chief complaint: pt   alert  tolerating  po  , occational  cough     HPI:  69 yr old female with PMHx ESRD on HD (mon-wed-fri) last HD session this past friday 4/19/19 (unknown removal of fluid), Afib on apixaban, CAD, Mod Ao stenosis (last TTE 5/2017), HTN, PVD, DM2, recent hospitalization 3/2019 s/p fall found to have hyperkalemia requiring emergent dialysis who now presents this morning (4/22/19) from home via EMS with worsening weakness and fatigue found to have hyperkalemia (8.5)             As endorsed by son pt was in usual state of health AOX3 after HD session, developing progressive weakness and fatigue saturday (4/20/19) with worsening this morning. In E.D. pt was found with thready pulse, initial ECG afib with ventricular response of 90's with RBBB slight peaked T waves in anterior leads and widened QRS complexes. Pt's heart rate deteriorated to rate of 20 (as endorsed by ED of [facility]) without loss of pulse, Pt received CaCl IVP (total 3 amps), Reg insulin initially 6 units IVP, then 8 units IVP, HCO3 IVP (total 100 meq), D50W IVP (total 100 cc's), and developed resp distress requiring intubation. Heart rate responded with development of SVT with widened QRS requiring cardioversion to afib with controlled rate. Pt's subsequent lab work demonstrated K+ 8.5, sCR 8.34 , . Last know sCr 4.68 from 3/2019.        Consult called and admission to MICU  for hyperkalemia requiring urgent hemodialysis and resp failure (22 Apr 2019 10:29)      REVIEW OF SYSTEMS:    CONSTITUTIONAL: No fever, weight loss, or fatigue  NECK: No pain or stiffness  RESPIRATORY: No cough, wheezing, chills or hemoptysis; No shortness of breath  CARDIOVASCULAR: No chest pain, palpitations, dizziness, or leg swelling  GASTROINTESTINAL: No abdominal or epigastric pain. No nausea, vomiting, or hematemesis; No diarrhea or constipation. No melena or hematochezia.  GENITOURINARY: No dysuria, frequency, hematuria, or incontinence  NEUROLOGICAL: No headaches, memory loss, loss of strength, numbness, or tremors  SKIN: No itching, burning, rashes, or lesions   LYMPH NODES: No enlarged glands  MUSCULOSKELETAL: No joint pain or swelling; No muscle, back, or extremity pain  HEME/LYMPH: No easy bruising, or bleeding gums    MEDICATIONS  (STANDING):  apixaban 2.5 milliGRAM(s) Oral every 12 hours  aspirin  chewable 81 milliGRAM(s) Oral daily  atorvastatin 40 milliGRAM(s) Oral at bedtime  dextrose 5%. 1000 milliLiter(s) (50 mL/Hr) IV Continuous <Continuous>  dextrose 50% Injectable 12.5 Gram(s) IV Push once  dextrose 50% Injectable 25 Gram(s) IV Push once  dextrose 50% Injectable 25 Gram(s) IV Push once  epoetin ramón Injectable 33884 Unit(s) IV Push <User Schedule>  gabapentin 300 milliGRAM(s) Oral at bedtime  influenza   Vaccine 0.5 milliLiter(s) IntraMuscular once  insulin lispro (HumaLOG) corrective regimen sliding scale   SubCutaneous three times a day before meals  insulin lispro (HumaLOG) corrective regimen sliding scale   SubCutaneous at bedtime  midodrine 15 milliGRAM(s) Oral every 8 hours  sevelamer carbonate 800 milliGRAM(s) Oral three times a day with meals    MEDICATIONS  (PRN):  dextrose 40% Gel 15 Gram(s) Oral once PRN Blood Glucose LESS THAN 70 milliGRAM(s)/deciliter  glucagon  Injectable 1 milliGRAM(s) IntraMuscular once PRN Glucose LESS THAN 70 milligrams/deciliter      Allergies    No Known Allergies    Intolerances        Vital Signs Last 24 Hrs  T(C): 36.7 (01 May 2019 05:35), Max: 37.1 (30 Apr 2019 18:18)  T(F): 98 (01 May 2019 05:35), Max: 98.7 (30 Apr 2019 18:18)  HR: 64 (01 May 2019 05:35) (55 - 70)  BP: 119/53 (01 May 2019 05:35) (99/47 - 138/67)  BP(mean): --  RR: 18 (01 May 2019 05:35) (18 - 18)  SpO2: 94% (01 May 2019 05:35) (94% - 100%)    PHYSICAL EXAM:    GENERAL: NAD, well-groomed, well-developed  HEAD:  Atraumatic, Normocephalic  EYES: EOMI, PERRLA, conjunctiva and sclera clear  ENMT: No tonsillar erythema, exudates, or enlargement; Moist mucous membranes, Good dentition, No lesions  NECK: Supple, No JVD, Normal thyroid  NERVOUS SYSTEM:  Alert & Oriented X3, Good concentration; Motor Strength 5/5 B/L upper and lower extremities; DTRs 2+ intact and symmetric  CHEST/LUNG: Clear to percussion bilaterally; No rales, rhonchi, wheezing, or rubs  HEART: Regular rate and rhythm; No murmurs, rubs, or gallops  ABDOMEN: Soft, Nontender, Nondistended; Bowel sounds present  EXTREMITIES:  2+ Peripheral Pulses, No clubbing, cyanosis, or edema  LYMPH: No lymphadenopathy noted  SKIN: No rashes or lesions      LABS:                        8.7    11.09 )-----------( 438      ( 30 Apr 2019 09:02 )             27.5     05-01    134<L>  |  91<L>  |  44<H>  ----------------------------<  81  4.5   |  26  |  6.43<H>    Ca    9.6      01 May 2019 06:15  Phos  5.5     05-01  Mg     2.7     05-01            RADIOLOGY & ADDITIONAL STUDIES:

## 2019-05-01 NOTE — PROGRESS NOTE ADULT - PROBLEM SELECTOR PROBLEM 4
ESRD (end stage renal disease)
Pharyngoesophageal dysphagia
Afib

## 2019-05-05 ENCOUNTER — TRANSCRIPTION ENCOUNTER (OUTPATIENT)
Age: 69
End: 2019-05-05

## 2019-05-07 NOTE — PATIENT PROFILE ADULT. - TEACHING/LEARNING LEARNING PREFERENCES
What Type Of Note Output Would You Prefer (Optional)?: Bullet Format Is This A New Presentation, Or A Follow-Up?: Skin Lesion Additional History: Patient is here for a full skin exam verbal instruction

## 2019-05-08 NOTE — PRE-OP CHECKLIST - BMI (KG/M2)
Julissa from Dr. Olmos's office is calling requesting a copy of patient's labs to be sent to their office    Fax: [phone]   29

## 2019-05-09 ENCOUNTER — TRANSCRIPTION ENCOUNTER (OUTPATIENT)
Age: 69
End: 2019-05-09

## 2019-05-14 NOTE — ED ADULT NURSE NOTE - NS ED NURSE DISCH DISPOSITION
Please advise. Once orders are placed , I will print and inform pt daughter its ready for  .    Admitted

## 2019-05-26 ENCOUNTER — TRANSCRIPTION ENCOUNTER (OUTPATIENT)
Age: 69
End: 2019-05-26

## 2019-05-28 ENCOUNTER — APPOINTMENT (OUTPATIENT)
Dept: VASCULAR SURGERY | Facility: CLINIC | Age: 69
End: 2019-05-28
Payer: MEDICARE

## 2019-05-28 PROCEDURE — 93923 UPR/LXTR ART STDY 3+ LVLS: CPT

## 2019-05-28 PROCEDURE — 99213 OFFICE O/P EST LOW 20 MIN: CPT

## 2019-05-28 NOTE — HISTORY OF PRESENT ILLNESS
[FreeTextEntry1] : 68 yo female with history of esrd on hd, dm, presents for evaluation of left foot infection was started on bactrim sunday after going to urgent care was seen by podiatry today and was given topical antibiotic and the nail was cut.  pt states that the redness has improved and denies any fevers or chills

## 2019-05-28 NOTE — PHYSICAL EXAM
[1+] : left 1+ [Ankle Swelling (On Exam)] : present [Ankle Swelling Bilaterally] : bilaterally  [Ankle Swelling On The Left] : moderate [No Rash or Lesion] : No rash or lesion [Alert] : alert [JVD] : no jugular venous distention  [Normal Rate and Rhythm] : normal rate and rhythm [Normal Breath Sounds] : Normal breath sounds [2+] : left 2+ [] : not present [Varicose Veins Of Lower Extremities] : not present [Skin Ulcer] : no ulcer [de-identified] : appears well  [de-identified] : motor intact b/l lower extremities

## 2019-05-28 NOTE — ASSESSMENT
[FreeTextEntry1] : 70 yo female with history of esrd on hd, dm, presents for evaluation of left foot infection was started on bactrim sunday after going to urgent care was seen by podiatry today and was given topical antibiotic and the nail was cut\par erik/pvr with toe pressures Were within normal limits. No vascular intervention necessary. Continue local wound care appeared

## 2019-05-30 NOTE — PROVIDER CONTACT NOTE (OTHER) - NAME OF MD/NP/PA/DO NOTIFIED:
05/30/2019  [id number]  (SERIES OF 3)  RIGHT  KNEE. NO COVERAGE - CASH BASED PROGRAM. PER LEADERSHIP.  NOT A COVERED BENEFIT FOR THIS SELF FUNDED BCBS OF [employer] [place] # [id number] .  AP DOUGLAS Jacobs

## 2019-06-13 ENCOUNTER — OUTPATIENT (OUTPATIENT)
Dept: OUTPATIENT SERVICES | Facility: HOSPITAL | Age: 69
LOS: 1 days | End: 2019-06-13
Payer: MEDICARE

## 2019-06-13 ENCOUNTER — APPOINTMENT (OUTPATIENT)
Dept: ULTRASOUND IMAGING | Facility: IMAGING CENTER | Age: 69
End: 2019-06-13
Payer: MEDICARE

## 2019-06-13 ENCOUNTER — APPOINTMENT (OUTPATIENT)
Dept: MAMMOGRAPHY | Facility: IMAGING CENTER | Age: 69
End: 2019-06-13
Payer: MEDICARE

## 2019-06-13 DIAGNOSIS — Z98.890 OTHER SPECIFIED POSTPROCEDURAL STATES: Chronic | ICD-10-CM

## 2019-06-13 DIAGNOSIS — Z00.8 ENCOUNTER FOR OTHER GENERAL EXAMINATION: ICD-10-CM

## 2019-06-13 DIAGNOSIS — Z93.1 GASTROSTOMY STATUS: Chronic | ICD-10-CM

## 2019-06-13 PROCEDURE — 76642 ULTRASOUND BREAST LIMITED: CPT | Mod: 26,LT

## 2019-06-13 PROCEDURE — 77063 BREAST TOMOSYNTHESIS BI: CPT | Mod: 26

## 2019-06-13 PROCEDURE — 77067 SCR MAMMO BI INCL CAD: CPT | Mod: 26

## 2019-06-13 PROCEDURE — 77067 SCR MAMMO BI INCL CAD: CPT

## 2019-06-13 PROCEDURE — 76642 ULTRASOUND BREAST LIMITED: CPT

## 2019-06-13 PROCEDURE — 77063 BREAST TOMOSYNTHESIS BI: CPT

## 2019-07-30 NOTE — HISTORY OF PRESENT ILLNESS
[] : in right upper arm [FreeTextEntry1] : 70 yo female with history of esrd on hd via right upper extremity avg presents for follow up without any complaints at this time

## 2019-07-30 NOTE — PHYSICAL EXAM
[Thrill] : thrill [Hand well perfused] : hand well perfused [Normal] : coordination grossly intact, no focal deficits [2+] : right 2+ [Pulsatile Thrill] : no pulsatile thrill [Aneurysm] : no aneurysm [Bleeding] : no bleeding [Ulcer] : no ulcer [de-identified] : intact

## 2019-07-30 NOTE — DISCUSSION/SUMMARY
[FreeTextEntry1] : 68 yo female with history of esrd on hd via right upper extremity avg presents for follow up without any complaints at this time \par duplex shows 2 areas of 50-75% stenosis with flow rate of 721 \par will continue to monitor given no difficulty with hd \par pt to follow up in 3 months with repeat duplex

## 2019-09-13 NOTE — ED PROVIDER NOTE - CLINICAL SUMMARY MEDICAL DECISION MAKING FREE TEXT BOX
69 yr old female with PMHx ESRD on HD (mon-wed-fri) last HD session wednesday), Afib , CAD, AS, HTN, PVD, DM2 and hld BIBA from dialysis for "clogged fistula". Will obtain labs, cxr, ekg and consult Vascular.

## 2019-09-13 NOTE — ED PROVIDER NOTE - CHPI ED SYMPTOMS NEG
no vomiting/no chills/no decreased eating/drinking/no nausea/no dizziness/no fever/no pain/no numbness/no tingling/no weakness

## 2019-09-13 NOTE — PROGRESS NOTE ADULT - SUBJECTIVE AND OBJECTIVE BOX
NEPHROLOGY - NSN    Patient seen and examined.    HPI:      PAST MEDICAL & SURGICAL HISTORY:  Hyperlipidemia  Diabetes  Hypertension  Osteomyelitis  Diabetic Neuropathy  Cellulitis of Foot  Edema of Both Legs  Diabetes: Type 2  History of Osteoarthritis  HTN - Hypertension  HLD (Hyperlipidemia)  Status post insertion of percutaneous endoscopic gastrostomy (PEG) tube  H/O tracheostomy  S/P amputation of lesser toe, right: 2013 right great toe, 2nd and 3rd right toes  S/P Amputation of Lesser Toe: Rt 1-3 metatarsal      MEDICATIONS  (STANDING):  sodium bicarbonate  Infusion 0.46 mEq/kG/Hr (250 mL/Hr) IV Continuous <Continuous>  sodium zirconium cyclosilicate 10 Gram(s) Oral once      Allergies    No Known Allergies    Intolerances        SOCIAL HISTORY:  Denies alcohol abuse, drug abuse or tobacco usage.     FAMILY HISTORY:      VITALS:  T(C): 36.7 (09-13-19 @ 12:29), Max: 36.9 (09-13-19 @ 11:52)  HR: 63 (09-13-19 @ 12:29) (63 - 64)  BP: 155/58 (09-13-19 @ 12:29) (154/66 - 155/58)  RR: 18 (09-13-19 @ 12:29) (18 - 18)  SpO2: 98% (09-13-19 @ 12:29) (98% - 99%)    REVIEW OF SYSTEMS:  Denies any nausea, vomiting, diarrhea, fever or chills. Denies chest pain, SOB, focal weakness, hematuria or dysuria. Good oral intake and denies fatigue or weakness. All other pertinent systems are reviewed and are negative.    PHYSICAL EXAM:  Constitutional: NAD  HEENT: EOMI  Neck:  No JVD, supple   Respiratory: CTA B/L  Cardiovascular: S1 and S2, RRR  Gastrointestinal: + BS, soft, NT, ND  Extremities: No peripheral edema, + peripheral pulses  Neurological: A/O x 3, CN2-12 intact  Psychiatric: Normal mood, normal affect  : No Raphael  Skin: No rashes, C/D/I  Access: Not applicable    I and O's:      Weight (kg): 81.6 (09-13 @ 11:52)    LABS:                        12.2   11.9  )-----------( 296      ( 13 Sep 2019 13:00 )             39.5     09-13    137  |  93<L>  |  86<H>  ----------------------------<  85  6.5<HH>   |  27  |  5.91<H>    Ca    9.3      13 Sep 2019 14:05    TPro  8.0  /  Alb  4.1  /  TBili  0.2  /  DBili  x   /  AST  14  /  ALT  20  /  AlkPhos  82  09-13         RADIOLOGY & ADDITIONAL STUDIES:  < from: Xray Chest 1 View AP/PA (09.13.19 @ 12:35) >    EXAM:  XR CHEST AP OR PA 1V                            PROCEDURE DATE:  09/13/2019            INTERPRETATION:  Indication: Preoperative chest radiograph.    Technique: Single AP view of the chest.    Comparison: 4/28/2019    Findings: The heart size cannot be adequately assessed on this single   view. The lung volumes are low. There is linear scarring versus   atelectasis at the right lung base. There is mild pulmonary vascular   congestion, unchanged.    Impression: Low lung volumes with mild pulmonary vascular congestion,   unchanged.                    DERRICK GUY M.D., ATTENDING RADIOLOGIST  This document has been electronically signed. Sep 13 2019 12:54PM             C NEPHROLOGY - NSN    Patient seen and examined.    HPI:The patient is an elderly female with past medical history of diabetes, hypertension, peripheral vascular disease, coronary artery disease, presents with clotted graft.     About a 2 year ago she had cardiac arrest following food, PEG was placed.  The patient continues to get stronger, PEG was eventually taken out.  She now eats regular food.  She has had no problems up until now.    She has a history of peripheral vascular disease status post TMA of the foot.  She has diabetes for about 20 years.  She does not have retinopathy.  She has hypertension but it has been well controlled.  She has been on dialysis for about 3 years now.  Dialysis days are Monday, Wednesday, Friday.  She does not know what her dry weight is.  HD days are MWF          PAST MEDICAL & SURGICAL HISTORY:  Hyperlipidemia  Diabetes  Hypertension  Osteomyelitis  Diabetic Neuropathy  Cellulitis of Foot  Edema of Both Legs  Diabetes: Type 2  History of Osteoarthritis  HTN - Hypertension  HLD (Hyperlipidemia)  Status post insertion of percutaneous endoscopic gastrostomy (PEG) tube  H/O tracheostomy  S/P amputation of lesser toe, right: 2013 right great toe, 2nd and 3rd right toes  S/P Amputation of Lesser Toe: Rt 1-3 metatarsal      MEDICATIONS  (STANDING):  sodium bicarbonate  Infusion 0.46 mEq/kG/Hr (250 mL/Hr) IV Continuous <Continuous>  sodium zirconium cyclosilicate 10 Gram(s) Oral once      Allergies    No Known Allergies    Intolerances        SOCIAL HISTORY:  Denies alcohol abuse, drug abuse or tobacco usage.     FAMILY HISTORY:      VITALS:  T(C): 36.7 (09-13-19 @ 12:29), Max: 36.9 (09-13-19 @ 11:52)  HR: 63 (09-13-19 @ 12:29) (63 - 64)  BP: 155/58 (09-13-19 @ 12:29) (154/66 - 155/58)  RR: 18 (09-13-19 @ 12:29) (18 - 18)  SpO2: 98% (09-13-19 @ 12:29) (98% - 99%)    REVIEW OF SYSTEMS:  Denies any nausea, vomiting, diarrhea, fever or chills. Denies chest pain, SOB, focal weakness, hematuria or dysuria. Good oral intake and denies fatigue or weakness. All other pertinent systems are reviewed and are negative.    PHYSICAL EXAM:  Constitutional: NAD  HEENT: EOMI  Neck:  No JVD, supple   Respiratory: CTA B/L  Cardiovascular: S1 and S2, RRR  Gastrointestinal: + BS, soft, NT, ND  Extremities: No peripheral edema, + peripheral pulses  Neurological: A/O x 3, CN2-12 intact  Psychiatric: Normal mood, normal affect  : No Raphael  Skin: No rashes, C/D/I  Access: Not applicable    I and O's:      Weight (kg): 81.6 (09-13 @ 11:52)    LABS:                        12.2   11.9  )-----------( 296      ( 13 Sep 2019 13:00 )             39.5     09-13    137  |  93<L>  |  86<H>  ----------------------------<  85  6.5<HH>   |  27  |  5.91<H>    Ca    9.3      13 Sep 2019 14:05    TPro  8.0  /  Alb  4.1  /  TBili  0.2  /  DBili  x   /  AST  14  /  ALT  20  /  AlkPhos  82  09-13         RADIOLOGY & ADDITIONAL STUDIES:  < from: Xray Chest 1 View AP/PA (09.13.19 @ 12:35) >    EXAM:  XR CHEST AP OR PA 1V                            PROCEDURE DATE:  09/13/2019            INTERPRETATION:  Indication: Preoperative chest radiograph.    Technique: Single AP view of the chest.    Comparison: 4/28/2019    Findings: The heart size cannot be adequately assessed on this single   view. The lung volumes are low. There is linear scarring versus   atelectasis at the right lung base. There is mild pulmonary vascular   congestion, unchanged.    Impression: Low lung volumes with mild pulmonary vascular congestion,   unchanged.                    DERRICK GUY M.D., ATTENDING RADIOLOGIST  This document has been electronically signed. Sep 13 2019 12:54PM             C

## 2019-09-13 NOTE — H&P ADULT - ASSESSMENT
68 YO female with past medical history of ESRD on HD ( M, W, F)  DM, hypertension, peripheral vascular disease ( status post TMA of the RTfoot) , A Fib ( on  Apixaban as per previous Admission in 5/2019), HLD,  Mod Aortic stenosis (last TTE 4/2019),coronary artery disease, h/o Cardiac arrest 2 years ago after food, S/P PEG and reversal,  presents with clotted graft.  Patient is found to have Hyperkalemia.

## 2019-09-13 NOTE — H&P ADULT - PROBLEM SELECTOR PLAN 1
S/P Albuterol / Ca Gluconate 1 gram X one IV, D50 X one and Regular Insulin ( 10 units ) and Lokelma 10 gram X one   F/up repeat Potassium at 6:20PM   Pt is currently on Sodium Bicarb   F/up repeat Potassium   HD once temporaary access is secured   repeat ECG  Telemetry monitoring S/P Albuterol / Ca Gluconate 1 gram X one IV, D50 X one and Regular Insulin ( 10 units ) and Lokelma 10 gram X one in the ED   F/up repeat Potassium at 6:20PM   Pt is currently on Sodium Bicarb   F/up repeat Potassium   HD once temporaary access is secured   repeat ECG  Telemetry monitoring

## 2019-09-13 NOTE — CONSULT NOTE ADULT - ASSESSMENT
ASSESSMENT  69F with PMHx ESRD on HD (MWF), Afib , CAD, AS, HTN, PVD, DM2 and HLD, BIBEMS for malfunctioning RUE AV graft    PLAN  - Will follow-up STAT labs  - To be discussed with Vascular Fellow    Vascular Surgery Team  [id number] ASSESSMENT  69F with PMHx ESRD on HD (MWF), Afib , CAD, AS, HTN, PVD, DM2 and HLD, BIBEMS for malfunctioning RUE AV graft    PLAN  - Hyperkalemic to 6.3 on chemistry  - May need emergent dialysis  - Have reached out to Dr. Gold from nephrology   - Rec Medical admission  - Will need fistulogram and lysis  - Discussed with Vascular Fellow    Vascular Surgery Team  [id number]

## 2019-09-13 NOTE — ED PROVIDER NOTE - CARE PLAN
Principal Discharge DX:	Hyperkalemia  Secondary Diagnosis:	Malfunction of arteriovenous dialysis fistula, initial encounter

## 2019-09-13 NOTE — CONSULT NOTE ADULT - SUBJECTIVE AND OBJECTIVE BOX
VASCULAR SURGERY CONSULT NOTE    HPI:  69F with PMHx ESRD on HD (MWF), Afib , CAD, AS, HTN, PVD, DM2 and HLD, BIBEMS from dialysis for non-working AVG. As per EMS, patient went to dialysis today but was sent to the ED when the AV graft was not functioning. Patient interviewed at bedside with Froy  (ID#[id number]). She has a Brachial AV graft with 6mm nonringed PTFE, surgery was in June 2017 by Dr. Caban and she has had no issues with dialysis since then. Her Nephrologist is Dr. Gold. Patient denies any numbness or weakness in the RUE. She states that she is feeling fine and denies any pain, fatigue, nausea, or vomiting. She denies CP, SOB, cough, fever. Has noted swelling in her lower extremities.      PMHx: Hyperlipidemia  Diabetes  Hypertension  Osteomyelitis  Diabetic Neuropathy  Cellulitis of Foot  Edema of Both Legs  Diabetes  History of Osteoarthritis  HTN - Hypertension  HLD (Hyperlipidemia)    PSHx: Status post insertion of percutaneous endoscopic gastrostomy (PEG) tube  H/O tracheostomy  S/P amputation of lesser toe, right  S/P Amputation of Lesser Toe  No Past Surgical History    Medications (inpatient):   Medications (PRN):  Allergies: No Known Allergies  (Intolerances: )  Social Hx: Lives at home with  and son    Physical Exam  T(C): 36.7  HR: 63 (63 - 64)  BP: 155/58 (154/66 - 155/58)  RR: 18 (18 - 18)  SpO2: 98% (98% - 99%)  Tmax: T(C): , Max: 36.9 (09-13-19 @ 11:52)    General: well developed, well nourished, NAD  Neuro: alert and oriented x3, [place], moves all extremities spontaneously  Respiratory: nonlabored on RA  CVS: regular rate and rhythm  Abdomen: soft, nontender, nondistended  Extremities: R brachial AVG without palpable thrill, no swelling, nontender to palpation. Full ROM of RUE and 5/5 strength in the RUE, b/l palpable radial pulses and b/l DP and PT pulses palpable, b/l lower 1+ extremity edema, sensation and movement grossly intact in all 4 extremities  Skin: warm, dry, appropriate color    Labs:                        12.2   11.9  )-----------( 296      ( 13 Sep 2019 13:00 )             39.5     PT/INR - ( 13 Sep 2019 13:00 )   PT: 15.0 sec;   INR: 1.31 ratio         PTT - ( 13 Sep 2019 13:00 )  PTT:32.2 sec

## 2019-09-13 NOTE — ED PROVIDER NOTE - SKIN, MLM
Skin normal color for race, warm, dry and intact. No evidence of rash. RUE fistula with no palpable thrill or bruits heard.

## 2019-09-13 NOTE — H&P ADULT - PROBLEM SELECTOR PLAN 7
With NOn functioning AV Graft   HD tonight with RT femoral Shiley placed by Vascular team   FIstulogram by IR on MOnday   HD on M/W/F / last HD was on Wednesday and will have HD tonight ( I have called HD center and As per nurse , pt is scheduled  for HD tonight

## 2019-09-13 NOTE — ED ADULT NURSE REASSESSMENT NOTE - NS ED NURSE REASSESS COMMENT FT1
pt still in dialysis. 2 [facility] called and report given to MIGUE Tomas. dialysis also called and asked to called 2 D[facility] RN with updates. pt to go to 2 [facility] from dialysis. chart is currently with pt

## 2019-09-13 NOTE — H&P ADULT - PROBLEM SELECTOR PLAN 5
Rate is currently controlled   ON Eliquis at home / Pt just had Rt Femoral Shiley placed by Vascular team tonight   MOnitor for Bleed   If no Bleed , PLease RESTART Eliquis tomorrow ( NP is aware )

## 2019-09-13 NOTE — H&P ADULT - PROBLEM SELECTOR PLAN 2
Vascular team has seen pt   IR for Fistulogram Vascular team has seen pt   IR for Fistulogram and Lysis possibly to be done on Monday 9/6/19 ( spoke with IR ) / Pt is to NPO after MN on 9/15/19   I spoke with the Vascular/Nephrology and IR teams   HD as per Nephrology

## 2019-09-13 NOTE — H&P ADULT - PROBLEM SELECTOR PLAN 3
ON  Insulin slidding scale at home   A1C in AM   Monitor Blood glucose   Low Intensity insulin Slidding scale

## 2019-09-13 NOTE — H&P ADULT - NSHPLABSRESULTS_GEN_ALL_CORE
Lab results/ ECG and CXR are all reviewed by me with noted  WBC = 11.9  h/H = 12.2  /39.5  PLT = 296      INR= 1.31  Glu = 72    Cr = 5.91   BUN= 86   K= 6.3--->6.5    Bicarb = 27   {H = 7.34   ECG with RBBB  QTC= 445  CA = 164  with no acute P or T wave changes / Compared to ECG done in 4/24/19 the RBBB is not new .  CXR with Mild Pulmonary vascular congestion.

## 2019-09-13 NOTE — H&P ADULT - HISTORY OF PRESENT ILLNESS
68 YO female with past medical history of ESRD on HD ( M, W, F)  DM, hypertension, peripheral vascular disease, HLD,  coronary artery disease, h/o Cardiac arrest, S/P PEG and reversal,  presents with clotted graft.   Patient was seen in the ED and her serum potassium was noted to be 6.3 and received Ca Gluconate, Albuterol, D50 and Insulin and repeat potassium is now 6.5.  Patient was started on Bicarb drip and Lokelma was ordered by the Nephrology team.  Potassium is to be repeate about one hour post Lokelma.  Patient was seen by Vascular team who recommend Fistulogram and possible Lysis to be done by IR / 68 YO female with past medical history of ESRD on HD ( M, W, F)  DM, hypertension, peripheral vascular disease ( status post TMA of the RTfoot) , A Fib ( on  Apixaban as per previous Admission in 5/2019), HLD,  Mod Aortic stenosis (last TTE 4/2019),coronary artery disease, h/o Cardiac arrest 2 years ago after food, S/P PEG and reversal,  presents with clotted graft.   Patient has ESRD and is on HD on M, W, F with last HD on Wednesday ( 2 days ago) was sent from Dialysis center due to non functioning AV Graft which was placed about 3 years ago as reported.  Patient was seen in the ED and her serum potassium was noted to be 6.3 and received Ca Gluconate, Albuterol, D50 and Insulin and repeat potassium is now 6.5 with no acute ECG changes.  Patient remains hemodynamically stable.  Patient was started on a Bicarb drip and Lokelma was ordered by the Nephrology team.  Potassium is to be repeated about one hour post Lokelma.  Patient was seen by Vascular team who recommend Fistulogram and possible Lysis to be done by IR .  As per IR , resident the Fistulogram /Possible Lysis is scheduled for Monday 9/16.    Pt was noted to have a complicated hospitalization in 4/2019 due to hyperkalemia and acute respiratory failure requiring Intubation and MICU admission

## 2019-09-13 NOTE — H&P ADULT - PROBLEM SELECTOR PLAN 8
MIld chronic cough as per patient   NO fever with no acute CXR finding   Wll order Tessalon Perles and monitor closely   Repeat CBC In AM

## 2019-09-13 NOTE — ED PROVIDER NOTE - PROGRESS NOTE DETAILS
Discussed case with Vascular team. Wants admitted to medicine. Will follow the patient and discussing with nephrology if she will need emergent dialysis

## 2019-09-13 NOTE — ED ADULT NURSE NOTE - OBJECTIVE STATEMENT
69y f pt arrived via ems; emt reports pt picked up from dialysis center 69y f pt arrived via ems; emt reports pt picked up from dialysis center and were given report pt fistula not working; on exam no thrill, no bruie; no active bleeding; pt denies any complaints; no pain no discomfort; aox3; very hard of hearing; no resp distress; no chest pain; no abd pain; no n/v/d; no fever/chills; pt not able to have any dialysis today; pt not sure how old fistula; states "a long time"; last dialysis wednesday; iv placed; labs drawn; safety/comfort maintained

## 2019-09-13 NOTE — PROGRESS NOTE ADULT - ASSESSMENT
The patient is a 68-year-old female with past medical history of hypertension, diabetes, end-stage renal disease who presents with clotted graft   Hyperkalemia      1.  Renal: Lokelma 10mg po x 1  2. Vasc-Will need for fistulogram and lysis so long as the potassium is normal;  If fistulogram is unsuccessful then [place]     3.  GI:  Low potassium diet.  Further workup pending above.

## 2019-09-13 NOTE — H&P ADULT - ATTENDING COMMENTS
Marianne Mariano   hospitalist   Rt femoral Shiley placed tonight by Vascular team tonight / MOnitor for bleed / HD tonight / Please follow up repeat Serum Potassium post HD / Restart Eliquis in AM if no bleed and is ok with the Attending PHysician.  Pt is scheduled for Fistulogram/ Lysis on MOnday 9/16.  NPO after MN on 9/15      [facility] Hospitalist  [phone]

## 2019-09-13 NOTE — ED PROVIDER NOTE - OBJECTIVE STATEMENT
69 yr old female with PMHx ESRD on HD (mon-wed-fri) last HD session wednesday), Afib , CAD, AS, HTN, PVD, DM2 and hld BIBA from dialysis for "clogged fistula". As per ems, patient went to dialysis and was unable to get it due to fistula malfunction. Patient a poor historian. Unsure of how long she had the fistula and who placed it. Nephrologist is Ali. Patient denies any symptoms at this time and states she feels "fine". Patient denies cp, sob, cough, fever, abd pain, nvd, and worsening swelling.

## 2019-09-14 NOTE — PROGRESS NOTE ADULT - SUBJECTIVE AND OBJECTIVE BOX
Chief complaint:pt  was  dialyzed  last  night  throgh  right  femoral  line , no  SOB   no  chest  pain .K 3.76    HPI:  70 YO female with past medical history of ESRD on HD ( M, W, F)  DM, hypertension, peripheral vascular disease ( status post TMA of the RTfoot) , A Fib ( on  Apixaban as per previous Admission in 5/2019), HLD,  Mod Aortic stenosis (last TTE 4/2019),coronary artery disease, h/o Cardiac arrest 2 years ago after food, S/P PEG and reversal,  presents with clotted graft.   Patient has ESRD and is on HD on M, W, F with last HD on Wednesday ( 2 days ago) was sent from Dialysis center due to non functioning AV Graft which was placed about 3 years ago as reported.  Patient was seen in the ED and her serum potassium was noted to be 6.3 and received Ca Gluconate, Albuterol, D50 and Insulin and repeat potassium is now 6.5 with no acute ECG changes.  Patient remains hemodynamically stable.  Patient was started on a Bicarb drip and Lokelma was ordered by the Nephrology team.  Potassium is to be repeated about one hour post Lokelma.  Patient was seen by Vascular team who recommend Fistulogram and possible Lysis to be done by IR .  As per IR , resident the Fistulogram /Possible Lysis is scheduled for Monday 9/16.    Pt was noted to have a complicated hospitalization in 4/2019 due to hyperkalemia and acute respiratory failure requiring Intubation and MICU admission (13 Sep 2019 16:55)      REVIEW OF SYSTEMS:    CONSTITUTIONAL: No fever, weight loss, or fatigue  NECK: No pain or stiffness  RESPIRATORY: No cough, wheezing, chills or hemoptysis; No shortness of breath  CARDIOVASCULAR: No chest pain, palpitations, dizziness, or leg swelling  GASTROINTESTINAL: No abdominal or epigastric pain. No nausea, vomiting, or hematemesis; No diarrhea or constipation. No melena or hematochezia.  GENITOURINARY: No dysuria, frequency, hematuria, or incontinence  NEUROLOGICAL: No headaches, memory loss, loss of strength, numbness, or tremors  SKIN: No itching, burning, rashes, or lesions   LYMPH NODES: No enlarged glands  MUSCULOSKELETAL: No joint pain or swelling; No muscle, back, or extremity pain  HEME/LYMPH: No easy bruising, or bleeding gums    MEDICATIONS  (STANDING):  artificial  tears Solution 1 Drop(s) Both EYES four times a day  aspirin  chewable 81 milliGRAM(s) Oral daily  atorvastatin 20 milliGRAM(s) Oral at bedtime  benzonatate 100 milliGRAM(s) Oral every 8 hours  dextrose 5%. 1000 milliLiter(s) (50 mL/Hr) IV Continuous <Continuous>  dextrose 50% Injectable 12.5 Gram(s) IV Push once  dextrose 50% Injectable 25 Gram(s) IV Push once  gabapentin 300 milliGRAM(s) Oral at bedtime  influenza   Vaccine 0.5 milliLiter(s) IntraMuscular once  insulin lispro (HumaLOG) corrective regimen sliding scale   SubCutaneous three times a day before meals  sevelamer carbonate 1600 milliGRAM(s) Oral three times a day with meals  sodium bicarbonate  Infusion 0.46 mEq/kG/Hr (250 mL/Hr) IV Continuous <Continuous>    MEDICATIONS  (PRN):  dextrose 40% Gel 15 Gram(s) Oral once PRN Blood Glucose LESS THAN 70 milliGRAM(s)/deciliter  glucagon  Injectable 1 milliGRAM(s) IntraMuscular once PRN Glucose LESS THAN 70 milligrams/deciliter      Allergies    No Known Allergies    Intolerances        Vital Signs Last 24 Hrs  T(C): 36.9 (14 Sep 2019 04:34), Max: 36.9 (13 Sep 2019 11:52)  T(F): 98.5 (14 Sep 2019 04:34), Max: 98.5 (13 Sep 2019 11:52)  HR: 62 (14 Sep 2019 04:34) (62 - 87)  BP: 124/51 (14 Sep 2019 04:34) (114/57 - 155/58)  BP(mean): --  RR: 18 (14 Sep 2019 04:34) (18 - 20)  SpO2: 93% (14 Sep 2019 04:34) (93% - 99%)    PHYSICAL EXAM:    GENERAL: NAD, well-groomed, well-developed  HEAD:  Atraumatic, Normocephalic  EYES: EOMI, PERRLA, conjunctiva and sclera clear  ENMT: No tonsillar erythema, exudates, or enlargement; Moist mucous membranes, Good dentition, No lesions  NECK: Supple, No JVD, Normal thyroid  NERVOUS SYSTEM:  Alert & Oriented X3, Good concentration; Motor Strength 5/5 B/L upper and lower extremities; DTRs 2+ intact and symmetric  CHEST/LUNG: Clear to percussion bilaterally; No rales, rhonchi, wheezing, or rubs  HEART: Regular rate and rhythm; No murmurs, rubs, or gallops  ABDOMEN: Soft, Nontender, Nondistended; Bowel sounds present  EXTREMITIES:  2+ Peripheral Pulses, No clubbing, cyanosis, or edema  LYMPH: No lymphadenopathy noted  SKIN: No rashes or lesions      LABS:                        12.2   11.9  )-----------( 296      ( 13 Sep 2019 13:00 )             39.5     09-14    136  |  94<L>  |  45<H>  ----------------------------<  120<H>  5.1   |  25  |  3.76<H>    Ca    9.3      14 Sep 2019 01:40    TPro  8.0  /  Alb  4.1  /  TBili  0.2  /  DBili  x   /  AST  14  /  ALT  20  /  AlkPhos  82  09-13    PT/INR - ( 13 Sep 2019 13:00 )   PT: 15.0 sec;   INR: 1.31 ratio         PTT - ( 13 Sep 2019 13:00 )  PTT:32.2 sec      RADIOLOGY & ADDITIONAL STUDIES:

## 2019-09-14 NOTE — PROGRESS NOTE ADULT - ASSESSMENT
ASSESSMENT  69F with PMHx ESRD on HD (MWF), Afib , CAD, AS, HTN, PVD, DM2 and HLD, BIBEMS for malfunctioning RUE AV graft.     PLAN  - Appreciate IR intervention, s/p Shiley placement.   - Plan for graftogram with IR tentatively Monday vs Tuesday.   - Care per Primary Team appreciated.     Vascular Surgery Pager #[phone] ASSESSMENT  69F with PMHx ESRD on HD (MWF), Afib , CAD, AS, HTN, PVD, DM2 and HLD, BIBEMS for malfunctioning RUE AV graft.     PLAN  - Plan for graftogram with IR.   - Care per Primary Team appreciated.     Vascular Surgery Pager #[phone]

## 2019-09-14 NOTE — PROGRESS NOTE ADULT - SUBJECTIVE AND OBJECTIVE BOX
Vascular Surgery Progress Note     Subjective/24hour Events:   Patient seen and examined.   Yesterday Shiley placed by IR.   No acute events overnight.   Pain controlled.     Vital Signs:  Vital Signs Last 24 Hrs  T(C): 36.9 (14 Sep 2019 04:34), Max: 36.9 (13 Sep 2019 11:52)  T(F): 98.5 (14 Sep 2019 04:34), Max: 98.5 (13 Sep 2019 11:52)  HR: 62 (14 Sep 2019 04:34) (62 - 87)  BP: 124/51 (14 Sep 2019 04:34) (114/57 - 155/58)  BP(mean): --  RR: 18 (14 Sep 2019 04:34) (18 - 20)  SpO2: 93% (14 Sep 2019 04:34) (93% - 99%)    CAPILLARY BLOOD GLUCOSE      POCT Blood Glucose.: 143 mg/dL (14 Sep 2019 08:04)  POCT Blood Glucose.: 110 mg/dL (13 Sep 2019 21:49)  POCT Blood Glucose.: 123 mg/dL (13 Sep 2019 19:39)  POCT Blood Glucose.: 72 mg/dL (13 Sep 2019 15:50)      I&O's Detail    13 Sep 2019 07:01  -  14 Sep 2019 07:00  --------------------------------------------------------  IN:  Total IN: 0 mL    OUT:    Other: 1200 mL  Total OUT: 1200 mL    Total NET: -1200 mL          MEDICATIONS  (STANDING):  artificial  tears Solution 1 Drop(s) Both EYES four times a day  aspirin  chewable 81 milliGRAM(s) Oral daily  atorvastatin 20 milliGRAM(s) Oral at bedtime  benzonatate 100 milliGRAM(s) Oral every 8 hours  dextrose 5%. 1000 milliLiter(s) (50 mL/Hr) IV Continuous <Continuous>  dextrose 50% Injectable 12.5 Gram(s) IV Push once  dextrose 50% Injectable 25 Gram(s) IV Push once  gabapentin 300 milliGRAM(s) Oral at bedtime  insulin lispro (HumaLOG) corrective regimen sliding scale   SubCutaneous three times a day before meals  sevelamer carbonate 1600 milliGRAM(s) Oral three times a day with meals  sodium bicarbonate  Infusion 0.46 mEq/kG/Hr (250 mL/Hr) IV Continuous <Continuous>    MEDICATIONS  (PRN):  dextrose 40% Gel 15 Gram(s) Oral once PRN Blood Glucose LESS THAN 70 milliGRAM(s)/deciliter  glucagon  Injectable 1 milliGRAM(s) IntraMuscular once PRN Glucose LESS THAN 70 milligrams/deciliter      Physical Exam:  Gen: NAD.  Lungs: Non labored breathing.   Ab: Soft, nontender, nondistended.   Extremities: R brachial AVG without palpable thrill, no swelling, nontender to palpation. Full ROM of RUE and 5/5 strength in the RUE, b/l palpable radial pulses and b/l DP and PT pulses palpable, b/l lower 1+ extremity edema, sensation and movement grossly intact in all 4 extremities    Labs:    09-14    136  |  94<L>  |  45<H>  ----------------------------<  120<H>  5.1   |  25  |  3.76<H>    Ca    9.3      14 Sep 2019 01:40    TPro  8.0  /  Alb  4.1  /  TBili  0.2  /  DBili  x   /  AST  14  /  ALT  20  /  AlkPhos  82  09-13    LIVER FUNCTIONS - ( 13 Sep 2019 13:00 )  Alb: 4.1 g/dL / Pro: 8.0 g/dL / ALK PHOS: 82 U/L / ALT: 20 U/L / AST: 14 U/L / GGT: x                                 12.2   11.9  )-----------( 296      ( 13 Sep 2019 13:00 )             39.5     PT/INR - ( 13 Sep 2019 13:00 )   PT: 15.0 sec;   INR: 1.31 ratio         PTT - ( 13 Sep 2019 13:00 )  PTT:32.2 sec Vascular Surgery Progress Note     Subjective/24hour Events:   Patient seen and examined.   Yesterday Shiley placed.   No acute events overnight.   Pain controlled.     Vital Signs:  Vital Signs Last 24 Hrs  T(C): 36.9 (14 Sep 2019 04:34), Max: 36.9 (13 Sep 2019 11:52)  T(F): 98.5 (14 Sep 2019 04:34), Max: 98.5 (13 Sep 2019 11:52)  HR: 62 (14 Sep 2019 04:34) (62 - 87)  BP: 124/51 (14 Sep 2019 04:34) (114/57 - 155/58)  BP(mean): --  RR: 18 (14 Sep 2019 04:34) (18 - 20)  SpO2: 93% (14 Sep 2019 04:34) (93% - 99%)    CAPILLARY BLOOD GLUCOSE      POCT Blood Glucose.: 143 mg/dL (14 Sep 2019 08:04)  POCT Blood Glucose.: 110 mg/dL (13 Sep 2019 21:49)  POCT Blood Glucose.: 123 mg/dL (13 Sep 2019 19:39)  POCT Blood Glucose.: 72 mg/dL (13 Sep 2019 15:50)      I&O's Detail    13 Sep 2019 07:01  -  14 Sep 2019 07:00  --------------------------------------------------------  IN:  Total IN: 0 mL    OUT:    Other: 1200 mL  Total OUT: 1200 mL    Total NET: -1200 mL          MEDICATIONS  (STANDING):  artificial  tears Solution 1 Drop(s) Both EYES four times a day  aspirin  chewable 81 milliGRAM(s) Oral daily  atorvastatin 20 milliGRAM(s) Oral at bedtime  benzonatate 100 milliGRAM(s) Oral every 8 hours  dextrose 5%. 1000 milliLiter(s) (50 mL/Hr) IV Continuous <Continuous>  dextrose 50% Injectable 12.5 Gram(s) IV Push once  dextrose 50% Injectable 25 Gram(s) IV Push once  gabapentin 300 milliGRAM(s) Oral at bedtime  insulin lispro (HumaLOG) corrective regimen sliding scale   SubCutaneous three times a day before meals  sevelamer carbonate 1600 milliGRAM(s) Oral three times a day with meals  sodium bicarbonate  Infusion 0.46 mEq/kG/Hr (250 mL/Hr) IV Continuous <Continuous>    MEDICATIONS  (PRN):  dextrose 40% Gel 15 Gram(s) Oral once PRN Blood Glucose LESS THAN 70 milliGRAM(s)/deciliter  glucagon  Injectable 1 milliGRAM(s) IntraMuscular once PRN Glucose LESS THAN 70 milligrams/deciliter      Physical Exam:  Gen: NAD.  Lungs: Non labored breathing.   Ab: Soft, nontender, nondistended.   Extremities: R brachial AVG without palpable thrill, no swelling, nontender to palpation. Full ROM of RUE and 5/5 strength in the RUE, b/l palpable radial pulses and b/l DP and PT pulses palpable, b/l lower 1+ extremity edema, sensation and movement grossly intact in all 4 extremities    Labs:    09-14    136  |  94<L>  |  45<H>  ----------------------------<  120<H>  5.1   |  25  |  3.76<H>    Ca    9.3      14 Sep 2019 01:40    TPro  8.0  /  Alb  4.1  /  TBili  0.2  /  DBili  x   /  AST  14  /  ALT  20  /  AlkPhos  82  09-13    LIVER FUNCTIONS - ( 13 Sep 2019 13:00 )  Alb: 4.1 g/dL / Pro: 8.0 g/dL / ALK PHOS: 82 U/L / ALT: 20 U/L / AST: 14 U/L / GGT: x                                 12.2   11.9  )-----------( 296      ( 13 Sep 2019 13:00 )             39.5     PT/INR - ( 13 Sep 2019 13:00 )   PT: 15.0 sec;   INR: 1.31 ratio         PTT - ( 13 Sep 2019 13:00 )  PTT:32.2 sec

## 2019-09-14 NOTE — PROGRESS NOTE ADULT - SUBJECTIVE AND OBJECTIVE BOX
      Patient seen and examined in bed. s/p HD yesterday.       MEDICATIONS  (STANDING):  artificial  tears Solution 1 Drop(s) Both EYES four times a day  aspirin  chewable 81 milliGRAM(s) Oral daily  atorvastatin 20 milliGRAM(s) Oral at bedtime  benzonatate 100 milliGRAM(s) Oral every 8 hours  dextrose 5%. 1000 milliLiter(s) (50 mL/Hr) IV Continuous <Continuous>  dextrose 50% Injectable 12.5 Gram(s) IV Push once  dextrose 50% Injectable 25 Gram(s) IV Push once  gabapentin 300 milliGRAM(s) Oral at bedtime  influenza   Vaccine 0.5 milliLiter(s) IntraMuscular once  insulin lispro (HumaLOG) corrective regimen sliding scale   SubCutaneous three times a day before meals  sevelamer carbonate 1600 milliGRAM(s) Oral three times a day with meals  sodium bicarbonate  Infusion 0.46 mEq/kG/Hr (250 mL/Hr) IV Continuous <Continuous>      VITAL:  T(C): , Max: 36.9 (09-13-19 @ 16:35)  T(F): , Max: 98.5 (09-13-19 @ 19:15)  HR: 62 (09-14-19 @ 04:34)  BP: 124/51 (09-14-19 @ 04:34)  RR: 18 (09-14-19 @ 04:34)  SpO2: 93% (09-14-19 @ 04:34)    I and O's:    09-13 @ 07:01  -  09-14 @ 07:00  --------------------------------------------------------  IN: 0 mL / OUT: 1200 mL / NET: -1200 mL    09-14 @ 07:01  -  09-14 @ 15:31  --------------------------------------------------------  IN: 360 mL / OUT: 0 mL / NET: 360 mL          PHYSICAL EXAM:    Constitutional: NAD  Neck:  No JVD  Respiratory: CTAB/L  Cardiovascular: S1 and S2  Gastrointestinal: BS+, soft, NT/ND  Extremities: No peripheral edema  Neurological: A/O x 3, no focal deficits  Psychiatric: Normal mood, normal affect  : No Raphael  Skin: No rashes  Access: clotted Right AVG, Right femoral Shiley    LABS:                        12.2   11.9  )-----------( 296      ( 13 Sep 2019 13:00 )             39.5     09-14    138  |  96  |  56<H>  ----------------------------<  174<H>  5.6<H>   |  26  |  4.79<H>    Ca    9.4      14 Sep 2019 12:31    TPro  7.0  /  Alb  3.7  /  TBili  0.2  /  DBili  x   /  AST  10  /  ALT  15  /  AlkPhos  76  09-14      ASSESSMENT/PLAN:  69-year-old female with past medical history of hypertension, diabetes, end-stage renal disease who presents with clotted graft     1. Renal: ESRD on HD MWF - s/p HD yesterday  2. Hyperkalemia: Give Lokelma 10mg po x 1  2. Vasc-Will need for fistulogram and lysis so long as the potassium is normal; s/p femoral shiley placement 9/13   3. GI: Low potassium diet. Further workup pending above.        Kylee Serra, NP-C  [facility]  [phone]       Patient seen and examined in bed. s/p HD yesterday.       MEDICATIONS  (STANDING):  artificial  tears Solution 1 Drop(s) Both EYES four times a day  aspirin  chewable 81 milliGRAM(s) Oral daily  atorvastatin 20 milliGRAM(s) Oral at bedtime  benzonatate 100 milliGRAM(s) Oral every 8 hours  dextrose 5%. 1000 milliLiter(s) (50 mL/Hr) IV Continuous <Continuous>  dextrose 50% Injectable 12.5 Gram(s) IV Push once  dextrose 50% Injectable 25 Gram(s) IV Push once  gabapentin 300 milliGRAM(s) Oral at bedtime  influenza   Vaccine 0.5 milliLiter(s) IntraMuscular once  insulin lispro (HumaLOG) corrective regimen sliding scale   SubCutaneous three times a day before meals  sevelamer carbonate 1600 milliGRAM(s) Oral three times a day with meals  sodium bicarbonate  Infusion 0.46 mEq/kG/Hr (250 mL/Hr) IV Continuous <Continuous>      VITAL:  T(C): , Max: 36.9 (09-13-19 @ 16:35)  T(F): , Max: 98.5 (09-13-19 @ 19:15)  HR: 62 (09-14-19 @ 04:34)  BP: 124/51 (09-14-19 @ 04:34)  RR: 18 (09-14-19 @ 04:34)  SpO2: 93% (09-14-19 @ 04:34)    I and O's:    09-13 @ 07:01  -  09-14 @ 07:00  --------------------------------------------------------  IN: 0 mL / OUT: 1200 mL / NET: -1200 mL    09-14 @ 07:01  -  09-14 @ 15:31  --------------------------------------------------------  IN: 360 mL / OUT: 0 mL / NET: 360 mL          PHYSICAL EXAM:    Constitutional: NAD  Neck:  No JVD  Respiratory: diminished BS  Cardiovascular: S1 and S2  Gastrointestinal: BS+, soft, NT/ND  Extremities: No peripheral edema  : No Raphael  Skin: No rashes  Access: clotted Right AVG, Right femoral Shiley    LABS:                        12.2   11.9  )-----------( 296      ( 13 Sep 2019 13:00 )             39.5     09-14    138  |  96  |  56<H>  ----------------------------<  174<H>  5.6<H>   |  26  |  4.79<H>    Ca    9.4      14 Sep 2019 12:31    TPro  7.0  /  Alb  3.7  /  TBili  0.2  /  DBili  x   /  AST  10  /  ALT  15  /  AlkPhos  76  09-14      ASSESSMENT/PLAN:  69-year-old female with past medical history of hypertension, diabetes, end-stage renal disease who presents with clotted graft     1. Renal: ESRD on HD MWF - s/p HD yesterday, next HD Monday  2. Hyperkalemia: K 5.6 this AM. Give Lokelma 10mg po x 1 today  2. Vasc-Will need for fistulogram and lysis so long as the potassium is normal; s/p femoral shiley placement 9/13   3. GI: Low potassium diet. Further workup pending above.        Kylee Serra, NP-C  [facility]  [phone]

## 2019-09-15 NOTE — PROGRESS NOTE ADULT - SUBJECTIVE AND OBJECTIVE BOX
covering for Dr Schmid    seen and examined. son and  at bedside no complaints. no palpitations. no CP or SOB.     MEDICATIONS  (STANDING):  artificial  tears Solution 1 Drop(s) Both EYES four times a day  aspirin  chewable 81 milliGRAM(s) Oral daily  atorvastatin 20 milliGRAM(s) Oral at bedtime  benzonatate 100 milliGRAM(s) Oral every 8 hours  dextrose 5%. 1000 milliLiter(s) (50 mL/Hr) IV Continuous <Continuous>  dextrose 50% Injectable 12.5 Gram(s) IV Push once  dextrose 50% Injectable 25 Gram(s) IV Push once  gabapentin 300 milliGRAM(s) Oral at bedtime  heparin  Infusion.  Unit(s)/Hr (15 mL/Hr) IV Continuous <Continuous>  influenza   Vaccine 0.5 milliLiter(s) IntraMuscular once  insulin lispro (HumaLOG) corrective regimen sliding scale   SubCutaneous three times a day before meals  sevelamer carbonate 1600 milliGRAM(s) Oral three times a day with meals  sodium bicarbonate  Infusion 0.46 mEq/kG/Hr (250 mL/Hr) IV Continuous <Continuous>    MEDICATIONS  (PRN):  dextrose 40% Gel 15 Gram(s) Oral once PRN Blood Glucose LESS THAN 70 milliGRAM(s)/deciliter  glucagon  Injectable 1 milliGRAM(s) IntraMuscular once PRN Glucose LESS THAN 70 milligrams/deciliter  heparin  Injectable 6500 Unit(s) IV Push every 6 hours PRN For aPTT less than 40  heparin  Injectable 3000 Unit(s) IV Push every 6 hours PRN For aPTT between 40 - 57      Allergies    No Known Allergies    Intolerances        Vital Signs Last 24 Hrs  T(C): 36.6 (15 Sep 2019 06:00), Max: 36.8 (14 Sep 2019 21:09)  T(F): 97.8 (15 Sep 2019 06:00), Max: 98.3 (14 Sep 2019 21:09)  HR: 56 (15 Sep 2019 06:00) (67 - 556)  BP: 120/65 (15 Sep 2019 06:00) (115/54 - 120/65)  BP(mean): --  RR: 20 (15 Sep 2019 06:00) (20 - 20)  SpO2: 97% (15 Sep 2019 06:00) (96% - 97%)    PHYSICAL EXAM:    GENERAL: NAD, well-groomed, well-developed  HEAD:  Atraumatic, Normocephalic  EYES: EOMI, PERRLA, conjunctiva and sclera clear  NECK: Supple, No JVD, Normal thyroid  NERVOUS SYSTEM:  Alert & Oriented X3, Good concentration; Motor Strength 5/5 B/L upper and lower extremities; DTRs 2+ intact and symmetric  CHEST/LUNG: Clear to auscultation bilaterally; No rales, rhonchi, wheezing, or rubs  HEART: Regular rate and rhythm; No murmurs, rubs, or gallops  ABDOMEN: Soft, Nontender, Nondistended; Bowel sounds present  EXTREMITIES:  2+ Peripheral Pulses, No clubbing, cyanosis, or edema right AV fistula  edema present in right arm.   LYMPH: No lymphadenopathy noted  SKIN: No rashes or lesions      LABS:                        10.8   7.85  )-----------( 203      ( 15 Sep 2019 08:54 )             34.4     09-15    137  |  96  |  74<H>  ----------------------------<  84  5.5<H>   |  23  |  5.97<H>    Ca    8.6      15 Sep 2019 06:24  Phos  7.2     09-15    TPro  7.0  /  Alb  3.7  /  TBili  0.2  /  DBili  x   /  AST  10  /  ALT  15  /  AlkPhos  76  09-14    PT/INR - ( 13 Sep 2019 13:00 )   PT: 15.0 sec;   INR: 1.31 ratio         PTT - ( 15 Sep 2019 10:13 )  PTT:97.1 sec      RADIOLOGY & ADDITIONAL STUDIES:

## 2019-09-16 NOTE — PROGRESS NOTE ADULT - SUBJECTIVE AND OBJECTIVE BOX
Interventional Radiology     69y Female with PMH as below with ESRD on HD with Right AV Graft malfunction (clotted AV Graft).   Pt reported last HD was last Friday.    PAST MEDICAL & SURGICAL HISTORY:  Hyperlipidemia  Diabetes  Hypertension  Osteomyelitis  Diabetic Neuropathy  Cellulitis of Foot  Edema of Both Legs  Diabetes: Type 2  History of Osteoarthritis  HTN - Hypertension  HLD (Hyperlipidemia)  Status post insertion of percutaneous endoscopic gastrostomy (PEG) tube  H/O tracheostomy  S/P amputation of lesser toe, right: 2013 right great toe, 2nd and 3rd right toes  S/P Amputation of Lesser Toe: Rt 1-3 metatarsal    Allergies  No Known Allergies    PE:   Gen Appearance: Pt is in NAD  MS: Alert OX3    Labs:                        10.7   8.48  )-----------( 235      ( 16 Sep 2019 09:49 )             34.1     09-16    136  |  93<L>  |  96<H>  ----------------------------<  85  5.7<H>   |  23  |  7.40<H>    Ca    8.6      16 Sep 2019 07:00  Phos  7.2     09-15    Prothrombin Time and INR, Plasma in AM (09.13.19 @ 13:00)    Prothrombin Time, Plasma: 15.0:     INR: 1.31:     PTT - ( 16 Sep 2019 09:16 )  PTT:88.7 sec    After risks, benefits, alternatives discussion pt verbalized understanding and signed informed consent.  Will plan for Right arm AV graftogram with possible thrombolysis, possible thrombectomy, possible angioplasty, possible stenting.    Catalino Cottrell, DASHAWN-C  [facility] [id number]  Ext [phone]

## 2019-09-16 NOTE — PROGRESS NOTE ADULT - SUBJECTIVE AND OBJECTIVE BOX
NEPHROLOGY-[facility] [phone]        Patient seen and examined in bed.  She was in good spirits         MEDICATIONS  (STANDING):  artificial tears (preservative free) Ophthalmic Solution 1 Drop(s) Both EYES four times a day  aspirin  chewable 81 milliGRAM(s) Oral daily  atorvastatin 20 milliGRAM(s) Oral at bedtime  dextrose 5%. 1000 milliLiter(s) (50 mL/Hr) IV Continuous <Continuous>  dextrose 50% Injectable 12.5 Gram(s) IV Push once  dextrose 50% Injectable 25 Gram(s) IV Push once  gabapentin 300 milliGRAM(s) Oral at bedtime  heparin  Infusion.  Unit(s)/Hr (15 mL/Hr) IV Continuous <Continuous>  influenza   Vaccine 0.5 milliLiter(s) IntraMuscular once  insulin lispro (HumaLOG) corrective regimen sliding scale   SubCutaneous three times a day before meals  sevelamer carbonate 1600 milliGRAM(s) Oral three times a day with meals  sodium bicarbonate  Infusion 0.46 mEq/kG/Hr (250 mL/Hr) IV Continuous <Continuous>      VITAL:  T(C): , Max: 36.7 (09-15-19 @ 14:33)  T(F): , Max: 98.1 (09-15-19 @ 21:21)  HR: 60 (09-16-19 @ 05:25)  BP: 129/69 (09-16-19 @ 05:25)  BP(mean): --  RR: 18 (09-16-19 @ 05:25)  SpO2: 99% (09-16-19 @ 05:25)  Wt(kg): --    I and O's:    09-15 @ 07:01  -  09-16 @ 07:00  --------------------------------------------------------  IN: 1365 mL / OUT: 350 mL / NET: 1015 mL          PHYSICAL EXAM:    Constitutional: NAD  Neck:  No JVD  Respiratory: CTAB/L  Cardiovascular: S1 and S2  Gastrointestinal: BS+, soft, NT/ND  Extremities: No peripheral edema  Neurological: A/O x 3, no focal deficits  Psychiatric: Normal mood, normal affect  : No Raphael  Skin: No rashes  Access: [facility]     LABS:                        10.8   7.85  )-----------( 203      ( 15 Sep 2019 08:54 )             34.4     09-16    136  |  93<L>  |  96<H>  ----------------------------<  85  5.7<H>   |  23  |  7.40<H>    Ca    8.6      16 Sep 2019 07:00  Phos  7.2     09-15    TPro  7.0  /  Alb  3.7  /  TBili  0.2  /  DBili  x   /  AST  10  /  ALT  15  /  AlkPhos  76  09-14          Urine Studies:          RADIOLOGY & ADDITIONAL STUDIES: NEPHROLOGY[facility] [phone]        Patient seen and examined in bed.  She was in good spirits         MEDICATIONS  (STANDING):  artificial tears (preservative free) Ophthalmic Solution 1 Drop(s) Both EYES four times a day  aspirin  chewable 81 milliGRAM(s) Oral daily  atorvastatin 20 milliGRAM(s) Oral at bedtime  dextrose 5%. 1000 milliLiter(s) (50 mL/Hr) IV Continuous <Continuous>  dextrose 50% Injectable 12.5 Gram(s) IV Push once  dextrose 50% Injectable 25 Gram(s) IV Push once  gabapentin 300 milliGRAM(s) Oral at bedtime  heparin  Infusion.  Unit(s)/Hr (15 mL/Hr) IV Continuous <Continuous>  influenza   Vaccine 0.5 milliLiter(s) IntraMuscular once  insulin lispro (HumaLOG) corrective regimen sliding scale   SubCutaneous three times a day before meals  sevelamer carbonate 1600 milliGRAM(s) Oral three times a day with meals  sodium bicarbonate  Infusion 0.46 mEq/kG/Hr (250 mL/Hr) IV Continuous <Continuous>      VITAL:  T(C): , Max: 36.7 (09-15-19 @ 14:33)  T(F): , Max: 98.1 (09-15-19 @ 21:21)  HR: 60 (09-16-19 @ 05:25)  BP: 129/69 (09-16-19 @ 05:25)  BP(mean): --  RR: 18 (09-16-19 @ 05:25)  SpO2: 99% (09-16-19 @ 05:25)  Wt(kg): --    I and O's:    09-15 @ 07:01  -  09-16 @ 07:00  --------------------------------------------------------  IN: 1365 mL / OUT: 350 mL / NET: 1015 mL          PHYSICAL EXAM:    Constitutional: NAD  Neck:  No JVD  Respiratory: CTAB/L  Cardiovascular: S1 and S2  Gastrointestinal: BS+, soft, NT/ND  Extremities: No peripheral edema + TMA  Neurological: A/O x 3, no focal deficits  Psychiatric: Normal mood, normal affect  : No Raphael  Skin: No rashes  Access: shiley ;  [facility]    LABS:                        10.8   7.85  )-----------( 203      ( 15 Sep 2019 08:54 )             34.4     09-16    136  |  93<L>  |  96<H>  ----------------------------<  85  5.7<H>   |  23  |  7.40<H>    Ca    8.6      16 Sep 2019 07:00  Phos  7.2     09-15    TPro  7.0  /  Alb  3.7  /  TBili  0.2  /  DBili  x   /  AST  10  /  ALT  15  /  AlkPhos  76  09-14          Urine Studies:          RADIOLOGY & ADDITIONAL STUDIES:

## 2019-09-16 NOTE — PROGRESS NOTE ADULT - ASSESSMENT
Annette is a pleasant 69 Year-Old Lady with PMHx ESRD on HD (MWF), Afib , CAD, AS, HTN, PVD, DM2 and HLD, BIBEMS for malfunctioning RUE AV graft.     PLAN  - Plan for graftogram with IR when possible.   - Care per Primary Team appreciated.     Vascular Surgery Pager #[phone]

## 2019-09-16 NOTE — PROGRESS NOTE ADULT - SUBJECTIVE AND OBJECTIVE BOX
Seen and examined. no complaints. awaiting fistulogram. on heparin no bleeding. NPO since midnight. no papitations. no CP or SOB.     Vital Signs Last 24 Hrs  T(C): 36.5 (16 Sep 2019 05:25), Max: 36.7 (15 Sep 2019 14:33)  T(F): 97.7 (16 Sep 2019 05:25), Max: 98.1 (15 Sep 2019 21:21)  HR: 60 (16 Sep 2019 05:25) (60 - 68)  BP: 129/69 (16 Sep 2019 05:25) (119/57 - 149/68)  BP(mean): --  RR: 18 (16 Sep 2019 05:25) (16 - 19)  SpO2: 99% (16 Sep 2019 05:25) (92% - 99%)    PHYSICAL EXAM:    aaox3 nad  irregularly irregular no murmur  clear lungs no wheezing  abd softnt nd  right arm AV fistula  no edema  no focal deficits      LABS:                        10.8   7.85  )-----------( 203      ( 15 Sep 2019 08:54 )             34.4     09-16    136  |  93<L>  |  96<H>  ----------------------------<  85  5.7<H>   |  23  |  7.40<H>    Ca    8.6      16 Sep 2019 07:00  Phos  7.2     09-15    TPro  7.0  /  Alb  3.7  /  TBili  0.2  /  DBili  x   /  AST  10  /  ALT  15  /  AlkPhos  76  09-14    PTT - ( 15 Sep 2019 10:13 )  PTT:97.1 sec      RADIOLOGY & ADDITIONAL STUDIES:

## 2019-09-16 NOTE — PROGRESS NOTE ADULT - SUBJECTIVE AND OBJECTIVE BOX
Vascular Surgery Progress Note     Subjective/24hour Events:   Patient seen and examined.   No acute events overnight.   Pain controlled.   Awaiting IR intervention.     Vital Signs:  Vital Signs Last 24 Hrs  T(C): 36.5 (16 Sep 2019 05:25), Max: 36.7 (15 Sep 2019 14:33)  T(F): 97.7 (16 Sep 2019 05:25), Max: 98.1 (15 Sep 2019 21:21)  HR: 60 (16 Sep 2019 05:25) (60 - 68)  BP: 129/69 (16 Sep 2019 05:25) (119/57 - 149/68)  BP(mean): --  RR: 18 (16 Sep 2019 05:25) (16 - 19)  SpO2: 99% (16 Sep 2019 05:25) (92% - 99%)    CAPILLARY BLOOD GLUCOSE      POCT Blood Glucose.: 120 mg/dL (15 Sep 2019 21:59)  POCT Blood Glucose.: 89 mg/dL (15 Sep 2019 17:51)  POCT Blood Glucose.: 130 mg/dL (15 Sep 2019 12:42)      I&O's Detail    15 Sep 2019 07:01  -  16 Sep 2019 07:00  --------------------------------------------------------  IN:    heparin  Infusion.: 285 mL    Oral Fluid: 1080 mL  Total IN: 1365 mL    OUT:    Voided: 350 mL  Total OUT: 350 mL    Total NET: 1015 mL          MEDICATIONS  (STANDING):  artificial tears (preservative free) Ophthalmic Solution 1 Drop(s) Both EYES four times a day  aspirin  chewable 81 milliGRAM(s) Oral daily  atorvastatin 20 milliGRAM(s) Oral at bedtime  dextrose 5%. 1000 milliLiter(s) (50 mL/Hr) IV Continuous <Continuous>  dextrose 50% Injectable 12.5 Gram(s) IV Push once  dextrose 50% Injectable 25 Gram(s) IV Push once  gabapentin 300 milliGRAM(s) Oral at bedtime  heparin  Infusion.  Unit(s)/Hr (15 mL/Hr) IV Continuous <Continuous>  influenza   Vaccine 0.5 milliLiter(s) IntraMuscular once  insulin lispro (HumaLOG) corrective regimen sliding scale   SubCutaneous three times a day before meals  sevelamer carbonate 1600 milliGRAM(s) Oral three times a day with meals  sodium bicarbonate  Infusion 0.46 mEq/kG/Hr (250 mL/Hr) IV Continuous <Continuous>    MEDICATIONS  (PRN):  dextrose 40% Gel 15 Gram(s) Oral once PRN Blood Glucose LESS THAN 70 milliGRAM(s)/deciliter  glucagon  Injectable 1 milliGRAM(s) IntraMuscular once PRN Glucose LESS THAN 70 milligrams/deciliter  heparin  Injectable 6500 Unit(s) IV Push every 6 hours PRN For aPTT less than 40  heparin  Injectable 3000 Unit(s) IV Push every 6 hours PRN For aPTT between 40 - 57      Physical Exam:  Gen: NAD.  Lungs: Non labored breathing.   Ab: Soft, nontender, nondistended.   Extremities: R brachial AVG without palpable thrill, no swelling, nontender to palpation. b/l palpable radial pulses and b/l DP and PT pulses palpable, b/l lower 1+ extremity edema.     Labs:    09-16    136  |  93<L>  |  96<H>  ----------------------------<  85  5.7<H>   |  23  |  7.40<H>    Ca    8.6      16 Sep 2019 07:00  Phos  7.2     09-15    TPro  7.0  /  Alb  3.7  /  TBili  0.2  /  DBili  x   /  AST  10  /  ALT  15  /  AlkPhos  76  09-14    LIVER FUNCTIONS - ( 14 Sep 2019 12:31 )  Alb: 3.7 g/dL / Pro: 7.0 g/dL / ALK PHOS: 76 U/L / ALT: 15 U/L / AST: 10 U/L / GGT: x                                 10.8   7.85  )-----------( 203      ( 15 Sep 2019 08:54 )             34.4     PTT - ( 15 Sep 2019 10:13 )  PTT:97.1 sec    Imaging:

## 2019-09-16 NOTE — PHARMACOTHERAPY INTERVENTION NOTE - COMMENTS
Confirmed home medications with pharmacy and list from dialysis center (in chart), updated in Outpatient Medication Review. Patient is unable to confirm her medications but states that she no longer takes insulin.    Laura Monte, PharmD   [phone] Confirmed home medications with pharmacy and list from dialysis center (in chart), updated in Outpatient Medication Review. Patient is unable to confirm her medications but states that she no longer takes insulin. Discrepancies communicated with NP - per pharmacy patient is on atorvastatin 40 mg daily and midodrine 10 mg TID, however per HD center list she is taking atorvastatin 20 mg daily and midodrine 2.5 mg daily. Per both sources patient is also on famotidine 20 mg daily.    Laura Monte, PharmD   [phone]

## 2019-09-16 NOTE — PROGRESS NOTE ADULT - ATTENDING COMMENTS
possible discharge after HD today or if too late will monitor overnight and discharge in AM    full code    Alton Schmid DO  Internal Medicine

## 2019-09-16 NOTE — PROGRESS NOTE ADULT - ASSESSMENT
ASSESSMENT/PLAN:  69-year-old female with past medical history of hypertension, diabetes, end-stage renal disease who presents with clotted graft     1. Renal: ESRD on HD MWF - s/p HD yesterday, next HD this am   2. Hyperkalemia: K 5.7 this AM. HD before declot  2. Vasc- s/p femoral shiley placement 9/13   3. GI: Low potassium diet. Further workup pending above.

## 2019-09-17 NOTE — PROGRESS NOTE ADULT - SUBJECTIVE AND OBJECTIVE BOX
Vascular Surgery Progress Note     Subjective/24hour Events:   Patient seen and examined.   Yesterday to IR for attempted AV Graft declot.   Episode of bleeding from AV graft site overnight.   Arm stable in AM, no pain.     Vital Signs:  Vital Signs Last 24 Hrs  T(C): 36.7 (17 Sep 2019 04:54), Max: 36.7 (17 Sep 2019 04:54)  T(F): 98.1 (17 Sep 2019 04:54), Max: 98.1 (17 Sep 2019 04:54)  HR: 73 (17 Sep 2019 04:54) (61 - 99)  BP: 132/56 (17 Sep 2019 04:54) (112/57 - 174/67)  BP(mean): 90 (16 Sep 2019 22:15) (82 - 97)  RR: 18 (17 Sep 2019 04:54) (15 - 18)  SpO2: 95% (17 Sep 2019 04:54) (94% - 100%)    CAPILLARY BLOOD GLUCOSE      POCT Blood Glucose.: 120 mg/dL (17 Sep 2019 07:58)  POCT Blood Glucose.: 114 mg/dL (16 Sep 2019 22:10)  POCT Blood Glucose.: 114 mg/dL (16 Sep 2019 21:16)  POCT Blood Glucose.: 78 mg/dL (16 Sep 2019 20:59)  POCT Blood Glucose.: 69 mg/dL (16 Sep 2019 20:47)  POCT Blood Glucose.: 67 mg/dL (16 Sep 2019 20:31)  POCT Blood Glucose.: 77 mg/dL (16 Sep 2019 12:21)      I&O's Detail    16 Sep 2019 07:01  -  17 Sep 2019 07:00  --------------------------------------------------------  IN:    heparin  Infusion.: 75 mL  Total IN: 75 mL    OUT:    Voided: 400 mL  Total OUT: 400 mL    Total NET: -325 mL          MEDICATIONS  (STANDING):  artificial tears (preservative free) Ophthalmic Solution 1 Drop(s) Both EYES four times a day  aspirin  chewable 81 milliGRAM(s) Oral daily  atorvastatin 20 milliGRAM(s) Oral at bedtime  dextrose 5%. 1000 milliLiter(s) (50 mL/Hr) IV Continuous <Continuous>  dextrose 50% Injectable 12.5 Gram(s) IV Push once  dextrose 50% Injectable 25 Gram(s) IV Push once  famotidine    Tablet 20 milliGRAM(s) Oral daily  gabapentin 300 milliGRAM(s) Oral at bedtime  heparin  Infusion.  Unit(s)/Hr (15 mL/Hr) IV Continuous <Continuous>  influenza   Vaccine 0.5 milliLiter(s) IntraMuscular once  insulin lispro (HumaLOG) corrective regimen sliding scale   SubCutaneous three times a day before meals  sevelamer carbonate 1600 milliGRAM(s) Oral three times a day with meals  sodium bicarbonate  Infusion 0.46 mEq/kG/Hr (250 mL/Hr) IV Continuous <Continuous>    MEDICATIONS  (PRN):  dextrose 40% Gel 15 Gram(s) Oral once PRN Blood Glucose LESS THAN 70 milliGRAM(s)/deciliter  glucagon  Injectable 1 milliGRAM(s) IntraMuscular once PRN Glucose LESS THAN 70 milligrams/deciliter  heparin  Injectable 6500 Unit(s) IV Push every 6 hours PRN For aPTT less than 40  heparin  Injectable 3000 Unit(s) IV Push every 6 hours PRN For aPTT between 40 - 57      Physical Exam:  Gen: NAD.  Lungs: Non labored breathing.   Ext: RUE Inner dressing with some serosanguinous saturation, external dressing intact. Moves all 4 spontaneously. R radial pulse palpable.     Labs:    09-17    137  |  93<L>  |  103<H>  ----------------------------<  111<H>  5.4<H>   |  21<L>  |  8.07<H>    Ca    8.5      17 Sep 2019 09:57  Phos  9.4     09-17  Mg     2.6     09-17                              10.8   8.3   )-----------( 232      ( 17 Sep 2019 09:18 )             34.3     PT/INR - ( 17 Sep 2019 00:18 )   PT: 13.1 sec;   INR: 1.13 ratio         PTT - ( 17 Sep 2019 09:18 )  PTT:74.4 sec    Imaging:

## 2019-09-17 NOTE — PROGRESS NOTE ADULT - ASSESSMENT
ASSESSMENT/PLAN:  69-year-old female with past medical history of hypertension, diabetes, end-stage renal disease who presents with clotted graft     1. Renal: ESRD on HD MWF - HD this am   2. Hyperkalemia: Will resolve with hd   2. Vasc- s/p femoral shiley placement 9/13 ;  Another IR declot on thursday;  At IR yesterday the cathater sheared inside and mult incision were made to remove the cathater.  Another attempt will be made on Thursday(48 hours p first insult)  3. GI: Low potassium diet. Further workup pending above.

## 2019-09-17 NOTE — PROGRESS NOTE ADULT - SUBJECTIVE AND OBJECTIVE BOX
Seen and examined. no complaints. awaiting fistulogram. on heparin no bleeding. NPO since midnight. no papitations. no CP or SOB.         PHYSICAL EXAM:    aaox3 nad  irregularly irregular no murmur  clear lungs no wheezing  abd softnt nd  right arm AV fistula  no edema  no focal deficits      Vital Signs Last 24 Hrs  T(C): 36.7 (17 Sep 2019 04:54), Max: 36.7 (17 Sep 2019 04:54)  T(F): 98.1 (17 Sep 2019 04:54), Max: 98.1 (17 Sep 2019 04:54)  HR: 73 (17 Sep 2019 04:54) (61 - 99)  BP: 132/56 (17 Sep 2019 04:54) (112/57 - 174/67)  BP(mean): 90 (16 Sep 2019 22:15) (82 - 97)  RR: 18 (17 Sep 2019 04:54) (15 - 18)  SpO2: 95% (17 Sep 2019 04:54) (94% - 100%)    PLABS:                        10.7   8.48  )-----------( 235      ( 16 Sep 2019 09:49 )             34.1     09-16    136  |  93<L>  |  96<H>  ----------------------------<  85  5.7<H>   |  23  |  7.40<H>    Ca    8.6      16 Sep 2019 07:00      PT/INR - ( 17 Sep 2019 00:18 )   PT: 13.1 sec;   INR: 1.13 ratio         PTT - ( 17 Sep 2019 00:18 )  PTT:27.1 sec      RADIOLOGY & ADDITIONAL STUDIES:

## 2019-09-17 NOTE — PROGRESS NOTE ADULT - SUBJECTIVE AND OBJECTIVE BOX
NEPHROLOGY-[facility] [phone]        Patient seen and examined in bed.  She was seen on HD         MEDICATIONS  (STANDING):  artificial tears (preservative free) Ophthalmic Solution 1 Drop(s) Both EYES four times a day  aspirin  chewable 81 milliGRAM(s) Oral daily  atorvastatin 20 milliGRAM(s) Oral at bedtime  dextrose 5%. 1000 milliLiter(s) (50 mL/Hr) IV Continuous <Continuous>  dextrose 50% Injectable 12.5 Gram(s) IV Push once  dextrose 50% Injectable 25 Gram(s) IV Push once  famotidine    Tablet 20 milliGRAM(s) Oral daily  gabapentin 300 milliGRAM(s) Oral at bedtime  heparin  Infusion.  Unit(s)/Hr (15 mL/Hr) IV Continuous <Continuous>  influenza   Vaccine 0.5 milliLiter(s) IntraMuscular once  insulin lispro (HumaLOG) corrective regimen sliding scale   SubCutaneous three times a day before meals  sevelamer carbonate 1600 milliGRAM(s) Oral three times a day with meals  sodium bicarbonate  Infusion 0.46 mEq/kG/Hr (250 mL/Hr) IV Continuous <Continuous>      VITAL:  T(C): , Max: 36.7 (09-17-19 @ 04:54)  T(F): , Max: 98.1 (09-17-19 @ 04:54)  HR: 71 (09-17-19 @ 08:45)  BP: 141/65 (09-17-19 @ 08:45)  BP(mean): 90 (09-16-19 @ 22:15)  RR: 17 (09-17-19 @ 08:45)  SpO2: 95% (09-17-19 @ 08:45)  Wt(kg): --    I and O's:    09-16 @ 07:01  -  09-17 @ 07:00  --------------------------------------------------------  IN: 75 mL / OUT: 400 mL / NET: -325 mL          PHYSICAL EXAM:    Constitutional: NAD  Neck:  No JVD  Respiratory: CTAB/L  Cardiovascular: S1 and S2  Gastrointestinal: BS+, soft, NT/ND  Extremities: No peripheral edema  Neurological: A/O x 3, no focal deficits  Psychiatric: Normal mood, normal affect  : No Raphael  Skin: No rashes  Access: [facility]     LABS:                        10.8   8.3   )-----------( 232      ( 17 Sep 2019 09:18 )             34.3     09-17    137  |  93<L>  |  103<H>  ----------------------------<  111<H>  5.4<H>   |  21<L>  |  8.07<H>    Ca    8.5      17 Sep 2019 09:57  Phos  9.4     09-17  Mg     2.6     09-17            Urine Studies:          RADIOLOGY & ADDITIONAL STUDIES:

## 2019-09-17 NOTE — PROGRESS NOTE ADULT - SUBJECTIVE AND OBJECTIVE BOX
Interventional Radiology    S/P attempted RUE AV graft declot, POD # 1.  Pt had sheared wire during the procedure that needed to be snared.      Vital Signs Last 24 Hrs  T(C): 36.7 (17 Sep 2019 04:54), Max: 36.7 (17 Sep 2019 04:54)  T(F): 98.1 (17 Sep 2019 04:54), Max: 98.1 (17 Sep 2019 04:54)  HR: 73 (17 Sep 2019 04:54) (61 - 99)  BP: 132/56 (17 Sep 2019 04:54) (112/57 - 174/67)  BP(mean): 90 (16 Sep 2019 22:15) (82 - 97)  RR: 18 (17 Sep 2019 04:54) (15 - 18)  SpO2: 95% (17 Sep 2019 04:54) (94% - 100%)    General Appearance:     Procedure Site (Right arm):     LABS:                        10.8   8.3   )-----------( 232      ( 17 Sep 2019 09:18 )             34.3     09-16    136  |  93<L>  |  96<H>  ----------------------------<  85  5.7<H>   |  23  |  7.40<H>    Ca    8.6      16 Sep 2019 07:00      PT/INR - ( 17 Sep 2019 00:18 )   PT: 13.1 sec;   INR: 1.13 ratio    PTT - ( 17 Sep 2019 00:18 )  PTT:27.1 sec    A/P   69y Female with ESRD on HD, thrombosed right upper extremity AV graft (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  Case d/w IR attending Dr. Mauricio Francois.  Pt can have repeat attempt for AV graft declot in 48 hours.    DONI Clark  [facility] [id number]  Ext [phone] Interventional Radiology    S/P attempted RUE AV graft declot, POD # 1.  Procedure was c/b an 0.018 wire that sheared off into the AV graft during the procedure and this was snared out.     Vital Signs Last 24 Hrs  T(C): 36.7 (17 Sep 2019 04:54), Max: 36.7 (17 Sep 2019 04:54)  T(F): 98.1 (17 Sep 2019 04:54), Max: 98.1 (17 Sep 2019 04:54)  HR: 73 (17 Sep 2019 04:54) (61 - 99)  BP: 132/56 (17 Sep 2019 04:54) (112/57 - 174/67)  BP(mean): 90 (16 Sep 2019 22:15) (82 - 97)  RR: 18 (17 Sep 2019 04:54) (15 - 18)  SpO2: 95% (17 Sep 2019 04:54) (94% - 100%)    General Appearance:     Procedure Site (Right arm):     LABS:                        10.8   8.3   )-----------( 232      ( 17 Sep 2019 09:18 )             34.3     09-16    136  |  93<L>  |  96<H>  ----------------------------<  85  5.7<H>   |  23  |  7.40<H>    Ca    8.6      16 Sep 2019 07:00      PT/INR - ( 17 Sep 2019 00:18 )   PT: 13.1 sec;   INR: 1.13 ratio    PTT - ( 17 Sep 2019 00:18 )  PTT:27.1 sec    A/P   69y Female with ESRD on HD, thrombosed right upper extremity AV graft (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  Case d/w IR attending Dr. Mauricio Francois.  Pt can have repeat attempt for AV graft declot in 48 hours.    DONI Clark  [facility] [id number]  Ext [phone] Interventional Radiology    S/P attempted RUE AV graft declot, POD # 1.  Procedure was c/b an 0.018 wire that sheared off into the AV graft during the procedure and this was snared out.     Vital Signs Last 24 Hrs  T(C): 36.7 (17 Sep 2019 04:54), Max: 36.7 (17 Sep 2019 04:54)  T(F): 98.1 (17 Sep 2019 04:54), Max: 98.1 (17 Sep 2019 04:54)  HR: 73 (17 Sep 2019 04:54) (61 - 99)  BP: 132/56 (17 Sep 2019 04:54) (112/57 - 174/67)  BP(mean): 90 (16 Sep 2019 22:15) (82 - 97)  RR: 18 (17 Sep 2019 04:54) (15 - 18)  SpO2: 95% (17 Sep 2019 04:54) (94% - 100%)    General Appearance: NAD seen in HD room while undergoing HD    Procedure Site (Right UE AV graft): no bleeding or hematoma, + pulsatile     RUE: 2+ Brachial, radial, and ulnar pulse    LABS:                        10.8   8.3   )-----------( 232      ( 17 Sep 2019 09:18 )             34.3     09-16    136  |  93<L>  |  96<H>  ----------------------------<  85  5.7<H>   |  23  |  7.40<H>    Ca    8.6      16 Sep 2019 07:00      PT/INR - ( 17 Sep 2019 00:18 )   PT: 13.1 sec;   INR: 1.13 ratio    PTT - ( 17 Sep 2019 00:18 )  PTT:27.1 sec    A/P   69y Female with ESRD on HD, thrombosed right upper extremity AV graft (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  Case d/w IR attending Dr. Mauricio Francois.  Pt can have repeat attempt for AV graft declot in 48 hours.    Catalino Cottrell, DASHAWN-C  [facility] [address]  Ext [phone] Interventional Radiology    S/P attempted RUE AV graft declot, POD # 1.  Procedure was c/b an 0.018 wire that sheared off into the AV graft during the procedure and this was snared out.     pt denies any c/o pain of right arm.  she denies any pain currently and does not have any complaints currently.      Vital Signs Last 24 Hrs  T(C): 36.7 (17 Sep 2019 04:54), Max: 36.7 (17 Sep 2019 04:54)  T(F): 98.1 (17 Sep 2019 04:54), Max: 98.1 (17 Sep 2019 04:54)  HR: 73 (17 Sep 2019 04:54) (61 - 99)  BP: 132/56 (17 Sep 2019 04:54) (112/57 - 174/67)  BP(mean): 90 (16 Sep 2019 22:15) (82 - 97)  RR: 18 (17 Sep 2019 04:54) (15 - 18)  SpO2: 95% (17 Sep 2019 04:54) (94% - 100%)    General Appearance: NAD seen in HD room while undergoing HD    Procedure Site (Right UE AV graft): no bleeding or hematoma, + pulsatile     RUE: 2+ Brachial, radial, and ulnar pulse, warm to touch     LABS:                        10.8   8.3   )-----------( 232      ( 17 Sep 2019 09:18 )             34.3     09-16    136  |  93<L>  |  96<H>  ----------------------------<  85  5.7<H>   |  23  |  7.40<H>    Ca    8.6      16 Sep 2019 07:00      PT/INR - ( 17 Sep 2019 00:18 )   PT: 13.1 sec;   INR: 1.13 ratio    PTT - ( 17 Sep 2019 00:18 )  PTT:27.1 sec    A/P   69y Female with ESRD on HD, thrombosed right upper extremity AV graft (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  Case d/w IR attending Dr. Mauricio Francois.  Pt can have repeat attempt for AV graft declot in 48 hours.    Catalino Cottrell, DASHAWN-C  [facility] [id number]  Ext [phone]

## 2019-09-17 NOTE — PROGRESS NOTE ADULT - ASSESSMENT
Annette is a pleasant 69 Year-Old Lady with PMHx ESRD on HD (MWF), Afib , CAD, AS, HTN, PVD, DM2 and HLD, BIBEMS for malfunctioning RUE AV graft.     PLAN  - F/u IR intervention.  - Care per Primary Team appreciated.     Vascular Surgery Pager #[phone]

## 2019-09-17 NOTE — PROGRESS NOTE ADULT - ATTENDING COMMENTS
HD today  needs longer term access for HD prior to discharge    full code    Alton Schmid, DO  Internal Medicine

## 2019-09-18 NOTE — PROGRESS NOTE ADULT - ASSESSMENT
ASSESSMENT/PLAN:  69-year-old female with past medical history of hypertension, diabetes, end-stage renal disease who presents with clotted graft     1. Renal: ESRD on HD MWF - HD this afternoon and then take out the shiley.  SHe continues to sit up despite being told  not to   2. Hyperkalemia: Will resolve with hd   2. Vasc- s/p femoral shiley placement 9/13 ;  Another IR declot on thursday;    3. GI: Low potassium diet. Further workup pending above.

## 2019-09-18 NOTE — PROGRESS NOTE ADULT - SUBJECTIVE AND OBJECTIVE BOX
NEPHROLOGY-[facility] [phone]        Patient seen and examined in bed.  She was about the same at present        MEDICATIONS  (STANDING):  artificial tears (preservative free) Ophthalmic Solution 1 Drop(s) Both EYES four times a day  aspirin  chewable 81 milliGRAM(s) Oral daily  atorvastatin 20 milliGRAM(s) Oral at bedtime  calcium acetate 667 milliGRAM(s) Oral three times a day with meals  dextrose 5%. 1000 milliLiter(s) (50 mL/Hr) IV Continuous <Continuous>  dextrose 50% Injectable 12.5 Gram(s) IV Push once  dextrose 50% Injectable 25 Gram(s) IV Push once  famotidine    Tablet 20 milliGRAM(s) Oral daily  gabapentin 300 milliGRAM(s) Oral at bedtime  heparin  Infusion 1200 Unit(s)/Hr (12 mL/Hr) IV Continuous <Continuous>  influenza   Vaccine 0.5 milliLiter(s) IntraMuscular once  insulin lispro (HumaLOG) corrective regimen sliding scale   SubCutaneous three times a day before meals  sevelamer carbonate 2400 milliGRAM(s) Oral three times a day with meals  sodium bicarbonate  Infusion 0.46 mEq/kG/Hr (250 mL/Hr) IV Continuous <Continuous>      VITAL:  T(C): , Max: 37.3 (09-17-19 @ 21:56)  T(F): , Max: 99.1 (09-17-19 @ 21:56)  HR: 66 (09-18-19 @ 04:31)  BP: 151/73 (09-18-19 @ 04:31)  BP(mean): --  RR: 18 (09-18-19 @ 04:31)  SpO2: 92% (09-18-19 @ 04:31)  Wt(kg): --    I and O's:    09-17 @ 07:01  -  09-18 @ 07:00  --------------------------------------------------------  IN: 360 mL / OUT: 1500 mL / NET: -1140 mL          PHYSICAL EXAM:    Constitutional: NAD  Neck:  No JVD  Respiratory: CTAB/L  Cardiovascular: S1 and S2  Gastrointestinal: BS+, soft, NT/ND  Extremities: No peripheral edema + tma  Neurological: A/O x 3, no focal deficits  Psychiatric: Normal mood, normal affect  : No Raphael  Skin: No rashes  Access: [facility]     LABS:                        10.8   8.3   )-----------( 232      ( 17 Sep 2019 09:18 )             34.3     09-17    137  |  93<L>  |  103<H>  ----------------------------<  111<H>  5.4<H>   |  21<L>  |  8.07<H>    Ca    8.5      17 Sep 2019 09:57  Phos  9.4     09-17  Mg     2.6     09-17            Urine Studies:          RADIOLOGY & ADDITIONAL STUDIES:

## 2019-09-18 NOTE — CHART NOTE - NSCHARTNOTEFT_GEN_A_CORE
Interventional Radiology Brief- Operative Note    Operators: Mauricio Francois MD    Procedure: Attempted right upper extremity AV graft declot    Post-op Dx:  thrombosed right upper extremity AV graft  Short severe segmental stenosis of the axillary vein    EBL: 10 mL    Medications: 1% lidocaine, IV sedation administered by anesthesiology    Contrast: 15 cc    Complications: proximal portion of the .018 mandril wire sheared off into the graft but was successfully snared and removed    Findings/Plan:  Attempts at declotting the right upper extremity AV graft were not successful  .018 mandril wire sheared off into the graft during initial access but was successfully snared and removed from the graft  Given the amount of puncture sites needed to remove the sheared .018 wire, the risk of severe bleeding outweighed the benefit of thrombectomy at this time  Repeat attempt in 48 hrs may be performed.
RN did bedside swallow test and patient passed
Spoke to vascular surgery Dr. Wayne    Advised him that patient very non compliant - constantly moving in bed & sitting at edge of bed  R groin Shiley HDC have some bleeding issues requiring dressing changes at least 1-2 times a day    Dr. Wayne willing to accept risks of complications of Shiley HDC in groin despite nephrology wanting to d/c catheter after HD today    Dr. Wayne adamant to leave catheter in place until HD functioning access catheter is in place - plan for fistulogram w/ lysis tomorrow and if not effective, will have to do Perma cath
Spoke w/ attending Dr. Schmid regarding some bleeding from RUE fistulogram site and R groin Shiley HDC site    heparin gtt changed to specific normogram w/ goal 50-70 without bolus and decreased from 1500 units/hr to 1200 units / hour    Ace bandage applied w/ reinforced pressure dressing to RUE  Quick clot dressing given to RN and HD unit called to change R groin Shiley HDC dressing    Will observe
Would not recommend removing Shiley after dialysis today, as patient would be left with no forms of dialysis, which would place the patient at risk of requiring additional placement of emergency dialysis access.   Risks of emergency dialysis access are significant and include RP bleeding, carotid injury, pneumothorax.   Would recommend removing Shiley after alternate forms of dialysis access obtained.     Spoke to NP Isabel Villafuerte regarding this at 0880 at [phone].
Called by PACU RN to evaluate patient's RUE; pt is s/p aborted right arm AV graftogram with possible thrombolysis. Per RN, patient's RUE noted with hematoma post procedure. Patient seen and assessed at bedside in PACU, NAD, alert and awake; family at bedside. She denied pain or weakness of RUE. She endorsed chronic numbness of fingers right hand. Also notified by PACU RN that patient had episode of hypoglycemia at 20:31 with fingerstick 67/69 (provider was not notified until approximately 22:10). Hypoglycemia protocol was followed, repeat fingerstick at 21:16 noted to be 114.         Vital Signs Last 24 Hrs  T(C): 36.5 (16 Sep 2019 22:15), Max: 36.5 (16 Sep 2019 05:25)  T(F): 97.7 (16 Sep 2019 22:15), Max: 97.7 (16 Sep 2019 05:25)  HR: 81 (16 Sep 2019 22:15) (60 - 99)  BP: 133/63 (16 Sep 2019 22:15) (112/57 - 174/67)  BP(mean): 90 (16 Sep 2019 22:15) (82 - 97)  RR: 16 (16 Sep 2019 22:15) (15 - 18)  SpO2: 97% (16 Sep 2019 22:15) (96% - 100%)      Physical Exam:  General: WN/WD, NAD, AOx3, nontoxic appearing  Head:  NC/AT  RUE: (+) radial and brachial pulse. Compartments soft and compressible. Strength 5/5. Sensation intact to light touch.   Skin: (+) warm, dry   Psych: Appropriate affect       Labs:                        10.7   8.48  )-----------( 235      ( 16 Sep 2019 09:49 )             34.1     09-16    136  |  93<L>  |  96<H>  ----------------------------<  85  5.7<H>   |  23  |  7.40<H>    Ca    8.6      16 Sep 2019 07:00  Phos  7.2     09-15        Assessment & Plan:  69 Year-Old Lady with PMHx ESRD on HD (MWF), Afib , CAD, AS, HTN, PVD, DM2 and HLD, BIBEMS for malfunctioning RUE AV graft.   Patient now presenting acutely for RUE hematoma noted on RUE post aborted AV graftogram with IR earlier this evening.       #RUE hematoma s/p aborted AV graftogram  -RUE neurovascularly intact, compartments soft  -Neurovascular checks Q4h  -Maintain pressure dressing on RUE  -Consider imaging of RUE if indicated  -Follow up IR/vascular recommendations  -Will continue to closely monitor patient/vitals  -Primary Team to follow up in AM, attending to follow     #Hypoglycemia-resolved  -Patient asymptomatic and asking to eat food  -Close monitoring of fingersticks  -Carb consistent diet/diabetic teaching      Manohar Smith PA-C  Dept of Medicine  [id number]

## 2019-09-18 NOTE — PROGRESS NOTE ADULT - SUBJECTIVE AND OBJECTIVE BOX
seen and examined. planning for HD today, and retry at vascular declot tomorrow. no complaints. no bleeding. no CP or palpitations. no SOB.       Vital Signs Last 24 Hrs  T(C): 36.7 (18 Sep 2019 08:00), Max: 37.3 (17 Sep 2019 21:56)  T(F): 98.1 (18 Sep 2019 08:00), Max: 99.1 (17 Sep 2019 21:56)  HR: 68 (18 Sep 2019 08:00) (66 - 80)  BP: 147/66 (18 Sep 2019 08:00) (121/52 - 151/73)  BP(mean): --  RR: 18 (18 Sep 2019 08:00) (18 - 19)  SpO2: 93% (18 Sep 2019 08:00) (92% - 95%)    PHYSICAL EXAM:    GENERAL: NAD, well-groomed, well-developed  HEAD:  Atraumatic, Normocephalic  NERVOUS SYSTEM:  Alert & Oriented X3, Good concentration; Motor Strength 5/5 B/L upper and lower extremities; DTRs 2+ intact and symmetric  CHEST/LUNG: Clear to auscultation bilaterally; No rales, rhonchi, wheezing, or rubs  HEART: irregularly irregular rate and rhythm; No murmurs, rubs, or gallops  ABDOMEN: Soft, Nontender, Nondistended; Bowel sounds present  EXTREMITIES:  2+ Peripheral Pulses, No clubbing, cyanosis, or edema - right arm AV fistula bandaged  LYMPH: No lymphadenopathy noted  SKIN: No rashes or lesions      LABS:                        10.8   8.3   )-----------( 232      ( 17 Sep 2019 09:18 )             34.3     09-17    137  |  93<L>  |  103<H>  ----------------------------<  111<H>  5.4<H>   |  21<L>  |  8.07<H>    Ca    8.5      17 Sep 2019 09:57  Phos  9.4     09-17  Mg     2.6     09-17      PT/INR - ( 17 Sep 2019 00:18 )   PT: 13.1 sec;   INR: 1.13 ratio         PTT - ( 18 Sep 2019 03:09 )  PTT:57.5 sec      RADIOLOGY & ADDITIONAL STUDIES:

## 2019-09-18 NOTE — PROGRESS NOTE ADULT - ASSESSMENT
Annette is a pleasant 69 Year-Old Lady with PMHx ESRD on HD (MWF), Afib , CAD, AS, HTN, PVD, DM2 and HLD, BIBEMS for malfunctioning RUE AV graft.     PLAN  - Plan for IR re-intervention tomorrow.   - Dressing change as needed.   - Care per Primary Team appreciated.     Vascular Surgery Pager #[phone]

## 2019-09-18 NOTE — PROGRESS NOTE ADULT - SUBJECTIVE AND OBJECTIVE BOX
Vascular Surgery Progress Note     Subjective/24hour Events:   Patient seen and examined.   Some bleeding from fistula site noted overnight.   Heparin adjusted, ACE bandage applied.     Vital Signs:  Vital Signs Last 24 Hrs  T(C): 36.8 (18 Sep 2019 04:31), Max: 37.3 (17 Sep 2019 21:56)  T(F): 98.3 (18 Sep 2019 04:31), Max: 99.1 (17 Sep 2019 21:56)  HR: 66 (18 Sep 2019 04:31) (66 - 80)  BP: 151/73 (18 Sep 2019 04:31) (121/52 - 151/73)  BP(mean): --  RR: 18 (18 Sep 2019 04:31) (17 - 19)  SpO2: 92% (18 Sep 2019 04:31) (92% - 95%)    CAPILLARY BLOOD GLUCOSE      POCT Blood Glucose.: 107 mg/dL (17 Sep 2019 21:49)  POCT Blood Glucose.: 131 mg/dL (17 Sep 2019 17:11)  POCT Blood Glucose.: 131 mg/dL (17 Sep 2019 12:54)  POCT Blood Glucose.: 120 mg/dL (17 Sep 2019 07:58)      I&O's Detail    17 Sep 2019 07:01  -  18 Sep 2019 07:00  --------------------------------------------------------  IN:    Oral Year-Old Ladyluid: 360 mL  Total IN: 360 mL    OUT:    Other: 1500 mL  Total OUT: 1500 mL    Total NET: -1140 mL          MEDICATIONS  (STANDING):  artificial tears (preservative free) Ophthalmic Solution 1 Drop(s) Both EYES four times a day  aspirin  chewable 81 milliGRAM(s) Oral daily  atorvastatin 20 milliGRAM(s) Oral at bedtime  calcium acetate 667 milliGRAM(s) Oral three times a day with meals  dextrose 5%. 1000 milliLiter(s) (50 mL/Hr) IV Continuous <Continuous>  dextrose 50% Injectable 12.5 Gram(s) IV Push once  dextrose 50% Injectable 25 Gram(s) IV Push once  famotidine    Tablet 20 milliGRAM(s) Oral daily  gabapentin 300 milliGRAM(s) Oral at bedtime  heparin  Infusion 1200 Unit(s)/Hr (12 mL/Hr) IV Continuous <Continuous>  influenza   Vaccine 0.5 milliLiter(s) IntraMuscular once  insulin lispro (HumaLOG) corrective regimen sliding scale   SubCutaneous three times a day before meals  sevelamer carbonate 2400 milliGRAM(s) Oral three times a day with meals  sodium bicarbonate  Infusion 0.46 mEq/kG/Hr (250 mL/Hr) IV Continuous <Continuous>    MEDICATIONS  (PRN):  dextrose 40% Gel 15 Gram(s) Oral once PRN Blood Glucose LESS THAN 70 milliGRAM(s)/deciliter  glucagon  Injectable 1 milliGRAM(s) IntraMuscular once PRN Glucose LESS THAN 70 milligrams/deciliter      Physical Exam:  Gen: NAD.  Lungs: Non labored breathing.   Ab: Soft, nontender, nondistended.   Ext: Moves all 4 spontaneously.     Labs:    09-17    137  |  93<L>  |  103<H>  ----------------------------<  111<H>  5.4<H>   |  21<L>  |  8.07<H>    Ca    8.5      17 Sep 2019 09:57  Phos  9.4     09-17  Mg     2.6     09-17                              10.8   8.3   )-----------( 232      ( 17 Sep 2019 09:18 )             34.3     PT/INR - ( 17 Sep 2019 00:18 )   PT: 13.1 sec;   INR: 1.13 ratio         PTT - ( 18 Sep 2019 03:09 )  PTT:57.5 sec

## 2019-09-19 NOTE — PROGRESS NOTE ADULT - SUBJECTIVE AND OBJECTIVE BOX
NEPHROLOGY-[facility] [phone]        Patient seen and examined in bed.  She was the same        MEDICATIONS  (STANDING):  artificial tears (preservative free) Ophthalmic Solution 1 Drop(s) Both EYES four times a day  aspirin  chewable 81 milliGRAM(s) Oral daily  atorvastatin 20 milliGRAM(s) Oral at bedtime  calcium acetate 667 milliGRAM(s) Oral three times a day with meals  dextrose 5%. 1000 milliLiter(s) (50 mL/Hr) IV Continuous <Continuous>  dextrose 50% Injectable 12.5 Gram(s) IV Push once  dextrose 50% Injectable 25 Gram(s) IV Push once  famotidine    Tablet 20 milliGRAM(s) Oral daily  gabapentin 300 milliGRAM(s) Oral at bedtime  guaiFENesin   Syrup  (Sugar-Free) 100 milliGRAM(s) Oral every 6 hours  heparin  Infusion 1200 Unit(s)/Hr (12 mL/Hr) IV Continuous <Continuous>  influenza   Vaccine 0.5 milliLiter(s) IntraMuscular once  insulin lispro (HumaLOG) corrective regimen sliding scale   SubCutaneous three times a day before meals  sevelamer carbonate 2400 milliGRAM(s) Oral three times a day with meals  sodium bicarbonate  Infusion 0.46 mEq/kG/Hr (250 mL/Hr) IV Continuous <Continuous>      VITAL:  T(C): , Max: 37.1 (09-18-19 @ 13:56)  T(F): , Max: 98.8 (09-18-19 @ 13:56)  HR: 70 (09-19-19 @ 04:28)  BP: 137/63 (09-19-19 @ 04:28)  BP(mean): --  RR: 20 (09-19-19 @ 04:28)  SpO2: 93% (09-19-19 @ 04:28)  Wt(kg): --    I and O's:    09-18 @ 07:01  -  09-19 @ 07:00  --------------------------------------------------------  IN: 1524 mL / OUT: 3250 mL / NET: -1726 mL          PHYSICAL EXAM:    Constitutional: NAD  Neck:  No JVD  Respiratory: CTAB/L  Cardiovascular: S1 and S2  Gastrointestinal: BS+, soft, NT/ND  Extremities: No peripheral edema + TMA  Neurological: A/O x 3, no focal deficits  Psychiatric: Normal mood, normal affect  : No Raphael  Skin: No rashes  Access: avf clotted    LABS:                        10.7   10.00 )-----------( 235      ( 19 Sep 2019 08:33 )             33.7     09-19    130<L>  |  90<L>  |  40<H>  ----------------------------<  95  4.5   |  24  |  4.12<H>    Ca    9.1      19 Sep 2019 06:40  Phos  5.6     09-19  Mg     2.2     09-19            Urine Studies:          RADIOLOGY & ADDITIONAL STUDIES:

## 2019-09-19 NOTE — PROGRESS NOTE ADULT - ASSESSMENT
ASSESSMENT/PLAN:  69-year-old female with past medical history of hypertension, diabetes, end-stage renal disease who presents with clotted graft     1. Renal: ESRD on HD MWF -  Likely next hd in am   2. Vasc-   Another IR declot today  3. GI: Low potassium diet. Further workup pending above.

## 2019-09-19 NOTE — PROGRESS NOTE ADULT - SUBJECTIVE AND OBJECTIVE BOX
seen and examined. had revascularization today of AV fistula. no bleeding. no complaints offered.      PVital Signs Last 24 Hrs  T(C): 36.8 (19 Sep 2019 21:03), Max: 37.1 (19 Sep 2019 04:28)  T(F): 98.3 (19 Sep 2019 21:03), Max: 98.7 (19 Sep 2019 04:28)  HR: 73 (19 Sep 2019 21:03) (69 - 80)  BP: 115/52 (19 Sep 2019 21:03) (115/52 - 152/53)  BP(mean): 88 (19 Sep 2019 19:00) (88 - 101)  RR: 16 (19 Sep 2019 21:03) (16 - 20)  SpO2: 95% (19 Sep 2019 21:03) (93% - 100%)    PHYSICAL EXAM:    aaox3 nad  irregularly irregualr no murmurs  AV fistual right arm + thrill  clear lungs  abd soft nt nd  no edema  no focal deficits  right groin Line in place.       LABS:                        10.7   10.00 )-----------( 235      ( 19 Sep 2019 08:33 )             33.7     09-19    130<L>  |  90<L>  |  40<H>  ----------------------------<  95  4.5   |  24  |  4.12<H>    Ca    9.1      19 Sep 2019 06:40  Phos  5.6     09-19  Mg     2.2     09-19      PTT - ( 19 Sep 2019 09:24 )  PTT:52.8 sec      RADIOLOGY & ADDITIONAL STUDIES:

## 2019-09-19 NOTE — PROGRESS NOTE ADULT - SUBJECTIVE AND OBJECTIVE BOX
Interventional Radiology Brief- Operative Note    Procedure: AVFistulogram, angioplasty, brachial artery arteriography and thrombectomy.    Operators: Oziel Sheets    Anesthesia (type): Conscious sedation provided by anesthesia.    Contrast: 100cc omnipaque 240    EBL: Minimal    Findings/Follow up Plan of Care: Outflow obstruction s/p angioplasty and thrombectomy with return of pulsatility at the conclusion of the case. / Okay to attempt use for dialysis on 9/20/2019. Full report to follow.    Specimens Removed: None.    Implants: None.    Complications: None.    Condition/Disposition: Stable / Recovery 1 hour.    Please call Interventional Radiology x [phone] with any questions, concerns, or issues. Interventional Radiology Brief- Operative Note    Procedure: AVFistulogram, angioplasty, brachial artery arteriography and thrombectomy.    Operators: Oziel Sheets    Anesthesia (type): Conscious sedation provided by anesthesia.    Contrast: 100cc omnipaque 240    EBL: Minimal    Findings: Outflow obstruction s/p angioplasty and thrombectomy with return of pulsatility at the conclusion of the case. Full report to follow.    Specimens Removed: None.    Implants: None.    Complications: None.    Condition/Disposition: Stable / Recovery 1 hour.      Plan:    - Resume heparin gtt immediately.   - Okay to attempt use for dialysis on 9/20/2019.    Please call Interventional Radiology x [phone] with any questions, concerns, or issues. Interventional Radiology Brief- Operative Note    Procedure: AVFistulogram, angioplasty, brachial artery arteriography and thrombectomy.  Operators: Oziel Sheets  Anesthesia (type): Conscious sedation provided by anesthesia.  Contrast: 100cc omnipaque 240  EBL: Minimal    Findings: Outflow obstruction s/p angioplasty and thrombectomy with return of pulsatility at the conclusion of the case. Full report to follow.    Specimens Removed: None.  Implants: None.  Complications: None.  Condition/Disposition: Stable / Recovery 1 hour.    Plan:    - Resume heparin gtt immediately.   - Okay to attempt use for dialysis on 9/20/2019. If unsuccessful, perm cath on Monday (will need [place] Order)    Please call Interventional Radiology x [phone] with any questions, concerns, or issues.

## 2019-09-20 NOTE — DISCHARGE NOTE PROVIDER - HOSPITAL COURSE
68 y/o F with DM II, ESRD on HD admitted for clotted Av fistula         Problem/Plan - 1:    ·  Problem: Malfunction of arteriovenous dialysis fistula, initial encounter.  Plan: revascularizaed Av fistula     continue heparin ggt    if HD works today D/C femoral line    if doesn't work needs to wait till monday for Shiley.          Problem/Plan - 2:    ·  Problem: Hyperkalemia.  Plan: improved.    HD today.          Problem/Plan - 3:    ·  Problem: Atrial fibrillation, unspecified type.  Plan: heparin ggt for now    restart eliquis today if AV fistula working.         Pt stable dc home with outpatient follow up with PMD and Nephrologist. 70 y/o F with DM II, ESRD on HD admitted for clotted Av fistula         initial attempt at revascularization of AV fistula aborted.    retry then able to revascularize. Working with HD    restarted on PO anticoagulant    right femoral line removed and stable for discharge.

## 2019-09-20 NOTE — PROGRESS NOTE ADULT - ASSESSMENT
Annette is a pleasant 69 Year-Old Lady with PMHx ESRD on HD (MWF), Afib , CAD, AS, HTN, PVD, DM2 and HLD, BIBEMS for malfunctioning RUE AV graft.     PLAN  - Appreciate IR intervention.   - Plan for HD today.   - Care per Primary Team appreciated.     Vascular Surgery Pager #[phone]

## 2019-09-20 NOTE — PROGRESS NOTE ADULT - PROBLEM SELECTOR PROBLEM 1
Malfunction of arteriovenous dialysis fistula, initial encounter

## 2019-09-20 NOTE — DISCHARGE NOTE PROVIDER - NSDCFUADDAPPT_GEN_ALL_CORE_FT
Follow up with Dr. Schmid as an outpatient within 1 week of discharge.   Follow up with Nephrologist as an outpatient continue with your HD schedule.

## 2019-09-20 NOTE — DISCHARGE NOTE PROVIDER - CARE PROVIDER_API CALL
Alton Schmid (DO)  Internal Medicine  [address] [address]  Phone: [phone]  Fax: [phone]  Follow Up Time: 1 week

## 2019-09-20 NOTE — PROGRESS NOTE ADULT - PROBLEM SELECTOR PLAN 1
pt  was  dialyzed  last  night  throgh  right  femoral  line , no  SOB   no  chest  pain .K 3.76 needs  declotting  by  vascular  tear ,
Aborted fistulogram   plan for repeat try at AV fistulogram and declot. If failed will need Shiley placed for more long term HD option  Hold eliquis, continue heparin ggt
Aborted fistulogram yesterday  discuss with IR and vascular regarding HD access. If unable to revasculariza AV fistula will need shiley.  Hold eliquis, continue heparin ggt
NPO  plan for IR fistulogram today followed by HD  hold heparin prior to procedure
NPO past midnight  plan for IR fistulogram tomorrow  heparin ggt for procedure, holding AC
revascularizaed Av fistula   continue heparin ggt  if HD works today D/C femoral line  if doesn't work needs to wait till monday for Carmine.
revascularizaed Av fistula today  continue heparin ggt  if HD works tomorrow D/C femoral line  if doesn't work needs to wait till monday for Carmine.

## 2019-09-20 NOTE — PROGRESS NOTE ADULT - PROBLEM SELECTOR PLAN 3
continue on  eliquis
heparin ggt for now  restart eliquis today if AV fistula working.
heparin ggt for now  restart eliquis tomorrow if AV fistula working.
[facility]
he[facility]in ggt pending declot tomorrow.  hold eliquis
hold eliquis for now  heparin ggt for procedure tomorrow  rate control
hold heparin prior to procedure  restart eliquis prior to discharge  rate control

## 2019-09-20 NOTE — DISCHARGE NOTE NURSING/CASE MANAGEMENT/SOCIAL WORK - PATIENT PORTAL LINK FT
You can access the FollowMyHealth Patient Portal offered by [facility] by registering at the following website: [email or web address]. By joining [facility]’s FollowMyHealth portal, you will also be able to view your health information using other applications (apps) compatible with our system.

## 2019-09-20 NOTE — PROGRESS NOTE ADULT - SUBJECTIVE AND OBJECTIVE BOX
NEPHROLOGY-[facility] [phone]        Patient seen and examined in bed.  No complaints         MEDICATIONS  (STANDING):  artificial tears (preservative free) Ophthalmic Solution 1 Drop(s) Both EYES four times a day  aspirin  chewable 81 milliGRAM(s) Oral daily  atorvastatin 20 milliGRAM(s) Oral at bedtime  calcium acetate 667 milliGRAM(s) Oral three times a day with meals  dextrose 5%. 1000 milliLiter(s) (50 mL/Hr) IV Continuous <Continuous>  dextrose 50% Injectable 12.5 Gram(s) IV Push once  dextrose 50% Injectable 25 Gram(s) IV Push once  famotidine    Tablet 20 milliGRAM(s) Oral daily  gabapentin 300 milliGRAM(s) Oral at bedtime  guaiFENesin   Syrup  (Sugar-Free) 100 milliGRAM(s) Oral every 6 hours  heparin  Infusion 1200 Unit(s)/Hr (12 mL/Hr) IV Continuous <Continuous>  influenza   Vaccine 0.5 milliLiter(s) IntraMuscular once  insulin lispro (HumaLOG) corrective regimen sliding scale   SubCutaneous three times a day before meals  sevelamer carbonate 2400 milliGRAM(s) Oral three times a day with meals  sodium bicarbonate  Infusion 0.46 mEq/kG/Hr (250 mL/Hr) IV Continuous <Continuous>      VITAL:  T(C): , Max: 37 (09-19-19 @ 13:04)  T(F): , Max: 98.6 (09-19-19 @ 13:04)  HR: 67 (09-20-19 @ 05:00)  BP: 128/47 (09-20-19 @ 05:00)  BP(mean): 88 (09-19-19 @ 19:00)  RR: 18 (09-20-19 @ 05:00)  SpO2: 96% (09-20-19 @ 05:00)  Wt(kg): --    I and O's:    09-19 @ 07:01  -  09-20 @ 07:00  --------------------------------------------------------  IN: 0 mL / OUT: 0 mL / NET: 0 mL          PHYSICAL EXAM:    Constitutional: NAD  Neck:  No JVD  Respiratory: CTAB/L  Cardiovascular: S1 and S2  Gastrointestinal: BS+, soft, NT/ND  Extremities: No peripheral edema  Neurological: A/O x 3, no focal deficits  Psychiatric: Normal mood, normal affect  : No Raphael  Skin: No rashes  Access: avf     LABS:                        10.7   10.00 )-----------( 235      ( 19 Sep 2019 08:33 )             33.7     09-20    133<L>  |  91<L>  |  60<H>  ----------------------------<  88  5.0   |  23  |  5.91<H>    Ca    9.1      20 Sep 2019 07:10  Phos  5.6     09-19  Mg     2.2     09-19            Urine Studies:          RADIOLOGY & ADDITIONAL STUDIES:

## 2019-09-20 NOTE — PROGRESS NOTE ADULT - SUBJECTIVE AND OBJECTIVE BOX
seen and examined. no complaints, no bleeding, no pain.awaiting HD this morning.      Vital Signs Last 24 Hrs  T(C): 36.7 (20 Sep 2019 05:00), Max: 37 (19 Sep 2019 13:04)  T(F): 98 (20 Sep 2019 05:00), Max: 98.6 (19 Sep 2019 13:04)  HR: 67 (20 Sep 2019 05:00) (67 - 80)  BP: 128/47 (20 Sep 2019 05:00) (115/52 - 152/53)  BP(mean): 88 (19 Sep 2019 19:00) (88 - 101)  RR: 18 (20 Sep 2019 05:00) (16 - 18)  SpO2: 96% (20 Sep 2019 05:00) (95% - 100%)    PHYSICAL EXAM:    aaox3 nad  irregularly irregualr no murmurs  AV fistual right arm + thrill  clear lungs  abd soft nt nd  no edema  no focal deficits  right groin Line in place.       LABS:                        10.7   10.00 )-----------( 235      ( 19 Sep 2019 08:33 )             33.7     09-19    130<L>  |  90<L>  |  40<H>  ----------------------------<  95  4.5   |  24  |  4.12<H>    Ca    9.1      19 Sep 2019 06:40  Phos  5.6     09-19  Mg     2.2     09-19      PTT - ( 19 Sep 2019 09:24 )  PTT:52.8 sec      RADIOLOGY & ADDITIONAL STUDIES:

## 2019-09-20 NOTE — PROGRESS NOTE ADULT - REASON FOR ADMISSION
Clotted HD Access
Clotted HD Access ( Graft)

## 2019-09-20 NOTE — PROGRESS NOTE ADULT - ASSESSMENT
ASSESSMENT/PLAN:  69-year-old female with past medical history of hypertension, diabetes, end-stage renal disease who presents with clotted graft     1. Renal: ESRD on HD MWF -  HD today via avf  2. Vasc-   Declot successful   3. Heme-dc heparin and place back ashly porter dc home

## 2019-09-20 NOTE — DISCHARGE NOTE PROVIDER - NSDCCPCAREPLAN_GEN_ALL_CORE_FT
PRINCIPAL DISCHARGE DIAGNOSIS  Diagnosis: Hyperkalemia  Assessment and Plan of Treatment: resolved.   Follow up with your Nephrologist as an outpatient.      SECONDARY DISCHARGE DIAGNOSES  Diagnosis: Essential hypertension  Assessment and Plan of Treatment: Low salt diet  Activity as tolerated.  Take all medication as prescribed.  Follow up with your medical doctor for routine blood pressure monitoring at your next visit.  Notify your doctor if you have any of the following symptoms:   Dizziness, Lightheadedness, Blurry vision, Headache, Chest pain, Shortness of breath      Diagnosis: Atrial fibrillation, unspecified type  Assessment and Plan of Treatment: Atrial fibrillation is the most common heart rhythm problem & has the risk of stroke & heart attack  It helps if you control your blood pressure, not drink more than 1-2 alcohol drinks per day, cut down on caffeine, getting treatment for over active thyroid gland, & getting exercise  Call your doctor if you feel your heart racing or beating unusually, chest tightness or pain, lightheaded, faint, shortness of breath especially with exercise  It is important to take your heart medication as prescribed  You may be on anticoagulation which is very important to take as directed - you may need blood work to monitor drug levels      Diagnosis: Malfunction of arteriovenous dialysis fistula, initial encounter  Assessment and Plan of Treatment: Resolved, dialysis fistula was used during dialysis on 9/20/19

## 2019-09-20 NOTE — DISCHARGE NOTE PROVIDER - NSDCFUSCHEDAPPT_GEN_ALL_CORE_FT
BENNY INGRAM ; 10/29/2019 ; [facility] Surg Vasc [address] BENNY Umana ; 10/29/2019 ; [facility] S[facility] Vasc [address]

## 2019-09-20 NOTE — PROGRESS NOTE ADULT - PROVIDER SPECIALTY LIST ADULT
Internal Medicine
Intervent Radiology
Nephrology
Vascular Surgery
Intervent Radiology
Nephrology
Vascular Surgery
Internal Medicine

## 2019-09-20 NOTE — PROGRESS NOTE ADULT - PROBLEM SELECTOR PLAN 2
pt  was  dialyzed  last  night  throgh  right  femoral  line , no  SOB   no  chest  pain .K 3.76 continue on  HD  with  renal  f/u
correct hyperglycemia today, kayexalate 15mg once.
improved.  HD today
improved.  HD tomorrow
monitor. asymptomatic.

## 2019-09-20 NOTE — PROGRESS NOTE ADULT - SUBJECTIVE AND OBJECTIVE BOX
Vascular Surgery Progress Note     Subjective/24hour Events:   Patient seen and examined.   Yesterday to IR for graft declot, successful.   Awaiting HD today.     Vital Signs:  Vital Signs Last 24 Hrs  T(C): 36.7 (20 Sep 2019 05:00), Max: 37 (19 Sep 2019 13:04)  T(F): 98 (20 Sep 2019 05:00), Max: 98.6 (19 Sep 2019 13:04)  HR: 67 (20 Sep 2019 05:00) (67 - 80)  BP: 128/47 (20 Sep 2019 05:00) (115/52 - 152/53)  BP(mean): 88 (19 Sep 2019 19:00) (88 - 101)  RR: 18 (20 Sep 2019 05:00) (16 - 18)  SpO2: 96% (20 Sep 2019 05:00) (95% - 100%)    CAPILLARY BLOOD GLUCOSE      POCT Blood Glucose.: 129 mg/dL (20 Sep 2019 07:38)  POCT Blood Glucose.: 117 mg/dL (19 Sep 2019 21:22)  POCT Blood Glucose.: 80 mg/dL (19 Sep 2019 18:22)  POCT Blood Glucose.: 78 mg/dL (19 Sep 2019 15:31)  POCT Blood Glucose.: 73 mg/dL (19 Sep 2019 14:40)  POCT Blood Glucose.: 87 mg/dL (19 Sep 2019 12:11)  POCT Blood Glucose.: 89 mg/dL (19 Sep 2019 08:54)      I&O's Detail    19 Sep 2019 07:01  -  20 Sep 2019 07:00  --------------------------------------------------------  IN:  Total IN: 0 mL    OUT:  Total OUT: 0 mL    Total NET: 0 mL          MEDICATIONS  (STANDING):  artificial tears (preservative free) Ophthalmic Solution 1 Drop(s) Both EYES four times a day  aspirin  chewable 81 milliGRAM(s) Oral daily  atorvastatin 20 milliGRAM(s) Oral at bedtime  calcium acetate 667 milliGRAM(s) Oral three times a day with meals  dextrose 5%. 1000 milliLiter(s) (50 mL/Hr) IV Continuous <Continuous>  dextrose 50% Injectable 12.5 Gram(s) IV Push once  dextrose 50% Injectable 25 Gram(s) IV Push once  famotidine    Tablet 20 milliGRAM(s) Oral daily  gabapentin 300 milliGRAM(s) Oral at bedtime  guaiFENesin   Syrup  (Sugar-Free) 100 milliGRAM(s) Oral every 6 hours  heparin  Infusion 1200 Unit(s)/Hr (12 mL/Hr) IV Continuous <Continuous>  influenza   Vaccine 0.5 milliLiter(s) IntraMuscular once  insulin lispro (HumaLOG) corrective regimen sliding scale   SubCutaneous three times a day before meals  sevelamer carbonate 2400 milliGRAM(s) Oral three times a day with meals  sodium bicarbonate  Infusion 0.46 mEq/kG/Hr (250 mL/Hr) IV Continuous <Continuous>    MEDICATIONS  (PRN):  dextrose 40% Gel 15 Gram(s) Oral once PRN Blood Glucose LESS THAN 70 milliGRAM(s)/deciliter  glucagon  Injectable 1 milliGRAM(s) IntraMuscular once PRN Glucose LESS THAN 70 milligrams/deciliter      Physical Exam:  Gen: NAD.  Lungs: Non labored breathing.   Ab: Soft, nontender, nondistended.   Ext: RUE AVG with palpable thrill. Puncture sites c/d/i.     Labs:    09-20    133<L>  |  91<L>  |  60<H>  ----------------------------<  88  5.0   |  23  |  5.91<H>    Ca    9.1      20 Sep 2019 07:10  Phos  5.6     09-19  Mg     2.2     09-19                              10.7   10.00 )-----------( 235      ( 19 Sep 2019 08:33 )             33.7     PTT - ( 20 Sep 2019 07:13 )  PTT:57.6 sec

## 2019-09-20 NOTE — PROGRESS NOTE ADULT - SUBJECTIVE AND OBJECTIVE BOX
Interventional Radiology Follow- Up Note      69y Female s/p AV Fistula declot in Interventional Radiology with Dr. Sheets. Patient seen and examined @ bedside.   Site c/d/i. No complaints offered.    Vitals: T(F): 98 (09-20-19 @ 05:00), Max: 98.6 (09-19-19 @ 13:04)  HR: 67 (09-20-19 @ 05:00) (67 - 80)  BP: 128/47 (09-20-19 @ 05:00) (115/52 - 152/53)  RR: 18 (09-20-19 @ 05:00) (16 - 18)  SpO2: 96% (09-20-19 @ 05:00) (95% - 100%)  Wt(kg): --    LABS:                        10.7   10.00 )-----------( 235      ( 19 Sep 2019 08:33 )             33.7     09-20    133<L>  |  91<L>  |  60<H>  ----------------------------<  88  5.0   |  23  |  5.91<H>    Ca    9.1      20 Sep 2019 07:10  Phos  5.6     09-19  Mg     2.2     09-19      PTT - ( 20 Sep 2019 07:13 )  PTT:57.6 sec  I&O's Detail    19 Sep 2019 07:01  -  20 Sep 2019 07:00  --------------------------------------------------------  IN:  Total IN: 0 mL    OUT:  Total OUT: 0 mL    Total NET: 0 mL            PHYSICAL EXAM:    General: Nontoxic, in NAD sitting comfortably in bed.  Neuro:  Alert & oriented x 3  RUE AVF: Decreased swelling. Dressings c/d/i. Palpable thrill.    Impression: 69y Female s/p AVF declot.    Plan:  - Palpable thrill this morning. Ok to use for dialysis.  - Continue to monitor.     Please call IR at extension [phone] with any questions, concerns, or issues regarding above.

## 2019-09-23 NOTE — PHYSICAL THERAPY INITIAL EVALUATION ADULT - PHYSICAL ASSIST/NONPHYSICAL ASSIST: GAIT, REHAB EVAL
1 person assist Simponi Counseling:  I discussed with the patient the risks of golimumab including but not limited to myelosuppression, immunosuppression, autoimmune hepatitis, demyelinating diseases, lymphoma, and serious infections.  The patient understands that monitoring is required including a PPD at baseline and must alert us or the primary physician if symptoms of infection or other concerning signs are noted.

## 2019-12-05 NOTE — DISCUSSION/SUMMARY
[FreeTextEntry1] : 70 yo female with history of esrd on hd via right upper extremity avg presents for follow up without any complaints at this time Duplex shows that graft is thrombosed. We'll schedule patient for thrombectomy tomorrow\par

## 2019-12-05 NOTE — PHYSICAL EXAM
[Pulsatile Thrill] : no pulsatile thrill [Thrill] : thrill [Aneurysm] : no aneurysm [Bleeding] : no bleeding [Hand well perfused] : hand well perfused [Ulcer] : no ulcer [2+] : right 2+ [Normal] : coordination grossly intact, no focal deficits [de-identified] : intact

## 2019-12-05 NOTE — HISTORY OF PRESENT ILLNESS
[FreeTextEntry1] : 68 yo female with history of esrd on hd via right upper extremity avg presents for follow up without any complaints at this time  [] : in right upper arm

## 2019-12-06 PROBLEM — N18.6 END STAGE RENAL DISEASE: Status: ACTIVE | Noted: 2018-01-29

## 2019-12-06 PROBLEM — T82.868A CLOTTED DIALYSIS SHUNT: Status: ACTIVE | Noted: 2019-01-01

## 2019-12-10 NOTE — PAST MEDICAL HISTORY
[No therapy indicated for cases scheduled for less than one hour] : No therapy indicated for cases scheduled for less than one hour. [Increasing age ( >40 years old)] : Increasing age ( >40 years old) [Altered mobility] : Altered mobility [FreeTextEntry1] : Malignant Hyperthermia Screening Tool and Risk of Bleeding Assessment\par \par Ms. BENNY INGRAM denies family history of unexpected death following Anesthesia or Exercise.\par Denies Family history of Malignant Hyperthermia, Muscle or Neuromuscular disorder and High Temperature following exercise.\par \par Ms. BENNY INGRAM denies history of Muscle Spasm, Dark or Chocolate - Colored urine and Unanticipated fever immediately following anesthesia or serious exercise. \par Ms. INGRAM also denies bleeding tendencies/ Risks of Bleeding.\par

## 2019-12-10 NOTE — HISTORY OF PRESENT ILLNESS
[] : in right upper arm [FreeTextEntry1] : alert and oriented\par accompanied by  Leonides [phone]\par no falls reported\par w/c but can stand\par took eliquis last night\par  [FreeTextEntry3] : 6/15/2017 Dr. Caban [FreeTextEntry4] : wednesday [FreeTextEntry5] : last night [FreeTextEntry6] : Dr Jean-Baptiste

## 2019-12-10 NOTE — PROCEDURE
[D/C IV on discharge] : D/C IV on discharge [Resume diet] : resume diet [Vital signs on admission the q 15 mins x2] : Vital signs on admission the q 15 mins x2 [Site check for bleeding/hematoma/thrill/bruit] : Site check for bleeding/hematoma/thrill/bruit [FreeTextEntry1] : right AV Graft/thrombectomy/stent [FreeTextEntry3] : TPA 2mg/3ml sterile water instilled right AVG via 10 cm Speedlyser by Dr. Johnson at 7;20am

## 2020-01-01 ENCOUNTER — APPOINTMENT (OUTPATIENT)
Dept: VASCULAR SURGERY | Facility: CLINIC | Age: 70
End: 2020-01-01
Payer: MEDICARE

## 2020-01-01 ENCOUNTER — APPOINTMENT (OUTPATIENT)
Dept: VASCULAR SURGERY | Facility: HOSPITAL | Age: 70
End: 2020-01-01

## 2020-01-01 ENCOUNTER — TRANSCRIPTION ENCOUNTER (OUTPATIENT)
Age: 70
End: 2020-01-01

## 2020-01-01 ENCOUNTER — APPOINTMENT (OUTPATIENT)
Dept: VASCULAR SURGERY | Facility: CLINIC | Age: 70
End: 2020-01-01

## 2020-01-01 ENCOUNTER — INPATIENT (INPATIENT)
Facility: HOSPITAL | Age: 70
LOS: 1 days | Discharge: HOME | DRG: 314 | End: 2020-01-30
Attending: INTERNAL MEDICINE | Admitting: INTERNAL MEDICINE
Payer: MEDICARE

## 2020-01-01 ENCOUNTER — EMERGENCY (EMERGENCY)
Facility: HOSPITAL | Age: 70
LOS: 1 days | End: 2020-01-01
Attending: EMERGENCY MEDICINE
Payer: MEDICARE

## 2020-01-01 ENCOUNTER — FORM ENCOUNTER (OUTPATIENT)
Age: 70
End: 2020-01-01

## 2020-01-01 ENCOUNTER — INPATIENT (INPATIENT)
Facility: HOSPITAL | Age: 70
LOS: 0 days | DRG: 853 | End: 2020-06-14
Attending: SURGERY | Admitting: SURGERY
Payer: MEDICARE

## 2020-01-01 ENCOUNTER — INPATIENT (INPATIENT)
Facility: HOSPITAL | Age: 70
LOS: 3 days | Discharge: HOME | DRG: 314 | End: 2020-02-21
Attending: INTERNAL MEDICINE | Admitting: INTERNAL MEDICINE
Payer: MEDICARE

## 2020-01-01 ENCOUNTER — APPOINTMENT (OUTPATIENT)
Dept: ENDOVASCULAR SURGERY | Facility: CLINIC | Age: 70
End: 2020-01-01
Payer: MEDICARE

## 2020-01-01 VITALS
TEMPERATURE: 98 F | WEIGHT: 184.97 LBS | DIASTOLIC BLOOD PRESSURE: 60 MMHG | RESPIRATION RATE: 17 BRPM | SYSTOLIC BLOOD PRESSURE: 148 MMHG | OXYGEN SATURATION: 98 % | HEIGHT: 65 IN | HEART RATE: 68 BPM

## 2020-01-01 VITALS
WEIGHT: 182.1 LBS | RESPIRATION RATE: 16 BRPM | HEIGHT: 64 IN | TEMPERATURE: 99 F | OXYGEN SATURATION: 95 % | SYSTOLIC BLOOD PRESSURE: 131 MMHG | DIASTOLIC BLOOD PRESSURE: 57 MMHG | HEART RATE: 81 BPM

## 2020-01-01 VITALS
SYSTOLIC BLOOD PRESSURE: 124 MMHG | TEMPERATURE: 97.6 F | BODY MASS INDEX: 34.76 KG/M2 | WEIGHT: 178 LBS | RESPIRATION RATE: 20 BRPM | OXYGEN SATURATION: 100 % | DIASTOLIC BLOOD PRESSURE: 52 MMHG | HEART RATE: 59 BPM

## 2020-01-01 VITALS
DIASTOLIC BLOOD PRESSURE: 68 MMHG | TEMPERATURE: 98 F | OXYGEN SATURATION: 96 % | RESPIRATION RATE: 18 BRPM | SYSTOLIC BLOOD PRESSURE: 156 MMHG | HEART RATE: 75 BPM

## 2020-01-01 VITALS
WEIGHT: 184.97 LBS | HEART RATE: 83 BPM | RESPIRATION RATE: 22 BRPM | TEMPERATURE: 98 F | SYSTOLIC BLOOD PRESSURE: 149 MMHG | DIASTOLIC BLOOD PRESSURE: 74 MMHG | OXYGEN SATURATION: 98 %

## 2020-01-01 VITALS
TEMPERATURE: 98 F | HEART RATE: 72 BPM | OXYGEN SATURATION: 98 % | SYSTOLIC BLOOD PRESSURE: 119 MMHG | DIASTOLIC BLOOD PRESSURE: 56 MMHG | RESPIRATION RATE: 18 BRPM

## 2020-01-01 VITALS
RESPIRATION RATE: 18 BRPM | TEMPERATURE: 98 F | SYSTOLIC BLOOD PRESSURE: 103 MMHG | OXYGEN SATURATION: 95 % | DIASTOLIC BLOOD PRESSURE: 61 MMHG | HEART RATE: 64 BPM

## 2020-01-01 VITALS — DIASTOLIC BLOOD PRESSURE: 66 MMHG | SYSTOLIC BLOOD PRESSURE: 154 MMHG

## 2020-01-01 VITALS — RESPIRATION RATE: 14 BRPM | OXYGEN SATURATION: 61 %

## 2020-01-01 DIAGNOSIS — E11.40 TYPE 2 DIABETES MELLITUS WITH DIABETIC NEUROPATHY, UNSPECIFIED: ICD-10-CM

## 2020-01-01 DIAGNOSIS — R10.9 UNSPECIFIED ABDOMINAL PAIN: ICD-10-CM

## 2020-01-01 DIAGNOSIS — I77.4 CELIAC ARTERY COMPRESSION SYNDROME: ICD-10-CM

## 2020-01-01 DIAGNOSIS — N18.6 END STAGE RENAL DISEASE: ICD-10-CM

## 2020-01-01 DIAGNOSIS — T82.868A THROMBOSIS DUE TO VASCULAR PROSTHETIC DEVICES, IMPLANTS AND GRAFTS, INITIAL ENCOUNTER: ICD-10-CM

## 2020-01-01 DIAGNOSIS — I50.9 HEART FAILURE, UNSPECIFIED: ICD-10-CM

## 2020-01-01 DIAGNOSIS — K55.1 CHRONIC VASCULAR DISORDERS OF INTESTINE: ICD-10-CM

## 2020-01-01 DIAGNOSIS — I48.20 CHRONIC ATRIAL FIBRILLATION, UNSPECIFIED: ICD-10-CM

## 2020-01-01 DIAGNOSIS — Z98.890 OTHER SPECIFIED POSTPROCEDURAL STATES: Chronic | ICD-10-CM

## 2020-01-01 DIAGNOSIS — Z93.1 GASTROSTOMY STATUS: Chronic | ICD-10-CM

## 2020-01-01 DIAGNOSIS — Z86.79 PERSONAL HISTORY OF OTHER DISEASES OF THE CIRCULATORY SYSTEM: ICD-10-CM

## 2020-01-01 DIAGNOSIS — T82.898A OTHER SPECIFIED COMPLICATION OF VASCULAR PROSTHETIC DEVICES, IMPLANTS AND GRAFTS, INITIAL ENCOUNTER: ICD-10-CM

## 2020-01-01 DIAGNOSIS — Z51.5 ENCOUNTER FOR PALLIATIVE CARE: ICD-10-CM

## 2020-01-01 DIAGNOSIS — Z79.899 OTHER LONG TERM (CURRENT) DRUG THERAPY: ICD-10-CM

## 2020-01-01 DIAGNOSIS — L02.219 CUTANEOUS ABSCESS OF TRUNK, UNSPECIFIED: ICD-10-CM

## 2020-01-01 DIAGNOSIS — K55.9 VASCULAR DISORDER OF INTESTINE, UNSPECIFIED: ICD-10-CM

## 2020-01-01 DIAGNOSIS — I95.9 HYPOTENSION, UNSPECIFIED: ICD-10-CM

## 2020-01-01 DIAGNOSIS — Z71.89 OTHER SPECIFIED COUNSELING: ICD-10-CM

## 2020-01-01 DIAGNOSIS — E87.5 HYPERKALEMIA: ICD-10-CM

## 2020-01-01 LAB
ALBUMIN SERPL ELPH-MCNC: 3.2 G/DL — LOW (ref 3.3–5)
ALBUMIN SERPL ELPH-MCNC: 3.6 G/DL — SIGNIFICANT CHANGE UP (ref 3.3–5)
ALBUMIN SERPL ELPH-MCNC: 3.6 G/DL — SIGNIFICANT CHANGE UP (ref 3.3–5)
ALBUMIN SERPL ELPH-MCNC: 3.9 G/DL — SIGNIFICANT CHANGE UP (ref 3.3–5)
ALBUMIN SERPL ELPH-MCNC: 4 G/DL — SIGNIFICANT CHANGE UP (ref 3.3–5)
ALP SERPL-CCNC: 72 U/L — SIGNIFICANT CHANGE UP (ref 40–120)
ALP SERPL-CCNC: 82 U/L — SIGNIFICANT CHANGE UP (ref 40–120)
ALP SERPL-CCNC: 84 U/L — SIGNIFICANT CHANGE UP (ref 40–120)
ALP SERPL-CCNC: 86 U/L — SIGNIFICANT CHANGE UP (ref 40–120)
ALP SERPL-CCNC: 95 U/L — SIGNIFICANT CHANGE UP (ref 40–120)
ALT FLD-CCNC: 12 U/L — SIGNIFICANT CHANGE UP (ref 10–45)
ALT FLD-CCNC: 13 U/L — SIGNIFICANT CHANGE UP (ref 10–45)
ALT FLD-CCNC: 14 U/L — SIGNIFICANT CHANGE UP (ref 10–45)
ALT FLD-CCNC: 20 U/L — SIGNIFICANT CHANGE UP (ref 10–45)
ALT FLD-CCNC: SIGNIFICANT CHANGE UP U/L (ref 10–45)
ANION GAP SERPL CALC-SCNC: 10 MMOL/L — SIGNIFICANT CHANGE UP (ref 5–17)
ANION GAP SERPL CALC-SCNC: 12 MMOL/L — SIGNIFICANT CHANGE UP (ref 5–17)
ANION GAP SERPL CALC-SCNC: 13 MMOL/L — SIGNIFICANT CHANGE UP (ref 5–17)
ANION GAP SERPL CALC-SCNC: 14 MMOL/L — SIGNIFICANT CHANGE UP (ref 5–17)
ANION GAP SERPL CALC-SCNC: 15 MMOL/L — SIGNIFICANT CHANGE UP (ref 5–17)
ANION GAP SERPL CALC-SCNC: 16 MMOL/L — SIGNIFICANT CHANGE UP (ref 5–17)
ANION GAP SERPL CALC-SCNC: 17 MMOL/L — SIGNIFICANT CHANGE UP (ref 5–17)
ANION GAP SERPL CALC-SCNC: 17 MMOL/L — SIGNIFICANT CHANGE UP (ref 5–17)
ANION GAP SERPL CALC-SCNC: 18 MMOL/L — HIGH (ref 5–17)
ANION GAP SERPL CALC-SCNC: 18 MMOL/L — HIGH (ref 5–17)
ANION GAP SERPL CALC-SCNC: 19 MMOL/L — HIGH (ref 5–17)
ANION GAP SERPL CALC-SCNC: 19 MMOL/L — HIGH (ref 5–17)
ANION GAP SERPL CALC-SCNC: 21 MMOL/L — HIGH (ref 5–17)
ANION GAP SERPL CALC-SCNC: 37 MMOL/L — HIGH (ref 5–17)
APTT BLD: 23.4 SEC — LOW (ref 27.5–36.3)
APTT BLD: 26.1 SEC — LOW (ref 27.5–36.3)
APTT BLD: 30.2 SEC — SIGNIFICANT CHANGE UP (ref 27.5–36.3)
AST SERPL-CCNC: 14 U/L — SIGNIFICANT CHANGE UP (ref 10–40)
AST SERPL-CCNC: 15 U/L — SIGNIFICANT CHANGE UP (ref 10–40)
AST SERPL-CCNC: 21 U/L — SIGNIFICANT CHANGE UP (ref 10–40)
AST SERPL-CCNC: 22 U/L — SIGNIFICANT CHANGE UP (ref 10–40)
AST SERPL-CCNC: SIGNIFICANT CHANGE UP U/L (ref 10–40)
BASE EXCESS BLDA CALC-SCNC: 3.9 MMOL/L — HIGH (ref -2–2)
BASE EXCESS BLDV CALC-SCNC: -1.1 MMOL/L — SIGNIFICANT CHANGE UP (ref -2–2)
BASE EXCESS BLDV CALC-SCNC: 5.5 MMOL/L — HIGH (ref -2–2)
BASOPHILS # BLD AUTO: 0.05 K/UL — SIGNIFICANT CHANGE UP (ref 0–0.2)
BASOPHILS # BLD AUTO: 0.06 K/UL — SIGNIFICANT CHANGE UP (ref 0–0.2)
BASOPHILS # BLD AUTO: 0.09 K/UL — SIGNIFICANT CHANGE UP (ref 0–0.2)
BASOPHILS NFR BLD AUTO: 0.4 % — SIGNIFICANT CHANGE UP (ref 0–2)
BASOPHILS NFR BLD AUTO: 0.4 % — SIGNIFICANT CHANGE UP (ref 0–2)
BASOPHILS NFR BLD AUTO: 0.5 % — SIGNIFICANT CHANGE UP (ref 0–2)
BILIRUB SERPL-MCNC: 0.2 MG/DL — SIGNIFICANT CHANGE UP (ref 0.2–1.2)
BILIRUB SERPL-MCNC: 0.2 MG/DL — SIGNIFICANT CHANGE UP (ref 0.2–1.2)
BILIRUB SERPL-MCNC: 0.3 MG/DL — SIGNIFICANT CHANGE UP (ref 0.2–1.2)
BILIRUB SERPL-MCNC: 0.5 MG/DL — SIGNIFICANT CHANGE UP (ref 0.2–1.2)
BILIRUB SERPL-MCNC: 0.8 MG/DL — SIGNIFICANT CHANGE UP (ref 0.2–1.2)
BLD GP AB SCN SERPL QL: NEGATIVE — SIGNIFICANT CHANGE UP
BLD GP AB SCN SERPL QL: NEGATIVE — SIGNIFICANT CHANGE UP
BUN SERPL-MCNC: 108 MG/DL — HIGH (ref 7–23)
BUN SERPL-MCNC: 116 MG/DL — HIGH (ref 7–23)
BUN SERPL-MCNC: 16 MG/DL — SIGNIFICANT CHANGE UP (ref 7–23)
BUN SERPL-MCNC: 17 MG/DL — SIGNIFICANT CHANGE UP (ref 7–23)
BUN SERPL-MCNC: 18 MG/DL — SIGNIFICANT CHANGE UP (ref 7–23)
BUN SERPL-MCNC: 20 MG/DL — SIGNIFICANT CHANGE UP (ref 7–23)
BUN SERPL-MCNC: 24 MG/DL — HIGH (ref 7–23)
BUN SERPL-MCNC: 28 MG/DL — HIGH (ref 7–23)
BUN SERPL-MCNC: 30 MG/DL — HIGH (ref 7–23)
BUN SERPL-MCNC: 32 MG/DL — HIGH (ref 7–23)
BUN SERPL-MCNC: 51 MG/DL — HIGH (ref 7–23)
BUN SERPL-MCNC: 71 MG/DL — HIGH (ref 7–23)
BUN SERPL-MCNC: 72 MG/DL — HIGH (ref 7–23)
BUN SERPL-MCNC: 86 MG/DL — HIGH (ref 7–23)
BUN SERPL-MCNC: 87 MG/DL — HIGH (ref 7–23)
BUN SERPL-MCNC: 94 MG/DL — HIGH (ref 7–23)
BUN SERPL-MCNC: 99 MG/DL — HIGH (ref 7–23)
CA-I SERPL-SCNC: 1.12 MMOL/L — SIGNIFICANT CHANGE UP (ref 1.12–1.3)
CA-I SERPL-SCNC: 1.21 MMOL/L — SIGNIFICANT CHANGE UP (ref 1.12–1.3)
CALCIUM SERPL-MCNC: 8.2 MG/DL — LOW (ref 8.4–10.5)
CALCIUM SERPL-MCNC: 8.4 MG/DL — SIGNIFICANT CHANGE UP (ref 8.4–10.5)
CALCIUM SERPL-MCNC: 8.6 MG/DL — SIGNIFICANT CHANGE UP (ref 8.4–10.5)
CALCIUM SERPL-MCNC: 8.7 MG/DL — SIGNIFICANT CHANGE UP (ref 8.4–10.5)
CALCIUM SERPL-MCNC: 8.8 MG/DL — SIGNIFICANT CHANGE UP (ref 8.4–10.5)
CALCIUM SERPL-MCNC: 8.8 MG/DL — SIGNIFICANT CHANGE UP (ref 8.4–10.5)
CALCIUM SERPL-MCNC: 8.9 MG/DL — SIGNIFICANT CHANGE UP (ref 8.4–10.5)
CALCIUM SERPL-MCNC: 9 MG/DL — SIGNIFICANT CHANGE UP (ref 8.4–10.5)
CALCIUM SERPL-MCNC: 9.2 MG/DL — SIGNIFICANT CHANGE UP (ref 8.4–10.5)
CALCIUM SERPL-MCNC: 9.3 MG/DL — SIGNIFICANT CHANGE UP (ref 8.4–10.5)
CALCIUM SERPL-MCNC: 9.3 MG/DL — SIGNIFICANT CHANGE UP (ref 8.4–10.5)
CALCIUM SERPL-MCNC: 9.5 MG/DL — SIGNIFICANT CHANGE UP (ref 8.4–10.5)
CALCIUM SERPL-MCNC: 9.6 MG/DL — SIGNIFICANT CHANGE UP (ref 8.4–10.5)
CALCIUM SERPL-MCNC: 9.7 MG/DL — SIGNIFICANT CHANGE UP (ref 8.4–10.5)
CALCIUM SERPL-MCNC: 9.7 MG/DL — SIGNIFICANT CHANGE UP (ref 8.4–10.5)
CHLORIDE BLDV-SCNC: 100 MMOL/L — SIGNIFICANT CHANGE UP (ref 96–108)
CHLORIDE BLDV-SCNC: 99 MMOL/L — SIGNIFICANT CHANGE UP (ref 96–108)
CHLORIDE SERPL-SCNC: 100 MMOL/L — SIGNIFICANT CHANGE UP (ref 96–108)
CHLORIDE SERPL-SCNC: 86 MMOL/L — LOW (ref 96–108)
CHLORIDE SERPL-SCNC: 88 MMOL/L — LOW (ref 96–108)
CHLORIDE SERPL-SCNC: 88 MMOL/L — LOW (ref 96–108)
CHLORIDE SERPL-SCNC: 89 MMOL/L — LOW (ref 96–108)
CHLORIDE SERPL-SCNC: 89 MMOL/L — LOW (ref 96–108)
CHLORIDE SERPL-SCNC: 91 MMOL/L — LOW (ref 96–108)
CHLORIDE SERPL-SCNC: 93 MMOL/L — LOW (ref 96–108)
CHLORIDE SERPL-SCNC: 94 MMOL/L — LOW (ref 96–108)
CHLORIDE SERPL-SCNC: 95 MMOL/L — LOW (ref 96–108)
CHLORIDE SERPL-SCNC: 95 MMOL/L — LOW (ref 96–108)
CHLORIDE SERPL-SCNC: 96 MMOL/L — SIGNIFICANT CHANGE UP (ref 96–108)
CHLORIDE SERPL-SCNC: 96 MMOL/L — SIGNIFICANT CHANGE UP (ref 96–108)
CHLORIDE SERPL-SCNC: 98 MMOL/L — SIGNIFICANT CHANGE UP (ref 96–108)
CHLORIDE SERPL-SCNC: 99 MMOL/L — SIGNIFICANT CHANGE UP (ref 96–108)
CO2 BLDA-SCNC: 26 MMOL/L — SIGNIFICANT CHANGE UP (ref 22–30)
CO2 BLDV-SCNC: 28 MMOL/L — SIGNIFICANT CHANGE UP (ref 22–30)
CO2 BLDV-SCNC: 33 MMOL/L — HIGH (ref 22–30)
CO2 SERPL-SCNC: 21 MMOL/L — LOW (ref 22–31)
CO2 SERPL-SCNC: 21 MMOL/L — LOW (ref 22–31)
CO2 SERPL-SCNC: 22 MMOL/L — SIGNIFICANT CHANGE UP (ref 22–31)
CO2 SERPL-SCNC: 22 MMOL/L — SIGNIFICANT CHANGE UP (ref 22–31)
CO2 SERPL-SCNC: 23 MMOL/L — SIGNIFICANT CHANGE UP (ref 22–31)
CO2 SERPL-SCNC: 24 MMOL/L — SIGNIFICANT CHANGE UP (ref 22–31)
CO2 SERPL-SCNC: 25 MMOL/L — SIGNIFICANT CHANGE UP (ref 22–31)
CO2 SERPL-SCNC: 25 MMOL/L — SIGNIFICANT CHANGE UP (ref 22–31)
CO2 SERPL-SCNC: 26 MMOL/L — SIGNIFICANT CHANGE UP (ref 22–31)
CO2 SERPL-SCNC: 27 MMOL/L — SIGNIFICANT CHANGE UP (ref 22–31)
CO2 SERPL-SCNC: <10 MMOL/L — CRITICAL LOW (ref 22–31)
CO2 SERPL-SCNC: <10 MMOL/L — CRITICAL LOW (ref 22–31)
CREAT SERPL-MCNC: 2.62 MG/DL — HIGH (ref 0.5–1.3)
CREAT SERPL-MCNC: 3.06 MG/DL — HIGH (ref 0.5–1.3)
CREAT SERPL-MCNC: 3.27 MG/DL — HIGH (ref 0.5–1.3)
CREAT SERPL-MCNC: 3.42 MG/DL — HIGH (ref 0.5–1.3)
CREAT SERPL-MCNC: 3.73 MG/DL — HIGH (ref 0.5–1.3)
CREAT SERPL-MCNC: 3.76 MG/DL — HIGH (ref 0.5–1.3)
CREAT SERPL-MCNC: 4.92 MG/DL — HIGH (ref 0.5–1.3)
CREAT SERPL-MCNC: 5.07 MG/DL — HIGH (ref 0.5–1.3)
CREAT SERPL-MCNC: 5.41 MG/DL — HIGH (ref 0.5–1.3)
CREAT SERPL-MCNC: 6.1 MG/DL — HIGH (ref 0.5–1.3)
CREAT SERPL-MCNC: 6.44 MG/DL — HIGH (ref 0.5–1.3)
CREAT SERPL-MCNC: 6.63 MG/DL — HIGH (ref 0.5–1.3)
CREAT SERPL-MCNC: 7.34 MG/DL — HIGH (ref 0.5–1.3)
CREAT SERPL-MCNC: 7.49 MG/DL — HIGH (ref 0.5–1.3)
CREAT SERPL-MCNC: 7.69 MG/DL — HIGH (ref 0.5–1.3)
CREAT SERPL-MCNC: 8.02 MG/DL — HIGH (ref 0.5–1.3)
CREAT SERPL-MCNC: 8.25 MG/DL — HIGH (ref 0.5–1.3)
EOSINOPHIL # BLD AUTO: 0.13 K/UL — SIGNIFICANT CHANGE UP (ref 0–0.5)
EOSINOPHIL # BLD AUTO: 0.14 K/UL — SIGNIFICANT CHANGE UP (ref 0–0.5)
EOSINOPHIL # BLD AUTO: 0.18 K/UL — SIGNIFICANT CHANGE UP (ref 0–0.5)
EOSINOPHIL # BLD AUTO: 0.19 K/UL — SIGNIFICANT CHANGE UP (ref 0–0.5)
EOSINOPHIL # BLD AUTO: 0.22 K/UL — SIGNIFICANT CHANGE UP (ref 0–0.5)
EOSINOPHIL NFR BLD AUTO: 0.6 % — SIGNIFICANT CHANGE UP (ref 0–6)
EOSINOPHIL NFR BLD AUTO: 1 % — SIGNIFICANT CHANGE UP (ref 0–6)
EOSINOPHIL NFR BLD AUTO: 1.7 % — SIGNIFICANT CHANGE UP (ref 0–6)
EOSINOPHIL NFR BLD AUTO: 1.8 % — SIGNIFICANT CHANGE UP (ref 0–6)
EOSINOPHIL NFR BLD AUTO: 1.9 % — SIGNIFICANT CHANGE UP (ref 0–6)
GAS PNL BLDA: SIGNIFICANT CHANGE UP
GAS PNL BLDV: 135 MMOL/L — SIGNIFICANT CHANGE UP (ref 135–145)
GAS PNL BLDV: 138 MMOL/L — SIGNIFICANT CHANGE UP (ref 135–145)
GAS PNL BLDV: SIGNIFICANT CHANGE UP
GLUCOSE BLDC GLUCOMTR-MCNC: 100 MG/DL — HIGH (ref 70–99)
GLUCOSE BLDC GLUCOMTR-MCNC: 101 MG/DL — HIGH (ref 70–99)
GLUCOSE BLDC GLUCOMTR-MCNC: 103 MG/DL — HIGH (ref 70–99)
GLUCOSE BLDC GLUCOMTR-MCNC: 105 MG/DL — HIGH (ref 70–99)
GLUCOSE BLDC GLUCOMTR-MCNC: 105 MG/DL — HIGH (ref 70–99)
GLUCOSE BLDC GLUCOMTR-MCNC: 107 MG/DL — HIGH (ref 70–99)
GLUCOSE BLDC GLUCOMTR-MCNC: 107 MG/DL — HIGH (ref 70–99)
GLUCOSE BLDC GLUCOMTR-MCNC: 108 MG/DL — HIGH (ref 70–99)
GLUCOSE BLDC GLUCOMTR-MCNC: 110 MG/DL — HIGH (ref 70–99)
GLUCOSE BLDC GLUCOMTR-MCNC: 111 MG/DL — HIGH (ref 70–99)
GLUCOSE BLDC GLUCOMTR-MCNC: 117 MG/DL — HIGH (ref 70–99)
GLUCOSE BLDC GLUCOMTR-MCNC: 123 MG/DL — HIGH (ref 70–99)
GLUCOSE BLDC GLUCOMTR-MCNC: 139 MG/DL — HIGH (ref 70–99)
GLUCOSE BLDC GLUCOMTR-MCNC: 150 MG/DL — HIGH (ref 70–99)
GLUCOSE BLDC GLUCOMTR-MCNC: 151 MG/DL — HIGH (ref 70–99)
GLUCOSE BLDC GLUCOMTR-MCNC: 182 MG/DL — HIGH (ref 70–99)
GLUCOSE BLDC GLUCOMTR-MCNC: 28 MG/DL — CRITICAL LOW (ref 70–99)
GLUCOSE BLDC GLUCOMTR-MCNC: 294 MG/DL — HIGH (ref 70–99)
GLUCOSE BLDC GLUCOMTR-MCNC: 37 MG/DL — CRITICAL LOW (ref 70–99)
GLUCOSE BLDC GLUCOMTR-MCNC: 55 MG/DL — LOW (ref 70–99)
GLUCOSE BLDC GLUCOMTR-MCNC: 72 MG/DL — SIGNIFICANT CHANGE UP (ref 70–99)
GLUCOSE BLDC GLUCOMTR-MCNC: 87 MG/DL — SIGNIFICANT CHANGE UP (ref 70–99)
GLUCOSE BLDC GLUCOMTR-MCNC: 92 MG/DL — SIGNIFICANT CHANGE UP (ref 70–99)
GLUCOSE BLDC GLUCOMTR-MCNC: 93 MG/DL — SIGNIFICANT CHANGE UP (ref 70–99)
GLUCOSE BLDC GLUCOMTR-MCNC: 93 MG/DL — SIGNIFICANT CHANGE UP (ref 70–99)
GLUCOSE BLDC GLUCOMTR-MCNC: 98 MG/DL — SIGNIFICANT CHANGE UP (ref 70–99)
GLUCOSE BLDC GLUCOMTR-MCNC: 98 MG/DL — SIGNIFICANT CHANGE UP (ref 70–99)
GLUCOSE BLDV-MCNC: 122 MG/DL — HIGH (ref 70–99)
GLUCOSE BLDV-MCNC: 80 MG/DL — SIGNIFICANT CHANGE UP (ref 70–99)
GLUCOSE SERPL-MCNC: 100 MG/DL — HIGH (ref 70–99)
GLUCOSE SERPL-MCNC: 102 MG/DL — HIGH (ref 70–99)
GLUCOSE SERPL-MCNC: 102 MG/DL — HIGH (ref 70–99)
GLUCOSE SERPL-MCNC: 105 MG/DL — HIGH (ref 70–99)
GLUCOSE SERPL-MCNC: 120 MG/DL — HIGH (ref 70–99)
GLUCOSE SERPL-MCNC: 121 MG/DL — HIGH (ref 70–99)
GLUCOSE SERPL-MCNC: 129 MG/DL — HIGH (ref 70–99)
GLUCOSE SERPL-MCNC: 139 MG/DL — HIGH (ref 70–99)
GLUCOSE SERPL-MCNC: 143 MG/DL — HIGH (ref 70–99)
GLUCOSE SERPL-MCNC: 148 MG/DL — HIGH (ref 70–99)
GLUCOSE SERPL-MCNC: 148 MG/DL — HIGH (ref 70–99)
GLUCOSE SERPL-MCNC: 174 MG/DL — HIGH (ref 70–99)
GLUCOSE SERPL-MCNC: 44 MG/DL — CRITICAL LOW (ref 70–99)
GLUCOSE SERPL-MCNC: 76 MG/DL — SIGNIFICANT CHANGE UP (ref 70–99)
GLUCOSE SERPL-MCNC: 84 MG/DL — SIGNIFICANT CHANGE UP (ref 70–99)
GLUCOSE SERPL-MCNC: 93 MG/DL — SIGNIFICANT CHANGE UP (ref 70–99)
GLUCOSE SERPL-MCNC: 94 MG/DL — SIGNIFICANT CHANGE UP (ref 70–99)
HBA1C BLD-MCNC: 6 % — HIGH (ref 4–5.6)
HBV SURFACE AB SER-ACNC: 219.2 MIU/ML — SIGNIFICANT CHANGE UP
HBV SURFACE AG SER-ACNC: SIGNIFICANT CHANGE UP
HCO3 BLDA-SCNC: 26 MMOL/L — SIGNIFICANT CHANGE UP (ref 21–29)
HCO3 BLDV-SCNC: 26 MMOL/L — SIGNIFICANT CHANGE UP (ref 21–29)
HCO3 BLDV-SCNC: 31 MMOL/L — HIGH (ref 21–29)
HCT VFR BLD CALC: 29.6 % — LOW (ref 34.5–45)
HCT VFR BLD CALC: 30.7 % — LOW (ref 34.5–45)
HCT VFR BLD CALC: 31.6 % — LOW (ref 34.5–45)
HCT VFR BLD CALC: 33.9 % — LOW (ref 34.5–45)
HCT VFR BLD CALC: 34.3 % — LOW (ref 34.5–45)
HCT VFR BLD CALC: 34.6 % — SIGNIFICANT CHANGE UP (ref 34.5–45)
HCT VFR BLD CALC: 35.2 % — SIGNIFICANT CHANGE UP (ref 34.5–45)
HCT VFR BLD CALC: 35.5 % — SIGNIFICANT CHANGE UP (ref 34.5–45)
HCT VFR BLD CALC: 38 % — SIGNIFICANT CHANGE UP (ref 34.5–45)
HCT VFR BLD CALC: 38.2 % — SIGNIFICANT CHANGE UP (ref 34.5–45)
HCT VFR BLD CALC: 38.9 % — SIGNIFICANT CHANGE UP (ref 34.5–45)
HCT VFR BLD CALC: 41 % — SIGNIFICANT CHANGE UP (ref 34.5–45)
HCT VFR BLD CALC: 41.4 % — SIGNIFICANT CHANGE UP (ref 34.5–45)
HCT VFR BLD CALC: 41.9 % — SIGNIFICANT CHANGE UP (ref 34.5–45)
HCT VFR BLDA CALC: 35 % — LOW (ref 39–50)
HCT VFR BLDA CALC: 41 % — SIGNIFICANT CHANGE UP (ref 39–50)
HCV AB S/CO SERPL IA: 0.22 S/CO — SIGNIFICANT CHANGE UP (ref 0–0.99)
HCV AB SERPL-IMP: SIGNIFICANT CHANGE UP
HGB BLD CALC-MCNC: 11.3 G/DL — LOW (ref 11.5–15.5)
HGB BLD CALC-MCNC: 13.5 G/DL — SIGNIFICANT CHANGE UP (ref 11.5–15.5)
HGB BLD-MCNC: 10.5 G/DL — LOW (ref 11.5–15.5)
HGB BLD-MCNC: 10.6 G/DL — LOW (ref 11.5–15.5)
HGB BLD-MCNC: 10.6 G/DL — LOW (ref 11.5–15.5)
HGB BLD-MCNC: 10.8 G/DL — LOW (ref 11.5–15.5)
HGB BLD-MCNC: 11.1 G/DL — LOW (ref 11.5–15.5)
HGB BLD-MCNC: 11.9 G/DL — SIGNIFICANT CHANGE UP (ref 11.5–15.5)
HGB BLD-MCNC: 11.9 G/DL — SIGNIFICANT CHANGE UP (ref 11.5–15.5)
HGB BLD-MCNC: 12 G/DL — SIGNIFICANT CHANGE UP (ref 11.5–15.5)
HGB BLD-MCNC: 12.8 G/DL — SIGNIFICANT CHANGE UP (ref 11.5–15.5)
HGB BLD-MCNC: 13 G/DL — SIGNIFICANT CHANGE UP (ref 11.5–15.5)
HGB BLD-MCNC: 13.1 G/DL — SIGNIFICANT CHANGE UP (ref 11.5–15.5)
HGB BLD-MCNC: 9 G/DL — LOW (ref 11.5–15.5)
HGB BLD-MCNC: 9.5 G/DL — LOW (ref 11.5–15.5)
HGB BLD-MCNC: 9.6 G/DL — LOW (ref 11.5–15.5)
HOROWITZ INDEX BLDA+IHG-RTO: 60 — SIGNIFICANT CHANGE UP
IMM GRANULOCYTES NFR BLD AUTO: 0.5 % — SIGNIFICANT CHANGE UP (ref 0–1.5)
IMM GRANULOCYTES NFR BLD AUTO: 0.7 % — SIGNIFICANT CHANGE UP (ref 0–1.5)
IMM GRANULOCYTES NFR BLD AUTO: 0.8 % — SIGNIFICANT CHANGE UP (ref 0–1.5)
IMM GRANULOCYTES NFR BLD AUTO: 0.9 % — SIGNIFICANT CHANGE UP (ref 0–1.5)
IMM GRANULOCYTES NFR BLD AUTO: 1 % — SIGNIFICANT CHANGE UP (ref 0–1.5)
INR BLD: 1.07 RATIO — SIGNIFICANT CHANGE UP (ref 0.88–1.16)
INR BLD: 1.09 RATIO — SIGNIFICANT CHANGE UP (ref 0.88–1.16)
INR BLD: 1.12 RATIO — SIGNIFICANT CHANGE UP (ref 0.88–1.16)
INR BLD: 1.24 RATIO — HIGH (ref 0.88–1.16)
INR BLD: 1.6 RATIO — HIGH (ref 0.88–1.16)
LACTATE BLDA-MCNC: 2.4 MMOL/L — HIGH (ref 0.7–2)
LACTATE BLDV-MCNC: 1.2 MMOL/L — SIGNIFICANT CHANGE UP (ref 0.7–2)
LACTATE BLDV-MCNC: 1.7 MMOL/L — SIGNIFICANT CHANGE UP (ref 0.7–2)
LIDOCAIN IGE QN: 62 U/L — HIGH (ref 7–60)
LYMPHOCYTES # BLD AUTO: 0.83 K/UL — LOW (ref 1–3.3)
LYMPHOCYTES # BLD AUTO: 1.01 K/UL — SIGNIFICANT CHANGE UP (ref 1–3.3)
LYMPHOCYTES # BLD AUTO: 1.31 K/UL — SIGNIFICANT CHANGE UP (ref 1–3.3)
LYMPHOCYTES # BLD AUTO: 1.44 K/UL — SIGNIFICANT CHANGE UP (ref 1–3.3)
LYMPHOCYTES # BLD AUTO: 1.56 K/UL — SIGNIFICANT CHANGE UP (ref 1–3.3)
LYMPHOCYTES # BLD AUTO: 11.6 % — LOW (ref 13–44)
LYMPHOCYTES # BLD AUTO: 12.5 % — LOW (ref 13–44)
LYMPHOCYTES # BLD AUTO: 6.6 % — LOW (ref 13–44)
LYMPHOCYTES # BLD AUTO: 7.5 % — LOW (ref 13–44)
LYMPHOCYTES # BLD AUTO: 8.4 % — LOW (ref 13–44)
MAGNESIUM SERPL-MCNC: 2.1 MG/DL — SIGNIFICANT CHANGE UP (ref 1.6–2.6)
MAGNESIUM SERPL-MCNC: 2.3 MG/DL — SIGNIFICANT CHANGE UP (ref 1.6–2.6)
MAGNESIUM SERPL-MCNC: 2.3 MG/DL — SIGNIFICANT CHANGE UP (ref 1.6–2.6)
MAGNESIUM SERPL-MCNC: 2.5 MG/DL — SIGNIFICANT CHANGE UP (ref 1.6–2.6)
MAGNESIUM SERPL-MCNC: 2.7 MG/DL — HIGH (ref 1.6–2.6)
MAGNESIUM SERPL-MCNC: 3 MG/DL — HIGH (ref 1.6–2.6)
MCHC RBC-ENTMCNC: 28.6 PG — SIGNIFICANT CHANGE UP (ref 27–34)
MCHC RBC-ENTMCNC: 28.9 PG — SIGNIFICANT CHANGE UP (ref 27–34)
MCHC RBC-ENTMCNC: 29.2 PG — SIGNIFICANT CHANGE UP (ref 27–34)
MCHC RBC-ENTMCNC: 29.2 PG — SIGNIFICANT CHANGE UP (ref 27–34)
MCHC RBC-ENTMCNC: 29.4 PG — SIGNIFICANT CHANGE UP (ref 27–34)
MCHC RBC-ENTMCNC: 29.4 PG — SIGNIFICANT CHANGE UP (ref 27–34)
MCHC RBC-ENTMCNC: 29.8 PG — SIGNIFICANT CHANGE UP (ref 27–34)
MCHC RBC-ENTMCNC: 29.8 PG — SIGNIFICANT CHANGE UP (ref 27–34)
MCHC RBC-ENTMCNC: 29.9 GM/DL — LOW (ref 32–36)
MCHC RBC-ENTMCNC: 29.9 PG — SIGNIFICANT CHANGE UP (ref 27–34)
MCHC RBC-ENTMCNC: 29.9 PG — SIGNIFICANT CHANGE UP (ref 27–34)
MCHC RBC-ENTMCNC: 30 PG — SIGNIFICANT CHANGE UP (ref 27–34)
MCHC RBC-ENTMCNC: 30.3 PG — SIGNIFICANT CHANGE UP (ref 27–34)
MCHC RBC-ENTMCNC: 30.4 GM/DL — LOW (ref 32–36)
MCHC RBC-ENTMCNC: 30.4 GM/DL — LOW (ref 32–36)
MCHC RBC-ENTMCNC: 30.4 PG — SIGNIFICANT CHANGE UP (ref 27–34)
MCHC RBC-ENTMCNC: 30.6 GM/DL — LOW (ref 32–36)
MCHC RBC-ENTMCNC: 30.6 GM/DL — LOW (ref 32–36)
MCHC RBC-ENTMCNC: 30.7 GM/DL — LOW (ref 32–36)
MCHC RBC-ENTMCNC: 30.9 GM/DL — LOW (ref 32–36)
MCHC RBC-ENTMCNC: 31 GM/DL — LOW (ref 32–36)
MCHC RBC-ENTMCNC: 31.1 PG — SIGNIFICANT CHANGE UP (ref 27–34)
MCHC RBC-ENTMCNC: 31.2 GM/DL — LOW (ref 32–36)
MCHC RBC-ENTMCNC: 31.2 GM/DL — LOW (ref 32–36)
MCHC RBC-ENTMCNC: 31.3 GM/DL — LOW (ref 32–36)
MCHC RBC-ENTMCNC: 31.6 GM/DL — LOW (ref 32–36)
MCHC RBC-ENTMCNC: 31.6 GM/DL — LOW (ref 32–36)
MCHC RBC-ENTMCNC: 32.1 GM/DL — SIGNIFICANT CHANGE UP (ref 32–36)
MCV RBC AUTO: 100 FL — SIGNIFICANT CHANGE UP (ref 80–100)
MCV RBC AUTO: 90.7 FL — SIGNIFICANT CHANGE UP (ref 80–100)
MCV RBC AUTO: 92.2 FL — SIGNIFICANT CHANGE UP (ref 80–100)
MCV RBC AUTO: 92.7 FL — SIGNIFICANT CHANGE UP (ref 80–100)
MCV RBC AUTO: 94 FL — SIGNIFICANT CHANGE UP (ref 80–100)
MCV RBC AUTO: 94.8 FL — SIGNIFICANT CHANGE UP (ref 80–100)
MCV RBC AUTO: 95.3 FL — SIGNIFICANT CHANGE UP (ref 80–100)
MCV RBC AUTO: 96.8 FL — SIGNIFICANT CHANGE UP (ref 80–100)
MCV RBC AUTO: 96.9 FL — SIGNIFICANT CHANGE UP (ref 80–100)
MCV RBC AUTO: 97.1 FL — SIGNIFICANT CHANGE UP (ref 80–100)
MCV RBC AUTO: 97.2 FL — SIGNIFICANT CHANGE UP (ref 80–100)
MCV RBC AUTO: 97.5 FL — SIGNIFICANT CHANGE UP (ref 80–100)
MCV RBC AUTO: 98.3 FL — SIGNIFICANT CHANGE UP (ref 80–100)
MCV RBC AUTO: 99.7 FL — SIGNIFICANT CHANGE UP (ref 80–100)
MONOCYTES # BLD AUTO: 0.6 K/UL — SIGNIFICANT CHANGE UP (ref 0–0.9)
MONOCYTES # BLD AUTO: 0.64 K/UL — SIGNIFICANT CHANGE UP (ref 0–0.9)
MONOCYTES # BLD AUTO: 0.94 K/UL — HIGH (ref 0–0.9)
MONOCYTES # BLD AUTO: 0.95 K/UL — HIGH (ref 0–0.9)
MONOCYTES # BLD AUTO: 2 K/UL — HIGH (ref 0–0.9)
MONOCYTES NFR BLD AUTO: 5.7 % — SIGNIFICANT CHANGE UP (ref 2–14)
MONOCYTES NFR BLD AUTO: 6.1 % — SIGNIFICANT CHANGE UP (ref 2–14)
MONOCYTES NFR BLD AUTO: 7.1 % — SIGNIFICANT CHANGE UP (ref 2–14)
MONOCYTES NFR BLD AUTO: 8.2 % — SIGNIFICANT CHANGE UP (ref 2–14)
MONOCYTES NFR BLD AUTO: 8.5 % — SIGNIFICANT CHANGE UP (ref 2–14)
NEUTROPHILS # BLD AUTO: 11.25 K/UL — HIGH (ref 1.8–7.4)
NEUTROPHILS # BLD AUTO: 19.45 K/UL — HIGH (ref 1.8–7.4)
NEUTROPHILS # BLD AUTO: 8.15 K/UL — HIGH (ref 1.8–7.4)
NEUTROPHILS # BLD AUTO: 8.75 K/UL — HIGH (ref 1.8–7.4)
NEUTROPHILS # BLD AUTO: 8.99 K/UL — HIGH (ref 1.8–7.4)
NEUTROPHILS NFR BLD AUTO: 76.1 % — SIGNIFICANT CHANGE UP (ref 43–77)
NEUTROPHILS NFR BLD AUTO: 79.6 % — HIGH (ref 43–77)
NEUTROPHILS NFR BLD AUTO: 82.5 % — HIGH (ref 43–77)
NEUTROPHILS NFR BLD AUTO: 82.9 % — HIGH (ref 43–77)
NEUTROPHILS NFR BLD AUTO: 83.5 % — HIGH (ref 43–77)
NRBC # BLD: 0 /100 WBCS — SIGNIFICANT CHANGE UP (ref 0–0)
NRBC # BLD: 3 /100 WBCS — HIGH (ref 0–0)
PCO2 BLDA: 30 MMHG — LOW (ref 32–46)
PCO2 BLDV: 52 MMHG — HIGH (ref 35–50)
PCO2 BLDV: 60 MMHG — HIGH (ref 35–50)
PH BLDA: 7.54 — HIGH (ref 7.35–7.45)
PH BLDV: 7.26 — LOW (ref 7.35–7.45)
PH BLDV: 7.4 — SIGNIFICANT CHANGE UP (ref 7.35–7.45)
PHOSPHATE SERPL-MCNC: 2.5 MG/DL — SIGNIFICANT CHANGE UP (ref 2.5–4.5)
PHOSPHATE SERPL-MCNC: 3 MG/DL — SIGNIFICANT CHANGE UP (ref 2.5–4.5)
PHOSPHATE SERPL-MCNC: 3.8 MG/DL — SIGNIFICANT CHANGE UP (ref 2.5–4.5)
PHOSPHATE SERPL-MCNC: 4 MG/DL — SIGNIFICANT CHANGE UP (ref 2.5–4.5)
PHOSPHATE SERPL-MCNC: 5 MG/DL — HIGH (ref 2.5–4.5)
PHOSPHATE SERPL-MCNC: 5.4 MG/DL — HIGH (ref 2.5–4.5)
PHOSPHATE SERPL-MCNC: 6.2 MG/DL — HIGH (ref 2.5–4.5)
PLATELET # BLD AUTO: 214 K/UL — SIGNIFICANT CHANGE UP (ref 150–400)
PLATELET # BLD AUTO: 217 K/UL — SIGNIFICANT CHANGE UP (ref 150–400)
PLATELET # BLD AUTO: 246 K/UL — SIGNIFICANT CHANGE UP (ref 150–400)
PLATELET # BLD AUTO: 246 K/UL — SIGNIFICANT CHANGE UP (ref 150–400)
PLATELET # BLD AUTO: 247 K/UL — SIGNIFICANT CHANGE UP (ref 150–400)
PLATELET # BLD AUTO: 257 K/UL — SIGNIFICANT CHANGE UP (ref 150–400)
PLATELET # BLD AUTO: 268 K/UL — SIGNIFICANT CHANGE UP (ref 150–400)
PLATELET # BLD AUTO: 271 K/UL — SIGNIFICANT CHANGE UP (ref 150–400)
PLATELET # BLD AUTO: 296 K/UL — SIGNIFICANT CHANGE UP (ref 150–400)
PLATELET # BLD AUTO: 318 K/UL — SIGNIFICANT CHANGE UP (ref 150–400)
PLATELET # BLD AUTO: 329 K/UL — SIGNIFICANT CHANGE UP (ref 150–400)
PLATELET # BLD AUTO: 347 K/UL — SIGNIFICANT CHANGE UP (ref 150–400)
PLATELET # BLD AUTO: 365 K/UL — SIGNIFICANT CHANGE UP (ref 150–400)
PLATELET # BLD AUTO: 416 K/UL — HIGH (ref 150–400)
PO2 BLDA: 173 MMHG — HIGH (ref 74–108)
PO2 BLDV: 25 MMHG — SIGNIFICANT CHANGE UP (ref 25–45)
PO2 BLDV: 33 MMHG — SIGNIFICANT CHANGE UP (ref 25–45)
POTASSIUM BLDV-SCNC: 3.4 MMOL/L — LOW (ref 3.5–5.3)
POTASSIUM BLDV-SCNC: 5.2 MMOL/L — SIGNIFICANT CHANGE UP (ref 3.5–5.3)
POTASSIUM SERPL-MCNC: 2.9 MMOL/L — CRITICAL LOW (ref 3.5–5.3)
POTASSIUM SERPL-MCNC: 3 MMOL/L — LOW (ref 3.5–5.3)
POTASSIUM SERPL-MCNC: 3.5 MMOL/L — SIGNIFICANT CHANGE UP (ref 3.5–5.3)
POTASSIUM SERPL-MCNC: 3.6 MMOL/L — SIGNIFICANT CHANGE UP (ref 3.5–5.3)
POTASSIUM SERPL-MCNC: 3.7 MMOL/L — SIGNIFICANT CHANGE UP (ref 3.5–5.3)
POTASSIUM SERPL-MCNC: 4.5 MMOL/L — SIGNIFICANT CHANGE UP (ref 3.5–5.3)
POTASSIUM SERPL-MCNC: 5 MMOL/L — SIGNIFICANT CHANGE UP (ref 3.5–5.3)
POTASSIUM SERPL-MCNC: 5.2 MMOL/L — SIGNIFICANT CHANGE UP (ref 3.5–5.3)
POTASSIUM SERPL-MCNC: 5.4 MMOL/L — HIGH (ref 3.5–5.3)
POTASSIUM SERPL-MCNC: 5.5 MMOL/L — HIGH (ref 3.5–5.3)
POTASSIUM SERPL-MCNC: 5.7 MMOL/L — HIGH (ref 3.5–5.3)
POTASSIUM SERPL-MCNC: 5.8 MMOL/L — HIGH (ref 3.5–5.3)
POTASSIUM SERPL-MCNC: 5.8 MMOL/L — HIGH (ref 3.5–5.3)
POTASSIUM SERPL-MCNC: 6.2 MMOL/L — CRITICAL HIGH (ref 3.5–5.3)
POTASSIUM SERPL-MCNC: >9 MMOL/L — CRITICAL HIGH (ref 3.5–5.3)
POTASSIUM SERPL-SCNC: 2.9 MMOL/L — CRITICAL LOW (ref 3.5–5.3)
POTASSIUM SERPL-SCNC: 3 MMOL/L — LOW (ref 3.5–5.3)
POTASSIUM SERPL-SCNC: 3.5 MMOL/L — SIGNIFICANT CHANGE UP (ref 3.5–5.3)
POTASSIUM SERPL-SCNC: 3.6 MMOL/L — SIGNIFICANT CHANGE UP (ref 3.5–5.3)
POTASSIUM SERPL-SCNC: 3.7 MMOL/L — SIGNIFICANT CHANGE UP (ref 3.5–5.3)
POTASSIUM SERPL-SCNC: 4.5 MMOL/L — SIGNIFICANT CHANGE UP (ref 3.5–5.3)
POTASSIUM SERPL-SCNC: 5 MMOL/L — SIGNIFICANT CHANGE UP (ref 3.5–5.3)
POTASSIUM SERPL-SCNC: 5.2 MMOL/L — SIGNIFICANT CHANGE UP (ref 3.5–5.3)
POTASSIUM SERPL-SCNC: 5.4 MMOL/L — HIGH (ref 3.5–5.3)
POTASSIUM SERPL-SCNC: 5.5 MMOL/L — HIGH (ref 3.5–5.3)
POTASSIUM SERPL-SCNC: 5.7 MMOL/L — HIGH (ref 3.5–5.3)
POTASSIUM SERPL-SCNC: 5.8 MMOL/L — HIGH (ref 3.5–5.3)
POTASSIUM SERPL-SCNC: 5.8 MMOL/L — HIGH (ref 3.5–5.3)
POTASSIUM SERPL-SCNC: 6.2 MMOL/L — CRITICAL HIGH (ref 3.5–5.3)
POTASSIUM SERPL-SCNC: >9 MMOL/L — CRITICAL HIGH (ref 3.5–5.3)
PROT SERPL-MCNC: 7.3 G/DL — SIGNIFICANT CHANGE UP (ref 6–8.3)
PROT SERPL-MCNC: 7.4 G/DL — SIGNIFICANT CHANGE UP (ref 6–8.3)
PROT SERPL-MCNC: 7.6 G/DL — SIGNIFICANT CHANGE UP (ref 6–8.3)
PROT SERPL-MCNC: 8.1 G/DL — SIGNIFICANT CHANGE UP (ref 6–8.3)
PROT SERPL-MCNC: 9.6 G/DL — HIGH (ref 6–8.3)
PROTHROM AB SERPL-ACNC: 12.2 SEC — SIGNIFICANT CHANGE UP (ref 10–12.9)
PROTHROM AB SERPL-ACNC: 12.6 SEC — SIGNIFICANT CHANGE UP (ref 10–12.9)
PROTHROM AB SERPL-ACNC: 12.9 SEC — SIGNIFICANT CHANGE UP (ref 10–12.9)
PROTHROM AB SERPL-ACNC: 14.2 SEC — HIGH (ref 10–12.9)
PROTHROM AB SERPL-ACNC: 18.7 SEC — HIGH (ref 10–12.9)
RBC # BLD: 3.01 M/UL — LOW (ref 3.8–5.2)
RBC # BLD: 3.17 M/UL — LOW (ref 3.8–5.2)
RBC # BLD: 3.17 M/UL — LOW (ref 3.8–5.2)
RBC # BLD: 3.49 M/UL — LOW (ref 3.8–5.2)
RBC # BLD: 3.55 M/UL — LOW (ref 3.8–5.2)
RBC # BLD: 3.57 M/UL — LOW (ref 3.8–5.2)
RBC # BLD: 3.6 M/UL — LOW (ref 3.8–5.2)
RBC # BLD: 3.62 M/UL — LOW (ref 3.8–5.2)
RBC # BLD: 3.99 M/UL — SIGNIFICANT CHANGE UP (ref 3.8–5.2)
RBC # BLD: 4.12 M/UL — SIGNIFICANT CHANGE UP (ref 3.8–5.2)
RBC # BLD: 4.19 M/UL — SIGNIFICANT CHANGE UP (ref 3.8–5.2)
RBC # BLD: 4.36 M/UL — SIGNIFICANT CHANGE UP (ref 3.8–5.2)
RBC # BLD: 4.42 M/UL — SIGNIFICANT CHANGE UP (ref 3.8–5.2)
RBC # BLD: 4.49 M/UL — SIGNIFICANT CHANGE UP (ref 3.8–5.2)
RBC # FLD: 13.9 % — SIGNIFICANT CHANGE UP (ref 10.3–14.5)
RBC # FLD: 14 % — SIGNIFICANT CHANGE UP (ref 10.3–14.5)
RBC # FLD: 14.7 % — HIGH (ref 10.3–14.5)
RBC # FLD: 14.8 % — HIGH (ref 10.3–14.5)
RBC # FLD: 14.9 % — HIGH (ref 10.3–14.5)
RBC # FLD: 15.5 % — HIGH (ref 10.3–14.5)
RBC # FLD: 16.5 % — HIGH (ref 10.3–14.5)
RBC # FLD: 16.9 % — HIGH (ref 10.3–14.5)
RBC # FLD: 17.1 % — HIGH (ref 10.3–14.5)
RBC # FLD: 17.2 % — HIGH (ref 10.3–14.5)
RBC # FLD: 17.2 % — HIGH (ref 10.3–14.5)
RBC # FLD: 17.3 % — HIGH (ref 10.3–14.5)
RH IG SCN BLD-IMP: POSITIVE — SIGNIFICANT CHANGE UP
RH IG SCN BLD-IMP: POSITIVE — SIGNIFICANT CHANGE UP
SAO2 % BLDA: 100 % — HIGH (ref 92–96)
SAO2 % BLDV: 26 % — LOW (ref 67–88)
SAO2 % BLDV: 56 % — LOW (ref 67–88)
SARS-COV-2 RNA SPEC QL NAA+PROBE: SIGNIFICANT CHANGE UP
SARS-COV-2 RNA SPEC QL NAA+PROBE: SIGNIFICANT CHANGE UP
SODIUM SERPL-SCNC: 123 MMOL/L — LOW (ref 135–145)
SODIUM SERPL-SCNC: 131 MMOL/L — LOW (ref 135–145)
SODIUM SERPL-SCNC: 131 MMOL/L — LOW (ref 135–145)
SODIUM SERPL-SCNC: 132 MMOL/L — LOW (ref 135–145)
SODIUM SERPL-SCNC: 133 MMOL/L — LOW (ref 135–145)
SODIUM SERPL-SCNC: 135 MMOL/L — SIGNIFICANT CHANGE UP (ref 135–145)
SODIUM SERPL-SCNC: 136 MMOL/L — SIGNIFICANT CHANGE UP (ref 135–145)
SODIUM SERPL-SCNC: 136 MMOL/L — SIGNIFICANT CHANGE UP (ref 135–145)
SODIUM SERPL-SCNC: 139 MMOL/L — SIGNIFICANT CHANGE UP (ref 135–145)
SODIUM SERPL-SCNC: 139 MMOL/L — SIGNIFICANT CHANGE UP (ref 135–145)
SODIUM SERPL-SCNC: 140 MMOL/L — SIGNIFICANT CHANGE UP (ref 135–145)
TROPONIN T, HIGH SENSITIVITY RESULT: 233 NG/L — HIGH (ref 0–51)
WBC # BLD: 10.06 K/UL — SIGNIFICANT CHANGE UP (ref 3.8–10.5)
WBC # BLD: 10.17 K/UL — SIGNIFICANT CHANGE UP (ref 3.8–10.5)
WBC # BLD: 11.29 K/UL — HIGH (ref 3.8–10.5)
WBC # BLD: 11.5 K/UL — HIGH (ref 3.8–10.5)
WBC # BLD: 13.47 K/UL — HIGH (ref 3.8–10.5)
WBC # BLD: 22.62 K/UL — HIGH (ref 3.8–10.5)
WBC # BLD: 23.48 K/UL — HIGH (ref 3.8–10.5)
WBC # BLD: 30.9 K/UL — HIGH (ref 3.8–10.5)
WBC # BLD: 33.08 K/UL — HIGH (ref 3.8–10.5)
WBC # BLD: 7.66 K/UL — SIGNIFICANT CHANGE UP (ref 3.8–10.5)
WBC # BLD: 7.93 K/UL — SIGNIFICANT CHANGE UP (ref 3.8–10.5)
WBC # BLD: 8.87 K/UL — SIGNIFICANT CHANGE UP (ref 3.8–10.5)
WBC # BLD: 9.34 K/UL — SIGNIFICANT CHANGE UP (ref 3.8–10.5)
WBC # BLD: 9.88 K/UL — SIGNIFICANT CHANGE UP (ref 3.8–10.5)
WBC # FLD AUTO: 10.06 K/UL — SIGNIFICANT CHANGE UP (ref 3.8–10.5)
WBC # FLD AUTO: 10.17 K/UL — SIGNIFICANT CHANGE UP (ref 3.8–10.5)
WBC # FLD AUTO: 11.29 K/UL — HIGH (ref 3.8–10.5)
WBC # FLD AUTO: 11.5 K/UL — HIGH (ref 3.8–10.5)
WBC # FLD AUTO: 13.47 K/UL — HIGH (ref 3.8–10.5)
WBC # FLD AUTO: 22.62 K/UL — HIGH (ref 3.8–10.5)
WBC # FLD AUTO: 23.48 K/UL — HIGH (ref 3.8–10.5)
WBC # FLD AUTO: 30.9 K/UL — HIGH (ref 3.8–10.5)
WBC # FLD AUTO: 33.08 K/UL — HIGH (ref 3.8–10.5)
WBC # FLD AUTO: 7.66 K/UL — SIGNIFICANT CHANGE UP (ref 3.8–10.5)
WBC # FLD AUTO: 7.93 K/UL — SIGNIFICANT CHANGE UP (ref 3.8–10.5)
WBC # FLD AUTO: 8.87 K/UL — SIGNIFICANT CHANGE UP (ref 3.8–10.5)
WBC # FLD AUTO: 9.34 K/UL — SIGNIFICANT CHANGE UP (ref 3.8–10.5)
WBC # FLD AUTO: 9.88 K/UL — SIGNIFICANT CHANGE UP (ref 3.8–10.5)

## 2020-01-01 PROCEDURE — 99285 EMERGENCY DEPT VISIT HI MDM: CPT | Mod: 25

## 2020-01-01 PROCEDURE — 93990 DOPPLER FLOW TESTING: CPT

## 2020-01-01 PROCEDURE — 36901 INTRO CATH DIALYSIS CIRCUIT: CPT

## 2020-01-01 PROCEDURE — 84295 ASSAY OF SERUM SODIUM: CPT

## 2020-01-01 PROCEDURE — 96366 THER/PROPH/DIAG IV INF ADDON: CPT | Mod: XU

## 2020-01-01 PROCEDURE — 82330 ASSAY OF CALCIUM: CPT

## 2020-01-01 PROCEDURE — 82962 GLUCOSE BLOOD TEST: CPT

## 2020-01-01 PROCEDURE — 93986 DUP-SCAN HEMO COMPL UNI STD: CPT

## 2020-01-01 PROCEDURE — 85027 COMPLETE CBC AUTOMATED: CPT

## 2020-01-01 PROCEDURE — 93010 ELECTROCARDIOGRAM REPORT: CPT

## 2020-01-01 PROCEDURE — 49000 EXPLORATION OF ABDOMEN: CPT | Mod: GC,62

## 2020-01-01 PROCEDURE — 83605 ASSAY OF LACTIC ACID: CPT

## 2020-01-01 PROCEDURE — P9016: CPT

## 2020-01-01 PROCEDURE — 86900 BLOOD TYPING SEROLOGIC ABO: CPT

## 2020-01-01 PROCEDURE — 36558 INSERT TUNNELED CV CATH: CPT

## 2020-01-01 PROCEDURE — 80053 COMPREHEN METABOLIC PANEL: CPT

## 2020-01-01 PROCEDURE — 71045 X-RAY EXAM CHEST 1 VIEW: CPT | Mod: 26,77

## 2020-01-01 PROCEDURE — 83735 ASSAY OF MAGNESIUM: CPT

## 2020-01-01 PROCEDURE — 99213 OFFICE O/P EST LOW 20 MIN: CPT

## 2020-01-01 PROCEDURE — 74174 CTA ABD&PLVS W/CONTRAST: CPT

## 2020-01-01 PROCEDURE — 97161 PT EVAL LOW COMPLEX 20 MIN: CPT

## 2020-01-01 PROCEDURE — 82947 ASSAY GLUCOSE BLOOD QUANT: CPT

## 2020-01-01 PROCEDURE — 80048 BASIC METABOLIC PNL TOTAL CA: CPT

## 2020-01-01 PROCEDURE — 77001 FLUOROGUIDE FOR VEIN DEVICE: CPT

## 2020-01-01 PROCEDURE — 82435 ASSAY OF BLOOD CHLORIDE: CPT

## 2020-01-01 PROCEDURE — C1757: CPT

## 2020-01-01 PROCEDURE — 85730 THROMBOPLASTIN TIME PARTIAL: CPT

## 2020-01-01 PROCEDURE — 36215Z: CUSTOM | Mod: 59,PD

## 2020-01-01 PROCEDURE — 93005 ELECTROCARDIOGRAM TRACING: CPT | Mod: 76

## 2020-01-01 PROCEDURE — 99261: CPT

## 2020-01-01 PROCEDURE — 86901 BLOOD TYPING SEROLOGIC RH(D): CPT

## 2020-01-01 PROCEDURE — 74174 CTA ABD&PLVS W/CONTRAST: CPT | Mod: 26

## 2020-01-01 PROCEDURE — 85014 HEMATOCRIT: CPT

## 2020-01-01 PROCEDURE — 49000 EXPLORATION OF ABDOMEN: CPT | Mod: 62

## 2020-01-01 PROCEDURE — 99223 1ST HOSP IP/OBS HIGH 75: CPT

## 2020-01-01 PROCEDURE — 85610 PROTHROMBIN TIME: CPT

## 2020-01-01 PROCEDURE — 84100 ASSAY OF PHOSPHORUS: CPT

## 2020-01-01 PROCEDURE — 96375 TX/PRO/DX INJ NEW DRUG ADDON: CPT

## 2020-01-01 PROCEDURE — 93010 ELECTROCARDIOGRAM REPORT: CPT | Mod: GC

## 2020-01-01 PROCEDURE — 82803 BLOOD GASES ANY COMBINATION: CPT

## 2020-01-01 PROCEDURE — 96375 TX/PRO/DX INJ NEW DRUG ADDON: CPT | Mod: XU

## 2020-01-01 PROCEDURE — 86850 RBC ANTIBODY SCREEN: CPT

## 2020-01-01 PROCEDURE — 36558 INSERT TUNNELED CV CATH: CPT | Mod: LT

## 2020-01-01 PROCEDURE — 96365 THER/PROPH/DIAG IV INF INIT: CPT | Mod: XU

## 2020-01-01 PROCEDURE — 86706 HEP B SURFACE ANTIBODY: CPT

## 2020-01-01 PROCEDURE — C1769: CPT

## 2020-01-01 PROCEDURE — 71045 X-RAY EXAM CHEST 1 VIEW: CPT | Mod: 26

## 2020-01-01 PROCEDURE — 73630 X-RAY EXAM OF FOOT: CPT

## 2020-01-01 PROCEDURE — 94002 VENT MGMT INPAT INIT DAY: CPT

## 2020-01-01 PROCEDURE — 71275 CT ANGIOGRAPHY CHEST: CPT

## 2020-01-01 PROCEDURE — 96361 HYDRATE IV INFUSION ADD-ON: CPT | Mod: XU

## 2020-01-01 PROCEDURE — G0378: CPT

## 2020-01-01 PROCEDURE — 99221 1ST HOSP IP/OBS SF/LOW 40: CPT | Mod: 57

## 2020-01-01 PROCEDURE — 99291 CRITICAL CARE FIRST HOUR: CPT | Mod: CS,GC

## 2020-01-01 PROCEDURE — 99284 EMERGENCY DEPT VISIT MOD MDM: CPT | Mod: 25

## 2020-01-01 PROCEDURE — 94640 AIRWAY INHALATION TREATMENT: CPT

## 2020-01-01 PROCEDURE — 96376 TX/PRO/DX INJ SAME DRUG ADON: CPT | Mod: XU

## 2020-01-01 PROCEDURE — 77001 FLUOROGUIDE FOR VEIN DEVICE: CPT | Mod: 26,59

## 2020-01-01 PROCEDURE — 84132 ASSAY OF SERUM POTASSIUM: CPT

## 2020-01-01 PROCEDURE — 71045 X-RAY EXAM CHEST 1 VIEW: CPT

## 2020-01-01 PROCEDURE — 86803 HEPATITIS C AB TEST: CPT

## 2020-01-01 PROCEDURE — 99285 EMERGENCY DEPT VISIT HI MDM: CPT | Mod: GC

## 2020-01-01 PROCEDURE — 76705 ECHO EXAM OF ABDOMEN: CPT | Mod: 26

## 2020-01-01 PROCEDURE — C1887: CPT

## 2020-01-01 PROCEDURE — 83036 HEMOGLOBIN GLYCOSYLATED A1C: CPT

## 2020-01-01 PROCEDURE — 76705 ECHO EXAM OF ABDOMEN: CPT

## 2020-01-01 PROCEDURE — 83690 ASSAY OF LIPASE: CPT

## 2020-01-01 PROCEDURE — 99238 HOSP IP/OBS DSCHRG MGMT 30/<: CPT

## 2020-01-01 PROCEDURE — C1894: CPT

## 2020-01-01 PROCEDURE — 87340 HEPATITIS B SURFACE AG IA: CPT

## 2020-01-01 PROCEDURE — 99217: CPT

## 2020-01-01 PROCEDURE — 93005 ELECTROCARDIOGRAM TRACING: CPT

## 2020-01-01 PROCEDURE — 76937 US GUIDE VASCULAR ACCESS: CPT

## 2020-01-01 PROCEDURE — 76937 US GUIDE VASCULAR ACCESS: CPT | Mod: 26,76

## 2020-01-01 PROCEDURE — 86923 COMPATIBILITY TEST ELECTRIC: CPT

## 2020-01-01 PROCEDURE — 99291 CRITICAL CARE FIRST HOUR: CPT

## 2020-01-01 PROCEDURE — 93990 DOPPLER FLOW TESTING: CPT | Mod: 26

## 2020-01-01 PROCEDURE — 76937 US GUIDE VASCULAR ACCESS: CPT | Mod: 26

## 2020-01-01 PROCEDURE — C1750: CPT

## 2020-01-01 PROCEDURE — 99285 EMERGENCY DEPT VISIT HI MDM: CPT | Mod: 25,GC

## 2020-01-01 PROCEDURE — 84484 ASSAY OF TROPONIN QUANT: CPT

## 2020-01-01 PROCEDURE — 36906Z: CUSTOM | Mod: PD

## 2020-01-01 PROCEDURE — 96365 THER/PROPH/DIAG IV INF INIT: CPT

## 2020-01-01 PROCEDURE — 99285 EMERGENCY DEPT VISIT HI MDM: CPT

## 2020-01-01 PROCEDURE — 73630 X-RAY EXAM OF FOOT: CPT | Mod: 26,LT

## 2020-01-01 PROCEDURE — 94003 VENT MGMT INPAT SUBQ DAY: CPT

## 2020-01-01 PROCEDURE — 99218: CPT | Mod: 25

## 2020-01-01 PROCEDURE — P9059: CPT

## 2020-01-01 PROCEDURE — 71275 CT ANGIOGRAPHY CHEST: CPT | Mod: 26

## 2020-01-01 RX ORDER — SODIUM CHLORIDE 9 MG/ML
1000 INJECTION, SOLUTION INTRAVENOUS
Refills: 0 | Status: DISCONTINUED | OUTPATIENT
Start: 2020-01-01 | End: 2020-01-01

## 2020-01-01 RX ORDER — DEXTROSE 50 % IN WATER 50 %
25 SYRINGE (ML) INTRAVENOUS ONCE
Refills: 0 | Status: DISCONTINUED | OUTPATIENT
Start: 2020-01-01 | End: 2020-01-01

## 2020-01-01 RX ORDER — VANCOMYCIN HCL 1 G
1000 VIAL (EA) INTRAVENOUS ONCE
Refills: 0 | Status: COMPLETED | OUTPATIENT
Start: 2020-01-01 | End: 2020-01-01

## 2020-01-01 RX ORDER — SEVELAMER CARBONATE 2400 MG/1
800 POWDER, FOR SUSPENSION ORAL
Refills: 0 | Status: DISCONTINUED | OUTPATIENT
Start: 2020-01-01 | End: 2020-01-01

## 2020-01-01 RX ORDER — CALCIUM GLUCONATE 100 MG/ML
2 VIAL (ML) INTRAVENOUS ONCE
Refills: 0 | Status: DISCONTINUED | OUTPATIENT
Start: 2020-01-01 | End: 2020-01-01

## 2020-01-01 RX ORDER — PHENYLEPHRINE HYDROCHLORIDE 10 MG/ML
3.4 INJECTION INTRAVENOUS
Qty: 160 | Refills: 0 | Status: DISCONTINUED | OUTPATIENT
Start: 2020-01-01 | End: 2020-01-01

## 2020-01-01 RX ORDER — METOCLOPRAMIDE HCL 10 MG
10 TABLET ORAL ONCE
Refills: 0 | Status: DISCONTINUED | OUTPATIENT
Start: 2020-01-01 | End: 2020-01-01

## 2020-01-01 RX ORDER — DEXMEDETOMIDINE HYDROCHLORIDE IN 0.9% SODIUM CHLORIDE 4 UG/ML
0.4 INJECTION INTRAVENOUS
Qty: 200 | Refills: 0 | Status: DISCONTINUED | OUTPATIENT
Start: 2020-01-01 | End: 2020-01-01

## 2020-01-01 RX ORDER — CHLORHEXIDINE GLUCONATE 213 G/1000ML
15 SOLUTION TOPICAL EVERY 12 HOURS
Refills: 0 | Status: DISCONTINUED | OUTPATIENT
Start: 2020-01-01 | End: 2020-01-01

## 2020-01-01 RX ORDER — INFLUENZA VIRUS VACCINE 15; 15; 15; 15 UG/.5ML; UG/.5ML; UG/.5ML; UG/.5ML
0.5 SUSPENSION INTRAMUSCULAR ONCE
Refills: 0 | Status: DISCONTINUED | OUTPATIENT
Start: 2020-01-01 | End: 2020-01-01

## 2020-01-01 RX ORDER — ACETAMINOPHEN 500 MG
975 TABLET ORAL ONCE
Refills: 0 | Status: COMPLETED | OUTPATIENT
Start: 2020-01-01 | End: 2020-01-01

## 2020-01-01 RX ORDER — MORPHINE SULFATE 50 MG/1
4 CAPSULE, EXTENDED RELEASE ORAL ONCE
Refills: 0 | Status: DISCONTINUED | OUTPATIENT
Start: 2020-01-01 | End: 2020-01-01

## 2020-01-01 RX ORDER — GLUCAGON INJECTION, SOLUTION 0.5 MG/.1ML
1 INJECTION, SOLUTION SUBCUTANEOUS ONCE
Refills: 0 | Status: DISCONTINUED | OUTPATIENT
Start: 2020-01-01 | End: 2020-01-01

## 2020-01-01 RX ORDER — MIDODRINE HYDROCHLORIDE 2.5 MG/1
10 TABLET ORAL
Refills: 0 | Status: DISCONTINUED | OUTPATIENT
Start: 2020-01-01 | End: 2020-01-01

## 2020-01-01 RX ORDER — MIDODRINE HYDROCHLORIDE 2.5 MG/1
1 TABLET ORAL
Qty: 0 | Refills: 0 | DISCHARGE
Start: 2020-01-01

## 2020-01-01 RX ORDER — INSULIN LISPRO 100/ML
VIAL (ML) SUBCUTANEOUS AT BEDTIME
Refills: 0 | Status: DISCONTINUED | OUTPATIENT
Start: 2020-01-01 | End: 2020-01-01

## 2020-01-01 RX ORDER — GABAPENTIN 400 MG/1
300 CAPSULE ORAL THREE TIMES A DAY
Refills: 0 | Status: DISCONTINUED | OUTPATIENT
Start: 2020-01-01 | End: 2020-01-01

## 2020-01-01 RX ORDER — INSULIN LISPRO 100/ML
VIAL (ML) SUBCUTANEOUS
Refills: 0 | Status: DISCONTINUED | OUTPATIENT
Start: 2020-01-01 | End: 2020-01-01

## 2020-01-01 RX ORDER — FAMOTIDINE 20 MG/1
20 TABLET, FILM COATED ORAL
Refills: 0 | Status: DISCONTINUED | COMMUNITY
End: 2020-01-01

## 2020-01-01 RX ORDER — CHLORHEXIDINE GLUCONATE 213 G/1000ML
1 SOLUTION TOPICAL
Refills: 0 | Status: DISCONTINUED | OUTPATIENT
Start: 2020-01-01 | End: 2020-01-01

## 2020-01-01 RX ORDER — SEVELAMER CARBONATE 2400 MG/1
1 POWDER, FOR SUSPENSION ORAL
Qty: 0 | Refills: 0 | DISCHARGE
Start: 2020-01-01

## 2020-01-01 RX ORDER — CEPHALEXIN 500 MG
1 CAPSULE ORAL
Qty: 18 | Refills: 0
Start: 2020-01-01 | End: 2020-01-01

## 2020-01-01 RX ORDER — DEXTROSE 50 % IN WATER 50 %
12.5 SYRINGE (ML) INTRAVENOUS ONCE
Refills: 0 | Status: DISCONTINUED | OUTPATIENT
Start: 2020-01-01 | End: 2020-01-01

## 2020-01-01 RX ORDER — ACETAMINOPHEN 500 MG
1000 TABLET ORAL ONCE
Refills: 0 | Status: DISCONTINUED | OUTPATIENT
Start: 2020-01-01 | End: 2020-01-01

## 2020-01-01 RX ORDER — FENTANYL CITRATE 50 UG/ML
1 INJECTION INTRAVENOUS
Qty: 5000 | Refills: 0 | Status: DISCONTINUED | OUTPATIENT
Start: 2020-01-01 | End: 2020-01-01

## 2020-01-01 RX ORDER — HEPARIN SODIUM 5000 [USP'U]/ML
5000 INJECTION INTRAVENOUS; SUBCUTANEOUS EVERY 8 HOURS
Refills: 0 | Status: DISCONTINUED | OUTPATIENT
Start: 2020-01-01 | End: 2020-01-01

## 2020-01-01 RX ORDER — CHLORHEXIDINE GLUCONATE 213 G/1000ML
1 SOLUTION TOPICAL DAILY
Refills: 0 | Status: DISCONTINUED | OUTPATIENT
Start: 2020-01-01 | End: 2020-01-01

## 2020-01-01 RX ORDER — HEPARIN SODIUM 5000 [USP'U]/ML
6500 INJECTION INTRAVENOUS; SUBCUTANEOUS EVERY 6 HOURS
Refills: 0 | Status: DISCONTINUED | OUTPATIENT
Start: 2020-01-01 | End: 2020-01-01

## 2020-01-01 RX ORDER — SODIUM ZIRCONIUM CYCLOSILICATE 10 G/10G
10 POWDER, FOR SUSPENSION ORAL ONCE
Refills: 0 | Status: COMPLETED | OUTPATIENT
Start: 2020-01-01 | End: 2020-01-01

## 2020-01-01 RX ORDER — ACETAMINOPHEN 500 MG
650 TABLET ORAL ONCE
Refills: 0 | Status: DISCONTINUED | OUTPATIENT
Start: 2020-01-01 | End: 2020-01-01

## 2020-01-01 RX ORDER — MAGNESIUM SULFATE 500 MG/ML
1 VIAL (ML) INJECTION ONCE
Refills: 0 | Status: COMPLETED | OUTPATIENT
Start: 2020-01-01 | End: 2020-01-01

## 2020-01-01 RX ORDER — MIDODRINE HYDROCHLORIDE 2.5 MG/1
2.5 TABLET ORAL
Refills: 0 | Status: ACTIVE | COMMUNITY

## 2020-01-01 RX ORDER — DEXTROSE 50 % IN WATER 50 %
50 SYRINGE (ML) INTRAVENOUS
Refills: 0 | Status: COMPLETED | OUTPATIENT
Start: 2020-01-01 | End: 2020-01-01

## 2020-01-01 RX ORDER — DEXTROSE 10 % IN WATER 10 %
1000 INTRAVENOUS SOLUTION INTRAVENOUS
Refills: 0 | Status: DISCONTINUED | OUTPATIENT
Start: 2020-01-01 | End: 2020-01-01

## 2020-01-01 RX ORDER — APIXABAN 2.5 MG/1
2.5 TABLET, FILM COATED ORAL
Refills: 0 | Status: DISCONTINUED | OUTPATIENT
Start: 2020-01-01 | End: 2020-01-01

## 2020-01-01 RX ORDER — APIXABAN 2.5 MG/1
2.5 TABLET, FILM COATED ORAL EVERY 12 HOURS
Refills: 0 | Status: DISCONTINUED | OUTPATIENT
Start: 2020-01-01 | End: 2020-01-01

## 2020-01-01 RX ORDER — APIXABAN 2.5 MG/1
2.5 TABLET, FILM COATED ORAL
Refills: 0 | Status: DISCONTINUED | COMMUNITY
End: 2020-01-01

## 2020-01-01 RX ORDER — PHENYLEPHRINE HYDROCHLORIDE 10 MG/ML
0.9 INJECTION INTRAVENOUS
Qty: 40 | Refills: 0 | Status: DISCONTINUED | OUTPATIENT
Start: 2020-01-01 | End: 2020-01-01

## 2020-01-01 RX ORDER — APIXABAN 5 MG/1
TABLET, FILM COATED ORAL
Refills: 0 | Status: DISCONTINUED | COMMUNITY
End: 2020-01-01

## 2020-01-01 RX ORDER — IPRATROPIUM/ALBUTEROL SULFATE 18-103MCG
3 AEROSOL WITH ADAPTER (GRAM) INHALATION EVERY 12 HOURS
Refills: 0 | Status: DISCONTINUED | OUTPATIENT
Start: 2020-01-01 | End: 2020-01-01

## 2020-01-01 RX ORDER — POTASSIUM CHLORIDE 20 MEQ
10 PACKET (EA) ORAL ONCE
Refills: 0 | Status: COMPLETED | OUTPATIENT
Start: 2020-01-01 | End: 2020-01-01

## 2020-01-01 RX ORDER — MIDODRINE HYDROCHLORIDE 2.5 MG/1
10 TABLET ORAL THREE TIMES A DAY
Refills: 0 | Status: DISCONTINUED | OUTPATIENT
Start: 2020-01-01 | End: 2020-01-01

## 2020-01-01 RX ORDER — APIXABAN 2.5 MG/1
2.5 TABLET, FILM COATED ORAL
Refills: 0 | Status: ACTIVE | COMMUNITY

## 2020-01-01 RX ORDER — SEVELAMER CARBONATE 2400 MG/1
2 POWDER, FOR SUSPENSION ORAL
Qty: 0 | Refills: 0 | DISCHARGE
Start: 2020-01-01

## 2020-01-01 RX ORDER — FERRIC CITRATE 210 MG/1
2 TABLET, COATED ORAL
Qty: 0 | Refills: 0 | DISCHARGE

## 2020-01-01 RX ORDER — ONDANSETRON 8 MG/1
4 TABLET, FILM COATED ORAL ONCE
Refills: 0 | Status: COMPLETED | OUTPATIENT
Start: 2020-01-01 | End: 2020-01-01

## 2020-01-01 RX ORDER — SEVELAMER CARBONATE 2400 MG/1
1600 POWDER, FOR SUSPENSION ORAL
Refills: 0 | Status: DISCONTINUED | OUTPATIENT
Start: 2020-01-01 | End: 2020-01-01

## 2020-01-01 RX ORDER — CEPHALEXIN 500 MG
250 CAPSULE ORAL EVERY 12 HOURS
Refills: 0 | Status: DISCONTINUED | OUTPATIENT
Start: 2020-01-01 | End: 2020-01-01

## 2020-01-01 RX ORDER — SODIUM CHLORIDE 9 MG/ML
500 INJECTION, SOLUTION INTRAVENOUS ONCE
Refills: 0 | Status: COMPLETED | OUTPATIENT
Start: 2020-01-01 | End: 2020-01-01

## 2020-01-01 RX ORDER — DEXTROSE 50 % IN WATER 50 %
15 SYRINGE (ML) INTRAVENOUS ONCE
Refills: 0 | Status: DISCONTINUED | OUTPATIENT
Start: 2020-01-01 | End: 2020-01-01

## 2020-01-01 RX ORDER — HEPARIN SODIUM 5000 [USP'U]/ML
3000 INJECTION INTRAVENOUS; SUBCUTANEOUS EVERY 6 HOURS
Refills: 0 | Status: DISCONTINUED | OUTPATIENT
Start: 2020-01-01 | End: 2020-01-01

## 2020-01-01 RX ORDER — HYDROMORPHONE HYDROCHLORIDE 2 MG/ML
0.5 INJECTION INTRAMUSCULAR; INTRAVENOUS; SUBCUTANEOUS
Refills: 0 | Status: DISCONTINUED | OUTPATIENT
Start: 2020-01-01 | End: 2020-01-01

## 2020-01-01 RX ORDER — HYDROMORPHONE HYDROCHLORIDE 2 MG/ML
0.25 INJECTION INTRAMUSCULAR; INTRAVENOUS; SUBCUTANEOUS
Refills: 0 | Status: DISCONTINUED | OUTPATIENT
Start: 2020-01-01 | End: 2020-01-01

## 2020-01-01 RX ORDER — GABAPENTIN 300 MG
300 TABLET ORAL
Refills: 0 | Status: ACTIVE | COMMUNITY

## 2020-01-01 RX ORDER — ONDANSETRON 8 MG/1
4 TABLET, FILM COATED ORAL ONCE
Refills: 0 | Status: DISCONTINUED | OUTPATIENT
Start: 2020-01-01 | End: 2020-01-01

## 2020-01-01 RX ORDER — SODIUM CHLORIDE 9 MG/ML
10 INJECTION INTRAMUSCULAR; INTRAVENOUS; SUBCUTANEOUS
Refills: 0 | Status: DISCONTINUED | OUTPATIENT
Start: 2020-01-01 | End: 2020-01-01

## 2020-01-01 RX ORDER — PIPERACILLIN AND TAZOBACTAM 4; .5 G/20ML; G/20ML
3.38 INJECTION, POWDER, LYOPHILIZED, FOR SOLUTION INTRAVENOUS ONCE
Refills: 0 | Status: COMPLETED | OUTPATIENT
Start: 2020-01-01 | End: 2020-01-01

## 2020-01-01 RX ORDER — CALCIUM ACETATE 667 MG
667 TABLET ORAL
Refills: 0 | Status: DISCONTINUED | COMMUNITY
End: 2020-01-01

## 2020-01-01 RX ORDER — SEVELAMER CARBONATE 800 MG/1
800 TABLET, FILM COATED ORAL
Refills: 0 | Status: ACTIVE | COMMUNITY

## 2020-01-01 RX ORDER — CALCIUM GLUCONATE 100 MG/ML
2 VIAL (ML) INTRAVENOUS ONCE
Refills: 0 | Status: COMPLETED | OUTPATIENT
Start: 2020-01-01 | End: 2020-01-01

## 2020-01-01 RX ORDER — PIPERACILLIN AND TAZOBACTAM 4; .5 G/20ML; G/20ML
3.38 INJECTION, POWDER, LYOPHILIZED, FOR SOLUTION INTRAVENOUS EVERY 12 HOURS
Refills: 0 | Status: DISCONTINUED | OUTPATIENT
Start: 2020-01-01 | End: 2020-01-01

## 2020-01-01 RX ORDER — PATIROMER 8.4 G/1
0 POWDER, FOR SUSPENSION ORAL
Qty: 0 | Refills: 0 | DISCHARGE

## 2020-01-01 RX ORDER — MIDODRINE HYDROCHLORIDE 2.5 MG/1
2 TABLET ORAL
Qty: 0 | Refills: 0 | DISCHARGE

## 2020-01-01 RX ORDER — HEPARIN SODIUM 5000 [USP'U]/ML
6500 INJECTION INTRAVENOUS; SUBCUTANEOUS ONCE
Refills: 0 | Status: DISCONTINUED | OUTPATIENT
Start: 2020-01-01 | End: 2020-01-01

## 2020-01-01 RX ORDER — HEPARIN SODIUM 5000 [USP'U]/ML
INJECTION INTRAVENOUS; SUBCUTANEOUS
Qty: 25000 | Refills: 0 | Status: DISCONTINUED | OUTPATIENT
Start: 2020-01-01 | End: 2020-01-01

## 2020-01-01 RX ORDER — MIDODRINE HYDROCHLORIDE 2.5 MG/1
10 TABLET ORAL ONCE
Refills: 0 | Status: COMPLETED | OUTPATIENT
Start: 2020-01-01 | End: 2020-01-01

## 2020-01-01 RX ORDER — INSULIN LISPRO 100/ML
VIAL (ML) SUBCUTANEOUS EVERY 6 HOURS
Refills: 0 | Status: DISCONTINUED | OUTPATIENT
Start: 2020-01-01 | End: 2020-01-01

## 2020-01-01 RX ORDER — PANTOPRAZOLE SODIUM 20 MG/1
40 TABLET, DELAYED RELEASE ORAL DAILY
Refills: 0 | Status: DISCONTINUED | OUTPATIENT
Start: 2020-01-01 | End: 2020-01-01

## 2020-01-01 RX ORDER — LIDOCAINE HCL 20 MG/ML
20 VIAL (ML) INJECTION ONCE
Refills: 0 | Status: COMPLETED | OUTPATIENT
Start: 2020-01-01 | End: 2020-01-01

## 2020-01-01 RX ORDER — HEPARIN SODIUM 5000 [USP'U]/ML
3 INJECTION INTRAVENOUS; SUBCUTANEOUS ONCE
Refills: 0 | Status: COMPLETED | OUTPATIENT
Start: 2020-01-01 | End: 2020-01-01

## 2020-01-01 RX ORDER — MIDODRINE HYDROCHLORIDE 2.5 MG/1
2.5 TABLET ORAL ONCE
Refills: 0 | Status: DISCONTINUED | OUTPATIENT
Start: 2020-01-01 | End: 2020-01-01

## 2020-01-01 RX ORDER — FERRIC CITRATE 210 MG/1
1 GM TABLET, COATED ORAL
Refills: 0 | Status: ACTIVE | COMMUNITY

## 2020-01-01 RX ADMIN — SEVELAMER CARBONATE 800 MILLIGRAM(S): 2400 POWDER, FOR SUSPENSION ORAL at 14:18

## 2020-01-01 RX ADMIN — HEPARIN SODIUM 5000 UNIT(S): 5000 INJECTION INTRAVENOUS; SUBCUTANEOUS at 22:45

## 2020-01-01 RX ADMIN — Medication 250 MILLIGRAM(S): at 23:49

## 2020-01-01 RX ADMIN — GABAPENTIN 300 MILLIGRAM(S): 400 CAPSULE ORAL at 06:48

## 2020-01-01 RX ADMIN — HYDROMORPHONE HYDROCHLORIDE 0.5 MILLIGRAM(S): 2 INJECTION INTRAMUSCULAR; INTRAVENOUS; SUBCUTANEOUS at 06:00

## 2020-01-01 RX ADMIN — PIPERACILLIN AND TAZOBACTAM 3.38 GRAM(S): 4; .5 INJECTION, POWDER, LYOPHILIZED, FOR SOLUTION INTRAVENOUS at 23:32

## 2020-01-01 RX ADMIN — SODIUM CHLORIDE 500 MILLILITER(S): 9 INJECTION, SOLUTION INTRAVENOUS at 05:30

## 2020-01-01 RX ADMIN — SEVELAMER CARBONATE 800 MILLIGRAM(S): 2400 POWDER, FOR SUSPENSION ORAL at 13:25

## 2020-01-01 RX ADMIN — SEVELAMER CARBONATE 800 MILLIGRAM(S): 2400 POWDER, FOR SUSPENSION ORAL at 12:33

## 2020-01-01 RX ADMIN — ONDANSETRON 4 MILLIGRAM(S): 8 TABLET, FILM COATED ORAL at 01:19

## 2020-01-01 RX ADMIN — PHENYLEPHRINE HYDROCHLORIDE 28.3 MICROGRAM(S)/KG/MIN: 10 INJECTION INTRAVENOUS at 09:00

## 2020-01-01 RX ADMIN — GABAPENTIN 300 MILLIGRAM(S): 400 CAPSULE ORAL at 13:25

## 2020-01-01 RX ADMIN — SEVELAMER CARBONATE 800 MILLIGRAM(S): 2400 POWDER, FOR SUSPENSION ORAL at 18:55

## 2020-01-01 RX ADMIN — APIXABAN 2.5 MILLIGRAM(S): 2.5 TABLET, FILM COATED ORAL at 05:03

## 2020-01-01 RX ADMIN — Medication 50 MILLILITER(S): at 12:06

## 2020-01-01 RX ADMIN — Medication 100 MILLIEQUIVALENT(S): at 06:22

## 2020-01-01 RX ADMIN — Medication 975 MILLIGRAM(S): at 23:05

## 2020-01-01 RX ADMIN — SODIUM ZIRCONIUM CYCLOSILICATE 10 GRAM(S): 10 POWDER, FOR SUSPENSION ORAL at 05:48

## 2020-01-01 RX ADMIN — SODIUM CHLORIDE 75 MILLILITER(S): 9 INJECTION, SOLUTION INTRAVENOUS at 13:12

## 2020-01-01 RX ADMIN — SEVELAMER CARBONATE 800 MILLIGRAM(S): 2400 POWDER, FOR SUSPENSION ORAL at 13:10

## 2020-01-01 RX ADMIN — DEXMEDETOMIDINE HYDROCHLORIDE IN 0.9% SODIUM CHLORIDE 8.39 MICROGRAM(S)/KG/HR: 4 INJECTION INTRAVENOUS at 13:11

## 2020-01-01 RX ADMIN — Medication 975 MILLIGRAM(S): at 21:50

## 2020-01-01 RX ADMIN — Medication 250 MILLIGRAM(S): at 17:21

## 2020-01-01 RX ADMIN — Medication 975 MILLIGRAM(S): at 20:56

## 2020-01-01 RX ADMIN — SEVELAMER CARBONATE 800 MILLIGRAM(S): 2400 POWDER, FOR SUSPENSION ORAL at 14:40

## 2020-01-01 RX ADMIN — MIDODRINE HYDROCHLORIDE 10 MILLIGRAM(S): 2.5 TABLET ORAL at 06:48

## 2020-01-01 RX ADMIN — PANTOPRAZOLE SODIUM 40 MILLIGRAM(S): 20 TABLET, DELAYED RELEASE ORAL at 13:01

## 2020-01-01 RX ADMIN — CHLORHEXIDINE GLUCONATE 1 APPLICATION(S): 213 SOLUTION TOPICAL at 13:10

## 2020-01-01 RX ADMIN — Medication 50 MILLILITER(S): at 12:30

## 2020-01-01 RX ADMIN — SEVELAMER CARBONATE 800 MILLIGRAM(S): 2400 POWDER, FOR SUSPENSION ORAL at 18:57

## 2020-01-01 RX ADMIN — Medication 1: at 13:09

## 2020-01-01 RX ADMIN — MIDODRINE HYDROCHLORIDE 10 MILLIGRAM(S): 2.5 TABLET ORAL at 12:33

## 2020-01-01 RX ADMIN — MIDODRINE HYDROCHLORIDE 10 MILLIGRAM(S): 2.5 TABLET ORAL at 13:10

## 2020-01-01 RX ADMIN — SODIUM CHLORIDE 50 MILLILITER(S): 9 INJECTION, SOLUTION INTRAVENOUS at 08:59

## 2020-01-01 RX ADMIN — CHLORHEXIDINE GLUCONATE 1 APPLICATION(S): 213 SOLUTION TOPICAL at 15:50

## 2020-01-01 RX ADMIN — PIPERACILLIN AND TAZOBACTAM 25 GRAM(S): 4; .5 INJECTION, POWDER, LYOPHILIZED, FOR SOLUTION INTRAVENOUS at 18:04

## 2020-01-01 RX ADMIN — Medication 3 MILLILITER(S): at 09:20

## 2020-01-01 RX ADMIN — GABAPENTIN 300 MILLIGRAM(S): 400 CAPSULE ORAL at 06:22

## 2020-01-01 RX ADMIN — PIPERACILLIN AND TAZOBACTAM 200 GRAM(S): 4; .5 INJECTION, POWDER, LYOPHILIZED, FOR SOLUTION INTRAVENOUS at 02:35

## 2020-01-01 RX ADMIN — PIPERACILLIN AND TAZOBACTAM 25 GRAM(S): 4; .5 INJECTION, POWDER, LYOPHILIZED, FOR SOLUTION INTRAVENOUS at 07:21

## 2020-01-01 RX ADMIN — HEPARIN SODIUM 5000 UNIT(S): 5000 INJECTION INTRAVENOUS; SUBCUTANEOUS at 06:00

## 2020-01-01 RX ADMIN — MIDODRINE HYDROCHLORIDE 10 MILLIGRAM(S): 2.5 TABLET ORAL at 18:17

## 2020-01-01 RX ADMIN — PHENYLEPHRINE HYDROCHLORIDE 28.3 MICROGRAM(S)/KG/MIN: 10 INJECTION INTRAVENOUS at 11:47

## 2020-01-01 RX ADMIN — Medication 200 GRAM(S): at 07:21

## 2020-01-01 RX ADMIN — MIDODRINE HYDROCHLORIDE 10 MILLIGRAM(S): 2.5 TABLET ORAL at 22:38

## 2020-01-01 RX ADMIN — PHENYLEPHRINE HYDROCHLORIDE 28.3 MICROGRAM(S)/KG/MIN: 10 INJECTION INTRAVENOUS at 15:52

## 2020-01-01 RX ADMIN — CHLORHEXIDINE GLUCONATE 1 APPLICATION(S): 213 SOLUTION TOPICAL at 12:42

## 2020-01-01 RX ADMIN — Medication 100 MILLIGRAM(S): at 06:18

## 2020-01-01 RX ADMIN — FENTANYL CITRATE 4.2 MICROGRAM(S)/KG/HR: 50 INJECTION INTRAVENOUS at 22:45

## 2020-01-01 RX ADMIN — FENTANYL CITRATE 4.2 MICROGRAM(S)/KG/HR: 50 INJECTION INTRAVENOUS at 11:47

## 2020-01-01 RX ADMIN — DEXMEDETOMIDINE HYDROCHLORIDE IN 0.9% SODIUM CHLORIDE 8.39 MICROGRAM(S)/KG/HR: 4 INJECTION INTRAVENOUS at 11:47

## 2020-01-01 RX ADMIN — HEPARIN SODIUM 3 UNIT(S): 5000 INJECTION INTRAVENOUS; SUBCUTANEOUS at 00:31

## 2020-01-01 RX ADMIN — Medication 975 MILLIGRAM(S): at 22:32

## 2020-01-01 RX ADMIN — MORPHINE SULFATE 4 MILLIGRAM(S): 50 CAPSULE, EXTENDED RELEASE ORAL at 01:18

## 2020-01-01 RX ADMIN — Medication 250 MILLIGRAM(S): at 18:16

## 2020-01-01 RX ADMIN — CHLORHEXIDINE GLUCONATE 15 MILLILITER(S): 213 SOLUTION TOPICAL at 17:24

## 2020-01-01 RX ADMIN — Medication 250 MILLIGRAM(S): at 06:48

## 2020-01-01 RX ADMIN — SEVELAMER CARBONATE 800 MILLIGRAM(S): 2400 POWDER, FOR SUSPENSION ORAL at 09:21

## 2020-01-01 RX ADMIN — SEVELAMER CARBONATE 800 MILLIGRAM(S): 2400 POWDER, FOR SUSPENSION ORAL at 18:18

## 2020-01-01 RX ADMIN — PIPERACILLIN AND TAZOBACTAM 3.38 GRAM(S): 4; .5 INJECTION, POWDER, LYOPHILIZED, FOR SOLUTION INTRAVENOUS at 04:33

## 2020-01-01 RX ADMIN — PANTOPRAZOLE SODIUM 40 MILLIGRAM(S): 20 TABLET, DELAYED RELEASE ORAL at 11:47

## 2020-01-01 RX ADMIN — Medication 100 GRAM(S): at 05:38

## 2020-01-01 RX ADMIN — SEVELAMER CARBONATE 800 MILLIGRAM(S): 2400 POWDER, FOR SUSPENSION ORAL at 09:08

## 2020-01-01 RX ADMIN — SEVELAMER CARBONATE 800 MILLIGRAM(S): 2400 POWDER, FOR SUSPENSION ORAL at 21:18

## 2020-01-01 RX ADMIN — CHLORHEXIDINE GLUCONATE 15 MILLILITER(S): 213 SOLUTION TOPICAL at 06:00

## 2020-01-01 RX ADMIN — Medication 100 MILLIGRAM(S): at 13:14

## 2020-01-01 RX ADMIN — Medication 50 MILLILITER(S): at 22:46

## 2020-01-01 RX ADMIN — HEPARIN SODIUM 5000 UNIT(S): 5000 INJECTION INTRAVENOUS; SUBCUTANEOUS at 14:00

## 2020-01-01 RX ADMIN — PHENYLEPHRINE HYDROCHLORIDE 53.5 MICROGRAM(S)/KG/MIN: 10 INJECTION INTRAVENOUS at 22:45

## 2020-01-01 RX ADMIN — MIDODRINE HYDROCHLORIDE 10 MILLIGRAM(S): 2.5 TABLET ORAL at 09:20

## 2020-01-01 RX ADMIN — CHLORHEXIDINE GLUCONATE 1 APPLICATION(S): 213 SOLUTION TOPICAL at 06:00

## 2020-01-01 RX ADMIN — GABAPENTIN 300 MILLIGRAM(S): 400 CAPSULE ORAL at 13:10

## 2020-01-01 RX ADMIN — SODIUM CHLORIDE 500 MILLILITER(S): 9 INJECTION, SOLUTION INTRAVENOUS at 03:25

## 2020-01-01 RX ADMIN — Medication 50 MILLILITER(S): at 12:09

## 2020-01-01 RX ADMIN — PIPERACILLIN AND TAZOBACTAM 25 GRAM(S): 4; .5 INJECTION, POWDER, LYOPHILIZED, FOR SOLUTION INTRAVENOUS at 17:24

## 2020-01-01 RX ADMIN — MIDODRINE HYDROCHLORIDE 10 MILLIGRAM(S): 2.5 TABLET ORAL at 13:25

## 2020-01-01 RX ADMIN — MORPHINE SULFATE 4 MILLIGRAM(S): 50 CAPSULE, EXTENDED RELEASE ORAL at 02:37

## 2020-01-01 RX ADMIN — DEXMEDETOMIDINE HYDROCHLORIDE IN 0.9% SODIUM CHLORIDE 8.39 MICROGRAM(S)/KG/HR: 4 INJECTION INTRAVENOUS at 22:45

## 2020-01-01 RX ADMIN — HEPARIN SODIUM 5000 UNIT(S): 5000 INJECTION INTRAVENOUS; SUBCUTANEOUS at 15:00

## 2020-01-01 RX ADMIN — CHLORHEXIDINE GLUCONATE 15 MILLILITER(S): 213 SOLUTION TOPICAL at 18:07

## 2020-01-01 RX ADMIN — CHLORHEXIDINE GLUCONATE 1 APPLICATION(S): 213 SOLUTION TOPICAL at 06:49

## 2020-01-01 RX ADMIN — PIPERACILLIN AND TAZOBACTAM 200 GRAM(S): 4; .5 INJECTION, POWDER, LYOPHILIZED, FOR SOLUTION INTRAVENOUS at 23:07

## 2020-01-01 RX ADMIN — CHLORHEXIDINE GLUCONATE 1 APPLICATION(S): 213 SOLUTION TOPICAL at 10:00

## 2020-01-01 RX ADMIN — DEXMEDETOMIDINE HYDROCHLORIDE IN 0.9% SODIUM CHLORIDE 8.39 MICROGRAM(S)/KG/HR: 4 INJECTION INTRAVENOUS at 09:00

## 2020-01-01 RX ADMIN — MIDODRINE HYDROCHLORIDE 10 MILLIGRAM(S): 2.5 TABLET ORAL at 06:22

## 2020-01-01 RX ADMIN — MIDODRINE HYDROCHLORIDE 10 MILLIGRAM(S): 2.5 TABLET ORAL at 12:30

## 2020-01-01 RX ADMIN — SEVELAMER CARBONATE 800 MILLIGRAM(S): 2400 POWDER, FOR SUSPENSION ORAL at 07:44

## 2020-01-01 RX ADMIN — SEVELAMER CARBONATE 800 MILLIGRAM(S): 2400 POWDER, FOR SUSPENSION ORAL at 17:21

## 2020-01-01 RX ADMIN — HEPARIN SODIUM 5000 UNIT(S): 5000 INJECTION INTRAVENOUS; SUBCUTANEOUS at 22:39

## 2020-01-01 RX ADMIN — CHLORHEXIDINE GLUCONATE 1 APPLICATION(S): 213 SOLUTION TOPICAL at 09:21

## 2020-01-01 RX ADMIN — Medication 3 MILLILITER(S): at 21:19

## 2020-01-01 RX ADMIN — GABAPENTIN 300 MILLIGRAM(S): 400 CAPSULE ORAL at 22:01

## 2020-01-01 RX ADMIN — Medication 1 GRAM(S): at 05:45

## 2020-01-28 NOTE — H&P ADULT - NSHPPHYSICALEXAM_GEN_ALL_CORE
Vital Signs Last 24 Hrs  T(C): 36.9 (28 Jan 2020 17:21), Max: 36.9 (28 Jan 2020 17:21)  T(F): 98.4 (28 Jan 2020 17:21), Max: 98.4 (28 Jan 2020 17:21)  HR: 72 (28 Jan 2020 17:21) (72 - 72)  BP: 119/56 (28 Jan 2020 17:21) (119/56 - 119/56)  BP(mean): --  RR: 18 (28 Jan 2020 17:21) (18 - 18)  SpO2: 98% (28 Jan 2020 17:21) (98% - 98%)      PHYSICAL EXAM:  GENERAL: NAD, well-groomed, well-developed  HEAD:  Atraumatic, Normocephalic  EYES: EOMI, PERRLA, conjunctiva and sclera clear  ENMT: Moist mucous membranes, No lesions  NECK: Supple, No JVD,  NERVOUS SYSTEM:  Alert & Oriented X3, Good concentration; Motor Strength 5/5 B/L upper and lower extremities  CHEST/LUNG: Non labored breathing. Bilateral rales. No wheezing  HEART: Regular rate and rhythm; No murmurs, rubs, or gallops  ABDOMEN: Soft, Nontender, Nondistended; Bowel sounds present  EXTREMITIES:  2+ Peripheral Pulses, No clubbing, cyanosis. +1 B/L LE edema. Right LE toe amputation

## 2020-01-28 NOTE — ED PROVIDER NOTE - ATTENDING CONTRIBUTION TO CARE
Yazanabelardo - 68yo F w ESRD on HD MWF, DM, HTN, HLD presents with RUE AV fistula dysfunction, min discomfort. Unable to get dialysis yesterday, went to Dr Hernandes today, attempted repair but unsuccessful, unable to get dialysis today ether. Last full session 5 days ago. No other complaints. VS wnl, well appearing, RUE fistula w no thrill, no TTP, lungs clear, no pedal edema. Spoke w Dr Hernandes, recommend admission to Medicine w Sx consult for Shiley placement and renal consult for dialysis

## 2020-01-28 NOTE — H&P ADULT - PROBLEM SELECTOR PLAN 4
Hemolyzed K. on serum BMP VBG K of 5.2  Repeat BMP pending per Vascular recs  Plan for HD overnight  Trend Hemolyzed K. on serum BMP VBG K of 5.2  Repeat BMP pending per Vascular recs  Plan for HD overnight  Trend K

## 2020-01-28 NOTE — H&P ADULT - HISTORY OF PRESENT ILLNESS
68 yo woman with PMH of ESRD on HD (MWF), HTN, HLD, DMT2, CAD,  with neuropathy presents from vascular surgeon's office in setting of AV fistula dysfunction of the RUE. Patient states Last HD on Friday and when she went to HD on Monday, she was unable to do so 2/2 potential thrombosis of fistula. Patient went to vascular to [facility] and reported to be successful  however was unable to undergo HD successfully per vascular team. Patient was referred to the ED for further intervention. Patient denies any chest pain or palpitations. Mild SOB on exertion and cough. Per family cough seems to be worse as she sounds congested. Has chronic LE swelling. Denies numbness of weakness of the RUE 68 yo woman with PMH of ESRD on HD (MWF), HTN, HLD, DMT2, CAD,  with neuropathy presents from vascular surgeon's office in setting of AV fistula dysfunction of the RUE. Patient states Last HD on Friday and when she went to HD on Monday, she was unable to do so 2/2 potential thrombosis of fistula. Patient went to vascular to [facility] and reported to be successful  however was unable to undergo HD successfully per vascular team. Patient was referred to the ED for further intervention. Patient denies any chest pain or palpitations. Endorses Mild SOB on exertion and cough. Per family cough seems to be worse as she sounds congested. Has chronic LE swelling. Endorses some discomfort at AV fistula site.  Denies numbness of weakness of the RUE 68 yo woman with PMH of ESRD on HD (MWF), HTN, HLD, DMT2, PVD, moderate AS, reported Afib (per past charts on Eliquis) with neuropathy presents from vascular surgeon's office in setting of AV fistula dysfunction of the RUE. Patient states Last HD on Friday and when she went to HD on Monday, she was unable to do so 2/2 potential thrombosis of fistula. Patient went to vascular to declot and was reported to be successful. However, patient was unable to undergo HD successfully after declotting of AVG per vascular team. Patient was referred to the ED for further intervention. Patient denies any chest pain or palpitations. Endorses Mild SOB on exertion and cough. Per family cough seems to be worse as she sounds congested. Has chronic LE swelling. Endorses some discomfort at AV fistula site.  Denies numbness of weakness of the RUE

## 2020-01-28 NOTE — ED PROVIDER NOTE - OBJECTIVE STATEMENT
69 year old female with PMH ESRD on HD MWF, DM, HTN, HLD presents with RUE AV fistula dysfunction and pain.  Pt states he went to dial 69 year old female with PMH ESRD on HD MWF, DM, HTN, HLD presents with RUE AV fistula dysfunction and pain.  Pt states she went to dialysis 2 days ago and was unable to get dialysis 2/2 AV fistula dysfunction. Pt followed up with her vascular surgeon Dr. Fang today with attempt to recover thrombosed fistula without improvement, was sent to ED for admission for HD and fistula repair. Pt reports right arm pain at this time, otherwise denies any complaints. Denies any chest pain, shortness of breath, palpitations, dizziness, lightheadedness, or vomiting. Denies any additional complaints. 69 year old female with PMH ESRD on HD MWF, DM, HTN, HLD presents with RUE AV fistula dysfunction and pain.  Pt states she went to dialysis 1 days ago and was unable to get dialysis 2/2 AV fistula dysfunction. Pt followed up with her vascular surgeon Dr. Fang today with attempt to recover thrombosed fistula without improvement, was sent to ED for admission for HD and fistula repair. Pt reports right arm pain at this time, otherwise denies any complaints. Denies any chest pain, shortness of breath, palpitations, dizziness, lightheadedness, or vomiting. Denies any additional complaints.

## 2020-01-28 NOTE — H&P ADULT - NSHPLABSRESULTS_GEN_ALL_CORE
Personally reviewed labs and noted in detail below: Hyperkalemia (Hemolyzed) on BMP on VBG K 5.2  Elevated Cr.    Personally reviewed EKG: Sinus bradycardia 56 bpm QTc 447 RBBB. Twave inversion V3-V6 grossly unchanged morphology compared to 9/13/2019 EKG    Personally reviewed CXR: mild pulmonary edema

## 2020-01-28 NOTE — H&P ADULT - NSHPLANGTRANSLATORFT_GEN_A_CORE
Patient speaks both english and Farsi. Defers to son Edvin to translate. Defers translation services

## 2020-01-28 NOTE — PATIENT PROFILE ADULT - LANGUAGE ASSISTANCE NEEDED
No-Patient/Caregiver offered and refused free interpretation services./Family at bedside, pt can verbalize needs in english

## 2020-01-28 NOTE — HISTORY OF PRESENT ILLNESS
[] : in right upper arm [FreeTextEntry1] : alert and oriented\par accompanied by  Leonides [phone]/family are poor historians/will get picked up by [place]\par no falls reported\par w/c but can stand\par took eliquis yesterday AM\par BS @8:20 = 109/pt states no insulin today or yesterday\par  [FreeTextEntry3] : 6/15/2017 Dr. Caban [FreeTextEntry4] : friday [FreeTextEntry5] : last night 11pm [FreeTextEntry6] : Dr Whittington

## 2020-01-28 NOTE — ED ADULT NURSE NOTE - NSIMPLEMENTINTERV_GEN_ALL_ED
Implemented All Fall Risk Interventions:  [place] to call system. Call bell, personal items and telephone within reach. Instruct patient to call for assistance. Room bathroom lighting operational. Non-slip footwear when patient is off stretcher. Physically safe environment: no spills, clutter or unnecessary equipment. Stretcher in lowest position, wheels locked, appropriate side rails in place. Provide visual cue, wrist band, yellow gown, etc. Monitor gait and stability. Monitor for mental status changes and reorient to person, place, and time. Review medications for side effects contributing to fall risk. Reinforce activity limits and safety measures with patient and family.

## 2020-01-28 NOTE — REASON FOR VISIT
[Other ___] : a [unfilled] visit for [Clotted Access] : clotted access [Family Member] : family member [FreeTextEntry2] : referral from dialysis/clotted access

## 2020-01-28 NOTE — ED PROVIDER NOTE - CLINICAL SUMMARY MEDICAL DECISION MAKING FREE TEXT BOX
Rita-PGY1: 69 year old female with PMH ESRD on HD MWF, DM, HTN, HLD presents with RUE AV fistula dysfunction and pain. Discussed plan with Dr. Fang, planning on operative repair inpatient.  No shortness of breath, increased edema, chest pain, palpitations, or signs of fluid overload. Plan includes labs, EKG, CXR, nephro consult for HD, surgery consult for Shiley catheter placement, and admission for further management of AV fistula dysfunction.

## 2020-01-28 NOTE — CONSULT NOTE ADULT - SUBJECTIVE AND OBJECTIVE BOX
Patient is a 69y old  Female who presents with a chief complaint of clotted access.       HPI:  Ms Lopez is an 69 year old lady on hemodialysis Monday, Wednesday and Friday at [facility] under the care of Dr. Gold her nephrologist. She is s/p RUE Brachial AVG with PTFE in 6/2017 with Dr. Hernandes, Afib , CAD, AS, HTN, PVD, diabetes for 20 years but does not have retinopathy. and HLD, who presented from her dialysis center for non-working AVG. Patient was last dialyzed successfully on 1/24/2020 and had tried to have dialysis yesterday 1/27/2020 but the AVG was found to be clotted. She was seen by Dr. Caban in the office this morning of 1/28/2020 who attempted declotting of the AVG. The declot appeared successful and she was taken to dialysis today at [facility] but the AVG was still malfunctioning and she was unable to be dialyzed (no further details regarding what malfunctioned are available).  Patient was seen at [facility] ED.  She was short of breath and had a dry cough. She stated she did not feel well.  Her BMP showed a potassim of 5.8 that is hemolyzed. CXR showed pulmonary edema. Patient admitted to myself of pain in the RUE but did not have numbness or weakness in the RUE. She states that she is feeling fatigued, and was starting to have nausea, and dry heaves but no vomiting. She has a mild dry cough but denies CP, SOB, fever. Has noted edema in her lower extremities. Nephrology was consulted to provide dialysis in patient who has pulmonary edema with severe metabolic acidosis.      PAST MEDICAL & SURGICAL HISTORY:  Hyperlipidemia  Diabetes  Hypertension  Osteomyelitis  Diabetic Neuropathy  Cellulitis of Foot  Edema of Both Legs  Diabetes: Type 2  History of Osteoarthritis  HTN - Hypertension  HLD (Hyperlipidemia)  Status post insertion of percutaneous endoscopic gastrostomy (PEG) tube  H/O tracheostomy  S/P amputation of lesser toe, right: 2013 right great toe, 2nd and 3rd right toes  S/P Amputation of Lesser Toe: Rt 1-3 metatarsal      Home Medications:   * Patient Currently Takes Medications as of 20-Sep-2019 16:34 documented in Structured Notes  · 	atorvastatin 20 mg oral tablet: 1 tab(s) orally once a day (at bedtime)  · 	sevelamer carbonate 800 mg oral tablet: 3 tab(s) orally 3 times a day (with meals)  · 	calcium acetate 667 mg oral tablet: 1 tab(s) orally 3 times a day   · 	ocular lubricant ophthalmic solution: 1 drop(s) to each affected eye 4 times a day  · 	famotidine 20 mg oral tablet: 1 tab(s) orally once a day  · 	aspirin 81 mg oral tablet, chewable: 1 tab(s) orally once a day  · 	gabapentin 300 mg oral capsule: 1 cap(s) orally once a day (at bedtime)  · 	apixaban 2.5 mg oral tablet: 1 tab(s) orally every 12 hours        Allergies  No Known Allergies    SOCIAL HISTORY:  Denies alcohol or tobacco abuse.    FAMILY HISTORY:  No kidney disease in family.    REVIEW OF SYSTEMS:  CONSTITUTIONAL: Has weakness, no fevers or chills  EYES/ENT: No visual changes  NECK: No pain or stiffness  RESPIRATORY: No cough, wheezing, hemoptysis. No shortness of breath  CARDIOVASCULAR: No chest pain or palpitations  GASTROINTESTINAL: No abdominal or epigastric pain. Has nausea, and dry heaves but no vomiting, or hematemesis. No diarrhea or constipation.   GENITOURINARY: No dysuria, frequency or hematuria. No stones or infection  NEUROLOGICAL: No numbness or weakness  SKIN: No itching, burning, rashes, or lesions   All other review of systems is negative unless indicated above    VITAL:  T(C): , Max: 36.9 (01-28-20 @ 17:21)  T(F): , Max: 98.4 (01-28-20 @ 17:21)  HR: 72 (01-28-20 @ 17:21)  BP: 119/56 (01-28-20 @ 17:21)  RR: 18 (01-28-20 @ 17:21)  SpO2: 98% (01-28-20 @ 17:21)    PHYSICAL EXAM:  General: NAD, Alert, Toxic appearing and ashen.   HEENT: NCAT, PERRLA. Has hirsuitism.   Neck: Supple, No JVD  Respiratory: Bibasilar crackles. With Rales in all lung fields.   Cardiovascular: RRR s1s2, no m/r/g  Gastrointestinal: +BS, soft, NT/ND  Extremities: 1+ peripheral edema b/l  Neurological: no focal deficits; strength grossly intact  Psychiatric: Normal mood, normal affect  Back: no CVAT b/l  Skin: No rashes, no nevi  Access: Right AVG that is swollen and erythematous with positive thrill.     LABS:                        10.8   7.66  )-----------( 268      ( 28 Jan 2020 18:51 )             35.2     Na(133)/K(5.8)/Cl(94)/HCO3(21)/BUN(108)/Cr(7.49)Glu(102)/Ca(8.9)/Mg(--)/PO4(--)    01-28 @ 18:51    01-28    133<L>  |  94<L>  |  108<H>  ----------------------------<  102<H>  5.8<H>   |  21<L>  |  7.49<H>    Ca    8.9      28 Jan 2020 18:51    TPro  7.4  /  Alb  3.6  /  TBili  0.2  /  DBili  x   /  AST  22  /  ALT  14  /  AlkPhos  72  01-28    IMAGING:          EXAM:  XR CHEST AP OR PA 1V                            PROCEDURE DATE:  01/28/2020      ******PRELIMINARY REPORT******    ******PRELIMINARY REPORT******              INTERPRETATION:  mild pulmonary edema.

## 2020-01-28 NOTE — PROCEDURE
[D/C IV on discharge] : D/C IV on discharge [Resume diet] : resume diet [Site check for bleeding/hematoma/thrill/bruit] : Site check for bleeding/hematoma/thrill/bruit [Vital signs on admission the q 15 mins x2] : Vital signs on admission the q 15 mins x2 [FreeTextEntry1] : right AV Graft/thrombectomy/stent [FreeTextEntry3] : TPA 2mg/3ml sterile water instilled right AVG via 10 cm Speedlyser by Dr. Hernandes at 10:00

## 2020-01-28 NOTE — ED ADULT NURSE NOTE - OBJECTIVE STATEMENT
69F AxOx3 BIBA from dialysis center accompanied by family sent for inability to access AVFistula. As per EMS, fistula was recently placed, and pt has not received dialysis since Friday. Pt presents w/ b/l +3 pitting edema, redness noted. +Rhonchi/crackles auscultated bilaterally, resp even, unlabored. Afebrile. Pt ambulates w/ assistance @ baseline. VSS. Abd soft/+distended/NT to palp. L foot all toes amputated and some amputated toes of L foot - no redness/swelling noted to incision sites. Sutures in place in R AV Fistula. Labs drawn and sent. Bed locked in lowest position. Safety maintained. Will continue to monitor.

## 2020-01-28 NOTE — H&P ADULT - PROBLEM SELECTOR PLAN 3
Last HD on 1/24  Signs of volume overload on exam and pulmonary edema on CXR  Plan for HD tonight after Shiley catheter placement   Continue with Sevelamer 800 TID per Renal.  Clarify final dose as patient on Sevelamer 1600 TID at home  Continue Midodrine TID per patient takes 5 mg (2 tabs) Last HD on 1/24  Signs of volume overload on exam and pulmonary edema on CXR  Plan for HD tonight after Shiley catheter placement   Continue with Sevelamer 800 TID per Renal.  Clarify final dose as patient on Sevelamer 1600 TID at home  Continue Midodrine TID per patient takes 5 mg (2 tabs)  Renally dose medication

## 2020-01-28 NOTE — H&P ADULT - PROBLEM SELECTOR PLAN 5
EKG currently in sinus  Eliquis on hold today in setting of AVF declotting and Shiley placement  Eliquis to resume per Vascular/Primary team

## 2020-01-28 NOTE — H&P ADULT - NSHPREVIEWOFSYSTEMS_GEN_ALL_CORE
REVIEW OF SYSTEMS:  CONSTITUTIONAL: No fever, chills or sweats  EYES: No eye discharge  ENMT:  No sinus congestion or throat pain  RESPIRATORY: +Chronic cough, No wheezing. No shortness of breath at rest  CARDIOVASCULAR: No chest pain, palpitations. +Chronic leg swelling  GASTROINTESTINAL: No abdominal  pain. No nausea, vomiting, diarrhea or constipation. No melena or hematochezia.  NEUROLOGICAL: No headaches or loss of strength,  SKIN: No rashes  LYMPH NODES: No enlarged glands  MUSCULOSKELETAL: No joint pain or swelling; No muscle, back, or extremity pain  PSYCHIATRIC: No depression, anxiety, mood swings, or difficulty sleeping  HEME/LYMPH: No easy bruising, or bleeding gums  ALLERGY AND IMMUNOLOGIC: No reactions to medications REVIEW OF SYSTEMS:  CONSTITUTIONAL: No fever, chills or sweats  EYES: No eye discharge  ENMT:  No sinus congestion or throat pain  RESPIRATORY: +Chronic cough, No wheezing. No shortness of breath at rest  CARDIOVASCULAR: No chest pain, palpitations. +Chronic leg swelling  GASTROINTESTINAL: No abdominal  pain. No nausea, vomiting, diarrhea or constipation. No melena or hematochezia.  NEUROLOGICAL: No headaches or loss of strength,  SKIN: No rashes. On left side of back: abscess/boil resolving on abx  LYMPH NODES: No enlarged glands  MUSCULOSKELETAL: No joint pain or swelling; No muscle, back, or extremity pain  PSYCHIATRIC: No depression, anxiety, mood swings, or difficulty sleeping  HEME/LYMPH: No easy bruising, or bleeding gums  ALLERGY AND IMMUNOLOGIC: No reactions to medications REVIEW OF SYSTEMS:  CONSTITUTIONAL: No fever, chills or sweats  EYES: No eye discharge  ENMT:  No sinus congestion or throat pain  RESPIRATORY: +Chronic cough, No wheezing. No shortness of breath at rest  CARDIOVASCULAR: No chest pain, palpitations. +Chronic leg swelling  GASTROINTESTINAL: No abdominal  pain. No nausea, vomiting, diarrhea or constipation. No melena or hematochezia.  GENITOURINARY: +Minima urine output. No dysuria  NEUROLOGICAL: No headaches or loss of strength. Chronic neuropathy  SKIN: No rashes. On left side of back: abscess/boil resolving on abx  LYMPH NODES: No enlarged glands  MUSCULOSKELETAL: No joint pain or swelling  HEME/LYMPH: No easy bruising, or bleeding gums  ALLERGY AND IMMUNOLOGIC: No reactions to medications

## 2020-01-28 NOTE — ED PROVIDER NOTE - PHYSICAL EXAMINATION
VITAL SIGNS: I have reviewed nursing notes and confirm.  CONSTITUTIONAL: non-toxic, well appearing  SKIN: no rash, no petechiae.  EYES: pink conjunctiva, anicteric  CARD: RRR, no murmurs  RESP: CTAB, no respiratory distress  ABD: Soft, non-tender, non-distended, no peritoneal signs, no CVA tenderness  EXT: Normal ROM x4. No edema. No calf tenderness  NEURO: Alert, oriented.   PSYCH: Cooperative, appropriate. VITAL SIGNS: I have reviewed nursing notes and confirm.  CONSTITUTIONAL: non-toxic, well appearing  SKIN: no rash, no petechiae.  EYES: pink conjunctiva, anicteric  CARD: RRR, no murmurs  RESP: CTAB, no respiratory distress  ABD: Soft, non-tender, non-distended, no peritoneal signs, no CVA tenderness  EXT: Normal ROM x4. 1+ lower extremity edema bilaterally. No calf tenderness  NEURO: Alert, oriented.   PSYCH: Cooperative, appropriate.

## 2020-01-28 NOTE — H&P ADULT - PROBLEM SELECTOR PLAN 2
Reviewed vascular and renal recs  Plan for Shiley catheter placement tonight  Eliquis per family has been held today.  F/U with vascular with regards to AVF dysfunction

## 2020-01-28 NOTE — H&P ADULT - ATTENDING COMMENTS
Dr. Bryan Schmid accepted patient's case from the ED and requested in house hospitalist team to complete admission. Patient was previously unknown to me. Patient was assigned to me by hospitalist in charge. My involvement in this case consisted only of the initial history, physical and management plan. Dr. Schmid to assume care in AM and thereafter. Case discussed in detail with overnight medicine NP/BEENA Wade

## 2020-01-28 NOTE — H&P ADULT - ASSESSMENT
70 yo woman with PMH of ESRD on HD (MWF), HTN, HLD, DMT2, PVD, moderate AS, reported Afib (per past charts on Eliquis) with neuropathy presents from vascular surgeon's office in setting of AV fistula dysfunction of the RUE.

## 2020-01-28 NOTE — H&P ADULT - NSHPSOCIALHISTORY_GEN_ALL_CORE
Lives with  and family  Sons involved in care  Denies tobacco use  Denies EtOH use  Denies illicit drug use

## 2020-01-28 NOTE — H&P ADULT - PROBLEM SELECTOR PLAN 1
Patient with symptoms of SOB and signs of pulmonary edema on exam. Likely in setting of volume overload state in setting of missed HD  History of AS. Last TTE in 4/2019 EF of 70% with grossly preserved LV function  Currently patient in no respiratory distress  Plan for HD tonight

## 2020-01-28 NOTE — CONSULT NOTE ADULT - SUBJECTIVE AND OBJECTIVE BOX
VASCULAR SURGERY CONSULT NOTE    HPI:  69F with PMHx ESRD on HD (MWF) s/p RUE Brachial AVG with PTFE in 6/2017 with Dr. Hernandes, Afib , CAD, AS, HTN, PVD, DM2 and HLD, BIBEMS from dialysis for non-working AVG. Patient had tried to have dialysis yesterday but the AVG was found to be clotted. She was seen by Dr. Caban in the office this morning who attempted declotting the AVG. The declot appeared successful and she was taken to dialysis but the AVG was still malfunctioning and she was unable to be dialyzed (no further details regarding what malfunctioned are available).  Patient interviewed at bedside with family. In the ED, she is HD stable, her BMP shows a K of 5.8 that is hemolyzed. CXR showed mild pulm edema. Her Nephrologist is Dr. Gold has been contacted. Patient denies any pain, numbness or weakness in the RUE. She states that she is feeling fine and denies any pain, fatigue, nausea, or vomiting. She has a mild dry cough but denies CP, SOB, fever. Has noted swelling in her lower extremities.      PMHx: Hyperlipidemia  Diabetes  Hypertension  Osteomyelitis  Diabetic Neuropathy  Cellulitis of Foot  Edema of Both Legs  Diabetes  History of Osteoarthritis  HTN - Hypertension  HLD (Hyperlipidemia)    PSHx: Status post insertion of percutaneous endoscopic gastrostomy (PEG) tube  H/O tracheostomy  S/P amputation of lesser toe, right  S/P Amputation of Lesser Toe  No Past Surgical History    Social Hx: Lives at home with   Physical Exam  T(C): 36.9  HR: 72 (72 - 72)  BP: 119/56 (119/56 - 119/56)  RR: 18 (18 - 18)  SpO2: 98% (98% - 98%)  Tmax: T(C): , Max: 36.9 (01-28-20 @ 17:21)    General: well developed, well nourished, NAD  Neuro: alert and oriented x3  Respiratory:  nonlabored on RA, dry cough  CVS: regular rate and rhythm  Abdomen: soft, nontender, nondistended  Extremities:  R brachial AVG with palpable thrill, two sutures in place, no bleeding noted, cannulation sites without bleeding, mild swelling around the suture sites, nontender to palpation. Full ROM of RUE and 5/5 strength in the RUE, b/l palpable radial pulses and b/l DP and PT pulses palpable, b/l lower 1+ extremity edema, sensation and movement grossly intact   Skin: warm, dry, appropriate color    Labs:                        10.8   7.66  )-----------( 268      ( 28 Jan 2020 18:51 )             35.2     PT/INR - ( 28 Jan 2020 18:51 )   PT: 12.6 sec;   INR: 1.09 ratio         PTT - ( 28 Jan 2020 18:51 )  PTT:23.4 sec  01-28    133<L>  |  94<L>  |  108<H>  ----------------------------<  102<H>  5.8<H>   |  21<L>  |  7.49<H>    Ca    8.9      28 Jan 2020 18:51    TPro  7.4  /  Alb  3.6  /  TBili  0.2  /  DBili  x   /  AST  22  /  ALT  14  /  AlkPhos  72  01-28      Imaging and other studies:  < from: Xray Chest 1 View AP/PA (01.28.20 @ 18:40) >  INTERPRETATION:  mild pulmonary edema.        < end of copied text > VASCULAR SURGERY CONSULT NOTE    HPI:  69F with PMHx ESRD on HD (MWF) s/p RUE Brachial AVG with PTFE in 6/2017 with Dr. Caban, Afib , CAD, AS, HTN, PVD, DM2 and HLD, BIBEMS from dialysis for non-working AVG. Patient had tried to have dialysis yesterday but the AVG was found to be clotted. She was seen by Dr. Hernandes in the office this morning who attempted declotting the AVG. The declot appeared successful and she was taken to dialysis but the AVG was still malfunctioning and she was unable to be dialyzed (no further details regarding what malfunctioned are available).  Patient interviewed at bedside with family. In the ED, she is HD stable, her BMP shows a K of 5.8 that is hemolyzed. CXR showed mild pulm edema. Her Nephrologist is Dr. Gold has been contacted. Patient denies any pain, numbness or weakness in the RUE. She states that she is feeling fine and denies any pain, fatigue, nausea, or vomiting. She has a mild dry cough but denies CP, SOB, fever. Has noted swelling in her lower extremities.      PMHx: Hyperlipidemia  Diabetes  Hypertension  Osteomyelitis  Diabetic Neuropathy  Cellulitis of Foot  Edema of Both Legs  Diabetes  History of Osteoarthritis  HTN - Hypertension  HLD (Hyperlipidemia)    PSHx: Status post insertion of percutaneous endoscopic gastrostomy (PEG) tube  H/O tracheostomy  S/P amputation of lesser toe, right  S/P Amputation of Lesser Toe  No Past Surgical History    Social Hx: Lives at home with   Physical Exam  T(C): 36.9  HR: 72 (72 - 72)  BP: 119/56 (119/56 - 119/56)  RR: 18 (18 - 18)  SpO2: 98% (98% - 98%)  Tmax: T(C): , Max: 36.9 (01-28-20 @ 17:21)    General: well developed, well nourished, NAD  Neuro: alert and oriented x3  Respiratory:  nonlabored on RA, dry cough  CVS: regular rate and rhythm  Abdomen: soft, nontender, nondistended  Extremities:  R brachial AVG with palpable thrill, two sutures in place, no bleeding noted, cannulation sites without bleeding, mild swelling around the suture sites, nontender to palpation. Full ROM of RUE and 5/5 strength in the RUE, b/l palpable radial pulses and b/l DP and PT pulses palpable, b/l lower 1+ extremity edema, sensation and movement grossly intact   Skin: warm, dry, appropriate color    Labs:                        10.8   7.66  )-----------( 268      ( 28 Jan 2020 18:51 )             35.2     PT/INR - ( 28 Jan 2020 18:51 )   PT: 12.6 sec;   INR: 1.09 ratio         PTT - ( 28 Jan 2020 18:51 )  PTT:23.4 sec  01-28    133<L>  |  94<L>  |  108<H>  ----------------------------<  102<H>  5.8<H>   |  21<L>  |  7.49<H>    Ca    8.9      28 Jan 2020 18:51    TPro  7.4  /  Alb  3.6  /  TBili  0.2  /  DBili  x   /  AST  22  /  ALT  14  /  AlkPhos  72  01-28      Imaging and other studies:  < from: Xray Chest 1 View AP/PA (01.28.20 @ 18:40) >  INTERPRETATION:  mild pulmonary edema.        < end of copied text >

## 2020-01-29 NOTE — PROGRESS NOTE ADULT - PROBLEM SELECTOR PLAN 2
pt   had HD  last night  from  a  groin  access. c/o  no dyspnea . vascular  consult   for declotting  of  fistula

## 2020-01-29 NOTE — CONSULT NOTE ADULT - ASSESSMENT
ASSESSMENT  69F with PMHx ESRD on HD (MWF), Afib , CAD, AS, HTN, PVD, DM2 and HLD, BIBEMS for malfunctioning RUE AV graft s/p successful declot at outpatient facility but still unable to complete dialysis thereafter.     PLAN  - Recommend medical admission  - Please repeat chemistry  - May need emergent dialysis pending repeat   - Appreciate nephrology recommendations  - Will need repeat fistulogram and lysis  - To be discussed with Vascular Fellow    Vascular Surgery Team  [id number]
ASSESSMENT:  Ms Lopez is an 69 year old lady on hemodialysis Monday, Wednesday and Friday at [facility] under the care of Dr. Gold her nephrologist. She is s/p RUE Brachial AVG with PTFE in 6/2017 with Dr. Hernandes, Afib , CAD, AS, HTN, PVD, diabetes for 20 years but does not have retinopathy. and HLD, who presented from her dialysis center for non-working AVG. Patient was last dialyzed successfully on 1/24/2020.    1. ESRD on hemodialysis Monday, Wednesday and Friday. Last dialysis was on 1/24/2020.   2. Clotted AVG on the right. Defer to vasular.   3. Hyperkalemia.   4. High severe metabolic acidosis.   5. Acute decompensated systolic heart failure. With mild pulmonary edema.   6. Hypervolemic.   7. Anemia of ESRD.   8. Secondary hyperparathyroidism of renal origin.     RECOMMEND:  1. Hemodialysis urgently tonight for 3 hours with 1.5 to 2 liters of UF as tolerated off.   2. Dialysis on 2 K bath.   3. Plan to dialyze patient again tomorrow.   4. Will need phosphorus, BMP, CBC and magnesium daily.   5. Sevelamer of 800 mg po TID with meals.   6. Please put a shiley in to dialyze patient.   7. Low potassium and low phosphorus diet.   8. Please adjust medications for GFR <10 ml/min.     Thank you for involving [employer] in this patient's care.    With warm regards,    Nasima Huynh, DO  [employer]  [phone]
69F with back mass, unsure at this time if this is an abscess/cyst vs solid mass/boil.    Would recommend an ultrasound (I ordered it) to evaluate for fluid component of this back mass.  If infected sebaceous cyst, may require operative excision.   Discussed with Dr. NORBERT Abbott, will follow-up imaging results.

## 2020-01-29 NOTE — PROGRESS NOTE ADULT - ASSESSMENT
Ms Lopez is an 69 year old lady on hemodialysis Monday, Wednesday and Friday at [facility] under the care of Dr. Gold her nephrologist. She is s/p RUE Brachial AVG with PTFE in 6/2017 with Dr. Hernandes, Afib , CAD, AS, HTN, PVD, diabetes for 20 years but does not have retinopathy. and HLD, who presented from her dialysis center for non-working AVG. Patient was last dialyzed successfully on 1/24/2020.    1. ESRD on hemodialysis Tu/Thu/Sat  2. Clotted AVG on the right. Defer to vascular   3. Secondary hyperparathyroidism of renal origin.     RECOMMEND:  1. Next Hemodialysis in am.   2. Dialysis on 2 K bath.   3. Sevelamer of 800 mg po TID with meals.   4. Vasc follow up.  Fistulogram and lysis to be arranged

## 2020-01-29 NOTE — PROCEDURE NOTE - NSINFORMCONSENT_GEN_A_CORE
Leonides Jimenez/Benefits, risks, and possible complications of procedure explained to patient/caregiver who verbalized understanding and gave written consent.

## 2020-01-29 NOTE — PROGRESS NOTE ADULT - SUBJECTIVE AND OBJECTIVE BOX
NEPHROLOGY-NSN [phone]        Patient seen and examined in bed.  She was in good spirts         MEDICATIONS  (STANDING):  cephalexin 250 milliGRAM(s) Oral every 12 hours  dextrose 5%. 1000 milliLiter(s) (50 mL/Hr) IV Continuous <Continuous>  dextrose 50% Injectable 12.5 Gram(s) IV Push once  dextrose 50% Injectable 25 Gram(s) IV Push once  dextrose 50% Injectable 25 Gram(s) IV Push once  Ferric Citrate (Auryxia) 210 mg Tablets 2 Tablet(s) 420 milliGRAM(s) Oral three times a day  gabapentin 300 milliGRAM(s) Oral three times a day  influenza   Vaccine 0.5 milliLiter(s) IntraMuscular once  insulin lispro (HumaLOG) corrective regimen sliding scale   SubCutaneous three times a day before meals  insulin lispro (HumaLOG) corrective regimen sliding scale   SubCutaneous at bedtime  midodrine. 10 milliGRAM(s) Oral three times a day  sevelamer carbonate 800 milliGRAM(s) Oral three times a day with meals      VITAL:  T(C): , Max: 36.9 (01-28-20 @ 17:21)  T(F): , Max: 98.5 (01-29-20 @ 03:00)  HR: 78 (01-29-20 @ 05:46)  BP: 130/70 (01-29-20 @ 05:46)  BP(mean): --  RR: 18 (01-29-20 @ 05:46)  SpO2: 99% (01-29-20 @ 05:46)  Wt(kg): --    I and O's:    01-28 @ 07:01  -  01-29 @ 07:00  --------------------------------------------------------  IN: 480 mL / OUT: 2000 mL / NET: -1520 mL      Height (cm): 160 (01-28 @ 22:17)  Weight (kg): 92 (01-28 @ 22:17)  BMI (kg/m2): 35.9 (01-28 @ 22:17)  BSA (m2): 1.95 (01-28 @ 22:17)    PHYSICAL EXAM:    Constitutional: NAD  Neck:  No JVD  Respiratory: CTAB/L  Cardiovascular: S1 and S2  Gastrointestinal: BS+, soft, NT/ND  Extremities: No peripheral edema  Neurological: A/O x 3, no focal deficits  Psychiatric: Normal mood, normal affect  : No Raphael  Skin: No rashes  Access: avg clotted right;  shiley in groin     LABS:                        9.5    9.88  )-----------( 271      ( 29 Jan 2020 08:48 )             30.7     01-29    136  |  98  |  87<H>  ----------------------------<  94  5.2   |  24  |  6.63<H>    Ca    8.6      29 Jan 2020 08:48  Phos  4.0     01-29  Mg     3.0     01-29    TPro  7.4  /  Alb  3.6  /  TBili  0.2  /  DBili  x   /  AST  22  /  ALT  14  /  AlkPhos  72  01-28          Urine Studies:          RADIOLOGY & ADDITIONAL STUDIES:

## 2020-01-29 NOTE — PROGRESS NOTE ADULT - SUBJECTIVE AND OBJECTIVE BOX
Chief complaint:pt   had HD  last night  from  a  groin  access. c/o  no dyspnea .    HPI:  68 yo woman with PMH of ESRD on HD (MWF), HTN, HLD, DMT2, PVD, moderate AS, reported Afib (per past charts on Eliquis) with neuropathy presents from vascular surgeon's office in setting of AV fistula dysfunction of the RUE. Patient states Last HD on Friday and when she went to HD on Monday, she was unable to do so 2/2 potential thrombosis of fistula. Patient went to vascular to declot and was reported to be successful. However, patient was unable to undergo HD successfully after declotting of AVG per vascular team. Patient was referred to the ED for further intervention. Patient denies any chest pain or palpitations. Endorses Mild SOB on exertion and cough. Per family cough seems to be worse as she sounds congested. Has chronic LE swelling. Endorses some discomfort at AV fistula site.  Denies numbness of weakness of the RUE (28 Jan 2020 20:51)      REVIEW OF SYSTEMS:    CONSTITUTIONAL: No fever, weight loss, or fatigue  NECK: No pain or stiffness  RESPIRATORY: No cough, wheezing, chills or hemoptysis; No shortness of breath  CARDIOVASCULAR: No chest pain, palpitations, dizziness, or leg swelling  GASTROINTESTINAL: No abdominal or epigastric pain. No nausea, vomiting, or hematemesis; No diarrhea or constipation. No melena or hematochezia.  GENITOURINARY: No dysuria, frequency, hematuria, or incontinence  NEUROLOGICAL: No headaches, memory loss, loss of strength, numbness, or tremors  SKIN: No itching, burning, rashes, or lesions   LYMPH NODES: No enlarged glands  MUSCULOSKELETAL: No joint pain or swelling; No muscle, back, or extremity pain  HEME/LYMPH: No easy bruising, or bleeding gums    MEDICATIONS  (STANDING):  dextrose 5%. 1000 milliLiter(s) (50 mL/Hr) IV Continuous <Continuous>  dextrose 50% Injectable 12.5 Gram(s) IV Push once  dextrose 50% Injectable 25 Gram(s) IV Push once  dextrose 50% Injectable 25 Gram(s) IV Push once  Ferric Citrate (Auryxia) 210 mg Tablets 2 Tablet(s) 420 milliGRAM(s) Oral three times a day  gabapentin 300 milliGRAM(s) Oral three times a day  influenza   Vaccine 0.5 milliLiter(s) IntraMuscular once  insulin lispro (HumaLOG) corrective regimen sliding scale   SubCutaneous three times a day before meals  insulin lispro (HumaLOG) corrective regimen sliding scale   SubCutaneous at bedtime  midodrine. 10 milliGRAM(s) Oral three times a day  sevelamer carbonate 800 milliGRAM(s) Oral three times a day with meals    MEDICATIONS  (PRN):  dextrose 40% Gel 15 Gram(s) Oral once PRN Blood Glucose LESS THAN 70 milliGRAM(s)/deciliter  glucagon  Injectable 1 milliGRAM(s) IntraMuscular once PRN Glucose LESS THAN 70 milligrams/deciliter      Allergies    No Known Allergies    Intolerances        Vital Signs Last 24 Hrs  T(C): 36.7 (29 Jan 2020 05:46), Max: 36.9 (28 Jan 2020 17:21)  T(F): 98 (29 Jan 2020 05:46), Max: 98.5 (29 Jan 2020 03:00)  HR: 78 (29 Jan 2020 05:46) (67 - 78)  BP: 130/70 (29 Jan 2020 05:46) (119/56 - 153/69)  BP(mean): --  RR: 18 (29 Jan 2020 05:46) (18 - 20)  SpO2: 99% (29 Jan 2020 05:46) (96% - 99%)    PHYSICAL EXAM:    GENERAL: NAD, well-groomed, well-developed  HEAD:  Atraumatic, Normocephalic  EYES: EOMI, PERRLA, conjunctiva and sclera clear  ENMT: No tonsillar erythema, exudates, or enlargement; Moist mucous membranes, Good dentition, No lesions  NECK: Supple, No JVD, Normal thyroid  NERVOUS SYSTEM:  Alert & Oriented X3, Good concentration; Motor Strength 5/5 B/L upper and lower extremities; DTRs 2+ intact and symmetric  CHEST/LUNG: Clear to percussion bilaterally; No rales, rhonchi, wheezing, or rubs  HEART: Regular rate and rhythm; No murmurs, rubs, or gallops  ABDOMEN: Soft, Nontender, Nondistended; Bowel sounds present  EXTREMITIES:  2+ Peripheral Pulses, No clubbing, cyanosis, or edema  LYMPH: No lymphadenopathy noted  SKIN: No rashes or lesions      LABS:                        10.8   7.66  )-----------( 268      ( 28 Jan 2020 18:51 )             35.2     01-29    135  |  96  |  116<H>  ----------------------------<  139<H>  5.8<H>   |  22  |  8.25<H>    Ca    8.4      29 Jan 2020 03:19    TPro  7.4  /  Alb  3.6  /  TBili  0.2  /  DBili  x   /  AST  22  /  ALT  14  /  AlkPhos  72  01-28    PT/INR - ( 28 Jan 2020 18:51 )   PT: 12.6 sec;   INR: 1.09 ratio         PTT - ( 28 Jan 2020 18:51 )  PTT:23.4 sec      RADIOLOGY & ADDITIONAL STUDIES:

## 2020-01-29 NOTE — CONSULT NOTE ADULT - SUBJECTIVE AND OBJECTIVE BOX
SURGERY CONSULT NOTE    Chief Complaint: Back mass  HPI: 69F hospitalized for occluded AVF, ESRD on HD. General surgery called for possible back abscess. Patient has had this for 2+ weeks, was seen at outpatient urgent care and given antibiotics. States that they also took out a bunch of fluid but patient unsure what the fluid looked like nor was able to describe it. Does not know the ABX. States that the growth hurts her, especially when it rubs against the chair/bed. No HA, CP, SOB, N/V/F/C.    PAST MEDICAL & SURGICAL HISTORY:  Hyperlipidemia  Diabetes  Hypertension  Osteomyelitis  Diabetic Neuropathy  Cellulitis of Foot  Edema of Both Legs  Diabetes: Type 2  History of Osteoarthritis  HTN - Hypertension  HLD (Hyperlipidemia)  Status post insertion of percutaneous endoscopic gastrostomy (PEG) tube  H/O tracheostomy  S/P amputation of lesser toe, right: 2013 right great toe, 2nd and 3rd right toes  S/P Amputation of Lesser Toe: Rt 1-3 metatarsal    MEDICATIONS  (STANDING):  cephalexin 250 milliGRAM(s) Oral every 12 hours  dextrose 5%. 1000 milliLiter(s) (50 mL/Hr) IV Continuous <Continuous>  dextrose 50% Injectable 12.5 Gram(s) IV Push once  dextrose 50% Injectable 25 Gram(s) IV Push once  dextrose 50% Injectable 25 Gram(s) IV Push once  Ferric Citrate (Auryxia) 210 mg Tablets 2 Tablet(s) 420 milliGRAM(s) Oral three times a day  gabapentin 300 milliGRAM(s) Oral three times a day  influenza   Vaccine 0.5 milliLiter(s) IntraMuscular once  insulin lispro (HumaLOG) corrective regimen sliding scale   SubCutaneous three times a day before meals  insulin lispro (HumaLOG) corrective regimen sliding scale   SubCutaneous at bedtime  midodrine. 10 milliGRAM(s) Oral three times a day  sevelamer carbonate 800 milliGRAM(s) Oral three times a day with meals    MEDICATIONS  (PRN):  dextrose 40% Gel 15 Gram(s) Oral once PRN Blood Glucose LESS THAN 70 milliGRAM(s)/deciliter  glucagon  Injectable 1 milliGRAM(s) IntraMuscular once PRN Glucose LESS THAN 70 milligrams/deciliter  guaiFENesin   Syrup  (Sugar-Free) 100 milliGRAM(s) Oral every 8 hours PRN Cough    Allergies    No Known Allergies    Intolerances          FAMILY HISTORY:  No pertinent family history in first degree relatives    Vital Signs Last 24 Hrs  T(C): 36.7 (29 Jan 2020 09:49), Max: 36.9 (28 Jan 2020 17:21)  T(F): 98.1 (29 Jan 2020 09:49), Max: 98.5 (29 Jan 2020 03:00)  HR: 62 (29 Jan 2020 09:49) (62 - 78)  BP: 125/74 (29 Jan 2020 09:49) (119/56 - 153/69)  BP(mean): --  RR: 18 (29 Jan 2020 09:49) (18 - 20)  SpO2: 94% (29 Jan 2020 09:49) (94% - 99%)    01-28 @ 07:01 - 01-29 @ 07:00  --------------------------------------------------------  IN: 480 mL / OUT: 2000 mL / NET: -1520 mL    01-29 @ 07:01 - 01-29 @ 10:01  --------------------------------------------------------  IN: 120 mL / OUT: 0 mL / NET: 120 mL        Physical Exam    PHYSICAL EXAM:  General: NAD, sitting in chair comfortably  Neuro: AAO  HEENT: NC/AT, EOMI  Resp: Good effort, no distress  Back: Approx 3cm raised, erythematous, non-mobile mass in the right mid-back. Minimally tender. 2 areas of small skin excoriation overlying the mass. No fluctuance. Feels more solid than cystic.    LABS                        9.5    9.88  )-----------( 271      ( 29 Jan 2020 08:48 )             30.7     01-29    136  |  98  |  87<H>  ----------------------------<  94  5.2   |  24  |  6.63<H>    Ca    8.6      29 Jan 2020 08:48  Phos  4.0     01-29  Mg     3.0     01-29    TPro  7.4  /  Alb  3.6  /  TBili  0.2  /  DBili  x   /  AST  22  /  ALT  14  /  AlkPhos  72  01-28    LIVER FUNCTIONS - ( 28 Jan 2020 18:51 )  Alb: 3.6 g/dL / Pro: 7.4 g/dL / ALK PHOS: 72 U/L / ALT: 14 U/L / AST: 22 U/L / GGT: x           PT/INR - ( 28 Jan 2020 18:51 )   PT: 12.6 sec;   INR: 1.09 ratio         PTT - ( 28 Jan 2020 18:51 )  PTT:23.4 sec    RADIOLOGY & ADDITIONAL STUDIES:    Assessment/Plan:69F with back mass, unsure at this time if this is an abscess/cyst vs solid mass/boil.    Would recommend an ultrasound (I ordered it) to evaluate for fluid component of this back mass.  Will I&D if there is ultrasound evidence of an abscess cavity.   Discussed with Dr. NORBERT Abbott, will follow-up imaging results. SURGERY CONSULT NOTE    Chief Complaint: Back mass  HPI: 69F hospitalized for occluded AVF, ESRD on HD. General surgery called for possible back abscess. Patient has had this for 2+ weeks, was seen at outpatient urgent care and given antibiotics. States that they also took out a bunch of fluid but patient unsure what the fluid looked like nor was able to describe it. Does not know the ABX. States that the growth hurts her, especially when it rubs against the chair/bed. No HA, CP, SOB, N/V/F/C.    PAST MEDICAL & SURGICAL HISTORY:  Hyperlipidemia  Diabetes  Hypertension  Osteomyelitis  Diabetic Neuropathy  Cellulitis of Foot  Edema of Both Legs  Diabetes: Type 2  History of Osteoarthritis  HTN - Hypertension  HLD (Hyperlipidemia)  Status post insertion of percutaneous endoscopic gastrostomy (PEG) tube  H/O tracheostomy  S/P amputation of lesser toe, right: 2013 right great toe, 2nd and 3rd right toes  S/P Amputation of Lesser Toe: Rt 1-3 metatarsal    MEDICATIONS  (STANDING):  cephalexin 250 milliGRAM(s) Oral every 12 hours  dextrose 5%. 1000 milliLiter(s) (50 mL/Hr) IV Continuous <Continuous>  dextrose 50% Injectable 12.5 Gram(s) IV Push once  dextrose 50% Injectable 25 Gram(s) IV Push once  dextrose 50% Injectable 25 Gram(s) IV Push once  Ferric Citrate (Auryxia) 210 mg Tablets 2 Tablet(s) 420 milliGRAM(s) Oral three times a day  gabapentin 300 milliGRAM(s) Oral three times a day  influenza   Vaccine 0.5 milliLiter(s) IntraMuscular once  insulin lispro (HumaLOG) corrective regimen sliding scale   SubCutaneous three times a day before meals  insulin lispro (HumaLOG) corrective regimen sliding scale   SubCutaneous at bedtime  midodrine. 10 milliGRAM(s) Oral three times a day  sevelamer carbonate 800 milliGRAM(s) Oral three times a day with meals    MEDICATIONS  (PRN):  dextrose 40% Gel 15 Gram(s) Oral once PRN Blood Glucose LESS THAN 70 milliGRAM(s)/deciliter  glucagon  Injectable 1 milliGRAM(s) IntraMuscular once PRN Glucose LESS THAN 70 milligrams/deciliter  guaiFENesin   Syrup  (Sugar-Free) 100 milliGRAM(s) Oral every 8 hours PRN Cough    Allergies    No Known Allergies    Intolerances          FAMILY HISTORY:  No pertinent family history in first degree relatives    Vital Signs Last 24 Hrs  T(C): 36.7 (29 Jan 2020 09:49), Max: 36.9 (28 Jan 2020 17:21)  T(F): 98.1 (29 Jan 2020 09:49), Max: 98.5 (29 Jan 2020 03:00)  HR: 62 (29 Jan 2020 09:49) (62 - 78)  BP: 125/74 (29 Jan 2020 09:49) (119/56 - 153/69)  BP(mean): --  RR: 18 (29 Jan 2020 09:49) (18 - 20)  SpO2: 94% (29 Jan 2020 09:49) (94% - 99%)    01-28 @ 07:01 - 01-29 @ 07:00  --------------------------------------------------------  IN: 480 mL / OUT: 2000 mL / NET: -1520 mL    01-29 @ 07:01 - 01-29 @ 10:01  --------------------------------------------------------  IN: 120 mL / OUT: 0 mL / NET: 120 mL        Physical Exam    PHYSICAL EXAM:  General: NAD, sitting in chair comfortably  Neuro: AAO  HEENT: NC/AT, EOMI  Resp: Good effort, no distress  Back: Approx 3cm raised, erythematous, non-mobile mass in the right mid-back. Minimally tender. 2 areas of small skin excoriation overlying the mass. No fluctuance. Feels more solid than cystic.    LABS                        9.5    9.88  )-----------( 271      ( 29 Jan 2020 08:48 )             30.7     01-29    136  |  98  |  87<H>  ----------------------------<  94  5.2   |  24  |  6.63<H>    Ca    8.6      29 Jan 2020 08:48  Phos  4.0     01-29  Mg     3.0     01-29    TPro  7.4  /  Alb  3.6  /  TBili  0.2  /  DBili  x   /  AST  22  /  ALT  14  /  AlkPhos  72  01-28    LIVER FUNCTIONS - ( 28 Jan 2020 18:51 )  Alb: 3.6 g/dL / Pro: 7.4 g/dL / ALK PHOS: 72 U/L / ALT: 14 U/L / AST: 22 U/L / GGT: x           PT/INR - ( 28 Jan 2020 18:51 )   PT: 12.6 sec;   INR: 1.09 ratio         PTT - ( 28 Jan 2020 18:51 )  PTT:23.4 sec    RADIOLOGY & ADDITIONAL STUDIES:

## 2020-01-30 NOTE — PHYSICAL THERAPY INITIAL EVALUATION ADULT - PRECAUTIONS/LIMITATIONS, REHAB EVAL
Endorses Mild SOB on exertion and cough. Per family cough seems to be worse as she sounds congested. Has chronic LE swelling.

## 2020-01-30 NOTE — DISCHARGE NOTE PROVIDER - CARE PROVIDERS DIRECT ADDRESSES
,DirectAddress_Unknown,DirectAddress_Unknown,[email or web address]

## 2020-01-30 NOTE — DISCHARGE NOTE PROVIDER - NSDCFUSCHEDAPPT_GEN_ALL_CORE_FT
BENNY INGRAM ; 03/23/2020 ; [facility] [address] BENNY Umana ; 03/23/2020 ; [facility] [address]

## 2020-01-30 NOTE — PHYSICAL THERAPY INITIAL EVALUATION ADULT - ADDITIONAL COMMENTS
PTA pt was mostly independent with functional mobility and ADlLs with assistance as needed provided by family/HHA. Pt lives in a  with spouse and son, HHA 8hrs/day 7days/week

## 2020-01-30 NOTE — PHYSICAL THERAPY INITIAL EVALUATION ADULT - GENERAL OBSERVATIONS, REHAB EVAL
Chart reviewed. Pt bong 45 mins PT eval; recd semi supine in bed, NAD, VSS, A/Ox4, following commands.

## 2020-01-30 NOTE — PROGRESS NOTE ADULT - ASSESSMENT
69F with back mass, unsure at this time if this is an abscess/cyst vs solid mass/boil.    PLAN:  - continue Abx  - Given likely to be an infected pilonidal cyst, will monitor progression with abx. If worsens or patient develops systemic symptoms , then would consider surgical removal.  - Monitor VS and labs  - Continue excellent care per primary team. 69F with back mass, unsure at this time if this is an abscess/cyst vs solid mass/boil.    PLAN:  - continue Abx  - Given likely to be an infected sebaceaous cyst, will monitor progression with abx. If worsens or patient develops systemic symptoms , then would consider surgical removal.  - Monitor VS and labs  - Continue excellent care per primary team.

## 2020-01-30 NOTE — CHART NOTE - NSCHARTNOTEFT_GEN_A_CORE
Vascular Surgery    - Please consult IR for AVF thrombolysis  - L femoral shiley for HD  - Will continue to follow    MONICA Oro, PGY2  Vascular Surgery   [phone] with any questions

## 2020-01-30 NOTE — PHYSICAL THERAPY INITIAL EVALUATION ADULT - PERTINENT HX OF CURRENT PROBLEM, REHAB EVAL
Pt is a 69 yoF with PMH of ESRD on HD (MWF), HTN, HLD, DMT2, CAD,  with neuropathy. Patient states Last HD on Friday and when she went to HD on Monday, she was unable to do so 2/2 potential thrombosis of fistula. [facility]  however was unable to undergo HD successfully per vascular team. Patient was referred to the ED for further intervention.

## 2020-01-30 NOTE — PHYSICAL THERAPY INITIAL EVALUATION ADULT - PLANNED THERAPY INTERVENTIONS, PT EVAL
balance training/bed mobility training/transfer training/GOAL: Pt will negotiated 15 steps +UHR CGA in 2 weeks/strengthening/gait training

## 2020-01-30 NOTE — PROGRESS NOTE ADULT - SUBJECTIVE AND OBJECTIVE BOX
NEPHROLOGY-[facility] [phone]        Patient seen and examined in bed.  He was in good spirits         MEDICATIONS  (STANDING):  cephalexin 250 milliGRAM(s) Oral every 12 hours  chlorhexidine 4% Liquid 1 Application(s) Topical daily  dextrose 5%. 1000 milliLiter(s) (50 mL/Hr) IV Continuous <Continuous>  dextrose 50% Injectable 12.5 Gram(s) IV Push once  dextrose 50% Injectable 25 Gram(s) IV Push once  dextrose 50% Injectable 25 Gram(s) IV Push once  Ferric Citrate (Auryxia) 210 mg Tablets 2 Tablet(s) 420 milliGRAM(s) Oral three times a day  gabapentin 300 milliGRAM(s) Oral three times a day  influenza   Vaccine 0.5 milliLiter(s) IntraMuscular once  insulin lispro (HumaLOG) corrective regimen sliding scale   SubCutaneous three times a day before meals  insulin lispro (HumaLOG) corrective regimen sliding scale   SubCutaneous at bedtime  midodrine. 10 milliGRAM(s) Oral three times a day  sevelamer carbonate 800 milliGRAM(s) Oral three times a day with meals      VITAL:  T(C): , Max: 36.8 (01-29-20 @ 14:27)  T(F): , Max: 98.3 (01-29-20 @ 14:27)  HR: 75 (01-30-20 @ 08:36)  BP: 134/55 (01-30-20 @ 08:36)  BP(mean): --  RR: 18 (01-30-20 @ 08:36)  SpO2: 97% (01-30-20 @ 08:36)  Wt(kg): --    I and O's:    01-29 @ 07:01  -  01-30 @ 07:00  --------------------------------------------------------  IN: 550 mL / OUT: 2 mL / NET: 548 mL    01-30 @ 07:01  -  01-30 @ 11:18  --------------------------------------------------------  IN: 120 mL / OUT: 0 mL / NET: 120 mL          PHYSICAL EXAM:    Constitutional: NAD  Neck:  No JVD  Respiratory: CTAB/L  Cardiovascular: S1 and S2  Gastrointestinal: BS+, soft, NT/ND  Extremities: No peripheral edema  Neurological: A/O x 3, no focal deficits  Psychiatric: Normal mood, normal affect  : No Raphael  Skin: No rashes  Access: shiley and avg    LABS:                        9.0    10.17 )-----------( 246      ( 30 Jan 2020 07:12 )             29.6     01-30    135  |  98  |  99<H>  ----------------------------<  93  6.2<HH>   |  23  |  8.02<H>    Ca    8.2<L>      30 Jan 2020 07:10  Phos  4.0     01-29  Mg     3.0     01-29    TPro  7.4  /  Alb  3.6  /  TBili  0.2  /  DBili  x   /  AST  22  /  ALT  14  /  AlkPhos  72  01-28          Urine Studies:          RADIOLOGY & ADDITIONAL STUDIES:

## 2020-01-30 NOTE — PROGRESS NOTE ADULT - SUBJECTIVE AND OBJECTIVE BOX
Chief complaint:pt   alert  , for  HD   today , planning  for  declotting  AV   as  per  vascular     HPI:  70 yo woman with PMH of ESRD on HD (MWF), HTN, HLD, DMT2, PVD, moderate AS, reported Afib (per past charts on Eliquis) with neuropathy presents from vascular surgeon's office in setting of AV fistula dysfunction of the RUE. Patient states Last HD on Friday and when she went to HD on Monday, she was unable to do so 2/2 potential thrombosis of fistula. Patient went to vascular to declot and was reported to be successful. However, patient was unable to undergo HD successfully after declotting of AVG per vascular team. Patient was referred to the ED for further intervention. Patient denies any chest pain or palpitations. Endorses Mild SOB on exertion and cough. Per family cough seems to be worse as she sounds congested. Has chronic LE swelling. Endorses some discomfort at AV fistula site.  Denies numbness of weakness of the RUE (28 Jan 2020 20:51)      REVIEW OF SYSTEMS:    CONSTITUTIONAL: No fever, weight loss, or fatigue  NECK: No pain or stiffness  RESPIRATORY: No cough, wheezing, chills or hemoptysis; No shortness of breath  CARDIOVASCULAR: No chest pain, palpitations, dizziness, or leg swelling  GASTROINTESTINAL: No abdominal or epigastric pain. No nausea, vomiting, or hematemesis; No diarrhea or constipation. No melena or hematochezia.  GENITOURINARY: No dysuria, frequency, hematuria, or incontinence  NEUROLOGICAL: No headaches, memory loss, loss of strength, numbness, or tremors  SKIN: No itching, burning, rashes, or lesions   LYMPH NODES: No enlarged glands  MUSCULOSKELETAL: No joint pain or swelling; No muscle, back, or extremity pain  HEME/LYMPH: No easy bruising, or bleeding gums    MEDICATIONS  (STANDING):  cephalexin 250 milliGRAM(s) Oral every 12 hours  chlorhexidine 4% Liquid 1 Application(s) Topical daily  dextrose 5%. 1000 milliLiter(s) (50 mL/Hr) IV Continuous <Continuous>  dextrose 50% Injectable 12.5 Gram(s) IV Push once  dextrose 50% Injectable 25 Gram(s) IV Push once  dextrose 50% Injectable 25 Gram(s) IV Push once  Ferric Citrate (Auryxia) 210 mg Tablets 2 Tablet(s) 420 milliGRAM(s) Oral three times a day  gabapentin 300 milliGRAM(s) Oral three times a day  influenza   Vaccine 0.5 milliLiter(s) IntraMuscular once  insulin lispro (HumaLOG) corrective regimen sliding scale   SubCutaneous three times a day before meals  insulin lispro (HumaLOG) corrective regimen sliding scale   SubCutaneous at bedtime  midodrine. 10 milliGRAM(s) Oral three times a day  sevelamer carbonate 800 milliGRAM(s) Oral three times a day with meals    MEDICATIONS  (PRN):  dextrose 40% Gel 15 Gram(s) Oral once PRN Blood Glucose LESS THAN 70 milliGRAM(s)/deciliter  glucagon  Injectable 1 milliGRAM(s) IntraMuscular once PRN Glucose LESS THAN 70 milligrams/deciliter  guaiFENesin   Syrup  (Sugar-Free) 100 milliGRAM(s) Oral every 8 hours PRN Cough      Allergies    No Known Allergies    Intolerances        Vital Signs Last 24 Hrs  T(C): 36.6 (30 Jan 2020 04:45), Max: 36.8 (29 Jan 2020 14:27)  T(F): 97.9 (30 Jan 2020 04:45), Max: 98.3 (29 Jan 2020 14:27)  HR: 68 (30 Jan 2020 04:45) (62 - 74)  BP: 112/55 (30 Jan 2020 04:45) (112/55 - 153/66)  BP(mean): --  RR: 18 (30 Jan 2020 04:45) (18 - 18)  SpO2: 93% (30 Jan 2020 04:45) (93% - 96%)    PHYSICAL EXAM:    GENERAL: NAD, well-groomed, well-developed  HEAD:  Atraumatic, Normocephalic  EYES: EOMI, PERRLA, conjunctiva and sclera clear  ENMT: No tonsillar erythema, exudates, or enlargement; Moist mucous membranes, Good dentition, No lesions  NECK: Supple, No JVD, Normal thyroid  NERVOUS SYSTEM:  Alert & Oriented X3, Good concentration; Motor Strength 5/5 B/L upper and lower extremities; DTRs 2+ intact and symmetric  CHEST/LUNG: Clear to percussion bilaterally; No rales, rhonchi, wheezing, or rubs  HEART: Regular rate and rhythm; No murmurs, rubs, or gallops  ABDOMEN: Soft, Nontender, Nondistended; Bowel sounds present  EXTREMITIES:  2+ Peripheral Pulses, No clubbing, cyanosis, or edema  LYMPH: No lymphadenopathy noted  SKIN: No rashes or lesions      LABS:                        9.0    10.17 )-----------( 246      ( 30 Jan 2020 07:12 )             29.6     01-30    135  |  98  |  99<H>  ----------------------------<  93  6.2<HH>   |  23  |  8.02<H>    Ca    8.2<L>      30 Jan 2020 07:10  Phos  4.0     01-29  Mg     3.0     01-29    TPro  7.4  /  Alb  3.6  /  TBili  0.2  /  DBili  x   /  AST  22  /  ALT  14  /  AlkPhos  72  01-28    PT/INR - ( 28 Jan 2020 18:51 )   PT: 12.6 sec;   INR: 1.09 ratio         PTT - ( 28 Jan 2020 18:51 )  PTT:23.4 sec      RADIOLOGY & ADDITIONAL STUDIES:

## 2020-01-30 NOTE — DISCHARGE NOTE PROVIDER - CARE PROVIDER_API CALL
Bryan Schmid)  Medicine  [address], [address]  [place] [address]  Phone: [phone]  Fax: [phone]  Follow Up Time:     Pat Gold)  Internal Medicine; Nephrology  [address], [address]  [place] [address]  Phone: [phone]  Fax: [phone]  Follow Up Time:     Jarrod Abbott)  Surgery  [address], [address]  [place] [address]  Phone: [phone]  Fax: [phone]  Follow Up Time: c/o eye discomfort with tearing sometimes

## 2020-01-30 NOTE — DISCHARGE NOTE PROVIDER - HOSPITAL COURSE
68 yo woman with PMH of ESRD on HD (MWF), HTN, HLD, DMT2, PVD, moderate AS, reported Afib (per past charts on Eliquis) with neuropathy presents from vascular surgeon's office in setting of AV fistula dysfunction of the RUE. Vascular consulted. S/p Cannulation in HD with improvement. Pt also with back abscess. Keflex was started. Surgery consulted. Pt with A 2.8 cm complicated collection in the mid back soft tissues, possible abscess.

## 2020-01-30 NOTE — DISCHARGE NOTE PROVIDER - NSDCCPCAREPLAN_GEN_ALL_CORE_FT
PRINCIPAL DISCHARGE DIAGNOSIS  Diagnosis: AV fistula occlusion  Assessment and Plan of Treatment: S/p cannulation in HD  AVF now working  Follow up with your Renal      SECONDARY DISCHARGE DIAGNOSES  Diagnosis: Abscess  Assessment and Plan of Treatment: Continue Keflex  Follow up with Dr Schmid in 1 week  You can follow up with Surgery-Dr. Abbott if abscess becomes worse or if any yellow drainage.    Diagnosis: ESRD (end stage renal disease)  Assessment and Plan of Treatment: Hemodialysis outpatient    Diagnosis: History of atrial fibrillation  Assessment and Plan of Treatment: History of atrial fibrillation

## 2020-01-30 NOTE — DISCHARGE NOTE PROVIDER - NSDCMRMEDTOKEN_GEN_ALL_CORE_FT
apixaban 2.5 mg oral tablet: 1 tab(s) orally every 12 hours  Auryxia 210 mg oral tablet: 2 tab(s) orally 3 times a day  gabapentin 300 mg oral capsule: 1 cap(s) orally 3 times a day  guaiFENesin 100 mg/5 mL oral liquid: 5 milliliter(s) orally every 8 hours, As needed, Cough  midodrine 5 mg oral tablet: 2 tab(s) orally 3 times a day  sevelamer carbonate 800 mg oral tablet: 1 tab(s) orally 3 times a day (with meals)

## 2020-01-30 NOTE — DISCHARGE NOTE NURSING/CASE MANAGEMENT/SOCIAL WORK - PATIENT PORTAL LINK FT
You can access the FollowMyHealth Patient Portal offered by [facility] by registering at the following website: [email or web address]. By joining [employer]’s FollowMyHealth portal, you will also be able to view your health information using other applications (apps) compatible with our system.

## 2020-01-30 NOTE — PROGRESS NOTE ADULT - REASON FOR ADMISSION
AV fistula dysfunction

## 2020-01-30 NOTE — PROGRESS NOTE ADULT - ASSESSMENT
Ms Lopez is an 69 year old lady on hemodialysis Monday, Wednesday and Friday at [facility] under the care of Dr. Gold her nephrologist. She is s/p RUE Brachial AVG with PTFE in 6/2017 with Dr. Hernandes, Afib , CAD, AS, HTN, PVD, diabetes for 20 years but does not have retinopathy. and HLD, who presented from her dialysis center for non-working AVG. Patient was last dialyzed successfully on 1/24/2020.    1. ESRD on hemodialysis Tu/Thu/Sat  2. Canulation of AVG at hd with clots   3. Hyperkalemia.     RECOMMEND:  1. Next Hemodialysis this am   2. Dialysis on 2 K bath.   3. Sevelamer of 800 mg po TID with meals.   4. dc the [place] now

## 2020-02-17 NOTE — ED ADULT NURSE REASSESSMENT NOTE - NS ED NURSE REASSESS COMMENT FT1
Shiley placed in R groin. Patient tolerated well, sterile technique maintained. Patient to go to dialysis at 2200 tonight. Family updated on plan of care.

## 2020-02-17 NOTE — ED ADULT NURSE NOTE - OBJECTIVE STATEMENT
68 yo female presents to ED via EMS from Dialysis Center for "clogged AV fistaula". Per EMS, patient went for M/W/F dialysis last 2/14, states dialysis center unable to access R upper arm fistula. Upon arrival, patient has no palpable thrill to R fistula. Patient denies SOB, CP, nvd, fever/chills, sick contacts, falls/loc. Patient A&Ox3, breathing spontaneously, airway patent, bl clear lungs, abdomen nontender, +pulses, cap refill <2 seconds. Patient resting in bed, side rails up, plan of care explained. Cardiac monitor in place. MD at bedside.

## 2020-02-17 NOTE — H&P ADULT - PROBLEM SELECTOR PLAN 3
- clarify outpatient home medications  - for now ISS w/ serial FS  - Gabapentin to be adjusted as below

## 2020-02-17 NOTE — H&P ADULT - NSHPPHYSICALEXAM_GEN_ALL_CORE
Vital Signs Last 24 Hrs  T(F): 97.6 (17 Feb 2020 14:40), Max: 98.4 (17 Feb 2020 11:23)  HR: 68 (17 Feb 2020 14:40) (63 - 68)  BP: 141/71 (17 Feb 2020 14:40) (141/71 - 154/55)  RR: 18 (17 Feb 2020 14:40) (16 - 18)  SpO2: 96% (17 Feb 2020 14:40) (96% - 100%)    GEN: elderly woman, laying in stretcher in NAD  PSYCH: A&Ox3, mood and affect appear appropriate   SKIN: intact, no e/o rash  NEURO: no focal neurologic deficits appreciated  EYES: PERRL, anicteric  HEAD: NC, AT  NECK: supple  RESPI: no accessory muscle use, B/L air entry, CTAB   CARDIO: regular rate/rhythm, no LE edema B/L  ABD: soft, NT, ND, +BS  EXT: patient able to move all extremities spontaneously  VASC: peripheral pulses palpated, RUE fistula w/o palpable thrill

## 2020-02-17 NOTE — ED PROVIDER NOTE - OBJECTIVE STATEMENT
69 year-old female with history of ESRD on HD (MWF), HTN, HLD, DMT2, PVD, moderate AS, reported atrial fibrillation (per past charts on Eliquis) presents to the Emergency Department for clogged fistula.  Patient went to dialysis today and found to have a clogged fistula - did not get dialysis.  No fevers, chills, nausea, vomiting, chest pain, shortness of breath, abdominal pain, LE edema.  No arm pain reported.

## 2020-02-17 NOTE — ED PROCEDURE NOTE - PROCEDURE ADDITIONAL DETAILS
Left sided 20 gauge angiocatheter  POCUS: Emergency Department Focused Ultrasound performed at patient's bedside.  The complete report may be available in PACS, see below for findings.

## 2020-02-17 NOTE — PATIENT PROFILE ADULT - NSPROMUTPARTICIPCAREFT_GEN_A_NUR
Pt and family will participate in plan of care conversation and assist in maintaining safety/prevent injury

## 2020-02-17 NOTE — H&P ADULT - HISTORY OF PRESENT ILLNESS
69yoF w/ PMHx significant for ESRD on HD (MWF), HTN, HLD, DM, PVD, moderate AS, ?Afib (?on Eliquis), who presents after being sent in from dialysis center after AVF non-functioning and found to be "clogged." She was unable to initiate her dialysis session. She doesn't have any current complaints.     ED Presenting Vitals: 97.7F, 68bpm, 148/    ED Course: vascular and nephrology consulted

## 2020-02-17 NOTE — CONSULT NOTE ADULT - ASSESSMENT
Annette is a very pleasant 69 Year-Old Lady  w/ PMHx significant for ESRD on HD (MWF), HTN, HLD, DM, PVD, moderate AS, who presents after being sent in from dialysis center with thrombosed AVF.     - Temporary dialysis access placed into L groin.   - Plan for dialysis tonight.   - IR consult for thrombectomy.    Vascular Surgery Pager #[phone]

## 2020-02-17 NOTE — H&P ADULT - ASSESSMENT
69yoF w/ PMHx significant for ESRD on HD (MWF), HTN, HLD, DM, PVD, moderate AS, ?Afib (?on Eliquis), who presents after being sent in from dialysis center after AVF non-functioning 69yoF w/ PMHx significant for ESRD on HD (MWF), HTN, HLD, DM, PVD, moderate AS, ?Afib (?on Eliquis), who presents after being sent in from dialysis center after AVF non-functioning and not able to initiate HD session.

## 2020-02-17 NOTE — H&P ADULT - NSHPLABSRESULTS_GEN_ALL_CORE
Labs and imaging data obtained personally reviewed. Pertinent findings include:                         10.6   11.29 )-----------( 318      ( 17 Feb 2020 12:20 )             33.9     02-17  133<L>  |  93<L>  |  94<H>  ----------------------------<  148<H>  5.5<H>   |  24  |  7.69<H>    Ca    9.7      17 Feb 2020 12:20    TPro  7.6  /  Alb  3.9  /  TBili  0.3  /  DBili  x   /  AST  14  /  ALT  20  /  AlkPhos  82  02-17    PT/INR - ( 17 Feb 2020 12:20 )   PT: 14.2 sec;   INR: 1.24 ratio    PTT - ( 17 Feb 2020 12:20 )  PTT:30.2 sec    Blood Gas Profile - Venous (02.17.20 @ 12:20)    pH, Venous: 7.31    pCO2, Venous: 56 mmHg    pO2, Venous: 35 mmHg    HCO3, Venous: 27 mmol/L    Base Excess, Venous: 0.8 mmol/L    Oxygen Saturation, Venous: 54 %    Total CO2, Venous: 29 mmol/L    CXR as reported: Mild pulmonary edema.    VA Duplex Right Hemodialysis Access as reported: Thrombosed AV graft from the right brachial artery to the axillary vein, with thrombosed stented segments. The right brachial artery proximal to the anastomosis is patent. The right axillary vein distal to the anastomosis is patent.

## 2020-02-17 NOTE — PROCEDURE NOTE - NSICDXPROCEDURE_GEN_ALL_CORE_FT
PROCEDURES:  US guided placement of non-tunneled central venous catheter 17-Feb-2020 18:08:09  Marsha Whitaker

## 2020-02-17 NOTE — ED PROVIDER NOTE - PHYSICAL EXAMINATION
*Gen: NAD, AAO*3  *HEENT: NC/AT, MMM, airway patent, trachea midline  *CV: RRR, S1/S2 present, + murmur; RUE fistula w/o palpable thrill  *Resp: no respiratory distress, LCTAB  *Abd: non-distended, soft N/Tx4, no guarding or rigidity  *Neuro: no focal neuro deficits, moving all limbs appropriately  *Extremities: no gross deformity  *Skin: no rashes, no wounds   ~ Jl Guerrero M.D.

## 2020-02-17 NOTE — ED PROCEDURE NOTE - ATTENDING CONTRIBUTION TO CARE
***Graham Baker MD FACEP*** Attending physician was available for the key components of the procedure, the patient tolerated well. There were no complications with the procedure.

## 2020-02-17 NOTE — H&P ADULT - PROBLEM SELECTOR PLAN 4
- medication reconciliation not completed: please confirm w/ outpatient pharmacy/records current home medications  - if pt on Eliquis, Gabapentin, will need to adjust medications per [facility]

## 2020-02-17 NOTE — ED ADULT NURSE REASSESSMENT NOTE - NS ED NURSE REASSESS COMMENT FT1
Patient going to dialysis, report given to MIGUE Reid at Dialysis. Patient VSS at time of transport, ok to go off tele per MD Guerrero.

## 2020-02-17 NOTE — CONSULT NOTE ADULT - SUBJECTIVE AND OBJECTIVE BOX
[facility] Vascular Surgical Consultant Evaluation    HPI:  69yoF w/ PMHx significant for ESRD on HD (MWF), HTN, HLD, DM, PVD, moderate AS, ?Afib (?on Eliquis), who presents after being sent in from dialysis center after AVF non-functioning and found to be "clogged." She was unable to initiate her dialysis session. She doesn't have any current complaints.     ED Presenting Vitals: 97.7F, 68bpm, 148/    ED Course: vascular and nephrology consulted (17 Feb 2020 16:09)    Vascular Surgery consulted for placement of temporary dialysis access in setting of clotted AVF earlier today during dialysis. Was unable to receive dialysis. Currently receiving dialysis for fluid overload.       PAST MEDICAL & SURGICAL HISTORY:  Hyperlipidemia  Diabetes  Hypertension  Osteomyelitis  Diabetic Neuropathy  Cellulitis of Foot  Edema of Both Legs  Diabetes: Type 2  History of Osteoarthritis  HTN - Hypertension  HLD (Hyperlipidemia)  Status post insertion of percutaneous endoscopic gastrostomy (PEG) tube  H/O tracheostomy  S/P amputation of lesser toe, right: 2013 right great toe, 2nd and 3rd right toes  S/P Amputation of Lesser Toe: Rt 1-3 metatarsal      MEDICATIONS  (STANDING):  apixaban 2.5 milliGRAM(s) Oral two times a day  sevelamer carbonate 800 milliGRAM(s) Oral three times a day with meals      ___________________________________________  REVIEW OF SYSTEMS:  As stated in History of Present Illness, otherwise non-contributory.   ___________________________________________  PHYSICAL EXAM:  Vital Signs Last 24 Hrs  T(C): 36.9 (17 Feb 2020 18:11), Max: 36.9 (17 Feb 2020 11:23)  T(F): 98.4 (17 Feb 2020 18:11), Max: 98.4 (17 Feb 2020 11:23)  HR: 73 (17 Feb 2020 18:11) (63 - 73)  BP: 132/53 (17 Feb 2020 18:11) (132/53 - 154/55)  BP(mean): 91 (17 Feb 2020 14:40) (91 - 91)  RR: 18 (17 Feb 2020 18:11) (16 - 18)  SpO2: 97% (17 Feb 2020 18:11) (96% - 100%)CAPILLARY BLOOD GLUCOSE        I&O's Detail      General: ANo acute distress.  Respiratory: Breathing non-labored, not on O2.   Abdomen: Large, soft, nontender, nondistended.   Extremities: Moves all four.   ____________________________________________  LABS:  CBC Full  -  ( 17 Feb 2020 12:20 )  WBC Count : 11.29 K/uL  RBC Count : 3.49 M/uL  Hemoglobin : 10.6 g/dL  Hematocrit : 33.9 %  Platelet Count - Automated : 318 K/uL  Mean Cell Volume : 97.1 fl  Mean Cell Hemoglobin : 30.4 pg  Mean Cell Hemoglobin Concentration : 31.3 gm/dL  Auto Neutrophil # : 8.99 K/uL  Auto Lymphocyte # : 1.31 K/uL  Auto Monocyte # : 0.64 K/uL  Auto Eosinophil # : 0.19 K/uL  Auto Basophil # : 0.06 K/uL  Auto Neutrophil % : 79.6 %  Auto Lymphocyte % : 11.6 %  Auto Monocyte % : 5.7 %  Auto Eosinophil % : 1.7 %  Auto Basophil % : 0.5 %    02-17    133<L>  |  93<L>  |  94<H>  ----------------------------<  148<H>  5.5<H>   |  24  |  7.69<H>    Ca    9.7      17 Feb 2020 12:20    TPro  7.6  /  Alb  3.9  /  TBili  0.3  /  DBili  x   /  AST  14  /  ALT  20  /  AlkPhos  82  02-17    LIVER FUNCTIONS - ( 17 Feb 2020 12:20 )  Alb: 3.9 g/dL / Pro: 7.6 g/dL / ALK PHOS: 82 U/L / ALT: 20 U/L / AST: 14 U/L / GGT: x           PT/INR - ( 17 Feb 2020 12:20 )   PT: 14.2 sec;   INR: 1.24 ratio         PTT - ( 17 Feb 2020 12:20 )  PTT:30.2 sec        ____________________________________________  RADIOLOGY:  < from: VA Duplex Hemodialysis Access, Right (02.17.20 @ 13:39) >  Findings:     The right brachial artery before the anastomosis is patent and free of thrombus. Small focal mural atherosclerotic change is appreciated.    However, the right brachial artery at the anastomosis is without flow, containing thrombus. The proximal AV graft at the lower upper arm is also thrombosed. The stented segments are also thrombosed (axillary fossa, and the proximal, mid, and lower upper arm). Small flow is visualized at the venous anastomosis.    The right axillary, right subclavian veins are patent and free of thrombus.     IMPRESSION:    Thrombosed AV graft from the right brachial artery to the axillary vein, with thrombosed stented segments.   The right brachial artery proximal to the anastomosis is patent. The right axillary vein distal to the anastomosis is patent.    < end of copied text >

## 2020-02-17 NOTE — ED ADULT NURSE REASSESSMENT NOTE - NS ED NURSE REASSESS COMMENT FT1
RN unable to obtain IV access. MD Avelar aware; US team contacted, to place US guided line and obtain labs.

## 2020-02-17 NOTE — ED PROVIDER NOTE - PROGRESS NOTE DETAILS
Yolanda RM: Vascular surg. consulted Attending MD Avelar: Dr. Lico jeronimo has seen patient, recommends shiley placement for HD today. IR consulted and cannot consult today so vascular is in charge of blocked AV fistula for now. Paged vascular surgery to consider shiley placement at bedside here with plans for HD today. Attending MD Avelar: vascular surgery made aware that nephro is requesting shiley placement for HD today, awaiting vascular surgery arrival for placement of shiley. Attending MD Avelar: vascular surgery team at bedside placing IJ HD catheter, patient was consented by surgery team with .

## 2020-02-17 NOTE — ED ADULT NURSE REASSESSMENT NOTE - NS ED NURSE REASSESS COMMENT FT1
Patient resting comfortably in bed, VSS. Admitted to medicine, awaiting shiley placement for RTM to be clicked. Patient updated on plan of care.

## 2020-02-17 NOTE — PATIENT PROFILE ADULT - LANGUAGE ASSISTANCE NEEDED
Family at bedside, pt can verbalize needs in english/No-Patient/Caregiver offered and refused free interpretation services.

## 2020-02-17 NOTE — ED ADULT TRIAGE NOTE - CHIEF COMPLAINT QUOTE
Patient sent from dialysis center for "clogged fistula" in Right arm  Patient unable to complete dialysis today - last treatment Friday 2/14/2020

## 2020-02-17 NOTE — H&P ADULT - CARDIOVASCULAR
details… Likely related to sepsis and hypovolemia. Will start ISS with q 6 hour accu-checks given NPO status.

## 2020-02-18 NOTE — PROGRESS NOTE ADULT - PROBLEM SELECTOR PLAN 2
pt  was  dialized  from right  groin  cath ,  right  AV fistula  declotted   on IRpt  was  dialized  from right  groin  cath ,  right  AV fistula  declotted   on IR, continue on  HD  as  per  nephrology

## 2020-02-18 NOTE — PROGRESS NOTE ADULT - SUBJECTIVE AND OBJECTIVE BOX
Patient seen and examined in bed. s/p HD last night with 0.4 kg removed.       MEDICATIONS  (STANDING):  apixaban 2.5 milliGRAM(s) Oral two times a day  dextrose 5%. 1000 milliLiter(s) (50 mL/Hr) IV Continuous <Continuous>  dextrose 50% Injectable 12.5 Gram(s) IV Push once  dextrose 50% Injectable 25 Gram(s) IV Push once  dextrose 50% Injectable 25 Gram(s) IV Push once  influenza   Vaccine 0.5 milliLiter(s) IntraMuscular once  insulin lispro (HumaLOG) corrective regimen sliding scale   SubCutaneous three times a day before meals  sevelamer carbonate 800 milliGRAM(s) Oral three times a day with meals      VITAL:  T(C): , Max: 36.9 (02-17-20 @ 11:23)  T(F): , Max: 98.4 (02-17-20 @ 11:23)  HR: 78 (02-18-20 @ 04:16)  BP: 145/60 (02-18-20 @ 04:16)  BP(mean): 91 (02-17-20 @ 14:40)  RR: 18 (02-18-20 @ 04:16)  SpO2: 97% (02-18-20 @ 04:16)    I and O's:    02-17 @ 07:01  -  02-18 @ 07:00  --------------------------------------------------------  IN: 0 mL / OUT: 400 mL / NET: -400 mL      Height (cm): 154.9 (02-17 @ 22:50)  Weight (kg): 90.8 (02-17 @ 22:50)  BMI (kg/m2): 37.8 (02-17 @ 22:50)  BSA (m2): 1.89 (02-17 @ 22:50)    PHYSICAL EXAM:    Constitutional: NAD  Neck:  No JVD  Respiratory: CTAB/L  Cardiovascular: S1 and S2  Gastrointestinal: BS+, soft, NT/ND  Extremities: Right TMA  Neurological: A/O x 3, no focal deficits  Psychiatric: Normal mood, normal affect  : No Raphael  Skin: No rashes  Access: Right AVG, no thrill, Right femoral nontunneled HD catheter (dressing bloody)    LABS:                        10.6   11.29 )-----------( 318      ( 17 Feb 2020 12:20 )             33.9     02-17    133<L>  |  93<L>  |  94<H>  ----------------------------<  148<H>  5.5<H>   |  24  |  7.69<H>    Ca    9.7      17 Feb 2020 12:20    TPro  7.6  /  Alb  3.9  /  TBili  0.3  /  DBili  x   /  AST  14  /  ALT  20  /  AlkPhos  82  02-17        ASSESSMENT:  69 year old female w/ PMH of ESRD on HD (MWF), HTN, HLD, DM, PVD, moderate AS, ?Afib (?on Eliquis), who presents after being sent in from dialysis center with non-functioning AVG.   (1)Renal - ESRD - HD MWF - s/p HD last night  (2)Hyperkalemia - renally mediated, should have improved s/p HD  (3)CV - hypervolemic, s/p HD yesterday with only 0.4 kg removed due to hypotension  (4)Vascular - thrombosed AV access, IR consult for thrombectomy    RECOMMEND:  (1)UF today; aggressive UF as able  (2)Midodrine pre-HD  (3)Repair of AV access per Vascular/IR  (4)Dose new meds for GFR<10/HD  (5)Renal diet      Kylee Serra NP-C  [facility]  [phone] Patient seen and examined in bed. s/p HD last night with 0.4 kg removed.       MEDICATIONS  (STANDING):  apixaban 2.5 milliGRAM(s) Oral two times a day  dextrose 5%. 1000 milliLiter(s) (50 mL/Hr) IV Continuous <Continuous>  dextrose 50% Injectable 12.5 Gram(s) IV Push once  dextrose 50% Injectable 25 Gram(s) IV Push once  dextrose 50% Injectable 25 Gram(s) IV Push once  influenza   Vaccine 0.5 milliLiter(s) IntraMuscular once  insulin lispro (HumaLOG) corrective regimen sliding scale   SubCutaneous three times a day before meals  sevelamer carbonate 800 milliGRAM(s) Oral three times a day with meals      VITAL:  T(C): , Max: 36.9 (02-17-20 @ 11:23)  T(F): , Max: 98.4 (02-17-20 @ 11:23)  HR: 78 (02-18-20 @ 04:16)  BP: 145/60 (02-18-20 @ 04:16)  BP(mean): 91 (02-17-20 @ 14:40)  RR: 18 (02-18-20 @ 04:16)  SpO2: 97% (02-18-20 @ 04:16)    I and O's:    02-17 @ 07:01  -  02-18 @ 07:00  --------------------------------------------------------  IN: 0 mL / OUT: 400 mL / NET: -400 mL      Height (cm): 154.9 (02-17 @ 22:50)  Weight (kg): 90.8 (02-17 @ 22:50)  BMI (kg/m2): 37.8 (02-17 @ 22:50)  BSA (m2): 1.89 (02-17 @ 22:50)    PHYSICAL EXAM:    Constitutional: NAD  Neck:  No JVD  Respiratory: CTAB/L  Cardiovascular: S1 and S2  Gastrointestinal: BS+, soft, NT/ND  Extremities: Right TMA  Neurological: A/O x 3, no focal deficits  Psychiatric: Normal mood, normal affect  : No Raphael  Skin: No rashes  Access: Right AVG, no thrill; Right groin nontunneled HD catheter (dressing bloody)    LABS:                        10.6   11.29 )-----------( 318      ( 17 Feb 2020 12:20 )             33.9     02-17    133<L>  |  93<L>  |  94<H>  ----------------------------<  148<H>  5.5<H>   |  24  |  7.69<H>    Ca    9.7      17 Feb 2020 12:20    TPro  7.6  /  Alb  3.9  /  TBili  0.3  /  DBili  x   /  AST  14  /  ALT  20  /  AlkPhos  82  02-17        ASSESSMENT:  69 year old female w/ PMH of ESRD on HD (MWF), HTN, HLD, DM, PVD, moderate AS, ?Afib (?on Eliquis), who presents after being sent in from dialysis center with non-functioning AVG.   (1)Renal - ESRD - HD MWF - s/p HD last night  (2)Hyperkalemia - renally mediated, should have improved s/p HD  (3)CV - remains hypervolemic, s/p HD yesterday with only 0.4 kg removed due to hypotension  (4)Vascular - thrombosed AV access, plan for IR consult for thrombectomy    RECOMMEND:  (1)UF today; aggressive UF as able  (2)Midodrine pre-HD  (3)Repair of AV access per IR  (4)Dose new meds for GFR<10/HD  (5)Renal diet      SHOLA JavierC  [facility]  [phone] Patient seen and examined in bed. s/p HD last night with 0.4 kg removed. No pain, mild SOB.      MEDICATIONS  (STANDING):  apixaban 2.5 milliGRAM(s) Oral two times a day  dextrose 5%. 1000 milliLiter(s) (50 mL/Hr) IV Continuous <Continuous>  dextrose 50% Injectable 12.5 Gram(s) IV Push once  dextrose 50% Injectable 25 Gram(s) IV Push once  dextrose 50% Injectable 25 Gram(s) IV Push once  influenza   Vaccine 0.5 milliLiter(s) IntraMuscular once  insulin lispro (HumaLOG) corrective regimen sliding scale   SubCutaneous three times a day before meals  sevelamer carbonate 800 milliGRAM(s) Oral three times a day with meals      VITAL:  T(C): , Max: 36.9 (02-17-20 @ 11:23)  T(F): , Max: 98.4 (02-17-20 @ 11:23)  HR: 78 (02-18-20 @ 04:16)  BP: 145/60 (02-18-20 @ 04:16)  BP(mean): 91 (02-17-20 @ 14:40)  RR: 18 (02-18-20 @ 04:16)  SpO2: 97% (02-18-20 @ 04:16)    I and O's:    02-17 @ 07:01  -  02-18 @ 07:00  --------------------------------------------------------  IN: 0 mL / OUT: 400 mL / NET: -400 mL      Height (cm): 154.9 (02-17 @ 22:50)  Weight (kg): 90.8 (02-17 @ 22:50)  BMI (kg/m2): 37.8 (02-17 @ 22:50)  BSA (m2): 1.89 (02-17 @ 22:50)    PHYSICAL EXAM:    Constitutional: NAD  Neck:  No JVD  Respiratory: CTAB/L  Cardiovascular: S1 and S2  Gastrointestinal: BS+, soft, NT/ND  Extremities: Right TMA  Neurological: A/O x 3, no focal deficits  Psychiatric: Normal mood, normal affect  : No Raphael  Skin: No rashes  Access: Right AVG, no thrill; Right groin nontunneled HD catheter (dressing bloody)    LABS:                        10.6   11.29 )-----------( 318      ( 17 Feb 2020 12:20 )             33.9     02-17    133<L>  |  93<L>  |  94<H>  ----------------------------<  148<H>  5.5<H>   |  24  |  7.69<H>    Ca    9.7      17 Feb 2020 12:20    TPro  7.6  /  Alb  3.9  /  TBili  0.3  /  DBili  x   /  AST  14  /  ALT  20  /  AlkPhos  82  02-17        ASSESSMENT:  69 year old female w/ PMH of ESRD on HD (MWF), HTN, HLD, DM, PVD, moderate AS, ?Afib (?on Eliquis), who presents after being sent in from dialysis center with non-functioning AVG.   (1)Renal - ESRD - HD MWF - s/p HD last night  (2)Hyperkalemia - renally mediated, should have improved s/p HD  (3)CV - remains hypervolemic, s/p HD yesterday with only 0.4 kg removed due to hypotension, indicated for UF today with midodrine pre-HD  (4)Vascular - thrombosed AV access, plan for IR consult for thrombectomy    RECOMMEND:  (1)UF today; 2 hours 2-2.5 kg UF as able  (2)Midodrine 10 mg po tid   (3)HD tomorrow as ordered  (4)Repair of AV access per IR  (5)Dose new meds for GFR<10/HD  (6)Renal diet      Kylee Serra NP-C  [facility]  [phone] Patient seen and examined in bed. s/p HD last night with 0.4 kg removed. No pain, mild SOB.      MEDICATIONS  (STANDING):  apixaban 2.5 milliGRAM(s) Oral two times a day  dextrose 5%. 1000 milliLiter(s) (50 mL/Hr) IV Continuous <Continuous>  dextrose 50% Injectable 12.5 Gram(s) IV Push once  dextrose 50% Injectable 25 Gram(s) IV Push once  dextrose 50% Injectable 25 Gram(s) IV Push once  influenza   Vaccine 0.5 milliLiter(s) IntraMuscular once  insulin lispro (HumaLOG) corrective regimen sliding scale   SubCutaneous three times a day before meals  sevelamer carbonate 800 milliGRAM(s) Oral three times a day with meals      VITAL:  T(C): , Max: 36.9 (02-17-20 @ 11:23)  T(F): , Max: 98.4 (02-17-20 @ 11:23)  HR: 78 (02-18-20 @ 04:16)  BP: 145/60 (02-18-20 @ 04:16)  BP(mean): 91 (02-17-20 @ 14:40)  RR: 18 (02-18-20 @ 04:16)  SpO2: 97% (02-18-20 @ 04:16)    I and O's:    02-17 @ 07:01  -  02-18 @ 07:00  --------------------------------------------------------  IN: 0 mL / OUT: 400 mL / NET: -400 mL      Height (cm): 154.9 (02-17 @ 22:50)  Weight (kg): 90.8 (02-17 @ 22:50)  BMI (kg/m2): 37.8 (02-17 @ 22:50)  BSA (m2): 1.89 (02-17 @ 22:50)    PHYSICAL EXAM:    Constitutional: NAD  Neck:  No JVD  Respiratory: CTAB/L  Cardiovascular: S1 and S2  Gastrointestinal: BS+, soft, NT/ND  Extremities: Right TMA  Neurological: A/O x 3, no focal deficits  Psychiatric: Normal mood, normal affect  : No Raphael  Skin: No rashes  Access: Right AVG, no thrill; Right groin nontunneled HD catheter (dressing bloody)    LABS:                        10.6   11.29 )-----------( 318      ( 17 Feb 2020 12:20 )             33.9     02-17    133<L>  |  93<L>  |  94<H>  ----------------------------<  148<H>  5.5<H>   |  24  |  7.69<H>    Ca    9.7      17 Feb 2020 12:20    TPro  7.6  /  Alb  3.9  /  TBili  0.3  /  DBili  x   /  AST  14  /  ALT  20  /  AlkPhos  82  02-17        ASSESSMENT:  69 year old female w/ PMH of ESRD on HD (MWF), HTN, HLD, DM, PVD, moderate AS, ?Afib (?on Eliquis), who presents after being sent in from dialysis center with non-functioning AVG.   (1)Renal - ESRD - HD MWF - s/p HD last night  (2)Hyperkalemia - renally mediated, should have improved s/p HD  (3)CV - remains hypervolemic, s/p HD yesterday with only 0.4 kg removed due to hypotension, indicated for UF today with midodrine pre-HD  (4)Vascular - thrombosed AV access, plan for IR consult for thrombectomy    RECOMMEND:  (1)UF today; 2 hours 2-2.5 kg UF as able  (2)Midodrine 10 mg po tid   (3)HD tomorrow as ordered  (4)Repair of AV access per IR  (5)Dose new meds for GFR<10/HD  (6)Renal diet      BREANNE Javier  [facility]  [phone]      RENAL ATTENDING NOTE  Patient seen and examined with NP. Agree with assessment and plan as above.    given inability to take much fluid off with HD yesterday due to hypotension, we will plan for PUF with pre-PUF midodrine    Dave Barfield MD  [facility]  [phone]

## 2020-02-18 NOTE — CONSULT NOTE ADULT - SUBJECTIVE AND OBJECTIVE BOX
HPI:    69yoF w/ PMHx significant for ESRD on HD (MWF), HTN, HLD, DM, PVD, moderate AS, afib, on eliquis, who presents after being sent in from dialysis center after AVF non-functioning and found to be thrombosed, patient was unable to initiate her dialysis session on 2/16/20. She doesn't have any current complaints. US of AC graft shows thrombosed AV graft from brachial artery to axillary vein. Patient had right femoral Shiley placed by IR for dialysis access. Patient received dialysis today.     PAST MEDICAL & SURGICAL HISTORY:  Hyperlipidemia  Diabetes  Hypertension  Osteomyelitis  Diabetic Neuropathy  Cellulitis of Foot  Edema of Both Legs  Diabetes: Type 2  History of Osteoarthritis  HTN - Hypertension  HLD (Hyperlipidemia)  Status post insertion of percutaneous endoscopic gastrostomy (PEG) tube  H/O tracheostomy  S/P amputation of lesser toe, right: 2013 right great toe, 2nd and 3rd right toes  S/P Amputation of Lesser Toe: Rt 1-3 metatarsal      MEDICATIONS  (STANDING):  apixaban 2.5 milliGRAM(s) Oral two times a day  dextrose 5%. 1000 milliLiter(s) (50 mL/Hr) IV Continuous <Continuous>  dextrose 50% Injectable 12.5 Gram(s) IV Push once  dextrose 50% Injectable 25 Gram(s) IV Push once  dextrose 50% Injectable 25 Gram(s) IV Push once  influenza   Vaccine 0.5 milliLiter(s) IntraMuscular once  insulin lispro (HumaLOG) corrective regimen sliding scale   SubCutaneous three times a day before meals  midodrine. 10 milliGRAM(s) Oral <User Schedule>  sevelamer carbonate 800 milliGRAM(s) Oral three times a day with meals    MEDICATIONS  (PRN):  dextrose 40% Gel 15 Gram(s) Oral once PRN Blood Glucose LESS THAN 70 milliGRAM(s)/deciliter  glucagon  Injectable 1 milliGRAM(s) IntraMuscular once PRN Glucose LESS THAN 70 milligrams/deciliter      Allergies    No Known Allergies    Intolerances        SOCIAL HISTORY:    FAMILY HISTORY:  No pertinent family history in first degree relatives          Physical Exam:  General: NAD, resting comfortably  HEENT: NC/AT, EOMI, normal hearing, no oral lesions, no LAD, neck supple  Pulmonary: normal resp effort  Abdominal: soft, non distended, non tender   Extremity: Right femoral shiley with no signs of active bleeding or hematoma, left sided TMA   Pulses: Palpable right radial pulse. Fistula without palpable pulse and thrill. faint doppler signal on graft    Vital Signs Last 24 Hrs  T(C): 36.6 (18 Feb 2020 15:15), Max: 36.9 (17 Feb 2020 18:11)  T(F): 97.8 (18 Feb 2020 15:15), Max: 98.4 (17 Feb 2020 18:11)  HR: 83 (18 Feb 2020 15:15) (65 - 83)  BP: 161/68 (18 Feb 2020 15:15) (109/48 - 168/63)  BP(mean): --  RR: 18 (18 Feb 2020 15:15) (18 - 19)  SpO2: 96% (18 Feb 2020 15:15) (95% - 97%)    I&O's Summary    17 Feb 2020 07:01  -  18 Feb 2020 07:00  --------------------------------------------------------  IN: 0 mL / OUT: 400 mL / NET: -400 mL    18 Feb 2020 07:01  -  18 Feb 2020 16:37  --------------------------------------------------------  IN: 1140 mL / OUT: 2700 mL / NET: -1560 mL            LABS:                        9.6    8.87  )-----------( 246      ( 18 Feb 2020 10:38 )             31.6     02-18    139  |  100  |  51<H>  ----------------------------<  174<H>  5.0   |  26  |  5.07<H>    Ca    9.0      18 Feb 2020 10:38  Phos  3.8     02-18  Mg     2.5     02-18    TPro  7.6  /  Alb  3.9  /  TBili  0.3  /  DBili  x   /  AST  14  /  ALT  20  /  AlkPhos  82  02-17    PT/INR - ( 17 Feb 2020 12:20 )   PT: 14.2 sec;   INR: 1.24 ratio         PTT - ( 17 Feb 2020 12:20 )  PTT:30.2 sec    CAPILLARY BLOOD GLUCOSE      POCT Blood Glucose.: 110 mg/dL (18 Feb 2020 12:04)  POCT Blood Glucose.: 105 mg/dL (18 Feb 2020 08:07)  POCT Blood Glucose.: 111 mg/dL (17 Feb 2020 22:50)    LIVER FUNCTIONS - ( 17 Feb 2020 12:20 )  Alb: 3.9 g/dL / Pro: 7.6 g/dL / ALK PHOS: 82 U/L / ALT: 20 U/L / AST: 14 U/L / GGT: x             Cultures:      RADIOLOGY & ADDITIONAL STUDIES:

## 2020-02-18 NOTE — PROGRESS NOTE ADULT - SUBJECTIVE AND OBJECTIVE BOX
Chief complaint:pt  was  dialized  from right  groin  cath ,  right  AV fistula  declotted   on IR    HPI:  69yoF w/ PMHx significant for ESRD on HD (MWF), HTN, HLD, DM, PVD, moderate AS, ?Afib (?on Eliquis), who presents after being sent in from dialysis center after AVF non-functioning and found to be "clogged." She was unable to initiate her dialysis session. She doesn't have any current complaints.     ED Presenting Vitals: 97.7F, 68bpm, 148/    ED Course: vascular and nephrology consulted (17 Feb 2020 16:09)      REVIEW OF SYSTEMS:    CONSTITUTIONAL: No fever, weight loss, or fatigue  NECK: No pain or stiffness  RESPIRATORY: No cough, wheezing, chills or hemoptysis; No shortness of breath  CARDIOVASCULAR: No chest pain, palpitations, dizziness, or leg swelling  GASTROINTESTINAL: No abdominal or epigastric pain. No nausea, vomiting, or hematemesis; No diarrhea or constipation. No melena or hematochezia.  GENITOURINARY: No dysuria, frequency, hematuria, or incontinence  NEUROLOGICAL: No headaches, memory loss, loss of strength, numbness, or tremors  SKIN: No itching, burning, rashes, or lesions   LYMPH NODES: No enlarged glands  MUSCULOSKELETAL: No joint pain or swelling; No muscle, back, or extremity pain  HEME/LYMPH: No easy bruising, or bleeding gums    MEDICATIONS  (STANDING):  apixaban 2.5 milliGRAM(s) Oral two times a day  dextrose 5%. 1000 milliLiter(s) (50 mL/Hr) IV Continuous <Continuous>  dextrose 50% Injectable 12.5 Gram(s) IV Push once  dextrose 50% Injectable 25 Gram(s) IV Push once  dextrose 50% Injectable 25 Gram(s) IV Push once  influenza   Vaccine 0.5 milliLiter(s) IntraMuscular once  insulin lispro (HumaLOG) corrective regimen sliding scale   SubCutaneous three times a day before meals  sevelamer carbonate 800 milliGRAM(s) Oral three times a day with meals    MEDICATIONS  (PRN):  dextrose 40% Gel 15 Gram(s) Oral once PRN Blood Glucose LESS THAN 70 milliGRAM(s)/deciliter  glucagon  Injectable 1 milliGRAM(s) IntraMuscular once PRN Glucose LESS THAN 70 milligrams/deciliter      Allergies    No Known Allergies    Intolerances        Vital Signs Last 24 Hrs  T(C): 36.7 (18 Feb 2020 04:16), Max: 36.9 (17 Feb 2020 11:23)  T(F): 98.1 (18 Feb 2020 04:16), Max: 98.4 (17 Feb 2020 11:23)  HR: 78 (18 Feb 2020 04:16) (63 - 78)  BP: 145/60 (18 Feb 2020 04:16) (109/48 - 154/55)  BP(mean): 91 (17 Feb 2020 14:40) (91 - 91)  RR: 18 (18 Feb 2020 04:16) (16 - 18)  SpO2: 97% (18 Feb 2020 04:16) (96% - 100%)    PHYSICAL EXAM:    GENERAL: NAD, well-groomed, well-developed  HEAD:  Atraumatic, Normocephalic  EYES: EOMI, PERRLA, conjunctiva and sclera clear  ENMT: No tonsillar erythema, exudates, or enlargement; Moist mucous membranes, Good dentition, No lesions  NECK: Supple, No JVD, Normal thyroid  NERVOUS SYSTEM:  Alert & Oriented X3, Good concentration; Motor Strength 5/5 B/L upper and lower extremities; DTRs 2+ intact and symmetric  CHEST/LUNG: Clear to percussion bilaterally; No rales, rhonchi, wheezing, or rubs  HEART: Regular rate and rhythm; No murmurs, rubs, or gallops  ABDOMEN: Soft, Nontender, Nondistended; Bowel sounds present  EXTREMITIES:  2+ Peripheral Pulses, No clubbing, cyanosis, or edema  LYMPH: No lymphadenopathy noted  SKIN: No rashes or lesions      LABS:                        10.6   11.29 )-----------( 318      ( 17 Feb 2020 12:20 )             33.9     02-17    133<L>  |  93<L>  |  94<H>  ----------------------------<  148<H>  5.5<H>   |  24  |  7.69<H>    Ca    9.7      17 Feb 2020 12:20    TPro  7.6  /  Alb  3.9  /  TBili  0.3  /  DBili  x   /  AST  14  /  ALT  20  /  AlkPhos  82  02-17    PT/INR - ( 17 Feb 2020 12:20 )   PT: 14.2 sec;   INR: 1.24 ratio         PTT - ( 17 Feb 2020 12:20 )  PTT:30.2 sec      RADIOLOGY & ADDITIONAL STUDIES:

## 2020-02-18 NOTE — CONSULT NOTE ADULT - ASSESSMENT
69yoF w/ PMHx significant for ESRD on HD (MWF), HTN, HLD, DM, PVD, moderate AS, Afib (?on Eliquis), who presents after being sent in from dialysis center after AVF non-functioning     - No acute surgical intervention   - Consult IR for placement of tunneled catheter for dialysis access  - F/U outpatient with Dr. Hernandes for graft revision/excision   - discussed with vascular fellow  - Please contact vascular surgery with any questions or concerns     [phone]

## 2020-02-19 NOTE — PROGRESS NOTE ADULT - SUBJECTIVE AND OBJECTIVE BOX
Chief complaint:pt   c/o  nochest  pain or  dyspnea  , for  HD  today    HPI:  69yoF w/ PMHx significant for ESRD on HD (MWF), HTN, HLD, DM, PVD, moderate AS, ?Afib (?on Eliquis), who presents after being sent in from dialysis center after AVF non-functioning and found to be "clogged." She was unable to initiate her dialysis session. She doesn't have any current complaints.     ED Presenting Vitals: 97.7F, 68bpm, 148/    ED Course: vascular and nephrology consulted (17 Feb 2020 16:09)      REVIEW OF SYSTEMS:    CONSTITUTIONAL: No fever, weight loss, or fatigue  NECK: No pain or stiffness  RESPIRATORY: No cough, wheezing, chills or hemoptysis; No shortness of breath  CARDIOVASCULAR: No chest pain, palpitations, dizziness, or leg swelling  GASTROINTESTINAL: No abdominal or epigastric pain. No nausea, vomiting, or hematemesis; No diarrhea or constipation. No melena or hematochezia.  GENITOURINARY: No dysuria, frequency, hematuria, or incontinence  NEUROLOGICAL: No headaches, memory loss, loss of strength, numbness, or tremors  SKIN: No itching, burning, rashes, or lesions   LYMPH NODES: No enlarged glands  MUSCULOSKELETAL: No joint pain or swelling; No muscle, back, or extremity pain  HEME/LYMPH: No easy bruising, or bleeding gums    MEDICATIONS  (STANDING):  dextrose 5%. 1000 milliLiter(s) (50 mL/Hr) IV Continuous <Continuous>  dextrose 50% Injectable 12.5 Gram(s) IV Push once  dextrose 50% Injectable 25 Gram(s) IV Push once  dextrose 50% Injectable 25 Gram(s) IV Push once  influenza   Vaccine 0.5 milliLiter(s) IntraMuscular once  insulin lispro (HumaLOG) corrective regimen sliding scale   SubCutaneous three times a day before meals  midodrine. 10 milliGRAM(s) Oral <User Schedule>  sevelamer carbonate 800 milliGRAM(s) Oral three times a day with meals    MEDICATIONS  (PRN):  dextrose 40% Gel 15 Gram(s) Oral once PRN Blood Glucose LESS THAN 70 milliGRAM(s)/deciliter  glucagon  Injectable 1 milliGRAM(s) IntraMuscular once PRN Glucose LESS THAN 70 milligrams/deciliter      Allergies    No Known Allergies    Intolerances        Vital Signs Last 24 Hrs  T(C): 36.4 (19 Feb 2020 05:28), Max: 36.8 (18 Feb 2020 21:15)  T(F): 97.5 (19 Feb 2020 05:28), Max: 98.2 (18 Feb 2020 21:15)  HR: 70 (19 Feb 2020 05:28) (62 - 83)  BP: 142/56 (19 Feb 2020 05:28) (130/65 - 168/63)  BP(mean): --  RR: 17 (19 Feb 2020 05:28) (17 - 19)  SpO2: 97% (19 Feb 2020 05:28) (95% - 97%)    PHYSICAL EXAM:    GENERAL: NAD, well-groomed, well-developed  HEAD:  Atraumatic, Normocephalic  EYES: EOMI, PERRLA, conjunctiva and sclera clear  ENMT: No tonsillar erythema, exudates, or enlargement; Moist mucous membranes, Good dentition, No lesions  NECK: Supple, No JVD, Normal thyroid  NERVOUS SYSTEM:  Alert & Oriented X3, Good concentration; Motor Strength 5/5 B/L upper and lower extremities; DTRs 2+ intact and symmetric  CHEST/LUNG: Clear to percussion bilaterally; No rales, rhonchi, wheezing, or rubs  HEART: Regular rate and rhythm; No murmurs, rubs, or gallops  ABDOMEN: Soft, Nontender, Nondistended; Bowel sounds present  EXTREMITIES:  2+ Peripheral Pulses, No clubbing, cyanosis, or edema  LYMPH: No lymphadenopathy noted  SKIN: No rashes or lesions      LABS:                        10.6   7.93  )-----------( 247      ( 19 Feb 2020 06:23 )             35.5     02-19    139  |  99  |  72<H>  ----------------------------<  105<H>  5.5<H>   |  25  |  6.44<H>    Ca    8.8      19 Feb 2020 06:23  Phos  3.8     02-18  Mg     2.5     02-18    TPro  7.6  /  Alb  3.9  /  TBili  0.3  /  DBili  x   /  AST  14  /  ALT  20  /  AlkPhos  82  02-17    PT/INR - ( 19 Feb 2020 06:23 )   PT: 12.9 sec;   INR: 1.12 ratio         PTT - ( 17 Feb 2020 12:20 )  PTT:30.2 sec      RADIOLOGY & ADDITIONAL STUDIES:

## 2020-02-19 NOTE — PROGRESS NOTE ADULT - SUBJECTIVE AND OBJECTIVE BOX
Patient seen and examined in bed. No pain, no SOB. s/p UF yesterday with 2 kg removed. Due for HD today.      MEDICATIONS  (STANDING):  chlorhexidine 2% Cloths 1 Application(s) Topical daily  dextrose 5%. 1000 milliLiter(s) (50 mL/Hr) IV Continuous <Continuous>  dextrose 50% Injectable 12.5 Gram(s) IV Push once  dextrose 50% Injectable 25 Gram(s) IV Push once  dextrose 50% Injectable 25 Gram(s) IV Push once  influenza   Vaccine 0.5 milliLiter(s) IntraMuscular once  insulin lispro (HumaLOG) corrective regimen sliding scale   SubCutaneous three times a day before meals  midodrine. 10 milliGRAM(s) Oral <User Schedule>  sevelamer carbonate 800 milliGRAM(s) Oral three times a day with meals      VITAL:  T(C): , Max: 36.8 (02-18-20 @ 21:15)  T(F): , Max: 98.2 (02-18-20 @ 21:15)  HR: 70 (02-19-20 @ 05:28)  BP: 142/56 (02-19-20 @ 05:28)  RR: 17 (02-19-20 @ 05:28)  SpO2: 97% (02-19-20 @ 05:28)    I and O's:    02-18 @ 07:01  -  02-19 @ 07:00  --------------------------------------------------------  IN: 1140 mL / OUT: 2700 mL / NET: -1560 mL          PHYSICAL EXAM:    Constitutional: NAD  Neck:  No JVD  Respiratory: CTAB/L  Cardiovascular: S1 and S2  Gastrointestinal: BS+, soft, NT/ND  Extremities: Right TMA  Neurological: A/O x 3, no focal deficits  Psychiatric: Normal mood, normal affect  : No Raphael  Skin: No rashes  Access: Right groin nontunneled HD catheter, Left AVG, clotted    LABS:                        10.6   7.93  )-----------( 247      ( 19 Feb 2020 06:23 )             35.5     02-19    139  |  99  |  72<H>  ----------------------------<  105<H>  5.5<H>   |  25  |  6.44<H>    Ca    8.8      19 Feb 2020 06:23  Phos  3.8     02-18  Mg     2.5     02-18    TPro  7.6  /  Alb  3.9  /  TBili  0.3  /  DBili  x   /  AST  14  /  ALT  20  /  AlkPhos  82  02-17      ASSESSMENT:  69 year old female w/ PMH of ESRD on HD (MWF), HTN, HLD, DM, PVD, moderate AS, ?Afib (?on Eliquis), who presents after being sent in from dialysis center with non-functioning AVG.   (1)Renal - ESRD - HD MWF - due for HD today  (2)Hyperkalemia - renally mediated - will improve s/p HD  (3)CV - hypervolemia improving with fluid removal with HD/UF  (4)Vascular - thrombosed AV access, plan for thrombectomy in IR tomorrow    RECOMMEND:  (1)HD today as ordered; Midodrine 10 mg po pre-HD  (2)Repair of AV access per IR  (3)Dose new meds for GFR<10/HD  (4)Renal diet      Kylee Serra NP-C  [facility]  [phone] Patient seen and examined in bed. No pain, no SOB. s/p UF yesterday with 2 kg removed. Due for HD today.      MEDICATIONS  (STANDING):  chlorhexidine 2% Cloths 1 Application(s) Topical daily  dextrose 5%. 1000 milliLiter(s) (50 mL/Hr) IV Continuous <Continuous>  dextrose 50% Injectable 12.5 Gram(s) IV Push once  dextrose 50% Injectable 25 Gram(s) IV Push once  dextrose 50% Injectable 25 Gram(s) IV Push once  influenza   Vaccine 0.5 milliLiter(s) IntraMuscular once  insulin lispro (HumaLOG) corrective regimen sliding scale   SubCutaneous three times a day before meals  midodrine. 10 milliGRAM(s) Oral <User Schedule>  sevelamer carbonate 800 milliGRAM(s) Oral three times a day with meals      VITAL:  T(C): , Max: 36.8 (02-18-20 @ 21:15)  T(F): , Max: 98.2 (02-18-20 @ 21:15)  HR: 70 (02-19-20 @ 05:28)  BP: 142/56 (02-19-20 @ 05:28)  RR: 17 (02-19-20 @ 05:28)  SpO2: 97% (02-19-20 @ 05:28)    I and O's:    02-18 @ 07:01  -  02-19 @ 07:00  --------------------------------------------------------  IN: 1140 mL / OUT: 2700 mL / NET: -1560 mL          PHYSICAL EXAM:    Constitutional: NAD  Neck:  No JVD  Respiratory: CTAB/L  Cardiovascular: S1 and S2  Gastrointestinal: BS+, soft, NT/ND  Extremities: Right TMA  Neurological: A/O x 3, no focal deficits  Psychiatric: Normal mood, normal affect  : No Raphael  Skin: No rashes  Access: Right groin nontunneled HD catheter, Left AVG, clotted    LABS:                        10.6   7.93  )-----------( 247      ( 19 Feb 2020 06:23 )             35.5     02-19    139  |  99  |  72<H>  ----------------------------<  105<H>  5.5<H>   |  25  |  6.44<H>    Ca    8.8      19 Feb 2020 06:23  Phos  3.8     02-18  Mg     2.5     02-18    TPro  7.6  /  Alb  3.9  /  TBili  0.3  /  DBili  x   /  AST  14  /  ALT  20  /  AlkPhos  82  02-17      ASSESSMENT:  69 year old female w/ PMH of ESRD on HD (MWF), HTN, HLD, DM, PVD, moderate AS, ?Afib (?on Eliquis), who presents after being sent in from dialysis center with non-functioning AVG.   (1)Renal - ESRD - HD MWF - due for HD today  (2)Hyperkalemia - renally mediated - will improve s/p HD  (3)CV - hypervolemia improving with fluid removal with HD/UF  (4)Vascular - thrombosed AV access, plan for thrombectomy in IR tomorrow    RECOMMEND:  (1)HD today as ordered; Midodrine 10 mg po pre-HD  (2)Repair of AV access per IR  (3)Dose new meds for GFR<10/HD  (4)Renal diet      BREANNE Javier  [facility]  [phone]      RENAL ATTENDING NOTE  Patient seen and examined with NP, on HD. Agree with assessment and plan as above. No objection to repair of AV access tomorrow as planned    Dave Barfield MD  [facility]  [phone]

## 2020-02-20 NOTE — CHART NOTE - NSCHARTNOTEFT_GEN_A_CORE
Vascular & Interventional Radiology Post-Procedure Note    Pre-Procedure Diagnosis: ESRD  Post-Procedure Diagnosis: Same as pre.  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 thrombolysis and thrombectomy failed. Left IJ tunneled HD catheter placed.  Access (if applicable): left ij    Complications: none  Estimated Blood Loss: Minimal  Specimen: none  Contrast: 40cc  Sedation: Anesthesia  Patient Condition/Disposition: stable, 1 hour pacu, then floor    Plan:   -OK to use left IJ tunneled HD catheter  -OK to remove groin shiley  -AVF planning per vascular surgery

## 2020-02-20 NOTE — PROGRESS NOTE ADULT - SUBJECTIVE AND OBJECTIVE BOX
Interventional Radiology Pre-Procedure Note    This is a 69y F with a thrombosed right upper extremtiy AVG (brachial artery to axillary vein) presents for image guided thrombectomy/declotting.     Procedure: RUE AVG image guided thrombectomy/declotting    Diagnosis/Indication: Patient is a 69y old  Female who presents with a chief complaint of Clotted Fistula (17 Feb 2020 18:15)    PAST MEDICAL & SURGICAL HISTORY:  Hyperlipidemia  Diabetes  Hypertension  Osteomyelitis  Diabetic Neuropathy  Cellulitis of Foot  Edema of Both Legs  Diabetes: Type 2  History of Osteoarthritis  HTN - Hypertension  HLD (Hyperlipidemia)  Status post insertion of percutaneous endoscopic gastrostomy (PEG) tube  H/O tracheostomy  S/P amputation of lesser toe, right: 2013 right great toe, 2nd and 3rd right toes  S/P Amputation of Lesser Toe: Rt 1-3 metatarsal     Female    Allergies: No Known Allergies      LABS:  CBC Full  -  ( 20 Feb 2020 10:30 )  WBC Count : 9.34 K/uL  RBC Count : 3.99 M/uL  Hemoglobin : 11.9 g/dL  Hematocrit : 38.9 %  Platelet Count - Automated : 214 K/uL  Mean Cell Volume : 97.5 fl  Mean Cell Hemoglobin : 29.8 pg  Mean Cell Hemoglobin Concentration : 30.6 gm/dL      02-20    133<L>  |  95<L>  |  71<H>  ----------------------------<  102<H>  5.5<H>   |  24  |  6.10<H>    Ca    9.3      20 Feb 2020 10:30  Phos  5.4     02-20      PT/INR - ( 19 Feb 2020 14:46 )   PT: 12.2 sec;   INR: 1.07 ratio      Plan:  -RUE AVG image guided thrombectomy/declotting.  -Procedure/ risks/ benefits were explained, informed consent obtained from patient, verbalizes understanding. Interventional Radiology Pre-Procedure Note    This is a 69y F with a thrombosed right upper extremtiy AVG (brachial artery to axillary vein) presents for image guided thrombectomy/declotting.     Procedure: RUE AVG image guided thrombectomy/declotting    Diagnosis/Indication: Patient is a 69y old  Female who presents with a chief complaint of Clotted Fistula (17 Feb 2020 18:15)    PAST MEDICAL & SURGICAL HISTORY:  Hyperlipidemia  Diabetes  Hypertension  Osteomyelitis  Diabetic Neuropathy  Cellulitis of Foot  Edema of Both Legs  Diabetes: Type 2  History of Osteoarthritis  HTN - Hypertension  HLD (Hyperlipidemia)  Status post insertion of percutaneous endoscopic gastrostomy (PEG) tube  H/O tracheostomy  S/P amputation of lesser toe, right: 2013 right great toe, 2nd and 3rd right toes  S/P Amputation of Lesser Toe: Rt 1-3 metatarsal     Female    Allergies: No Known Allergies      LABS:  CBC Full  -  ( 20 Feb 2020 10:30 )  WBC Count : 9.34 K/uL  RBC Count : 3.99 M/uL  Hemoglobin : 11.9 g/dL  Hematocrit : 38.9 %  Platelet Count - Automated : 214 K/uL  Mean Cell Volume : 97.5 fl  Mean Cell Hemoglobin : 29.8 pg  Mean Cell Hemoglobin Concentration : 30.6 gm/dL      02-20    133<L>  |  95<L>  |  71<H>  ----------------------------<  102<H>  5.5<H>   |  24  |  6.10<H>    Ca    9.3      20 Feb 2020 10:30  Phos  5.4     02-20      PT/INR - ( 19 Feb 2020 14:46 )   PT: 12.2 sec;   INR: 1.07 ratio      Plan:  -RUE AVG image guided thrombectomy/declotting. HD catheter if failure  -Procedure/ risks/ benefits were explained, informed consent obtained from patient, verbalizes understanding.

## 2020-02-20 NOTE — PROGRESS NOTE ADULT - SUBJECTIVE AND OBJECTIVE BOX
No pain, no shortness of breath      VITAL:  T(C): , Max: 37 (02-20-20 @ 12:36)  T(F): , Max: 98.6 (02-20-20 @ 12:36)  HR: 76 (02-20-20 @ 12:36)  BP: 111/66 (02-20-20 @ 12:36)  RR: 18 (02-20-20 @ 12:36)  SpO2: 97% (02-20-20 @ 12:36)        PHYSICAL EXAM:  Constitutional: NAD  Neck:  No JVD  Respiratory: CTAB/L  Cardiovascular: S1 and S2  Gastrointestinal: BS+, soft, NT/ND  Extremities: Right TMA  Neurological: A/O x 3, no focal deficits  Psychiatric: Normal mood, normal affect  : No Raphael  Skin: No rashes  Access: Right groin nontunneled HD catheter, Left AVG, clotted      LABS:                        11.9   9.34  )-----------( 214      ( 20 Feb 2020 10:30 )             38.9     Na(133)/K(5.5)/Cl(95)/HCO3(24)/BUN(71)/Cr(6.10)Glu(102)/Ca(9.3)/Mg(--)/PO4(5.4)    02-20 @ 10:30  Na(139)/K(5.5)/Cl(99)/HCO3(25)/BUN(72)/Cr(6.44)Glu(105)/Ca(8.8)/Mg(--)/PO4(--)    02-19 @ 06:23  Na(139)/K(5.0)/Cl(100)/HCO3(26)/BUN(51)/Cr(5.07)Glu(174)/Ca(9.0)/Mg(2.5)/PO4(3.8)    02-18 @ 10:38      ASSESSMENT: 69F w/ HTN, DM2, PAD, AS, AFib, and ESRD-HD MWF, 2/17/20 a/w AVG thrombosis    (1)Renal - ESRD - HD MWF - dialyzed yesterday; due for next HD tomorrow  (2)Hyperkalemia - renally mediated   (3)Vascular - thrombosed AV access, plan for thrombectomy in IR today    RECOMMEND:  (1)HD tomorrow as ordered; Midodrine 10 mg po pre-HD  (2)Repair of AV access per IR  (3)Dose new meds for GFR<10/HD  (4)Renal diet            Dave Barfield MD  [facility]  [phone] No pain, no shortness of breath      VITAL:  T(C): , Max: 37 (02-20-20 @ 12:36)  T(F): , Max: 98.6 (02-20-20 @ 12:36)  HR: 76 (02-20-20 @ 12:36)  BP: 111/66 (02-20-20 @ 12:36)  RR: 18 (02-20-20 @ 12:36)  SpO2: 97% (02-20-20 @ 12:36)        PHYSICAL EXAM:  Constitutional: lethargic but alert  Neck:  No JVD  Respiratory: CTAB/L  Cardiovascular: S1 and S2  Gastrointestinal: BS+, soft, NT/ND  Extremities: Right TMA  Neurological: A/O x 3, no focal deficits  Skin: No rashes  Access: Right groin nontunneled HD catheter, Left AVG without thrill nor bruit    LABS:                        11.9   9.34  )-----------( 214      ( 20 Feb 2020 10:30 )             38.9     Na(133)/K(5.5)/Cl(95)/HCO3(24)/BUN(71)/Cr(6.10)Glu(102)/Ca(9.3)/Mg(--)/PO4(5.4)    02-20 @ 10:30  Na(139)/K(5.5)/Cl(99)/HCO3(25)/BUN(72)/Cr(6.44)Glu(105)/Ca(8.8)/Mg(--)/PO4(--)    02-19 @ 06:23  Na(139)/K(5.0)/Cl(100)/HCO3(26)/BUN(51)/Cr(5.07)Glu(174)/Ca(9.0)/Mg(2.5)/PO4(3.8)    02-18 @ 10:38      ASSESSMENT: 69F w/ HTN, DM2, PAD, AS, AFib, and ESRD-HD MWF, 2/17/20 a/w AVG thrombosis    (1)Renal - ESRD - HD MWF - dialyzed yesterday; due for next HD tomorrow  (2)Hyperkalemia - renally mediated   (3)Vascular - thrombosed AV access, plan for thrombectomy in IR today    RECOMMEND:  (1)HD tomorrow as ordered; Midodrine 10 mg po pre-HD  (2)Repair of AV access per IR  (3)Dose new meds for GFR<10/HD  (4)Renal diet            Dave Barfield MD  [facility]  [phone]

## 2020-02-20 NOTE — PROGRESS NOTE ADULT - SUBJECTIVE AND OBJECTIVE BOX
Chief complaint:pt  not  sob  no  chest  pain  for  declotting  AV  fistula  today     HPI:  69yoF w/ PMHx significant for ESRD on HD (MWF), HTN, HLD, DM, PVD, moderate AS, ?Afib (?on Eliquis), who presents after being sent in from dialysis center after AVF non-functioning and found to be "clogged." She was unable to initiate her dialysis session. She doesn't have any current complaints.     ED Presenting Vitals: 97.7F, 68bpm, 148/    ED Course: vascular and nephrology consulted (17 Feb 2020 16:09)      REVIEW OF SYSTEMS:    CONSTITUTIONAL: No fever, weight loss, or fatigue  NECK: No pain or stiffness  RESPIRATORY: No cough, wheezing, chills or hemoptysis; No shortness of breath  CARDIOVASCULAR: No chest pain, palpitations, dizziness, or leg swelling  GASTROINTESTINAL: No abdominal or epigastric pain. No nausea, vomiting, or hematemesis; No diarrhea or constipation. No melena or hematochezia.  GENITOURINARY: No dysuria, frequency, hematuria, or incontinence  NEUROLOGICAL: No headaches, memory loss, loss of strength, numbness, or tremors  SKIN: No itching, burning, rashes, or lesions   LYMPH NODES: No enlarged glands  MUSCULOSKELETAL: No joint pain or swelling; No muscle, back, or extremity pain  HEME/LYMPH: No easy bruising, or bleeding gums    MEDICATIONS  (STANDING):  chlorhexidine 2% Cloths 1 Application(s) Topical daily  dextrose 5%. 1000 milliLiter(s) (50 mL/Hr) IV Continuous <Continuous>  dextrose 50% Injectable 12.5 Gram(s) IV Push once  dextrose 50% Injectable 25 Gram(s) IV Push once  dextrose 50% Injectable 25 Gram(s) IV Push once  influenza   Vaccine 0.5 milliLiter(s) IntraMuscular once  insulin lispro (HumaLOG) corrective regimen sliding scale   SubCutaneous three times a day before meals  midodrine. 10 milliGRAM(s) Oral <User Schedule>  sevelamer carbonate 800 milliGRAM(s) Oral three times a day with meals    MEDICATIONS  (PRN):  dextrose 40% Gel 15 Gram(s) Oral once PRN Blood Glucose LESS THAN 70 milliGRAM(s)/deciliter  glucagon  Injectable 1 milliGRAM(s) IntraMuscular once PRN Glucose LESS THAN 70 milligrams/deciliter      Allergies    No Known Allergies    Intolerances        Vital Signs Last 24 Hrs  T(C): 36.4 (20 Feb 2020 05:43), Max: 36.8 (19 Feb 2020 20:43)  T(F): 97.5 (20 Feb 2020 05:43), Max: 98.2 (19 Feb 2020 20:43)  HR: 63 (20 Feb 2020 05:43) (63 - 82)  BP: 138/62 (20 Feb 2020 05:43) (116/46 - 154/68)  BP(mean): --  RR: 18 (20 Feb 2020 05:43) (16 - 18)  SpO2: 98% (20 Feb 2020 05:43) (94% - 98%)    PHYSICAL EXAM:    GENERAL: NAD, well-groomed, well-developed  HEAD:  Atraumatic, Normocephalic  EYES: EOMI, PERRLA, conjunctiva and sclera clear  ENMT: No tonsillar erythema, exudates, or enlargement; Moist mucous membranes, Good dentition, No lesions  NECK: Supple, No JVD, Normal thyroid  NERVOUS SYSTEM:  Alert & Oriented X3, Good concentration; Motor Strength 5/5 B/L upper and lower extremities; DTRs 2+ intact and symmetric  CHEST/LUNG: Clear to percussion bilaterally; No rales, rhonchi, wheezing, or rubs  HEART: Regular rate and rhythm; No murmurs, rubs, or gallops  ABDOMEN: Soft, Nontender, Nondistended; Bowel sounds present  EXTREMITIES:  2+ Peripheral Pulses, No clubbing, cyanosis, or edema  LYMPH: No lymphadenopathy noted  SKIN: No rashes or lesions      LABS:                        10.6   7.93  )-----------( 247      ( 19 Feb 2020 06:23 )             35.5     02-19    139  |  99  |  72<H>  ----------------------------<  105<H>  5.5<H>   |  25  |  6.44<H>    Ca    8.8      19 Feb 2020 06:23  Phos  3.8     02-18  Mg     2.5     02-18      PT/INR - ( 19 Feb 2020 14:46 )   PT: 12.2 sec;   INR: 1.07 ratio               RADIOLOGY & ADDITIONAL STUDIES:

## 2020-02-21 NOTE — DISCHARGE NOTE PROVIDER - PROVIDER TOKENS
PROVIDER:[TOKEN:[[id number]],FOLLOWUP:[1-3 days]],PROVIDER:[TOKEN:[[id number]],FOLLOWUP:[1 week]]

## 2020-02-21 NOTE — DISCHARGE NOTE PROVIDER - NSDCMRMEDTOKEN_GEN_ALL_CORE_FT
apixaban 2.5 mg oral tablet: 1 tab(s) orally every 12 hours  Auryxia 210 mg oral tablet: 2 tab(s) orally 3 times a day  cephalexin 250 mg oral capsule: 1 cap(s) orally every 12 hours  gabapentin 300 mg oral capsule: 1 cap(s) orally 3 times a day  guaiFENesin 100 mg/5 mL oral liquid: 5 milliliter(s) orally every 8 hours, As needed, Cough  midodrine 5 mg oral tablet: 2 tab(s) orally 3 times a day  Physical therapy:   sevelamer carbonate 800 mg oral tablet: 1 tab(s) orally 3 times a day (with meals) apixaban 2.5 mg oral tablet: 1 tab(s) orally every 12 hours ( Resume in am 2/22)  Auryxia 210 mg oral tablet: 2 tab(s) orally 3 times a day  gabapentin 300 mg oral capsule: 1 cap(s) orally 3 times a day  guaiFENesin 100 mg/5 mL oral liquid: 5 milliliter(s) orally every 8 hours, As needed, Cough  midodrine 10 mg oral tablet: 1 tab(s) orally 3 times a week before HD  sevelamer carbonate 800 mg oral tablet: 1 tab(s) orally 3 times a day (with meals)

## 2020-02-21 NOTE — CHART NOTE - NSCHARTNOTEFT_GEN_A_CORE
s/p right femoral shiley removal at 1520. catheter tip was intact, direct pressure applied by this provider for 20 mins. Right leg warm to touch and pedal pulse and right femoral pulse intact, no bleeding or hematoma noted.  Bed rest for 2 hours, will check the site for bleeding and hematoma, neurovascular check q 15 min X4 and q 30 min for X2.     Lizeth Kc NP-C  #[id number]

## 2020-02-21 NOTE — PROGRESS NOTE ADULT - PROBLEM SELECTOR PLAN 1
pt  had  a  left  subclavian cath  inserted  , not  able  to  declot  AV  fistula  . for  HD  today needs  vascular  follow  up

## 2020-02-21 NOTE — DISCHARGE NOTE PROVIDER - CARE PROVIDER_API CALL
Bryna Schmid)  Medicine  [address] [place] [address], [address]  [place] [address]  Phone: [phone]  Fax: [phone]  Follow Up Time: 1-3 days    Chandrakant Hernandes)  Vascular Surgery  [address], [address]  [place] [address]  Phone: [phone]  Fax: [phone]  Follow Up Time: 1 week

## 2020-02-21 NOTE — CHART NOTE - NSCHARTNOTEFT_GEN_A_CORE
Spoke with vascular surgery Dr. Madrid- No acute surgical intervention, follow up as outpatient with Dr. Hernandes for graft revision/excision.     Lizeth Kc NP-C  #[id number]

## 2020-02-21 NOTE — PROGRESS NOTE ADULT - SUBJECTIVE AND OBJECTIVE BOX
Patient seen and examined in bed. Right AVF thrombectomy was unsuccessful yesterday. LCW permacath was placed and Right groin shiley to be removed this AM. Due for HD today.      MEDICATIONS  (STANDING):  acetaminophen   Tablet .. 650 milliGRAM(s) Oral once  albuterol/ipratropium for Nebulization 3 milliLiter(s) Nebulizer every 12 hours  chlorhexidine 2% Cloths 1 Application(s) Topical daily  chlorhexidine 4% Liquid 1 Application(s) Topical <User Schedule>  dextrose 5%. 1000 milliLiter(s) (50 mL/Hr) IV Continuous <Continuous>  dextrose 50% Injectable 12.5 Gram(s) IV Push once  dextrose 50% Injectable 25 Gram(s) IV Push once  dextrose 50% Injectable 25 Gram(s) IV Push once  influenza   Vaccine 0.5 milliLiter(s) IntraMuscular once  insulin lispro (HumaLOG) corrective regimen sliding scale   SubCutaneous three times a day before meals  midodrine. 10 milliGRAM(s) Oral <User Schedule>  sevelamer carbonate 800 milliGRAM(s) Oral three times a day with meals      VITAL:  T(C): , Max: 37 (02-20-20 @ 12:36)  T(F): , Max: 98.6 (02-20-20 @ 12:36)  HR: 74 (02-21-20 @ 04:51)  BP: 147/62 (02-21-20 @ 04:51)  BP(mean): 78 (02-20-20 @ 19:00)  RR: 17 (02-21-20 @ 04:51)  SpO2: 97% (02-21-20 @ 04:51)        PHYSICAL EXAM:    Constitutional: NAD  Neck:  No JVD  Respiratory: CTAB/L  Cardiovascular: S1 and S2  Gastrointestinal: BS+, soft, NT/ND  Extremities: No peripheral edema  Neurological: A/O x 3, no focal deficits  Psychiatric: Normal mood, normal affect  : No Raphael  Skin: No rashes  Access: LCW permacath, Right AVF, no thrill    LABS:                        11.1   10.06 )-----------( 217      ( 21 Feb 2020 06:28 )             34.6     02-21    136  |  98  |  86<H>  ----------------------------<  76  5.7<H>   |  21<L>  |  7.34<H>    Ca    8.8      21 Feb 2020 06:26  Phos  6.2     02-21  Mg     2.7     02-21      ASSESSMENT: 69F w/ HTN, DM2, PAD, AS, AFib, and ESRD-HD MWF, 2/17/20 a/w AVG thrombosis    (1)Renal - ESRD - HD MWF - due for HD today  (2)Hyperkalemia - renally mediated, will improve s/p HD  (3)Vascular - thrombosed RUE AV access, thrombectomy yesterday in IR unsuccessful, now with new LCW permacath, with planning for new AV access as outpatient    RECOMMEND:  (1)HD today as ordered (with new tunneled catheter); Midodrine 10 mg po pre-HD  (2)Dose new meds for GFR<10/HD  (3)Renal diet      SHOLA JavierC  [facility]  [phone] Patient seen and examined in bed. Right AVF thrombectomy was unsuccessful yesterday. LCW permacath was placed and Right groin shiley to be removed this AM. Due for HD today.      MEDICATIONS  (STANDING):  acetaminophen   Tablet .. 650 milliGRAM(s) Oral once  albuterol/ipratropium for Nebulization 3 milliLiter(s) Nebulizer every 12 hours  chlorhexidine 2% Cloths 1 Application(s) Topical daily  chlorhexidine 4% Liquid 1 Application(s) Topical <User Schedule>  dextrose 5%. 1000 milliLiter(s) (50 mL/Hr) IV Continuous <Continuous>  dextrose 50% Injectable 12.5 Gram(s) IV Push once  dextrose 50% Injectable 25 Gram(s) IV Push once  dextrose 50% Injectable 25 Gram(s) IV Push once  influenza   Vaccine 0.5 milliLiter(s) IntraMuscular once  insulin lispro (HumaLOG) corrective regimen sliding scale   SubCutaneous three times a day before meals  midodrine. 10 milliGRAM(s) Oral <User Schedule>  sevelamer carbonate 800 milliGRAM(s) Oral three times a day with meals      VITAL:  T(C): , Max: 37 (02-20-20 @ 12:36)  T(F): , Max: 98.6 (02-20-20 @ 12:36)  HR: 74 (02-21-20 @ 04:51)  BP: 147/62 (02-21-20 @ 04:51)  BP(mean): 78 (02-20-20 @ 19:00)  RR: 17 (02-21-20 @ 04:51)  SpO2: 97% (02-21-20 @ 04:51)        PHYSICAL EXAM:    Constitutional: NAD  Neck:  No JVD  Respiratory: CTAB/L  Cardiovascular: S1 and S2  Gastrointestinal: BS+, soft, NT/ND  Extremities: No peripheral edema  Neurological: A/O x 3, no focal deficits  Psychiatric: Normal mood, normal affect  : No Raphael  Skin: No rashes  Access: LCW permacath, Right AVF, no thrill    LABS:                        11.1   10.06 )-----------( 217      ( 21 Feb 2020 06:28 )             34.6     02-21    136  |  98  |  86<H>  ----------------------------<  76  5.7<H>   |  21<L>  |  7.34<H>    Ca    8.8      21 Feb 2020 06:26  Phos  6.2     02-21  Mg     2.7     02-21      ASSESSMENT: 69F w/ HTN, DM2, PAD, AS, AFib, and ESRD-HD MWF, 2/17/20 a/w AVG thrombosis    (1)Renal - ESRD - HD MWF - due for HD today  (2)Hyperkalemia - renally mediated, will improve s/p HD  (3)Vascular - thrombosed RUE AV access, thrombectomy yesterday in IR unsuccessful, now with new LCW permacath, with planning for new AV access as outpatient    RECOMMEND:  (1)HD today as ordered (with new tunneled catheter); Midodrine 10 mg po pre-HD  (2)Dose new meds for GFR<10/HD  (3)Renal diet      BREANNE Javier  [facility]  [phone]      RENAL ATTENDING NOTE  Patient seen and examined with NP. Agree with assessment and plan as above.     Failed thrombectomy yesterday. Tunneled cath in place and being used now for HD. Can have shiley removed after. No objection to discharge if no further complications ensue.      Dave Barfield MD  [facility]  [phone]

## 2020-02-21 NOTE — DISCHARGE NOTE NURSING/CASE MANAGEMENT/SOCIAL WORK - PATIENT PORTAL LINK FT
You can access the FollowMyHealth Patient Portal offered by [facility] by registering at the following website: [email or web address]. By joining [employer]’s FollowMyHealth portal, you will also be able to view your health information using other applications (apps) compatible with our system.

## 2020-02-21 NOTE — DISCHARGE NOTE PROVIDER - NSDCFUADDINST_GEN_ALL_CORE_FT
No strenuous exercise for one week   Make appointments to follow up with your out patient physicians.  Bring all discharge paperwork including discharge medication list to your follow up appointments.

## 2020-02-21 NOTE — PROGRESS NOTE ADULT - SUBJECTIVE AND OBJECTIVE BOX
Chief complaint:pt  had  a  left  subclavian cath  inserted  , not  able  to  declot  AV  fistula  . for  HD  today    HPI:  69yoF w/ PMHx significant for ESRD on HD (MWF), HTN, HLD, DM, PVD, moderate AS, ?Afib (?on Eliquis), who presents after being sent in from dialysis center after AVF non-functioning and found to be "clogged." She was unable to initiate her dialysis session. She doesn't have any current complaints.     ED Presenting Vitals: 97.7F, 68bpm, 148/    ED Course: vascular and nephrology consulted (17 Feb 2020 16:09)      REVIEW OF SYSTEMS:    CONSTITUTIONAL: No fever, weight loss, or fatigue  NECK: No pain or stiffness  RESPIRATORY: No cough, wheezing, chills or hemoptysis; No shortness of breath  CARDIOVASCULAR: No chest pain, palpitations, dizziness, or leg swelling  GASTROINTESTINAL: No abdominal or epigastric pain. No nausea, vomiting, or hematemesis; No diarrhea or constipation. No melena or hematochezia.  GENITOURINARY: No dysuria, frequency, hematuria, or incontinence  NEUROLOGICAL: No headaches, memory loss, loss of strength, numbness, or tremors  SKIN: No itching, burning, rashes, or lesions   LYMPH NODES: No enlarged glands  MUSCULOSKELETAL: No joint pain or swelling; No muscle, back, or extremity pain  HEME/LYMPH: No easy bruising, or bleeding gums    MEDICATIONS  (STANDING):  acetaminophen   Tablet .. 650 milliGRAM(s) Oral once  albuterol/ipratropium for Nebulization 3 milliLiter(s) Nebulizer every 12 hours  chlorhexidine 2% Cloths 1 Application(s) Topical daily  chlorhexidine 4% Liquid 1 Application(s) Topical <User Schedule>  dextrose 5%. 1000 milliLiter(s) (50 mL/Hr) IV Continuous <Continuous>  dextrose 50% Injectable 12.5 Gram(s) IV Push once  dextrose 50% Injectable 25 Gram(s) IV Push once  dextrose 50% Injectable 25 Gram(s) IV Push once  influenza   Vaccine 0.5 milliLiter(s) IntraMuscular once  insulin lispro (HumaLOG) corrective regimen sliding scale   SubCutaneous three times a day before meals  midodrine. 10 milliGRAM(s) Oral <User Schedule>  sevelamer carbonate 800 milliGRAM(s) Oral three times a day with meals    MEDICATIONS  (PRN):  dextrose 40% Gel 15 Gram(s) Oral once PRN Blood Glucose LESS THAN 70 milliGRAM(s)/deciliter  glucagon  Injectable 1 milliGRAM(s) IntraMuscular once PRN Glucose LESS THAN 70 milligrams/deciliter  sodium chloride 0.9% lock flush 10 milliLiter(s) IV Push every 1 hour PRN Pre/post blood products, medications, blood draw, and to maintain line patency      Allergies    No Known Allergies    Intolerances        Vital Signs Last 24 Hrs  T(C): 36.7 (21 Feb 2020 04:51), Max: 37 (20 Feb 2020 12:36)  T(F): 98.1 (21 Feb 2020 04:51), Max: 98.6 (20 Feb 2020 12:36)  HR: 74 (21 Feb 2020 04:51) (74 - 92)  BP: 147/62 (21 Feb 2020 04:51) (104/53 - 147/62)  BP(mean): 78 (20 Feb 2020 19:00) (71 - 89)  RR: 17 (21 Feb 2020 04:51) (16 - 18)  SpO2: 97% (21 Feb 2020 04:51) (96% - 100%)    PHYSICAL EXAM:    GENERAL: NAD, well-groomed, well-developed  HEAD:  Atraumatic, Normocephalic  EYES: EOMI, PERRLA, conjunctiva and sclera clear  ENMT: No tonsillar erythema, exudates, or enlargement; Moist mucous membranes, Good dentition, No lesions  NECK: Supple, No JVD, Normal thyroid  NERVOUS SYSTEM:  Alert & Oriented X3, Good concentration; Motor Strength 5/5 B/L upper and lower extremities; DTRs 2+ intact and symmetric  CHEST/LUNG: Clear to percussion bilaterally; No rales, rhonchi, wheezing, or rubs  HEART: Regular rate and rhythm; No murmurs, rubs, or gallops  ABDOMEN: Soft, Nontender, Nondistended; Bowel sounds present  EXTREMITIES:  2+ Peripheral Pulses, No clubbing, cyanosis, or edema  LYMPH: No lymphadenopathy noted  SKIN: No rashes or lesions      LABS:                        11.1   10.06 )-----------( 217      ( 21 Feb 2020 06:28 )             34.6     02-21    136  |  98  |  86<H>  ----------------------------<  76  5.7<H>   |  21<L>  |  7.34<H>    Ca    8.8      21 Feb 2020 06:26  Phos  6.2     02-21  Mg     2.7     02-21      PT/INR - ( 19 Feb 2020 14:46 )   PT: 12.2 sec;   INR: 1.07 ratio               RADIOLOGY & ADDITIONAL STUDIES:

## 2020-02-21 NOTE — DISCHARGE NOTE PROVIDER - HOSPITAL COURSE
69yoF w/ PMHx significant for ESRD on HD (MWF), HTN, HLD, DM, PVD, moderate AS, ?Afib (?on Eliquis), who presents after being sent in from dialysis center after AVF non-functioning and found to be "clogged." Failed thrombectomy 2/20/20 by IR. Tunneled cath in place and being used now for HD. s/p right femoral shiley removed.  No surgical plan during this hospitalization as per vascular surgery. No objection to discharge as per renal .     Medically cleared by Dr. Schmid

## 2020-02-21 NOTE — DISCHARGE NOTE PROVIDER - NSDCCPCAREPLAN_GEN_ALL_CORE_FT
PRINCIPAL DISCHARGE DIAGNOSIS  Diagnosis: Thrombosis of kidney dialysis arteriovenous graft, initial encounter  Assessment and Plan of Treatment: s/p thrombectomy by IR 2/20/20 - failed  Now you have permacath  You were seen by vascular surgeon - No acute surgical intervention, recommended during this hospitalization.   Please follow up with Dr. Hernandes for graft revision/excision   Please follow up with your primray care physician in one wek      SECONDARY DISCHARGE DIAGNOSES  Diagnosis: Hyperkalemia  Assessment and Plan of Treatment: Resolves after hemodyalysis   s/p dialysis today 2/21      Diagnosis: ESRD (end stage renal disease)  Assessment and Plan of Treatment: Please continue HD as scheduled  Please follow up with your nephrologist in one week PRINCIPAL DISCHARGE DIAGNOSIS  Diagnosis: Thrombosis of kidney dialysis arteriovenous graft, initial encounter  Assessment and Plan of Treatment: s/p thrombectomy by IR 2/20/20 - failed  Now you have permacath  You were seen by vascular surgeon - No acute surgical intervention, recommended during this hospitalization.   Please follow up with Dr. Hernandes for graft revision/excision   Please follow up with your primray care physician in one week  Keep right arm dressing clean and intact until you see your docor ( currently not bleeding no hematoma noted)      SECONDARY DISCHARGE DIAGNOSES  Diagnosis: Hyperkalemia  Assessment and Plan of Treatment: Resolves after hemodyalysis   s/p dialysis today 2/21      Diagnosis: ESRD (end stage renal disease)  Assessment and Plan of Treatment: Please continue HD as scheduled  Please follow up with your nephrologist in one week  s/p right femoral shiley removal today  No Eliquis tonight, resume Eliquis in am ( 2/22)  Monitor right groin for bleeding or hematoma, return to ER if bleeding or hematoma noted

## 2020-03-03 NOTE — HISTORY OF PRESENT ILLNESS
[FreeTextEntry1] : 68 yo female with history of esrd on hd via permcath with thrombosed right upper extremity avg presents for follow up and vein mapping of the left upper extremity planning new access

## 2020-03-03 NOTE — DISCUSSION/SUMMARY
Cora Nava is a 78year old female. Patient presents with:  Throat Problem: Sore throat, cough x 5 days.  Patient was Dx hemoptysis 11/2018      HISTORY OF PRESENT ILLNESS  Seen by Dr. Cristina Steel for hemoptysis back in November 2018 and noted to have a possi [FreeTextEntry1] : 68 yo female with history of esrd on hd via permcath with thrombosed right upper extremity avg presents for follow up and vein mapping of the left upper extremity planning new access\par vein mapping shows thickened small cephalic vein in the left upper extremity with large basilic of the left upper extremity \par will arrange for left upper extremity basilic transposition  Right knee DJD 2017 12/19/17-xray R knee--narrowing of medial joint space and small joint effusion. Saw Dr Cisco Weir.    • Vitamin D deficiency 2013       Past Surgical History:   Procedure Laterality Date   • CHEMOTHERAPY  1997   • COLONOSCOPY  12/2011 Inspection - Right: Normal, Left: Normal. Canal - Right: Normal, Left: Normal. TM - Right: Normal, Left: Normal.   Skin Normal Inspection - Normal.        Lymph Detail Normal Submental. Submandibular. Anterior cervical. Posterior cervical. Supraclavicular. sprays by Nasal route 2 (two) times daily. , Disp: 1 Bottle, Rfl: 0  •  Calcium Carbonate-Vitamin D (CALCIUM + D OR), Take 1 tablet by mouth daily. , Disp: , Rfl:   •  Triamterene-HCTZ 37.5-25 MG Oral Tab, Take 1 tablet by mouth once daily. , Disp: 90 tablet,

## 2020-05-04 NOTE — DISCHARGE NOTE ADULT - NS AS DC AMI YN
no Burow's Graft Text: The defect edges were debeveled with a #15 scalpel blade.  Given the location of the defect, shape of the defect, the proximity to free margins and the presence of a standing cone deformity a Burow's skin graft was deemed most appropriate. The standing cone was removed and this tissue was then trimmed to the shape of the primary defect. The adipose tissue was also removed until only dermis and epidermis were left.  The skin margins of the secondary defect were undermined to an appropriate distance in all directions utilizing iris scissors.  The secondary defect was closed with interrupted buried subcutaneous sutures.  The skin edges were then re-apposed with running  sutures.  The skin graft was then placed in the primary defect and oriented appropriately.

## 2020-05-15 NOTE — ED PROVIDER NOTE - PROGRESS NOTE DETAILS
Spoke with Podiatry resident - will come see patient in ED podiatry seen, will ephraim site and reeval in the AM for spread for necrosis vs cellulitis vs hematoma

## 2020-05-15 NOTE — ED ADULT TRIAGE NOTE - CHIEF COMPLAINT QUOTE
"left dark toe" history of diabetes & dialysis "left dark toe" history of diabetes & dialysis (MD Walters-podiatrist)

## 2020-05-15 NOTE — CONSULT NOTE ADULT - ASSESSMENT
69 y/o F p/w L dorsal hallux ecchymosis w/ cellulitis to midfoot    - pt seen and evaluated, WBC 13  - LFXR neg for fx or dislocations  - L dorsal hallux ecchymosis w/ cellulitis to dorsal midfoot, no open lesions, no fluctuance or crepitation, warm to touch, - TTP  - aseptic aspiration performed at apex of ecchymotic lesion utilizing sterile 18 gauge needle, no purulence or hematoma noted  - redressed w/ DSD  - rec admit to CDU for IV abx - Clinda   - will f/u sandra and WBC in AM  - discussed w/ attending

## 2020-05-15 NOTE — ED PROVIDER NOTE - CLINICAL SUMMARY MEDICAL DECISION MAKING FREE TEXT BOX
69 y/o F p/w ecchymosis on dorsum of L great toe. Denies pain or trauma. DDx: fracture vs infection. No suspicion for acute vascular insufficiency.

## 2020-05-15 NOTE — ED PROVIDER NOTE - OBJECTIVE STATEMENT
69 y/o F with pmhx of dm and ESRD on hemodialysis (last had this morning) and hx of amputation of R toes presents after noticing bruising on L great toe. Denies any pain to toe. No trauma or injury to toe. Denies fever, chills, cp, sob, abdominal pain, n/v/d, calf pain.  No h/o MI, strokes, or cancer. 69 y/o F with pmhx of dm and ESRD on hemodialysis (last had this morning) and hx of amputation of R toes presents after noticing bruising on L great toe today. Denies any pain to toe. No trauma or injury to toe. Denies fever, chills, cp, sob, abdominal pain, n/v/d, calf pain.  No h/o MI, strokes, or cancer. 71 y/o F with pmhx of dm and ESRD on hemodialysis (last had this morning) and hx of amputation of R toes presents after noticing bruising on L great toe today. Denies any pain to toe. No trauma or injury to toe. Denies fever, chills, cp, sob, abdominal pain, n/v/d, calf pain.  No h/o MI, strokes, or cancer.    ----------------------------------------------------------------  70y F PMH ESRD (on HD MWF), HTN, HLD, DM, PVD, hx of amputation of right toes presenting with bruising on left great toe today. Pt states she did not notice any change in appearance until today. Pt denies any pain or trauma/injury. Pt denies any fever/chills, chest pain/palpitations, SOB, abdominal pain, N/V/D, calf tenderness.  -Edvin Medrano PA-C see above

## 2020-05-15 NOTE — ED PROVIDER NOTE - PHYSICAL EXAMINATION
GEN: NAD, awake, eyes open spontaneously  Eyes: clear b/l, pupils equal/round/reactive to light  CHEST/LUNGS: clear breath sounds,   CARDIAC: Non-tachycardic, normal perfusion  ABDOMEN: Soft, NTND, No rebound/guarding  MSK: No edema, no gross deformity of extremities  SKIN: ecchymosis/edema of left great toe; b/l pitting edema of feet/ankles. Amputation of toes of right foot;  PSYCH: Alert, appropriate, cooperative  -Edvin Medrano PA-C see above

## 2020-05-15 NOTE — ED ADULT NURSE NOTE - OBJECTIVE STATEMENT
Patient is a 70 year old female complaining of discoloration to left great toe. Patient has history of dm, esrd on dialysis and went today, goes m,w,t,f, pt has left chest wall cath. pt has old fistula on right arm. htn, hld, right toes amputations. Patient is A&O x 4. Pt reports discoloration starting today, denies trauma or pain to foot or toe. pt has edema to b/l legs.  Denies complaints of chest pain, sob, fevers, chills, n/v/d, headache, syncope, burning urination, blood in urine, blood in stool. Abd is soft, non tender, non distended. Skin is warm and dry. Color is consistent with ethnicity. VSS/ NAD. Safety and comfort maintained. Will continue to monitor.

## 2020-05-15 NOTE — ED PROVIDER NOTE - NS ED ROS FT
Constitutional: No fever/chills  Cardiovascular: No chest pain, palpitations  Respiratory: no chest pain/SOB or difficulty breathing  GI: no abdominal pain, N/V/D  Skin: bruising of left great toe  -Edvin Medrano PA-C see above

## 2020-05-15 NOTE — ED PROVIDER NOTE - MUSCULOSKELETAL MINIMAL EXAM
Amputation of all digits on R foot. L foot ecchymosis on dorsum of L toe without deformity, good capillary refill. No ascending lymphangitis. Pitting edema bilateral ankles.

## 2020-05-15 NOTE — CONSULT NOTE ADULT - SUBJECTIVE AND OBJECTIVE BOX
Attending:    Patient is a 70y old  Female who presents with a chief complaint of L hallux ecchymosis     HPI:  69 y/o F with pmhx of dm and ESRD on hemodialysis (last had this morning) and hx of amputation of R toes presents after noticing bruising on L great toe today. Denies any pain to toe. No trauma or injury to toe. Denies fever, chills, cp, sob, abdominal pain, n/v/d, calf pain.  No h/o MI, strokes, or cancer.    Review of systems negative except per HPI    PAST MEDICAL & SURGICAL HISTORY:  Hyperlipidemia  Diabetes  Hypertension  Osteomyelitis  Diabetic Neuropathy  Cellulitis of Foot  Edema of Both Legs  Diabetes: Type 2  History of Osteoarthritis  HTN - Hypertension  HLD (Hyperlipidemia)  Status post insertion of percutaneous endoscopic gastrostomy (PEG) tube  H/O tracheostomy  S/P amputation of lesser toe, right: 2013 right great toe, 2nd and 3rd right toes  S/P Amputation of Lesser Toe: Rt 1-3 metatarsal    Home Medications:  apixaban 2.5 mg oral tablet: 1 tab(s) orally every 12 hours ( Resume in am 2/22) (21 Feb 2020 16:25)  Auryxia 210 mg oral tablet: 2 tab(s) orally 3 times a day (28 Jan 2020 22:09)  gabapentin 300 mg oral capsule: 1 cap(s) orally 3 times a day (28 Jan 2020 22:09)  guaiFENesin 100 mg/5 mL oral liquid: 5 milliliter(s) orally every 8 hours, As needed, Cough (30 Jan 2020 13:32)  midodrine 10 mg oral tablet: 1 tab(s) orally 3 times a week before HD (21 Feb 2020 16:25)  sevelamer carbonate 800 mg oral tablet: 1 tab(s) orally 3 times a day (with meals) (30 Jan 2020 13:33)    Allergies    No Known Allergies    Intolerances      FAMILY HISTORY:  No pertinent family history in first degree relatives    Social History:       LABS                        12.8   13.47 )-----------( 296      ( 15 May 2020 23:06 )             41.0     05-15    140  |  95<L>  |  24<H>  ----------------------------<  121<H>  3.7   |  27  |  3.06<H>    Ca    9.7      15 May 2020 23:06    TPro  8.1  /  Alb  4.0  /  TBili  0.2  /  DBili  x   /  AST  15  /  ALT  12  /  AlkPhos  84  05-15        Vital Signs Last 24 Hrs  T(C): 37.4 (15 May 2020 23:50), Max: 37.4 (15 May 2020 23:50)  T(F): 99.3 (15 May 2020 23:50), Max: 99.3 (15 May 2020 23:50)  HR: 74 (15 May 2020 23:50) (74 - 82)  BP: 108/45 (15 May 2020 23:50) (108/45 - 131/57)  BP(mean): --  RR: 18 (15 May 2020 23:50) (16 - 18)  SpO2: 95% (15 May 2020 23:50) (94% - 95%)    PHYSICAL EXAM  General: NAD, AA0x3    Lower Extremity Focused:  Vasc: DP 1/4 on L, PT non-palpable, TG warm to warm on L, CFT intact, +2 pitting edema to LLE  Neuro: Sensation diminished to level of ankle b/l   MSK: s/p RF TMA  Derm:  L dorsal hallux ecchymosis w/ cellulitis to dorsal midfoot, no open lesions, no fluctuance or crepitation, warm to touch, - TTP      RADIOLOGY    < from: Xray Foot AP + Lateral + Oblique, Left (05.15.20 @ 21:12) >    EXAM:  FOOT COMPLETE LEFT (MIN 3 VIEWS)                            PROCEDURE DATE:  05/15/2020            INTERPRETATION:  CLINICAL INFORMATION: Black first left metatarsal.    EXAM: 3 views of the left  foot with prior comparison from 9/15/2016.    FINDINGS:  Generalized bone demineralization limits osseous evaluation.    No cortical erosions or periosteal reactions to suggest osteomyelitis. No acute fracture or dislocation.    There is soft tissue swelling greatest at the lower leg anteriorlyand dorsally at the foot.    Plantar calcaneal enthesophyte.    Arterial calcifications.    IMPRESSION:  Soft tissue swelling greatest at the lower leg anteriorly and dorsally at the foot. No radiographic evidence of osteomyelitis. No acute fracture or dislocation.                    TITUS SAMSON M.D., RADIOLOGY RESIDENT  This document has been electronically signed.  YONNY ELDRIDGE M.D. ATTENDING RADIOLOGIST  This document has been electronically signed. May 15 2020  9:31PM        < end of copied text >

## 2020-05-16 NOTE — ED CDU PROVIDER DISPOSITION NOTE - CARE PROVIDER_API CALL
Thomas Dumont  PODIATRIC MEDICINE AND SURGERY  [address] [address] [place] [address]  Phone: [phone]  Fax: [phone]  Follow Up Time: 1-3 Days

## 2020-05-16 NOTE — ED CDU PROVIDER SUBSEQUENT DAY NOTE - HISTORY
No interval changes since initial CDU provider note. Pt feels well without complaint. denies any L foot pain. NAD, VSS. Dressing c/d/i on L toe/foot. podiatry following and will re-eval in AM. pt got nola/zosyn in ED, podiatry recommending clinda, will give clinda and continue monitoring.  Patrick Morelos

## 2020-05-16 NOTE — ED CDU PROVIDER SUBSEQUENT DAY NOTE - MEDICAL DECISION MAKING DETAILS
69 y/o female with multiple medical issues on dialysis. Was admitted to CDU for cellulitis of left great toe. Had IV antibiotics overnight. COVID negative. Seen by podiatry this morning. Wound looks good no fever. PAtient asymptomatic. Will discharge home on clindamycin and outpatient podiatry f/u. Patient stable for discharge ~ZR

## 2020-05-16 NOTE — ED ADULT NURSE REASSESSMENT NOTE - NS ED NURSE REASSESS COMMENT FT1
Pt received from MIGUE Butts. Pt oriented to CDU & plan of care was discussed. Pt A&O x 4. Pt in CDU for IV abx, and podiatry consults. Pt has bruising to Pt denies any chest pain, SOB, dizziness or palpitations. Safety & comfort measures maintained. Call bell in reach. Will continue to monitor. Pt received from MIGUE Butts. Pt oriented to CDU & plan of care was discussed. Pt A&O x 4. Pt in CDU for IV abx, and podiatry consults. Pt has bruising to left great toe, area covered with dressing. Pt R toes are amputated. Pt denies any left foot/great toe pain. Pt resting in bed. Safety & comfort measures maintained. Call bell in reach. Will continue to monitor.

## 2020-05-16 NOTE — ED CDU PROVIDER DISPOSITION NOTE - PATIENT PORTAL LINK FT
You can access the FollowMyHealth Patient Portal offered by [facility] by registering at the following website: [email or web address]. By joining [employer]’s FollowMyHealth portal, you will also be able to view your health information using other applications (apps) compatible with our system.

## 2020-05-16 NOTE — ED CDU PROVIDER DISPOSITION NOTE - CLINICAL COURSE
71y/o F with PMH ESRD on HD (MWF), HTN, HLD, DM, PVD, hx of amputation of right toes presenting with bruising on left great toe today. Pt states she did not notice any change in appearance until today. Pt denies any pain or trauma/injury. Pt denies any fever/chills, cough, chest pain/palpitations, SOB, abdominal pain, N/V/D, calf tenderness, COVID exposure.  In ED, WBC 13.47, AG 18, Cr 3.06, glu 121, xray L foot showed soft tissue swelling, no osteo, no fx. pt started on vanc/zosyn. podiatry consulted, recommended clinda and will re-eval in AM. pt sent to CDU for continued treatment and monitoring.   In CDU, ___________ 69y/o F with PMH ESRD on HD (MWF), HTN, HLD, DM, PVD, hx of amputation of right toes presenting with bruising on left great toe today. Pt states she did not notice any change in appearance until today. Pt denies any pain or trauma/injury. Pt denies any fever/chills, cough, chest pain/palpitations, SOB, abdominal pain, N/V/D, calf tenderness, COVID exposure.  In ED, WBC 13.47, AG 18, Cr 3.06, glu 121, xray L foot showed soft tissue swelling, no osteo, no fx. pt started on vanc/zosyn. podiatry consulted, recommended clinda and will re-eval in AM. pt sent to CDU for continued treatment and monitoring.   In CDU, patient remained stable and afebrile overnight. Re-evaluated by podiatry in AM who felt infected site has improved and patient cleared for discharge from podiatry perspective with outpatient f/u this week. WBC downtrended. Seen by ED attending Dr. Sellers who cleared pt for discharge home.

## 2020-05-16 NOTE — ED CDU PROVIDER INITIAL DAY NOTE - DETAILS
-IV ABX  -PODIATRY FOLLOWING, RE-EVAL IN AM  -[facility] EVAL  -CASE D/W ATTENDING Dr. Farias

## 2020-05-16 NOTE — ED CDU PROVIDER INITIAL DAY NOTE - OBJECTIVE STATEMENT
71y/o F with PMH ESRD on HD (MWF), HTN, HLD, DM, PVD, hx of amputation of right toes presenting with bruising on left great toe today. Pt states she did not notice any change in appearance until today. Pt denies any pain or trauma/injury. Pt denies any fever/chills, cough, chest pain/palpitations, SOB, abdominal pain, N/V/D, calf tenderness, COVID exposure.  In ED, WBC 13.47, AG 18, Cr 3.06, glu 121, xray L foot showed soft tissue swelling, no osteo, no fx. pt started on vanc/zosyn. podiatry consulted, recommended clinda and will re-eval in AM. pt sent to CDU for continued treatment and monitoring.

## 2020-05-16 NOTE — PROGRESS NOTE ADULT - SUBJECTIVE AND OBJECTIVE BOX
Patient is a 70y old  Female who presents with a chief complaint of      INTERVAL HPI/OVERNIGHT EVENTS:  Patient seen and evaluated at bedside.  Pt is resting comfortable in NAD. Denies N/V/F/C.   Allergies    No Known Allergies    Intolerances        Vital Signs Last 24 Hrs  T(C): 37.3 (16 May 2020 03:20), Max: 37.4 (15 May 2020 23:50)  T(F): 99.1 (16 May 2020 03:20), Max: 99.3 (15 May 2020 23:50)  HR: 67 (16 May 2020 03:20) (67 - 82)  BP: 107/67 (16 May 2020 03:20) (107/67 - 131/57)  BP(mean): --  RR: 18 (16 May 2020 03:20) (16 - 18)  SpO2: 95% (16 May 2020 03:20) (94% - 95%)    LABS:                        10.5   11.50 )-----------( 257      ( 16 May 2020 06:55 )             34.3     05-16    135  |  96  |  30<H>  ----------------------------<  84  3.5   |  27  |  3.73<H>    Ca    8.7      16 May 2020 06:25    TPro  8.1  /  Alb  4.0  /  TBili  0.2  /  DBili  x   /  AST  15  /  ALT  12  /  AlkPhos  84  05-15        CAPILLARY BLOOD GLUCOSE      POCT Blood Glucose.: 104 mg/dL (16 May 2020 04:00)      Lower Extremity Physical Exam:  Vasc: DP 1/4 on L, PT non-palpable, TG warm to warm on L, CFT intact, +2 pitting edema to LLE  Neuro: Sensation diminished to level of ankle b/l   MSK: s/p RF TMA  Derm:  L dorsal hallux ecchymosis w/ cellulitis resolving to hallux, no open lesions, no fluctuance or crepitation, warm to touch, - TTP

## 2020-05-16 NOTE — ED CDU PROVIDER INITIAL DAY NOTE - MEDICAL DECISION MAKING DETAILS
pt with ecchymosis and early cellulitis left great toe dorsum with erythema and increased warmth.  IV ABx, Podiatry consult and reassess.

## 2020-05-16 NOTE — ED CDU PROVIDER DISPOSITION NOTE - NSFOLLOWUPINSTRUCTIONS_ED_ALL_ED_FT
1. Rest and apply warm compresses to affected area. Elevate affected leg.  2. Take Clindamycin as prescribed. Take Tylenol 650mg every 6-8 hours as needed for pain.  3. Follow up with your PMD and your podiatrist or our podiatry clinic within 48-72hours.   4. Any worsening redness, swelling, streaking (red lines), fever, chills return to the ER. 1. Rest and apply warm compresses to affected area. Elevate affected leg.  2. Take Clindamycin as prescribed. Take Tylenol 650mg every 6-8 hours as needed for pain.  3. Follow up with your PMD and your podiatrist or our podiatry clinic within 48-72hours.   4. Any worsening redness, swelling, streaking (red lines), fever, chills return to the ER.  5. Follow up with podiatrist Dr. Dumont this week. Call [phone] Monday to make an appointment

## 2020-05-16 NOTE — ED ADULT NURSE REASSESSMENT NOTE - PERIPHERAL PULSES
Refused; Disp Refills Start End    albuterol-ipratropium 2.5 mg/0.5 mg (DUONEB) 0.5-2.5 (3) MG/3ML nebulizer solution 2160 mL 5 9/21/2017     Sig - Route: Take 3 mLs by nebulization 2 times daily. - Nebulization    Class: Eprescribe    Notes to Pharmacy: **Patient requests 90 days supply**    E-Prescribing Status: Receipt confirmed by pharmacy (9/21/2017 Â 1:28 PM CDT)      Not due for refills. Notified patient who said they told her this morning she did not have enough left. I had already confirmed with pharmacy patient had refills left. Patient will call pharmacy again. equal bilaterally

## 2020-05-16 NOTE — PROGRESS NOTE ADULT - ASSESSMENT
71 y/o F p/w L dorsal hallux ecchymosis w/ cellulitis resolving to hallux     - pt seen and evaluated  - WBC trending down to 11  - LFXR neg for fx or dislocations  - L dorsal hallux ecchymosis w/ cellulitis resolving to hallux, no open lesions, no fluctuance or crepitation, warm to touch, - TTP  - redressed w/ DSD  - pod stable for d/c on 10 days PO clinda   - return to ER immediately for worsening appearance or if pt starts to experience constitutional symptoms   - to follow up w/ in 7 days of discharge with Dr. Dumont. Please call [phone] to schedule an appointment   - discussed w/ attending

## 2020-06-13 NOTE — ED PROVIDER NOTE - PROGRESS NOTE DETAILS
Attending Macrina:  pt found to have mesenteric ischemia, vasc was consulted, requested heparin gtt initially. however after re evaluation they felt pt needed to go to OR and requeseted it be cancelled. pt given meds for pain control Attending Macrina:  Discussed w/ surgery team regarding disposition. Vasc fellow is coming into see pt. The surgical attending has been made aware of the patient .Pt remains hd stable. Attending Macrina:  discussed w/ surgery. admit to dr peña, order placed

## 2020-06-13 NOTE — CONSULT NOTE ADULT - PROBLEM SELECTOR RECOMMENDATION 4
Introduced self, explained my role. Discussed at length about the medical condition, issues and the prognosis, Questions/concerns addressed. Emotional support provided.     Patient remains full code at this point.     Will continue to follow Introduced self, explained my role. Discussed at length about the medical condition, issues and the prognosis, Questions/concerns addressed. Emotional support provided.     No changes have been made to code status at this point; the remains full code at this point.     Will continue to follow

## 2020-06-13 NOTE — H&P ADULT - HISTORY OF PRESENT ILLNESS
Patient is a 70y old  Female who presents with a chief complaint of abdominal pain    HPI:  Annette is a 70 year old lady with history of ESRD on HD (MWF), Afib on Eliquis, HTN, HLD, DM, PVD, moderate AS, known to the vascular surgery service, now presenting c/o abdominal pain. Patient is a poor historian due to current state of pain and language barrier, despite using Farsi . Collateral history obtained from patients son. Patient states she has been experiencing worsening abdominal pain for 2-3 days, associated with several episodes of vomiting yesterday. She is still having bowel function, last BM this morning and reports she is passing flatus. Denies any other complaints including recent illness/sick contacts, fevers/chills, chest pain/shortness of breath, diarrhea/constipation.     ROS: 10-system review is otherwise negative except HPI above.      PAST MEDICAL & SURGICAL HISTORY:  Hyperlipidemia  Diabetes  Hypertension  Osteomyelitis  Diabetic Neuropathy  Cellulitis of Foot  Edema of Both Legs  Diabetes: Type 2  History of Osteoarthritis  HTN - Hypertension  HLD (Hyperlipidemia)  Status post insertion of percutaneous endoscopic gastrostomy (PEG) tube  H/O tracheostomy  S/P amputation of lesser toe, right: 2013 right great toe, 2nd and 3rd right toes  S/P Amputation of Lesser Toe: Rt 1-3 metatarsal    FAMILY HISTORY:  No pertinent family history in first degree relatives    [] Family history not pertinent as reviewed with the patient and family    SOCIAL HISTORY:    Nonsmoker  Nondrinker    ALLERGIES: No Known Allergies      HOME MEDICATIONS:   apixaban 2.5 mg oral tablet: 1 tab(s) orally every 12 hours ( Resume in am 2/22) (16 May 2020 04:11)  Auryxia 210 mg oral tablet: 2 tab(s) orally 3 times a day (16 May 2020 04:11)  gabapentin 300 mg oral capsule: 1 cap(s) orally 3 times a day (16 May 2020 04:11)  midodrine 2.5 mg oral tablet: 1 tab(s) orally once a day (16 May 2020 04:11)  sevelamer carbonate 800 mg oral tablet: 2 tab(s) orally 3 times a day (with meals) (16 May 2020 04:11)  Veltassa: orally once a day (at bedtime) (16 May 2020 04:11)      --------------------------------------------------------------------------------------------

## 2020-06-13 NOTE — CONSULT NOTE ADULT - ASSESSMENT
Ms. Lopez is a 70 year old lady with history of ESRD on HD (MWF), Afib on Eliquis, HTN, HLD, DM, PVD, moderate AS, presenting with acute mesenteric ischemia, status post 6/13 Exploratory Laparotomy with findings of diffuse small bowel and patchy transverse colon. Ms. Lopez is a 70 year old lady with history of ESRD on HD (MWF), Afib on Eliquis, HTN, HLD, DM, PVD, moderate AS, presenting with acute mesenteric ischemia, status post 6/13 Exploratory Laparotomy with findings of diffuse small bowel and patchy transverse colon.     PLAN:    NEURO: Pain control, sedation.    - Sedation with Precedex.   - Pain control with IV tylenol, dilaudid.     RESPIRATORY: Mechanical ventilation.   - 450/14/5/45.   - Monitor CXR.     CARDIOVASCULAR:       GI/NUTRITION: Mesenteric Ischemia.   - Nil Per Os, continue IV Fluids.     GENITOURINARY/RENAL: ESRD on HD.   - IVF LR @ 75cc/hr.     HEMATOLOGIC: Mesenteric ischemia.   - DVT ppx: subq heparin, sequential compression devices.     INFECTIOUS DISEASE: Mesenteric ischemia.  - Intravenous antibiotic administration: Zosyn.     ENDOCRINE:      DISPO: Surgical Intensive Care Unit with Palliative Care Unit Ms. Lopez is a 70 year old lady with history of ESRD on HD (MWF), Afib on Eliquis, HTN, HLD, DM, PVD, moderate AS, presenting with acute mesenteric ischemia, status post 6/13 Exploratory Laparotomy with findings of diffuse small bowel and patchy transverse colon.     PLAN:    NEURO: Pain control, sedation.    - Sedation with Precedex.   - Pain control with IV tylenol, dilaudid.     RESPIRATORY: Mechanical ventilation.   - 450/14/5/45.   - Monitor CXR.     CARDIOVASCULAR: Hypotension in setting of septic shock.   - On Neosynephrine, wean as tolerated.     GI/NUTRITION: Mesenteric Ischemia.   - Nil Per Os, continue IV Fluids.   - Stress ulcer prophylaxis with Protonix.     GENITOURINARY/RENAL: ESRD on HD.   - IVF LR @ 75cc/hr.     HEMATOLOGIC: Mesenteric ischemia.   - DVT ppx: subq heparin, sequential compression devices.     INFECTIOUS DISEASE: Mesenteric ischemia.  - Intravenous antibiotic administration: Zosyn.     ENDOCRINE: DM   - ISS.      DISPO: Surgical Intensive Care Unit with Palliative Care Unit Consultation

## 2020-06-13 NOTE — ED PROVIDER NOTE - COVID-19 RESULT DATE/TIME
16-May-2020 02:43 Retinoid Dermatitis Aggressive Treatment: I recommended more frequent application of Cetaphil or CeraVe to the areas of dermatitis. I also prescribed a topical steroid for twice daily use until the dermatitis resolves.

## 2020-06-13 NOTE — H&P ADULT - ATTENDING COMMENTS
71yo F p/w acute onset severe abd pain.  Diffuse guarding on exam. CT AP with evidence of acute mesenteric mesenteric ischemia 2/2 high grade SMA stenosis. 69yo F p/w acute onset severe abd pain.  Diffuse guarding on exam. CT AP with evidence of acute mesenteric mesenteric ischemia 2/2 high grade SMA stenosis.  I had a long discussion with the patient's family ( and children) regarding her poor prognosis.  We discussed the possibility of finding and extensive distribution of necrotic bowel.  In this setting, I informed them of the futility associated with resection considering its associated mortality risk.  We discussed the high risks of infectious, cardiovascular and circulatory morbidities associated with this procedure.  Even in the best case, being non-compromised bowel which is successfully revascularized, we discussed that there remains a significant mortality risk.   We also discussed the option of hospice care with end of life comfort goals, deferring a laparotomy.  The patients children and  have clearly stated that they would like her to have a surgical exploration, and they feel that this would be her wishes as well.  We were unable to communicate with the patient, despite trying with a [email or web address] , as she has had significant pain and altered sensorium.

## 2020-06-13 NOTE — CONSULT NOTE ADULT - SUBJECTIVE AND OBJECTIVE BOX
HPI:  Patient is a 70y old  Female who presents with a chief complaint of abdominal pain    HPI:  Annette is a 70 year old lady with history of ESRD on HD (MWF), Afib on Eliquis, HTN, HLD, DM, PVD, moderate AS, known to the vascular surgery service, now presenting c/o abdominal pain. Patient is a poor historian due to current state of pain and language barrier, despite using Farsi . Collateral history obtained from patients son. Patient states she has been experiencing worsening abdominal pain for 2-3 days, associated with several episodes of vomiting yesterday. She is still having bowel function, last BM this morning and reports she is passing flatus. Denies any other complaints including recent illness/sick contacts, fevers/chills, chest pain/shortness of breath, diarrhea/constipation.     ROS: 10-system review is otherwise negative except HPI above.      PAST MEDICAL & SURGICAL HISTORY:  Hyperlipidemia  Diabetes  Hypertension  Osteomyelitis  Diabetic Neuropathy  Cellulitis of Foot  Edema of Both Legs  Diabetes: Type 2  History of Osteoarthritis  HTN - Hypertension  HLD (Hyperlipidemia)  Status post insertion of percutaneous endoscopic gastrostomy (PEG) tube  H/O tracheostomy  S/P amputation of lesser toe, right: 2013 right great toe, 2nd and 3rd right toes  S/P Amputation of Lesser Toe: Rt 1-3 metatarsal    FAMILY HISTORY:  No pertinent family history in first degree relatives    [] Family history not pertinent as reviewed with the patient and family    SOCIAL HISTORY:    Nonsmoker  Nondrinker    ALLERGIES: No Known Allergies      HOME MEDICATIONS:   apixaban 2.5 mg oral tablet: 1 tab(s) orally every 12 hours ( Resume in am 2/22) (16 May 2020 04:11)  Auryxia 210 mg oral tablet: 2 tab(s) orally 3 times a day (16 May 2020 04:11)  gabapentin 300 mg oral capsule: 1 cap(s) orally 3 times a day (16 May 2020 04:11)  midodrine 2.5 mg oral tablet: 1 tab(s) orally once a day (16 May 2020 04:11)  sevelamer carbonate 800 mg oral tablet: 2 tab(s) orally 3 times a day (with meals) (16 May 2020 04:11)  Veltassa: orally once a day (at bedtime) (16 May 2020 04:11)      -------------------------------------------------------------------------------------------- (13 Jun 2020 06:52)    PERTINENT PM/SXH:   Hyperlipidemia  Diabetes  Hypertension  Osteomyelitis  Diabetic Neuropathy  Cellulitis of Foot  Edema of Both Legs  Diabetes  History of Osteoarthritis  HTN - Hypertension  HLD (Hyperlipidemia)    Status post insertion of percutaneous endoscopic gastrostomy (PEG) tube  H/O tracheostomy  S/P amputation of lesser toe, right  S/P Amputation of Lesser Toe  No Past Surgical History    FAMILY HISTORY:  No pertinent family history in first degree relatives    ITEMS NOT CHECKED ARE NOT PRESENT    SOCIAL HISTORY:   Significant other/partner:  [ ]  Children:  [ ]  Scientologist/Spirituality:  Substance hx:  [ ]   Tobacco hx:  [ ]   Alcohol hx: [ ]   Home Opioid hx:  [ ] I-Stop Reference No:  Living Situation: [x ]Home  [ ]Long term care  [ ]Rehab [ ]Other    ADVANCE DIRECTIVES:    DNR  MOLST  [ ]  Living Will  [ ]   DECISION MAKER(s):  [ ] Health Care Proxy(s)  [ x] Surrogate(s) :  [ ] Guardian           Name(s): Sergio Lopez Phone Number(s): [phone]     BASELINE (I)ADL(s) (prior to admission): Partially dependent  [place]: [ ]Total  [x ] Moderate [ ]Dependent    Allergies: No Known Allergies    Intolerances    MEDICATIONS  (STANDING):  chlorhexidine 0.12% Liquid 15 milliLiter(s) Oral Mucosa every 12 hours  chlorhexidine 4% Liquid 1 Application(s) Topical <User Schedule>  dexMEDEtomidine Infusion 0.4 MICROgram(s)/kG/Hr (8.39 mL/Hr) IV Continuous <Continuous>  heparin   Injectable 5000 Unit(s) SubCutaneous every 8 hours  insulin lispro (HumaLOG) corrective regimen sliding scale   SubCutaneous every 6 hours  lactated ringers. 1000 milliLiter(s) (75 mL/Hr) IV Continuous <Continuous>  pantoprazole  Injectable 40 milliGRAM(s) IV Push daily  phenylephrine    Infusion 0.9 MICROgram(s)/kG/Min (28.3 mL/Hr) IV Continuous <Continuous>  piperacillin/tazobactam IVPB.. 3.375 Gram(s) IV Intermittent every 12 hours    MEDICATIONS  (PRN):  acetaminophen  IVPB .. 1000 milliGRAM(s) IV Intermittent once PRN Mild Pain (1 - 3)  HYDROmorphone  Injectable 0.5 milliGRAM(s) IV Push every 3 hours PRN Severe Pain (7 - 10)  HYDROmorphone  Injectable 0.25 milliGRAM(s) IV Push every 3 hours PRN Moderate Pain (4 - 6)    PRESENT SYMPTOMS: [ x]Unable to obtain due to poor mentation : intubated and sedated  Source if other than patient:  [ x]Family   [x ]Team     Pain (Impact on QOL):  N/A  Location -      N/A    Minimal acceptable level (0-10 scale):  N/A  Aggravating factors -N/A  Quality -N/A  Radiation -N/A  Severity (0-10 scale) -  N/A  Timing -N/A    PAIN AD Score: 0    [email or web address] (press ctrl +  left click to view)    Dyspnea:                           [ ]Mild [ ]Moderate [ ]Severe  Anxiety:                             [ ]Mild [ ]Moderate [ ]Severe  Fatigue:                             [ ]Mild [ ]Moderate [ ]Severe  Nausea:                             [ ]Mild [ ]Moderate [ ]Severe  Loss of appetite:              [ ]Mild [ ]Moderate [ ]Severe  Constipation:                    [ ]Mild [ ]Moderate [ ]Severe    Other Symptoms:  [ ]All other review of systems negative     Karnofsky Performance Score/Palliative Performance Status Version 2:      20   %    http://palliative.info/resource_material/[email or web address].pdf  PHYSICAL EXAM:  Vital Signs Last 24 Hrs  T(C): 36.9 (13 Jun 2020 11:30), Max: 36.9 (13 Jun 2020 07:40)  T(F): 98.4 (13 Jun 2020 11:30), Max: 98.4 (13 Jun 2020 07:40)  HR: 73 (13 Jun 2020 13:30) (68 - 101)  BP: 104/51 (13 Jun 2020 13:30) (96/52 - 164/70)  BP(mean): 74 (13 Jun 2020 13:30) (71 - 80)  RR: 20 (13 Jun 2020 13:30) (19 - 22)  SpO2: 100% (13 Jun 2020 13:30) (88% - 100%) I&O's Summary    GENERAL:  [ ]Alert  [ ]Oriented x   [ ]Lethargic  [ ]Cachexia  [x ]Unarousable  [ ]Verbal  [x ]Non-Verbal  Behavioral:   [ ] Anxiety  [ ] Delirium [ ] Agitation [ ] Other  HEENT:  [ ]Normal   [ ]Dry mouth   [x ]ET Tube/Trach  [ ]Oral lesions  PULMONARY:   [x ]Clear [ ]Tachypnea  [ ]Audible excessive secretions   [ ]Rhonchi        [ ]Right [ ]Left [ ]Bilateral  [ ]Crackles        [ ]Right [ ]Left [ ]Bilateral  [ ]Wheezing     [ ]Right [ ]Left [ ]Bilateral  CARDIOVASCULAR:    [x ]Regular [ ]Irregular [ ]Tachy  [ ]Christ [ ]Murmur [ ]Other  GASTROINTESTINAL:  [x ]Soft  [ ]Distended   [ ]+BS  [ ]Non tender [ ]Tender  [ ]PEG [ ]OGT/ NGT  Last BM:   Surgical incision site dressing  GENITOURINARY:  [ ]Normal [ ] Incontinent   [ ]Oliguria/Anuria   [x ]Raphael  MUSCULOSKELETAL:   [ ]Normal   [ ]Weakness  [ x]Bed/Wheelchair bound [ ]Edema  NEUROLOGIC:   [ ]No focal deficits  [x ] Cognitive impairment  [ ] Dysphagia [ ]Dysarthria [ ] Paresis [ ]Other   SKIN:   [ x]Normal   [ ]Pressure ulcer(s)  [ ]Rash    CRITICAL CARE:  [x ] Shock Present  [x ]Septic [ ]Cardiogenic [ ]Neurologic [ ]Hypovolemic  [ ]  Vasopressors [ ]  Inotropes   [x ] Respiratory failure present  [x ] Acute  [ ] Chronic [ ] Hypoxic  [ ] Hypercarbic [ ] Other  [x ] Other organ failure     LABS:                        12.0   30.90 )-----------( 416      ( 13 Jun 2020 13:01 )             38.0   06-13    132<L>  |  89<L>  |  20  ----------------------------<  129<H>  3.0<L>   |  22  |  3.76<H>    Ca    8.9      13 Jun 2020 13:01  Phos  2.5     06-13  Mg     2.1     06-13    TPro  7.3  /  Alb  3.2<L>  /  TBili  0.8  /  DBili  x   /  AST  21  /  ALT  13  /  AlkPhos  95  06-13  PT/INR - ( 13 Jun 2020 01:33 )   PT: 18.7 sec;   INR: 1.60 ratio         PTT - ( 13 Jun 2020 01:33 )  PTT:26.1 sec      RADIOLOGY & ADDITIONAL STUDIES:    PROTEIN CALORIE MALNUTRITION PRESENT: [ ] Yes [ ] No  [x ] PPSV2 < or = to 30% [ ] significant weight loss  [x ] poor nutritional intake [ ] catabolic state [ ] anasarca     Albumin, Serum: 3.2 g/dL (06-13-20 @ 13:01)  Artificial Nutrition [ ]     REFERRALS:   [ ]Chaplaincy  [ ] Hospice  [ ]Child Life  [ ]Social Work  [ ]Case management [ ]Holistic Therapy     Goals of Care Document: HPI:  Patient is a 70y old  Female who presents with a chief complaint of abdominal pain    HPI:  Annette is a 70 year old woman with history of ESRD on HD (MWF), Afib on Eliquis, HTN, HLD, DM, PVD, moderate AS, known to the vascular surgery service, now presenting c/o abdominal pain. Patient is a poor historian due to current state of pain and language barrier, despite using Farsi . Collateral history obtained from patients son. Patient states she has been experiencing worsening abdominal pain for 2-3 days, associated with several episodes of vomiting yesterday. She is still having bowel function, last BM this morning and reports she is passing flatus. Denies any other complaints including recent illness/sick contacts, fevers/chills, chest pain/shortness of breath, diarrhea/constipation.     ROS: 10-system review is otherwise negative except HPI above.      PAST MEDICAL & SURGICAL HISTORY:  Hyperlipidemia  Diabetes  Hypertension  Osteomyelitis  Diabetic Neuropathy  Cellulitis of Foot  Edema of Both Legs  Diabetes: Type 2  History of Osteoarthritis  HTN - Hypertension  HLD (Hyperlipidemia)  Status post insertion of percutaneous endoscopic gastrostomy (PEG) tube  H/O tracheostomy  S/P amputation of lesser toe, right: 2013 right great toe, 2nd and 3rd right toes  S/P Amputation of Lesser Toe: Rt 1-3 metatarsal    FAMILY HISTORY:  No pertinent family history in first degree relatives    [] Family history not pertinent as reviewed with the patient and family    SOCIAL HISTORY:    Nonsmoker  Nondrinker    ALLERGIES: No Known Allergies      HOME MEDICATIONS:   apixaban 2.5 mg oral tablet: 1 tab(s) orally every 12 hours ( Resume in am 2/22) (16 May 2020 04:11)  Auryxia 210 mg oral tablet: 2 tab(s) orally 3 times a day (16 May 2020 04:11)  gabapentin 300 mg oral capsule: 1 cap(s) orally 3 times a day (16 May 2020 04:11)  midodrine 2.5 mg oral tablet: 1 tab(s) orally once a day (16 May 2020 04:11)  sevelamer carbonate 800 mg oral tablet: 2 tab(s) orally 3 times a day (with meals) (16 May 2020 04:11)  Veltassa: orally once a day (at bedtime) (16 May 2020 04:11)      -------------------------------------------------------------------------------------------- (13 Jun 2020 06:52)    PERTINENT PM/SXH:   Hyperlipidemia  Diabetes  Hypertension  Osteomyelitis  Diabetic Neuropathy  Cellulitis of Foot  Edema of Both Legs  Diabetes  History of Osteoarthritis  HTN - Hypertension  HLD (Hyperlipidemia)    Status post insertion of percutaneous endoscopic gastrostomy (PEG) tube  H/O tracheostomy  S/P amputation of lesser toe, right  S/P Amputation of Lesser Toe  No Past Surgical History    FAMILY HISTORY:  No pertinent family history in first degree relatives    ITEMS NOT CHECKED ARE NOT PRESENT    SOCIAL HISTORY:   Significant other/partner:  [ ]  Children:  [ ]  Cheondoism/Spirituality:  Substance hx:  [ ]   Tobacco hx:  [ ]   Alcohol hx: [ ]   Home Opioid hx:  [ ] I-Stop Reference No:  Living Situation: [x ]Home  [ ]Long term care  [ ]Rehab [ ]Other    ADVANCE DIRECTIVES:    DNR  MOLST  [ ]  Living Will  [ ]   DECISION MAKER(s):  [ ] Health Care Proxy(s)  [ x] Surrogate(s) :  [ ] Guardian           Name(s): Sergio Lopez Phone Number(s): [phone]     BASELINE (I)ADL(s) (prior to admission): Partially dependent  [place]: [ ]Total  [x ] Moderate [ ]Dependent    Allergies: No Known Allergies    Intolerances    MEDICATIONS  (STANDING):  chlorhexidine 0.12% Liquid 15 milliLiter(s) Oral Mucosa every 12 hours  chlorhexidine 4% Liquid 1 Application(s) Topical <User Schedule>  dexMEDEtomidine Infusion 0.4 MICROgram(s)/kG/Hr (8.39 mL/Hr) IV Continuous <Continuous>  heparin   Injectable 5000 Unit(s) SubCutaneous every 8 hours  insulin lispro (HumaLOG) corrective regimen sliding scale   SubCutaneous every 6 hours  lactated ringers. 1000 milliLiter(s) (75 mL/Hr) IV Continuous <Continuous>  pantoprazole  Injectable 40 milliGRAM(s) IV Push daily  phenylephrine    Infusion 0.9 MICROgram(s)/kG/Min (28.3 mL/Hr) IV Continuous <Continuous>  piperacillin/tazobactam IVPB.. 3.375 Gram(s) IV Intermittent every 12 hours    MEDICATIONS  (PRN):  acetaminophen  IVPB .. 1000 milliGRAM(s) IV Intermittent once PRN Mild Pain (1 - 3)  HYDROmorphone  Injectable 0.5 milliGRAM(s) IV Push every 3 hours PRN Severe Pain (7 - 10)  HYDROmorphone  Injectable 0.25 milliGRAM(s) IV Push every 3 hours PRN Moderate Pain (4 - 6)    PRESENT SYMPTOMS: [ x]Unable to obtain due to poor mentation : intubated and sedated  Source if other than patient:  [ x]Family   [x ]Team     Pain (Impact on QOL):  N/A  Location -      N/A    Minimal acceptable level (0-10 scale):  N/A  Aggravating factors -N/A  Quality -N/A  Radiation -N/A  Severity (0-10 scale) -  N/A  Timing -N/A    PAIN AD Score: 0    [email or web address] (press ctrl +  left click to view)    Dyspnea:                           [ ]Mild [ ]Moderate [ ]Severe  Anxiety:                             [ ]Mild [ ]Moderate [ ]Severe  Fatigue:                             [ ]Mild [ ]Moderate [ ]Severe  Nausea:                             [ ]Mild [ ]Moderate [ ]Severe  Loss of appetite:              [ ]Mild [ ]Moderate [ ]Severe  Constipation:                    [ ]Mild [ ]Moderate [ ]Severe    Other Symptoms:  [ ]All other review of systems negative     Karnofsky Performance Score/Palliative Performance Status Version 2:      20   %    http://palliative.info/resource_material/PPSv2.pdf  PHYSICAL EXAM:  Vital Signs Last 24 Hrs  T(C): 36.9 (13 Jun 2020 11:30), Max: 36.9 (13 Jun 2020 07:40)  T(F): 98.4 (13 Jun 2020 11:30), Max: 98.4 (13 Jun 2020 07:40)  HR: 73 (13 Jun 2020 13:30) (68 - 101)  BP: 104/51 (13 Jun 2020 13:30) (96/52 - 164/70)  BP(mean): 74 (13 Jun 2020 13:30) (71 - 80)  RR: 20 (13 Jun 2020 13:30) (19 - 22)  SpO2: 100% (13 Jun 2020 13:30) (88% - 100%) I&O's Summary    GENERAL:  [ ]Alert  [ ]Oriented x   [ ]Lethargic  [ ]Cachexia  [x ]Unarousable  [ ]Verbal  [x ]Non-Verbal  Behavioral:   [ ] Anxiety  [ ] Delirium [ ] Agitation [ ] Other  HEENT:  [ ]Normal   [ ]Dry mouth   [x ]ET Tube/Trach  [ ]Oral lesions  PULMONARY:   [x ]Clear [ ]Tachypnea  [ ]Audible excessive secretions   [ ]Rhonchi        [ ]Right [ ]Left [ ]Bilateral  [ ]Crackles        [ ]Right [ ]Left [ ]Bilateral  [ ]Wheezing     [ ]Right [ ]Left [ ]Bilateral  CARDIOVASCULAR:    [x ]Regular [ ]Irregular [ ]Tachy  [ ]Christ [ ]Murmur [ ]Other  GASTROINTESTINAL:  [x ]Soft  [ ]Distended   [ ]+BS  [ ]Non tender [ ]Tender  [ ]PEG [ ]OGT/ NGT  Last BM:   Surgical incision site dressing  GENITOURINARY:  [ ]Normal [ ] Incontinent   [ ]Oliguria/Anuria   [x ]Raphael  MUSCULOSKELETAL:   [ ]Normal   [ ]Weakness  [ x]Bed/Wheelchair bound [ ]Edema  NEUROLOGIC:   [ ]No focal deficits  [x ] Cognitive impairment  [ ] Dysphagia [ ]Dysarthria [ ] Paresis [ ]Other   SKIN:   [ x]Normal   [ ]Pressure ulcer(s)  [ ]Rash    CRITICAL CARE:  [x ] Shock Present  [x ]Septic [ ]Cardiogenic [ ]Neurologic [ ]Hypovolemic  [ ]  Vasopressors [ ]  Inotropes   [x ] Respiratory failure present  [x ] Acute  [ ] Chronic [ ] Hypoxic  [ ] Hypercarbic [ ] Other  [x ] Other organ failure     LABS:                        12.0   30.90 )-----------( 416      ( 13 Jun 2020 13:01 )             38.0   06-13    132<L>  |  89<L>  |  20  ----------------------------<  129<H>  3.0<L>   |  22  |  3.76<H>    Ca    8.9      13 Jun 2020 13:01  Phos  2.5     06-13  Mg     2.1     06-13    TPro  7.3  /  Alb  3.2<L>  /  TBili  0.8  /  DBili  x   /  AST  21  /  ALT  13  /  AlkPhos  95  06-13  PT/INR - ( 13 Jun 2020 01:33 )   PT: 18.7 sec;   INR: 1.60 ratio         PTT - ( 13 Jun 2020 01:33 )  PTT:26.1 sec      RADIOLOGY & ADDITIONAL STUDIES:    PROTEIN CALORIE MALNUTRITION PRESENT: [ ] Yes [ ] No  [x ] PPSV2 < or = to 30% [ ] significant weight loss  [x ] poor nutritional intake [ ] catabolic state [ ] anasarca     Albumin, Serum: 3.2 g/dL (06-13-20 @ 13:01)  Artificial Nutrition [ ]     REFERRALS:   [ ]Chaplaincy  [ ] Hospice  [ ]Child Life  [ ]Social Work  [ ]Case management [ ]Holistic Therapy     Goals of Care Document:

## 2020-06-13 NOTE — H&P ADULT - NSHPLABSRESULTS_GEN_ALL_CORE
< from: CT Angio Abdomen and Pelvis w/ IV Cont (06.13.20 @ 01:47) >      EXAM:  CT ANGIO ABD PELV (W)AW IC                          EXAM:  CT ANGIO CHEST (W)AW IC                            PROCEDURE DATE:  06/13/2020            INTERPRETATION:  CLINICAL INFORMATION: Chest and abdominal pain. Evaluate for aortic dissection.     COMPARISON: CT chest 3/20/2018. CT abdomen and pelvis 5/25/2017.    PROCEDURE:   CT Angiography of the Chest, Abdomen and Pelvis.   Gated precontrast imaging was performed through the heart followed by gated CT Angiography of the heart with subsequent non-gated arterial phase imaging of the chest, abdomen and pelvis.  Intravenous contrast: 90 ml Omnipaque 350. 10 ml discarded.  Oral contrast: None.  Sagittal and coronal reformats were performed as well as 3D (MIP) reconstructions.    FINDINGS:  CHEST:   LUNGS AND LARGE AIRWAYS: Bilateral lower lobe peripheral groundglass opacities. Unchanged focal narrowing of the upper trachea. Patent central airways.   PLEURA: No pleural effusion.  VESSELS: No intramural hematoma, penetrating aorticulcer or aortic dissection. Calcification of the aorta and its branches. No central pulmonary embolism. The great vessels are not dilated.  HEART: Heart size is enlarged. No pericardial effusion. Aortic valve calcification. Coronary artery calcification.  MEDIASTINUM AND ELO: No lymphadenopathy.  CHEST WALL AND LOWER NECK: Left central venous catheter with tip in SVC. Enlarged heterogenous thyroid gland.    ABDOMEN AND PELVIS:  LIVER: Within normal limits.  BILE DUCTS: Normal caliber.  GALLBLADDER: Cholelithiasis.  SPLEEN: Within normal limits.  PANCREAS: Within normal limits.  ADRENALS: Within normal limits.  KIDNEYS/URETERS: Mild perinephric stranding. No hydronephrosis.    BLADDER: Within normal limits.  REPRODUCTIVE ORGANS: Uterus and adnexa within normal limits.    BOWEL: Long segment (>40 cm) of dilated mid to distal hypoenhancing small bowel loops with associated pneumatosis. No bowel obstruction. Appendix is normal.  PERITONEUM: No ascites. No free air.  VESSELS: No penetrating aortic ulcer or aortic dissection. No aneurysm. Mesenteric venous gas surrounding hypoenhancing mid to distal small bowel loops. Calcification of the aorta and its branches with moderate stenosis at the origin of the celiac and inferior mesenteric arteries and high-grade stenosis at the origin of the SMA. Prior CT of 3/20/18 demonstrated moderate calcific stenosis at ostium of SMA.  No SMV thrombosis. The portal vein appears patent.  RETROPERITONEUM/LYMPH NODES: No lymphadenopathy.    ABDOMINAL WALL: Within normal limits.  BONES: Degenerative changes of the spine including compression deformity of L2 vertebral body, unchanged since 2017.    IMPRESSION:     Dilated and hypoenhancing mid to distal small bowel loops with pneumatosis and mesentericvenous air consistent with mesenteric ischemia.     High-grade calcific stenosis at the origin of the SMA. No SMV thrombosis.    No aortic dissection.   Nonspecific groundglass opacities at the lung bases.    These findings were discussed with Dr. Bui at 6/13/2020 2:37 AM by Dr. Eden.                SINDY EDEN M.D., RADIOLOGY RESIDENT  This document has been electronically signed.  VARUN GAMING M.D, ATTENDING RADIOLOGIST  This document has been electronically signed. Jun 13 2020  4:07AM                < end of copied text >

## 2020-06-13 NOTE — ED PROVIDER NOTE - PHYSICAL EXAMINATION
Gen: Uncomfortable appearing, moaning in pain  Head: NCAT  HEENT: PERRL, MMM, normal conjunctiva, anicteric, neck supple  Lung: CTAB, no adventitious sounds  CV: RRR, no murmurs  Abd: soft, diffusely ttp throughout abd, no rebound or guarding, no CVAT  MSK: No edema, no visible deformities  Neuro: Moving all extremity grossly  Skin: Warm and dry, no evidence of rash

## 2020-06-13 NOTE — CONSULT NOTE ADULT - SUBJECTIVE AND OBJECTIVE BOX
CHIEF COMPLAINT:  Mesenteric Ischemia     HISTORY OF PRESENT ILLNESS:   70 year old female well known to me from office extensive medical history including ESRD on HD (MWF), Afib on Eliquis, HTN, HLD, DM, PVD, moderate AS, presented w/ c/o abdominal pain.   She was taken to the OR for an exploratory laparotomy this morning.  Unfortunately, she was found to have significant patchy necrosis of a large portion of her small bowel in the SMA distribution, extending from the Ligament of Treitz to approximately 20cm from the IC valve.  The rest of the small bowel was clearly poorly perfused.   She also had a necrotic segment of transverse colon.   In light of this, we made the decision to stop the operation in light of its futility and the mortality associated with such a large resection.   Her abdomen was closed and she was taken to the SICU.     PAST MEDICAL & SURGICAL HISTORY:  Hyperlipidemia  Diabetes  Hypertension  Osteomyelitis  Diabetic Neuropathy  Cellulitis of Foot  Edema of Both Legs  Diabetes: Type 2  History of Osteoarthritis  HTN - Hypertension  HLD (Hyperlipidemia)  Status post insertion of percutaneous endoscopic gastrostomy (PEG) tube  H/O tracheostomy  S/P amputation of lesser toe, right: 2013 right great toe, 2nd and 3rd right toes  S/P Amputation of Lesser Toe: Rt 1-3 metatarsal          MEDICATIONS:  heparin   Injectable 5000 Unit(s) SubCutaneous every 8 hours  phenylephrine    Infusion 0.9 MICROgram(s)/kG/Min IV Continuous <Continuous>    piperacillin/tazobactam IVPB.. 3.375 Gram(s) IV Intermittent every 12 hours      acetaminophen  IVPB .. 1000 milliGRAM(s) IV Intermittent once PRN  dexMEDEtomidine Infusion 0.4 MICROgram(s)/kG/Hr IV Continuous <Continuous>  HYDROmorphone  Injectable 0.5 milliGRAM(s) IV Push every 3 hours PRN  HYDROmorphone  Injectable 0.25 milliGRAM(s) IV Push every 3 hours PRN    pantoprazole  Injectable 40 milliGRAM(s) IV Push daily    insulin lispro (HumaLOG) corrective regimen sliding scale   SubCutaneous every 6 hours    chlorhexidine 0.12% Liquid 15 milliLiter(s) Oral Mucosa every 12 hours  chlorhexidine 4% Liquid 1 Application(s) Topical <User Schedule>  lactated ringers. 1000 milliLiter(s) IV Continuous <Continuous>      FAMILY HISTORY:  No pertinent family history in first degree relatives      SOCIAL HISTORY:    [ ] Non-smoker  [ ] Smoker  [ ] Alcohol    Allergies    No Known Allergies    Intolerances    	    REVIEW OF SYSTEMS:      [ ] All others negative	  [ XX] Unable to obtain    PHYSICAL EXAM:  T(C): 36.9 (06-13-20 @ 17:15), Max: 36.9 (06-13-20 @ 07:40)  HR: 80 (06-13-20 @ 20:30) (68 - 101)  BP: 99/51 (06-13-20 @ 20:00) (91/54 - 164/70)  RR: 20 (06-13-20 @ 20:30) (19 - 25)  SpO2: 100% (06-13-20 @ 20:30) (88% - 100%)  Wt(kg): --  I&O's Summary    13 Jun 2020 07:01  -  13 Jun 2020 22:45  --------------------------------------------------------  IN: 1073 mL / OUT: 0 mL / NET: 1073 mL        Appearance: Intubated, sedated   HEENT:   Dry oral mucosa  Lymphatic: No lymphadenopathy  Cardiovascular: Normal S1 S2, No JVD, No murmurs  Respiratory: ventilated   Psychiatry: A & O x 0, sedated   Gastrointestinal:  Soft, Non-tender, + BS	  Skin: No rashes, No ecchymoses, No cyanosis	  Neurologic: Non-focal  Extremities: Decreased range of motion, + partial foot amputations  Vascular: Peripheral pulses palpable 2+ bilaterally    TELEMETRY: 	  SR  ECG:  	  Troponin T, High Sensitivity (06.13.20 @ 04:14)    Troponin T, High Sensitivity Result: 233: Rapid upward or downward changes in high-sensitivity troponin levels  suggest acute myocardial injury. Renal impairment may cause sustained  troponin elevations.  Normal: <6 - 14 ng/L  Indeterminate: 15-51 ng/L  Elevated: > 51 ng/L  See http://labs/test/TROPTHS on the [facility] intranet for more  information ng/L    RADIOLOGY:< from: CT Angio Abdomen and Pelvis w/ IV Cont (06.13.20 @ 01:47) >    EXAM:  CT ANGIO ABD PELV (W)AW IC                          EXAM:  CT ANGIO CHEST (W)AW IC                            PROCEDURE DATE:  06/13/2020            INTERPRETATION:  CLINICAL INFORMATION: Chest and abdominal pain. Evaluate for aortic dissection.     COMPARISON: CT chest 3/20/2018. CT abdomen and pelvis 5/25/2017.    PROCEDURE:   CT Angiography of the Chest, Abdomen and Pelvis.   Gated precontrast imaging was performed through the heart followed by gated CT Angiography of the heart with subsequent non-gated arterial phase imaging of the chest, abdomen and pelvis.  Intravenous contrast: 90 ml Omnipaque 350. 10 ml discarded.  Oral contrast: None.  Sagittal and coronal reformats were performed as well as 3D (MIP) reconstructions.    FINDINGS:  CHEST:   LUNGS AND LARGE AIRWAYS: Bilateral lower lobe peripheral groundglass opacities. Unchanged focal narrowing of the upper trachea. Patent central airways.   PLEURA: No pleural effusion.  VESSELS: No intramural hematoma, penetrating aorticulcer or aortic dissection. Calcification of the aorta and its branches. No central pulmonary embolism. The great vessels are not dilated.  HEART: Heart size is enlarged. No pericardial effusion. Aortic valve calcification. Coronary artery calcification.  MEDIASTINUM AND ELO: No lymphadenopathy.  CHEST WALL AND LOWER NECK: Left central venous catheter with tip in SVC. Enlarged heterogenous thyroid gland.    ABDOMEN AND PELVIS:  LIVER: Within normal limits.  BILE DUCTS: Normal caliber.  GALLBLADDER: Cholelithiasis.  SPLEEN: Within normal limits.  PANCREAS: Within normal limits.  ADRENALS: Within normal limits.  KIDNEYS/URETERS: Mild perinephric stranding. No hydronephrosis.    BLADDER: Within normal limits.  REPRODUCTIVE ORGANS: Uterus and adnexa within normal limits.    BOWEL: Long segment (>40 cm) of dilated mid to distal hypoenhancing small bowel loops with associated pneumatosis. No bowel obstruction. Appendix is normal.  PERITONEUM: No ascites. No free air.  VESSELS: No penetrating aortic ulcer or aortic dissection. No aneurysm. Mesenteric venous gas surrounding hypoenhancing mid to distal small bowel loops. Calcification of the aorta and its branches with moderate stenosis at the origin of the celiac and inferior mesenteric arteries and high-grade stenosis at the origin of the SMA. Prior CT of 3/20/18 demonstrated moderate calcific stenosis at ostium of SMA.  No SMV thrombosis. The portal vein appears patent.  RETROPERITONEUM/LYMPH NODES: No lymphadenopathy.    ABDOMINAL WALL: Within normal limits.  BONES: Degenerative changes of the spine including compression deformity of L2 vertebral body, unchanged since 2017.    IMPRESSION:     Dilated and hypoenhancing mid to distal small bowel loops with pneumatosis and mesentericvenous air consistent with mesenteric ischemia.     High-grade calcific stenosis at the origin of the SMA. No SMV thrombosis.    No aortic dissection.   Nonspecific groundglass opacities at the lung bases.    These findings were discussed with Dr. Bui at 6/13/2020 2:37 AM by Dr. Eden.                SINDY EDEN M.D., RADIOLOGY RESIDENT  This document has been electronically signed.  VARUN GAMING M.D, ATTENDING RADIOLOGIST  This document has been electronically signed. Jun 13 2020  4:07AM                < end of copied text >    OTHER: 	  	  LABS:	 	    CARDIAC MARKERS:                                  12.0   30.90 )-----------( 416      ( 13 Jun 2020 13:01 )             38.0     06-13    132<L>  |  89<L>  |  20  ----------------------------<  129<H>  3.0<L>   |  22  |  3.76<H>    Ca    8.9      13 Jun 2020 13:01  Phos  2.5     06-13  Mg     2.1     06-13    TPro  7.3  /  Alb  3.2<L>  /  TBili  0.8  /  DBili  x   /  AST  21  /  ALT  13  /  AlkPhos  95  06-13    proBNP:   Lipid Profile:   HgA1c:   TSH:

## 2020-06-13 NOTE — CONSULT NOTE ADULT - ASSESSMENT
70 year old F with PMH of ESRD on HD (MWF), Afib on Eliquis, HTN, HLD, DM, PVD, moderate AS, presenting with acute mesenteric ischemia, status post 6/13 Exploratory Laparotomy with findings of diffuse small bowel and patchy transverse colon. Surgery was aborted due to the futility of the procedure and the mortality associated with such a large resection. Palliative was consulted for GOC conversation with the family and possible transfer to PCU.

## 2020-06-13 NOTE — H&P ADULT - ASSESSMENT
ASSESSMENT:  Annette is a 70 year old lady with history of ESRD on HD (MWF), Afib on Eliquis, HTN, HLD, DM, PVD, moderate AS, presenting with acute mesenteric ischemia    PLAN:  - Admit to surgery, Dr. Hoff  - NPO, IVF resuscitation  - General surgery on board for operative intervention, Dr. Burnett   - Added on to OR emergently for Exlap, possible bowel resection and vascular bypass  - Patient seen and examined by vascular surgery fellow, on behalf of attending    Guevara Blackwood, PGY 2  Surgery [phone]

## 2020-06-13 NOTE — CONSULT NOTE ADULT - PROBLEM SELECTOR RECOMMENDATION 3
Called the family. Patient has 3 sons,  speaks Farsi. Spoke to Bryant who was very emotional and crying over the phone and requested me to call his other brother. So I called . Leonides Lopez - discussed with him the overall prognosis. He stated that there is no HCP and they all make the medical decision making for the patient however his father Mr. Sergio Lopez is the ultimate surrogate decision maker.     Leonides was able to state the conversation he had with the Surgeon Dr. Burnett. I then explained that mom has multiple medical issues that cannot be fixed/cured at this time. An attempt to cure them may be more harmful than beneficial. Discussed the hemodialysis, septic shock with the patient being on pressors. Discussed the role of palliative care medicine and PCU. Leonides was able to understand however was overwhelmed with so much information and also wanted to speak to his other members of the family especially the father who does not have the full information of the prognosis of the patient. He requested 1-2 days prior to making any decision to de-escalate the care.     Total time spent: 30 minutes
I Rush Hoff MD performed a history and physical exam of the patient and discussed  the findings and plan with the house officer. I reviewed the resident note and agree with the findings and plan

## 2020-06-13 NOTE — CONSULT NOTE ADULT - PROBLEM SELECTOR RECOMMENDATION 2
Septic shock   on Neosynephrine
I Rush Hoff MD performed a history and physical exam of the patient and discussed  the findings and plan with the house officer. I reviewed the resident note and agree with the findings and plan

## 2020-06-13 NOTE — BRIEF OPERATIVE NOTE - OPERATION/FINDINGS
Identification of diffusely necrotic small bowel from the Identification of diffusely necrotic small bowel in the SMA distribution, extending from the Ligament of Treitz to approximately 20cm from the IC valve. Patient also with a necrotic transmural segment in the transverse colon. Given extent of ischemia without an option for revascularization, bowel resection would be futile. Patient's fascia closed and transferred to SICU intubated.

## 2020-06-13 NOTE — ED PROVIDER NOTE - ATTENDING CONTRIBUTION TO CARE
MD Schrader:  patient seen and evaluated personally.   I agree with the History & Physical,  Impression & Plan other than what was detailed in my note.  MD Schrader    69 y/o f w/ hx of dialsysis last went yest  presenting to ed w/ cc of abd pain that came on suddenly. pt is poor historian, limited by pain. reports to me in upper abd but told resident in lower abd. unable to give characteristic or if radiating. associated w/ nausea. pt vitalss stable but ill appearing and appears in a lot of pain. abd soft w/ ttp in epigastric area. pt moaning in pain. given degree of pain pt placed for CT abd pelvis w/ iv contrast to r/o dissection. ekg shows rbbb. MD Schrader:  patient seen and evaluated personally.   I agree with the History & Physical,  Impression & Plan other than what was detailed in my note.  MD Schrader    71 y/o f w/ hx of dialsysis last went yest  presenting to ed w/ cc of abd pain that came on suddenly. pt is poor historian, limited by pain. reports to me in upper abd but told resident in lower abd. unable to give characteristic or if radiating. associated w/ nausea. pt vitalss stable but ill appearing and appears in a lot of pain. abd soft w/ ttp in epigastric area. pt moaning in pain. given degree of pain pt placed for CT abd pelvis w/ iv contrast to r/o dissection, ischemia, perforation. ekg shows rbbb.

## 2020-06-13 NOTE — ED PROVIDER NOTE - CLINICAL SUMMARY MEDICAL DECISION MAKING FREE TEXT BOX
In distress d/t pain, n/v, abd pain. TTP. HDS but concern for dissection will get labs, CTA and analgesia

## 2020-06-13 NOTE — ED ADULT NURSE REASSESSMENT NOTE - NS ED NURSE REASSESS COMMENT FT1
0200- patient asleep during rounds but easily arousable to name. 0200- patient asleep during rounds but easily arousable to name. awaiting for CT angio results.

## 2020-06-13 NOTE — CONSULT NOTE ADULT - ATTENDING COMMENTS
LEO Hoff MD performed a history and physical exam of the patient and discussed  the findings and plan with the house officer. I reviewed the resident note and agree with the findings and plan      d/w pt's fam that it is v likely  pt has mesenteric ischemia which has progressed to intestinal necrosis and this would be fatal  fam wants to proceed w expl lap, possible endo or open vasc intervention   this is an emergency LEO Hoff MD performed a history and physical exam of the patient and discussed  the findings and plan with the house officer. I reviewed the resident note and agree with the findings and plan      d/w pt's fam that it is v likely  pt has mesenteric ischemia which has progressed to intestinal necrosis and this would be fatal  fam wants to proceed w expl lap, possible endo or open vasc intervention   this is an emergency    Critical care note (Dr. Rehman)    Pt seen and examined.  Chart reviewed.  Resident note confirmed.  Pt is a 70 year old female with a medical history significant for CAD/HTN/Afib/PVD/ESRD on HD (MWF)/DM who presented to [facility] with abdominal pain. On imaging, mesenteric ischemia secondary to high grade SMA stenosis was found. She  undernent an exploratory laparotomy with findings of intestinal necrosis. Findings are not compatable with life. A palliative care consult has been called.    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

## 2020-06-13 NOTE — ED ADULT NURSE NOTE - OBJECTIVE STATEMENT
Pt arrived in excruciating abdominal pain, pointing to right abdominal pain. IV inserted, labs obtained, morphine 4mg IV given via right hand. pending ultra sound of the abdomen.

## 2020-06-13 NOTE — ED PROVIDER NOTE - NS ED ROS FT
CONSTITUTIONAL: No fevers, no chills, no lightheadedness, no dizziness  EYES: no visual changes, no eye pain  EARS: no ear drainage, no ear pain, no change in hearing  NOSE: no nasal congestion  MOUTH/THROAT: no sore throat  CV: No chest pain, no palpitations  RESP: No SOB, no cough  GI: see hpi  : no dysuria, no hematuria, no flank pain  MSK: no back pain, no extremity pain  SKIN: no rashes  NEURO: no headache, no focal weakness, no decreased sensation/parasthesias   PSYCHIATRIC: no known mental health issues.

## 2020-06-13 NOTE — CONSULT NOTE ADULT - SUBJECTIVE AND OBJECTIVE BOX
SURGICAL INTENSIVE CARE UNIT CONSULT NOTE    HPI:  Patient is a 70y old  Female who presents with a chief complaint of abdominal pain    HPI:  Annette is a 70 year old lady with history of ESRD on HD (MWF), Afib on Eliquis, HTN, HLD, DM, PVD, moderate AS, known to the vascular surgery service, now presenting c/o abdominal pain. Patient is a poor historian due to current state of pain and language barrier, despite using Smart[employer] system . Collateral history obtained from patients son. Patient states she has been experiencing worsening abdominal pain for 2-3 days, associated with several episodes of vomiting yesterday. She is still having bowel function, last BM this morning and reports she is passing flatus. Denies any other complaints including recent illness/sick contacts, fevers/chills, chest pain/shortness of breath, diarrhea/constipation.     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 After a GOC discussion was had, she was taken to the Operating Theatre where she undernent an exploratory laparotomy with identification of diffusely necrotic small bowel from ligament of Treitz to 20cm for IC valve, and necrotic segment of transverse colon. Her abdomen was closed and she was transferred to the Surgical Intensive Care Unit for further monitoring.     NEURO: Intubated, sedated.     RESPIRATORY  Mechanical Ventilation: Mode: AC/ CMV (Assist Control/ Continuous Mandatory Ventilation), RR (machine): 14, RR (patient): 18, TV (machine): 500, FiO2: 60, PEEP: 5, ITime: 0.9, MAP: 10, PIP: 24  ABG - ( 13 Jun 2020 12:59 )  pH: 7.54  /  pCO2: 30    /  pO2: 173   / HCO3: 26    / Base Excess: 3.9   /  SaO2: 100     Lactate: x        CARDIOVASCULAR  VBG - ( 13 Jun 2020 05:09 )  pH: 7.42  /  pCO2: 49    /  pO2: 42    / HCO3: 31    / Base Excess: 6.2   /  SaO2: 74     Lactate: 1.5       GI/NUTRITION: Large, soft. Midline incision intact.     GENITOURINARY/RENAL    Weight (kg): 83.9 (06-13 @ 00:34)  06-13    131<L>  |  88<L>  |  18  ----------------------------<  120<H>  3.6   |  24  |  3.42<H>    Ca    9.3      13 Jun 2020 08:14  Phos  3.0     06-13  Mg     2.3     06-13    TPro  9.6<H>  /  Alb  3.6  /  TBili  0.5  /  DBili  x   /  AST  see note  /  ALT  see note  /  AlkPhos  86  06-13      HEMATOLOGIC                        11.9   22.62 )-----------( 329      ( 13 Jun 2020 08:14 )             38.2     PT/INR - ( 13 Jun 2020 01:33 )   PT: 18.7 sec;   INR: 1.60 ratio         PTT - ( 13 Jun 2020 01:33 )  PTT:26.1 sec    INFECTIOUS DISEASES  RECENT CULTURES:      ENDOCRINE  CAPILLARY BLOOD GLUCOSE      POCT Blood Glucose.: 146 mg/dL (13 Jun 2020 00:38)      IMAGING STUDIES:  < from: CT Angio Chest w/ IV Cont (06.13.20 @ 01:47) >  FINDINGS:  CHEST:   LUNGS AND LARGE AIRWAYS: Bilateral lower lobe peripheral groundglass opacities. Unchanged focal narrowing of the upper trachea. Patent central airways.   PLEURA: No pleural effusion.  VESSELS: No intramural hematoma, penetrating aorticulcer or aortic dissection. Calcification of the aorta and its branches. No central pulmonary embolism. The great vessels are not dilated.  HEART: Heart size is enlarged. No pericardial effusion. Aortic valve calcification. Coronary artery calcification.  MEDIASTINUM AND ELO: No lymphadenopathy.  CHEST WALL AND LOWER NECK: Left central venous catheter with tip in SVC. Enlarged heterogenous thyroid gland.    ABDOMEN AND PELVIS:  LIVER: Within normal limits.  BILE DUCTS: Normal caliber.  GALLBLADDER: Cholelithiasis.  SPLEEN: Within normal limits.  PANCREAS: Within normal limits.  ADRENALS: Within normal limits.  KIDNEYS/URETERS: Mild perinephric stranding. No hydronephrosis.    BLADDER: Within normal limits.  REPRODUCTIVE ORGANS: Uterus and adnexa within normal limits.    BOWEL: Long segment (>40 cm) of dilated mid to distal hypoenhancing small bowel loops with associated pneumatosis. No bowel obstruction. Appendix is normal.  PERITONEUM: No ascites. No free air.  VESSELS: No penetrating aortic ulcer or aortic dissection. No aneurysm. Mesenteric venous gas surrounding hypoenhancing mid to distal small bowel loops. Calcification of the aorta and its branches with moderate stenosis at the origin of the celiac and inferior mesenteric arteries and high-grade stenosis at the origin of the SMA. Prior CT of 3/20/18 demonstrated moderate calcific stenosis at ostium of SMA.  No SMV thrombosis. The portal vein appears patent.  RETROPERITONEUM/LYMPH NODES: No lymphadenopathy.    ABDOMINAL WALL: Within normal limits.  BONES: Degenerative changes of the spine including compression deformity of L2 vertebral body, unchanged since 2017.    IMPRESSION:     Dilated and hypoenhancing mid to distal small bowel loops with pneumatosis and mesentericvenous air consistent with mesenteric ischemia.     High-grade calcific stenosis at the origin of the SMA. No SMV thrombosis.    No aortic dissection.   Nonspecific groundglass opacities at the lung bases.    These findings were discussed with Dr. Bui at 6/13/2020 2:37 AM by Dr. Eden.    < end of copied text >

## 2020-06-13 NOTE — H&P ADULT - NSHPPHYSICALEXAM_GEN_ALL_CORE
General: Elderly female, in no acute distress   Neurologic: Awake, alert, GCS 14, mildly confused, No focal Deficits   Respiratory: Normal respiratory effort  CVS: RRR, perfusing adequately  Abdomen: Abdomen softly distended, with mild-moderate diffuse TTP, no rebound or involuntary guarding   Ext: Moving all extremities, RLE s/p toe amputations.  Vascular: RUE AVF with no palpable thrill.   Skin: Warm, dry, intact, no erythema General: Elderly female, appears very uncomfortable  Neurologic: Awake, alert, GCS 14, mildly confused, No focal Deficits   Respiratory: Normal respiratory effort  CVS: RRR, perfusing adequately  Abdomen: Abdomen softly distended, with moderate diffuse TTP, no rebound or involuntary guarding   Ext: Moving all extremities, RLE s/p toe amputations.  Vascular: RUE AVF with no palpable thrill.   Skin: Warm, dry, intact, no erythema

## 2020-06-13 NOTE — GOALS OF CARE CONVERSATION - ADVANCED CARE PLANNING - CONVERSATION DETAILS
Called the family. Patient has 3 sons (Leonides, Bobby and Bryant),  (Sergio) speaks Farsi. Spoke to Bryant who was very emotional and crying over the phone and requested me to call his other brother. So I called . Leonides Lopez - discussed with him the overall prognosis. He stated that there is no HCP and they all make the medical decision making for the patient however his father Mr. Sergio Lopez is the ultimate surrogate decision maker.     Leonides was able to state the conversation he had with the Surgeon Dr. Burnett. I then explained that mom has multiple medical issues that cannot be fixed/cured at this time. An attempt to cure them may be more harmful than beneficial. Discussed the hemodialysis, septic shock with the patient being on pressors. Discussed the role of palliative care medicine and PCU. Leonides was able to understand however was overwhelmed with so much information and also wanted to speak to his other members of the family especially the father who does not have the full information of the prognosis of the patient. He requested 1-2 days prior to making any decision to de-escalate the care.     Total time spent: 30 minutes

## 2020-06-13 NOTE — CONSULT NOTE ADULT - ASSESSMENT
ASSESSMENT:  70 year old lady with history of ESRD on HD (MWF), Afib on Eliquis, HTN, HLD, DM, moderate AS, known to the vascular surgery service, now presenting c/o abdominal pain. Pt is intubated and sedated. Found to have mesenteric ischemia secondary to high grade SMA stenosis. After a GOC discussion was had, she was taken to the Operating Room where she underwent an exploratory laparotomy with identification of diffusely necrotic small bowel from ligament of Treitz to 20cm for IC valve, and necrotic segment of transverse colon. Her abdomen was closed and she was transferred to the Surgical Intensive Care Unit for further monitoring. Nephrology was consulted for Anuric NEHAL.     1. Anuric NEHAL in the setting of septic shock secondary to ATN.   2. Hyponatremia.   3. Hypokalemia.   4. High anion gap metabolic acidosis.   5. Phosphorus is low normal.       RECOMMEND:  - Prognosis is poor given the history of necrotic small bowel.   - Supplement potassium.   - Agree with LR but add 10 meq of KCL at 75 cc/hr for now.   - Urine studies are not an option as pt is anuric.   - Renal US will not change the outcome.  - BMP, CBC, Magnesium and phosphorus daily.  - If pt able to urinate but doubt it since she is in multiorgan failure. Get UA, urine sodium, urine chloride, urine creatinine and urine protein.      Thank you for involving [employer] in this patient's care.    With warm regards,    Nasima Huynh, DO  [employer]  [phone]

## 2020-06-13 NOTE — CONSULT NOTE ADULT - ATTENDING COMMENTS
In the absence of health care proxy paperwork, the spouse is the surrogate. Should the spouse desire to refrain from decision making, then all children are of equivalent weight according to the Family Health Care Decision Act. At this time, the sons have stated that they would like to work on generating family consensus surrounding the potential decision about care deescalation.  It is unclear how much information the surrogate/ has according to [place].  The spouse will require Farsi interpretation.  No changes were made to the care plan.  Given the continued trial of critical care, the patient is not a candidate for the Palliative Care Unit at this time.  Will continue to follow. Recommend continued consistent messaging regarding prognosis. Recommend floor SW follow up for psychosocial support and potential video based visits should the family want this. Recommend chaplaincy follow up for emotional support for the sons and spouse.

## 2020-06-13 NOTE — ED PROVIDER NOTE - OBJECTIVE STATEMENT
69 y/o F with pmhx of dm and ESRD on hemodialysis TThS? p/w diffuse epigastric and lower abd pain severely worsening over the last 2 days assc with n/v. No feve,r chills, sob, cp, diarrhea.

## 2020-06-13 NOTE — CONSULT NOTE ADULT - PROBLEM SELECTOR RECOMMENDATION 9
non operable   prognosis poor  will discuss with family.   Due to [place] law, do not extubate patient,. Cont with basic medical care/support until patient passes on her own.

## 2020-06-13 NOTE — CHART NOTE - NSCHARTNOTEFT_GEN_A_CORE
Mrs. Lopez was taken to the OR for an exploratory laparotomy this morning.  Unfortunately, she was found to have significant patchy necrosis of a large portion of her small bowel in the SMA distribution, extending from the Ligament of Treitz to approximately 20cm from the IC valve.  The rest of the small bowel was clearly poorly perfused.   She also had a necrotic segment of transverse colon.   In light of this, we made the decision to stop the operation in light of its futility and the mortality associated with such a large resection.   Her abdomen was closed and she was taken to the SICU.  A goals of care conversation was had with the patient's family by phone after they were explained the operative findings.   They elected to transfer the patient to the palliative service to focus on comfort care.  She is not yet DNR/DNI.  I discussed this with the palliative care team who will reach out to the family and provide further guidance/help.

## 2020-06-13 NOTE — CONSULT NOTE ADULT - PROBLEM SELECTOR RECOMMENDATION 9
I Rush Hoff MD performed a history and physical exam of the patient and discussed  the findings and plan with the house officer. I reviewed the resident note and agree with the findings and plan

## 2020-06-13 NOTE — PRE-ANESTHESIA EVALUATION ADULT - NSANTHOSAYNRD_GEN_A_CORE
No. HAI screening performed.  STOP BANG Legend: 0-2 = LOW Risk; 3-4 = INTERMEDIATE Risk; 5-8 = HIGH Risk

## 2020-06-13 NOTE — CONSULT NOTE ADULT - PROBLEM SELECTOR RECOMMENDATION 9
Pt is currently sedated and intubated.  She is on PRN IVP dilaudid q 3 hours  C/w same for now Pt is currently sedated and intubated.  She is on PRN IVP dilaudid q 3 hours  mgmt per primary team

## 2020-06-13 NOTE — CONSULT NOTE ADULT - SUBJECTIVE AND OBJECTIVE BOX
Patient is a 70y old  Female who presents with a chief complaint of Abdominal pain (13 Jun 2020 14:34)    HPI  Mr. Lopez is a 70 year old lady with history of ESRD on hemodialysis (Mon, Wed, Fri), Afib on Eliquis, HTN, HLD, DM, PVD, moderate AS, known to the vascular surgery service, now presenting with abdominal pain. Patient is a poor historian due to current state of pain and language barrier, despite using Farsi . Collateral history obtained from patients son. Patient states she has been experiencing worsening abdominal pain for 2-3 days, associated with several episodes of vomiting yesterday. She is still having bowel function, last BM this morning and reports she is passing flatus. Denies any other complaints including recent illness/sick contacts, fevers/chills, chest pain/shortness of breath, diarrhea/constipation.     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 After a GOC discussion was had, she was taken to the Operating Room where she underwent an exploratory laparotomy with identification of diffusely necrotic small bowel from ligament of Treitz to 20cm for IC valve, and necrotic segment of transverse colon. Her abdomen was closed and she was transferred to the Surgical Intensive Care Unit for further monitoring.     NEURO: Currently, Intubated, sedated. On phenylephrine and dexMEDEtomidine Infusion 0.4 MICROgram(s)/kG/Hr (8.39 mL/Hr) IV Continuous    ROS: Unable to obtain as she is intubated and sedated.     PAST MEDICAL & SURGICAL HISTORY:  Hyperlipidemia  Diabetes  Hypertension  Osteomyelitis  Diabetic Neuropathy  Cellulitis of Foot  Edema of Both Legs  Diabetes: Type 2  History of Osteoarthritis  HTN - Hypertension  HLD (Hyperlipidemia)  Status post insertion of percutaneous endoscopic gastrostomy (PEG) tube  H/O tracheostomy  S/P amputation of lesser toe, right: 2013 right great toe, 2nd and 3rd right toes  S/P Amputation of Lesser Toe: Rt 1-3 metatarsal    FAMILY HISTORY:  No pertinent family history in first degree relatives  Family history not pertinent as reviewed with the patient and family    SOCIAL HISTORY:    Nonsmoker  Nondrinker    ALLERGIES: No Known Allergies      HOME MEDICATIONS:   apixaban 2.5 mg oral tablet: 1 tab(s) orally every 12 hours ( Resume in am 2/22) (16 May 2020 04:11)  Auryxia 210 mg oral tablet: 2 tab(s) orally 3 times a day (16 May 2020 04:11)  Gabapentin 300 mg oral capsule: 1 cap(s) orally 3 times a day (16 May 2020 04:11)  Midodrine 2.5 mg oral tablet: 1 tab(s) orally once a day (16 May 2020 04:11)  Sevelamer carbonate 800 mg oral tablet: 2 tab(s) orally 3 times a day (with meals) (16 May 2020 04:11)  Veltassa: orally once a day (at bedtime) (16 May 2020 04:11)    REVIEW OF SYSTEMS:  Unable to obtain is intubated and sedated.     VITAL:  T(C): , Max: 36.9 (06-13-20 @ 07:40)  T(F): , Max: 98.4 (06-13-20 @ 07:40)  HR: 80 (06-13-20 @ 20:30)  BP: 99/51 (06-13-20 @ 20:00)  BP(mean): 71 (06-13-20 @ 20:00)  RR: 20 (06-13-20 @ 20:30)  SpO2: 100% (06-13-20 @ 20:30)      Mechanical Ventilation: Mode: AC/ CMV (Assist Control/ Continuous Mandatory Ventilation), RR (machine): 14, RR (patient): 18, TV (machine): 450, FiO2: 45, PEEP: 5ABG - ( 13 Jun 2020 12:59 )  pH: 7.54  /  pCO2: 30    /  pO2: 173   / HCO3: 26    / Base Excess: 3.9   /  SaO2: 100         PHYSICAL EXAM:  General: NAD, Alert  HEENT: NCAT, PERRLA  Neck: Supple, No JVD  Respiratory: Bibasilar crackles.   Cardiovascular: RRR s1s2, no m/r/g  Gastrointestinal: +BS, soft, NT/ND  Extremities: No peripheral edema b/l  Neurological: no focal deficits; strength grossly intact  Psychiatric: Normal mood, normal affect  Back: no CVAT b/l  Skin: No rashes, no nevi      LABS:                        12.0   30.90 )-----------( 416      ( 13 Jun 2020 13:01 )             38.0     Na(132)/K(3.0)/Cl(89)/HCO3(22)/BUN(20)/Cr(3.76)Glu(129)/Ca(8.9)/Mg(2.1)/PO4(2.5)    06-13 @ 13:01  Na(131)/K(3.6)/Cl(88)/HCO3(24)/BUN(18)/Cr(3.42)Glu(120)/Ca(9.3)/Mg(2.3)/PO4(3.0)    06-13 @ 08:14  Na(132)/K(2.9)/Cl(88)/HCO3(25)/BUN(17)/Cr(3.27)Glu(148)/Ca(9.6)/Mg(--)/PO4(--)    06-13 @ 04:14  Na(123)/K(>9.0)/Cl(86)/HCO3(27)/BUN(16)/Cr(2.62)Glu(143)/Ca(9.5)/Mg(--)/PO4(--)    06-13 @ 01:33      06-13    132<L>  |  89<L>  |  20  ----------------------------<  129<H>  3.0<L>   |  22  |  3.76<H>    Ca    8.9      13 Jun 2020 13:01  Phos  2.5     06-13  Mg     2.1     06-13    TPro  7.3  /  Alb  3.2<L>  /  TBili  0.8  /  DBili  x   /  AST  21  /  ALT  13  /  AlkPhos  95  06-13      IMAGING:  EXAM:  XR CHEST PORTABLE URGENT 1V                            PROCEDURE DATE:  06/13/2020            INTERPRETATION:  A single chest x-ray was obtained on June 13, 2020. Status post surgery.    Impression:    The heart is normal in size. Platelike atelectasis left lower lobe. The right lung is clear. Endotracheal tube is just above the myesha. NG tube is in the stomach. A double-lumen catheter is seen on the left and the tip is in the superior vena cava. No pneumothorax. Degenerative changes of the thoracic spine.                    JESE GARCIA M.D., ATTENDING RADIOLOGIST  This document has been electronically signed. Jun 13 2020  4:54PM

## 2020-06-14 NOTE — PROGRESS NOTE ADULT - PROBLEM SELECTOR PLAN 3
I Rush Hoff MD have seen and examined the patient today and agree with  the  evaluation, assessment and plan of the surgical house officer

## 2020-06-14 NOTE — PROGRESS NOTE ADULT - ASSESSMENT
70 year old woman with history of ESRD on HD (MWF), Afib on Eliquis, HTN, HLD, DM, PVD, moderate AS, presenting with acute mesenteric ischemia who is now s/p ex-lap revealing significant necrotic bowel. Pt is currently intubated sedated and on vasopressors with rising lactate.     PLAN:  Necrotic bowel  - poor prognosis  - Will continue current care as per SICU and [facility]U [facility] conversation    Transplant surgery pager [phone]

## 2020-06-14 NOTE — PROGRESS NOTE ADULT - SUBJECTIVE AND OBJECTIVE BOX
Subjective: Patient seen and examined. No new events except as noted.   remains in ICU   no clinical change     REVIEW OF SYSTEMS:    Unable to obtain       MEDICATIONS:  MEDICATIONS  (STANDING):  chlorhexidine 0.12% Liquid 15 milliLiter(s) Oral Mucosa every 12 hours  chlorhexidine 4% Liquid 1 Application(s) Topical <User Schedule>  dexMEDEtomidine Infusion 0.4 MICROgram(s)/kG/Hr (8.39 mL/Hr) IV Continuous <Continuous>  dextrose 5% + sodium chloride 0.9%. 1000 milliLiter(s) (50 mL/Hr) IV Continuous <Continuous>  heparin   Injectable 5000 Unit(s) SubCutaneous every 8 hours  insulin lispro (HumaLOG) corrective regimen sliding scale   SubCutaneous every 6 hours  pantoprazole  Injectable 40 milliGRAM(s) IV Push daily  phenylephrine    Infusion 0.899 MICROgram(s)/kG/Min (28.3 mL/Hr) IV Continuous <Continuous>  piperacillin/tazobactam IVPB.. 3.375 Gram(s) IV Intermittent every 12 hours      PHYSICAL EXAM:  T(C): 37 (06-14-20 @ 07:00), Max: 37.5 (06-13-20 @ 23:00)  HR: 108 (06-14-20 @ 09:00) (68 - 108)  BP: 86/39 (06-14-20 @ 08:00) (85/47 - 110/56)  RR: 33 (06-14-20 @ 09:00) (19 - 34)  SpO2: 95% (06-14-20 @ 09:00) (95% - 100%)  Wt(kg): --  I&O's Summary    13 Jun 2020 07:01  -  14 Jun 2020 07:00  --------------------------------------------------------  IN: 2999 mL / OUT: 200 mL / NET: 2799 mL      Height (cm): 162.6 (06-14 @ 05:48)    Appearance: Intubated, sedated   HEENT:   Dry oral mucosa  Lymphatic: No lymphadenopathy  Cardiovascular: Normal S1 S2, No JVD, No murmurs  Respiratory: ventilated   Psychiatry: A & O x 0, sedated   Gastrointestinal:  Soft, Non-tender, + BS	  Skin: No rashes, No ecchymoses, No cyanosis	  Neurologic: Non-focal  Extremities: Decreased range of motion, + partial foot amputations  Vascular: Peripheral pulses palpable 2+ bilaterally      LABS:    CARDIAC MARKERS:                                13.0   33.08 )-----------( 365      ( 14 Jun 2020 05:36 )             41.9     06-14    131<L>  |  89<L>  |  28<H>  ----------------------------<  100<H>  4.5   |  <10<LL>  |  4.92<H>    Ca    9.2      14 Jun 2020 05:36  Phos  5.0     06-14  Mg     2.3     06-14    TPro  7.3  /  Alb  3.2<L>  /  TBili  0.8  /  DBili  x   /  AST  21  /  ALT  13  /  AlkPhos  95  06-13    proBNP:   Lipid Profile:   HgA1c:   TSH:             TELEMETRY: 	 SR   ECG:  	  RADIOLOGY:   DIAGNOSTIC TESTING:  [ ] Echocardiogram:  [ ]  Catheterization:  [ ] Stress Test:    OTHER: 	      Blood Gas Profile - Arterial (06.14.20 @ 05:32)    pH, Arterial: 7.31    pCO2, Arterial: 24 mmHg    pO2, Arterial: 85 mmHg    HCO3, Arterial: 12 mmol/L    Base Excess, Arterial: -12.7 mmol/L    Oxygen Saturation, Arterial: 94 %    Total CO2, Arterial: 13 mmoL/L    FIO2, Arterial: 45    Blood Gas Source Arterial: Arterial

## 2020-06-14 NOTE — PROGRESS NOTE ADULT - SUBJECTIVE AND OBJECTIVE BOX
Transplant Surgery - Multidisciplinary Rounds  --------------------------------------------------------------  DDRT / LDRT    Date:         POD#    Present:   Patient seen with multidisciplinary team including ( Transplant Surgeon: Dr. Andrade, Dr. Loredo, Dr. Valdez, Dr. Hernandez, Dr. Burnett, Dr. Johnson. Transplant Nephrologist: Dr. Tanner, Dr. Martinez, Dr. Huo, Dr. William.  Pharmacist: Matthew Alatorre, Meggan Adorno. NP/PAs: Rola Arciniega, Carleen Tarango, Aure Abraham, Laura Jaramillo,  Adama Nash and Megan Villareal and Surgical Resident during am rounds and examined with    *****.    Disciplines not in attendance will be notified of the plan.       Patient seen and examined on morning rounds. Pt underwent ex lap yesterday demonstrating diffusely necrotic bowel. She was closed and returned to SICU. Currently sedated and on pressors, intubated. Senior resident Dr. Colón had extensive conversation with family yesterday detailing patient's poor prognosis. Family again stating they want 1-2 days to decide GOC, as they did during conversation with Palliative Care colleague      Interval Events:  UNderwent ex lap yesterday and was found to have significant patchy necrosis of a large portion of small bowel in the SMA distribution, extending from the Ligament of Treitz to approximately 20cm from the IC valve.  The rest of the small bowel was clearly poorly perfused.   She also had a necrotic segment of transverse colon.   In light of this, we made the decision to stop the operation in light of its futility and the mortality associated with such a large resection.   Her abdomen was closed and she was taken to the SICU.    Potential Discharge date:    Education:  Medications    Plan of care:  See Below    MEDICATIONS  (STANDING):  chlorhexidine 0.12% Liquid 15 milliLiter(s) Oral Mucosa every 12 hours  chlorhexidine 4% Liquid 1 Application(s) Topical <User Schedule>  dexMEDEtomidine Infusion 0.4 MICROgram(s)/kG/Hr (8.39 mL/Hr) IV Continuous <Continuous>  dextrose 5% + sodium chloride 0.9%. 1000 milliLiter(s) (50 mL/Hr) IV Continuous <Continuous>  fentaNYL   Infusion.. 1 MICROgram(s)/kG/Hr (4.2 mL/Hr) IV Continuous <Continuous>  heparin   Injectable 5000 Unit(s) SubCutaneous every 8 hours  insulin lispro (HumaLOG) corrective regimen sliding scale   SubCutaneous every 6 hours  pantoprazole  Injectable 40 milliGRAM(s) IV Push daily  phenylephrine    Infusion 0.899 MICROgram(s)/kG/Min (28.3 mL/Hr) IV Continuous <Continuous>  piperacillin/tazobactam IVPB.. 3.375 Gram(s) IV Intermittent every 12 hours    MEDICATIONS  (PRN):  acetaminophen  IVPB .. 1000 milliGRAM(s) IV Intermittent once PRN Mild Pain (1 - 3)      PAST MEDICAL & SURGICAL HISTORY:  Hyperlipidemia  Diabetes  Hypertension  Osteomyelitis  Diabetic Neuropathy  Cellulitis of Foot  Edema of Both Legs  Diabetes: Type 2  History of Osteoarthritis  HTN - Hypertension  HLD (Hyperlipidemia)  Status post insertion of percutaneous endoscopic gastrostomy (PEG) tube  H/O tracheostomy  S/P amputation of lesser toe, right: 2013 right great toe, 2nd and 3rd right toes  S/P Amputation of Lesser Toe: Rt 1-3 metatarsal      Vital Signs Last 24 Hrs  T(C): 37 (14 Jun 2020 07:00), Max: 37.5 (13 Jun 2020 23:00)  T(F): 98.6 (14 Jun 2020 07:00), Max: 99.5 (13 Jun 2020 23:00)  HR: 108 (14 Jun 2020 09:00) (68 - 108)  BP: 86/39 (14 Jun 2020 08:00) (85/47 - 110/56)  BP(mean): 56 (14 Jun 2020 08:00) (56 - 80)  RR: 33 (14 Jun 2020 09:00) (19 - 34)  SpO2: 95% (14 Jun 2020 09:00) (95% - 100%)    I&O's Summary    13 Jun 2020 07:01  -  14 Jun 2020 07:00  --------------------------------------------------------  IN: 2999 mL / OUT: 200 mL / NET: 2799 mL                              13.0   33.08 )-----------( 365      ( 14 Jun 2020 05:36 )             41.9     06-14    131<L>  |  89<L>  |  28<H>  ----------------------------<  100<H>  4.5   |  <10<LL>  |  4.92<H>    Ca    9.2      14 Jun 2020 05:36  Phos  5.0     06-14  Mg     2.3     06-14    TPro  7.3  /  Alb  3.2<L>  /  TBili  0.8  /  DBili  x   /  AST  21  /  ALT  13  /  AlkPhos  95  06-13            Review of systems  intubated and sedated      PHYSICAL EXAM:  Constitutional: Well developed / well nourished  Eyes: Anicteric, PERRLA  ENMT: nc/at  Neck: central line *****************  Respiratory: CTA B/L  Cardiovascular: RRR  Gastrointestinal: Soft abdomen, mild tender to touch at surgical site, ND  Genitourinary: Urinary catheter in place*****Voiding spontaneously  Extremities: SCD's in place and working bilaterally, No edema  Vascular: Palpable dp pulses bilaterally  Neurological: A&O x3  Skin: Mild serosanguinous ephraim on wound dressing*******Wound open to air without erythema or evidence of infection noted  Musculoskeletal: Moving all extremities  Psychiatric: Responsive Transplant Surgery - daily note  --------------------------------------------------------------      Present:   Patient seen with Dr. Hernandez and NP Rola Arciniega.     HPI: 70 year old lady with history of ESRD on HD (MWF), Afib on Eliquis, HTN, HLD, DM, PVD, moderate AS, known to the vascular surgery service, now presenting c/o abdominal pain. Patient is a poor historian due to current state of pain and language barrier, despite using [employer] . Collateral history obtained from patients son. Patient states she has been experiencing worsening abdominal pain for 2-3 days, associated with several episodes of vomiting yesterday. She is still having bowel function, last BM this morning and reports she is passing flatus. Denies any other complaints including recent illness/sick contacts, fevers/chills, chest pain/shortness of breath, diarrhea/constipation. CT showing dilated and hypoenhancing mid to distal small bowel loops with pneumatosis and mesenteric venous air consistent with mesenteric ischemia, High-grade calcific stenosis at the origin of the SMA, no SMV thrombosis, no aortic dissection, nonspecific groundglass opacities at the lung bases    Interval Events:  Underwent ex lap yesterday and was found to have significant necrotic bowel.  Decision was made to stop operation, close her abdomen and transfer her to SICU for further care. At present, she is intubated, on ventilator support, sedated, on vasopressors. GOC were discussed with family who have asked for more time to finalize decision regarding further care.   - Lactate 12   - WBC 33  On Zosyn  - SCr 4.92  Na 131, K 4.5, Cl 89, CO2 <10.  Anuric  - hypoglycemic now on D10    MEDICATIONS  (STANDING):  chlorhexidine 0.12% Liquid 15 milliLiter(s) Oral Mucosa every 12 hours  chlorhexidine 4% Liquid 1 Application(s) Topical <User Schedule>  dexMEDEtomidine Infusion 0.4 MICROgram(s)/kG/Hr (8.39 mL/Hr) IV Continuous <Continuous>  dextrose 5% + sodium chloride 0.9%. 1000 milliLiter(s) (50 mL/Hr) IV Continuous <Continuous>  fentaNYL   Infusion.. 1 MICROgram(s)/kG/Hr (4.2 mL/Hr) IV Continuous <Continuous>  heparin   Injectable 5000 Unit(s) SubCutaneous every 8 hours  insulin lispro (HumaLOG) corrective regimen sliding scale   SubCutaneous every 6 hours  pantoprazole  Injectable 40 milliGRAM(s) IV Push daily  phenylephrine    Infusion 0.899 MICROgram(s)/kG/Min (28.3 mL/Hr) IV Continuous <Continuous>  piperacillin/tazobactam IVPB.. 3.375 Gram(s) IV Intermittent every 12 hours    MEDICATIONS  (PRN):  acetaminophen  IVPB .. 1000 milliGRAM(s) IV Intermittent once PRN Mild Pain (1 - 3)      PAST MEDICAL & SURGICAL HISTORY:  Hyperlipidemia  Diabetes  Hypertension  Osteomyelitis  Diabetic Neuropathy  Cellulitis of Foot  Edema of Both Legs  Diabetes: Type 2  History of Osteoarthritis  HTN - Hypertension  HLD (Hyperlipidemia)  Status post insertion of percutaneous endoscopic gastrostomy (PEG) tube  H/O tracheostomy  S/P amputation of lesser toe, right: 2013 right great toe, 2nd and 3rd right toes  S/P Amputation of Lesser Toe: Rt 1-3 metatarsal      Vital Signs Last 24 Hrs  T(C): 37 (14 Jun 2020 07:00), Max: 37.5 (13 Jun 2020 23:00)  T(F): 98.6 (14 Jun 2020 07:00), Max: 99.5 (13 Jun 2020 23:00)  HR: 108 (14 Jun 2020 09:00) (68 - 108)  BP: 86/39 (14 Jun 2020 08:00) (85/47 - 110/56)  BP(mean): 56 (14 Jun 2020 08:00) (56 - 80)  RR: 33 (14 Jun 2020 09:00) (19 - 34)  SpO2: 95% (14 Jun 2020 09:00) (95% - 100%)    I&O's Summary    13 Jun 2020 07:01  -  14 Jun 2020 07:00  --------------------------------------------------------  IN: 2999 mL / OUT: 200 mL / NET: 2799 mL                              13.0   33.08 )-----------( 365      ( 14 Jun 2020 05:36 )             41.9     06-14    131<L>  |  89<L>  |  28<H>  ----------------------------<  100<H>  4.5   |  <10<LL>  |  4.92<H>    Ca    9.2      14 Jun 2020 05:36  Phos  5.0     06-14  Mg     2.3     06-14    TPro  7.3  /  Alb  3.2<L>  /  TBili  0.8  /  DBili  x   /  AST  21  /  ALT  13  /  AlkPhos  95  06-13        EXAM:  CT ANGIO ABD PELV (W)AW IC                        EXAM:  CT ANGIO CHEST (W)AW IC                        PROCEDURE DATE:  06/13/2020    INTERPRETATION:  CLINICAL INFORMATION: Chest and abdominal pain. Evaluate for aortic dissection.   COMPARISON: CT chest 3/20/2018. CT abdomen and pelvis 5/25/2017.  PROCEDURE:   CT Angiography of the Chest, Abdomen and Pelvis.   Gated precontrast imaging was performed through the heart followed by gated CT Angiography of the heart with subsequent non-gated arterial phase imaging of the chest, abdomen and pelvis.  Intravenous contrast: 90 ml Omnipaque 350. 10 ml discarded.  Oral contrast: None.  Sagittal and coronal reformats were performed as well as 3D (MIP) reconstructions.  FINDINGS:  CHEST:   LUNGS AND LARGE AIRWAYS: Bilateral lower lobe peripheral groundglass opacities. Unchanged focal narrowing of the upper trachea. Patent central airways.   PLEURA: No pleural effusion.  VESSELS: No intramural hematoma, penetrating aortic ulcer or aortic dissection. Calcification of the aorta and its branches. No central pulmonary embolism. The great vessels are not dilated.  HEART: Heart size is enlarged. No pericardial effusion. Aortic valve calcification. Coronary artery calcification.  MEDIASTINUM AND ELO: No lymphadenopathy.  CHEST WALL AND LOWER NECK: Left central venous catheter with tip in SVC. Enlarged heterogenous thyroid gland.  ABDOMEN AND PELVIS:  LIVER: Within normal limits.  BILE DUCTS: Normal caliber.  GALLBLADDER: Cholelithiasis.  SPLEEN: Within normal limits.  PANCREAS: Within normal limits.  ADRENALS: Within normal limits.  KIDNEYS/URETERS: Mild perinephric stranding. No hydronephrosis.  BLADDER: Within normal limits.  REPRODUCTIVE ORGANS: Uterus and adnexa within normal limits.  BOWEL: Long segment (>40 cm) of dilated mid to distal hypoenhancing small bowel loops with associated pneumatosis. No bowel obstruction. Appendix is normal.  PERITONEUM: No ascites. No free air.  VESSELS: No penetrating aortic ulcer or aortic dissection. No aneurysm. Mesenteric venous gas surrounding hypoenhancing mid to distal small bowel loops. Calcification of the aorta and its branches with moderate stenosis at the origin of the celiac and inferior mesenteric arteries and high-grade stenosis at the origin of the SMA. Prior CT of 3/20/18 demonstrated moderate calcific stenosis at ostium of SMA.  No SMV thrombosis. The portal vein appears patent.  RETROPERITONEUM/LYMPH NODES: No lymphadenopathy.    ABDOMINAL WALL: Within normal limits.  BONES: Degenerative changes of the spine including compression deformity of L2 vertebral body, unchanged since 2017.  IMPRESSION:   Dilated and hypoenhancing mid to distal small bowel loops with pneumatosis and mesenteric venous air consistent with mesenteric ischemia.   High-grade calcific stenosis at the origin of the SMA. No SMV thrombosis.  No aortic dissection.   Nonspecific groundglass opacities at the lung bases.        Review of systems  intubated and sedated  Unable to obtain    PHYSICAL EXAM:  Constitutional: Well developed / well nourished  ENMT: nc/at  Neck: supple  Respiratory: CTA B/L  Cardiovascular: RRR  Gastrointestinal: Soft abdomen, distended, dressing intact  Genitourinary: anuric  Extremities: partial foot amputations  Vascular: Palpable dp pulses bilaterally  Neurological: sedated  Skin: midline dressing intact  Psychiatric: sedated

## 2020-06-14 NOTE — PROGRESS NOTE ADULT - ASSESSMENT
ASSESSMENT:  70 year old lady with history of ESRD on HD (MWF), Afib on Eliquis, HTN, HLD, DM, moderate AS, known to the vascular surgery service, now presenting c/o abdominal pain. Pt is intubated and sedated. Found to have mesenteric ischemia secondary to high grade SMA stenosis. After a GOC discussion was had, she was taken to the Operating Room where she underwent an exploratory laparotomy with identification of diffusely necrotic small bowel from ligament of Treitz to 20cm for IC valve, and necrotic segment of transverse colon. Her abdomen was closed and she was transferred to the Surgical Intensive Care Unit for further monitoring. Nephrology was consulted for Anuric NEHAL.     1. Anuric NEHAL secondary to ATN in the setting of ATN.   2. Hyponatremia.   3. Hyperkalemia   4. High anion gap metabolic acidosis.   5. Leukocytosis.       RECOMMEND:  - Prognosis is poor given the history of necrotic small bowel.   - Still treat if permitted by family. Treat hyperkalemia per protocol.   - Discontinue the IVF   - Urine studies are not an option as pt is anuric.   - Renal US will not change the outcome.  - BMP, CBC, Magnesium and phosphorus daily. If family still wants us to treat.   - No need for urine studies as prognosis is grim. Pt is actively dying.

## 2020-06-14 NOTE — CHART NOTE - NSCHARTNOTEFT_GEN_A_CORE
Spoke to  [id number] in [facility], to  Mr Lopez, who designates son Leonides Lopez to be the decision maker from now on.

## 2020-06-14 NOTE — PROGRESS NOTE ADULT - SUBJECTIVE AND OBJECTIVE BOX
HISTORY  70y Female history of ESRD on HD (MWF), Afib on Eliquis, HTN, HLD, DM, PVD, moderate AS, known to the vascular surgery service, now presenting c/o abdominal pain. Patient is a poor historian due to current state of pain and language barrier, despite using [employer] . Collateral history obtained from patients son. Patient states she has been experiencing worsening abdominal pain for 2-3 days, associated with several episodes of vomiting yesterday. She is still having bowel function, last BM this morning and reports she is passing flatus. Denies any other complaints including recent illness/sick contacts, fevers/chills, chest pain/shortness of breath, diarrhea/constipation.     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 After a GOC discussion was had, she was taken to the Operating Theatre where she underwent an exploratory laparotomy with identification of diffusely necrotic small bowel from ligament of Treitz to 20cm for IC valve, and necrotic segment of transverse colon. Her abdomen was closed and she was transferred to the Surgical Intensive Care Unit for further monitoring.         24 HOUR EVENTS:  - No overnight events        SUBJECTIVE/ROS:  [ ] A ten-point review of systems was otherwise negative except as noted.  [ ] Due to altered mental status/intubation, subjective information were not able to be obtained from the patient. History was obtained, to the extent possible, from review of the chart and collateral sources of information.      NEURO  Exam: awake, alert, oriented  Meds: acetaminophen  IVPB .. 1000 milliGRAM(s) IV Intermittent once PRN Mild Pain (1 - 3)  dexMEDEtomidine Infusion 0.4 MICROgram(s)/kG/Hr IV Continuous <Continuous>  HYDROmorphone  Injectable 0.5 milliGRAM(s) IV Push every 3 hours PRN Severe Pain (7 - 10)  HYDROmorphone  Injectable 0.25 milliGRAM(s) IV Push every 3 hours PRN Moderate Pain (4 - 6)    [x] Adequacy of sedation and pain control has been assessed and adjusted      RESPIRATORY  RR: 20 (06-13-20 @ 20:30) (19 - 25)  SpO2: 99% (06-14-20 @ 00:10) (88% - 100%)  Exam: unlabored, clear to auscultation bilaterally  Mechanical Ventilation: Mode: AC/ CMV (Assist Control/ Continuous Mandatory Ventilation), RR (machine): 14, RR (patient): 20, TV (machine): 450, FiO2: 45, PEEP: 5, ITime: 0.9, MAP: 12, PIP: 28  ABG - ( 13 Jun 2020 12:59 )  pH: 7.54  /  pCO2: 30    /  pO2: 173   / HCO3: 26    / Base Excess: 3.9   /  SaO2: 100     Lactate: x      [N/A] Extubation Readiness Assessed  Meds: none      CARDIOVASCULAR  HR: 83 (06-14-20 @ 00:10) (68 - 101)  BP: 99/51 (06-13-20 @ 20:00) (91/54 - 142/85)  BP(mean): 71 (06-13-20 @ 20:00) (69 - 80)  ABP: 107/40 (06-13-20 @ 20:30) (95/42 - 120/49)  ABP(mean): 60 (06-13-20 @ 20:30) (59 - 72)  VBG - ( 13 Jun 2020 05:09 )  pH: 7.42  /  pCO2: 49    /  pO2: 42    / HCO3: 31    / Base Excess: 6.2   /  SaO2: 74     Lactate: 1.5    Exam: regular rate and rhythm  Cardiac Rhythm: sinus  Perfusion     [x]Adequate   [ ]Inadequate  Mentation   [x]Normal       [ ]Reduced  Extremities  [x]Warm         [ ]Cool  Volume Status [ ]Hypervolemic [x]Euvolemic [ ]Hypovolemic  Meds: phenylephrine    Infusion 0.9 MICROgram(s)/kG/Min IV Continuous <Continuous>        GI/NUTRITION  Exam: soft, nontender, nondistended  Diet: NPO  Meds: pantoprazole  Injectable 40 milliGRAM(s) IV Push daily      GENITOURINARY  I&O's Detail    06-13 @ 07:01  -  06-14 @ 03:00  --------------------------------------------------------  IN:    dexmedetomidine Infusion: 155.4 mL    IV PiggyBack: 50 mL    lactated ringers.: 600 mL    phenylephrine   Infusion: 267.6 mL  Total IN: 1073 mL    OUT:  Total OUT: 0 mL    Total NET: 1073 mL        Weight (kg): 83.9 (06-13 @ 08:47)  06-13    132<L>  |  89<L>  |  20  ----------------------------<  129<H>  3.0<L>   |  22  |  3.76<H>    Ca    8.9      13 Jun 2020 13:01  Phos  2.5     06-13  Mg     2.1     06-13    TPro  7.3  /  Alb  3.2<L>  /  TBili  0.8  /  DBili  x   /  AST  21  /  ALT  13  /  AlkPhos  95  06-13    [ ] Raphael catheter, indication: N/A  Meds: lactated ringers. 1000 milliLiter(s) IV Continuous <Continuous>        HEMATOLOGIC  Meds: heparin   Injectable 5000 Unit(s) SubCutaneous every 8 hours    [x] VTE Prophylaxis                        12.0   30.90 )-----------( 416      ( 13 Jun 2020 13:01 )             38.0     PT/INR - ( 13 Jun 2020 01:33 )   PT: 18.7 sec;   INR: 1.60 ratio         PTT - ( 13 Jun 2020 01:33 )  PTT:26.1 sec  Transfusion     [ ] PRBC   [ ] Platelets   [ ] FFP   [ ] Cryoprecipitate      INFECTIOUS DISEASES  WBC Count: 30.90 K/uL (06-13 @ 13:01)  WBC Count: 22.62 K/uL (06-13 @ 08:14)    RECENT CULTURES:    Meds: piperacillin/tazobactam IVPB.. 3.375 Gram(s) IV Intermittent every 12 hours        ENDOCRINE  CAPILLARY BLOOD GLUCOSE      POCT Blood Glucose.: 150 mg/dL (13 Jun 2020 17:37)    Meds: insulin lispro (HumaLOG) corrective regimen sliding scale   SubCutaneous every 6 hours        ACCESS DEVICES:  [x] Peripheral IV  [ ] Central Venous Line	[ ] R	[ ] L	[ ] IJ	[ ] Fem	[ ] SC	Placed:   [ ] Arterial Line		[ ] R	[ ] L	[ ] Fem	[ ] Rad	[ ] Ax	Placed:   [ ] PICC:					[ ] Mediport  [ ] Urinary Catheter, Date Placed:   [x] Necessity of urinary, arterial, and venous catheters discussed    OTHER MEDICATIONS:  chlorhexidine 0.12% Liquid 15 milliLiter(s) Oral Mucosa every 12 hours  chlorhexidine 4% Liquid 1 Application(s) Topical <User Schedule>      CODE STATUS:      IMAGING:

## 2020-06-14 NOTE — PROGRESS NOTE ADULT - ATTENDING COMMENTS
Pt seen and examined.  Chart reviewed.  Resident note confirmed.  Pt is a 70 year old female with a medical history significant for CAD/HTN/Afib/PVD/ESRD on HD (MWF)/DM who presented to [facility] with abdominal pain. On imaging, mesenteric ischemia secondary to high grade SMA stenosis was found. She  undernent an exploratory laparotomy with findings of intestinal necrosis. Findings are not compatable with life. A palliative care consult has been called.    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   MOLST form signed.
sepsis from ischemic bowel, not amenable to resection due to extent of ischemia  supportive care as per SICU
Advanced care planning was discussed with patient and family.  Advanced care planning forms were reviewed and discussed as appropriate.  Differential diagnosis and plan of care discussed with patient after the evaluation.   Pain assessed and judicious use of narcotics when appropriate was discussed.  Importance of Fall prevention discussed.  Counseling on Smoking and Alcohol cessation was offered when appropriate.  Counseling on Diet, exercise, and medication compliance was done.
I Rush Hoff MD have personally  seen and examined the patient today and have noted the findings and formulated the plan of care.  I Rush Hoff MD have personally seen and examined the patient at bedside today at  11am

## 2020-06-14 NOTE — CHART NOTE - NSCHARTNOTEFT_GEN_A_CORE
Reached out to the family  Attempt unsuccessful  PT seen on floor  Will follow up Reached out to the family  Attempt unsuccessful  PT seen on floor  Will follow up      Addendum,  I called the Son - Leonides twice, left him a voicemail to call back. No call back and then re-attempted to call him. Was able to reach him however he wanted to talk later.     Thank you  Yevgeniy Phoenix,  PGY-4  Palliative medicine fellow

## 2020-06-14 NOTE — PROGRESS NOTE ADULT - ASSESSMENT
NEURO: Pain control, sedation.    - Sedation with Precedex.   - Pain control with IV tylenol, dilaudid.     RESPIRATORY: Mechanical ventilation.   - 450/14/5/45.   - Monitor CXR.     CARDIOVASCULAR: Hypotension in setting of septic shock.   - On Neosynephrine, wean as tolerated.     GI/NUTRITION: Mesenteric Ischemia.   - Nil Per Os, continue IV Fluids.   - Stress ulcer prophylaxis with Protonix.     GENITOURINARY/RENAL: ESRD on HD.   - IVF LR @ 75cc/hr.     HEMATOLOGIC: Mesenteric ischemia.   - DVT ppx: subq heparin, sequential compression devices.     INFECTIOUS DISEASE: Mesenteric ischemia.  - Intravenous antibiotic administration: Zosyn.     ENDOCRINE: DM   - ISS.      DISPO: Surgical Intensive Care Unit with Palliative Care Unit Consultation

## 2020-06-14 NOTE — PROGRESS NOTE ADULT - SUBJECTIVE AND OBJECTIVE BOX
Surgery Daily Progress Note     69yo Female    --------------------------------------------------------------------------------------------------------------------  SUBJECTIVE / 24H EVENTS  Patient seen and examined on morning rounds. Pt underwent ex lap yesterday demonstrating diffusely necrotic bowel. She was closed and returned to SICU. Currently sedated and on pressors, intubated. Senior resident Dr. Colón had extensive conversation with family yesterday detailing patient's poor prognosis. Family again stating they want 1-2 days to decide GOC, as they did during conversation with Palliative Care colleagues.       OBJECTIVE:    VITAL SIGNS:  T(C): 37 (20 @ 07:00), Max: 37.5 (20 @ 23:00)  HR: 108 (20 @ 09:00) (68 - 108)  BP: 86/39 (20 @ 08:00) (85/47 - 110/56)  RR: 33 (20 @ 09:00) (19 - 34)  SpO2: 95% (20 @ 09:00) (95% - 100%)  Daily     Daily Weight in k.2 (2020 00:30)  POCT Blood Glucose.: 72 mg/dL (20 @ 08:11)  POCT Blood Glucose.: 150 mg/dL (20 @ 17:37)      PHYSICAL EXAM:  Gen: NAD  Resp: intubated  Card: tachy, hypotensive  GI: Distended, soft, midline dressing c/d/i  Ext: Warm, well perfused      20 @ 07:01  -  20 @ 07:00  --------------------------------------------------------  IN:    dexmedetomidine Infusion: 407.4 mL    IV PiggyBack: 50 mL    lactated ringers: 300 mL    lactated ringers.: 1200 mL    phenylephrine   Infusion: 1041.6 mL  Total IN: 2999 mL    OUT:    Nasoenteral Tube: 200 mL  Total OUT: 200 mL    Total NET: 2799 mL          LAB VALUES:      131<L>  |  89<L>  |  28<H>  ----------------------------<  100<H>  4.5   |  <10<LL>  |  4.92<H>    Ca    9.2      2020 05:36  Phos  5.0       Mg     2.3         TPro  7.3  /  Alb  3.2<L>  /  TBili  0.8  /  DBili  x   /  AST  21  /  ALT  13  /  AlkPhos  95                                 13.0   33.08 )-----------( 365      ( 2020 05:36 )             41.9     LIVER FUNCTIONS - ( 2020 13:01 )  Alb: 3.2 g/dL / Pro: 7.3 g/dL / ALK PHOS: 95 U/L / ALT: 13 U/L / AST: 21 U/L / GGT: x           PT/INR - ( 2020 01:33 )   PT: 18.7 sec;   INR: 1.60 ratio         PTT - ( 2020 01:33 )  PTT:26.1 sec  ABG - ( 2020 05:32 )  pH, Arterial: 7.31  pH, Blood: x     /  pCO2: 24    /  pO2: 85    / HCO3: 12    / Base Excess: -12.7 /  SaO2: 94                        MICROBIOLOGY:      RADIOLOGY:  PACS Image: Image(s) Available (20 @ 12:42)        MEDICATIONS  (STANDING):  chlorhexidine 0.12% Liquid 15 milliLiter(s) Oral Mucosa every 12 hours  chlorhexidine 4% Liquid 1 Application(s) Topical <User Schedule>  dexMEDEtomidine Infusion 0.4 MICROgram(s)/kG/Hr (8.39 mL/Hr) IV Continuous <Continuous>  dextrose 5% + sodium chloride 0.9%. 1000 milliLiter(s) (50 mL/Hr) IV Continuous <Continuous>  heparin   Injectable 5000 Unit(s) SubCutaneous every 8 hours  insulin lispro (HumaLOG) corrective regimen sliding scale   SubCutaneous every 6 hours  pantoprazole  Injectable 40 milliGRAM(s) IV Push daily  phenylephrine    Infusion 0.899 MICROgram(s)/kG/Min (28.3 mL/Hr) IV Continuous <Continuous>  piperacillin/tazobactam IVPB.. 3.375 Gram(s) IV Intermittent every 12 hours    MEDICATIONS  (PRN):  acetaminophen  IVPB .. 1000 milliGRAM(s) IV Intermittent once PRN Mild Pain (1 - 3)  HYDROmorphone  Injectable 0.5 milliGRAM(s) IV Push every 3 hours PRN Severe Pain (7 - 10)  HYDROmorphone  Injectable 0.25 milliGRAM(s) IV Push every 3 hours PRN Moderate Pain (4 - 6) Surgery Daily Progress Note     71yo Female s.p expl laparotomy  for dead bowel    --------------------------------------------------------------------------------------------------------------------  SUBJECTIVE / 24H EVENTS  Patient seen and examined on morning rounds. Pt underwent ex lap yesterday demonstrating diffusely necrotic bowel. She was closed and returned to SICU. Currently sedated and on pressors, intubated. Senior resident Dr. Colón had extensive conversation with family yesterday detailing patient's poor prognosis. Family again stating they want 1-2 days to decide GOC, as they did during conversation with Palliative Care colleagues.       OBJECTIVE:    VITAL SIGNS:  T(C): 37 (20 @ 07:00), Max: 37.5 (20 @ 23:00)  HR: 108 (20 @ 09:00) (68 - 108)  BP: 86/39 (20 @ 08:00) (85/47 - 110/56)  RR: 33 (20 @ 09:00) (19 - 34)  SpO2: 95% (20 @ 09:00) (95% - 100%)  Daily     Daily Weight in k.2 (2020 00:30)  POCT Blood Glucose.: 72 mg/dL (20 @ 08:11)  POCT Blood Glucose.: 150 mg/dL (20 @ 17:37)      PHYSICAL EXAM:  Gen: NAD  Resp: intubated  Card: tachy, hypotensive  GI: Distended, soft, midline dressing c/d/i  Ext: Warm, well perfused      20 @ 07:01  -  20 @ 07:00  --------------------------------------------------------  IN:    dexmedetomidine Infusion: 407.4 mL    IV PiggyBack: 50 mL    lactated ringers: 300 mL    lactated ringers.: 1200 mL    phenylephrine   Infusion: 1041.6 mL  Total IN: 2999 mL    OUT:    Nasoenteral Tube: 200 mL  Total OUT: 200 mL    Total NET: 2799 mL      LAB VALUES:      131<L>  |  89<L>  |  28<H>  ----------------------------<  100<H>  4.5   |  <10<LL>  |  4.92<H>    Ca    9.2      2020 05:36  Phos  5.0       Mg     2.3         TPro  7.3  /  Alb  3.2<L>  /  TBili  0.8  /  DBili  x   /  AST  21  /  ALT  13  /  AlkPhos  95                                 13.0   33.08 )-----------( 365      ( 2020 05:36 )             41.9     LIVER FUNCTIONS - ( 2020 13:01 )  Alb: 3.2 g/dL / Pro: 7.3 g/dL / ALK PHOS: 95 U/L / ALT: 13 U/L / AST: 21 U/L / GGT: x           PT/INR - ( 2020 01:33 )   PT: 18.7 sec;   INR: 1.60 ratio         PTT - ( 2020 01:33 )  PTT:26.1 sec  ABG - ( 2020 05:32 )  pH, Arterial: 7.31  pH, Blood: x     /  pCO2: 24    /  pO2: 85    / HCO3: 12    / Base Excess: -12.7 /  SaO2: 94                        MICROBIOLOGY:      RADIOLOGY:  PACS Image: Image(s) Available (20 @ 12:42)        MEDICATIONS  (STANDING):  chlorhexidine 0.12% Liquid 15 milliLiter(s) Oral Mucosa every 12 hours  chlorhexidine 4% Liquid 1 Application(s) Topical <User Schedule>  dexMEDEtomidine Infusion 0.4 MICROgram(s)/kG/Hr (8.39 mL/Hr) IV Continuous <Continuous>  dextrose 5% + sodium chloride 0.9%. 1000 milliLiter(s) (50 mL/Hr) IV Continuous <Continuous>  heparin   Injectable 5000 Unit(s) SubCutaneous every 8 hours  insulin lispro (HumaLOG) corrective regimen sliding scale   SubCutaneous every 6 hours  pantoprazole  Injectable 40 milliGRAM(s) IV Push daily  phenylephrine    Infusion 0.899 MICROgram(s)/kG/Min (28.3 mL/Hr) IV Continuous <Continuous>  piperacillin/tazobactam IVPB.. 3.375 Gram(s) IV Intermittent every 12 hours    MEDICATIONS  (PRN):  acetaminophen  IVPB .. 1000 milliGRAM(s) IV Intermittent once PRN Mild Pain (1 - 3)  HYDROmorphone  Injectable 0.5 milliGRAM(s) IV Push every 3 hours PRN Severe Pain (7 - 10)  HYDROmorphone  Injectable 0.25 milliGRAM(s) IV Push every 3 hours PRN Moderate Pain (4 - 6)

## 2020-06-14 NOTE — PROGRESS NOTE ADULT - ASSESSMENT
Pt is a 70 year old lady with history of ESRD on HD (MWF), Afib on Eliquis, HTN, HLD, DM, PVD, moderate AS, presenting with acute mesenteric ischemia. Now POD#1 s/p ex-lap demonstrating diffusely necrotic bowel. Pt is moribund with rising lactate and remains intubate and on pressors, pending family decision to transition pt to palliative care.    PLAN:  - f/u family GOC conversation  - potential transfer to PCU  - care per SICU    Vascular Surgery  [id number] Pt is a 70 year old lady with history of ESRD on HD (MWF), Afib on Eliquis, HTN, HLD, DM, PVD, moderate AS, presenting with acute mesenteric ischemia. Now POD#1 s/p ex-lap demonstrating diffusely necrotic bowel. Pt is moribund with rising lactate and remains intubate and on pressors, pending family decision to transition pt to palliative care.    PLAN:  prognosis w dead gut poor leading to death   - f/u family GOC conversation  - potential transfer to PCU  - care per SICU    Vascular Surgery  [id number]

## 2020-06-14 NOTE — PROGRESS NOTE ADULT - SUBJECTIVE AND OBJECTIVE BOX
Seen intubated and sedated. On phenylephrine, Precedex and fentanyl.  Pt underwent ex lap yesterday demonstrating diffusely necrotic bowel. She was closed and returned to SICU.      Review of systems: Unable to obtain as he is intubated and sedated.     acetaminophen  IVPB .. 1000 milliGRAM(s) IV Intermittent once PRN  chlorhexidine 0.12% Liquid 15 milliLiter(s) Oral Mucosa every 12 hours  chlorhexidine 4% Liquid 1 Application(s) Topical <User Schedule>  dexMEDEtomidine Infusion 0.4 MICROgram(s)/kG/Hr IV Continuous <Continuous>  dextrose 10%. 1000 milliLiter(s) IV Continuous <Continuous>  fentaNYL   Infusion.. 1 MICROgram(s)/kG/Hr IV Continuous <Continuous>  heparin   Injectable 5000 Unit(s) SubCutaneous every 8 hours  pantoprazole  Injectable 40 milliGRAM(s) IV Push daily  phenylephrine    Infusion 0.899 MICROgram(s)/kG/Min IV Continuous <Continuous>  piperacillin/tazobactam IVPB.. 3.375 Gram(s) IV Intermittent every 12 hours      VITAL:  T(C): , Max: 37.5 (06-13-20 @ 23:00)  T(F): , Max: 99.5 (06-13-20 @ 23:00)  HR: 93 (06-14-20 @ 18:45)  BP: 83/54 (06-14-20 @ 18:00)  BP(mean): 64 (06-14-20 @ 18:00)  RR: 29 (06-14-20 @ 18:45)  SpO2: 86% (06-14-20 @ 18:45)      06-13-20 @ 07:01  -  06-14-20 @ 07:00  --------------------------------------------------------  IN: 3024 mL / OUT: 200 mL / NET: 2824 mL    06-14-20 @ 07:01  -  06-14-20 @ 19:10  --------------------------------------------------------  IN: 2333.6 mL / OUT: 300 mL / NET: 2033.6 mL        PHYSICAL EXAM:  General: Intubated. Sedated in critical condition.   HEENT:  NCAT; PERRLA  Neck: No JVD; supple  Respiratory: CTA-b/l  Cardiac: Tachycardic.   Gastrointestinal: BS+, soft, NT/ND  Urologic: No mondragon  Extremities: No peripheral edema      LABS:                          13.0   33.08 )-----------( 365 ( 14 Jun 2020 05:36 )             41.9     Na(132)/K(5.4)/Cl(91)/HCO3(<10)/BUN(32)/Cr(5.41)Glu(44)/Ca(8.9)/Mg(--)/PO4(--)    06-14 @ 11:59  Na(131)/K(4.5)/Cl(89)/HCO3(<10)/BUN(28)/Cr(4.92)Glu(100)/Ca(9.2)/Mg(2.3)/PO4(5.0)    06-14 @ 05:36  Na(132)/K(3.0)/Cl(89)/HCO3(22)/BUN(20)/Cr(3.76)Glu(129)/Ca(8.9)/Mg(2.1)/PO4(2.5)    06-13 @ 13:01  Na(131)/K(3.6)/Cl(88)/HCO3(24)/BUN(18)/Cr(3.42)Glu(120)/Ca(9.3)/Mg(2.3)/PO4(3.0)    06-13 @ 08:14  Na(132)/K(2.9)/Cl(88)/HCO3(25)/BUN(17)/Cr(3.27)Glu(148)/Ca(9.6)/Mg(--)/PO4(--)    06-13 @ 04:14  Na(123)/K(>9.0)/Cl(86)/HCO3(27)/BUN(16)/Cr(2.62)Glu(143)/Ca(9.5)/Mg(--)/PO4(--)    06-13 @ 01:33    06-14    132<L>  |  91<L>  |  32<H>  ----------------------------<  44<LL>  5.4<H>   |  <10<LL>  |  5.41<H>    Ca    8.9      14 Jun 2020 11:59  Phos  5.0     06-14  Mg     2.3     06-14    TPro  7.3  /  Alb  3.2<L>  /  TBili  0.8  /  DBili  x   /  AST  21  /  ALT  13  /  AlkPhos  95  06-13

## 2020-06-14 NOTE — CHART NOTE - NSCHARTNOTEFT_GEN_A_CORE
Spoke to patient's son Leonides Lopez, who was updated about patient's current medical diagnosis and prognosis.   Patient has intestinal necrosis, now intubated on vasopressors, elevated lactate, and hypoglycemic.    Explained to patient's son, of patient's nil recovery, and will go into arrest anytime.   Mr Leonides Lopez, agreed on DNR, but does not want to extubate patient.   However will discuss with the rest of the family and father regarding vasopressors and other treatment.

## 2020-06-14 NOTE — PROGRESS NOTE ADULT - PROBLEM SELECTOR PLAN 1
non operable   prognosis poor  I discussed with family.   Due to [employer] law, do not extubate patient,. Cont with basic medical care/support until patient passes on her own.
I Rush Hoff MD have seen and examined the patient today and agree with  the  evaluation, assessment and plan of the surgical house officer

## 2020-06-15 NOTE — DISCHARGE NOTE FOR THE EXPIRED PATIENT - HOSPITAL COURSE
70 year old female history of ESRD on HD (MWF), Afib on Eliquis, HTN, HLD, DM, PVD, moderate AS, known to the vascular surgery service, presented c/o abdominal pain. Patient is a poor historian due to current state of pain and language barrier, despite using [employer] . Collateral history obtained from patients son. Patient states she has been experiencing worsening abdominal pain for 2-3 days, associated with several episodes of vomiting. Upon presentation to ED, she was still having bowel function, last BM morning prior to presentation and reports she is passing flatus. Denies any other complaints including recent illness/sick contacts, fevers/chills, chest pain/shortness of breath, diarrhea/constipation.     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 After a GOC discussion was had, she was taken to the Operating Theatre where she underwent an exploratory laparotomy with identification of diffusely necrotic small bowel from ligament of Treitz to 20cm for IC valve, and necrotic segment of transverse colon. Her abdomen was closed and she was transferred to the Surgical Intensive Care Unit for further monitoring. GOC discussion held with family explaining patient has no chance of survival with extent of necrotic small bowel found in OR. After taking time to process the prognosis, family elected for comfort care on 06/14. Vasopressor dose was capped and in the evening of 06/14, patient went into asystolic arrest. On exam, no palpable pulse, pupils fixed and dilated bilaterally and no spontaneous breaths initiated by patient. Patient pronounced dead at 11:43 PM

## 2020-06-21 ENCOUNTER — APPOINTMENT (OUTPATIENT)
Dept: DISASTER EMERGENCY | Facility: CLINIC | Age: 70
End: 2020-06-21

## 2020-06-24 ENCOUNTER — APPOINTMENT (OUTPATIENT)
Dept: VASCULAR SURGERY | Facility: HOSPITAL | Age: 70
End: 2020-06-24

## 2020-07-07 ENCOUNTER — APPOINTMENT (OUTPATIENT)
Dept: VASCULAR SURGERY | Facility: CLINIC | Age: 70
End: 2020-07-07

## 2020-07-26 NOTE — H&P ADULT - NSHPLABSRESULTS_GEN_ALL_CORE
16
Labs personally reviewed:                          9.8    11.5  )-----------( 349      ( 21 Nov 2018 16:49 )             28.5     11-21    136  |  92<L>  |  104<H>  ----------------------------<  170<H>  6.4<HH>   |  21<L>  |  8.70<H>    Ca    7.8<L>      21 Nov 2018 16:49    TPro  7.4  /  Alb  3.8  /  TBili  0.2  /  DBili  x   /  AST  <5<L>  /  ALT  10  /  AlkPhos  112  11-21        LIVER FUNCTIONS - ( 21 Nov 2018 16:49 )  Alb: 3.8 g/dL / Pro: 7.4 g/dL / ALK PHOS: 112 U/L / ALT: 10 U/L / AST: <5 U/L / GGT: x               CAPILLARY BLOOD GLUCOSE          Imaging:  CXR personally reviewed: +pulm edema    EKG personally reviewed: nsr, no peak t waves

## 2020-08-12 NOTE — PROGRESS NOTE ADULT - PROBLEM SELECTOR PROBLEM 3
I redid the PA and requested that Sclerderma be added as a note in the PA for approval. Explained that as far as we knew patient has already been approved for this medication from another pulmonologist's office.   Anemia due to chronic kidney disease

## 2020-08-27 NOTE — ED ADULT NURSE NOTE - PRO INTERPRETER NEED 2
English Hydroxyzine Counseling: Patient advised that the medication is sedating and not to drive a car after taking this medication.  Patient informed of potential adverse effects including but not limited to dry mouth, urinary retention, and blurry vision.  The patient verbalized understanding of the proper use and possible adverse effects of hydroxyzine.  All of the patient's questions and concerns were addressed. Cyclosporine Pregnancy And Lactation Text: This medication is Pregnancy Category C and it isn't know if it is safe during pregnancy. This medication is excreted in breast milk. Ivermectin Pregnancy And Lactation Text: This medication is Pregnancy Category C and it isn't known if it is safe during pregnancy. It is also excreted in breast milk. Cosentyx Counseling:  I discussed with the patient the risks of Cosentyx including but not limited to worsening of Crohn's disease, immunosuppression, allergic reactions and infections.  The patient understands that monitoring is required including a PPD at baseline and must alert us or the primary physician if symptoms of infection or other concerning signs are noted. Rituxan Counseling:  I discussed with the patient the risks of Rituxan infusions. Side effects can include infusion reactions, severe drug rashes including mucocutaneous reactions, reactivation of latent hepatitis and other infections and rarely progressive multifocal leukoencephalopathy.  All of the patient's questions and concerns were addressed. Cosentyx Pregnancy And Lactation Text: This medication is Pregnancy Category B and is considered safe during pregnancy. It is unknown if this medication is excreted in breast milk. Gabapentin Pregnancy And Lactation Text: This medication is Pregnancy Category C and isn't considered safe during pregnancy. It is excreted in breast milk. Hydroquinone Counseling:  Patient advised that medication may result in skin irritation, lightening (hypopigmentation), dryness, and burning.  In the event of skin irritation, the patient was advised to reduce the amount of the drug applied or use it less frequently.  Rarely, spots that are treated with hydroquinone can become darker (pseudoochronosis).  Should this occur, patient instructed to stop medication and call the office. The patient verbalized understanding of the proper use and possible adverse effects of hydroquinone.  All of the patient's questions and concerns were addressed. Spironolactone Pregnancy And Lactation Text: This medication can cause feminization of the male fetus and should be avoided during pregnancy. The active metabolite is also found in breast milk. Glycopyrrolate Pregnancy And Lactation Text: This medication is Pregnancy Category B and is considered safe during pregnancy. It is unknown if it is excreted breast milk. Bactrim Pregnancy And Lactation Text: This medication is Pregnancy Category D and is known to cause fetal risk.  It is also excreted in breast milk. Methotrexate Counseling:  Patient counseled regarding adverse effects of methotrexate including but not limited to nausea, vomiting, abnormalities in liver function tests. Patients may develop mouth sores, rash, diarrhea, and abnormalities in blood counts. The patient understands that monitoring is required including LFT's and blood counts.  There is a rare possibility of scarring of the liver and lung problems that can occur when taking methotrexate. Persistent nausea, loss of appetite, pale stools, dark urine, cough, and shortness of breath should be reported immediately. Patient advised to discontinue methotrexate treatment at least three months before attempting to become pregnant.  I discussed the need for folate supplements while taking methotrexate.  These supplements can decrease side effects during methotrexate treatment. The patient verbalized understanding of the proper use and possible adverse effects of methotrexate.  All of the patient's questions and concerns were addressed. Dapsone Counseling: I discussed with the patient the risks of dapsone including but not limited to hemolytic anemia, agranulocytosis, rashes, methemoglobinemia, kidney failure, peripheral neuropathy, headaches, GI upset, and liver toxicity.  Patients who start dapsone require monitoring including baseline LFTs and weekly CBCs for the first month, then every month thereafter.  The patient verbalized understanding of the proper use and possible adverse effects of dapsone.  All of the patient's questions and concerns were addressed. Humira Counseling:  I discussed with the patient the risks of adalimumab including but not limited to myelosuppression, immunosuppression, autoimmune hepatitis, demyelinating diseases, lymphoma, and serious infections.  The patient understands that monitoring is required including a PPD at baseline and must alert us or the primary physician if symptoms of infection or other concerning signs are noted. Acitretin Counseling:  I discussed with the patient the risks of acitretin including but not limited to hair loss, dry lips/skin/eyes, liver damage, hyperlipidemia, depression/suicidal ideation, photosensitivity.  Serious rare side effects can include but are not limited to pancreatitis, pseudotumor cerebri, bony changes, clot formation/stroke/heart attack.  Patient understands that alcohol is contraindicated since it can result in liver toxicity and significantly prolong the elimination of the drug by many years. Picato Counseling:  I discussed with the patient the risks of Picato including but not limited to erythema, scaling, itching, weeping, crusting, and pain. Birth Control Pills Counseling: Birth Control Pill Counseling: I discussed with the patient the potential side effects of OCPs including but not limited to increased risk of stroke, heart attack, thrombophlebitis, deep venous thrombosis, hepatic adenomas, breast changes, GI upset, headaches, and depression.  The patient verbalized understanding of the proper use and possible adverse effects of OCPs. All of the patient's questions and concerns were addressed. Gabapentin Counseling: I discussed with the patient the risks of gabapentin including but not limited to dizziness, somnolence, fatigue and ataxia. Enbrel Counseling:  I discussed with the patient the risks of etanercept including but not limited to myelosuppression, immunosuppression, autoimmune hepatitis, demyelinating diseases, lymphoma, and infections.  The patient understands that monitoring is required including a PPD at baseline and must alert us or the primary physician if symptoms of infection or other concerning signs are noted. Quinolones Counseling:  I discussed with the patient the risks of fluoroquinolones including but not limited to GI upset, allergic reaction, drug rash, diarrhea, dizziness, photosensitivity, yeast infections, liver function test abnormalities, tendonitis/tendon rupture. Arava Counseling:  Patient counseled regarding adverse effects of Arava including but not limited to nausea, vomiting, abnormalities in liver function tests. Patients may develop mouth sores, rash, diarrhea, and abnormalities in blood counts. The patient understands that monitoring is required including LFTs and blood counts.  There is a rare possibility of scarring of the liver and lung problems that can occur when taking methotrexate. Persistent nausea, loss of appetite, pale stools, dark urine, cough, and shortness of breath should be reported immediately. Patient advised to discontinue Arava treatment and consult with a physician prior to attempting conception. The patient will have to undergo a treatment to eliminate Arava from the body prior to conception. Solaraze Counseling:  I discussed with the patient the risks of Solaraze including but not limited to erythema, scaling, itching, weeping, crusting, and pain. Protopic Counseling: Patient may experience a mild burning sensation during topical application. Protopic is not approved in children less than 2 years of age. There have been case reports of hematologic and skin malignancies in patients using topical calcineurin inhibitors although causality is questionable. Sski Pregnancy And Lactation Text: This medication is Pregnancy Category D and isn't considered safe during pregnancy. It is excreted in breast milk. Carac Pregnancy And Lactation Text: This medication is Pregnancy Category X and contraindicated in pregnancy and in women who may become pregnant. It is unknown if this medication is excreted in breast milk. Thalidomide Counseling: I discussed with the patient the risks of thalidomide including but not limited to birth defects, anxiety, weakness, chest pain, dizziness, cough and severe allergy. Topical Clindamycin Counseling: Patient counseled that this medication may cause skin irritation or allergic reactions.  In the event of skin irritation, the patient was advised to reduce the amount of the drug applied or use it less frequently.   The patient verbalized understanding of the proper use and possible adverse effects of clindamycin.  All of the patient's questions and concerns were addressed. Clindamycin Counseling: I counseled the patient regarding use of clindamycin as an antibiotic for prophylactic and/or therapeutic purposes. Clindamycin is active against numerous classes of bacteria, including skin bacteria. Side effects may include nausea, diarrhea, gastrointestinal upset, rash, hives, yeast infections, and in rare cases, colitis. Minocycline Counseling: Patient advised regarding possible photosensitivity and discoloration of the teeth, skin, lips, tongue and gums.  Patient instructed to avoid sunlight, if possible.  When exposed to sunlight, patients should wear protective clothing, sunglasses, and sunscreen.  The patient was instructed to call the office immediately if the following severe adverse effects occur:  hearing changes, easy bruising/bleeding, severe headache, or vision changes.  The patient verbalized understanding of the proper use and possible adverse effects of minocycline.  All of the patient's questions and concerns were addressed. Hydroxychloroquine Counseling:  I discussed with the patient that a baseline ophthalmologic exam is needed at the start of therapy and every year thereafter while on therapy. A CBC may also be warranted for monitoring.  The side effects of this medication were discussed with the patient, including but not limited to agranulocytosis, aplastic anemia, seizures, rashes, retinopathy, and liver toxicity. Patient instructed to call the office should any adverse effect occur.  The patient verbalized understanding of the proper use and possible adverse effects of Plaquenil.  All the patient's questions and concerns were addressed. Cellcept Counseling:  I discussed with the patient the risks of mycophenolate mofetil including but not limited to infection/immunosuppression, GI upset, hypokalemia, hypercholesterolemia, bone marrow suppression, lymphoproliferative disorders, malignancy, GI ulceration/bleed/perforation, colitis, interstitial lung disease, kidney failure, progressive multifocal leukoencephalopathy, and birth defects.  The patient understands that monitoring is required including a baseline creatinine and regular CBC testing. In addition, patient must alert us immediately if symptoms of infection or other concerning signs are noted. Minoxidil Pregnancy And Lactation Text: This medication has not been assigned a Pregnancy Risk Category but animal studies failed to show danger with the topical medication. It is unknown if the medication is excreted in breast milk. High Dose Vitamin A Counseling: Side effects reviewed, pt to contact office should one occur. SSKI Counseling:  I discussed with the patient the risks of SSKI including but not limited to thyroid abnormalities, metallic taste, GI upset, fever, headache, acne, arthralgias, paraesthesias, lymphadenopathy, easy bleeding, arrhythmias, and allergic reaction. Terbinafine Pregnancy And Lactation Text: This medication is Pregnancy Category B and is considered safe during pregnancy. It is also excreted in breast milk and breast feeding isn't recommended. Xeljanz Counseling: I discussed with the patient the risks of Xeljanz therapy including increased risk of infection, liver issues, headache, diarrhea, or cold symptoms. Live vaccines should be avoided. They were instructed to call if they have any problems. Ilumya Pregnancy And Lactation Text: The risk during pregnancy and breastfeeding is uncertain with this medication. Erythromycin Pregnancy And Lactation Text: This medication is Pregnancy Category B and is considered safe during pregnancy. It is also excreted in breast milk. Erythromycin Counseling:  I discussed with the patient the risks of erythromycin including but not limited to GI upset, allergic reaction, drug rash, diarrhea, increase in liver enzymes, and yeast infections. Simponi Counseling:  I discussed with the patient the risks of golimumab including but not limited to myelosuppression, immunosuppression, autoimmune hepatitis, demyelinating diseases, lymphoma, and serious infections.  The patient understands that monitoring is required including a PPD at baseline and must alert us or the primary physician if symptoms of infection or other concerning signs are noted. Bexarotene Counseling:  I discussed with the patient the risks of bexarotene including but not limited to hair loss, dry lips/skin/eyes, liver abnormalities, hyperlipidemia, pancreatitis, depression/suicidal ideation, photosensitivity, drug rash/allergic reactions, hypothyroidism, anemia, leukopenia, infection, cataracts, and teratogenicity.  Patient understands that they will need regular blood tests to check lipid profile, liver function tests, white blood cell count, thyroid function tests and pregnancy test if applicable. Drysol Pregnancy And Lactation Text: This medication is considered safe during pregnancy and breast feeding. Isotretinoin Counseling: Patient should get monthly blood tests, not donate blood, not drive at night if vision affected, not share medication, and not undergo elective surgery for 6 months after tx completed. Side effects reviewed, pt to contact office should one occur. Use Enhanced Medication Counseling?: No Methotrexate Pregnancy And Lactation Text: This medication is Pregnancy Category X and is known to cause fetal harm. This medication is excreted in breast milk. Spironolactone Counseling: Patient advised regarding risks of diarrhea, abdominal pain, hyperkalemia, birth defects (for female patients), liver toxicity and renal toxicity. The patient may need blood work to monitor liver and kidney function and potassium levels while on therapy. The patient verbalized understanding of the proper use and possible adverse effects of spironolactone.  All of the patient's questions and concerns were addressed. Topical Retinoid Pregnancy And Lactation Text: This medication is Pregnancy Category C. It is unknown if this medication is excreted in breast milk. Ketoconazole Pregnancy And Lactation Text: This medication is Pregnancy Category C and it isn't know if it is safe during pregnancy. It is also excreted in breast milk and breast feeding isn't recommended. Cellcept Pregnancy And Lactation Text: This medication is Pregnancy Category D and isn't considered safe during pregnancy. It is unknown if this medication is excreted in breast milk. Tremfya Counseling: I discussed with the patient the risks of guselkumab including but not limited to immunosuppression, serious infections, worsening of inflammatory bowel disease and drug reactions.  The patient understands that monitoring is required including a PPD at baseline and must alert us or the primary physician if symptoms of infection or other concerning signs are noted. Nsaids Counseling: NSAID Counseling: I discussed with the patient that NSAIDs should be taken with food. Prolonged use of NSAIDs can result in the development of stomach ulcers.  Patient advised to stop taking NSAIDs if abdominal pain occurs.  The patient verbalized understanding of the proper use and possible adverse effects of NSAIDs.  All of the patient's questions and concerns were addressed. Eucrisa Counseling: Patient may experience a mild burning sensation during topical application. Eucrisa is not approved in children less than 2 years of age. Cyclophosphamide Pregnancy And Lactation Text: This medication is Pregnancy Category D and it isn't considered safe during pregnancy. This medication is excreted in breast milk. 5-Fu Counseling: 5-Fluorouracil Counseling:  I discussed with the patient the risks of 5-fluorouracil including but not limited to erythema, scaling, itching, weeping, crusting, and pain. Topical Sulfur Applications Counseling: Topical Sulfur Counseling: Patient counseled that this medication may cause skin irritation or allergic reactions.  In the event of skin irritation, the patient was advised to reduce the amount of the drug applied or use it less frequently.   The patient verbalized understanding of the proper use and possible adverse effects of topical sulfur application.  All of the patient's questions and concerns were addressed. Topical Retinoid counseling:  Patient advised to apply a pea-sized amount only at bedtime and wait 30 minutes after washing their face before applying.  If too drying, patient may add a non-comedogenic moisturizer. The patient verbalized understanding of the proper use and possible adverse effects of retinoids.  All of the patient's questions and concerns were addressed. Otezla Counseling: The side effects of Otezla were discussed with the patient, including but not limited to worsening or new depression, weight loss, diarrhea, nausea, upper respiratory tract infection, and headache. Patient instructed to call the office should any adverse effect occur.  The patient verbalized understanding of the proper use and possible adverse effects of Otezla.  All the patient's questions and concerns were addressed. Doxepin Pregnancy And Lactation Text: This medication is Pregnancy Category C and it isn't known if it is safe during pregnancy. It is also excreted in breast milk and breast feeding isn't recommended. Otezla Pregnancy And Lactation Text: This medication is Pregnancy Category C and it isn't known if it is safe during pregnancy. It is unknown if it is excreted in breast milk. Protopic Pregnancy And Lactation Text: This medication is Pregnancy Category C. It is unknown if this medication is excreted in breast milk when applied topically. Odomzo Pregnancy And Lactation Text: This medication is Pregnancy Category X and is absolutely contraindicated during pregnancy. It is unknown if it is excreted in breast milk. Dupixent Pregnancy And Lactation Text: This medication likely crosses the placenta but the risk for the fetus is uncertain. This medication is excreted in breast milk. Cimzia Pregnancy And Lactation Text: This medication crosses the placenta but can be considered safe in certain situations. Cimzia may be excreted in breast milk. Zyclara Counseling:  I discussed with the patient the risks of imiquimod including but not limited to erythema, scaling, itching, weeping, crusting, and pain.  Patient understands that the inflammatory response to imiquimod is variable from person to person and was educated regarded proper titration schedule.  If flu-like symptoms develop, patient knows to discontinue the medication and contact us. Rifampin Pregnancy And Lactation Text: This medication is Pregnancy Category C and it isn't know if it is safe during pregnancy. It is also excreted in breast milk and should not be used if you are breast feeding. Cephalexin Counseling: I counseled the patient regarding use of cephalexin as an antibiotic for prophylactic and/or therapeutic purposes. Cephalexin (commonly prescribed under brand name Keflex) is a cephalosporin antibiotic which is active against numerous classes of bacteria, including most skin bacteria. Side effects may include nausea, diarrhea, gastrointestinal upset, rash, hives, yeast infections, and in rare cases, hepatitis, kidney disease, seizures, fever, confusion, neurologic symptoms, and others. Patients with severe allergies to penicillin medications are cautioned that there is about a 10% incidence of cross-reactivity with cephalosporins. When possible, patients with penicillin allergies should use alternatives to cephalosporins for antibiotic therapy. Carac Counseling:  I discussed with the patient the risks of Carac including but not limited to erythema, scaling, itching, weeping, crusting, and pain. Ketoconazole Counseling:   Patient counseled regarding improving absorption with orange juice.  Adverse effects include but are not limited to breast enlargement, headache, diarrhea, nausea, upset stomach, liver function test abnormalities, taste disturbance, and stomach pain.  There is a rare possibility of liver failure that can occur when taking ketoconazole. The patient understands that monitoring of LFTs may be required, especially at baseline. The patient verbalized understanding of the proper use and possible adverse effects of ketoconazole.  All of the patient's questions and concerns were addressed. Isotretinoin Pregnancy And Lactation Text: This medication is Pregnancy Category X and is considered extremely dangerous during pregnancy. It is unknown if it is excreted in breast milk. Hydroxychloroquine Pregnancy And Lactation Text: This medication has been shown to cause fetal harm but it isn't assigned a Pregnancy Risk Category. There are small amounts excreted in breast milk. Tetracycline Pregnancy And Lactation Text: This medication is Pregnancy Category D and not consider safe during pregnancy. It is also excreted in breast milk. Quinolones Pregnancy And Lactation Text: This medication is Pregnancy Category C and it isn't know if it is safe during pregnancy. It is also excreted in breast milk. Valtrex Counseling: I discussed with the patient the risks of valacyclovir including but not limited to kidney damage, nausea, vomiting and severe allergy.  The patient understands that if the infection seems to be worsening or is not improving, they are to call. Detail Level: Zone Oxybutynin Counseling:  I discussed with the patient the risks of oxybutynin including but not limited to skin rash, drowsiness, dry mouth, difficulty urinating, and blurred vision. Drysol Counseling:  I discussed with the patient the risks of drysol/aluminum chloride including but not limited to skin rash, itching, irritation, burning. Tazorac Pregnancy And Lactation Text: This medication is not safe during pregnancy. It is unknown if this medication is excreted in breast milk. Itraconazole Counseling:  I discussed with the patient the risks of itraconazole including but not limited to liver damage, nausea/vomiting, neuropathy, and severe allergy.  The patient understands that this medication is best absorbed when taken with acidic beverages such as non-diet cola or ginger ale.  The patient understands that monitoring is required including baseline LFTs and repeat LFTs at intervals.  The patient understands that they are to contact us or the primary physician if concerning signs are noted. Hydroxyzine Pregnancy And Lactation Text: This medication is not safe during pregnancy and should not be taken. It is also excreted in breast milk and breast feeding isn't recommended. Ivermectin Counseling:  Patient instructed to take medication on an empty stomach with a full glass of water.  Patient informed of potential adverse effects including but not limited to nausea, diarrhea, dizziness, itching, and swelling of the extremities or lymph nodes.  The patient verbalized understanding of the proper use and possible adverse effects of ivermectin.  All of the patient's questions and concerns were addressed. Nsaids Pregnancy And Lactation Text: These medications are considered safe up to 30 weeks gestation. It is excreted in breast milk. Taltz Counseling: I discussed with the patient the risks of ixekizumab including but not limited to immunosuppression, serious infections, worsening of inflammatory bowel disease and drug reactions.  The patient understands that monitoring is required including a PPD at baseline and must alert us or the primary physician if symptoms of infection or other concerning signs are noted. Fluconazole Counseling:  Patient counseled regarding adverse effects of fluconazole including but not limited to headache, diarrhea, nausea, upset stomach, liver function test abnormalities, taste disturbance, and stomach pain.  There is a rare possibility of liver failure that can occur when taking fluconazole.  The patient understands that monitoring of LFTs and kidney function test may be required, especially at baseline. The patient verbalized understanding of the proper use and possible adverse effects of fluconazole.  All of the patient's questions and concerns were addressed. Xolair Counseling:  Patient informed of potential adverse effects including but not limited to fever, muscle aches, rash and allergic reactions.  The patient verbalized understanding of the proper use and possible adverse effects of Xolair.  All of the patient's questions and concerns were addressed. Imiquimod Counseling:  I discussed with the patient the risks of imiquimod including but not limited to erythema, scaling, itching, weeping, crusting, and pain.  Patient understands that the inflammatory response to imiquimod is variable from person to person and was educated regarded proper titration schedule.  If flu-like symptoms develop, patient knows to discontinue the medication and contact us. Benzoyl Peroxide Pregnancy And Lactation Text: This medication is Pregnancy Category C. It is unknown if benzoyl peroxide is excreted in breast milk. Stelara Counseling:  I discussed with the patient the risks of ustekinumab including but not limited to immunosuppression, malignancy, posterior leukoencephalopathy syndrome, and serious infections.  The patient understands that monitoring is required including a PPD at baseline and must alert us or the primary physician if symptoms of infection or other concerning signs are noted. Ilumya Counseling: I discussed with the patient the risks of tildrakizumab including but not limited to immunosuppression, malignancy, posterior leukoencephalopathy syndrome, and serious infections.  The patient understands that monitoring is required including a PPD at baseline and must alert us or the primary physician if symptoms of infection or other concerning signs are noted. Albendazole Counseling:  I discussed with the patient the risks of albendazole including but not limited to cytopenia, kidney damage, nausea/vomiting and severe allergy.  The patient understands that this medication is being used in an off-label manner. Solaraze Pregnancy And Lactation Text: This medication is Pregnancy Category B and is considered safe. There is some data to suggest avoiding during the third trimester. It is unknown if this medication is excreted in breast milk. Bactrim Counseling:  I discussed with the patient the risks of sulfa antibiotics including but not limited to GI upset, allergic reaction, drug rash, diarrhea, dizziness, photosensitivity, and yeast infections.  Rarely, more serious reactions can occur including but not limited to aplastic anemia, agranulocytosis, methemoglobinemia, blood dyscrasias, liver or kidney failure, lung infiltrates or desquamative/blistering drug rashes. Griseofulvin Pregnancy And Lactation Text: This medication is Pregnancy Category X and is known to cause serious birth defects. It is unknown if this medication is excreted in breast milk but breast feeding should be avoided. Infliximab Counseling:  I discussed with the patient the risks of infliximab including but not limited to myelosuppression, immunosuppression, autoimmune hepatitis, demyelinating diseases, lymphoma, and serious infections.  The patient understands that monitoring is required including a PPD at baseline and must alert us or the primary physician if symptoms of infection or other concerning signs are noted. Colchicine Counseling:  Patient counseled regarding adverse effects including but not limited to stomach upset (nausea, vomiting, stomach pain, or diarrhea).  Patient instructed to limit alcohol consumption while taking this medication.  Colchicine may reduce blood counts especially with prolonged use.  The patient understands that monitoring of kidney function and blood counts may be required, especially at baseline. The patient verbalized understanding of the proper use and possible adverse effects of colchicine.  All of the patient's questions and concerns were addressed. Griseofulvin Counseling:  I discussed with the patient the risks of griseofulvin including but not limited to photosensitivity, cytopenia, liver damage, nausea/vomiting and severe allergy.  The patient understands that this medication is best absorbed when taken with a fatty meal (e.g., ice cream or french fries). Doxepin Counseling:  Patient advised that the medication is sedating and not to drive a car after taking this medication. Patient informed of potential adverse effects including but not limited to dry mouth, urinary retention, and blurry vision.  The patient verbalized understanding of the proper use and possible adverse effects of doxepin.  All of the patient's questions and concerns were addressed. Doxycycline Counseling:  Patient counseled regarding possible photosensitivity and increased risk for sunburn.  Patient instructed to avoid sunlight, if possible.  When exposed to sunlight, patients should wear protective clothing, sunglasses, and sunscreen.  The patient was instructed to call the office immediately if the following severe adverse effects occur:  hearing changes, easy bruising/bleeding, severe headache, or vision changes.  The patient verbalized understanding of the proper use and possible adverse effects of doxycycline.  All of the patient's questions and concerns were addressed. Glycopyrrolate Counseling:  I discussed with the patient the risks of glycopyrrolate including but not limited to skin rash, drowsiness, dry mouth, difficulty urinating, and blurred vision. Tazorac Counseling:  Patient advised that medication is irritating and drying.  Patient may need to apply sparingly and wash off after an hour before eventually leaving it on overnight.  The patient verbalized understanding of the proper use and possible adverse effects of tazorac.  All of the patient's questions and concerns were addressed. Rituxan Pregnancy And Lactation Text: This medication is Pregnancy Category C and it isn't know if it is safe during pregnancy. It is unknown if this medication is excreted in breast milk but similar antibodies are known to be excreted. Clofazimine Counseling:  I discussed with the patient the risks of clofazimine including but not limited to skin and eye pigmentation, liver damage, nausea/vomiting, gastrointestinal bleeding and allergy. Azithromycin Counseling:  I discussed with the patient the risks of azithromycin including but not limited to GI upset, allergic reaction, drug rash, diarrhea, and yeast infections. Tetracycline Counseling: Patient counseled regarding possible photosensitivity and increased risk for sunburn.  Patient instructed to avoid sunlight, if possible.  When exposed to sunlight, patients should wear protective clothing, sunglasses, and sunscreen.  The patient was instructed to call the office immediately if the following severe adverse effects occur:  hearing changes, easy bruising/bleeding, severe headache, or vision changes.  The patient verbalized understanding of the proper use and possible adverse effects of tetracycline.  All of the patient's questions and concerns were addressed. Patient understands to avoid pregnancy while on therapy due to potential birth defects. Rifampin Counseling: I discussed with the patient the risks of rifampin including but not limited to liver damage, kidney damage, red-orange body fluids, nausea/vomiting and severe allergy. Cimzia Counseling:  I discussed with the patient the risks of Cimzia including but not limited to immunosuppression, allergic reactions and infections.  The patient understands that monitoring is required including a PPD at baseline and must alert us or the primary physician if symptoms of infection or other concerning signs are noted. Clindamycin Pregnancy And Lactation Text: This medication can be used in pregnancy if certain situations. Clindamycin is also present in breast milk. Oxybutynin Pregnancy And Lactation Text: This medication is Pregnancy Category B and is considered safe during pregnancy. It is unknown if it is excreted in breast milk. Dapsone Pregnancy And Lactation Text: This medication is Pregnancy Category C and is not considered safe during pregnancy or breast feeding. Siliq Counseling:  I discussed with the patient the risks of Siliq including but not limited to new or worsening depression, suicidal thoughts and behavior, immunosuppression, malignancy, posterior leukoencephalopathy syndrome, and serious infections.  The patient understands that monitoring is required including a PPD at baseline and must alert us or the primary physician if symptoms of infection or other concerning signs are noted. There is also a special program designed to monitor depression which is required with Siliq. Bexarotene Pregnancy And Lactation Text: This medication is Pregnancy Category X and should not be given to women who are pregnant or may become pregnant. This medication should not be used if you are breast feeding. Terbinafine Counseling: Patient counseling regarding adverse effects of terbinafine including but not limited to headache, diarrhea, rash, upset stomach, liver function test abnormalities, itching, taste/smell disturbance, nausea, abdominal pain, and flatulence.  There is a rare possibility of liver failure that can occur when taking terbinafine.  The patient understands that a baseline LFT and kidney function test may be required. The patient verbalized understanding of the proper use and possible adverse effects of terbinafine.  All of the patient's questions and concerns were addressed. Valtrex Pregnancy And Lactation Text: this medication is Pregnancy Category B and is considered safe during pregnancy. This medication is not directly found in breast milk but it's metabolite acyclovir is present. Metronidazole Counseling:  I discussed with the patient the risks of metronidazole including but not limited to seizures, nausea/vomiting, a metallic taste in the mouth, nausea/vomiting and severe allergy. Cyclophosphamide Counseling:  I discussed with the patient the risks of cyclophosphamide including but not limited to hair loss, hormonal abnormalities, decreased fertility, abdominal pain, diarrhea, nausea and vomiting, bone marrow suppression and infection. The patient understands that monitoring is required while taking this medication. Xelbetseyz Pregnancy And Lactation Text: This medication is Pregnancy Category D and is not considered safe during pregnancy.  The risk during breast feeding is also uncertain. Azathioprine Counseling:  I discussed with the patient the risks of azathioprine including but not limited to myelosuppression, immunosuppression, hepatotoxicity, lymphoma, and infections.  The patient understands that monitoring is required including baseline LFTs, Creatinine, possible TPMP genotyping and weekly CBCs for the first month and then every 2 weeks thereafter.  The patient verbalized understanding of the proper use and possible adverse effects of azathioprine.  All of the patient's questions and concerns were addressed. Benzoyl Peroxide Counseling: Patient counseled that medicine may cause skin irritation and bleach clothing.  In the event of skin irritation, the patient was advised to reduce the amount of the drug applied or use it less frequently.   The patient verbalized understanding of the proper use and possible adverse effects of benzoyl peroxide.  All of the patient's questions and concerns were addressed. Doxycycline Pregnancy And Lactation Text: This medication is Pregnancy Category D and not consider safe during pregnancy. It is also excreted in breast milk but is considered safe for shorter treatment courses. Cimetidine Counseling:  I discussed with the patient the risks of Cimetidine including but not limited to gynecomastia, headache, diarrhea, nausea, drowsiness, arrhythmias, pancreatitis, skin rashes, psychosis, bone marrow suppression and kidney toxicity. Topical Sulfur Applications Pregnancy And Lactation Text: This medication is Pregnancy Category C and has an unknown safety profile during pregnancy. It is unknown if this topical medication is excreted in breast milk. Azithromycin Pregnancy And Lactation Text: This medication is considered safe during pregnancy and is also secreted in breast milk. Dupixent Counseling: I discussed with the patient the risks of dupilumab including but not limited to eye infection and irritation, cold sores, injection site reactions, worsening of asthma, allergic reactions and increased risk of parasitic infection.  Live vaccines should be avoided while taking dupilumab. Dupilumab will also interact with certain medications such as warfarin and cyclosporine. The patient understands that monitoring is required and they must alert us or the primary physician if symptoms of infection or other concerning signs are noted. Birth Control Pills Pregnancy And Lactation Text: This medication should be avoided if pregnant and for the first 30 days post-partum. Metronidazole Pregnancy And Lactation Text: This medication is Pregnancy Category B and considered safe during pregnancy.  It is also excreted in breast milk. Xolair Pregnancy And Lactation Text: This medication is Pregnancy Category B and is considered safe during pregnancy. This medication is excreted in breast milk. Cephalexin Pregnancy And Lactation Text: This medication is Pregnancy Category B and considered safe during pregnancy.  It is also excreted in breast milk but can be used safely for shorter doses. High Dose Vitamin A Pregnancy And Lactation Text: High dose vitamin A therapy is contraindicated during pregnancy and breast feeding. Prednisone Counseling:  I discussed with the patient the risks of prolonged use of prednisone including but not limited to weight gain, insomnia, osteoporosis, mood changes, diabetes, susceptibility to infection, glaucoma and high blood pressure.  In cases where prednisone use is prolonged, patients should be monitored with blood pressure checks, serum glucose levels and an eye exam.  Additionally, the patient may need to be placed on GI prophylaxis, PCP prophylaxis, and calcium and vitamin D supplementation and/or a bisphosphonate.  The patient verbalized understanding of the proper use and the possible adverse effects of prednisone.  All of the patient's questions and concerns were addressed. Erivedge Counseling- I discussed with the patient the risks of Erivedge including but not limited to nausea, vomiting, diarrhea, constipation, weight loss, changes in the sense of taste, decreased appetite, muscle spasms, and hair loss.  The patient verbalized understanding of the proper use and possible adverse effects of Erivedge.  All of the patient's questions and concerns were addressed. Acitretin Pregnancy And Lactation Text: This medication is Pregnancy Category X and should not be given to women who are pregnant or may become pregnant in the future. This medication is excreted in breast milk. Odomzo Counseling- I discussed with the patient the risks of Odomzo including but not limited to nausea, vomiting, diarrhea, constipation, weight loss, changes in the sense of taste, decreased appetite, muscle spasms, and hair loss.  The patient verbalized understanding of the proper use and possible adverse effects of Odomzo.  All of the patient's questions and concerns were addressed. Minoxidil Counseling: Minoxidil is a topical medication which can increase blood flow where it is applied. It is uncertain how this medication increases hair growth. Side effects are uncommon and include stinging and allergic reactions. Cyclosporine Counseling:  I discussed with the patient the risks of cyclosporine including but not limited to hypertension, gingival hyperplasia,myelosuppression, immunosuppression, liver damage, kidney damage, neurotoxicity, lymphoma, and serious infections. The patient understands that monitoring is required including baseline blood pressure, CBC, CMP, lipid panel and uric acid, and then 1-2 times monthly CMP and blood pressure. Elidel Counseling: Patient may experience a mild burning sensation during topical application. Elidel is not approved in children less than 2 years of age. There have been case reports of hematologic and skin malignancies in patients using topical calcineurin inhibitors although causality is questionable.

## 2020-10-23 NOTE — ED ADULT NURSE NOTE - DOES PATIENT HAVE ADVANCE DIRECTIVE
2/4 criteria on admission- HR 98 WBCs 20k - most likely 2/2 UTI  - CXR- WNL  - EKG - Normal sinus w/ RSR  - pt went to OB/GYN for dysuria about a week ago took 1 day of Macrobid did not complete course, cont. to have urinary urgency  - CFTX 1g q24hr  - +UA, f/u UCx unk 2/4 criteria on admission- HR 98 WBCs 20k - most likely 2/2 Uncomplicated UTI  - CXR- WNL  - EKG - Normal sinus w/ RSR  - pt went to OB/GYN for dysuria about a week ago took 1 day of Macrobid did not complete course, cont. to have urinary urgency  - CFTX 1g q24hr, can transition to cefpodoxime for total 3 day course on d/c  - +UA, f/u UCx

## 2021-03-09 NOTE — PROVIDER CONTACT NOTE (OTHER) - SITUATION
[FreeTextEntry1] : --The patient has been experiencing over the last few months increasing shortness of breath, dyspnea upon exertion and fatigue.  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  The patients cases was evaluated by multidisciplinary team including heart failure/Dr. Wong, cardiac surgery/Dr. Green and cardiac imaging/Dr. Orantes.  It is felt that the patients symptoms are secondary to his coronary-cameral fistula.  The patient is felt to be of prohibitive risk to undergo surgical repair.  Reviewed with the patient potential percutaneous options and approaches to close the fistula.  Potential benefits of percutaneous closure of coronary-cameral fistula.  He was informed that this is an off-label use of  percutaneous closure devices.  Indications and details of the procedure were gone over in great detail.  This includes infection, bleeding, anemia/hemolysis (acute and long-term), pericardial effusion/tamponade, cardiac perforation, embolization of device, myocardial infarction, dissection of the vasculature systems, need for urgent surgery and death.  The patient wishes to discuss with Dr. Wong prior to proceeding.,\par \par All questions and concerns were addressed.  The patient asked me to reach out to his close friend about meeting.  All of her questions and concerns were addressed.\par \par Time spent: 75 minutes\par \par Findings and plan discussed with Dr. Wong.
L 20G IV infiltrated, pt due for IV epogen that is given on 4x/wk. Needed new IV access. RN tried x1 w/ vein finder to access IV, unsuccessful. No IV nurse available.
Manual BP 94/52. Pt ambulated in hallway 10 ft 30 min prior.
Pt voided

## 2021-05-25 NOTE — ED PROVIDER NOTE - CONSTITUTIONAL MENTATION
Hemigard Postcare Instructions: The HEMIGARD strips are to remain completely dry for at least 5-7 days. oriented to person, place, time/situation/alert/awake

## 2021-07-01 NOTE — H&P ADULT - PROBLEM SELECTOR PROBLEM 3
Problem: Adult Inpatient Plan of Care  Goal: Plan of Care Review  7/1/2021 1008 by Suki Olsen RN  Outcome: Met  7/1/2021 1007 by Suki Olsen RN  Outcome: Ongoing, Progressing  Flowsheets (Taken 7/1/2021 1007)  Outcome Summary: Pt discharged to home with eliquis  Goal: Patient-Specific Goal (Individualized)  7/1/2021 1008 by Suki Olsen RN  Outcome: Met  7/1/2021 1007 by Suki Olsen RN  Outcome: Ongoing, Progressing  Goal: Absence of Hospital-Acquired Illness or Injury  7/1/2021 1008 by Suki Olsen RN  Outcome: Met  7/1/2021 1007 by Suki Olsen RN  Outcome: Ongoing, Progressing  Goal: Optimal Comfort and Wellbeing  7/1/2021 1008 by Suki Olsen RN  Outcome: Met  7/1/2021 1007 by Suki Olsen RN  Outcome: Ongoing, Progressing  Goal: Readiness for Transition of Care  7/1/2021 1008 by Suki Olsen RN  Outcome: Met  7/1/2021 1007 by Suki Olsen RN  Outcome: Ongoing, Progressing   Goal Outcome Evaluation:              Outcome Summary: Pt discharged to home with eliquis   ESRD (end stage renal disease)

## 2021-07-23 NOTE — PATIENT PROFILE ADULT. - AS SC BRADEN SENSORY
x3 attempts have been made to contact patient. Letter and mychart message was sent. Encounter closed.    (3) slightly limited

## 2021-09-14 NOTE — SWALLOW VFSS/MBS ASSESSMENT ADULT - SLP GENERAL OBSERVATIONS
Utilized  phone for Farsi interpretation. Pt alert, but impaired ability to follow commands
verbal cues/1 person assist

## 2021-11-18 NOTE — PATIENT PROFILE ADULT. - FUNCTIONAL SCREEN CURRENT LEVEL: EATING, MLM
(3) assistive equipment and person Breath Sounds equal & clear to percussion & auscultation, no accessory muscle use

## 2021-12-19 NOTE — ED PROVIDER NOTE - NS ED ROS FT
Review of Systems    Constitutional: (-) fever, (-) chills, (-) fatigue  HEENT: (-) sore throat, (-) hearing loss, (-) nasal congestion  Cardiovascular: (-) chest pain, (-) syncope  Respiratory: (-) cough, (-) shortness of breath  Gastrointestinal: (-) vomiting, (-) diarrhea, (-) abdominal pain  Musculoskeletal: (-) neck pain, (-) back pain, (+) arm pain  Integumentary: (-) edema, (-) rash  Neurological: (-) headache, (-) altered mental status    Except as documented in the HPI, all other systems are negative.
no

## 2022-03-18 NOTE — PROGRESS NOTE ADULT - SUBJECTIVE AND OBJECTIVE BOX
70 YO female with past medical history of ESRD on HD ( M, W, F)  DM, hypertension, peripheral vascular disease ( status post TMA of the RTfoot) , A Fib,  HLD,  Mod Aortic stenosis,coronary artery disease, h/o Cardiac arrest 2 years ago, with a non functioning AV-graft referred to IR for AVG evaluation possible thrombolysis, thrombectomy, angioplasty and stenting, Possible tunneled HD catheter.  Pt denies a hx of GI bleed, brain bleed, hemoptysis , melena.      NPO status: NPO past midnight   Anticoagulation: Iv heparin on hold since 2 PM.    Allergies: No Known Allergies      PAST MEDICAL & SURGICAL HISTORY:  Hyperlipidemia  Diabetes  Hypertension  Osteomyelitis  Diabetic Neuropathy  Cellulitis of Foot  Edema of Both Legs  Diabetes: Type 2  History of Osteoarthritis  HTN - Hypertension  HLD (Hyperlipidemia)  Status post insertion of percutaneous endoscopic gastrostomy (PEG) tube  H/O tracheostomy  S/P amputation of lesser toe, right: 2013 right great toe, 2nd and 3rd right toes  S/P Amputation of Lesser Toe: Rt 1-3 metatarsal        Pertinent labs:                      10.7   10.00 )-----------( 235      ( 19 Sep 2019 08:33 )             33.7   09-19    130<L>  |  90<L>  |  40<H>  ----------------------------<  95  4.5   |  24  |  4.12<H>    Ca    9.1      19 Sep 2019 06:40  Phos  5.6     09-19  Mg     2.2     09-19    PTT - ( 19 Sep 2019 09:24 )  PTT:52.8 sec    Consent: Procedure/risks/ Benefits explained. Informed consent obtained. Pt verbalizes understanding. <-- Click to add NO pertinent Family History

## 2022-07-26 NOTE — PHYSICAL THERAPY INITIAL EVALUATION ADULT - FOLLOWS COMMANDS/ANSWERS QUESTIONS, REHAB EVAL
Initiate Treatment: \\nRecommend use of a daily broad spectrum sunscreen with SPF30+ applied to sun exposed areas, reapply q2-3 hours while outdoors
Detail Level: Generalized
100% of the time

## 2022-08-18 NOTE — PHYSICAL THERAPY INITIAL EVALUATION ADULT - BALANCE TRAINING, PT EVAL
GOAL: Pt will demonstrate improved static/dynamic standing balance by 1/2 balance grade, in order to improve stability, decrease fall risk and increase independence with transfers and ambulation within 2 weeks.

## 2022-09-20 NOTE — PROGRESS NOTE ADULT - PROBLEM SELECTOR PROBLEM 2
Patient's school has not received 3 forms from our office for patient to take Epi-pen, Benadryl at school  [employer]  [place]  Please advise Mom   Pneumonia, bacterial

## 2022-10-07 NOTE — PROGRESS NOTE ADULT - PROVIDER SPECIALTY LIST ADULT
MEDICARE WELLNESS VISIT + NOTE    CHIEF COMPLAINT:  Nettie Araya presents for her Subsequent Annual Medicare Wellness Visit.   Her additional complaints or concerns are addressed below.      Patient Care Team:  Kaden Barbour MD as PCP - General (Family Practice)  PILAR Jain as Nurse Practitioner (Nurse Practitioner)  Mallika Bo MD as Gynecologist (Female Pelvic Medicine and Reconstructive Surgery)        Patient Active Problem List   Diagnosis   • HLD (hyperlipidemia)   • HTN (hypertension)   • Hypothyroidism   • Stress incontinence, female   • Vitamin D deficiency   • Varicose veins of both lower extremities with inflammation   • Osteopenia   • Major depressive disorder   • Cardiac arrhythmia   • Chronic right shoulder pain   • Primary osteoarthritis involving multiple joints         Past Medical History:   Diagnosis Date   • Cardiac arrhythmia 06/15/2010    RBBB   • HLD (hyperlipidemia)    • HTN (hypertension)    • Hypothyroidism    • Impingement syndrome, shoulder, right    • Major depressive disorder    • Osteopenia    • Stress incontinence, female    • Varicose veins of both lower extremities with inflammation    • Vitamin D deficiency          Past Surgical History:   Procedure Laterality Date   • ----------mammogram----------  10/30/2020   • Bunionectomy      and hammertoe repair   • Colonoscopy diagnostic  10/09/2014    negative exam, mildy suboptimal exam/poor prep   • Colonoscopy w biopsy  12/05/2019    polyps, diverticulosis, hemorrhoids, redundant colon, fair prep   • Tubal ligation      age 46         Social History     Tobacco Use   • Smoking status: Never Smoker   • Smokeless tobacco: Never Used   Vaping Use   • Vaping Use: never used   Substance Use Topics   • Alcohol use: Yes     Comment: occasionally    • Drug use: Never     Family History   Problem Relation Age of Onset   • Diabetes Mother    • Dementia/Alzheimers Mother    • Coronary Artery Disease Mother         CABG  Internal Medicine age 67   • Parkinsonism Father    • Jeremy-Parkinson-White Sister    • Depression Sister    • Syncope Sister         vasovagal   • Diabetes Brother    • Heart Brother         rhythm issues   • Diabetes Brother          Current Outpatient Medications   Medication Sig Dispense Refill   • loteprednol (LOTEMAX) 0.5 % ophthalmic suspension INSTILL 1 DROP INTO BOTH EYES 3 TIMES A DAY     • buPROPion (WELLBUTRIN SR) 100 MG 12 hr tablet TAKE 2 TABLETS TWICE A  tablet 1   • hydrochlorothiazide (MICROZIDE) 12.5 MG capsule TAKE 1 CAPSULE BY MOUTH EVERY DAY IN THE MORNING 90 capsule 0   • levothyroxine (Synthroid) 112 MCG tablet Take 1 tablet by mouth daily (before breakfast). 90 tablet 0   • losartan (COZAAR) 100 MG tablet Take 1 tablet by mouth nightly. 90 tablet 0   • BIOTIN PO Take by mouth daily.     • Calcium Carbonate-Vit D-Min (CALCIUM 1200 PO) Take 1 tablet by mouth daily.     • Cholecalciferol (VITAMIN D3 PO) Take by mouth daily.       No current facility-administered medications for this visit.        The following items on the Medicare Health Risk Assessment were found to be positive  4.) During the past 4 weeks, what was the hardest physical activity you could do for at least 2 minutes?: Light     7b.) Do you feel unsteady when standing or walking?: Yes     11d.) Bodily pain: Often     11e.) Tiredness or Fatigue: Often     11f.) Feeling stressed or overwhelmed: Often     15.) How confident are you that you can control and manage most of your health problems?: Somewhat confident         Vision and Hearing screens: Both screenings were performed and reviewed    Advance Directive:   The patient has the following documents:  Power of  for Health Care  Living Will    Cognitive/Functional Status: no evidence of cognitive dysfunction by direct observation    Opioid Review: Nettie is not taking opioid medications.    Recent PHQ 2/9 Score:    PHQ 2:  Date Adult PHQ 2 Score Adult PHQ 2 Interpretation   10/6/2022  2 No further screening needed       PHQ 9:       DEPRESSION ASSESSMENT/PLAN:  Depression screening is negative no further plan needed.     Body mass index is 29.96 kg/m².    BMI ASSESSMENT/PLAN:  Patient is overweight.    Journal food intake daily        See Patient Instructions section.   Return in about 6 months (around 4/6/2023) for MULTIPLE MEDICAL ISSUES, MENTAL HEALTH.      OUTPATIENT PROGRESS NOTE    Subjective   Chief Complaint Medicare Wellness Visit (Presents for subsequent medicare exam )    HPI     Patient has given consent to record this visit for documentation in their clinical record.    Historian: Self     Mixed hyperlipidemia:  Most recent cholesterol was at 210 mg/dL, triglycerides was at 57 mg/dL, HDL was at 87 mg/dL, LDL was at 112 mg/dL.     Primary hypertension:  Blood pressure measured during rooming today was at 112/58 mmHg. Most recent electrolytes and kidney function test was normal.     Acquired hypothyroidism:  Most recent TSH was normal.     Recurrent major depressive disorder, in partial remission (CMS/[facility]):  Reports her mind being foggy with everything ongoing per patient. Had counseling done a long time ago.     Additional comments:   Denies any symptoms of UTIs.  She moves her bowels a couple of times a week. She has Miralax,a nd uses it as needed.   Health care maintenance:  Screening Labs: Most recent labs reported her numbers looked good.     Medications  Medications were reviewed and updated today.    Histories  I have personally reviewed and updated the patient's past medical, past surgical, family and social histories during today's visit.    Review of Systems    Cardiovascular: Per HPI.   Gastrointestinal: Per HPI.   Genitourinary: Per HPI.   Metabolic/Endocrine: Per HPI.   Psychiatric: Per HPI.     Objective   Visit Vitals  /58 (BP Location: LUE - Left upper extremity, Patient Position: Sitting, Cuff Size: Regular)   Pulse 78   Temp 97.9 °F (36.6 °C) (Temporal)   Resp  18   Ht 5' 3.5\"   Wt 78 kg (171 lb 13.6 oz)   SpO2 99%   BMI 29.96 kg/m²     Physical Exam    Constitutional: alert, in no acute distress and current vital signs reviewed.   Head and Face: atraumatic, no deformities, normocephalic, normal facies.   Eyes: no discharge, normal conjunctiva, no eyelid swelling, no ptosis and the sclerae were normal. pupils equal, round and reactive to light and accommodation, conjugate gaze and extraocular movements were intact.   ENT: normal appearing outer ear, normal appearing nose. examination of the tympanic membrane showed normal landmarks, normal appearing external canal. nasal mucosa moist and pink, no nasal discharge. normal lips. oral mucosa pink and moist, no oral lesions.   Neck: normal appearing neck, supple neck and no mass was seen. Thyroid not enlarged and no thyroid nodules. No lymphadenopathy.   Pulmonary: no respiratory distress, normal respiratory rate and effort and no accessory muscle use. breath sounds clear to auscultation bilaterally.   Cardiovascular: normal rate, no murmurs were heard, regular rhythm, normal S1 and normal S2. edema was not present in the lower extremities.   Abdomen: soft, nontender, nondistended, normal bowel sounds and no abdominal mass. no hepatomegaly and no splenomegaly. no umbilical hernia was discovered.   Musculoskeletal: normal gait. no musculoskeletal erythema was seen, no joint swelling seen and no joint tenderness was elicited. no scoliosis. normal range of motion. there was no joint instability noted. muscle strength and tone were normal.   Neurologic: cranial nerves grossly intact. normal DTRs. no sensory deficits noted. no coordination deficits.  Psychiatric: oriented to person, oriented to place and oriented to time. alert and awake, interactive and mood/affect were appropriate. judgement not impaired. normal attention span. short term memory intact.   Skin, Hair, Nails: normal skin color and pigmentation and no rash. no foot  ulcers and no skin ulcer was seen. normal skin turgor. no clubbing or cyanosis of the fingernails.    Laboratory  · I have reviewed the pertinent laboratory tests.     Imaging  · I have reviewed the pertinent imaging study reports.     Assessment & Plan   Diagnoses and associated orders for this visit:  1. Mixed hyperlipidemia  2. Primary hypertension  3. Varicose veins of both lower extremities with inflammation  4. Acquired hypothyroidism  5. Vitamin D deficiency  6. Recurrent major depressive disorder, in partial remission (CMS/HCC)  7. Osteopenia of multiple sites  8. Primary osteoarthritis involving multiple joints    Mixed hyperlipidemia:  Under good control.   Reviewed and discussed previous reports.  Continue current monitoring regimen.     Primary hypertension:  Under good control.   Reviewed and discussed previous reports.  Continue current medication, to be used as directed.     Varicose veins of both lower extremities with inflammation:  Continue current monitoring regimen.     Acquired hypothyroidism:  Under good control.   Reviewed and discussed previous reports.  Continue current medication, to be used as directed.     Vitamin D deficiency:  Continue current management.     Recurrent major depressive disorder, in partial remission (CMS/HCC):  Supportive counseling done and suggestions given.  Continue current medication, to be used as directed.     Osteopenia of multiple sites:  Continue current management.     Primary osteoarthritis involving multiple joints:  Continue current management.     Additional comments:   Bowel concerns: Recommended OTC Miralax, to be used as directed. Benefits, side effects, and mechanism of action explained.     Health care maintenance:  Reviewed previous lab reports.  Medicare wellness guidelines discussed.   Fall risks, and fall prevention discussed. Avoid falls.  All questions and concerns have been addressed.    Follow-up in 6 months or sooner if needed.    Refer to  orders.  Medical compliance with plan discussed and risks of non-compliance reviewed.  Patient education completed on disease process, etiology & prognosis.  Proper usage and side effects of medications reviewed & discussed.  Return to clinic as clinically indicated as discussed with patient who verbalized understanding of the plan and is in agreement with the plan.    I, Dr. Chaya Heck, have created a visit summary document based on the audio recording between Kaden Barbour MD and this patient for the physician to review, edit as needed, and authenticate.  Creation Date: 10/7/2022    IKaden M.D. [facility] - have reviewed and edited the visit summary above and attest that it is accurate.

## 2022-10-20 NOTE — PROGRESS NOTE ADULT - ASSESSMENT
Shoulder Arthroscopy Operative Note      Patient: Jose Alfredo Novak MRN: [id number]  SSN: [id number]    YOB: 1952  Age: 72 y.o. Sex: male        Date of Surgery: 10/20/2022    Preoperative Diagnosis:  1. Left Shoulder Rotator cuff tear  2. AC arthritis,   3. Impingement  4. Biceps Tendon Rupture    Postoperative Diagnosis:  1. Left Shoulder Rotator cuff tear  2. AC arthritis,   3. Degenerative glenoid labrum Tear  4. Impingement   5. Biceps Tendon Rupture   6. Glenohumeral Arthritis      Procedures: 1. Left Shoulder Scope and Arthroscopic Rotator Cuff Repair   2. Arthroscopic Distal Clavicle Excision   3. Extensive Debridement of Degenerative Labrum tear, Glenohumeral Articular Cartilage and Biceps Stump  4. Open Biceps Tenodesis  5. Subacromial Decompression    Surgeon(s) and Role:     Felisa Banuelos MD (Jody) - Primary     Assistant: Kourtney Ponce PA-C    Anesthesia: General    Pathology: Left Shoulder Rotator cuff tear, Proximal Biceps Rupture, Degenerative glenoid labrum Tear, Impingement, Glenohumeral Arthritis and AC Arthritis    Estimated Blood Loss:  <20ml      Specimens: * No specimens in log *     Condition: Stable    Complications: None     Hospital Problems  Date Reviewed: 9/26/2022   None      Indications: The patient is a 72 y.o. male who has left shoulder impingement, Proximal Biceps Rupture,  AC arthritis and a rotator cuff tear. The patient has exhausted nonoperative modalities and is electively admitted for outpatient right shoulder arthroscopy. PROCEDURE IN DETAIL: After the procedure was explained to the patient, including the risks, benefits and possible complications, the patient signed the informed consent. The patient was then taken to the operating suite.  Following administration of general anesthesia and interscalene block for postoperative pain control and infusion of intravenous antibiotic, the patient was positioned on the operating table in the supine fashion. The  left  shoulder was then examined under anesthesia and noted to be stable through full range of motion. At this point, the patient was then carefully positioned in the lateral decubitus position, right side down. The axillary roll was placed. Care was taken to pad both the upper and lower extremities. The left arm was then placed in the VSoft traction device in 45 degrees abduction and 30 degrees forward flexion with 10 pounds of traction. The left shoulder was then prepped and draped in the sterile fashion. The scope was introduced into the shoulder and diagnostic arthroscopy commenced. Articular surfaces of the humeral head and glenoid were visualized and noted to be significantly Degenerative with grade 3 and 4 change of the humeral and glenoid articular surfaces. The anterior, posterior, superior and inferior labrum were visualized. There was extensive degenerative tearing of the entire labrum, with a number of loose flaps. The biceps anchor and the biceps itself was torn and retracted. Extensive debridement of the shoulder was done of the Biceps Stump, Labrum and articular surfaces to debride back to stable tissue. The undersurface of the rotator cuff was then visualized. There was no significant tearing seen at the insertion. The scope was introduced into the subacromial space. An anterior portal was created for inflow and  lateral portal was established for debridement using a spinal needle for localization. Hypertrophic hemorrhagic bursal tissue was then resected. The bursal side of the cuff was visualized and was torn medially at the musculotendinous junction at a small site. The underside of the acromion was identified and denuded of all soft tissue. An acromioplasty was then performed from the posterior portal using a helicut kieran with the cutting block technique. The Acromion was shaved down to a type one acromion to remove all impingement.  At this point the distal clavicle was 68 year old female with PMH of HTN, DM, ESRD on HD, who presents to [facility] ER s/p fall. She is hyperkalemic and will require HD today.   1. Renal - ESRD on HD - MWF;  hyperkalemia   2. Mechanical fall in light of neuropathic pain   3. Hyperphosphatemia - due to ESRD - indicated for Phos binders  4. Diabetic neuropathy     RECOMMEND:  1. HD today as ordered x 3hours 15 min and will increase the  dialysate flow;  No renal objection to dc home.  Repeat bloods at outpt HD    2. No needlesticks RUE  3. Renal diet  4. Renagel 1600mg PO TID with meals      Physical therapy identified and an arthroscopic distal clavicle resection was then performed. The distal 10mm of distal clavicle was then resected. Care was taken to preserve the posterior/superior capsule. The scope was introduced back into the subacromial space. An arthroscopic rotator cuff repair was then performed utilizing a side to side #2 fiberwire placed from the posterior portal with a suture retriever. It was tied with a rodeur not to close the defect. This yielded an excellent cuff repair on the bursal side. At this point, with the procedure complete, all arthroscopic equipment was removed from the shoulder. An incision was made just distal to the pec over the anterior shoulder and dissection was done down to the biceps. The humerus was exposed with two homans and the biceps was exposed and brought out of the wound and whip stitched. A single hole was placed in the humerus and the sutures were placed through a button that was deployed into the humerus. The biceps was then secured to the humerus. The portals were reapproximated using 2-0 nylon sutures. A sterile dressing was applied. The Sling/immobilzer was applied. The patient was then transferred to the Recovery Room in stable condition. Joi Croea was present for the entirety of the case and was essential in its completion. She assisted with arm positioning and suture management during the repair as well as controlling the scope as the surgeon completed other tasks such as suture passage and knot tying that required two hands to do. Signed: Felisa Erzao MD (10/20/2022 at 9:38 AM)

## 2022-11-23 NOTE — PHYSICAL THERAPY INITIAL EVALUATION ADULT - IMPAIRMENTS FOUND, PT EVAL
aerobic capacity/endurance/gait, locomotion, and balance/muscle strength Mirvaso Counseling: Mirvaso is a topical medication which can decrease superficial blood flow where applied. Side effects are uncommon and include stinging, redness and allergic reactions.

## 2022-12-26 NOTE — DISCHARGE NOTE ADULT - CARE PROVIDERS DIRECT ADDRESSES
To be completed in Nursing note:    Please reference list for forms that require a visit for completion.  Please remind patients that providers are given 3-5 business days to complete and return forms.      Form type: [facility] ~ HOME HEALTH CERTIFICATION AND PLAN OF CARE 12/19/2022 TO 2/16/2023     Date form received: 12/21/2022    Date form completed by Physician: 12/29/2022    How was form returned to patient (mailed, faxed, or at  for patient to ):    Fax to [phone]    Date form mailed/faxed/left at  for patient and sent to HIM for scanning:    Fax on 12/30/2022    Once form is left for patient, faxed, or mailed PCS will then close the documentation only encounter.      ,DirectAddress_Unknown,DirectAddress_Unknown

## 2023-01-25 NOTE — ED PROVIDER NOTE - NS ED MD DISPO ISOLATION TYPES
None Niacinamide Counseling: I recommended taking niacin or niacinamide, also know as vitamin B3, twice daily. Recent evidence suggests that taking vitamin B3 (500 mg twice daily) can reduce the risk of actinic keratoses and non-melanoma skin cancers. Side effects of vitamin B3 include flushing and headache.

## 2023-11-07 NOTE — ED PROVIDER NOTE - NS ED MD TWO NIGHTS YN
Will ask RN to please follow up w/ pharmacist and make sure there are not any dose changes, early fills, or other new concerns I should be aware of. Otherwise, this prescriber is very aware of potential adverse interactions and the patient is as well, and the benefits of this medication plan outweigh the risks.    If they need further info - Hailee has been on stable doses of all three of those medications for years without adverse effects or acute concerns. She has been extensively counseled on risks and benefits of her current medication regimen, which is working well to control her complex regional pain syndrome. She recently consulted with a Pain specialist who did not have any new treatments to offer her and recommended she continue with her current regimen that is working for her. She has great compliance and regular visits with me every 2-3 mos.    Yes

## 2023-11-21 NOTE — ED PROVIDER NOTE - CADM POA VASCULAR ACCESS TYPE
Again attempted to contact patient and again got voicemail.  Left a detailed message about insulin recommendation below.  Patient to call back with any questions.   internal jugular

## 2024-06-20 NOTE — PHYSICAL THERAPY INITIAL EVALUATION ADULT - GENERAL OBSERVATIONS, REHAB EVAL
Addended by: ARTURO PERRY on: 6/20/2024 04:29 PM     Modules accepted: Orders     Rec'd in bed, +tele, +IV

## 2024-10-01 NOTE — ED PROVIDER NOTE - BREATH SOUNDS
-- DO NOT REPLY / DO NOT REPLY ALL --  -- This inbox is not monitored. If this was sent to the wrong provider or department, reroute message to P [facility] Reroute pool. --  -- Message is from Engagement Center Operations ([facility]O) --    Offered Waitlist if Available for the Visit Type? No    Caller is requesting an appointment.    Reason for Appointment Message:  PCP unavailable for TCM post-hospital follow up    Reason for Visit: Patient was discharged 09/29/2024 patient was hospitalized for Diayalsis treatments     Is the patient currently scheduled? No    Preferred time to be seen: Tuesday  or Thursday in the morning     Caller Information       Contact Date/Time Type Contact Phone/Fax    10/01/2024 12:19 PM CDT Phone (Outgoing) Lalo Riddle (Self) [phone] (M)    Spoke with Patient             Alternative phone number: none     Can a detailed message be left?  Yes - [employer] Message   or will leave voice message   Patient has been advised the message will be addressed within 2-3 business days          normal

## 2024-11-25 NOTE — PROGRESS NOTE ADULT - PROBLEM SELECTOR PLAN 1
stable and resolved  Supplemental O2 as needed Tejas Tejada  Cardiovascular Disease  [address], [address]  [place] [address]  Phone: [phone]  Fax: [phone]  Follow Up Time: 1 week    Manish Scales  Geriatric Medicine  [address] [place] [address], [address]  [place] [address]  Phone: [phone]  Fax: [phone]  Follow Up Time: 1 week

## 2025-04-08 NOTE — PROGRESS NOTE ADULT - PROVIDER SPECIALTY LIST ADULT
At goal today.  Continue follow-up and management per cardiology.  Encouraged to take at home blood pressures and contact office if persistently elevated.        Internal Medicine

## 2025-07-30 NOTE — PROCEDURE NOTE - NSINFORMCONSENT_GEN_A_CORE
Detail Level: Detailed Right hand and arm swelling, left face swelling that started yesterday.     Quality 226: Preventive Care And Screening: Tobacco Use: Screening And Cessation Intervention: Patient screened for tobacco use and is an ex/non-smoker Benefits, risks, and possible complications of procedure explained to patient/caregiver who verbalized understanding and gave written consent.